# Patient Record
Sex: MALE | Race: WHITE | NOT HISPANIC OR LATINO | Employment: FULL TIME | URBAN - METROPOLITAN AREA
[De-identification: names, ages, dates, MRNs, and addresses within clinical notes are randomized per-mention and may not be internally consistent; named-entity substitution may affect disease eponyms.]

---

## 2022-03-11 ENCOUNTER — HOSPITAL ENCOUNTER (EMERGENCY)
Facility: CLINIC | Age: 37
Discharge: HOME | End: 2022-03-11
Attending: EMERGENCY MEDICINE | Admitting: EMERGENCY MEDICINE
Payer: COMMERCIAL

## 2022-03-11 VITALS
HEART RATE: 81 BPM | TEMPERATURE: 96.8 F | SYSTOLIC BLOOD PRESSURE: 141 MMHG | OXYGEN SATURATION: 98 % | BODY MASS INDEX: 29.39 KG/M2 | RESPIRATION RATE: 16 BRPM | WEIGHT: 217 LBS | HEIGHT: 72 IN | DIASTOLIC BLOOD PRESSURE: 99 MMHG

## 2022-03-11 DIAGNOSIS — F10.930 ALCOHOL WITHDRAWAL SYNDROME WITHOUT COMPLICATION (H): ICD-10-CM

## 2022-03-11 DIAGNOSIS — F10.10 ALCOHOL ABUSE: ICD-10-CM

## 2022-03-11 LAB
ALBUMIN SERPL-MCNC: 3.8 G/DL (ref 3.4–5)
ALP SERPL-CCNC: 113 U/L (ref 40–150)
ALT SERPL W P-5'-P-CCNC: 78 U/L (ref 0–70)
ANION GAP SERPL CALCULATED.3IONS-SCNC: 7 MMOL/L (ref 3–14)
AST SERPL W P-5'-P-CCNC: 115 U/L (ref 0–45)
BASOPHILS # BLD AUTO: 0 10E3/UL (ref 0–0.2)
BASOPHILS NFR BLD AUTO: 1 %
BILIRUB SERPL-MCNC: 1.6 MG/DL (ref 0.2–1.3)
BUN SERPL-MCNC: 6 MG/DL (ref 7–30)
CALCIUM SERPL-MCNC: 9.6 MG/DL (ref 8.5–10.1)
CHLORIDE BLD-SCNC: 101 MMOL/L (ref 94–109)
CO2 SERPL-SCNC: 26 MMOL/L (ref 20–32)
CREAT SERPL-MCNC: 0.56 MG/DL (ref 0.66–1.25)
EOSINOPHIL # BLD AUTO: 0.2 10E3/UL (ref 0–0.7)
EOSINOPHIL NFR BLD AUTO: 3 %
ERYTHROCYTE [DISTWIDTH] IN BLOOD BY AUTOMATED COUNT: 13.4 % (ref 10–15)
ETHANOL SERPL-MCNC: <0.01 G/DL
GFR SERPL CREATININE-BSD FRML MDRD: >90 ML/MIN/1.73M2
GLUCOSE BLD-MCNC: 122 MG/DL (ref 70–99)
HCT VFR BLD AUTO: 48.7 % (ref 40–53)
HGB BLD-MCNC: 16.7 G/DL (ref 13.3–17.7)
HOLD SPECIMEN: NORMAL
IMM GRANULOCYTES # BLD: 0 10E3/UL
IMM GRANULOCYTES NFR BLD: 0 %
LYMPHOCYTES # BLD AUTO: 0.5 10E3/UL (ref 0.8–5.3)
LYMPHOCYTES NFR BLD AUTO: 8 %
MAGNESIUM SERPL-MCNC: 1.4 MG/DL (ref 1.6–2.3)
MCH RBC QN AUTO: 34.7 PG (ref 26.5–33)
MCHC RBC AUTO-ENTMCNC: 34.3 G/DL (ref 31.5–36.5)
MCV RBC AUTO: 101 FL (ref 78–100)
MONOCYTES # BLD AUTO: 0.7 10E3/UL (ref 0–1.3)
MONOCYTES NFR BLD AUTO: 11 %
NEUTROPHILS # BLD AUTO: 4.8 10E3/UL (ref 1.6–8.3)
NEUTROPHILS NFR BLD AUTO: 77 %
NRBC # BLD AUTO: 0 10E3/UL
NRBC BLD AUTO-RTO: 0 /100
PHOSPHATE SERPL-MCNC: 2.6 MG/DL (ref 2.5–4.5)
PLATELET # BLD AUTO: 166 10E3/UL (ref 150–450)
POTASSIUM BLD-SCNC: 3.7 MMOL/L (ref 3.4–5.3)
POTASSIUM BLD-SCNC: 5.4 MMOL/L (ref 3.4–5.3)
PROT SERPL-MCNC: 8.5 G/DL (ref 6.8–8.8)
RBC # BLD AUTO: 4.81 10E6/UL (ref 4.4–5.9)
SODIUM SERPL-SCNC: 134 MMOL/L (ref 133–144)
WBC # BLD AUTO: 6.2 10E3/UL (ref 4–11)

## 2022-03-11 PROCEDURE — 96375 TX/PRO/DX INJ NEW DRUG ADDON: CPT

## 2022-03-11 PROCEDURE — 82040 ASSAY OF SERUM ALBUMIN: CPT | Performed by: EMERGENCY MEDICINE

## 2022-03-11 PROCEDURE — 36415 COLL VENOUS BLD VENIPUNCTURE: CPT | Performed by: EMERGENCY MEDICINE

## 2022-03-11 PROCEDURE — 82077 ASSAY SPEC XCP UR&BREATH IA: CPT | Performed by: EMERGENCY MEDICINE

## 2022-03-11 PROCEDURE — 84100 ASSAY OF PHOSPHORUS: CPT | Performed by: EMERGENCY MEDICINE

## 2022-03-11 PROCEDURE — 258N000003 HC RX IP 258 OP 636: Performed by: EMERGENCY MEDICINE

## 2022-03-11 PROCEDURE — 250N000013 HC RX MED GY IP 250 OP 250 PS 637: Performed by: EMERGENCY MEDICINE

## 2022-03-11 PROCEDURE — 96365 THER/PROPH/DIAG IV INF INIT: CPT

## 2022-03-11 PROCEDURE — 250N000011 HC RX IP 250 OP 636: Performed by: EMERGENCY MEDICINE

## 2022-03-11 PROCEDURE — 84132 ASSAY OF SERUM POTASSIUM: CPT | Performed by: EMERGENCY MEDICINE

## 2022-03-11 PROCEDURE — 99284 EMERGENCY DEPT VISIT MOD MDM: CPT | Mod: 25

## 2022-03-11 PROCEDURE — 80053 COMPREHEN METABOLIC PANEL: CPT | Performed by: EMERGENCY MEDICINE

## 2022-03-11 PROCEDURE — 96361 HYDRATE IV INFUSION ADD-ON: CPT

## 2022-03-11 PROCEDURE — 83735 ASSAY OF MAGNESIUM: CPT | Performed by: EMERGENCY MEDICINE

## 2022-03-11 PROCEDURE — 85025 COMPLETE CBC W/AUTO DIFF WBC: CPT | Performed by: EMERGENCY MEDICINE

## 2022-03-11 RX ORDER — FOLIC ACID 1 MG/1
1 TABLET ORAL ONCE
Status: COMPLETED | OUTPATIENT
Start: 2022-03-11 | End: 2022-03-11

## 2022-03-11 RX ORDER — MULTIPLE VITAMINS W/ MINERALS TAB 9MG-400MCG
1 TAB ORAL ONCE
Status: COMPLETED | OUTPATIENT
Start: 2022-03-11 | End: 2022-03-11

## 2022-03-11 RX ORDER — DIAZEPAM 5 MG
10 TABLET ORAL EVERY 30 MIN PRN
Status: DISCONTINUED | OUTPATIENT
Start: 2022-03-11 | End: 2022-03-11 | Stop reason: HOSPADM

## 2022-03-11 RX ORDER — MAGNESIUM SULFATE HEPTAHYDRATE 500 MG/ML
2 INJECTION, SOLUTION INTRAMUSCULAR; INTRAVENOUS ONCE
Status: DISCONTINUED | OUTPATIENT
Start: 2022-03-11 | End: 2022-03-11 | Stop reason: CLARIF

## 2022-03-11 RX ORDER — CLONIDINE HYDROCHLORIDE 0.1 MG/1
0.1 TABLET ORAL ONCE
Status: COMPLETED | OUTPATIENT
Start: 2022-03-11 | End: 2022-03-11

## 2022-03-11 RX ORDER — MAGNESIUM SULFATE HEPTAHYDRATE 40 MG/ML
2 INJECTION, SOLUTION INTRAVENOUS ONCE
Status: COMPLETED | OUTPATIENT
Start: 2022-03-11 | End: 2022-03-11

## 2022-03-11 RX ORDER — SODIUM CHLORIDE 9 MG/ML
INJECTION, SOLUTION INTRAVENOUS CONTINUOUS
Status: DISCONTINUED | OUTPATIENT
Start: 2022-03-11 | End: 2022-03-11 | Stop reason: HOSPADM

## 2022-03-11 RX ORDER — DIAZEPAM 10 MG/2ML
5-10 INJECTION, SOLUTION INTRAMUSCULAR; INTRAVENOUS EVERY 30 MIN PRN
Status: DISCONTINUED | OUTPATIENT
Start: 2022-03-11 | End: 2022-03-11 | Stop reason: HOSPADM

## 2022-03-11 RX ORDER — FLUMAZENIL 0.1 MG/ML
0.2 INJECTION, SOLUTION INTRAVENOUS
Status: DISCONTINUED | OUTPATIENT
Start: 2022-03-11 | End: 2022-03-11 | Stop reason: HOSPADM

## 2022-03-11 RX ORDER — ONDANSETRON 2 MG/ML
4 INJECTION INTRAMUSCULAR; INTRAVENOUS ONCE
Status: COMPLETED | OUTPATIENT
Start: 2022-03-11 | End: 2022-03-11

## 2022-03-11 RX ADMIN — MAGNESIUM SULFATE HEPTAHYDRATE 2 G: 40 INJECTION, SOLUTION INTRAVENOUS at 11:58

## 2022-03-11 RX ADMIN — SODIUM CHLORIDE: 9 INJECTION, SOLUTION INTRAVENOUS at 11:32

## 2022-03-11 RX ADMIN — FOLIC ACID 1 MG: 1 TABLET ORAL at 10:45

## 2022-03-11 RX ADMIN — SODIUM CHLORIDE 1000 ML: 9 INJECTION, SOLUTION INTRAVENOUS at 10:33

## 2022-03-11 RX ADMIN — THIAMINE HCL TAB 100 MG 100 MG: 100 TAB at 10:44

## 2022-03-11 RX ADMIN — MULTIPLE VITAMINS W/ MINERALS TAB 1 TABLET: TAB at 10:45

## 2022-03-11 RX ADMIN — ONDANSETRON 4 MG: 2 INJECTION INTRAMUSCULAR; INTRAVENOUS at 10:43

## 2022-03-11 RX ADMIN — CLONIDINE HYDROCHLORIDE 0.1 MG: 0.1 TABLET ORAL at 10:44

## 2022-03-11 ASSESSMENT — ENCOUNTER SYMPTOMS
PALPITATIONS: 1
LIGHT-HEADEDNESS: 1
VOMITING: 1
ABDOMINAL PAIN: 1
SEIZURES: 0
SLEEP DISTURBANCE: 1
NUMBNESS: 1

## 2022-03-11 NOTE — ED PROVIDER NOTES
History     Chief Complaint:  Withdrawal, Hypertension, and Numbness    The history is provided by the patient.      Crispin Ham is a 36 year old male with history of alcoholism, fatty liver, obesity, and hypertension who presents with withdrawal, hypertension, and numbness. The patient reports a history of alcoholism, and has been trying to quit, but will keep relapsing and going on a binge, during which time he will drink 1.75 L of vodka within 2-3 days. His last drink was two days ago. He says he stopped because he started vomiting, was unable to sleep, and has a racing heart. His at-home blood pressure this morning was 150/112; a nurse line told him to come into the ED. As he presents to the ED, the patient complains of vomiting, light-headedness, tingling and numbness in his fingers and toes, and difficulty sleeping. Also endorses some mild abdominal pain. No history of pancreatitis or withdrawal seizures. No history of treatment, and does not feel particularly excited to start treatment at this time.     Review of Systems   Cardiovascular: Positive for palpitations.   Gastrointestinal: Positive for abdominal pain and vomiting.   Neurological: Positive for light-headedness and numbness. Negative for seizures.   Psychiatric/Behavioral: Positive for sleep disturbance.     Allergies:  Aspirin  Lisinopril  Nsaids    Medications:    sertraline (ZOLOFT) 50 MG tablet      Past Medical History:    Fatty Livery  Osteoarthritis of left hip   Morbid obesity  Alcoholism  Hypertension  Ganglion cyst  Anxiety  Depression     Past Surgical History:    ORIF left wrist fracture x2    Family History:    Brother: asthma    Social History:  The patient presents to the ED alone.     Physical Exam     Patient Vitals for the past 24 hrs:   BP Temp Pulse Resp SpO2 Height Weight   03/11/22 1130 (!) 150/93 -- 89 18 95 % -- --   03/11/22 1030 (!) 141/116 -- -- 18 96 % -- --   03/11/22 0958 (!) 178/127 96.8  F (36  C) 106 20 96 %  1.829 m (6') 98.4 kg (217 lb)       Physical Exam  GENERAL: well developed, pleasant, tremulous  HEAD: atraumatic  EYES: pupils reactive, extraocular muscles intact, conjunctivae normal  ENT:  mucus membranes moist  NECK:  trachea midline, normal range of motion  RESPIRATORY: no tachypnea, breath sounds clear to auscultation   CVS: normal S1/S2, no murmurs, intact distal pulses  ABDOMEN: soft, nontender, nondistention  MUSCULOSKELETAL: no deformities  SKIN: warm and dry, no acute rashes or ulceration  NEURO: GCS 15, cranial nerves intact, alert and oriented x3  PSYCH:  Mood/affect normal        Emergency Department Course     Laboratory:  Labs Ordered and Resulted from Time of ED Arrival to Time of ED Departure   COMPREHENSIVE METABOLIC PANEL - Abnormal       Result Value    Sodium 134      Potassium 5.4 (*)     Chloride 101      Carbon Dioxide (CO2) 26      Anion Gap 7      Urea Nitrogen 6 (*)     Creatinine 0.56 (*)     Calcium 9.6      Glucose 122 (*)     Alkaline Phosphatase 113       (*)     ALT 78 (*)     Protein Total 8.5      Albumin 3.8      Bilirubin Total 1.6 (*)     GFR Estimate >90     MAGNESIUM - Abnormal    Magnesium 1.4 (*)    CBC WITH PLATELETS AND DIFFERENTIAL - Abnormal    WBC Count 6.2      RBC Count 4.81      Hemoglobin 16.7      Hematocrit 48.7       (*)     MCH 34.7 (*)     MCHC 34.3      RDW 13.4      Platelet Count 166      % Neutrophils 77      % Lymphocytes 8      % Monocytes 11      % Eosinophils 3      % Basophils 1      % Immature Granulocytes 0      NRBCs per 100 WBC 0      Absolute Neutrophils 4.8      Absolute Lymphocytes 0.5 (*)     Absolute Monocytes 0.7      Absolute Eosinophils 0.2      Absolute Basophils 0.0      Absolute Immature Granulocytes 0.0      Absolute NRBCs 0.0     PHOSPHORUS - Normal    Phosphorus 2.6     ETHYL ALCOHOL LEVEL - Normal    Alcohol ethyl <0.01     POTASSIUM - Normal    Potassium 3.7       Emergency Department Course:       Reviewed:  I  reviewed nursing notes, vitals, past history and care everywhere    Assessments:  1005 I obtained history and examined the patient as noted above.   1305 I rechecked the patient and explained findings. Prepared for discharge.         Interventions:  1033 NS 1 L IV  1043 Zofran 4 mg IV  1044 Catapres 0.1 mg PO  1044 Thiamine 100 g PO  1045 Flovite 1 mg PO   1158 Magnesium sulfate 2 g IV   1245 Thera-Vit-M 1 tablet PO     Disposition:  The patient was discharged to home.    Impression & Plan         Medical Decision Making:  Patient presents with alcohol abuse and periodic binge drinking.  He is not suicidal.  He was given IV fluids, catapres, oral multivitamins, Magnesium IV.  CIWA protocol was initiated and did not require valium or ativan.  Discussed treatment/detox which he wasn't interested in.  He is feeling improved.  I discussed options as an outpatient and resources were given.  He notes he is just now admitting his addiction to family and friends.  I welcomed him back if he changes his mind.  I also discussed possible complications of on going alcohol abuse and withdrawal.    Critical Care time:  none    Diagnosis:    ICD-10-CM    1. Alcohol withdrawal syndrome without complication (H)  F10.230    2. Alcohol abuse  F10.10        Discharge Medications:  None.     Scribe Disclosure:  YARITZA, Anirudh Munoz, am serving as a scribe at 10:04 AM on 3/11/2022 to document services personally performed by Leo Parham MD based on my observations and the provider's statements to me.      Leo Parham MD  03/11/22 [id number]

## 2022-03-11 NOTE — ED TRIAGE NOTES
Was binge drinking, last drink was on Wednesday, shaky, numbness to fingers and toes since yesterday. Blood pressure high.

## 2022-03-11 NOTE — ED NOTES
"Pt states he does not want to go to detox \"I can do it on my own\"  Pt at first stated has been only drinking for 3 days then stated \"I have been drinking heavily since college.\"  I have stopped a few times and go back to drinking.  States he has a therapist and is to embarrassed to go to treatment.  \"I almost didn't come but I was vomiting\"  "

## 2022-04-30 ENCOUNTER — HEALTH MAINTENANCE LETTER (OUTPATIENT)
Age: 37
End: 2022-04-30

## 2022-11-20 ENCOUNTER — HEALTH MAINTENANCE LETTER (OUTPATIENT)
Age: 37
End: 2022-11-20

## 2022-12-24 ENCOUNTER — HEALTH MAINTENANCE LETTER (OUTPATIENT)
Age: 37
End: 2022-12-24

## 2023-01-01 ENCOUNTER — APPOINTMENT (OUTPATIENT)
Dept: ULTRASOUND IMAGING | Facility: CLINIC | Age: 38
DRG: 432 | End: 2023-01-01
Attending: PHYSICIAN ASSISTANT
Payer: COMMERCIAL

## 2023-01-01 ENCOUNTER — APPOINTMENT (OUTPATIENT)
Dept: ULTRASOUND IMAGING | Facility: CLINIC | Age: 38
DRG: 432 | End: 2023-01-01
Attending: INTERNAL MEDICINE
Payer: COMMERCIAL

## 2023-01-01 ENCOUNTER — HOSPITAL ENCOUNTER (EMERGENCY)
Facility: CLINIC | Age: 38
Discharge: HOME | End: 2023-10-30
Attending: EMERGENCY MEDICINE | Admitting: EMERGENCY MEDICINE
Payer: COMMERCIAL

## 2023-01-01 ENCOUNTER — APPOINTMENT (OUTPATIENT)
Dept: CARDIOLOGY | Facility: CLINIC | Age: 38
DRG: 432 | End: 2023-01-01
Attending: INTERNAL MEDICINE
Payer: COMMERCIAL

## 2023-01-01 ENCOUNTER — HOSPITAL ENCOUNTER (INPATIENT)
Facility: CLINIC | Age: 38
LOS: 8 days | Discharge: HOME | DRG: 432 | End: 2023-11-24
Attending: EMERGENCY MEDICINE | Admitting: INTERNAL MEDICINE
Payer: COMMERCIAL

## 2023-01-01 ENCOUNTER — APPOINTMENT (OUTPATIENT)
Dept: GENERAL RADIOLOGY | Facility: CLINIC | Age: 38
DRG: 432 | End: 2023-01-01
Attending: INTERNAL MEDICINE
Payer: COMMERCIAL

## 2023-01-01 ENCOUNTER — APPOINTMENT (OUTPATIENT)
Dept: OCCUPATIONAL THERAPY | Facility: CLINIC | Age: 38
DRG: 432 | End: 2023-01-01
Payer: COMMERCIAL

## 2023-01-01 ENCOUNTER — TELEPHONE (OUTPATIENT)
Facility: CLINIC | Age: 38
End: 2023-01-01
Payer: COMMERCIAL

## 2023-01-01 ENCOUNTER — APPOINTMENT (OUTPATIENT)
Dept: OCCUPATIONAL THERAPY | Facility: CLINIC | Age: 38
DRG: 432 | End: 2023-01-01
Attending: PHYSICIAN ASSISTANT
Payer: COMMERCIAL

## 2023-01-01 ENCOUNTER — APPOINTMENT (OUTPATIENT)
Dept: ULTRASOUND IMAGING | Facility: CLINIC | Age: 38
End: 2023-01-01
Attending: PHYSICIAN ASSISTANT
Payer: COMMERCIAL

## 2023-01-01 ENCOUNTER — APPOINTMENT (OUTPATIENT)
Dept: OCCUPATIONAL THERAPY | Facility: CLINIC | Age: 38
DRG: 432 | End: 2023-01-01
Attending: INTERNAL MEDICINE
Payer: COMMERCIAL

## 2023-01-01 ENCOUNTER — TELEPHONE (OUTPATIENT)
Dept: MEDSURG UNIT | Facility: CLINIC | Age: 38
End: 2023-01-01
Payer: COMMERCIAL

## 2023-01-01 ENCOUNTER — HOSPITAL ENCOUNTER (OUTPATIENT)
Facility: CLINIC | Age: 38
Discharge: HOME | End: 2023-11-09
Admitting: RADIOLOGY
Payer: COMMERCIAL

## 2023-01-01 ENCOUNTER — APPOINTMENT (OUTPATIENT)
Dept: PHYSICAL THERAPY | Facility: CLINIC | Age: 38
DRG: 432 | End: 2023-01-01
Payer: COMMERCIAL

## 2023-01-01 ENCOUNTER — PATIENT OUTREACH (OUTPATIENT)
Dept: CARE COORDINATION | Facility: CLINIC | Age: 38
End: 2023-01-01
Payer: COMMERCIAL

## 2023-01-01 ENCOUNTER — APPOINTMENT (OUTPATIENT)
Dept: PHYSICAL THERAPY | Facility: CLINIC | Age: 38
DRG: 432 | End: 2023-01-01
Attending: PHYSICIAN ASSISTANT
Payer: COMMERCIAL

## 2023-01-01 VITALS
RESPIRATION RATE: 18 BRPM | DIASTOLIC BLOOD PRESSURE: 65 MMHG | TEMPERATURE: 97.6 F | HEIGHT: 72 IN | BODY MASS INDEX: 37.93 KG/M2 | OXYGEN SATURATION: 98 % | HEART RATE: 90 BPM | SYSTOLIC BLOOD PRESSURE: 105 MMHG | WEIGHT: 280 LBS

## 2023-01-01 VITALS
HEART RATE: 96 BPM | RESPIRATION RATE: 18 BRPM | HEIGHT: 72 IN | WEIGHT: 248.8 LBS | SYSTOLIC BLOOD PRESSURE: 106 MMHG | OXYGEN SATURATION: 98 % | DIASTOLIC BLOOD PRESSURE: 60 MMHG | BODY MASS INDEX: 33.7 KG/M2 | TEMPERATURE: 98 F

## 2023-01-01 DIAGNOSIS — K76.9 LIVER DISEASE: ICD-10-CM

## 2023-01-01 DIAGNOSIS — E87.8 ELECTROLYTE ABNORMALITY: ICD-10-CM

## 2023-01-01 DIAGNOSIS — F10.10 ALCOHOL ABUSE: ICD-10-CM

## 2023-01-01 DIAGNOSIS — G89.29 CHRONIC BACK PAIN, UNSPECIFIED BACK LOCATION, UNSPECIFIED BACK PAIN LATERALITY: ICD-10-CM

## 2023-01-01 DIAGNOSIS — K72.00 ACUTE LIVER FAILURE WITHOUT HEPATIC COMA: ICD-10-CM

## 2023-01-01 DIAGNOSIS — K70.11 ALCOHOLIC HEPATITIS WITH ASCITES (H): ICD-10-CM

## 2023-01-01 DIAGNOSIS — K26.9 DUODENAL ULCER DISEASE: ICD-10-CM

## 2023-01-01 DIAGNOSIS — K65.2 SBP (SPONTANEOUS BACTERIAL PERITONITIS) (H): ICD-10-CM

## 2023-01-01 DIAGNOSIS — D64.9 ANEMIA, UNSPECIFIED TYPE: Primary | ICD-10-CM

## 2023-01-01 DIAGNOSIS — M54.9 CHRONIC BACK PAIN, UNSPECIFIED BACK LOCATION, UNSPECIFIED BACK PAIN LATERALITY: ICD-10-CM

## 2023-01-01 DIAGNOSIS — I89.0 LYMPHEDEMA: Primary | ICD-10-CM

## 2023-01-01 LAB
% LINING CELLS, BODY FLUID: 10 %
% LINING CELLS, BODY FLUID: 51 %
% LINING CELLS, BODY FLUID: 82 %
ABO/RH(D): NORMAL
ABO/RH(D): NORMAL
ABSOLUTE NEUTROPHILS, BODY FLUID: 1.7 /UL
ABSOLUTE NEUTROPHILS, BODY FLUID: 72 /UL
ALBUMIN BODY FLUID SOURCE: NORMAL
ALBUMIN FLD-MCNC: 0.5 G/DL
ALBUMIN SERPL BCG-MCNC: 2.6 G/DL (ref 3.5–5.2)
ALBUMIN SERPL BCG-MCNC: 2.7 G/DL (ref 3.5–5.2)
ALBUMIN SERPL BCG-MCNC: 2.9 G/DL (ref 3.5–5.2)
ALBUMIN SERPL BCG-MCNC: 3 G/DL (ref 3.5–5.2)
ALBUMIN SERPL BCG-MCNC: 3.1 G/DL (ref 3.5–5.2)
ALBUMIN UR-MCNC: 100 MG/DL
ALLEN'S TEST: YES
ALP SERPL-CCNC: 185 U/L (ref 40–129)
ALP SERPL-CCNC: 245 U/L (ref 40–150)
ALP SERPL-CCNC: 247 U/L (ref 40–150)
ALP SERPL-CCNC: 263 U/L (ref 40–150)
ALP SERPL-CCNC: 269 U/L (ref 40–150)
ALP SERPL-CCNC: 308 U/L (ref 40–150)
ALP SERPL-CCNC: 312 U/L (ref 40–150)
ALP SERPL-CCNC: 330 U/L (ref 40–150)
ALT SERPL W P-5'-P-CCNC: 35 U/L (ref 0–70)
ALT SERPL W P-5'-P-CCNC: 36 U/L (ref 0–70)
ALT SERPL W P-5'-P-CCNC: 36 U/L (ref 0–70)
ALT SERPL W P-5'-P-CCNC: 38 U/L (ref 0–70)
ALT SERPL W P-5'-P-CCNC: 43 U/L (ref 0–70)
ALT SERPL W P-5'-P-CCNC: 46 U/L (ref 0–70)
ALT SERPL W P-5'-P-CCNC: 48 U/L (ref 0–70)
ALT SERPL W P-5'-P-CCNC: 49 U/L (ref 0–70)
AMMONIA PLAS-SCNC: 80 UMOL/L (ref 16–60)
AMMONIA PLAS-SCNC: 82 UMOL/L (ref 16–60)
AMMONIA PLAS-SCNC: 96 UMOL/L (ref 16–60)
ANION GAP SERPL CALCULATED.3IONS-SCNC: 11 MMOL/L (ref 7–15)
ANION GAP SERPL CALCULATED.3IONS-SCNC: 12 MMOL/L (ref 7–15)
ANION GAP SERPL CALCULATED.3IONS-SCNC: 12 MMOL/L (ref 7–15)
ANION GAP SERPL CALCULATED.3IONS-SCNC: 7 MMOL/L (ref 7–15)
ANION GAP SERPL CALCULATED.3IONS-SCNC: 8 MMOL/L (ref 7–15)
ANION GAP SERPL CALCULATED.3IONS-SCNC: 9 MMOL/L (ref 7–15)
ANTIBODY SCREEN: NEGATIVE
ANTIBODY SCREEN: NEGATIVE
APPEARANCE FLD: ABNORMAL
APPEARANCE FLD: ABNORMAL
APPEARANCE FLD: CLEAR
APPEARANCE UR: ABNORMAL
AST SERPL W P-5'-P-CCNC: 113 U/L (ref 0–45)
AST SERPL W P-5'-P-CCNC: 141 U/L (ref 0–45)
AST SERPL W P-5'-P-CCNC: 146 U/L (ref 0–45)
AST SERPL W P-5'-P-CCNC: 192 U/L (ref 0–45)
AST SERPL W P-5'-P-CCNC: 203 U/L (ref 0–45)
AST SERPL W P-5'-P-CCNC: 205 U/L (ref 0–45)
AST SERPL W P-5'-P-CCNC: 222 U/L (ref 0–45)
AST SERPL W P-5'-P-CCNC: ABNORMAL U/L
BACTERIA #/AREA URNS HPF: ABNORMAL /HPF
BACTERIA BLD CULT: NO GROWTH
BACTERIA BLD CULT: NO GROWTH
BACTERIA FLD CULT: NO GROWTH
BACTERIA FLD CULT: NORMAL
BACTERIA PLR CULT: NO GROWTH
BACTERIA PRT CULT: ABNORMAL
BACTERIA PRT CULT: NO GROWTH
BASE EXCESS BLDA CALC-SCNC: 1 MMOL/L (ref -9–1.8)
BASE EXCESS BLDV CALC-SCNC: 2.7 MMOL/L (ref -7.7–1.9)
BASOPHILS # BLD AUTO: 0 10E3/UL (ref 0–0.2)
BASOPHILS # BLD AUTO: 0 10E3/UL (ref 0–0.2)
BASOPHILS # BLD AUTO: 0.1 10E3/UL (ref 0–0.2)
BASOPHILS NFR BLD AUTO: 1 %
BASOPHILS NFR BLD AUTO: 2 %
BASOPHILS NFR FLD MANUAL: 1 %
BASOPHILS NFR FLD MANUAL: 1 %
BASOPHILS NFR FLD MANUAL: 3 %
BILIRUB DIRECT SERPL-MCNC: 3.19 MG/DL (ref 0–0.3)
BILIRUB DIRECT SERPL-MCNC: 3.34 MG/DL (ref 0–0.3)
BILIRUB DIRECT SERPL-MCNC: 3.89 MG/DL (ref 0–0.3)
BILIRUB SERPL-MCNC: 6.1 MG/DL
BILIRUB SERPL-MCNC: 7 MG/DL
BILIRUB SERPL-MCNC: 7.1 MG/DL
BILIRUB SERPL-MCNC: 7.1 MG/DL
BILIRUB SERPL-MCNC: 7.5 MG/DL
BILIRUB SERPL-MCNC: 7.8 MG/DL
BILIRUB SERPL-MCNC: 8.1 MG/DL
BILIRUB SERPL-MCNC: 8.4 MG/DL
BILIRUB UR QL STRIP: ABNORMAL
BLD PROD TYP BPU: NORMAL
BLOOD COMPONENT TYPE: NORMAL
BUN SERPL-MCNC: 18.1 MG/DL (ref 6–20)
BUN SERPL-MCNC: 3.1 MG/DL (ref 6–20)
BUN SERPL-MCNC: 3.3 MG/DL (ref 6–20)
BUN SERPL-MCNC: 3.4 MG/DL (ref 6–20)
BUN SERPL-MCNC: 4.5 MG/DL (ref 6–20)
BUN SERPL-MCNC: 5.3 MG/DL (ref 6–20)
BUN SERPL-MCNC: 5.4 MG/DL (ref 6–20)
BUN SERPL-MCNC: 6.1 MG/DL (ref 6–20)
BUN SERPL-MCNC: 6.7 MG/DL (ref 6–20)
CALCIUM SERPL-MCNC: 8.8 MG/DL (ref 8.6–10)
CALCIUM SERPL-MCNC: 8.9 MG/DL (ref 8.6–10)
CALCIUM SERPL-MCNC: 8.9 MG/DL (ref 8.6–10)
CALCIUM SERPL-MCNC: 9.1 MG/DL (ref 8.6–10)
CALCIUM SERPL-MCNC: 9.1 MG/DL (ref 8.6–10)
CALCIUM SERPL-MCNC: 9.2 MG/DL (ref 8.6–10)
CALCIUM SERPL-MCNC: 9.2 MG/DL (ref 8.6–10)
CALCIUM SERPL-MCNC: 9.3 MG/DL (ref 8.6–10)
CALCIUM SERPL-MCNC: 9.4 MG/DL (ref 8.6–10)
CAOX CRY #/AREA URNS HPF: ABNORMAL /HPF
CELL COUNT BODY FLUID SOURCE: ABNORMAL
CELL COUNT BODY FLUID SOURCE: ABNORMAL
CELL COUNT BODY FLUID SOURCE: NORMAL
CHLORIDE SERPL-SCNC: 102 MMOL/L (ref 98–107)
CHLORIDE SERPL-SCNC: 103 MMOL/L (ref 98–107)
CHLORIDE SERPL-SCNC: 104 MMOL/L (ref 98–107)
CHLORIDE SERPL-SCNC: 104 MMOL/L (ref 98–107)
CHLORIDE SERPL-SCNC: 105 MMOL/L (ref 98–107)
CHLORIDE SERPL-SCNC: 107 MMOL/L (ref 98–107)
CODING SYSTEM: NORMAL
COLOR FLD: ABNORMAL
COLOR FLD: YELLOW
COLOR FLD: YELLOW
COLOR UR AUTO: ABNORMAL
CREAT SERPL-MCNC: 0.4 MG/DL (ref 0.67–1.17)
CREAT SERPL-MCNC: 0.45 MG/DL (ref 0.67–1.17)
CREAT SERPL-MCNC: 0.47 MG/DL (ref 0.67–1.17)
CREAT SERPL-MCNC: 0.49 MG/DL (ref 0.67–1.17)
CREAT SERPL-MCNC: 0.5 MG/DL (ref 0.67–1.17)
CREAT SERPL-MCNC: 0.54 MG/DL (ref 0.67–1.17)
CREAT SERPL-MCNC: 0.56 MG/DL (ref 0.67–1.17)
CREAT SERPL-MCNC: 0.7 MG/DL (ref 0.67–1.17)
CREAT SERPL-MCNC: 0.94 MG/DL (ref 0.67–1.17)
CROSSMATCH: NORMAL
DEPRECATED HCO3 PLAS-SCNC: 20 MMOL/L (ref 22–29)
DEPRECATED HCO3 PLAS-SCNC: 20 MMOL/L (ref 22–29)
DEPRECATED HCO3 PLAS-SCNC: 22 MMOL/L (ref 22–29)
DEPRECATED HCO3 PLAS-SCNC: 24 MMOL/L (ref 22–29)
DEPRECATED HCO3 PLAS-SCNC: 26 MMOL/L (ref 22–29)
DEPRECATED HCO3 PLAS-SCNC: 26 MMOL/L (ref 22–29)
EGFRCR SERPLBLD CKD-EPI 2021: >90 ML/MIN/1.73M2
EOSINOPHIL # BLD AUTO: 0.2 10E3/UL (ref 0–0.7)
EOSINOPHIL # BLD AUTO: 0.2 10E3/UL (ref 0–0.7)
EOSINOPHIL # BLD AUTO: 0.3 10E3/UL (ref 0–0.7)
EOSINOPHIL # BLD AUTO: 0.6 10E3/UL (ref 0–0.7)
EOSINOPHIL NFR BLD AUTO: 3 %
EOSINOPHIL NFR BLD AUTO: 3 %
EOSINOPHIL NFR BLD AUTO: 4 %
EOSINOPHIL NFR BLD AUTO: 6 %
EOSINOPHIL NFR BLD AUTO: 6 %
EOSINOPHIL NFR BLD AUTO: 7 %
EOSINOPHIL NFR FLD MANUAL: 1 %
ERYTHROCYTE [DISTWIDTH] IN BLOOD BY AUTOMATED COUNT: 14.9 % (ref 10–15)
ERYTHROCYTE [DISTWIDTH] IN BLOOD BY AUTOMATED COUNT: 14.9 % (ref 10–15)
ERYTHROCYTE [DISTWIDTH] IN BLOOD BY AUTOMATED COUNT: 15.2 % (ref 10–15)
ERYTHROCYTE [DISTWIDTH] IN BLOOD BY AUTOMATED COUNT: 15.4 % (ref 10–15)
ERYTHROCYTE [DISTWIDTH] IN BLOOD BY AUTOMATED COUNT: 17 % (ref 10–15)
ERYTHROCYTE [DISTWIDTH] IN BLOOD BY AUTOMATED COUNT: 18.1 % (ref 10–15)
ERYTHROCYTE [DISTWIDTH] IN BLOOD BY AUTOMATED COUNT: 18.4 % (ref 10–15)
ETHANOL SERPL-MCNC: 0.22 G/DL
FERRITIN SERPL-MCNC: 3593 NG/ML (ref 31–409)
FOLATE SERPL-MCNC: 7.6 NG/ML (ref 4.6–34.8)
GLUCOSE BLDC GLUCOMTR-MCNC: 108 MG/DL (ref 70–99)
GLUCOSE BLDC GLUCOMTR-MCNC: 116 MG/DL (ref 70–99)
GLUCOSE BODY FLUID SOURCE: NORMAL
GLUCOSE FLD-MCNC: 123 MG/DL
GLUCOSE SERPL-MCNC: 100 MG/DL (ref 70–99)
GLUCOSE SERPL-MCNC: 101 MG/DL (ref 70–99)
GLUCOSE SERPL-MCNC: 102 MG/DL (ref 70–99)
GLUCOSE SERPL-MCNC: 102 MG/DL (ref 70–99)
GLUCOSE SERPL-MCNC: 115 MG/DL (ref 70–99)
GLUCOSE SERPL-MCNC: 118 MG/DL (ref 70–99)
GLUCOSE SERPL-MCNC: 127 MG/DL (ref 70–99)
GLUCOSE SERPL-MCNC: 135 MG/DL (ref 70–99)
GLUCOSE SERPL-MCNC: 98 MG/DL (ref 70–99)
GLUCOSE UR STRIP-MCNC: 50 MG/DL
GRAM STAIN RESULT: NORMAL
GRANULAR CAST: 20 /LPF
HCO3 BLD-SCNC: 25 MMOL/L (ref 21–28)
HCO3 BLDV-SCNC: 27 MMOL/L (ref 21–28)
HCT VFR BLD AUTO: 19.6 % (ref 40–53)
HCT VFR BLD AUTO: 20.3 % (ref 40–53)
HCT VFR BLD AUTO: 20.6 % (ref 40–53)
HCT VFR BLD AUTO: 20.6 % (ref 40–53)
HCT VFR BLD AUTO: 21 % (ref 40–53)
HCT VFR BLD AUTO: 21.6 % (ref 40–53)
HCT VFR BLD AUTO: 22.9 % (ref 40–53)
HGB BLD-MCNC: 6.7 G/DL (ref 13.3–17.7)
HGB BLD-MCNC: 6.9 G/DL (ref 13.3–17.7)
HGB BLD-MCNC: 7 G/DL (ref 13.3–17.7)
HGB BLD-MCNC: 7.1 G/DL (ref 13.3–17.7)
HGB BLD-MCNC: 7.2 G/DL (ref 13.3–17.7)
HGB BLD-MCNC: 7.4 G/DL (ref 13.3–17.7)
HGB BLD-MCNC: 7.4 G/DL (ref 13.3–17.7)
HGB BLD-MCNC: 7.5 G/DL (ref 13.3–17.7)
HGB BLD-MCNC: 7.6 G/DL (ref 13.3–17.7)
HGB BLD-MCNC: 7.7 G/DL (ref 13.3–17.7)
HGB BLD-MCNC: 7.8 G/DL (ref 13.3–17.7)
HGB BLD-MCNC: 8 G/DL (ref 13.3–17.7)
HGB BLD-MCNC: 8.1 G/DL (ref 13.3–17.7)
HGB BLD-MCNC: 8.2 G/DL (ref 13.3–17.7)
HGB BLD-MCNC: 8.4 G/DL (ref 13.3–17.7)
HGB BLD-MCNC: 8.5 G/DL (ref 13.3–17.7)
HGB BLD-MCNC: 8.8 G/DL (ref 13.3–17.7)
HGB UR QL STRIP: ABNORMAL
HOLD SPECIMEN: NORMAL
HYALINE CASTS: 20 /LPF
IMM GRANULOCYTES # BLD: 0 10E3/UL
IMM GRANULOCYTES # BLD: 0.1 10E3/UL
IMM GRANULOCYTES # BLD: 0.1 10E3/UL
IMM GRANULOCYTES NFR BLD: 0 %
IMM GRANULOCYTES NFR BLD: 1 %
INR PPP: 2.65 (ref 0.85–1.15)
INR PPP: 2.68 (ref 0.85–1.15)
INR PPP: 2.88 (ref 0.85–1.15)
INR PPP: 3.01 (ref 0.85–1.15)
IRON BINDING CAPACITY (ROCHE): NORMAL
IRON SATN MFR SERPL: NORMAL %
IRON SERPL-MCNC: 96 UG/DL (ref 61–157)
ISSUE DATE AND TIME: NORMAL
KETONES UR STRIP-MCNC: NEGATIVE MG/DL
LACTATE SERPL-SCNC: 2.4 MMOL/L (ref 0.7–2)
LACTATE SERPL-SCNC: 3 MMOL/L (ref 0.7–2)
LACTATE SERPL-SCNC: 3.8 MMOL/L (ref 0.7–2)
LACTATE SERPL-SCNC: 4.3 MMOL/L (ref 0.7–2)
LACTATE SERPL-SCNC: 4.6 MMOL/L (ref 0.7–2)
LACTATE SERPL-SCNC: 4.9 MMOL/L (ref 0.7–2)
LD BODY BODY FLUID SOURCE: NORMAL
LDH FLD L TO P-CCNC: 63 U/L
LDH SERPL L TO P-CCNC: 189 U/L (ref 0–250)
LEUKOCYTE ESTERASE UR QL STRIP: NEGATIVE
LVEF ECHO: NORMAL
LYMPHOCYTES # BLD AUTO: 0.7 10E3/UL (ref 0.8–5.3)
LYMPHOCYTES # BLD AUTO: 1.3 10E3/UL (ref 0.8–5.3)
LYMPHOCYTES # BLD AUTO: 1.3 10E3/UL (ref 0.8–5.3)
LYMPHOCYTES NFR BLD AUTO: 13 %
LYMPHOCYTES NFR BLD AUTO: 16 %
LYMPHOCYTES NFR BLD AUTO: 18 %
LYMPHOCYTES NFR BLD AUTO: 9 %
LYMPHOCYTES NFR FLD MANUAL: 15 %
LYMPHOCYTES NFR FLD MANUAL: 37 %
LYMPHOCYTES NFR FLD MANUAL: 5 %
MAGNESIUM SERPL-MCNC: 1.3 MG/DL (ref 1.7–2.3)
MAGNESIUM SERPL-MCNC: 1.5 MG/DL (ref 1.7–2.3)
MAGNESIUM SERPL-MCNC: 1.5 MG/DL (ref 1.7–2.3)
MAGNESIUM SERPL-MCNC: 1.6 MG/DL (ref 1.7–2.3)
MAGNESIUM SERPL-MCNC: 1.6 MG/DL (ref 1.7–2.3)
MAGNESIUM SERPL-MCNC: 1.7 MG/DL (ref 1.7–2.3)
MAGNESIUM SERPL-MCNC: 1.8 MG/DL (ref 1.7–2.3)
MAGNESIUM SERPL-MCNC: 1.8 MG/DL (ref 1.7–2.3)
MAGNESIUM SERPL-MCNC: 2 MG/DL (ref 1.7–2.3)
MAGNESIUM SERPL-MCNC: 2.4 MG/DL (ref 1.7–2.3)
MCH RBC QN AUTO: 34.4 PG (ref 26.5–33)
MCH RBC QN AUTO: 34.5 PG (ref 26.5–33)
MCH RBC QN AUTO: 34.8 PG (ref 26.5–33)
MCH RBC QN AUTO: 35.4 PG (ref 26.5–33)
MCH RBC QN AUTO: 36 PG (ref 26.5–33)
MCH RBC QN AUTO: 36.1 PG (ref 26.5–33)
MCH RBC QN AUTO: 36.1 PG (ref 26.5–33)
MCHC RBC AUTO-ENTMCNC: 33.8 G/DL (ref 31.5–36.5)
MCHC RBC AUTO-ENTMCNC: 34 G/DL (ref 31.5–36.5)
MCHC RBC AUTO-ENTMCNC: 34 G/DL (ref 31.5–36.5)
MCHC RBC AUTO-ENTMCNC: 34.2 G/DL (ref 31.5–36.5)
MCHC RBC AUTO-ENTMCNC: 34.3 G/DL (ref 31.5–36.5)
MCHC RBC AUTO-ENTMCNC: 34.5 G/DL (ref 31.5–36.5)
MCHC RBC AUTO-ENTMCNC: 35.4 G/DL (ref 31.5–36.5)
MCV RBC AUTO: 101 FL (ref 78–100)
MCV RBC AUTO: 101 FL (ref 78–100)
MCV RBC AUTO: 102 FL (ref 78–100)
MCV RBC AUTO: 103 FL (ref 78–100)
MCV RBC AUTO: 103 FL (ref 78–100)
MCV RBC AUTO: 106 FL (ref 78–100)
MCV RBC AUTO: 106 FL (ref 78–100)
MONOCYTES # BLD AUTO: 0.4 10E3/UL (ref 0–1.3)
MONOCYTES # BLD AUTO: 0.4 10E3/UL (ref 0–1.3)
MONOCYTES # BLD AUTO: 0.5 10E3/UL (ref 0–1.3)
MONOCYTES # BLD AUTO: 0.7 10E3/UL (ref 0–1.3)
MONOCYTES # BLD AUTO: 0.8 10E3/UL (ref 0–1.3)
MONOCYTES # BLD AUTO: 1 10E3/UL (ref 0–1.3)
MONOCYTES NFR BLD AUTO: 11 %
MONOCYTES NFR BLD AUTO: 8 %
MONOCYTES NFR BLD AUTO: 9 %
MONOCYTES NFR BLD AUTO: 9 %
MONOS+MACROS NFR FLD MANUAL: 12 %
MONOS+MACROS NFR FLD MANUAL: 61 %
MONOS+MACROS NFR FLD MANUAL: 9 %
MUCOUS THREADS #/AREA URNS LPF: PRESENT /LPF
NEUTROPHILS # BLD AUTO: 2.5 10E3/UL (ref 1.6–8.3)
NEUTROPHILS # BLD AUTO: 2.6 10E3/UL (ref 1.6–8.3)
NEUTROPHILS # BLD AUTO: 4.1 10E3/UL (ref 1.6–8.3)
NEUTROPHILS # BLD AUTO: 5.9 10E3/UL (ref 1.6–8.3)
NEUTROPHILS # BLD AUTO: 6.7 10E3/UL (ref 1.6–8.3)
NEUTROPHILS # BLD AUTO: 7.7 10E3/UL (ref 1.6–8.3)
NEUTROPHILS NFR BLD AUTO: 63 %
NEUTROPHILS NFR BLD AUTO: 66 %
NEUTROPHILS NFR BLD AUTO: 68 %
NEUTROPHILS NFR BLD AUTO: 72 %
NEUTROPHILS NFR BLD AUTO: 74 %
NEUTROPHILS NFR BLD AUTO: 77 %
NEUTS BAND NFR FLD MANUAL: 1 %
NEUTS BAND NFR FLD MANUAL: 11 %
NEUTS BAND NFR FLD MANUAL: NORMAL %
NITRATE UR QL: NEGATIVE
NRBC # BLD AUTO: 0 10E3/UL
NRBC BLD AUTO-RTO: 0 /100
NT-PROBNP SERPL-MCNC: 93 PG/ML (ref 0–450)
O2/TOTAL GAS SETTING VFR VENT: 1 %
O2/TOTAL GAS SETTING VFR VENT: 8 %
OXYHGB MFR BLD: 97 % (ref 92–100)
PATH REPORT.COMMENTS IMP SPEC: NORMAL
PATH REPORT.FINAL DX SPEC: NORMAL
PATH REPORT.GROSS SPEC: NORMAL
PATH REPORT.MICROSCOPIC SPEC OTHER STN: NORMAL
PATH REPORT.RELEVANT HX SPEC: NORMAL
PCO2 BLD: 34 MM HG (ref 35–45)
PCO2 BLDV: 39 MM HG (ref 40–50)
PH BLD: 7.47 [PH] (ref 7.35–7.45)
PH BLDV: 7.45 [PH] (ref 7.32–7.43)
PH UR STRIP: 6 [PH] (ref 5–7)
PHOSPHATE SERPL-MCNC: 2.4 MG/DL (ref 2.5–4.5)
PHOSPHATE SERPL-MCNC: 2.8 MG/DL (ref 2.5–4.5)
PHOSPHATE SERPL-MCNC: 2.8 MG/DL (ref 2.5–4.5)
PHOSPHATE SERPL-MCNC: 2.9 MG/DL (ref 2.5–4.5)
PHOSPHATE SERPL-MCNC: 2.9 MG/DL (ref 2.5–4.5)
PHOSPHATE SERPL-MCNC: 3.1 MG/DL (ref 2.5–4.5)
PHOSPHATE SERPL-MCNC: 3.5 MG/DL (ref 2.5–4.5)
PHOSPHATE SERPL-MCNC: 3.5 MG/DL (ref 2.5–4.5)
PLATELET # BLD AUTO: 101 10E3/UL (ref 150–450)
PLATELET # BLD AUTO: 138 10E3/UL (ref 150–450)
PLATELET # BLD AUTO: 221 10E3/UL (ref 150–450)
PLATELET # BLD AUTO: 221 10E3/UL (ref 150–450)
PLATELET # BLD AUTO: 62 10E3/UL (ref 150–450)
PLATELET # BLD AUTO: 63 10E3/UL (ref 150–450)
PLATELET # BLD AUTO: 74 10E3/UL (ref 150–450)
PLATELET # BLD AUTO: 83 10E3/UL (ref 150–450)
PO2 BLD: 105 MM HG (ref 80–105)
PO2 BLDV: 43 MM HG (ref 25–47)
POTASSIUM SERPL-SCNC: 2.9 MMOL/L (ref 3.4–5.3)
POTASSIUM SERPL-SCNC: 3.1 MMOL/L (ref 3.4–5.3)
POTASSIUM SERPL-SCNC: 3.2 MMOL/L (ref 3.4–5.3)
POTASSIUM SERPL-SCNC: 3.3 MMOL/L (ref 3.4–5.3)
POTASSIUM SERPL-SCNC: 3.3 MMOL/L (ref 3.4–5.3)
POTASSIUM SERPL-SCNC: 3.4 MMOL/L (ref 3.4–5.3)
POTASSIUM SERPL-SCNC: 3.5 MMOL/L (ref 3.4–5.3)
POTASSIUM SERPL-SCNC: 3.6 MMOL/L (ref 3.4–5.3)
POTASSIUM SERPL-SCNC: 3.6 MMOL/L (ref 3.4–5.3)
POTASSIUM SERPL-SCNC: 3.8 MMOL/L (ref 3.4–5.3)
PROT FLD-MCNC: 1.1 G/DL
PROT FLD-MCNC: 1.6 G/DL
PROT SERPL-MCNC: 5.9 G/DL (ref 6.4–8.3)
PROT SERPL-MCNC: 6.1 G/DL (ref 6.4–8.3)
PROT SERPL-MCNC: 6.4 G/DL (ref 6.4–8.3)
PROT SERPL-MCNC: 6.8 G/DL (ref 6.4–8.3)
PROT SERPL-MCNC: 6.8 G/DL (ref 6.4–8.3)
PROT SERPL-MCNC: 7 G/DL (ref 6.4–8.3)
PROT SERPL-MCNC: 7.5 G/DL (ref 6.4–8.3)
PROTEIN BODY FLUID SOURCE: NORMAL
PROTEIN BODY FLUID SOURCE: NORMAL
RBC # BLD AUTO: 1.94 10E6/UL (ref 4.4–5.9)
RBC # BLD AUTO: 1.98 10E6/UL (ref 4.4–5.9)
RBC # BLD AUTO: 2.04 10E6/UL (ref 4.4–5.9)
RBC # BLD AUTO: 2.15 10E6/UL (ref 4.4–5.9)
RBC # BLD AUTO: 2.25 10E6/UL (ref 4.4–5.9)
RBC URINE: 6 /HPF
SODIUM SERPL-SCNC: 135 MMOL/L (ref 135–145)
SODIUM SERPL-SCNC: 136 MMOL/L (ref 135–145)
SODIUM SERPL-SCNC: 137 MMOL/L (ref 135–145)
SODIUM SERPL-SCNC: 139 MMOL/L (ref 135–145)
SP GR UR STRIP: 1.03 (ref 1–1.03)
SPECIMEN EXPIRATION DATE: NORMAL
SPECIMEN EXPIRATION DATE: NORMAL
SQUAMOUS EPITHELIAL: <1 /HPF
UNIT ABO/RH: NORMAL
UNIT NUMBER: NORMAL
UNIT STATUS: NORMAL
UNIT TYPE ISBT: 600
UNIT TYPE ISBT: 6200
UROBILINOGEN UR STRIP-MCNC: 12 MG/DL
VIT B12 SERPL-MCNC: 1478 PG/ML (ref 232–1245)
WBC # BLD AUTO: 10.3 10E3/UL (ref 4–11)
WBC # BLD AUTO: 3.9 10E3/UL (ref 4–11)
WBC # BLD AUTO: 4 10E3/UL (ref 4–11)
WBC # BLD AUTO: 5.6 10E3/UL (ref 4–11)
WBC # BLD AUTO: 7.6 10E3/UL (ref 4–11)
WBC # BLD AUTO: 9.6 10E3/UL (ref 4–11)
WBC # BLD AUTO: 9.6 10E3/UL (ref 4–11)
WBC # FLD AUTO: 169 /UL
WBC # FLD AUTO: 648 /UL
WBC # FLD AUTO: 809 /UL
WBC URINE: 8 /HPF

## 2023-01-01 PROCEDURE — 36415 COLL VENOUS BLD VENIPUNCTURE: CPT

## 2023-01-01 PROCEDURE — 250N000011 HC RX IP 250 OP 636: Mod: JZ | Performed by: PHYSICIAN ASSISTANT

## 2023-01-01 PROCEDURE — 120N000013 HC R&B IMCU

## 2023-01-01 PROCEDURE — 250N000013 HC RX MED GY IP 250 OP 250 PS 637: Performed by: NURSE PRACTITIONER

## 2023-01-01 PROCEDURE — 96365 THER/PROPH/DIAG IV INF INIT: CPT

## 2023-01-01 PROCEDURE — 250N000013 HC RX MED GY IP 250 OP 250 PS 637: Performed by: INTERNAL MEDICINE

## 2023-01-01 PROCEDURE — 258N000003 HC RX IP 258 OP 636: Performed by: INTERNAL MEDICINE

## 2023-01-01 PROCEDURE — 250N000011 HC RX IP 250 OP 636: Mod: JZ | Performed by: HOSPITALIST

## 2023-01-01 PROCEDURE — 84100 ASSAY OF PHOSPHORUS: CPT | Performed by: HOSPITALIST

## 2023-01-01 PROCEDURE — 82140 ASSAY OF AMMONIA: CPT | Performed by: PHYSICIAN ASSISTANT

## 2023-01-01 PROCEDURE — 36415 COLL VENOUS BLD VENIPUNCTURE: CPT | Performed by: EMERGENCY MEDICINE

## 2023-01-01 PROCEDURE — 258N000003 HC RX IP 258 OP 636: Performed by: PHYSICIAN ASSISTANT

## 2023-01-01 PROCEDURE — 36600 WITHDRAWAL OF ARTERIAL BLOOD: CPT

## 2023-01-01 PROCEDURE — HZ2ZZZZ DETOXIFICATION SERVICES FOR SUBSTANCE ABUSE TREATMENT: ICD-10-PCS | Performed by: INTERNAL MEDICINE

## 2023-01-01 PROCEDURE — 97110 THERAPEUTIC EXERCISES: CPT | Mod: GP

## 2023-01-01 PROCEDURE — 85018 HEMOGLOBIN: CPT | Performed by: HOSPITALIST

## 2023-01-01 PROCEDURE — 97535 SELF CARE MNGMENT TRAINING: CPT | Mod: GO | Performed by: OCCUPATIONAL THERAPIST

## 2023-01-01 PROCEDURE — 84132 ASSAY OF SERUM POTASSIUM: CPT | Performed by: INTERNAL MEDICINE

## 2023-01-01 PROCEDURE — 250N000013 HC RX MED GY IP 250 OP 250 PS 637: Performed by: PHYSICIAN ASSISTANT

## 2023-01-01 PROCEDURE — 81001 URINALYSIS AUTO W/SCOPE: CPT | Performed by: EMERGENCY MEDICINE

## 2023-01-01 PROCEDURE — 94640 AIRWAY INHALATION TREATMENT: CPT | Mod: 76

## 2023-01-01 PROCEDURE — 85018 HEMOGLOBIN: CPT | Performed by: INTERNAL MEDICINE

## 2023-01-01 PROCEDURE — 83880 ASSAY OF NATRIURETIC PEPTIDE: CPT | Performed by: INTERNAL MEDICINE

## 2023-01-01 PROCEDURE — 85610 PROTHROMBIN TIME: CPT | Performed by: EMERGENCY MEDICINE

## 2023-01-01 PROCEDURE — 93306 TTE W/DOPPLER COMPLETE: CPT

## 2023-01-01 PROCEDURE — 87205 SMEAR GRAM STAIN: CPT | Performed by: PHYSICIAN ASSISTANT

## 2023-01-01 PROCEDURE — 99239 HOSP IP/OBS DSCHRG MGMT >30: CPT | Performed by: HOSPITALIST

## 2023-01-01 PROCEDURE — 36415 COLL VENOUS BLD VENIPUNCTURE: CPT | Performed by: INTERNAL MEDICINE

## 2023-01-01 PROCEDURE — 88305 TISSUE EXAM BY PATHOLOGIST: CPT | Mod: TC | Performed by: INTERNAL MEDICINE

## 2023-01-01 PROCEDURE — 84157 ASSAY OF PROTEIN OTHER: CPT | Performed by: INTERNAL MEDICINE

## 2023-01-01 PROCEDURE — 94640 AIRWAY INHALATION TREATMENT: CPT

## 2023-01-01 PROCEDURE — 84155 ASSAY OF PROTEIN SERUM: CPT | Performed by: HOSPITALIST

## 2023-01-01 PROCEDURE — 97116 GAIT TRAINING THERAPY: CPT | Mod: GP | Performed by: PHYSICAL THERAPIST

## 2023-01-01 PROCEDURE — 87070 CULTURE OTHR SPECIMN AEROBIC: CPT | Performed by: PHYSICIAN ASSISTANT

## 2023-01-01 PROCEDURE — 0W9B3ZZ DRAINAGE OF LEFT PLEURAL CAVITY, PERCUTANEOUS APPROACH: ICD-10-PCS | Performed by: RADIOLOGY

## 2023-01-01 PROCEDURE — 84157 ASSAY OF PROTEIN OTHER: CPT | Performed by: PHYSICIAN ASSISTANT

## 2023-01-01 PROCEDURE — 250N000013 HC RX MED GY IP 250 OP 250 PS 637: Performed by: HOSPITALIST

## 2023-01-01 PROCEDURE — 97116 GAIT TRAINING THERAPY: CPT | Mod: GP

## 2023-01-01 PROCEDURE — 76700 US EXAM ABDOM COMPLETE: CPT

## 2023-01-01 PROCEDURE — 250N000009 HC RX 250: Performed by: RADIOLOGY

## 2023-01-01 PROCEDURE — 84132 ASSAY OF SERUM POTASSIUM: CPT | Performed by: HOSPITALIST

## 2023-01-01 PROCEDURE — 120N000001 HC R&B MED SURG/OB

## 2023-01-01 PROCEDURE — 272N000706 US PARACENTESIS WITHOUT ALBUMIN

## 2023-01-01 PROCEDURE — P9047 ALBUMIN (HUMAN), 25%, 50ML: HCPCS | Performed by: INTERNAL MEDICINE

## 2023-01-01 PROCEDURE — 83605 ASSAY OF LACTIC ACID: CPT | Performed by: PHYSICIAN ASSISTANT

## 2023-01-01 PROCEDURE — 97140 MANUAL THERAPY 1/> REGIONS: CPT | Mod: GO

## 2023-01-01 PROCEDURE — 84100 ASSAY OF PHOSPHORUS: CPT | Performed by: INTERNAL MEDICINE

## 2023-01-01 PROCEDURE — 94660 CPAP INITIATION&MGMT: CPT

## 2023-01-01 PROCEDURE — 999N000127 HC STATISTIC PERIPHERAL IV START W US GUIDANCE

## 2023-01-01 PROCEDURE — 250N000009 HC RX 250: Performed by: INTERNAL MEDICINE

## 2023-01-01 PROCEDURE — 99233 SBSQ HOSP IP/OBS HIGH 50: CPT | Performed by: HOSPITALIST

## 2023-01-01 PROCEDURE — 83735 ASSAY OF MAGNESIUM: CPT | Performed by: INTERNAL MEDICINE

## 2023-01-01 PROCEDURE — 84460 ALANINE AMINO (ALT) (SGPT): CPT | Performed by: INTERNAL MEDICINE

## 2023-01-01 PROCEDURE — 93005 ELECTROCARDIOGRAM TRACING: CPT

## 2023-01-01 PROCEDURE — 89050 BODY FLUID CELL COUNT: CPT | Performed by: PHYSICIAN ASSISTANT

## 2023-01-01 PROCEDURE — 97166 OT EVAL MOD COMPLEX 45 MIN: CPT | Mod: GO

## 2023-01-01 PROCEDURE — 99232 SBSQ HOSP IP/OBS MODERATE 35: CPT | Performed by: HOSPITALIST

## 2023-01-01 PROCEDURE — 36430 TRANSFUSION BLD/BLD COMPNT: CPT

## 2023-01-01 PROCEDURE — 5A09357 ASSISTANCE WITH RESPIRATORY VENTILATION, LESS THAN 24 CONSECUTIVE HOURS, CONTINUOUS POSITIVE AIRWAY PRESSURE: ICD-10-PCS

## 2023-01-01 PROCEDURE — 80053 COMPREHEN METABOLIC PANEL: CPT | Performed by: EMERGENCY MEDICINE

## 2023-01-01 PROCEDURE — 83735 ASSAY OF MAGNESIUM: CPT | Performed by: HOSPITALIST

## 2023-01-01 PROCEDURE — 250N000011 HC RX IP 250 OP 636: Performed by: PHYSICIAN ASSISTANT

## 2023-01-01 PROCEDURE — 82945 GLUCOSE OTHER FLUID: CPT | Performed by: INTERNAL MEDICINE

## 2023-01-01 PROCEDURE — 999N000128 HC STATISTIC PERIPHERAL IV START W/O US GUIDANCE

## 2023-01-01 PROCEDURE — 83605 ASSAY OF LACTIC ACID: CPT | Performed by: EMERGENCY MEDICINE

## 2023-01-01 PROCEDURE — 83605 ASSAY OF LACTIC ACID: CPT | Performed by: INTERNAL MEDICINE

## 2023-01-01 PROCEDURE — 36415 COLL VENOUS BLD VENIPUNCTURE: CPT | Performed by: HOSPITALIST

## 2023-01-01 PROCEDURE — 85027 COMPLETE CBC AUTOMATED: CPT | Performed by: EMERGENCY MEDICINE

## 2023-01-01 PROCEDURE — 89051 BODY FLUID CELL COUNT: CPT | Performed by: INTERNAL MEDICINE

## 2023-01-01 PROCEDURE — 71045 X-RAY EXAM CHEST 1 VIEW: CPT

## 2023-01-01 PROCEDURE — 80069 RENAL FUNCTION PANEL: CPT | Performed by: HOSPITALIST

## 2023-01-01 PROCEDURE — 0W9G3ZZ DRAINAGE OF PERITONEAL CAVITY, PERCUTANEOUS APPROACH: ICD-10-PCS | Performed by: RADIOLOGY

## 2023-01-01 PROCEDURE — 82248 BILIRUBIN DIRECT: CPT | Performed by: INTERNAL MEDICINE

## 2023-01-01 PROCEDURE — 85025 COMPLETE CBC W/AUTO DIFF WBC: CPT | Performed by: INTERNAL MEDICINE

## 2023-01-01 PROCEDURE — 88305 TISSUE EXAM BY PATHOLOGIST: CPT | Mod: 26 | Performed by: PATHOLOGY

## 2023-01-01 PROCEDURE — 85610 PROTHROMBIN TIME: CPT | Performed by: INTERNAL MEDICINE

## 2023-01-01 PROCEDURE — 82728 ASSAY OF FERRITIN: CPT | Performed by: EMERGENCY MEDICINE

## 2023-01-01 PROCEDURE — 250N000011 HC RX IP 250 OP 636: Performed by: INTERNAL MEDICINE

## 2023-01-01 PROCEDURE — 97535 SELF CARE MNGMENT TRAINING: CPT | Mod: GO

## 2023-01-01 PROCEDURE — 999N000157 HC STATISTIC RCP TIME EA 10 MIN

## 2023-01-01 PROCEDURE — 87075 CULTR BACTERIA EXCEPT BLOOD: CPT | Performed by: PHYSICIAN ASSISTANT

## 2023-01-01 PROCEDURE — 84155 ASSAY OF PROTEIN SERUM: CPT | Performed by: INTERNAL MEDICINE

## 2023-01-01 PROCEDURE — 99285 EMERGENCY DEPT VISIT HI MDM: CPT | Mod: 25

## 2023-01-01 PROCEDURE — 82746 ASSAY OF FOLIC ACID SERUM: CPT | Performed by: EMERGENCY MEDICINE

## 2023-01-01 PROCEDURE — 97530 THERAPEUTIC ACTIVITIES: CPT | Mod: GP | Performed by: PHYSICAL THERAPIST

## 2023-01-01 PROCEDURE — 80053 COMPREHEN METABOLIC PANEL: CPT | Performed by: PHYSICIAN ASSISTANT

## 2023-01-01 PROCEDURE — 82805 BLOOD GASES W/O2 SATURATION: CPT

## 2023-01-01 PROCEDURE — 97140 MANUAL THERAPY 1/> REGIONS: CPT | Mod: GO | Performed by: OCCUPATIONAL THERAPIST

## 2023-01-01 PROCEDURE — 80048 BASIC METABOLIC PNL TOTAL CA: CPT | Performed by: HOSPITALIST

## 2023-01-01 PROCEDURE — 85004 AUTOMATED DIFF WBC COUNT: CPT | Performed by: EMERGENCY MEDICINE

## 2023-01-01 PROCEDURE — P9059 PLASMA, FRZ BETWEEN 8-24HOUR: HCPCS | Performed by: INTERNAL MEDICINE

## 2023-01-01 PROCEDURE — 85025 COMPLETE CBC W/AUTO DIFF WBC: CPT | Performed by: EMERGENCY MEDICINE

## 2023-01-01 PROCEDURE — 99233 SBSQ HOSP IP/OBS HIGH 50: CPT | Performed by: INTERNAL MEDICINE

## 2023-01-01 PROCEDURE — 250N000011 HC RX IP 250 OP 636: Mod: JZ | Performed by: INTERNAL MEDICINE

## 2023-01-01 PROCEDURE — 99291 CRITICAL CARE FIRST HOUR: CPT

## 2023-01-01 PROCEDURE — 86901 BLOOD TYPING SEROLOGIC RH(D): CPT | Performed by: EMERGENCY MEDICINE

## 2023-01-01 PROCEDURE — 97530 THERAPEUTIC ACTIVITIES: CPT | Mod: GP

## 2023-01-01 PROCEDURE — 82803 BLOOD GASES ANY COMBINATION: CPT

## 2023-01-01 PROCEDURE — 88112 CYTOPATH CELL ENHANCE TECH: CPT | Mod: 26 | Performed by: PATHOLOGY

## 2023-01-01 PROCEDURE — 999N000065 XR CHEST PORT 1 VIEW

## 2023-01-01 PROCEDURE — 83735 ASSAY OF MAGNESIUM: CPT | Performed by: PHYSICIAN ASSISTANT

## 2023-01-01 PROCEDURE — 97161 PT EVAL LOW COMPLEX 20 MIN: CPT | Mod: GP

## 2023-01-01 PROCEDURE — 258N000003 HC RX IP 258 OP 636: Performed by: EMERGENCY MEDICINE

## 2023-01-01 PROCEDURE — 89050 BODY FLUID CELL COUNT: CPT | Performed by: INTERNAL MEDICINE

## 2023-01-01 PROCEDURE — 86850 RBC ANTIBODY SCREEN: CPT | Performed by: EMERGENCY MEDICINE

## 2023-01-01 PROCEDURE — C9113 INJ PANTOPRAZOLE SODIUM, VIA: HCPCS | Mod: JZ | Performed by: INTERNAL MEDICINE

## 2023-01-01 PROCEDURE — 99223 1ST HOSP IP/OBS HIGH 75: CPT | Performed by: PHYSICIAN ASSISTANT

## 2023-01-01 PROCEDURE — 272N000706 US PARACENTESIS WITH ALBUMIN

## 2023-01-01 PROCEDURE — 86923 COMPATIBILITY TEST ELECTRIC: CPT | Performed by: EMERGENCY MEDICINE

## 2023-01-01 PROCEDURE — 83550 IRON BINDING TEST: CPT | Performed by: EMERGENCY MEDICINE

## 2023-01-01 PROCEDURE — 82607 VITAMIN B-12: CPT | Performed by: EMERGENCY MEDICINE

## 2023-01-01 PROCEDURE — 83615 LACTATE (LD) (LDH) ENZYME: CPT | Performed by: INTERNAL MEDICINE

## 2023-01-01 PROCEDURE — P9016 RBC LEUKOCYTES REDUCED: HCPCS | Performed by: EMERGENCY MEDICINE

## 2023-01-01 PROCEDURE — 82140 ASSAY OF AMMONIA: CPT

## 2023-01-01 PROCEDURE — P9047 ALBUMIN (HUMAN), 25%, 50ML: HCPCS | Performed by: HOSPITALIST

## 2023-01-01 PROCEDURE — 250N000011 HC RX IP 250 OP 636: Performed by: HOSPITALIST

## 2023-01-01 PROCEDURE — 97166 OT EVAL MOD COMPLEX 45 MIN: CPT | Mod: GO | Performed by: OCCUPATIONAL THERAPIST

## 2023-01-01 PROCEDURE — 99233 SBSQ HOSP IP/OBS HIGH 50: CPT | Mod: 25 | Performed by: INTERNAL MEDICINE

## 2023-01-01 PROCEDURE — 0W993ZZ DRAINAGE OF RIGHT PLEURAL CAVITY, PERCUTANEOUS APPROACH: ICD-10-PCS | Performed by: RADIOLOGY

## 2023-01-01 PROCEDURE — 85049 AUTOMATED PLATELET COUNT: CPT | Performed by: HOSPITALIST

## 2023-01-01 PROCEDURE — 96361 HYDRATE IV INFUSION ADD-ON: CPT

## 2023-01-01 PROCEDURE — 86923 COMPATIBILITY TEST ELECTRIC: CPT | Performed by: PHYSICIAN ASSISTANT

## 2023-01-01 PROCEDURE — 84100 ASSAY OF PHOSPHORUS: CPT | Performed by: PHYSICIAN ASSISTANT

## 2023-01-01 PROCEDURE — P9016 RBC LEUKOCYTES REDUCED: HCPCS | Performed by: PHYSICIAN ASSISTANT

## 2023-01-01 PROCEDURE — 80053 COMPREHEN METABOLIC PANEL: CPT | Performed by: HOSPITALIST

## 2023-01-01 PROCEDURE — 272N000706 US THORACENTESIS

## 2023-01-01 PROCEDURE — 36415 COLL VENOUS BLD VENIPUNCTURE: CPT | Performed by: PHYSICIAN ASSISTANT

## 2023-01-01 PROCEDURE — 87205 SMEAR GRAM STAIN: CPT | Performed by: INTERNAL MEDICINE

## 2023-01-01 PROCEDURE — 97530 THERAPEUTIC ACTIVITIES: CPT | Mod: GO

## 2023-01-01 PROCEDURE — 82042 OTHER SOURCE ALBUMIN QUAN EA: CPT | Performed by: PHYSICIAN ASSISTANT

## 2023-01-01 PROCEDURE — 82140 ASSAY OF AMMONIA: CPT | Performed by: INTERNAL MEDICINE

## 2023-01-01 PROCEDURE — 93306 TTE W/DOPPLER COMPLETE: CPT | Mod: 26 | Performed by: INTERNAL MEDICINE

## 2023-01-01 PROCEDURE — 82077 ASSAY SPEC XCP UR&BREATH IA: CPT | Performed by: EMERGENCY MEDICINE

## 2023-01-01 PROCEDURE — 87040 BLOOD CULTURE FOR BACTERIA: CPT | Performed by: EMERGENCY MEDICINE

## 2023-01-01 PROCEDURE — 250N000011 HC RX IP 250 OP 636: Mod: JZ | Performed by: EMERGENCY MEDICINE

## 2023-01-01 PROCEDURE — 85025 COMPLETE CBC W/AUTO DIFF WBC: CPT | Performed by: PHYSICIAN ASSISTANT

## 2023-01-01 RX ORDER — LIDOCAINE HYDROCHLORIDE 10 MG/ML
10 INJECTION, SOLUTION EPIDURAL; INFILTRATION; INTRACAUDAL; PERINEURAL ONCE
Status: COMPLETED | OUTPATIENT
Start: 2023-01-01 | End: 2023-01-01

## 2023-01-01 RX ORDER — ALBUMIN (HUMAN) 12.5 G/50ML
12.5 SOLUTION INTRAVENOUS EVERY 8 HOURS
Status: COMPLETED | OUTPATIENT
Start: 2023-01-01 | End: 2023-01-01

## 2023-01-01 RX ORDER — HALOPERIDOL 5 MG/ML
2.5-5 INJECTION INTRAMUSCULAR EVERY 6 HOURS PRN
Status: DISCONTINUED | OUTPATIENT
Start: 2023-01-01 | End: 2023-01-01 | Stop reason: HOSPADM

## 2023-01-01 RX ORDER — ONDANSETRON 4 MG/1
4 TABLET, ORALLY DISINTEGRATING ORAL EVERY 6 HOURS PRN
Status: DISCONTINUED | OUTPATIENT
Start: 2023-01-01 | End: 2023-01-01 | Stop reason: HOSPADM

## 2023-01-01 RX ORDER — IPRATROPIUM BROMIDE AND ALBUTEROL SULFATE 2.5; .5 MG/3ML; MG/3ML
3 SOLUTION RESPIRATORY (INHALATION) EVERY 4 HOURS PRN
Status: DISCONTINUED | OUTPATIENT
Start: 2023-01-01 | End: 2023-01-01 | Stop reason: HOSPADM

## 2023-01-01 RX ORDER — POTASSIUM CHLORIDE 1.5 G/1.58G
40 POWDER, FOR SOLUTION ORAL DAILY
Status: DISCONTINUED | OUTPATIENT
Start: 2023-01-01 | End: 2023-01-01 | Stop reason: HOSPADM

## 2023-01-01 RX ORDER — ALBUMIN (HUMAN) 12.5 G/50ML
12.5 SOLUTION INTRAVENOUS EVERY 8 HOURS
Status: DISCONTINUED | OUTPATIENT
Start: 2023-01-01 | End: 2023-01-01

## 2023-01-01 RX ORDER — PANTOPRAZOLE SODIUM 40 MG/1
40 TABLET, DELAYED RELEASE ORAL
Status: DISCONTINUED | OUTPATIENT
Start: 2023-01-01 | End: 2023-01-01 | Stop reason: HOSPADM

## 2023-01-01 RX ORDER — POTASSIUM CHLORIDE 1500 MG/1
40 TABLET, EXTENDED RELEASE ORAL ONCE
Status: COMPLETED | OUTPATIENT
Start: 2023-01-01 | End: 2023-01-01

## 2023-01-01 RX ORDER — LACTULOSE 10 G/15ML
20 SOLUTION ORAL 3 TIMES DAILY
Status: DISCONTINUED | OUTPATIENT
Start: 2023-01-01 | End: 2023-01-01

## 2023-01-01 RX ORDER — SPIRONOLACTONE 25 MG/1
100 TABLET ORAL DAILY
Status: DISCONTINUED | OUTPATIENT
Start: 2023-01-01 | End: 2023-01-01

## 2023-01-01 RX ORDER — FUROSEMIDE 40 MG
40 TABLET ORAL
Status: DISCONTINUED | OUTPATIENT
Start: 2023-01-01 | End: 2023-01-01

## 2023-01-01 RX ORDER — FLUMAZENIL 0.1 MG/ML
0.2 INJECTION, SOLUTION INTRAVENOUS
Status: DISCONTINUED | OUTPATIENT
Start: 2023-01-01 | End: 2023-01-01 | Stop reason: HOSPADM

## 2023-01-01 RX ORDER — FUROSEMIDE 10 MG/ML
40 INJECTION INTRAMUSCULAR; INTRAVENOUS
Status: DISCONTINUED | OUTPATIENT
Start: 2023-01-01 | End: 2023-01-01 | Stop reason: ALTCHOICE

## 2023-01-01 RX ORDER — FUROSEMIDE 10 MG/ML
40 INJECTION INTRAMUSCULAR; INTRAVENOUS ONCE
Status: COMPLETED | OUTPATIENT
Start: 2023-01-01 | End: 2023-01-01

## 2023-01-01 RX ORDER — METHOCARBAMOL 500 MG/1
500 TABLET, FILM COATED ORAL 2 TIMES DAILY PRN
Qty: 15 TABLET | Refills: 0 | Status: SHIPPED | OUTPATIENT
Start: 2023-01-01 | End: 2024-01-01

## 2023-01-01 RX ORDER — LACTULOSE 10 G/15ML
20 SOLUTION ORAL 2 TIMES DAILY
Status: DISCONTINUED | OUTPATIENT
Start: 2023-01-01 | End: 2023-01-01 | Stop reason: HOSPADM

## 2023-01-01 RX ORDER — POTASSIUM CHLORIDE 1.5 G/1.58G
40 POWDER, FOR SOLUTION ORAL ONCE
Status: COMPLETED | OUTPATIENT
Start: 2023-01-01 | End: 2023-01-01

## 2023-01-01 RX ORDER — LANOLIN ALCOHOL/MO/W.PET/CERES
100 CREAM (GRAM) TOPICAL DAILY
Qty: 30 TABLET | Refills: 0 | Status: SHIPPED | OUTPATIENT
Start: 2023-01-01

## 2023-01-01 RX ORDER — OLANZAPINE 5 MG/1
5-10 TABLET, ORALLY DISINTEGRATING ORAL EVERY 6 HOURS PRN
Status: DISCONTINUED | OUTPATIENT
Start: 2023-01-01 | End: 2023-01-01 | Stop reason: HOSPADM

## 2023-01-01 RX ORDER — FUROSEMIDE 10 MG/ML
20 INJECTION INTRAMUSCULAR; INTRAVENOUS ONCE
Status: COMPLETED | OUTPATIENT
Start: 2023-01-01 | End: 2023-01-01

## 2023-01-01 RX ORDER — LORAZEPAM 2 MG/ML
1-2 INJECTION INTRAMUSCULAR EVERY 30 MIN PRN
Status: DISCONTINUED | OUTPATIENT
Start: 2023-01-01 | End: 2023-01-01

## 2023-01-01 RX ORDER — SPIRONOLACTONE 100 MG/1
200 TABLET, FILM COATED ORAL DAILY
Status: DISCONTINUED | OUTPATIENT
Start: 2023-01-01 | End: 2023-01-01 | Stop reason: HOSPADM

## 2023-01-01 RX ORDER — MAGNESIUM OXIDE 400 MG/1
400 TABLET ORAL DAILY
Qty: 15 TABLET | Refills: 0 | Status: ON HOLD | OUTPATIENT
Start: 2023-01-01 | End: 2024-01-01

## 2023-01-01 RX ORDER — SPIRONOLACTONE 100 MG/1
200 TABLET, FILM COATED ORAL DAILY
Qty: 60 TABLET | Refills: 0 | Status: ON HOLD | OUTPATIENT
Start: 2023-01-01 | End: 2024-01-01

## 2023-01-01 RX ORDER — MULTIPLE VITAMINS W/ MINERALS TAB 9MG-400MCG
1 TAB ORAL DAILY
Status: DISCONTINUED | OUTPATIENT
Start: 2023-01-01 | End: 2023-01-01 | Stop reason: HOSPADM

## 2023-01-01 RX ORDER — CALCIUM CARBONATE 500 MG/1
1000 TABLET, CHEWABLE ORAL 4 TIMES DAILY PRN
Status: DISCONTINUED | OUTPATIENT
Start: 2023-01-01 | End: 2023-01-01 | Stop reason: HOSPADM

## 2023-01-01 RX ORDER — LACTULOSE 10 G/15ML
20 SOLUTION ORAL 2 TIMES DAILY
Qty: 473 ML | Refills: 0 | Status: ON HOLD | OUTPATIENT
Start: 2023-01-01 | End: 2024-01-01

## 2023-01-01 RX ORDER — IBUPROFEN 200 MG
400 TABLET ORAL EVERY 8 HOURS PRN
Status: ON HOLD | COMMUNITY
End: 2023-01-01

## 2023-01-01 RX ORDER — AMOXICILLIN 250 MG
2 CAPSULE ORAL 2 TIMES DAILY PRN
Status: DISCONTINUED | OUTPATIENT
Start: 2023-01-01 | End: 2023-01-01 | Stop reason: HOSPADM

## 2023-01-01 RX ORDER — ONDANSETRON 2 MG/ML
4 INJECTION INTRAMUSCULAR; INTRAVENOUS ONCE
Status: COMPLETED | OUTPATIENT
Start: 2023-01-01 | End: 2023-01-01

## 2023-01-01 RX ORDER — FOLIC ACID 1 MG/1
1 TABLET ORAL DAILY
Status: DISCONTINUED | OUTPATIENT
Start: 2023-01-01 | End: 2023-01-01 | Stop reason: HOSPADM

## 2023-01-01 RX ORDER — LORAZEPAM 1 MG/1
1-2 TABLET ORAL EVERY 30 MIN PRN
Status: DISCONTINUED | OUTPATIENT
Start: 2023-01-01 | End: 2023-01-01

## 2023-01-01 RX ORDER — SULFAMETHOXAZOLE/TRIMETHOPRIM 800-160 MG
1 TABLET ORAL DAILY
Status: DISCONTINUED | OUTPATIENT
Start: 2023-01-01 | End: 2023-01-01 | Stop reason: HOSPADM

## 2023-01-01 RX ORDER — MAGNESIUM SULFATE HEPTAHYDRATE 40 MG/ML
4 INJECTION, SOLUTION INTRAVENOUS ONCE
Status: COMPLETED | OUTPATIENT
Start: 2023-01-01 | End: 2023-01-01

## 2023-01-01 RX ORDER — FUROSEMIDE 10 MG/ML
60 INJECTION INTRAMUSCULAR; INTRAVENOUS ONCE
Status: COMPLETED | OUTPATIENT
Start: 2023-01-01 | End: 2023-01-01

## 2023-01-01 RX ORDER — CEFTRIAXONE 2 G/1
2 INJECTION, POWDER, FOR SOLUTION INTRAMUSCULAR; INTRAVENOUS EVERY 24 HOURS
Status: DISCONTINUED | OUTPATIENT
Start: 2023-01-01 | End: 2023-01-01

## 2023-01-01 RX ORDER — MAGNESIUM OXIDE 400 MG/1
400 TABLET ORAL 2 TIMES DAILY
Status: DISCONTINUED | OUTPATIENT
Start: 2023-01-01 | End: 2023-01-01

## 2023-01-01 RX ORDER — LIDOCAINE 40 MG/G
CREAM TOPICAL
Status: DISCONTINUED | OUTPATIENT
Start: 2023-01-01 | End: 2023-01-01 | Stop reason: HOSPADM

## 2023-01-01 RX ORDER — AMOXICILLIN 250 MG
1 CAPSULE ORAL 2 TIMES DAILY PRN
Status: DISCONTINUED | OUTPATIENT
Start: 2023-01-01 | End: 2023-01-01 | Stop reason: HOSPADM

## 2023-01-01 RX ORDER — SODIUM CHLORIDE 9 MG/ML
INJECTION, SOLUTION INTRAVENOUS CONTINUOUS
Status: DISCONTINUED | OUTPATIENT
Start: 2023-01-01 | End: 2023-01-01

## 2023-01-01 RX ORDER — METHOCARBAMOL 500 MG/1
500 TABLET, FILM COATED ORAL 3 TIMES DAILY PRN
Status: DISCONTINUED | OUTPATIENT
Start: 2023-01-01 | End: 2023-01-01 | Stop reason: HOSPADM

## 2023-01-01 RX ORDER — MAGNESIUM OXIDE 400 MG/1
400 TABLET ORAL 2 TIMES DAILY
Status: DISCONTINUED | OUTPATIENT
Start: 2023-01-01 | End: 2023-01-01 | Stop reason: HOSPADM

## 2023-01-01 RX ORDER — CARBOXYMETHYLCELLULOSE SODIUM 5 MG/ML
1 SOLUTION/ DROPS OPHTHALMIC
Status: DISCONTINUED | OUTPATIENT
Start: 2023-01-01 | End: 2023-01-01 | Stop reason: HOSPADM

## 2023-01-01 RX ORDER — FUROSEMIDE 40 MG
40 TABLET ORAL
Qty: 60 TABLET | Refills: 0 | Status: ON HOLD | OUTPATIENT
Start: 2023-01-01 | End: 2024-01-01

## 2023-01-01 RX ORDER — PANTOPRAZOLE SODIUM 40 MG/1
40 TABLET, DELAYED RELEASE ORAL 2 TIMES DAILY
Status: DISCONTINUED | OUTPATIENT
Start: 2023-01-01 | End: 2023-01-01

## 2023-01-01 RX ORDER — POTASSIUM CHLORIDE 1.5 G/1.58G
40 POWDER, FOR SOLUTION ORAL DAILY
Status: COMPLETED | OUTPATIENT
Start: 2023-01-01 | End: 2023-01-01

## 2023-01-01 RX ORDER — FOLIC ACID 1 MG/1
1 TABLET ORAL DAILY
Qty: 30 TABLET | Refills: 0 | Status: SHIPPED | OUTPATIENT
Start: 2023-01-01

## 2023-01-01 RX ORDER — FUROSEMIDE 10 MG/ML
40 INJECTION INTRAMUSCULAR; INTRAVENOUS 2 TIMES DAILY
Status: DISCONTINUED | OUTPATIENT
Start: 2023-01-01 | End: 2023-01-01

## 2023-01-01 RX ORDER — BISACODYL 10 MG
10 SUPPOSITORY, RECTAL RECTAL DAILY PRN
Status: DISCONTINUED | OUTPATIENT
Start: 2023-01-01 | End: 2023-01-01 | Stop reason: HOSPADM

## 2023-01-01 RX ORDER — ONDANSETRON 2 MG/ML
4 INJECTION INTRAMUSCULAR; INTRAVENOUS EVERY 6 HOURS PRN
Status: DISCONTINUED | OUTPATIENT
Start: 2023-01-01 | End: 2023-01-01 | Stop reason: HOSPADM

## 2023-01-01 RX ORDER — NITROGLYCERIN 0.4 MG/1
0.4 TABLET SUBLINGUAL EVERY 5 MIN PRN
Status: DISCONTINUED | OUTPATIENT
Start: 2023-01-01 | End: 2023-01-01 | Stop reason: HOSPADM

## 2023-01-01 RX ORDER — POTASSIUM CHLORIDE 1.5 G/1.58G
40 POWDER, FOR SOLUTION ORAL DAILY
Qty: 15 PACKET | Refills: 0 | Status: SHIPPED | OUTPATIENT
Start: 2023-01-01 | End: 2024-01-01

## 2023-01-01 RX ORDER — IPRATROPIUM BROMIDE AND ALBUTEROL SULFATE 2.5; .5 MG/3ML; MG/3ML
3 SOLUTION RESPIRATORY (INHALATION)
Status: DISCONTINUED | OUTPATIENT
Start: 2023-01-01 | End: 2023-01-01

## 2023-01-01 RX ORDER — CEFTRIAXONE 2 G/1
2 INJECTION, POWDER, FOR SOLUTION INTRAMUSCULAR; INTRAVENOUS ONCE
Status: COMPLETED | OUTPATIENT
Start: 2023-01-01 | End: 2023-01-01

## 2023-01-01 RX ORDER — LIDOCAINE 40 MG/G
CREAM TOPICAL
Status: DISCONTINUED | OUTPATIENT
Start: 2023-01-01 | End: 2023-01-01

## 2023-01-01 RX ORDER — PANTOPRAZOLE SODIUM 40 MG/1
40 TABLET, DELAYED RELEASE ORAL 2 TIMES DAILY
Qty: 60 TABLET | Refills: 0 | Status: ON HOLD | OUTPATIENT
Start: 2023-01-01 | End: 2024-01-01

## 2023-01-01 RX ORDER — PROCHLORPERAZINE 25 MG
25 SUPPOSITORY, RECTAL RECTAL EVERY 12 HOURS PRN
Status: DISCONTINUED | OUTPATIENT
Start: 2023-01-01 | End: 2023-01-01 | Stop reason: HOSPADM

## 2023-01-01 RX ORDER — POTASSIUM CHLORIDE 1.5 G/1.58G
40 POWDER, FOR SOLUTION ORAL DAILY
Status: DISCONTINUED | OUTPATIENT
Start: 2023-01-01 | End: 2023-01-01

## 2023-01-01 RX ORDER — POTASSIUM CHLORIDE 1500 MG/1
20 TABLET, EXTENDED RELEASE ORAL ONCE
Status: COMPLETED | OUTPATIENT
Start: 2023-01-01 | End: 2023-01-01

## 2023-01-01 RX ORDER — SULFAMETHOXAZOLE/TRIMETHOPRIM 800-160 MG
1 TABLET ORAL DAILY
Qty: 14 TABLET | Refills: 0 | Status: ON HOLD | OUTPATIENT
Start: 2023-01-01 | End: 2024-01-01

## 2023-01-01 RX ORDER — PROCHLORPERAZINE MALEATE 10 MG
10 TABLET ORAL EVERY 6 HOURS PRN
Status: DISCONTINUED | OUTPATIENT
Start: 2023-01-01 | End: 2023-01-01 | Stop reason: HOSPADM

## 2023-01-01 RX ORDER — FUROSEMIDE 40 MG
40 TABLET ORAL
Status: DISCONTINUED | OUTPATIENT
Start: 2023-01-01 | End: 2023-01-01 | Stop reason: HOSPADM

## 2023-01-01 RX ORDER — POLYETHYLENE GLYCOL 3350 17 G/17G
17 POWDER, FOR SOLUTION ORAL 2 TIMES DAILY PRN
Status: DISCONTINUED | OUTPATIENT
Start: 2023-01-01 | End: 2023-01-01 | Stop reason: HOSPADM

## 2023-01-01 RX ORDER — MAGNESIUM OXIDE 400 MG/1
400 TABLET ORAL DAILY
Status: DISCONTINUED | OUTPATIENT
Start: 2023-01-01 | End: 2023-01-01

## 2023-01-01 RX ADMIN — PHYTONADIONE 10 MG: 10 INJECTION, EMULSION INTRAMUSCULAR; INTRAVENOUS; SUBCUTANEOUS at 14:57

## 2023-01-01 RX ADMIN — IPRATROPIUM BROMIDE AND ALBUTEROL SULFATE 3 ML: .5; 3 SOLUTION RESPIRATORY (INHALATION) at 12:31

## 2023-01-01 RX ADMIN — FUROSEMIDE 40 MG: 10 INJECTION, SOLUTION INTRAMUSCULAR; INTRAVENOUS at 08:19

## 2023-01-01 RX ADMIN — LIDOCAINE HYDROCHLORIDE 10 ML: 10 INJECTION, SOLUTION EPIDURAL; INFILTRATION; INTRACAUDAL; PERINEURAL at 13:06

## 2023-01-01 RX ADMIN — PANTOPRAZOLE SODIUM 40 MG: 40 TABLET, DELAYED RELEASE ORAL at 08:17

## 2023-01-01 RX ADMIN — LIDOCAINE HYDROCHLORIDE 10 ML: 10 INJECTION, SOLUTION EPIDURAL; INFILTRATION; INTRACAUDAL; PERINEURAL at 13:05

## 2023-01-01 RX ADMIN — SULFAMETHOXAZOLE AND TRIMETHOPRIM 1 TABLET: 800; 160 TABLET ORAL at 08:56

## 2023-01-01 RX ADMIN — POTASSIUM CHLORIDE 40 MEQ: 1500 TABLET, EXTENDED RELEASE ORAL at 08:20

## 2023-01-01 RX ADMIN — RIFAXIMIN 550 MG: 550 TABLET ORAL at 09:55

## 2023-01-01 RX ADMIN — CEFTRIAXONE SODIUM 2 G: 2 INJECTION, POWDER, FOR SOLUTION INTRAMUSCULAR; INTRAVENOUS at 16:23

## 2023-01-01 RX ADMIN — LACTULOSE 20 G: 20 SOLUTION ORAL at 20:10

## 2023-01-01 RX ADMIN — LIDOCAINE HYDROCHLORIDE 10 ML: 10 INJECTION, SOLUTION EPIDURAL; INFILTRATION; INTRACAUDAL; PERINEURAL at 10:07

## 2023-01-01 RX ADMIN — LACTULOSE 20 G: 20 SOLUTION ORAL at 08:14

## 2023-01-01 RX ADMIN — Medication 400 MG: at 15:11

## 2023-01-01 RX ADMIN — PANTOPRAZOLE SODIUM 40 MG: 40 TABLET, DELAYED RELEASE ORAL at 06:31

## 2023-01-01 RX ADMIN — SODIUM CHLORIDE: 9 INJECTION, SOLUTION INTRAVENOUS at 22:45

## 2023-01-01 RX ADMIN — SPIRONOLACTONE 100 MG: 25 TABLET ORAL at 11:47

## 2023-01-01 RX ADMIN — METHOCARBAMOL 500 MG: 500 TABLET ORAL at 13:52

## 2023-01-01 RX ADMIN — PANTOPRAZOLE SODIUM 40 MG: 40 TABLET, DELAYED RELEASE ORAL at 08:14

## 2023-01-01 RX ADMIN — FOLIC ACID 1 MG: 1 TABLET ORAL at 08:17

## 2023-01-01 RX ADMIN — RIFAXIMIN 550 MG: 550 TABLET ORAL at 08:15

## 2023-01-01 RX ADMIN — FOLIC ACID 1 MG: 1 TABLET ORAL at 09:55

## 2023-01-01 RX ADMIN — POTASSIUM CHLORIDE 40 MEQ: 1500 TABLET, EXTENDED RELEASE ORAL at 07:46

## 2023-01-01 RX ADMIN — LACTULOSE 20 G: 20 SOLUTION ORAL at 08:19

## 2023-01-01 RX ADMIN — SPIRONOLACTONE 200 MG: 100 TABLET ORAL at 09:47

## 2023-01-01 RX ADMIN — LACTULOSE 20 G: 20 SOLUTION ORAL at 08:00

## 2023-01-01 RX ADMIN — PANTOPRAZOLE SODIUM 40 MG: 40 TABLET, DELAYED RELEASE ORAL at 16:30

## 2023-01-01 RX ADMIN — CEFTRIAXONE SODIUM 2 G: 2 INJECTION, POWDER, FOR SOLUTION INTRAMUSCULAR; INTRAVENOUS at 15:50

## 2023-01-01 RX ADMIN — SODIUM CHLORIDE: 9 INJECTION, SOLUTION INTRAVENOUS at 06:27

## 2023-01-01 RX ADMIN — POTASSIUM & SODIUM PHOSPHATES POWDER PACK 280-160-250 MG 1 PACKET: 280-160-250 PACK at 07:47

## 2023-01-01 RX ADMIN — LACTULOSE 20 G: 20 SOLUTION ORAL at 16:55

## 2023-01-01 RX ADMIN — RIFAXIMIN 550 MG: 550 TABLET ORAL at 09:47

## 2023-01-01 RX ADMIN — RIFAXIMIN 550 MG: 550 TABLET ORAL at 08:20

## 2023-01-01 RX ADMIN — RIFAXIMIN 550 MG: 550 TABLET ORAL at 20:18

## 2023-01-01 RX ADMIN — LACTULOSE 20 G: 20 SOLUTION ORAL at 15:37

## 2023-01-01 RX ADMIN — SULFAMETHOXAZOLE AND TRIMETHOPRIM 1 TABLET: 800; 160 TABLET ORAL at 16:43

## 2023-01-01 RX ADMIN — POTASSIUM CHLORIDE 40 MEQ: 1500 TABLET, EXTENDED RELEASE ORAL at 21:33

## 2023-01-01 RX ADMIN — ALBUMIN HUMAN 12.5 G: 0.25 SOLUTION INTRAVENOUS at 09:55

## 2023-01-01 RX ADMIN — CEFTRIAXONE SODIUM 2 G: 2 INJECTION, POWDER, FOR SOLUTION INTRAMUSCULAR; INTRAVENOUS at 16:57

## 2023-01-01 RX ADMIN — RIFAXIMIN 550 MG: 550 TABLET ORAL at 20:01

## 2023-01-01 RX ADMIN — LACTULOSE 20 G: 20 SOLUTION ORAL at 12:24

## 2023-01-01 RX ADMIN — MAGNESIUM SULFATE HEPTAHYDRATE 4 G: 40 INJECTION, SOLUTION INTRAVENOUS at 17:43

## 2023-01-01 RX ADMIN — IPRATROPIUM BROMIDE AND ALBUTEROL SULFATE 3 ML: .5; 3 SOLUTION RESPIRATORY (INHALATION) at 18:00

## 2023-01-01 RX ADMIN — LORAZEPAM 1 MG: 2 INJECTION INTRAMUSCULAR; INTRAVENOUS at 06:58

## 2023-01-01 RX ADMIN — SULFAMETHOXAZOLE AND TRIMETHOPRIM 1 TABLET: 800; 160 TABLET ORAL at 09:47

## 2023-01-01 RX ADMIN — MAGNESIUM SULFATE HEPTAHYDRATE 4 G: 40 INJECTION, SOLUTION INTRAVENOUS at 08:13

## 2023-01-01 RX ADMIN — PANTOPRAZOLE SODIUM 40 MG: 40 TABLET, DELAYED RELEASE ORAL at 16:43

## 2023-01-01 RX ADMIN — IPRATROPIUM BROMIDE AND ALBUTEROL SULFATE 3 ML: .5; 3 SOLUTION RESPIRATORY (INHALATION) at 19:21

## 2023-01-01 RX ADMIN — POTASSIUM CHLORIDE 40 MEQ: 1500 TABLET, EXTENDED RELEASE ORAL at 05:46

## 2023-01-01 RX ADMIN — MULTIPLE VITAMINS W/ MINERALS TAB 1 TABLET: TAB at 08:58

## 2023-01-01 RX ADMIN — RIFAXIMIN 550 MG: 550 TABLET ORAL at 20:44

## 2023-01-01 RX ADMIN — LIDOCAINE HYDROCHLORIDE 10 ML: 10 INJECTION, SOLUTION EPIDURAL; INFILTRATION; INTRACAUDAL; PERINEURAL at 10:42

## 2023-01-01 RX ADMIN — MELATONIN 5 MG TABLET 5 MG: at 00:14

## 2023-01-01 RX ADMIN — RIFAXIMIN 550 MG: 550 TABLET ORAL at 08:56

## 2023-01-01 RX ADMIN — THIAMINE HCL TAB 100 MG 100 MG: 100 TAB at 08:14

## 2023-01-01 RX ADMIN — PANTOPRAZOLE SODIUM 40 MG: 40 TABLET, DELAYED RELEASE ORAL at 16:55

## 2023-01-01 RX ADMIN — FOLIC ACID 1 MG: 1 TABLET ORAL at 08:14

## 2023-01-01 RX ADMIN — MAGNESIUM SULFATE HEPTAHYDRATE 4 G: 40 INJECTION, SOLUTION INTRAVENOUS at 09:15

## 2023-01-01 RX ADMIN — RIFAXIMIN 550 MG: 550 TABLET ORAL at 20:03

## 2023-01-01 RX ADMIN — MULTIPLE VITAMINS W/ MINERALS TAB 1 TABLET: TAB at 09:47

## 2023-01-01 RX ADMIN — SPIRONOLACTONE 100 MG: 25 TABLET ORAL at 09:55

## 2023-01-01 RX ADMIN — LACTULOSE 20 G: 20 SOLUTION ORAL at 16:58

## 2023-01-01 RX ADMIN — FUROSEMIDE 60 MG: 10 INJECTION, SOLUTION INTRAMUSCULAR; INTRAVENOUS at 13:28

## 2023-01-01 RX ADMIN — LACTULOSE 20 G: 20 SOLUTION ORAL at 16:42

## 2023-01-01 RX ADMIN — IPRATROPIUM BROMIDE AND ALBUTEROL SULFATE 3 ML: .5; 3 SOLUTION RESPIRATORY (INHALATION) at 07:15

## 2023-01-01 RX ADMIN — METHOCARBAMOL 500 MG: 500 TABLET ORAL at 20:49

## 2023-01-01 RX ADMIN — THIAMINE HCL TAB 100 MG 100 MG: 100 TAB at 08:58

## 2023-01-01 RX ADMIN — FOLIC ACID 1 MG: 1 TABLET ORAL at 08:58

## 2023-01-01 RX ADMIN — METHOCARBAMOL 500 MG: 500 TABLET ORAL at 20:02

## 2023-01-01 RX ADMIN — FUROSEMIDE 40 MG: 10 INJECTION, SOLUTION INTRAMUSCULAR; INTRAVENOUS at 14:56

## 2023-01-01 RX ADMIN — PANTOPRAZOLE SODIUM 40 MG: 40 TABLET, DELAYED RELEASE ORAL at 16:58

## 2023-01-01 RX ADMIN — MULTIPLE VITAMINS W/ MINERALS TAB 1 TABLET: TAB at 09:55

## 2023-01-01 RX ADMIN — POTASSIUM & SODIUM PHOSPHATES POWDER PACK 280-160-250 MG 1 PACKET: 280-160-250 PACK at 15:54

## 2023-01-01 RX ADMIN — Medication 400 MG: at 08:56

## 2023-01-01 RX ADMIN — FUROSEMIDE 20 MG: 10 INJECTION, SOLUTION INTRAMUSCULAR; INTRAVENOUS at 20:03

## 2023-01-01 RX ADMIN — THIAMINE HCL TAB 100 MG 100 MG: 100 TAB at 08:17

## 2023-01-01 RX ADMIN — SODIUM CHLORIDE 1000 ML: 9 INJECTION, SOLUTION INTRAVENOUS at 17:54

## 2023-01-01 RX ADMIN — RIFAXIMIN 550 MG: 550 TABLET ORAL at 08:17

## 2023-01-01 RX ADMIN — POTASSIUM CHLORIDE 40 MEQ: 1500 TABLET, EXTENDED RELEASE ORAL at 15:54

## 2023-01-01 RX ADMIN — FUROSEMIDE 40 MG: 40 TABLET ORAL at 16:55

## 2023-01-01 RX ADMIN — LACTULOSE 20 G: 20 SOLUTION ORAL at 16:30

## 2023-01-01 RX ADMIN — POTASSIUM CHLORIDE 40 MEQ: 1.5 POWDER, FOR SOLUTION ORAL at 13:56

## 2023-01-01 RX ADMIN — MELATONIN 5 MG TABLET 5 MG: at 00:33

## 2023-01-01 RX ADMIN — IPRATROPIUM BROMIDE AND ALBUTEROL SULFATE 3 ML: .5; 3 SOLUTION RESPIRATORY (INHALATION) at 08:07

## 2023-01-01 RX ADMIN — LACTULOSE 20 G: 20 SOLUTION ORAL at 15:55

## 2023-01-01 RX ADMIN — PANTOPRAZOLE SODIUM 40 MG: 40 TABLET, DELAYED RELEASE ORAL at 06:42

## 2023-01-01 RX ADMIN — ALBUMIN HUMAN 12.5 G: 0.25 SOLUTION INTRAVENOUS at 08:14

## 2023-01-01 RX ADMIN — CEFTRIAXONE SODIUM 2 G: 2 INJECTION, POWDER, FOR SOLUTION INTRAMUSCULAR; INTRAVENOUS at 16:19

## 2023-01-01 RX ADMIN — POTASSIUM CHLORIDE 40 MEQ: 1500 TABLET, EXTENDED RELEASE ORAL at 06:31

## 2023-01-01 RX ADMIN — PANTOPRAZOLE SODIUM 40 MG: 40 INJECTION, POWDER, FOR SOLUTION INTRAVENOUS at 08:51

## 2023-01-01 RX ADMIN — ALBUMIN HUMAN 12.5 G: 0.25 SOLUTION INTRAVENOUS at 00:30

## 2023-01-01 RX ADMIN — FUROSEMIDE 40 MG: 40 TABLET ORAL at 08:56

## 2023-01-01 RX ADMIN — THIAMINE HCL TAB 100 MG 100 MG: 100 TAB at 08:01

## 2023-01-01 RX ADMIN — MULTIPLE VITAMINS W/ MINERALS TAB 1 TABLET: TAB at 08:01

## 2023-01-01 RX ADMIN — ALBUMIN HUMAN 12.5 G: 0.25 SOLUTION INTRAVENOUS at 08:45

## 2023-01-01 RX ADMIN — ALBUMIN HUMAN 12.5 G: 0.25 SOLUTION INTRAVENOUS at 00:14

## 2023-01-01 RX ADMIN — PANTOPRAZOLE SODIUM 40 MG: 40 INJECTION, POWDER, FOR SOLUTION INTRAVENOUS at 09:55

## 2023-01-01 RX ADMIN — POTASSIUM & SODIUM PHOSPHATES POWDER PACK 280-160-250 MG 1 PACKET: 280-160-250 PACK at 11:47

## 2023-01-01 RX ADMIN — FUROSEMIDE 40 MG: 40 TABLET ORAL at 09:47

## 2023-01-01 RX ADMIN — SPIRONOLACTONE 100 MG: 25 TABLET ORAL at 08:19

## 2023-01-01 RX ADMIN — FUROSEMIDE 40 MG: 10 INJECTION, SOLUTION INTRAMUSCULAR; INTRAVENOUS at 15:54

## 2023-01-01 RX ADMIN — CEFTRIAXONE SODIUM 2 G: 2 INJECTION, POWDER, FOR SOLUTION INTRAMUSCULAR; INTRAVENOUS at 15:55

## 2023-01-01 RX ADMIN — FUROSEMIDE 40 MG: 40 TABLET ORAL at 08:14

## 2023-01-01 RX ADMIN — RIFAXIMIN 550 MG: 550 TABLET ORAL at 08:01

## 2023-01-01 RX ADMIN — ONDANSETRON 4 MG: 4 TABLET, ORALLY DISINTEGRATING ORAL at 07:47

## 2023-01-01 RX ADMIN — THIAMINE HCL TAB 100 MG 100 MG: 100 TAB at 09:55

## 2023-01-01 RX ADMIN — LACTULOSE 20 G: 20 SOLUTION ORAL at 20:17

## 2023-01-01 RX ADMIN — MULTIPLE VITAMINS W/ MINERALS TAB 1 TABLET: TAB at 08:56

## 2023-01-01 RX ADMIN — IPRATROPIUM BROMIDE AND ALBUTEROL SULFATE 3 ML: .5; 3 SOLUTION RESPIRATORY (INHALATION) at 13:43

## 2023-01-01 RX ADMIN — MULTIPLE VITAMINS W/ MINERALS TAB 1 TABLET: TAB at 08:20

## 2023-01-01 RX ADMIN — LORAZEPAM 1 MG: 1 TABLET ORAL at 14:13

## 2023-01-01 RX ADMIN — LORAZEPAM 1 MG: 1 TABLET ORAL at 11:53

## 2023-01-01 RX ADMIN — FOLIC ACID 1 MG: 1 TABLET ORAL at 09:47

## 2023-01-01 RX ADMIN — SODIUM CHLORIDE: 9 INJECTION, SOLUTION INTRAVENOUS at 15:31

## 2023-01-01 RX ADMIN — RIFAXIMIN 550 MG: 550 TABLET ORAL at 20:10

## 2023-01-01 RX ADMIN — LACTULOSE 20 G: 20 SOLUTION ORAL at 09:25

## 2023-01-01 RX ADMIN — PANTOPRAZOLE SODIUM 40 MG: 40 TABLET, DELAYED RELEASE ORAL at 08:58

## 2023-01-01 RX ADMIN — RIFAXIMIN 550 MG: 550 TABLET ORAL at 20:12

## 2023-01-01 RX ADMIN — LORAZEPAM 1 MG: 1 TABLET ORAL at 08:58

## 2023-01-01 RX ADMIN — MULTIPLE VITAMINS W/ MINERALS TAB 1 TABLET: TAB at 08:17

## 2023-01-01 RX ADMIN — PANTOPRAZOLE SODIUM 40 MG: 40 TABLET, DELAYED RELEASE ORAL at 20:10

## 2023-01-01 RX ADMIN — LACTULOSE 20 G: 20 SOLUTION ORAL at 15:54

## 2023-01-01 RX ADMIN — FOLIC ACID 1 MG: 1 TABLET ORAL at 08:00

## 2023-01-01 RX ADMIN — IPRATROPIUM BROMIDE AND ALBUTEROL SULFATE 3 ML: .5; 3 SOLUTION RESPIRATORY (INHALATION) at 19:55

## 2023-01-01 RX ADMIN — CEFTRIAXONE SODIUM 2 G: 2 INJECTION, POWDER, FOR SOLUTION INTRAMUSCULAR; INTRAVENOUS at 15:39

## 2023-01-01 RX ADMIN — FUROSEMIDE 40 MG: 40 TABLET ORAL at 16:30

## 2023-01-01 RX ADMIN — FUROSEMIDE 40 MG: 40 TABLET ORAL at 11:58

## 2023-01-01 RX ADMIN — FUROSEMIDE 40 MG: 10 INJECTION, SOLUTION INTRAMUSCULAR; INTRAVENOUS at 16:42

## 2023-01-01 RX ADMIN — IPRATROPIUM BROMIDE AND ALBUTEROL SULFATE 3 ML: .5; 3 SOLUTION RESPIRATORY (INHALATION) at 08:58

## 2023-01-01 RX ADMIN — IPRATROPIUM BROMIDE AND ALBUTEROL SULFATE 3 ML: .5; 3 SOLUTION RESPIRATORY (INHALATION) at 12:54

## 2023-01-01 RX ADMIN — ONDANSETRON 4 MG: 2 INJECTION INTRAMUSCULAR; INTRAVENOUS at 21:11

## 2023-01-01 RX ADMIN — PHYTONADIONE 10 MG: 10 INJECTION, EMULSION INTRAMUSCULAR; INTRAVENOUS; SUBCUTANEOUS at 13:33

## 2023-01-01 RX ADMIN — PHYTONADIONE 10 MG: 10 INJECTION, EMULSION INTRAMUSCULAR; INTRAVENOUS; SUBCUTANEOUS at 13:40

## 2023-01-01 RX ADMIN — FOLIC ACID 1 MG: 1 TABLET ORAL at 08:20

## 2023-01-01 RX ADMIN — FUROSEMIDE 40 MG: 40 TABLET ORAL at 16:58

## 2023-01-01 RX ADMIN — FOLIC ACID 1 MG: 1 TABLET ORAL at 08:56

## 2023-01-01 RX ADMIN — SPIRONOLACTONE 200 MG: 100 TABLET ORAL at 08:56

## 2023-01-01 RX ADMIN — LORAZEPAM 1 MG: 1 TABLET ORAL at 15:37

## 2023-01-01 RX ADMIN — SODIUM CHLORIDE 500 ML: 9 INJECTION, SOLUTION INTRAVENOUS at 22:44

## 2023-01-01 RX ADMIN — PANTOPRAZOLE SODIUM 40 MG: 40 TABLET, DELAYED RELEASE ORAL at 08:20

## 2023-01-01 RX ADMIN — ALBUMIN HUMAN 12.5 G: 0.25 SOLUTION INTRAVENOUS at 17:07

## 2023-01-01 RX ADMIN — LACTULOSE 20 G: 20 SOLUTION ORAL at 09:55

## 2023-01-01 RX ADMIN — MAGNESIUM SULFATE HEPTAHYDRATE 4 G: 40 INJECTION, SOLUTION INTRAVENOUS at 10:27

## 2023-01-01 RX ADMIN — PANTOPRAZOLE SODIUM 40 MG: 40 INJECTION, POWDER, FOR SOLUTION INTRAVENOUS at 20:18

## 2023-01-01 RX ADMIN — POTASSIUM CHLORIDE 20 MEQ: 1500 TABLET, EXTENDED RELEASE ORAL at 09:23

## 2023-01-01 RX ADMIN — Medication 400 MG: at 11:11

## 2023-01-01 RX ADMIN — LACTULOSE 20 G: 20 SOLUTION ORAL at 08:56

## 2023-01-01 RX ADMIN — FUROSEMIDE 40 MG: 10 INJECTION, SOLUTION INTRAMUSCULAR; INTRAVENOUS at 10:27

## 2023-01-01 RX ADMIN — ALBUMIN HUMAN 12.5 G: 0.25 SOLUTION INTRAVENOUS at 16:57

## 2023-01-01 RX ADMIN — IPRATROPIUM BROMIDE AND ALBUTEROL SULFATE 3 ML: .5; 3 SOLUTION RESPIRATORY (INHALATION) at 08:03

## 2023-01-01 RX ADMIN — POTASSIUM CHLORIDE 40 MEQ: 1.5 POWDER, FOR SOLUTION ORAL at 08:56

## 2023-01-01 RX ADMIN — MULTIPLE VITAMINS W/ MINERALS TAB 1 TABLET: TAB at 08:14

## 2023-01-01 ASSESSMENT — ACTIVITIES OF DAILY LIVING (ADL)
ADLS_ACUITY_SCORE: 47
ADLS_ACUITY_SCORE: 40
ADLS_ACUITY_SCORE: 42
ADLS_ACUITY_SCORE: 47
ADLS_ACUITY_SCORE: 37
ADLS_ACUITY_SCORE: 44
ADLS_ACUITY_SCORE: 41
ADLS_ACUITY_SCORE: 46
ADLS_ACUITY_SCORE: 35
ADLS_ACUITY_SCORE: 37
ADLS_ACUITY_SCORE: 46
ADLS_ACUITY_SCORE: 42
ADLS_ACUITY_SCORE: 42
ADLS_ACUITY_SCORE: 46
ADLS_ACUITY_SCORE: 35
ADLS_ACUITY_SCORE: 42
ADLS_ACUITY_SCORE: 41
ADLS_ACUITY_SCORE: 42
ADLS_ACUITY_SCORE: 37
ADLS_ACUITY_SCORE: 36
ADLS_ACUITY_SCORE: 46
ADLS_ACUITY_SCORE: 44
ADLS_ACUITY_SCORE: 42
ADLS_ACUITY_SCORE: 37
ADLS_ACUITY_SCORE: 41
ADLS_ACUITY_SCORE: 47
ADLS_ACUITY_SCORE: 47
ADLS_ACUITY_SCORE: 46
ADLS_ACUITY_SCORE: 41
ADLS_ACUITY_SCORE: 47
ADLS_ACUITY_SCORE: 45
ADLS_ACUITY_SCORE: 40
ADLS_ACUITY_SCORE: 47
ADLS_ACUITY_SCORE: 40
ADLS_ACUITY_SCORE: 47
ADLS_ACUITY_SCORE: 45
ADLS_ACUITY_SCORE: 46
ADLS_ACUITY_SCORE: 42
ADLS_ACUITY_SCORE: 35
ADLS_ACUITY_SCORE: 40
ADLS_ACUITY_SCORE: 42
ADLS_ACUITY_SCORE: 37
ADLS_ACUITY_SCORE: 45
ADLS_ACUITY_SCORE: 35
ADLS_ACUITY_SCORE: 47
ADLS_ACUITY_SCORE: 45
ADLS_ACUITY_SCORE: 32
ADLS_ACUITY_SCORE: 37
ADLS_ACUITY_SCORE: 47
ADLS_ACUITY_SCORE: 47
ADLS_ACUITY_SCORE: 45
ADLS_ACUITY_SCORE: 46
ADLS_ACUITY_SCORE: 42
ADLS_ACUITY_SCORE: 35
ADLS_ACUITY_SCORE: 46
ADLS_ACUITY_SCORE: 47
ADLS_ACUITY_SCORE: 46
ADLS_ACUITY_SCORE: 47
ADLS_ACUITY_SCORE: 45
ADLS_ACUITY_SCORE: 47
ADLS_ACUITY_SCORE: 45
ADLS_ACUITY_SCORE: 40
ADLS_ACUITY_SCORE: 35
ADLS_ACUITY_SCORE: 46
ADLS_ACUITY_SCORE: 45
ADLS_ACUITY_SCORE: 47
ADLS_ACUITY_SCORE: 32
ADLS_ACUITY_SCORE: 46
ADLS_ACUITY_SCORE: 45
ADLS_ACUITY_SCORE: 33
ADLS_ACUITY_SCORE: 36
ADLS_ACUITY_SCORE: 42
ADLS_ACUITY_SCORE: 45
ADLS_ACUITY_SCORE: 41
DEPENDENT_IADLS:: INDEPENDENT
ADLS_ACUITY_SCORE: 46
ADLS_ACUITY_SCORE: 45
ADLS_ACUITY_SCORE: 46
ADLS_ACUITY_SCORE: 45
ADLS_ACUITY_SCORE: 42
ADLS_ACUITY_SCORE: 46
ADLS_ACUITY_SCORE: 37
ADLS_ACUITY_SCORE: 37
ADLS_ACUITY_SCORE: 47
ADLS_ACUITY_SCORE: 35
ADLS_ACUITY_SCORE: 47
ADLS_ACUITY_SCORE: 47
ADLS_ACUITY_SCORE: 42
ADLS_ACUITY_SCORE: 37
ADLS_ACUITY_SCORE: 37
ADLS_ACUITY_SCORE: 46
ADLS_ACUITY_SCORE: 36
ADLS_ACUITY_SCORE: 35
ADLS_ACUITY_SCORE: 42
ADLS_ACUITY_SCORE: 47
ADLS_ACUITY_SCORE: 36
ADLS_ACUITY_SCORE: 47
ADLS_ACUITY_SCORE: 42

## 2023-01-01 ASSESSMENT — ENCOUNTER SYMPTOMS
WEAKNESS: 0
NUMBNESS: 0
FEVER: 0
FREQUENCY: 0
DIFFICULTY URINATING: 0
NAUSEA: 0
DIARRHEA: 0
LIGHT-HEADEDNESS: 0
FATIGUE: 1
VOMITING: 0
BACK PAIN: 1
ABDOMINAL PAIN: 0
NECK PAIN: 0
CHILLS: 1
SHORTNESS OF BREATH: 0
BLOOD IN STOOL: 0
RHINORRHEA: 0
DYSURIA: 0
COUGH: 0
SORE THROAT: 0
HEMATURIA: 0
DIZZINESS: 0

## 2023-09-08 ENCOUNTER — APPOINTMENT (OUTPATIENT)
Dept: CT IMAGING | Facility: CLINIC | Age: 38
DRG: 432 | End: 2023-09-08
Attending: EMERGENCY MEDICINE
Payer: COMMERCIAL

## 2023-09-08 ENCOUNTER — HOSPITAL ENCOUNTER (INPATIENT)
Facility: CLINIC | Age: 38
LOS: 3 days | Discharge: HOME | DRG: 432 | End: 2023-09-11
Attending: EMERGENCY MEDICINE | Admitting: INTERNAL MEDICINE
Payer: COMMERCIAL

## 2023-09-08 DIAGNOSIS — R65.21 SEPTIC SHOCK (H): ICD-10-CM

## 2023-09-08 DIAGNOSIS — F10.10 ALCOHOL ABUSE: ICD-10-CM

## 2023-09-08 DIAGNOSIS — K72.00 ACUTE LIVER FAILURE WITHOUT HEPATIC COMA: ICD-10-CM

## 2023-09-08 DIAGNOSIS — D64.9 ANEMIA, UNSPECIFIED TYPE: ICD-10-CM

## 2023-09-08 DIAGNOSIS — A41.9 SEPTIC SHOCK (H): ICD-10-CM

## 2023-09-08 DIAGNOSIS — K26.9 DUODENAL ULCER DISEASE: Primary | ICD-10-CM

## 2023-09-08 DIAGNOSIS — N17.9 ACUTE RENAL FAILURE, UNSPECIFIED ACUTE RENAL FAILURE TYPE (H): ICD-10-CM

## 2023-09-08 LAB
ABO/RH(D): NORMAL
ALBUMIN SERPL BCG-MCNC: 2.3 G/DL (ref 3.5–5.2)
ALBUMIN UR-MCNC: 50 MG/DL
ALP SERPL-CCNC: 200 U/L (ref 40–129)
ALT SERPL W P-5'-P-CCNC: 82 U/L (ref 0–70)
ANION GAP SERPL CALCULATED.3IONS-SCNC: 15 MMOL/L (ref 7–15)
ANION GAP SERPL CALCULATED.3IONS-SCNC: 17 MMOL/L (ref 7–15)
ANTIBODY SCREEN: NEGATIVE
APAP SERPL-MCNC: 10.9 UG/ML (ref 10–30)
APPEARANCE UR: ABNORMAL
AST SERPL W P-5'-P-CCNC: 331 U/L (ref 0–45)
ATRIAL RATE - MUSE: 89 BPM
BACTERIA #/AREA URNS HPF: ABNORMAL /HPF
BASOPHILS # BLD AUTO: 0 10E3/UL (ref 0–0.2)
BASOPHILS NFR BLD AUTO: 0 %
BILIRUB DIRECT SERPL-MCNC: 7.66 MG/DL (ref 0–0.3)
BILIRUB SERPL-MCNC: 10.4 MG/DL
BILIRUB UR QL STRIP: ABNORMAL
BUN SERPL-MCNC: 34 MG/DL (ref 6–20)
BUN SERPL-MCNC: 36.2 MG/DL (ref 6–20)
CA-I BLD-MCNC: 3.8 MG/DL (ref 4.4–5.2)
CALCIUM SERPL-MCNC: 7.6 MG/DL (ref 8.6–10)
CALCIUM SERPL-MCNC: 7.9 MG/DL (ref 8.6–10)
CHLORIDE SERPL-SCNC: 87 MMOL/L (ref 98–107)
CHLORIDE SERPL-SCNC: 90 MMOL/L (ref 98–107)
CK SERPL-CCNC: 47 U/L (ref 39–308)
COLOR UR AUTO: ABNORMAL
CREAT SERPL-MCNC: 2.23 MG/DL (ref 0.67–1.17)
CREAT SERPL-MCNC: 2.78 MG/DL (ref 0.67–1.17)
CREAT UR-MCNC: 163.3 MG/DL
DEPRECATED HCO3 PLAS-SCNC: 23 MMOL/L (ref 22–29)
DEPRECATED HCO3 PLAS-SCNC: 26 MMOL/L (ref 22–29)
DIASTOLIC BLOOD PRESSURE - MUSE: NORMAL MMHG
EGFRCR SERPLBLD CKD-EPI 2021: 29 ML/MIN/1.73M2
EGFRCR SERPLBLD CKD-EPI 2021: 38 ML/MIN/1.73M2
EOSINOPHIL # BLD AUTO: 0.1 10E3/UL (ref 0–0.7)
EOSINOPHIL NFR BLD AUTO: 1 %
ERYTHROCYTE [DISTWIDTH] IN BLOOD BY AUTOMATED COUNT: 16 % (ref 10–15)
ETHANOL SERPL-MCNC: 0.09 G/DL
FOLATE SERPL-MCNC: 5.3 NG/ML (ref 4.6–34.8)
FRACT EXCRET NA UR+SERPL-RTO: 0.2 %
GLUCOSE SERPL-MCNC: 103 MG/DL (ref 70–99)
GLUCOSE SERPL-MCNC: 118 MG/DL (ref 70–99)
GLUCOSE UR STRIP-MCNC: 70 MG/DL
HCO3 BLDV-SCNC: 29 MMOL/L (ref 21–28)
HCO3 BLDV-SCNC: 29 MMOL/L (ref 21–28)
HCT VFR BLD AUTO: 29.3 % (ref 40–53)
HEMOCCULT STL QL: NEGATIVE
HGB BLD-MCNC: 10.7 G/DL (ref 13.3–17.7)
HGB BLD-MCNC: 10.9 G/DL (ref 13.3–17.7)
HGB UR QL STRIP: ABNORMAL
HYALINE CASTS: 14 /LPF
IMM GRANULOCYTES # BLD: 0 10E3/UL
IMM GRANULOCYTES NFR BLD: 0 %
INR PPP: 1.41 (ref 0.85–1.15)
INTERPRETATION ECG - MUSE: NORMAL
IRON BINDING CAPACITY (ROCHE): 113 UG/DL (ref 240–430)
IRON SATN MFR SERPL: 85 % (ref 15–46)
IRON SERPL-MCNC: 96 UG/DL (ref 61–157)
KETONES UR STRIP-MCNC: NEGATIVE MG/DL
LACTATE BLD-SCNC: 1.7 MMOL/L
LACTATE BLD-SCNC: 2.7 MMOL/L
LACTATE SERPL-SCNC: 1.8 MMOL/L (ref 0.7–2)
LACTATE SERPL-SCNC: 2.4 MMOL/L (ref 0.7–2)
LEUKOCYTE ESTERASE UR QL STRIP: ABNORMAL
LIPASE SERPL-CCNC: 272 U/L (ref 13–60)
LYMPHOCYTES # BLD AUTO: 1 10E3/UL (ref 0.8–5.3)
LYMPHOCYTES NFR BLD AUTO: 11 %
MAGNESIUM SERPL-MCNC: 1.4 MG/DL (ref 1.7–2.3)
MAGNESIUM SERPL-MCNC: 1.7 MG/DL (ref 1.7–2.3)
MCH RBC QN AUTO: 37.8 PG (ref 26.5–33)
MCHC RBC AUTO-ENTMCNC: 36.5 G/DL (ref 31.5–36.5)
MCV RBC AUTO: 104 FL (ref 78–100)
MONOCYTES # BLD AUTO: 1.7 10E3/UL (ref 0–1.3)
MONOCYTES NFR BLD AUTO: 18 %
MUCOUS THREADS #/AREA URNS LPF: PRESENT /LPF
NEUTROPHILS # BLD AUTO: 6.7 10E3/UL (ref 1.6–8.3)
NEUTROPHILS NFR BLD AUTO: 70 %
NITRATE UR QL: NEGATIVE
NRBC # BLD AUTO: 0 10E3/UL
NRBC BLD AUTO-RTO: 0 /100
P AXIS - MUSE: 26 DEGREES
PCO2 BLDV: 38 MM HG (ref 40–50)
PCO2 BLDV: 42 MM HG (ref 40–50)
PH BLDV: 7.45 [PH] (ref 7.32–7.43)
PH BLDV: 7.49 [PH] (ref 7.32–7.43)
PH UR STRIP: 5 [PH] (ref 5–7)
PLATELET # BLD AUTO: 154 10E3/UL (ref 150–450)
PO2 BLDV: 16 MM HG (ref 25–47)
PO2 BLDV: 30 MM HG (ref 25–47)
POTASSIUM SERPL-SCNC: 2.7 MMOL/L (ref 3.4–5.3)
POTASSIUM SERPL-SCNC: 2.8 MMOL/L (ref 3.4–5.3)
POTASSIUM SERPL-SCNC: 2.8 MMOL/L (ref 3.4–5.3)
PR INTERVAL - MUSE: 156 MS
PROCALCITONIN SERPL IA-MCNC: 0.71 NG/ML
PROT SERPL-MCNC: 6.1 G/DL (ref 6.4–8.3)
QRS DURATION - MUSE: 92 MS
QT - MUSE: 398 MS
QTC - MUSE: 484 MS
R AXIS - MUSE: 4 DEGREES
RBC # BLD AUTO: 2.83 10E6/UL (ref 4.4–5.9)
RBC URINE: 1 /HPF
SAO2 % BLDV: 23 % (ref 94–100)
SAO2 % BLDV: 63 % (ref 94–100)
SODIUM SERPL-SCNC: 128 MMOL/L (ref 136–145)
SODIUM SERPL-SCNC: 130 MMOL/L (ref 136–145)
SODIUM UR-SCNC: 20 MMOL/L
SODIUM UR-SCNC: <20 MMOL/L
SP GR UR STRIP: 1.02 (ref 1–1.03)
SPECIMEN EXPIRATION DATE: NORMAL
SYSTOLIC BLOOD PRESSURE - MUSE: NORMAL MMHG
T AXIS - MUSE: 12 DEGREES
T4 FREE SERPL-MCNC: 1.39 NG/DL (ref 0.9–1.7)
TROPONIN T SERPL HS-MCNC: 15 NG/L
TSH SERPL DL<=0.005 MIU/L-ACNC: 8.43 UIU/ML (ref 0.3–4.2)
UROBILINOGEN UR STRIP-MCNC: 4 MG/DL
VENTRICULAR RATE- MUSE: 89 BPM
VIT B12 SERPL-MCNC: 1442 PG/ML (ref 232–1245)
WBC # BLD AUTO: 9.5 10E3/UL (ref 4–11)
WBC CLUMPS #/AREA URNS HPF: PRESENT /HPF
WBC URINE: 50 /HPF

## 2023-09-08 PROCEDURE — 87086 URINE CULTURE/COLONY COUNT: CPT | Performed by: EMERGENCY MEDICINE

## 2023-09-08 PROCEDURE — 85610 PROTHROMBIN TIME: CPT | Performed by: EMERGENCY MEDICINE

## 2023-09-08 PROCEDURE — 85025 COMPLETE CBC W/AUTO DIFF WBC: CPT | Performed by: EMERGENCY MEDICINE

## 2023-09-08 PROCEDURE — 83735 ASSAY OF MAGNESIUM: CPT | Performed by: INTERNAL MEDICINE

## 2023-09-08 PROCEDURE — 71250 CT THORAX DX C-: CPT

## 2023-09-08 PROCEDURE — 82248 BILIRUBIN DIRECT: CPT | Performed by: EMERGENCY MEDICINE

## 2023-09-08 PROCEDURE — 120N000013 HC R&B IMCU

## 2023-09-08 PROCEDURE — 250N000011 HC RX IP 250 OP 636: Mod: JZ | Performed by: INTERNAL MEDICINE

## 2023-09-08 PROCEDURE — C9113 INJ PANTOPRAZOLE SODIUM, VIA: HCPCS | Mod: JZ | Performed by: INTERNAL MEDICINE

## 2023-09-08 PROCEDURE — 84300 ASSAY OF URINE SODIUM: CPT | Performed by: INTERNAL MEDICINE

## 2023-09-08 PROCEDURE — 96360 HYDRATION IV INFUSION INIT: CPT

## 2023-09-08 PROCEDURE — 84132 ASSAY OF SERUM POTASSIUM: CPT | Performed by: INTERNAL MEDICINE

## 2023-09-08 PROCEDURE — 82077 ASSAY SPEC XCP UR&BREATH IA: CPT | Performed by: EMERGENCY MEDICINE

## 2023-09-08 PROCEDURE — 250N000011 HC RX IP 250 OP 636: Mod: JZ | Performed by: EMERGENCY MEDICINE

## 2023-09-08 PROCEDURE — 87641 MR-STAPH DNA AMP PROBE: CPT | Performed by: INTERNAL MEDICINE

## 2023-09-08 PROCEDURE — 258N000003 HC RX IP 258 OP 636: Performed by: EMERGENCY MEDICINE

## 2023-09-08 PROCEDURE — 93005 ELECTROCARDIOGRAM TRACING: CPT

## 2023-09-08 PROCEDURE — C9113 INJ PANTOPRAZOLE SODIUM, VIA: HCPCS | Mod: JZ | Performed by: EMERGENCY MEDICINE

## 2023-09-08 PROCEDURE — 84484 ASSAY OF TROPONIN QUANT: CPT | Performed by: EMERGENCY MEDICINE

## 2023-09-08 PROCEDURE — 84145 PROCALCITONIN (PCT): CPT | Performed by: EMERGENCY MEDICINE

## 2023-09-08 PROCEDURE — 258N000003 HC RX IP 258 OP 636: Performed by: INTERNAL MEDICINE

## 2023-09-08 PROCEDURE — 36415 COLL VENOUS BLD VENIPUNCTURE: CPT | Performed by: EMERGENCY MEDICINE

## 2023-09-08 PROCEDURE — 85018 HEMOGLOBIN: CPT | Performed by: INTERNAL MEDICINE

## 2023-09-08 PROCEDURE — 99291 CRITICAL CARE FIRST HOUR: CPT | Mod: 25

## 2023-09-08 PROCEDURE — 80053 COMPREHEN METABOLIC PANEL: CPT | Performed by: EMERGENCY MEDICINE

## 2023-09-08 PROCEDURE — 81001 URINALYSIS AUTO W/SCOPE: CPT | Performed by: EMERGENCY MEDICINE

## 2023-09-08 PROCEDURE — 82330 ASSAY OF CALCIUM: CPT | Performed by: INTERNAL MEDICINE

## 2023-09-08 PROCEDURE — 250N000009 HC RX 250: Performed by: INTERNAL MEDICINE

## 2023-09-08 PROCEDURE — 82803 BLOOD GASES ANY COMBINATION: CPT

## 2023-09-08 PROCEDURE — 86850 RBC ANTIBODY SCREEN: CPT | Performed by: EMERGENCY MEDICINE

## 2023-09-08 PROCEDURE — HZ2ZZZZ DETOXIFICATION SERVICES FOR SUBSTANCE ABUSE TREATMENT: ICD-10-PCS | Performed by: INTERNAL MEDICINE

## 2023-09-08 PROCEDURE — 84439 ASSAY OF FREE THYROXINE: CPT | Performed by: EMERGENCY MEDICINE

## 2023-09-08 PROCEDURE — 82607 VITAMIN B-12: CPT | Performed by: INTERNAL MEDICINE

## 2023-09-08 PROCEDURE — 86901 BLOOD TYPING SEROLOGIC RH(D): CPT | Performed by: EMERGENCY MEDICINE

## 2023-09-08 PROCEDURE — 120N000001 HC R&B MED SURG/OB

## 2023-09-08 PROCEDURE — 83550 IRON BINDING TEST: CPT | Performed by: INTERNAL MEDICINE

## 2023-09-08 PROCEDURE — 99223 1ST HOSP IP/OBS HIGH 75: CPT | Performed by: INTERNAL MEDICINE

## 2023-09-08 PROCEDURE — 83605 ASSAY OF LACTIC ACID: CPT

## 2023-09-08 PROCEDURE — 84443 ASSAY THYROID STIM HORMONE: CPT | Performed by: EMERGENCY MEDICINE

## 2023-09-08 PROCEDURE — 87040 BLOOD CULTURE FOR BACTERIA: CPT | Performed by: EMERGENCY MEDICINE

## 2023-09-08 PROCEDURE — 99292 CRITICAL CARE ADDL 30 MIN: CPT

## 2023-09-08 PROCEDURE — P9045 ALBUMIN (HUMAN), 5%, 250 ML: HCPCS | Mod: JZ | Performed by: EMERGENCY MEDICINE

## 2023-09-08 PROCEDURE — 83735 ASSAY OF MAGNESIUM: CPT | Performed by: EMERGENCY MEDICINE

## 2023-09-08 PROCEDURE — 36415 COLL VENOUS BLD VENIPUNCTURE: CPT | Performed by: INTERNAL MEDICINE

## 2023-09-08 PROCEDURE — 83605 ASSAY OF LACTIC ACID: CPT | Performed by: INTERNAL MEDICINE

## 2023-09-08 PROCEDURE — 83690 ASSAY OF LIPASE: CPT | Performed by: EMERGENCY MEDICINE

## 2023-09-08 PROCEDURE — 80143 DRUG ASSAY ACETAMINOPHEN: CPT | Performed by: EMERGENCY MEDICINE

## 2023-09-08 PROCEDURE — 82746 ASSAY OF FOLIC ACID SERUM: CPT | Performed by: INTERNAL MEDICINE

## 2023-09-08 PROCEDURE — 82550 ASSAY OF CK (CPK): CPT | Performed by: EMERGENCY MEDICINE

## 2023-09-08 PROCEDURE — 250N000011 HC RX IP 250 OP 636: Performed by: INTERNAL MEDICINE

## 2023-09-08 PROCEDURE — 82272 OCCULT BLD FECES 1-3 TESTS: CPT | Performed by: INTERNAL MEDICINE

## 2023-09-08 RX ORDER — POTASSIUM CHLORIDE 7.45 MG/ML
10 INJECTION INTRAVENOUS
Status: COMPLETED | OUTPATIENT
Start: 2023-09-08 | End: 2023-09-09

## 2023-09-08 RX ORDER — POTASSIUM CHLORIDE 7.45 MG/ML
10 INJECTION INTRAVENOUS ONCE
Status: COMPLETED | OUTPATIENT
Start: 2023-09-08 | End: 2023-09-08

## 2023-09-08 RX ORDER — ACETAMINOPHEN 500 MG
500-1000 TABLET ORAL EVERY 6 HOURS PRN
Status: ON HOLD | COMMUNITY
End: 2023-09-11

## 2023-09-08 RX ORDER — FLUMAZENIL 0.1 MG/ML
0.2 INJECTION, SOLUTION INTRAVENOUS
Status: DISCONTINUED | OUTPATIENT
Start: 2023-09-08 | End: 2023-09-11 | Stop reason: HOSPADM

## 2023-09-08 RX ORDER — PIPERACILLIN SODIUM, TAZOBACTAM SODIUM 4; .5 G/20ML; G/20ML
4.5 INJECTION, POWDER, LYOPHILIZED, FOR SOLUTION INTRAVENOUS ONCE
Status: COMPLETED | OUTPATIENT
Start: 2023-09-08 | End: 2023-09-08

## 2023-09-08 RX ORDER — ONDANSETRON 2 MG/ML
4 INJECTION INTRAMUSCULAR; INTRAVENOUS EVERY 6 HOURS PRN
Status: DISCONTINUED | OUTPATIENT
Start: 2023-09-08 | End: 2023-09-11 | Stop reason: HOSPADM

## 2023-09-08 RX ORDER — SODIUM CHLORIDE 9 MG/ML
INJECTION, SOLUTION INTRAVENOUS CONTINUOUS
Status: DISCONTINUED | OUTPATIENT
Start: 2023-09-08 | End: 2023-09-10

## 2023-09-08 RX ORDER — MULTIVITAMIN,THERAPEUTIC
1 TABLET ORAL DAILY
COMMUNITY

## 2023-09-08 RX ORDER — LIDOCAINE 40 MG/G
CREAM TOPICAL
Status: DISCONTINUED | OUTPATIENT
Start: 2023-09-08 | End: 2023-09-11 | Stop reason: HOSPADM

## 2023-09-08 RX ORDER — MULTIPLE VITAMINS W/ MINERALS TAB 9MG-400MCG
1 TAB ORAL DAILY
Status: DISCONTINUED | OUTPATIENT
Start: 2023-09-09 | End: 2023-09-11 | Stop reason: HOSPADM

## 2023-09-08 RX ORDER — DIAZEPAM 10 MG/2ML
5-10 INJECTION, SOLUTION INTRAMUSCULAR; INTRAVENOUS EVERY 30 MIN PRN
Status: DISCONTINUED | OUTPATIENT
Start: 2023-09-08 | End: 2023-09-11 | Stop reason: HOSPADM

## 2023-09-08 RX ORDER — PIPERACILLIN SODIUM, TAZOBACTAM SODIUM 4; .5 G/20ML; G/20ML
4.5 INJECTION, POWDER, LYOPHILIZED, FOR SOLUTION INTRAVENOUS EVERY 6 HOURS
Status: DISCONTINUED | OUTPATIENT
Start: 2023-09-08 | End: 2023-09-10

## 2023-09-08 RX ORDER — OLANZAPINE 5 MG/1
5-10 TABLET, ORALLY DISINTEGRATING ORAL EVERY 6 HOURS PRN
Status: DISCONTINUED | OUTPATIENT
Start: 2023-09-08 | End: 2023-09-11 | Stop reason: HOSPADM

## 2023-09-08 RX ORDER — LIDOCAINE 40 MG/G
CREAM TOPICAL
Status: DISCONTINUED | OUTPATIENT
Start: 2023-09-08 | End: 2023-09-09

## 2023-09-08 RX ORDER — NITROGLYCERIN 0.4 MG/1
0.4 TABLET SUBLINGUAL EVERY 5 MIN PRN
Status: DISCONTINUED | OUTPATIENT
Start: 2023-09-08 | End: 2023-09-11 | Stop reason: HOSPADM

## 2023-09-08 RX ORDER — FOLIC ACID 1 MG/1
1 TABLET ORAL DAILY
Status: DISCONTINUED | OUTPATIENT
Start: 2023-09-09 | End: 2023-09-11 | Stop reason: HOSPADM

## 2023-09-08 RX ORDER — CALCIUM GLUCONATE 20 MG/ML
1 INJECTION, SOLUTION INTRAVENOUS ONCE
Status: COMPLETED | OUTPATIENT
Start: 2023-09-08 | End: 2023-09-08

## 2023-09-08 RX ORDER — ONDANSETRON 4 MG/1
4 TABLET, ORALLY DISINTEGRATING ORAL EVERY 6 HOURS PRN
Status: DISCONTINUED | OUTPATIENT
Start: 2023-09-08 | End: 2023-09-11 | Stop reason: HOSPADM

## 2023-09-08 RX ORDER — MAGNESIUM SULFATE HEPTAHYDRATE 40 MG/ML
2 INJECTION, SOLUTION INTRAVENOUS ONCE
Status: COMPLETED | OUTPATIENT
Start: 2023-09-08 | End: 2023-09-08

## 2023-09-08 RX ORDER — LOSARTAN POTASSIUM 50 MG/1
50 TABLET ORAL DAILY
Status: ON HOLD | COMMUNITY
End: 2023-09-11

## 2023-09-08 RX ORDER — HALOPERIDOL 5 MG/ML
2.5-5 INJECTION INTRAMUSCULAR EVERY 6 HOURS PRN
Status: DISCONTINUED | OUTPATIENT
Start: 2023-09-08 | End: 2023-09-11 | Stop reason: HOSPADM

## 2023-09-08 RX ORDER — DIAZEPAM 5 MG
10 TABLET ORAL EVERY 30 MIN PRN
Status: DISCONTINUED | OUTPATIENT
Start: 2023-09-08 | End: 2023-09-11 | Stop reason: HOSPADM

## 2023-09-08 RX ADMIN — SODIUM CHLORIDE 500 ML: 9 INJECTION, SOLUTION INTRAVENOUS at 18:34

## 2023-09-08 RX ADMIN — FOLIC ACID: 5 INJECTION, SOLUTION INTRAMUSCULAR; INTRAVENOUS; SUBCUTANEOUS at 20:04

## 2023-09-08 RX ADMIN — PANTOPRAZOLE SODIUM 40 MG: 40 INJECTION, POWDER, FOR SOLUTION INTRAVENOUS at 20:21

## 2023-09-08 RX ADMIN — POTASSIUM CHLORIDE 10 MEQ: 7.46 INJECTION, SOLUTION INTRAVENOUS at 22:30

## 2023-09-08 RX ADMIN — PIPERACILLIN AND TAZOBACTAM 4.5 G: 4; .5 INJECTION, POWDER, FOR SOLUTION INTRAVENOUS at 13:32

## 2023-09-08 RX ADMIN — ALBUMIN HUMAN 500 ML: 0.05 INJECTION, SOLUTION INTRAVENOUS at 15:15

## 2023-09-08 RX ADMIN — SODIUM CHLORIDE: 9 INJECTION, SOLUTION INTRAVENOUS at 14:55

## 2023-09-08 RX ADMIN — SODIUM CHLORIDE 1000 ML: 9 INJECTION, SOLUTION INTRAVENOUS at 13:32

## 2023-09-08 RX ADMIN — POTASSIUM CHLORIDE 10 MEQ: 7.46 INJECTION, SOLUTION INTRAVENOUS at 14:08

## 2023-09-08 RX ADMIN — MAGNESIUM SULFATE HEPTAHYDRATE 2 G: 40 INJECTION, SOLUTION INTRAVENOUS at 14:08

## 2023-09-08 RX ADMIN — POTASSIUM CHLORIDE 10 MEQ: 7.46 INJECTION, SOLUTION INTRAVENOUS at 23:46

## 2023-09-08 RX ADMIN — SODIUM CHLORIDE 1000 ML: 9 INJECTION, SOLUTION INTRAVENOUS at 11:47

## 2023-09-08 RX ADMIN — PANTOPRAZOLE SODIUM 80 MG: 40 INJECTION, POWDER, FOR SOLUTION INTRAVENOUS at 13:32

## 2023-09-08 RX ADMIN — CALCIUM GLUCONATE 1 G: 20 INJECTION, SOLUTION INTRAVENOUS at 21:02

## 2023-09-08 RX ADMIN — PIPERACILLIN AND TAZOBACTAM 4.5 G: 4; .5 INJECTION, POWDER, FOR SOLUTION INTRAVENOUS at 20:21

## 2023-09-08 ASSESSMENT — ACTIVITIES OF DAILY LIVING (ADL)
ADLS_ACUITY_SCORE: 20
ADLS_ACUITY_SCORE: 35
ADLS_ACUITY_SCORE: 20
ADLS_ACUITY_SCORE: 35
ADLS_ACUITY_SCORE: 20
ADLS_ACUITY_SCORE: 35
ADLS_ACUITY_SCORE: 35

## 2023-09-08 NOTE — PHARMACY-ADMISSION MEDICATION HISTORY
Pharmacy Intern Admission Medication History    Admission medication history is complete. The information provided in this note is only as accurate as the sources available at the time of the update.    Medication reconciliation/reorder completed by provider prior to medication history? No    Information Source(s): Patient and CareEverywhere/SureScripts via in-person    Pertinent Information: Patient reports he has not been taking sertraline. He recently restarted losartan for about 10 days until Wednesday 9/6. Prior to this, he had not been taking losartan since March 2023.    Changes made to PTA medication list:  Added: Losartan, acetaminophen  Deleted: None  Changed: None    Medication Affordability:  Not including over the counter (OTC) medications, was there a time in the past 3 months when you did not take your medications as prescribed because of cost?: No    Allergies reviewed with patient and updates made in EHR: yes He is unsure if his reaction to NSAIDs was really an allergy - he reports having a reaction of puffy eyes and dry throat to NSAIDs a few years ago but since then he tried Advil a few times and did not have any reaction.    Medication History Completed By: Megan Munoz 9/8/2023 1:29 PM    Prior to Admission medications    Medication Sig Last Dose Taking? Auth Provider Long Term End Date   acetaminophen (TYLENOL) 500 MG tablet Take 500-1,000 mg by mouth every 6 hours as needed for mild pain 9/8/2023 at 1000 Yes Unknown, Entered By History     losartan (COZAAR) 50 MG tablet Take 50 mg by mouth daily 9/6/2023 at 2000 Yes Unknown, Entered By History Yes    sertraline (ZOLOFT) 50 MG tablet Take 50 mg by mouth daily  Patient not taking: Reported on 9/8/2023 Not Taking  Reported, Patient Yes

## 2023-09-08 NOTE — H&P
[facility]    History and Physical - Hospitalist Service       Date of Admission:  9/8/2023    Assessment & Plan      Crispin Ham is a 38 year old male with history of alcohol use disorder, generalized anxiety disorder, history of hypertension who initially presented to urgent care due to feeling lightheaded and dizzy, decreased energy, light sensitivity and was noted to have blood pressure in the 60s at urgent care and so brought to the ED.    Sepsis with hypotension, lactic acidosis  Possible UTI  Lactic acidosis  -Presented with generalized weakness, dizziness, light sensitivity.  However denies any fever, does not have any meningeal signs.  Symptoms started about a week ago.  Patient was noted to be hypotensive in the 60s at urgent care.  No leukocytosis but UA dirty with leukocyte esterase positive and 50 WBC.  Procalcitonin 0.71.  Initial lactate at presentation was 2.7.  Patient has been given 2 L of IV fluid and his blood pressure now is in the high 80s/90s.  -Recheck lactate  -CT chest abdomen pelvis -shows marked hepatic steatosis with liver enlarged and diffusely heterogenous, mild bowel wall thickening the first and second portion of duodenum with mild surrounding fat stranding.  Finding could be related to duodenitis or duodenal ulcer disease.  Few small air bubbles adjacent to the duodenum possibly within duodenal diverticula however contained perforation of duodenitis or duodenal ulcer cannot be excluded  -Started on Zosyn after sending cultures, will continue  -Follow cultures, antibiotic titration accordingly  -Monitor in IMC    Macrocytic anemia  Possible duodenitis/duodenal ulcer with possible duodenal perforation  -Suggested in noncontrasted CT abdomen pelvis as above  -Hemoglobin about a year and half ago was 16, at presentation hemoglobin was 10.7, anticipate some more drop with hydration  -Patient denies any history of black tarry stool and christiano bleeding, ED  provider had done rectal examination and had noted brown stool  -IV Protonix already started at ED, will continue IV Protonix twice daily  -GI/surgery consult  -Will put him n.p.o. at least until evaluated by GI/surgery  -Iron studies B12 and folate studies    Alcohol use history  History of depression/anxiety  -Reports drinking about half a liter of vodka/hard drink up until about 10 days ago when he decided to quit.  Initially reported had withdrawal with tremors, dizziness but tremors had improved.  Dizziness and photosensitivity persisted so he decided to drink alcohol again last evening.  -Alcohol level at presentation was 0.09  -Monitor with CIWA protocol, benzodiazepine as needed  -Multivitamins  -Declines CD counselor evaluation at this point.  -Patient has not been taking his PTA Zoloft recently, will not restart Zoloft currently due to sodium of 128, may benefit from psychiatry evaluation once improves from his acute illness inpatient versus outpatient    Acute liver injury  Hyperbilirubinemia  Elevated lipase  -Had elevated LFTs in previous labs.  Lipase also elevated at 272, patient however denies any abdominal pain.  Bilirubin elevated at 10.4 with direct bilirubin also 7.6.  INR 1.41 and albumin 2.3 suggesting synthetic dysfunction also  -CT abdomen shows marked hepatic steatosis with enlarged liver and diffusely heterogenous liver  -Child-Schwartz class B, MELD Na score of 32 currently  -Abstain from alcohol or any hepatotoxins  -GI consulted as above  -Monitor labs    Acute kidney injury  Hyponatremia  Hypokalemia  Hypomagnesemia  Hypocalcemia-check ionized calcium  -At baseline about a year and half ago had normal creatinine, presented with a creatinine of 2.78  -No evidence of hydronephrosis on CT chest abdomen pelvis could be prerenal with elevated BUN  -Patient getting hydration, monitor labs, avoid nephrotoxins  -Replace electrolytes  -Monitor in telemetry    History of hypertension  -Patient has  not been taking his PTA losartan until about a week ago when he quit drinking and decided to start taking losartan  -Blood pressure low on arrival and GATITO  -Hold PTA losartan.    Elevated TSH  -TSH elevated at 8.43, however free T4 normal 1.39, could be euthyroid sick syndrome due to his acute illness  -Recheck labs in few weeks after improvement throughout patient     Diet:  N.p.o. given CT finding of possible duodenal perforation  DVT Prophylaxis: Full code, discussed with patientPneumatic Compression Devices  Bosch Catheter: Not present  Lines: None     Cardiac Monitoring: None  Code Status:  Full code, discussed with patient    Clinically Significant Risk Factors Present on Admission        # Hypokalemia: Lowest K = 2.7 mmol/L in last 2 days, will replace as needed  # Hyponatremia: Lowest Na = 128 mmol/L in last 2 days, will monitor as appropriate    # Hypomagnesemia: Lowest Mg = 1.4 mg/dL in last 2 days, will replace as needed   # Hypoalbuminemia: Lowest albumin = 2.3 g/dL at 9/8/2023 11:47 AM, will monitor as appropriate  # Coagulation Defect: INR = 1.41 (Ref range: 0.85 - 1.15) and/or PTT = N/A, will monitor for bleeding                    Disposition Plan           Jane Elmore MD  Hospitalist Service  [facility]  Securely message with [employer] (more info)  Text page via [employer] Paging/Directory     ______________________________________________________________________    Chief Complaint   Generalized weakness, dizziness    History is obtained from the patient, electronic health record, and emergency department physician    History of Present Illness   Crispin Ham is a 38 year old male with history of alcohol use disorder, generalized anxiety disorder, history of hypertension who initially presented to urgent care due to feeling lightheaded and dizzy, decreased energy, light sensitivity and was noted to have blood pressure in the 60s at urgent care and so brought to the  ED.    The patient reports that he used to drink alcohol about half liter of vodka every day until about 10 days ago when he decided to quit.  He started having tremors, light sensitivity, generalized weakness.  Tremors stopped after about 4 or 5 days but generalized weakness, dizziness and light sensitivity persisted.  He denies any chest pain, cough, shortness of breath.  He does report to intermittent headache associated with bright light but would get better when he rests.  He denies any fever nausea vomiting.  He does have loose stool but denies diarrhea.  Denies abdominal pain.  He again decided to drink alcohol yesterday as he was not feeling very well.      Past Medical History    Past Medical History:   Diagnosis Date    Alcohol abuse     Hypertension     Substance abuse (H)        Past Surgical History   Past Surgical History:   Procedure Laterality Date    ORTHOPEDIC SURGERY         Prior to Admission Medications   Prior to Admission Medications   Prescriptions Last Dose Informant Patient Reported? Taking?   acetaminophen (TYLENOL) 500 MG tablet 9/8/2023 at 1000  Yes Yes   Sig: Take 500-1,000 mg by mouth every 6 hours as needed for mild pain   losartan (COZAAR) 50 MG tablet 9/6/2023 at 2000  Yes Yes   Sig: Take 50 mg by mouth daily   multivitamin, therapeutic (THERA-VIT) TABS tablet 9/7/2023  Yes Yes   Sig: Take 1 tablet by mouth daily   sertraline (ZOLOFT) 50 MG tablet Not Taking Self Yes No   Sig: Take 50 mg by mouth daily   Patient not taking: Reported on 9/8/2023      Facility-Administered Medications: None        Review of Systems    The 10 point Review of Systems is negative other than noted in the HPI or here.      Physical Exam   Vital Signs: Temp: 97.6  F (36.4  C) Temp src: Tympanic BP: (!) 82/54 Pulse: 90   Resp: 14 SpO2: 95 %      Weight: 260 lbs 0 oz    Exam:  Constitutional: Awake, alert and no distress. Appears comfortable  Head/ ENMT: Icteric sclera  Cardiovascular: RRR.  No murmurs, no  rubs or JVD  Respiratory: Normal WOB,b/l equal air entry, no wheezes or crackles   Gastrointestinal: Abdomen soft, non-tender. BS normal. No masses, organomegaly  : Deferred  Extremities : No edema , no clubbing or cyanosis    Neurologic: Cranial nerves II-XII grossly intact , power symmetrical, Reflexes normal and symmetric. Sensation grossly WNL.     Medical Decision Making       85 MINUTES SPENT BY ME on the date of service doing chart review, history, exam, documentation & further activities per the note.      Data     I have personally reviewed the following data over the past 24 hrs:    9.5  \   10.7 (L)   / 154     128 (L) 87 (L) 36.2 (H) /  118 (H)   2.7 (L) 26 2.78 (H) \     ALT: 82 (H) AST: 331 (H) AP: 200 (H) TBILI: 10.4 (H)   ALB: 2.3 (L) TOT PROTEIN: 6.1 (L) LIPASE: 272 (H)     Trop: 15 BNP: N/A     TSH: 8.43 (H) T4: 1.39 A1C: N/A     Procal: 0.71 (H) CRP: N/A Lactic Acid: 2.7 (H)       INR:  1.41 (H) PTT:  N/A   D-dimer:  N/A Fibrinogen:  N/A       Imaging results reviewed over the past 24 hrs:   Recent Results (from the past 24 hour(s))   CT Chest Abdomen Pelvis w/o Contrast    Narrative    CT CHEST ABDOMEN PELVIS WITHOUT CONTRAST 9/8/2023 1:36 PM    CLINICAL HISTORY: Septic shock. Liver failure.    TECHNIQUE: CT scan of the chest, abdomen, and pelvis was performed  without IV contrast. Multiplanar reformats were obtained. Dose  reduction techniques were used.   CONTRAST: None.  COMPARISON: None.    FINDINGS:   LUNGS AND PLEURA: Trace amount of pleural fluid at the right lung  base. Mild scattered atelectasis at both lung bases. No lung masses.  No pneumothorax.    MEDIASTINUM/AXILLAE: No enlarged lymph nodes in the chest. No  pericardial effusion.    CORONARY ARTERY CALCIFICATION: None.    HEPATOBILIARY: Marked diffuse fatty infiltration of the liver. The  liver also appears enlarged and heterogeneous. No focal hepatic  masses. The gallbladder is mildly distended. No calcified  gallstones.    PANCREAS: Normal.    SPLEEN: Mild splenomegaly. The spleen measures 15.5 cm in length.    ADRENAL GLANDS: Normal.    KIDNEYS/BLADDER: Unremarkable. No hydronephrosis.    BOWEL: Mild thickening of the first and second portions of the  duodenum, with mild surrounding fat stranding. There are a few small  air bubbles adjacent to the duodenum, possibly within duodenal  diverticula, however a contained perforation of a duodenitis/duodenal  ulcer cannot be excluded. No bowel obstruction. Few scattered colonic  diverticula. No convincing evidence for colitis or diverticulitis.  Unremarkable appendix.    PELVIC ORGANS: Unremarkable.    LYMPH NODES: Few mildly enlarged retroperitoneal and peripancreatic  lymph nodes are noted. For example, a mildly enlarged aortocaval lymph  node (series 3 image 178) measures 1.3 cm. Scattered mildly prominent  mesenteric and periportal lymph nodes are also noted.    VASCULATURE: Unremarkable.    ADDITIONAL FINDINGS: Small amount of nonspecific free fluid in the  pelvis.    MUSCULOSKELETAL: Degenerative changes left hip.      Impression    IMPRESSION:   1.  Marked hepatic steatosis. The liver is also enlarged and diffusely  heterogeneous.  2.  Mild bowel wall thickening in the first and second portions of the  duodenum, with mild surrounding fat stranding. Findings could be  related to a duodenitis or duodenal ulcer disease. A few small air  bubbles adjacent to the duodenum could be within small duodenal  diverticula, although a contained perforation is difficult to exclude.  No other areas suspicious for free intraperitoneal air.  3.  Small amount of nonspecific free fluid in the pelvis.  4.  The gallbladder is mildly distended.  5.  Mild splenomegaly.  6.  Mild retroperitoneal and peripancreatic adenopathy.  7.  Trace amount of pleural fluid at the right lung base.      Recent Labs   Lab 09/08/23  1147   WBC 9.5   HGB 10.7*   *      INR 1.41*   *    POTASSIUM 2.7*   CHLORIDE 87*   CO2 26   BUN 36.2*   CR 2.78*   ANIONGAP 15   MIGUELINA 7.9*   *   ALBUMIN 2.3*   PROTTOTAL 6.1*   BILITOTAL 10.4*   ALKPHOS 200*   ALT 82*   *   LIPASE 272*

## 2023-09-08 NOTE — ED PROVIDER NOTES
History     Chief Complaint:  Hypotension       HPI   Crispin Ham is a 38 year old male brought in by ambulance from urgent care for hypotension.  The patient states that he stopped drinking alcohol about a week and a half ago.  He then believes that he started going into some withdrawal with some visual complaints and possibly seeing things, which then went away.  After that stopped about a week ago, he then started developing some light sensitivity and feeling lightheaded and dizzy.  He has been also having some blurry vision and decreased energy as well as feeling off balance.  He did start his blood pressure medication again as well last week as he stopped drinking alcohol.  His blood pressure in urgent care was as low as 60 systolic.  He was provided IV fluids which has helped his blood pressure and his symptoms somewhat.  His urine has been darker.  He denies any chest pain, abdominal pain, blood in his stools, dark tarry stools, or fevers.  He has had some intermittent shortness of breath that he is unsure if it could be from anxiety.      Independent Historian:   None - Patient Only    Review of External Notes:   Urgent care note from today      Medications:    sertraline (ZOLOFT) 50 MG tablet        Past Medical History:    Past Medical History:   Diagnosis Date    Alcohol abuse     Hypertension     Substance abuse (H)        Past Surgical History:    Past Surgical History:   Procedure Laterality Date    ORTHOPEDIC SURGERY          Physical Exam   Patient Vitals for the past 24 hrs:   BP Temp Temp src Pulse Resp SpO2 Weight   09/08/23 1100 94/42 97.6  F (36.4  C) Tympanic 91 16 99 % 117.9 kg (260 lb)        Physical Exam  Nursing note and vitals reviewed.  Constitutional:  Oriented to person, place, and time. Cooperative.   HENT:   Nose:    Nose normal.   Mouth/Throat:   Mucous membranes are normal.   Eyes:    Scleral icterus present.  Conjunctivae normal and EOM are normal.      Pupils are equal,  round, and reactive to light.   Neck:    Trachea normal.   Cardiovascular:  Normal rate, regular rhythm, normal heart sounds and normal pulses. No murmur heard.  Pulmonary/Chest:  Effort normal and breath sounds normal.   Abdominal:   Soft. Normal appearance and bowel sounds are normal.      There is no tenderness.      There is no rebound and no CVA tenderness.   Rectal:   No stool in the vault.  Musculoskeletal:  Extremities atraumatic x 4.   Lymphadenopathy:  No cervical adenopathy.   Neurological:   Alert and oriented to person, place, and time. Normal strength.      No cranial nerve deficit or sensory deficit. GCS eye subscore is 4. GCS verbal subscore is 5. GCS motor subscore is 6.   Skin:    Jaundiced.   Psychiatric:   Normal mood and affect.      Emergency Department Course     ECG results from 09/08/23 - EKG read at 1159   EKG 12-lead, tracing only     Value    Systolic Blood Pressure     Diastolic Blood Pressure     Ventricular Rate 89    Atrial Rate 89    UT Interval 156    QRS Duration 92        QTc 484    P Axis 26    R AXIS 4    T Axis 12    Interpretation ECG      Sinus rhythm  Normal ECG  No previous ECGs available       Imaging:  CT Chest Abdomen Pelvis w/o Contrast   Final Result   IMPRESSION:    1.  Marked hepatic steatosis. The liver is also enlarged and diffusely   heterogeneous.   2.  Mild bowel wall thickening in the first and second portions of the   duodenum, with mild surrounding fat stranding. Findings could be   related to a duodenitis or duodenal ulcer disease. A few small air   bubbles adjacent to the duodenum could be within small duodenal   diverticula, although a contained perforation is difficult to exclude.   No other areas suspicious for free intraperitoneal air.   3.  Small amount of nonspecific free fluid in the pelvis.   4.  The gallbladder is mildly distended.   5.  Mild splenomegaly.   6.  Mild retroperitoneal and peripancreatic adenopathy.   7.  Trace amount of pleural  fluid at the right lung base.       KHLOE FENTON MD            SYSTEM ID:  [id number]         Report per radiology    Laboratory:  Labs Ordered and Resulted from Time of ED Arrival to Time of ED Departure   INR - Abnormal       Result Value    INR 1.41 (*)    BASIC METABOLIC PANEL - Abnormal    Sodium 128 (*)     Potassium 2.7 (*)     Chloride 87 (*)     Carbon Dioxide (CO2) 26      Anion Gap 15      Urea Nitrogen 36.2 (*)     Creatinine 2.78 (*)     Calcium 7.9 (*)     Glucose 118 (*)     GFR Estimate 29 (*)    HEPATIC FUNCTION PANEL - Abnormal    Protein Total 6.1 (*)     Albumin 2.3 (*)     Bilirubin Total 10.4 (*)     Alkaline Phosphatase 200 (*)      (*)     ALT 82 (*)     Bilirubin Direct 7.66 (*)    LIPASE - Abnormal    Lipase 272 (*)    PROCALCITONIN - Abnormal    Procalcitonin 0.71 (*)    MAGNESIUM - Abnormal    Magnesium 1.4 (*)    TSH WITH FREE T4 REFLEX - Abnormal    TSH 8.43 (*)    ETHYL ALCOHOL LEVEL - Abnormal    Alcohol ethyl 0.09 (*)    ROUTINE UA WITH MICROSCOPIC REFLEX TO CULTURE - Abnormal    Color Urine Dark Yellow (*)     Appearance Urine Slightly Cloudy (*)     Glucose Urine 70 (*)     Bilirubin Urine Large (*)     Ketones Urine Negative      Specific Gravity Urine 1.024      Blood Urine Trace (*)     pH Urine 5.0      Protein Albumin Urine 50 (*)     Urobilinogen Urine 4.0 (*)     Nitrite Urine Negative      Leukocyte Esterase Urine Trace (*)     Bacteria Urine Few (*)     WBC Clumps Urine Present (*)     Mucus Urine Present (*)     RBC Urine 1      WBC Urine 50 (*)     Hyaline Casts Urine 14 (*)    CBC WITH PLATELETS AND DIFFERENTIAL - Abnormal    WBC Count 9.5      RBC Count 2.83 (*)     Hemoglobin 10.7 (*)     Hematocrit 29.3 (*)      (*)     MCH 37.8 (*)     MCHC 36.5      RDW 16.0 (*)     Platelet Count 154      % Neutrophils 70      % Lymphocytes 11      % Monocytes 18      % Eosinophils 1      % Basophils 0      % Immature Granulocytes 0      NRBCs per 100 WBC 0       Absolute Neutrophils 6.7      Absolute Lymphocytes 1.0      Absolute Monocytes 1.7 (*)     Absolute Eosinophils 0.1      Absolute Basophils 0.0      Absolute Immature Granulocytes 0.0      Absolute NRBCs 0.0     ISTAT GASES LACTATE VENOUS POCT - Abnormal    Lactic Acid POCT 2.7 (*)     Bicarbonate Venous POCT 29 (*)     O2 Sat, Venous POCT 63 (*)     pCO2 Venous POCT 38 (*)     pH Venous POCT 7.49 (*)     pO2 Venous POCT 30     ISTAT GASES LACTATE VENOUS POCT - Abnormal    Lactic Acid POCT 1.7      Bicarbonate Venous POCT 29 (*)     O2 Sat, Venous POCT 23 (*)     pCO2 Venous POCT 42      pH Venous POCT 7.45 (*)     pO2 Venous POCT 16 (*)    TROPONIN T, HIGH SENSITIVITY - Normal    Troponin T, High Sensitivity 15     ACETAMINOPHEN LEVEL - Normal    Acetaminophen 10.9     T4 FREE - Normal    Free T4 1.39     CK TOTAL - Normal    CK 47     LACTIC ACID WHOLE BLOOD   TYPE AND SCREEN, ADULT    ABO/RH(D) A POS      Antibody Screen Negative      SPECIMEN EXPIRATION DATE [id number]     URINE CULTURE   BLOOD CULTURE   BLOOD CULTURE   ABO/RH TYPE AND SCREEN        Procedures       Emergency Department Course & Assessments:             Interventions:  Medications   sodium chloride 0.9% infusion ( Intravenous $New Bag 9/8/23 1455)   0.9% sodium chloride BOLUS (0 mLs Intravenous Stopped 9/8/23 1256)   piperacillin-tazobactam (ZOSYN) 4.5 g vial to attach to  mL bag (0 g Intravenous Stopped 9/8/23 1407)   pantoprazole (PROTONIX) IV push injection 80 mg (80 mg Intravenous $Given 9/8/23 1332)   0.9% sodium chloride BOLUS (0 mLs Intravenous Stopped 9/8/23 1455)   magnesium sulfate 2 g in 50 mL sterile water intermittent infusion (0 g Intravenous Stopped 9/8/23 1455)   potassium chloride 10 mEq in 100 mL sterile water infusion (0 mEq Intravenous Stopped 9/8/23 1537)   albumin human 5 % injection 500 mL (500 mLs Intravenous $New Bag 9/8/23 1515)          Independent Interpretation (X-rays, CTs, rhythm  "strip):  None    Consultations/Discussion of Management or Tests:  None        Social Determinants of Health affecting care:   Alcohol abuse    Disposition:  The patient was admitted to the hospital under the care of Dr. Elmore.     Impression & Plan    CMS Diagnoses: The patient has signs of Septic Shock  The patient has signs of septic shock as evidenced by:  1. Presence of Sepsis, AND  2. Persistent hypotension defined by the last 2 BP readings within the ONE HOUR following completion of the 30mL/kg bolus being low (SBP <90 or MAP <65)    Time septic shock diagnosis confirmed = 1245  09/08/23   as this was the time when Provider determined that septic shock was present    3 Hour Septic Shock Bundle Completion:  1. Initial Lactic Acid Result:   Recent Labs   Lab Test 09/08/23  1457 09/08/23  1154   LACT 1.7 2.7*     2. Blood Cultures before Antibiotics: Yes  3. Broad Spectrum Antibiotics Administered:  yes       Anti-infectives (From admission through now)      Start     Dose/Rate Route Frequency Ordered Stop    09/08/23 1300  piperacillin-tazobactam (ZOSYN) 4.5 g vial to attach to  mL bag        Note to Pharmacy: For [facility], [facility] and [facility]: For Zosyn-naive patients, use the \"Zosyn initial dose + extended infusion\" order panel.    4.5 g  over 30 Minutes Intravenous ONCE 09/08/23 1255 09/08/23 1407            4. IF 30 mL/kg bolus criteria met based on:  -Lactate > 4  OR  -Initial Hypotension:  Definition:  2 low BP readings (SBP <90, MAP <65, or decrease > 40 from baseline due to infection) within 3 hrs of each other during the time period of 6 hrs before and 3 hrs  after time zero  THEN: Fluid volume administered in ED:  Full 30 mL/kg bolus given (see amount below).    BMI Readings from Last 1 Encounters:   09/08/23 35.26 kg/m      30 mL/kg fluids based on weight: 3,540 mL  30 mL/kg fluids based on IBW (must be >= 60 inches tall): Patient ideal weight not available.    Septic Shock reassessment:  1. Repeat Lactic " Acid Level: 1.7  2. MAP>65 after initial IVF bolus, will continue to monitor fluid status and vital signs    I attest to having performed a repeat sepsis exam and assessment of perfusion at 1543 and the results demonstrate improved perfusion.      Medical Decision Making:  This is a 38-year-old male brought in by ambulance from urgent care due to hypotension.  He was initially quite hypotensive here as well, although his blood pressure did seem to respond appropriately to IV fluids.  He also was clearly jaundiced, and therefore I was concerned about acute liver failure.  I was additionally concerned about the possibility of an infection and septic shock.  He does have quite a few things going on with him including what appears to be liver failure, acute renal failure, significant drop in his hemoglobin, and ongoing alcohol abuse.  I also could have septic shock, as he does have an apparent UTI with persistent hypotension.  Therefore he was provided IV Zosyn in addition to IV fluids.  His blood pressure did respond appropriately to the 30 cc/kg bolus of IV fluids.  He also is mentating normally, and therefore I do not feel that he requires pressors.  He also does not require a transfusion at this point, however that might change if his hemoglobin continues to drop.  I performed a rectal exam, however there was no stool in the vault and therefore I could not do guaiac testing at this time.  I am providing him an IV dose of Protonix in case he might have a peptic ulcer.  I spoke with Dr. Elmore, who [place] [facility]  We then obtained a noncontrast CT scan of his chest, abdomen, and pelvis, which shows the above findings which are concerning for a peptic ulcer as well.  He does not have any abdominal pain to suggest the need for emergent surgical consultation or going to the OR emergently.    Critical care time (excluding procedures): 35 minutes.      Diagnosis:    ICD-10-CM    1. Septic shock (H)  A41.9      R65.21       2. Anemia, unspecified type  D64.9       3. Acute liver failure without hepatic coma  K72.00       4. Acute renal failure, unspecified acute renal failure type (H)  N17.9       5. Alcohol abuse  F10.10                9/8/2023   Rene Cuello MD Lashkowitz, Seth H, MD  09/08/23 8688

## 2023-09-08 NOTE — PLAN OF CARE
September 8, 2023  Shift:[id number]  Crispin Ham  38 year old  YOB: 1985    Reason for admission:Alcohol abuse [F10.10]  Septic shock (H) [A41.9, R65.21]  Acute renal failure, unspecified acute renal failure type (H) [N17.9]  Acute liver failure without hepatic coma [K72.00]  Anemia, unspecified type [D64.9]    Cognition/Mentation:A&Ox4  Neuros/CMS:WDL  VS:VSS on RA  Cardiac:Tele SR  GI:WDL  :WDL  Pulmonary:WDL  Pain:denies pain  Drains/Lines:PIV x 2, Left arm infusing and right SL  Skin:WDL ex Jaundice   Activity:SBA  Diet:NPO  Discharge:pending    Shift summary:Lactic came back 2.4, NS bolus 500 ml over an hour. CIWA at a 2. GI consult and Gen Surg consult ordered. Urine, stool, and MRSA nasal done and sent to lab 1830. Potasium and magnesium recheck at 1900.

## 2023-09-08 NOTE — ED TRIAGE NOTES
Patient was seen at the clinic for liver failure work up. Patient was hypotensive at the clinic with systolics in the 50s. Patient received fluids and last systolic was 94.  Patient complains of weakness.     Triage Assessment       Row Name 09/08/23 1058       Triage Assessment (Adult)    Airway WDL WDL       Respiratory WDL    Respiratory WDL WDL       Skin Circulation/Temperature WDL    Skin Circulation/Temperature WDL WDL       Cardiac WDL    Cardiac WDL WDL       Peripheral/Neurovascular WDL    Peripheral Neurovascular WDL WDL       Cognitive/Neuro/Behavioral WDL    Cognitive/Neuro/Behavioral WDL WDL

## 2023-09-08 NOTE — ED NOTES
Bed: ED08  Expected date: 9/8/23  Expected time: 10:45 AM  Means of arrival: Ambulance  Comments:  [place] 38m hypoBP  ETA 1050

## 2023-09-09 ENCOUNTER — APPOINTMENT (OUTPATIENT)
Dept: GENERAL RADIOLOGY | Facility: CLINIC | Age: 38
DRG: 432 | End: 2023-09-09
Attending: INTERNAL MEDICINE
Payer: COMMERCIAL

## 2023-09-09 LAB
ALBUMIN SERPL BCG-MCNC: 2.4 G/DL (ref 3.5–5.2)
ALP SERPL-CCNC: 178 U/L (ref 40–129)
ALT SERPL W P-5'-P-CCNC: 80 U/L (ref 0–70)
ANION GAP SERPL CALCULATED.3IONS-SCNC: 11 MMOL/L (ref 7–15)
AST SERPL W P-5'-P-CCNC: 327 U/L (ref 0–45)
BASOPHILS # BLD AUTO: 0.1 10E3/UL (ref 0–0.2)
BASOPHILS NFR BLD AUTO: 1 %
BILIRUB SERPL-MCNC: 12.1 MG/DL
BUN SERPL-MCNC: 28.8 MG/DL (ref 6–20)
CALCIUM SERPL-MCNC: 8 MG/DL (ref 8.6–10)
CHLORIDE SERPL-SCNC: 96 MMOL/L (ref 98–107)
CREAT SERPL-MCNC: 1.38 MG/DL (ref 0.67–1.17)
DEPRECATED HCO3 PLAS-SCNC: 26 MMOL/L (ref 22–29)
EGFRCR SERPLBLD CKD-EPI 2021: 68 ML/MIN/1.73M2
EOSINOPHIL # BLD AUTO: 0.1 10E3/UL (ref 0–0.7)
EOSINOPHIL NFR BLD AUTO: 1 %
ERYTHROCYTE [DISTWIDTH] IN BLOOD BY AUTOMATED COUNT: 16.5 % (ref 10–15)
GLUCOSE SERPL-MCNC: 106 MG/DL (ref 70–99)
HCT VFR BLD AUTO: 29.6 % (ref 40–53)
HGB BLD-MCNC: 10.6 G/DL (ref 13.3–17.7)
IMM GRANULOCYTES # BLD: 0 10E3/UL
IMM GRANULOCYTES NFR BLD: 0 %
LYMPHOCYTES # BLD AUTO: 0.8 10E3/UL (ref 0.8–5.3)
LYMPHOCYTES NFR BLD AUTO: 10 %
MAGNESIUM SERPL-MCNC: 1.7 MG/DL (ref 1.7–2.3)
MCH RBC QN AUTO: 38 PG (ref 26.5–33)
MCHC RBC AUTO-ENTMCNC: 35.8 G/DL (ref 31.5–36.5)
MCV RBC AUTO: 106 FL (ref 78–100)
MONOCYTES # BLD AUTO: 1.2 10E3/UL (ref 0–1.3)
MONOCYTES NFR BLD AUTO: 15 %
MRSA DNA SPEC QL NAA+PROBE: NEGATIVE
NEUTROPHILS # BLD AUTO: 5.8 10E3/UL (ref 1.6–8.3)
NEUTROPHILS NFR BLD AUTO: 73 %
NRBC # BLD AUTO: 0 10E3/UL
NRBC BLD AUTO-RTO: 0 /100
PLATELET # BLD AUTO: 157 10E3/UL (ref 150–450)
POTASSIUM SERPL-SCNC: 3.1 MMOL/L (ref 3.4–5.3)
POTASSIUM SERPL-SCNC: 3.9 MMOL/L (ref 3.4–5.3)
PROT SERPL-MCNC: 6.3 G/DL (ref 6.4–8.3)
RBC # BLD AUTO: 2.79 10E6/UL (ref 4.4–5.9)
SA TARGET DNA: POSITIVE
SODIUM SERPL-SCNC: 133 MMOL/L (ref 136–145)
WBC # BLD AUTO: 7.9 10E3/UL (ref 4–11)

## 2023-09-09 PROCEDURE — C9113 INJ PANTOPRAZOLE SODIUM, VIA: HCPCS | Mod: JZ | Performed by: INTERNAL MEDICINE

## 2023-09-09 PROCEDURE — 120N000013 HC R&B IMCU

## 2023-09-09 PROCEDURE — 250N000011 HC RX IP 250 OP 636: Mod: JZ | Performed by: INTERNAL MEDICINE

## 2023-09-09 PROCEDURE — 999N000065 XR ABDOMEN 1 VIEW

## 2023-09-09 PROCEDURE — 120N000001 HC R&B MED SURG/OB

## 2023-09-09 PROCEDURE — 74018 RADEX ABDOMEN 1 VIEW: CPT | Mod: 76

## 2023-09-09 PROCEDURE — 80053 COMPREHEN METABOLIC PANEL: CPT | Performed by: INTERNAL MEDICINE

## 2023-09-09 PROCEDURE — 99232 SBSQ HOSP IP/OBS MODERATE 35: CPT | Performed by: INTERNAL MEDICINE

## 2023-09-09 PROCEDURE — 83735 ASSAY OF MAGNESIUM: CPT | Performed by: INTERNAL MEDICINE

## 2023-09-09 PROCEDURE — 85025 COMPLETE CBC W/AUTO DIFF WBC: CPT | Performed by: INTERNAL MEDICINE

## 2023-09-09 PROCEDURE — 74018 RADEX ABDOMEN 1 VIEW: CPT

## 2023-09-09 PROCEDURE — 36415 COLL VENOUS BLD VENIPUNCTURE: CPT | Performed by: INTERNAL MEDICINE

## 2023-09-09 PROCEDURE — 250N000013 HC RX MED GY IP 250 OP 250 PS 637: Performed by: INTERNAL MEDICINE

## 2023-09-09 PROCEDURE — 258N000003 HC RX IP 258 OP 636: Performed by: INTERNAL MEDICINE

## 2023-09-09 PROCEDURE — 84132 ASSAY OF SERUM POTASSIUM: CPT | Performed by: INTERNAL MEDICINE

## 2023-09-09 RX ADMIN — PIPERACILLIN AND TAZOBACTAM 4.5 G: 4; .5 INJECTION, POWDER, FOR SOLUTION INTRAVENOUS at 08:25

## 2023-09-09 RX ADMIN — THIAMINE HCL TAB 100 MG 100 MG: 100 TAB at 08:50

## 2023-09-09 RX ADMIN — POTASSIUM CHLORIDE 10 MEQ: 7.46 INJECTION, SOLUTION INTRAVENOUS at 05:04

## 2023-09-09 RX ADMIN — PIPERACILLIN AND TAZOBACTAM 4.5 G: 4; .5 INJECTION, POWDER, FOR SOLUTION INTRAVENOUS at 02:05

## 2023-09-09 RX ADMIN — PIPERACILLIN AND TAZOBACTAM 4.5 G: 4; .5 INJECTION, POWDER, FOR SOLUTION INTRAVENOUS at 14:31

## 2023-09-09 RX ADMIN — SODIUM CHLORIDE: 9 INJECTION, SOLUTION INTRAVENOUS at 14:31

## 2023-09-09 RX ADMIN — PIPERACILLIN AND TAZOBACTAM 4.5 G: 4; .5 INJECTION, POWDER, FOR SOLUTION INTRAVENOUS at 20:31

## 2023-09-09 RX ADMIN — PANTOPRAZOLE SODIUM 40 MG: 40 INJECTION, POWDER, FOR SOLUTION INTRAVENOUS at 21:16

## 2023-09-09 RX ADMIN — FOLIC ACID 1 MG: 1 TABLET ORAL at 08:50

## 2023-09-09 RX ADMIN — POTASSIUM CHLORIDE 10 MEQ: 7.46 INJECTION, SOLUTION INTRAVENOUS at 02:48

## 2023-09-09 RX ADMIN — PANTOPRAZOLE SODIUM 40 MG: 40 INJECTION, POWDER, FOR SOLUTION INTRAVENOUS at 08:27

## 2023-09-09 RX ADMIN — SODIUM CHLORIDE: 9 INJECTION, SOLUTION INTRAVENOUS at 06:50

## 2023-09-09 RX ADMIN — POTASSIUM CHLORIDE 10 MEQ: 7.46 INJECTION, SOLUTION INTRAVENOUS at 03:58

## 2023-09-09 RX ADMIN — MULTIPLE VITAMINS W/ MINERALS TAB 1 TABLET: TAB at 08:50

## 2023-09-09 RX ADMIN — POTASSIUM CHLORIDE 10 MEQ: 7.46 INJECTION, SOLUTION INTRAVENOUS at 02:05

## 2023-09-09 ASSESSMENT — ACTIVITIES OF DAILY LIVING (ADL)
ADLS_ACUITY_SCORE: 20

## 2023-09-09 NOTE — PROGRESS NOTES
1930h-0730h: Pt AO x4. W/ generalized jaundice. Soft BP at 90's, O2Sat 93% on RA. Clear bilateral breath sound. NPO except ice chips. Continent B/B. Voiding adequately. Last BM 09/08. Abdomen soft to palpate, normoactive. No edema  on BLE, sensation intact w/ palpable pulses. Skin intact. Ambulates SBA in the room.    CIWA score: 1 to 2.  Started on Infuvite, thiamine, folic infusion @ 100 ml/hr then to start IV maintenance NaCL 125 ml/hr at 0800h after.    For GI & Gen surgery consult 09/10  On K, Mg protocol.  2002h:   K 2.8, replaced w/ IV KCl x 6 doses. Next re-check at 0900h.  Lactic 1.7    Tele: [place]

## 2023-09-09 NOTE — PROGRESS NOTES
[facility]    Medicine Progress Note - Hospitalist Service    Date of Admission:  9/8/2023    Assessment & Plan   Crispin Ham is a 38 y.o M hx of Alcohol use disorder, generalized anxiety disorder, hypertension. He presented to the hospital where urgent care due to generalized weakness, dizziness and light sensitivity noted to be hypotensive with work-up showing evidence of duodenitis.  Patient also found to be hyponatremic, hypokalemic, hypomagnesemic, elevated LFTs as well as lactic acid.  There was no evidence of metabolic acidosis.  Patient's urinalysis is also concerning for an infection.  Initiated on Zosyn for presumed sepsis in the setting of a urinary tract infection.  Though, patient's metabolic derangement could also related to his heavy alcohol use.      #Sepsis with hypotension, lactic acidosis  #Possible UTI  #Lactic acidosis  -- Presented with generalized weakness, dizziness, light sensitivity.   -- However denies any fever, does not have any meningeal signs.   -- Symptoms started about a week ago.    -- Patient was noted to be hypotensive in the 60s at urgent care.    -- No leukocytosis but UA dirty with leukocyte esterase positive and 50 WBC.  Procalcitonin 0.71.  Initial lactate at presentation was 2.7.    -- Patient has been given IVF with adequate response.    -- Lactic acid resolved   -CT chest abdomen pelvis -shows marked hepatic steatosis with liver enlarged and diffusely heterogenous, mild bowel wall thickening the first and second portion of duodenum with mild surrounding fat stranding.  Finding could be related to duodenitis or duodenal ulcer disease.  Few small air bubbles adjacent to the duodenum possibly within duodenal diverticula however contained perforation of duodenitis or duodenal ulcer cannot be excluded  Continue Zosyn after sending cultures, will continue  Appreciate GI and surgery recommendations   Follow cultures, antibiotic titration  accordingly  -Monitor in IMC     #Macrocytic anemia  #Possible duodenitis/duodenal ulcer with possible duodenal perforation  -Suggested in noncontrasted CT abdomen pelvis as above  -Hemoglobin about a year and half ago was 16, at presentation hemoglobin was 10.7, anticipate some more drop with hydration  -Patient denies any history of black tarry stool and christiano bleeding, ED provider had done rectal examination and had noted brown stool  -IV Protonix already started at ED, will continue IV Protonix twice daily  -GI/surgery consult  Keep n.p.o. pending surgery recs   -Iron studies B12 and folate studies     #Alcohol use history  #History of depression/anxiety  -Reports drinking about half a liter of vodka/hard drink up until about 10 days ago when he decided to quit.  Initially reported had withdrawal with tremors, dizziness but tremors had improved.  Dizziness and photosensitivity persisted so he decided to drink alcohol again last evening.  -Alcohol level at presentation was 0.09  -Monitor with CIWA protocol, benzodiazepine as needed  -Multivitamins  -Declines CD counselor evaluation at this point.  -Patient has not been taking his PTA Zoloft recently, will not restart Zoloft currently due to sodium of 128, may benefit from psychiatry evaluation once improves from his acute illness inpatient versus outpatient     #Acute liver injury  #Hyperbilirubinemia  #Elevated lipase  #Alcoholic hepatitis   -Had elevated LFTs in previous labs.  Lipase also elevated at 272, patient however denies any abdominal pain.  Bilirubin elevated at 10.4 with direct bilirubin also 7.6.  INR 1.41 and albumin 2.3 suggesting synthetic dysfunction also  -CT abdomen shows marked hepatic steatosis with enlarged liver and diffusely heterogenous liver  -Child-Schwartz class B,   MELD 3.0: 26 at 9/9/2023 11:19 AM  MELD-Na: 25 at 9/9/2023 11:19 AM  Calculated from:  Serum Creatinine: 1.38 mg/dL at 9/9/2023 11:19 AM  Serum Sodium: 133 mmol/L at 9/9/2023  11:19 AM  Total Bilirubin: 12.1 mg/dL at 9/9/2023 11:19 AM  Serum Albumin: 2.4 g/dL at 9/9/2023 11:19 AM  INR(ratio): 1.41 at 9/8/2023 11:47 AM  Age at listing (hypothetical): 38 years  Sex: Male at 9/9/2023 11:19 AM  Abstain from alcohol or any hepatotoxins  Appreciate GI recs     #Acute kidney injury - improving   #Hyponatremia - improving   #Hypokalemia - improving   #Hypomagnesemia - improving   #Hypocalcemia-check ionized calcium  -At baseline about a year and half ago had normal creatinine, presented with a creatinine of 2.78  -No evidence of hydronephrosis on CT chest abdomen pelvis could be prerenal with elevated BUN  -Patient getting hydration, monitor labs, avoid nephrotoxins  -Replace electrolytes  -Monitor in telemetry     #History of hypertension  -Patient has not been taking his PTA losartan until about a week ago when he quit drinking and decided to start taking losartan  -Blood pressure low on arrival and [place]  -Hold PTA losartan.     #Elevated TSH  -TSH elevated at 8.43, however free T4 normal 1.39, could be euthyroid sick syndrome due to his acute illness  -Recheck labs in few weeks after improvement throughout patient         Diet: NPO for Medical/Clinical Reasons Except for: Ice Chips, Meds    DVT Prophylaxis: Pneumatic Compression Devices  Bosch Catheter: Not present  Lines: None     Cardiac Monitoring: ACTIVE order. Indication: Electrolyte Imbalance (24 hours)- Magnesium <1.3 mg/ml; Potassium < =2.8 or > 5.5 mg/ml  Code Status: Full Code      Clinically Significant Risk Factors        # Hypokalemia: Lowest K = 2.7 mmol/L in last 2 days, will replace as needed  # Hyponatremia: Lowest Na = 128 mmol/L in last 2 days, will monitor as appropriate  # Hypocalcemia: Lowest iCa = 3.8 mg/dL in last 2 days, will monitor and replace as appropriate   # Hypomagnesemia: Lowest Mg = 1.4 mg/dL in last 2 days, will replace as needed   # Hypoalbuminemia: Lowest albumin = 2.3 g/dL at 9/8/2023 11:47 AM, will monitor  as appropriate  # Coagulation Defect: INR = 1.41 (Ref range: 0.85 - 1.15) and/or PTT = N/A, will monitor for bleeding           # Obesity: Estimated body mass index is 36.06 kg/m  as calculated from the following:    Height as of this encounter: 1.829 m (6').    Weight as of this encounter: 120.6 kg (265 lb 14 oz)., PRESENT ON ADMISSION            Disposition Plan      Expected Discharge Date: 09/11/2023        Discharge Comments: 9/9 requiring electrolye replacements, awaiting GI consults          JOHNNIE MARX MD  Hospitalist Service  [facility]  Securely message with [employer] (more info)  Text page via [employer] Paging/Directory   ______________________________________________________________________    Interval History   -admitted overnight  -denies any acute complaints  -afebrile and HDS    Physical Exam   Vital Signs: Temp: 98.9  F (37.2  C) Temp src: Oral BP: 106/54 Pulse: 92   Resp: 18 SpO2: 98 % O2 Device: None (Room air)    Weight: 265 lbs 14 oz    General Appearance: Lying comfortably in bed, on room air, in no acute distress of discomfort. Appears jaundiced  HEENT: PERRL: EOMI; moist mucous membrane w/o lesions  Neck: No JVD  Pulmonary: Clear to auscultation bilaterally, no wheezes or crackles  CVS: Regular rhythm, no murmurs, rubs or gallops  GI: BS (+), soft nontender, no rebound or guarding   Extremities: No LE edema.   Skin: No rashes or lesions  Neurologic: A&O x3      Medical Decision Making       55 MINUTES SPENT BY ME on the date of service doing chart review, history, exam, documentation & further activities per the note.      Data   ------------------------- PAST 24 HR DATA REVIEWED -----------------------------------------------    I have personally reviewed the following data over the past 24 hrs:    7.9  \   10.6 (L)   / 157     133 (L) 96 (L) 28.8 (H) /  106 (H)   3.1 (L) 26 1.38 (H) \     ALT: 80 (H) AST: 327 (H) AP: 178 (H) TBILI: 12.1 (H)   ALB: 2.4 (L) TOT PROTEIN:  6.3 (L) LIPASE: N/A     Procal: N/A CRP: N/A Lactic Acid: 1.8         Imaging results reviewed over the past 24 hrs:   No results found for this or any previous visit (from the past 24 hour(s)).

## 2023-09-09 NOTE — PLAN OF CARE
Patient is A&Ox 4. Vitals are stable on room air, Bps soft in 100's. Tele is NSR.  Patient is up with standby assist, ambulates to bathroom. Voiding adequately, no BM on shift. Skin Jaundiced. CIWA scores zero. Patient states he does not anticipate having any withdrawal as last drink was a while ago. Gastrogaffin imaging needs completed on 9/9.

## 2023-09-10 LAB
ALBUMIN SERPL BCG-MCNC: 2.5 G/DL (ref 3.5–5.2)
ALP SERPL-CCNC: 173 U/L (ref 40–129)
ALT SERPL W P-5'-P-CCNC: 87 U/L (ref 0–70)
ANION GAP SERPL CALCULATED.3IONS-SCNC: 12 MMOL/L (ref 7–15)
AST SERPL W P-5'-P-CCNC: 357 U/L (ref 0–45)
BACTERIA UR CULT: NORMAL
BASOPHILS # BLD AUTO: 0.1 10E3/UL (ref 0–0.2)
BASOPHILS NFR BLD AUTO: 1 %
BILIRUB SERPL-MCNC: 12.8 MG/DL
BUN SERPL-MCNC: 23.8 MG/DL (ref 6–20)
CALCIUM SERPL-MCNC: 8.4 MG/DL (ref 8.6–10)
CHLORIDE SERPL-SCNC: 99 MMOL/L (ref 98–107)
CREAT SERPL-MCNC: 0.97 MG/DL (ref 0.67–1.17)
DEPRECATED HCO3 PLAS-SCNC: 25 MMOL/L (ref 22–29)
EGFRCR SERPLBLD CKD-EPI 2021: >90 ML/MIN/1.73M2
EOSINOPHIL # BLD AUTO: 0.1 10E3/UL (ref 0–0.7)
EOSINOPHIL NFR BLD AUTO: 1 %
ERYTHROCYTE [DISTWIDTH] IN BLOOD BY AUTOMATED COUNT: 16.7 % (ref 10–15)
GLUCOSE SERPL-MCNC: 97 MG/DL (ref 70–99)
HCT VFR BLD AUTO: 29.1 % (ref 40–53)
HGB BLD-MCNC: 10.3 G/DL (ref 13.3–17.7)
IMM GRANULOCYTES # BLD: 0 10E3/UL
IMM GRANULOCYTES NFR BLD: 1 %
INR PPP: 1.52 (ref 0.85–1.15)
LYMPHOCYTES # BLD AUTO: 0.9 10E3/UL (ref 0.8–5.3)
LYMPHOCYTES NFR BLD AUTO: 11 %
MAGNESIUM SERPL-MCNC: 1.8 MG/DL (ref 1.7–2.3)
MCH RBC QN AUTO: 37.9 PG (ref 26.5–33)
MCHC RBC AUTO-ENTMCNC: 35.4 G/DL (ref 31.5–36.5)
MCV RBC AUTO: 107 FL (ref 78–100)
MONOCYTES # BLD AUTO: 1 10E3/UL (ref 0–1.3)
MONOCYTES NFR BLD AUTO: 13 %
NEUTROPHILS # BLD AUTO: 6.1 10E3/UL (ref 1.6–8.3)
NEUTROPHILS NFR BLD AUTO: 73 %
NRBC # BLD AUTO: 0 10E3/UL
NRBC BLD AUTO-RTO: 0 /100
PLATELET # BLD AUTO: 166 10E3/UL (ref 150–450)
POTASSIUM SERPL-SCNC: 3.3 MMOL/L (ref 3.4–5.3)
POTASSIUM SERPL-SCNC: 3.4 MMOL/L (ref 3.4–5.3)
POTASSIUM SERPL-SCNC: 3.6 MMOL/L (ref 3.4–5.3)
PROT SERPL-MCNC: 6.4 G/DL (ref 6.4–8.3)
RBC # BLD AUTO: 2.72 10E6/UL (ref 4.4–5.9)
SODIUM SERPL-SCNC: 136 MMOL/L (ref 136–145)
WBC # BLD AUTO: 8.2 10E3/UL (ref 4–11)

## 2023-09-10 PROCEDURE — 84132 ASSAY OF SERUM POTASSIUM: CPT | Performed by: INTERNAL MEDICINE

## 2023-09-10 PROCEDURE — 99232 SBSQ HOSP IP/OBS MODERATE 35: CPT | Performed by: INTERNAL MEDICINE

## 2023-09-10 PROCEDURE — 36415 COLL VENOUS BLD VENIPUNCTURE: CPT | Performed by: INTERNAL MEDICINE

## 2023-09-10 PROCEDURE — 36415 COLL VENOUS BLD VENIPUNCTURE: CPT | Performed by: HOSPITALIST

## 2023-09-10 PROCEDURE — 83735 ASSAY OF MAGNESIUM: CPT | Performed by: INTERNAL MEDICINE

## 2023-09-10 PROCEDURE — 250N000013 HC RX MED GY IP 250 OP 250 PS 637: Performed by: INTERNAL MEDICINE

## 2023-09-10 PROCEDURE — 85025 COMPLETE CBC W/AUTO DIFF WBC: CPT | Performed by: HOSPITALIST

## 2023-09-10 PROCEDURE — 250N000011 HC RX IP 250 OP 636: Mod: JZ | Performed by: INTERNAL MEDICINE

## 2023-09-10 PROCEDURE — C9113 INJ PANTOPRAZOLE SODIUM, VIA: HCPCS | Mod: JZ | Performed by: INTERNAL MEDICINE

## 2023-09-10 PROCEDURE — 258N000003 HC RX IP 258 OP 636: Performed by: INTERNAL MEDICINE

## 2023-09-10 PROCEDURE — 85610 PROTHROMBIN TIME: CPT | Performed by: HOSPITALIST

## 2023-09-10 PROCEDURE — 80053 COMPREHEN METABOLIC PANEL: CPT | Performed by: HOSPITALIST

## 2023-09-10 PROCEDURE — 120N000001 HC R&B MED SURG/OB

## 2023-09-10 PROCEDURE — 86705 HEP B CORE ANTIBODY IGM: CPT | Performed by: INTERNAL MEDICINE

## 2023-09-10 RX ORDER — MAGNESIUM OXIDE 400 MG/1
400 TABLET ORAL EVERY 4 HOURS
Status: COMPLETED | OUTPATIENT
Start: 2023-09-10 | End: 2023-09-10

## 2023-09-10 RX ORDER — POTASSIUM CHLORIDE 7.45 MG/ML
10 INJECTION INTRAVENOUS
Status: COMPLETED | OUTPATIENT
Start: 2023-09-10 | End: 2023-09-10

## 2023-09-10 RX ORDER — POTASSIUM CHLORIDE 1500 MG/1
40 TABLET, EXTENDED RELEASE ORAL ONCE
Status: COMPLETED | OUTPATIENT
Start: 2023-09-10 | End: 2023-09-10

## 2023-09-10 RX ADMIN — SODIUM CHLORIDE: 9 INJECTION, SOLUTION INTRAVENOUS at 01:39

## 2023-09-10 RX ADMIN — FOLIC ACID 1 MG: 1 TABLET ORAL at 09:27

## 2023-09-10 RX ADMIN — Medication 400 MG: at 13:10

## 2023-09-10 RX ADMIN — POTASSIUM CHLORIDE 10 MEQ: 7.46 INJECTION, SOLUTION INTRAVENOUS at 10:29

## 2023-09-10 RX ADMIN — PANTOPRAZOLE SODIUM 40 MG: 40 INJECTION, POWDER, FOR SOLUTION INTRAVENOUS at 09:26

## 2023-09-10 RX ADMIN — POTASSIUM CHLORIDE 10 MEQ: 7.46 INJECTION, SOLUTION INTRAVENOUS at 06:45

## 2023-09-10 RX ADMIN — POTASSIUM CHLORIDE 10 MEQ: 7.46 INJECTION, SOLUTION INTRAVENOUS at 09:13

## 2023-09-10 RX ADMIN — PIPERACILLIN AND TAZOBACTAM 4.5 G: 4; .5 INJECTION, POWDER, FOR SOLUTION INTRAVENOUS at 01:46

## 2023-09-10 RX ADMIN — POTASSIUM CHLORIDE 10 MEQ: 7.46 INJECTION, SOLUTION INTRAVENOUS at 11:31

## 2023-09-10 RX ADMIN — POTASSIUM CHLORIDE 40 MEQ: 1500 TABLET, EXTENDED RELEASE ORAL at 17:28

## 2023-09-10 RX ADMIN — PANTOPRAZOLE SODIUM 40 MG: 40 INJECTION, POWDER, FOR SOLUTION INTRAVENOUS at 20:16

## 2023-09-10 RX ADMIN — MULTIPLE VITAMINS W/ MINERALS TAB 1 TABLET: TAB at 09:27

## 2023-09-10 RX ADMIN — THIAMINE HCL TAB 100 MG 100 MG: 100 TAB at 09:27

## 2023-09-10 RX ADMIN — Medication 400 MG: at 09:22

## 2023-09-10 ASSESSMENT — ACTIVITIES OF DAILY LIVING (ADL)
ADLS_ACUITY_SCORE: 20

## 2023-09-10 NOTE — PROGRESS NOTES
[facility]    Medicine Progress Note - Hospitalist Service    Date of Admission:  9/8/2023    Assessment & Plan   HamCrispin garcia is a 38 y.o M hx of Alcohol use disorder, generalized anxiety disorder, hypertension. He presented to the hospital where urgent care due to generalized weakness, dizziness and light sensitivity noted to be hypotensive with work-up showing evidence of duodenitis.  Patient also found to be hyponatremic, hypokalemic, hypomagnesemic, elevated LFTs as well as lactic acid.  There was no evidence of metabolic acidosis.  Patient's urinalysis is also concerning for an infection.  Initiated on Zosyn for presumed sepsis in the setting of a urinary tract infection.  Though, patient's metabolic derangement could also related to his heavy alcohol use.      #Sepsis with hypotension, lactic acidosis  #Concern for UTI  #Lactic acidosis  -Presented with generalized weakness, dizziness, light sensitivity.   -However denies any fever, does not have any meningeal signs.   -Symptoms started about a week ago.    -Patient was noted to be hypotensive in the 60s at urgent care; but could be due to hypovolemia  -No leukocytosis but UA concerning for infection with leukocyte esterase positive and 50 WBC.  - Procalcitonin 0.71.  - Initial lactate at presentation was 2.7.    -Patient has been given IVF with adequate response.    -Lactic acid resolved   -CT chest abdomen pelvis -shows marked hepatic steatosis with liver enlarged and diffusely heterogenous, mild bowel wall thickening the first and second portion of duodenum with mild surrounding fat stranding.  Finding could be related to duodenitis or duodenal ulcer disease.  Few small air bubbles adjacent to the duodenum possibly within duodenal diverticula however contained perforation of duodenitis or duodenal ulcer cannot be excluded  - Patient has since undergone upper GI series without evidence of perforation.  - Patient denies any  abdominal pain, his abdominal exam is also relatively benign.  Has been on Zosyn from 9/8 - 9/10; given no culture growth, will opt to discontinue antibiotics now.  Appreciate GI and surgery recommendations   Follow cultures, antibiotic titration accordingly  Given normal hemodynamics, improving laboratory derangements, okay for patient to come off IMC.     #Macrocytic anemia-  #Possible duodenitis/duodenal ulcer with possible duodenal perforation  -Suggested in noncontrasted CT abdomen pelvis as above  -Hemoglobin about a year and half ago was 16, at presentation hemoglobin was 10.7, anticipate some more drop with hydration  -Patient denies any history of black tarry stool and christiano bleeding, ED provider had done rectal examination and had noted brown stool  - Patient was initiated on IV Protonix in the ED which was continued while inpatient.  - Iron studies without evidence of iron deficiency  - Low normal folate level, no evidence of B12 deficiency.  Continue to encourage alcohol cessation.       #Alcohol use history  #History of depression/anxiety  -Reports drinking about half a liter of vodka/hard drink up until about 10 days ago when he decided to quit.  Initially reported had withdrawal with tremors, dizziness but tremors had improved.  Dizziness and photosensitivity persisted so he decided to drink alcohol again last evening.  -Alcohol level at presentation was 0.09  -Monitor with CIWA protocol, benzodiazepine as needed  -Multivitamins  -Declines CD counselor evaluation at this point.  -Patient has not been taking his PTA Zoloft recently, will not restart Zoloft currently due to sodium of 128, may benefit from psychiatry evaluation once improves from his acute illness inpatient versus outpatient     #Transaminitis  #Hyperbilirubinemia  #Elevated lipase  #Alcoholic hepatitis   -Had elevated LFTs in previous labs.  Lipase also elevated at 272, patient however denies any abdominal pain.  Bilirubin markedly  elevated.  - INR elevated, low albumin.    -CT abdomen shows marked hepatic steatosis with enlarged liver and diffusely heterogenous liver  -Child-Schwartz class B,   MELD 3.0: 23 at 9/10/2023  5:25 AM  MELD-Na: 22 at 9/10/2023  5:25 AM  Calculated from:  Serum Creatinine: 0.97 mg/dL (Using min of 1 mg/dL) at 9/10/2023  5:25 AM  Serum Sodium: 136 mmol/L at 9/10/2023  5:25 AM  Total Bilirubin: 12.8 mg/dL at 9/10/2023  5:25 AM  Serum Albumin: 2.5 g/dL at 9/10/2023  5:25 AM  INR(ratio): 1.52 at 9/10/2023  5:25 AM  Age at listing (hypothetical): 38 years  Sex: Male at 9/10/2023  5:25 AM  Abstain from alcohol or any hepatotoxins  Continue to trend LFTs  Continue emphasis on alcohol cessation  Appreciate GI recs; no indication for steroids at this time.  Follow-up acute hepatitis panel.     #Acute kidney injury -resolved with IV fluids  #Hyponatremia -resolved with IV fluids  #Hypokalemia - improving with ongoing correction  #Hypomagnesemia - improving with ongoing correction  #Hypocalcemia -improving  -Various electrolyte abnormalities attributed to poor p.o. intake in the setting of ongoing alcohol use disorder all of which has improved significantly with alcohol cessation as well as IV fluids.  Continue to monitor  -Monitor in telemetry     #History of hypertension  -Patient has not been taking his PTA losartan until about a week ago when he quit drinking and decided to start taking losartan subsequently  -Blood pressure has remained normal  Continue to hold PTA losartan.     #Elevated TSH  -TSH elevated at 8.43, however free T4 normal 1.39, could be euthyroid sick syndrome due to his acute illness  -Recheck labs in few weeks after improvement throughout patient         Diet: Advance Diet as Tolerated: Clear Liquid Diet    DVT Prophylaxis: Pneumatic Compression Devices  Bosch Catheter: Not present  Lines: None     Cardiac Monitoring: ACTIVE order. Indication: Electrolyte Imbalance (24 hours)- Magnesium <1.3 mg/ml;  Potassium < =2.8 or > 5.5 mg/ml  Code Status: Full Code      Clinically Significant Risk Factors        # Hypokalemia: Lowest K = 2.7 mmol/L in last 2 days, will replace as needed  # Hyponatremia: Lowest Na = 128 mmol/L in last 2 days, will monitor as appropriate  # Hypocalcemia: Lowest iCa = 3.8 mg/dL in last 2 days, will monitor and replace as appropriate   # Hypomagnesemia: Lowest Mg = 1.4 mg/dL in last 2 days, will replace as needed   # Hypoalbuminemia: Lowest albumin = 2.3 g/dL at 9/8/2023 11:47 AM, will monitor as appropriate    # Coagulation Defect: INR = 1.52 (Ref range: 0.85 - 1.15) and/or PTT = N/A, will monitor for bleeding           # Obesity: Estimated body mass index is 35.97 kg/m  as calculated from the following:    Height as of this encounter: 1.829 m (6').    Weight as of this encounter: 120.3 kg (265 lb 3.4 oz).  , PRESENT ON ADMISSION            Disposition Plan  Okay to discharge on 9/11/23 if able to fully advance diet, electrolytes remains corrected. He'll need to follow GI outpatient.     Expected Discharge Date: 09/11/2023        Discharge Comments: 9/9 requiring electrolye replacements, awaiting GI consults          JOHNNIE MARX MD  Hospitalist Service  [facility]  Securely message with [employer] (more info)  Text page via [employer] Paging/Directory   ______________________________________________________________________    Interval History   -Afebrile and hemodynamically stable  -Denies any acute complaints  -He was able to tolerate clear liquid diet today      Physical Exam   Vital Signs: Temp: 98.9  F (37.2  C) Temp src: Oral BP: 108/72 Pulse: 92   Resp: 13 SpO2: 96 % O2 Device: None (Room air)    Weight: 265 lbs 3.41 oz    General Appearance: Lying comfortably in bed, on room air, in no acute distress of discomfort. Appears jaundiced  HEENT: PERRL: EOMI; moist mucous membrane w/o lesions, scleral icterus  Neck: No JVD  Pulmonary: Clear to auscultation bilaterally,  no wheezes or crackles  CVS: Regular rhythm, no murmurs, rubs or gallops  GI: BS (+), soft nontender, no rebound or guarding   Extremities: No LE edema.   Skin: No rashes or lesions  Neurologic: A&O x3      Medical Decision Making       55 MINUTES SPENT BY ME on the date of service doing chart review, history, exam, documentation & further activities per the note.      Data   ------------------------- PAST 24 HR DATA REVIEWED -----------------------------------------------    I have personally reviewed the following data over the past 24 hrs:    8.2  \   10.3 (L)   / 166     136 99 23.8 (H) /  97   3.3 (L) 25 0.97 \     ALT: 87 (H) AST: 357 (H) AP: 173 (H) TBILI: 12.8 (H)   ALB: 2.5 (L) TOT PROTEIN: 6.4 LIPASE: N/A     INR:  1.52 (H) PTT:  N/A   D-dimer:  N/A Fibrinogen:  N/A       Imaging results reviewed over the past 24 hrs:   Recent Results (from the past 24 hour(s))   XR Abdomen 1 View    Narrative    EXAM: XR ABDOMEN 1 VIEW  LOCATION: [facility]  DATE: 9/9/2023    INDICATION*: to ensure no perforation  COMPARISON: 09/09/2023 at 2233 hours      Impression    IMPRESSION: Oral contrast material is noted within the stomach, nondilated small bowel loops, and rectosigmoid. No plain film evidence of extra luminal contrast. Bibasilar atelectasis. No acute bony abnormalities.   XR Abdomen 1 View    Narrative    EXAM: ABDOMEN SINGLE VIEW  LOCATION: [facility]  DATE/TIME: 9/9/2023 10:39 PM CDT    INDICATION: Septic shock. Liver failure. Evaluate for bowel perforation. Question of extraluminal gas adjacent to the duodenum on CT scan.    COMPARISON:   09/09/2023 at 2154 hours - Abdomen single view.  09/08/2023 - CT chest, abdomen and pelvis.      Impression    IMPRESSION:   1. Enteric contrast material is present within the stomach and nondilated distal small bowel and colon. No evidence of bowel obstruction.  2. No extraluminal enteric contrast material is visualized to  suggest the presence of a bowel perforation, but a plain radiograph with enteric contrast is not sensitive in the detection of a bowel perforation.

## 2023-09-10 NOTE — PROVIDER NOTIFICATION
GI notes says daily LFT w/ INR & Hgb but forgot to order. XR gastrografin done tonight & result available. Notified Dr. Schwartz.

## 2023-09-10 NOTE — PLAN OF CARE
Patient is A&Ox 4. Vitals are stable on room air. IMC status discontinued on shift.  Patient is up independently in room. Potassium replaced IV in AM, and then orally in afternoon. Recheck tonight. Magnesium replaced orally, recheck in AM. Diet advanced to full liquid. Advance to regular diet in AM, if tolerated, patient likely to discharge. Denies pain

## 2023-09-10 NOTE — PROGRESS NOTES
1930h-0730h: Pt AO x4. Generalized jaundice. Soft BP at 90 to low 100's. O2Sat 94% on RA. Clear bilateral breath sound. NPO except ice chips. Continent B/B. Voiding adequately. Last BM 09/09. Abdomen soft to palpate, normoactive BS x4. No edema  on BLE, sensation intact w/ palpable pulses. Skin intact. Ambulates SBA in the room. Light headed at times.      CIWA score: 0  GI & Gen surgery consult done today.  XR Gastrografin done. All orders clarified w/ both GI & Radiologist doctors.    IV maintenance NaCL 125 ml/hr infusing thru L arm.  IV SL on R forearm.     On K, Mg protocol.     1911h: K 3.9, w/ slightly hemolyzed comment; result clarified, acceptable as per lab no need redraw.    0525h: serum K 3.3 to replace w/ IV Kcl x4 doses, re-check at 1331h.  Tele: NSR

## 2023-09-10 NOTE — PROGRESS NOTES
Surgeon Progress Note    no pain; slept well; ambulating; tolerating full liquid diet.    Pseudo UGI yesterday looked fine.    Lab Results   Component Value Date    WBC 8.2 09/10/2023     Lab Results   Component Value Date    RBC 2.72 09/10/2023     Lab Results   Component Value Date    HGB 10.3 09/10/2023     Lab Results   Component Value Date    HCT 29.1 09/10/2023     No components found for: MCT  Lab Results   Component Value Date     09/10/2023     Lab Results   Component Value Date    MCH 37.9 09/10/2023     Lab Results   Component Value Date    MCHC 35.4 09/10/2023     Lab Results   Component Value Date    RDW 16.7 09/10/2023     Lab Results   Component Value Date     09/10/2023         Vitals:  /68 (BP Location: Right arm, Patient Position: Semi-Gibson's, Cuff Size: Adult Regular)   Pulse 96   Temp 98.6  F (37  C) (Oral)   Resp (!) 48   Ht 1.829 m (6')   Wt 120.3 kg (265 lb 3.4 oz)   SpO2 98%   BMI 35.97 kg/m    NAD  breathing unlabored  abdomen s/nt/nd  LE no swelling    Ins and Outs:    Intake/Output Summary (Last 24 hours) at 9/10/2023 1531  Last data filed at 9/10/2023 0400  Gross per 24 hour   Intake --   Output 400 ml   Net -400 ml         A/P stable; not sick appearing.  1. Regular diet fine.  2. Discharge when able.  3. Continue ppi therapy; will need upper endoscopy as outpatient with 6-10 weeks.    Mukesh Rawls MD  Surgery  [phone] (hospital )  [phone] (clinic nurses)

## 2023-09-11 VITALS
RESPIRATION RATE: 18 BRPM | HEART RATE: 106 BPM | HEIGHT: 72 IN | DIASTOLIC BLOOD PRESSURE: 73 MMHG | TEMPERATURE: 98.2 F | WEIGHT: 268.74 LBS | BODY MASS INDEX: 36.4 KG/M2 | SYSTOLIC BLOOD PRESSURE: 126 MMHG | OXYGEN SATURATION: 92 %

## 2023-09-11 LAB
ALBUMIN SERPL BCG-MCNC: 2.5 G/DL (ref 3.5–5.2)
ALP SERPL-CCNC: 174 U/L (ref 40–129)
ALT SERPL W P-5'-P-CCNC: 103 U/L (ref 0–70)
ANION GAP SERPL CALCULATED.3IONS-SCNC: 10 MMOL/L (ref 7–15)
AST SERPL W P-5'-P-CCNC: 374 U/L (ref 0–45)
BASOPHILS # BLD AUTO: 0.1 10E3/UL (ref 0–0.2)
BASOPHILS NFR BLD AUTO: 1 %
BILIRUB SERPL-MCNC: 13.8 MG/DL
BUN SERPL-MCNC: 17.3 MG/DL (ref 6–20)
CALCIUM SERPL-MCNC: 8.6 MG/DL (ref 8.6–10)
CHLORIDE SERPL-SCNC: 101 MMOL/L (ref 98–107)
CREAT SERPL-MCNC: 0.57 MG/DL (ref 0.67–1.17)
DEPRECATED HCO3 PLAS-SCNC: 23 MMOL/L (ref 22–29)
EGFRCR SERPLBLD CKD-EPI 2021: >90 ML/MIN/1.73M2
EOSINOPHIL # BLD AUTO: 0.1 10E3/UL (ref 0–0.7)
EOSINOPHIL NFR BLD AUTO: 2 %
ERYTHROCYTE [DISTWIDTH] IN BLOOD BY AUTOMATED COUNT: 16.1 % (ref 10–15)
GLUCOSE SERPL-MCNC: 102 MG/DL (ref 70–99)
HAV IGM SERPL QL IA: NONREACTIVE
HBV CORE IGM SERPL QL IA: NONREACTIVE
HBV SURFACE AG SERPL QL IA: NONREACTIVE
HCT VFR BLD AUTO: 28.9 % (ref 40–53)
HCV AB SERPL QL IA: NONREACTIVE
HGB BLD-MCNC: 10.3 G/DL (ref 13.3–17.7)
IMM GRANULOCYTES # BLD: 0 10E3/UL
IMM GRANULOCYTES NFR BLD: 0 %
INR PPP: 1.52 (ref 0.85–1.15)
LYMPHOCYTES # BLD AUTO: 1 10E3/UL (ref 0.8–5.3)
LYMPHOCYTES NFR BLD AUTO: 11 %
MAGNESIUM SERPL-MCNC: 1.7 MG/DL (ref 1.7–2.3)
MCH RBC QN AUTO: 38.1 PG (ref 26.5–33)
MCHC RBC AUTO-ENTMCNC: 35.6 G/DL (ref 31.5–36.5)
MCV RBC AUTO: 107 FL (ref 78–100)
MONOCYTES # BLD AUTO: 1.1 10E3/UL (ref 0–1.3)
MONOCYTES NFR BLD AUTO: 12 %
NEUTROPHILS # BLD AUTO: 7.2 10E3/UL (ref 1.6–8.3)
NEUTROPHILS NFR BLD AUTO: 74 %
NRBC # BLD AUTO: 0 10E3/UL
NRBC BLD AUTO-RTO: 0 /100
PLATELET # BLD AUTO: 150 10E3/UL (ref 150–450)
POTASSIUM SERPL-SCNC: 3.6 MMOL/L (ref 3.4–5.3)
PROT SERPL-MCNC: 6.6 G/DL (ref 6.4–8.3)
RBC # BLD AUTO: 2.7 10E6/UL (ref 4.4–5.9)
SODIUM SERPL-SCNC: 134 MMOL/L (ref 136–145)
WBC # BLD AUTO: 9.6 10E3/UL (ref 4–11)

## 2023-09-11 PROCEDURE — 250N000011 HC RX IP 250 OP 636: Mod: JZ | Performed by: INTERNAL MEDICINE

## 2023-09-11 PROCEDURE — 83735 ASSAY OF MAGNESIUM: CPT | Performed by: INTERNAL MEDICINE

## 2023-09-11 PROCEDURE — 250N000013 HC RX MED GY IP 250 OP 250 PS 637: Performed by: HOSPITALIST

## 2023-09-11 PROCEDURE — 99239 HOSP IP/OBS DSCHRG MGMT >30: CPT | Performed by: HOSPITALIST

## 2023-09-11 PROCEDURE — 250N000013 HC RX MED GY IP 250 OP 250 PS 637: Performed by: INTERNAL MEDICINE

## 2023-09-11 PROCEDURE — 85025 COMPLETE CBC W/AUTO DIFF WBC: CPT | Performed by: INTERNAL MEDICINE

## 2023-09-11 PROCEDURE — 85610 PROTHROMBIN TIME: CPT | Performed by: INTERNAL MEDICINE

## 2023-09-11 PROCEDURE — 36415 COLL VENOUS BLD VENIPUNCTURE: CPT | Performed by: INTERNAL MEDICINE

## 2023-09-11 PROCEDURE — 80053 COMPREHEN METABOLIC PANEL: CPT | Performed by: INTERNAL MEDICINE

## 2023-09-11 PROCEDURE — C9113 INJ PANTOPRAZOLE SODIUM, VIA: HCPCS | Mod: JZ | Performed by: INTERNAL MEDICINE

## 2023-09-11 RX ORDER — POTASSIUM CHLORIDE 1500 MG/1
20 TABLET, EXTENDED RELEASE ORAL ONCE
Status: COMPLETED | OUTPATIENT
Start: 2023-09-11 | End: 2023-09-11

## 2023-09-11 RX ORDER — PANTOPRAZOLE SODIUM 40 MG/1
40 TABLET, DELAYED RELEASE ORAL 2 TIMES DAILY
Qty: 60 TABLET | Refills: 0 | Status: ON HOLD | OUTPATIENT
Start: 2023-09-11 | End: 2023-01-01

## 2023-09-11 RX ORDER — MAGNESIUM OXIDE 400 MG/1
400 TABLET ORAL EVERY 4 HOURS
Status: COMPLETED | OUTPATIENT
Start: 2023-09-11 | End: 2023-09-11

## 2023-09-11 RX ADMIN — PANTOPRAZOLE SODIUM 40 MG: 40 INJECTION, POWDER, FOR SOLUTION INTRAVENOUS at 08:29

## 2023-09-11 RX ADMIN — POTASSIUM CHLORIDE 20 MEQ: 1500 TABLET, EXTENDED RELEASE ORAL at 08:29

## 2023-09-11 RX ADMIN — Medication 400 MG: at 08:29

## 2023-09-11 RX ADMIN — FOLIC ACID 1 MG: 1 TABLET ORAL at 08:29

## 2023-09-11 RX ADMIN — Medication 400 MG: at 12:24

## 2023-09-11 RX ADMIN — MULTIPLE VITAMINS W/ MINERALS TAB 1 TABLET: TAB at 08:29

## 2023-09-11 RX ADMIN — THIAMINE HCL TAB 100 MG 100 MG: 100 TAB at 08:29

## 2023-09-11 ASSESSMENT — ACTIVITIES OF DAILY LIVING (ADL)
ADLS_ACUITY_SCORE: 20

## 2023-09-11 NOTE — PLAN OF CARE
Goal Outcome Evaluation:         Shift: Julian 9/10/2023 - 19:00-23:30    Patient is A&Ox 4. Vitals are stable on room air. IMC status discontinued on shift.  Patient is up independently in room. Potassium replaced IV in AM, and then orally in afternoon. Recheck tonight. Magnesium replaced orally, recheck in AM. Diet advanced to full liquid. Advance to regular diet in AM, if tolerated, patient likely to discharge. Denies pain. No acute changes from previous shift.

## 2023-09-11 NOTE — PLAN OF CARE
0700 - 1500  Patient is A&Ox 4. Vitals are stable on room air. Patient is up with independently in room. Potassium and Magnesium replaced orally in AM. Tolerating regular diet. Plan is for discharge this afternoon.

## 2023-09-11 NOTE — PROGRESS NOTES
[facility]  Gastroenterology Progress Note     Crispin Ham MRN# [id number]   YOB: 1985 Age: 38 year old          Assessment and Plan:     Crispin Ham is a 38 year old male who was admitted on 9/8/2023 with h/o alcohol use disorder, generalized anxiety disorder, history of hypertension with lightheadedness and dizzy, and hypotensive    Hypotension  Alcohol abuse  CT showing duodenitis/ulcer with contained perforation vs duodenal diverticulum.  Esophgram revealed no perforation  Hemoglobin stable and no signs of active bleeding  Child-Schwartz class B, MELD Na score of 23 currently but platelet >150, can still be al from Etoh hepatitis  LFts stable and in alcohol hepatitis pattern AST/AST >2:1  Has no h/o prior EGD    - oral PPI BID  -daily LFT with INR and Hgb  -only small amount of fluid collection in RUQ which could also be transient hence will not start diuretics yet  -outpatient follow up, no plan for EGD at this time unless Hgb continues to trend down  - ok to discharge patiemt from GI standpoint               Septic shock (H)  Anemia, unspecified type  Acute liver failure without hepatic coma  Acute renal failure, unspecified acute renal failure type (H)  Alcohol abuse      Interval History:     no new complaints and doing well; no cp, sob, n/v/d, mild abdominal pain.              Review of Systems:     C: NEGATIVE for fever, chills, change in weight  E/M: NEGATIVE for ear, mouth and throat problems  R: NEGATIVE for significant cough or SOB  CV: NEGATIVE for chest pain, palpitations or peripheral edema             Medications:   I have reviewed this patient's current medications   folic acid  1 mg Oral Daily    magnesium oxide  400 mg Oral Q4H    multivitamin w/minerals  1 tablet Oral Daily    pantoprazole  40 mg Intravenous BID    sodium chloride (PF)  3 mL Intracatheter Q8H    thiamine  100 mg Oral Daily                  Physical Exam:   Vitals were  reviewed  Vital Signs with Ranges  Temp:  [98.1  F (36.7  C)-98.8  F (37.1  C)] 98.5  F (36.9  C)  Pulse:  [] 103  Resp:  [18-20] 18  BP: (122-128)/(70-76) 123/75  SpO2:  [92 %-99 %] 92 %  No intake/output data recorded.  Constitutional: healthy, alert, and no distress   Cardiovascular: negative, PMI normal. No lifts, heaves, or thrills. RRR. No murmurs, clicks gallops or rub  Respiratory: negative, Percussion normal. Good diaphragmatic excursion. Lungs clear  Abdomen: Abdomen soft, non-tender. BS normal. No masses, organomegaly           Data:   I reviewed the patient's new clinical lab test results.   Recent Labs   Lab Test 09/11/23  0521 09/10/23  0525 09/09/23  1119 09/08/23  1709 09/08/23  1147   WBC 9.6 8.2 7.9  --  9.5   HGB 10.3* 10.3* 10.6*   < > 10.7*   * 107* 106*  --  104*    166 157  --  154   INR 1.52* 1.52*  --   --  1.41*    < > = values in this interval not displayed.     Recent Labs   Lab Test 09/11/23  0521 09/10/23  2236 09/10/23  1419 09/10/23  0525 09/09/23  1911 09/09/23  1119   POTASSIUM 3.6 3.6 3.4 3.3*   < > 3.1*   CHLORIDE 101  --   --  99  --  96*   CO2 23  --   --  25  --  26   BUN 17.3  --   --  23.8*  --  28.8*   ANIONGAP 10  --   --  12  --  11    < > = values in this interval not displayed.     Recent Labs   Lab Test 09/11/23  0521 09/10/23  0525 09/09/23  1119 09/08/23  1224 09/08/23  1147   ALBUMIN 2.5* 2.5* 2.4*  --  2.3*   BILITOTAL 13.8* 12.8* 12.1*  --  10.4*   * 87* 80*  --  82*   * 357* 327*  --  331*   PROTEIN  --   --   --  50*  --    LIPASE  --   --   --   --  272*       I reviewed the patient's new imaging results.    All laboratory data reviewed  All imaging studies reviewed by me.    Jeaneth Luu PA-C,  9/11/2023  [facility]  Office : [phone]  Cell: [phone] (Dr. Gibson)  Cell: [phone] (Jeaneth Luu PA-C)

## 2023-09-11 NOTE — PLAN OF CARE
Summary:Alcohol abuse, Septic shock, Acute renal failure, unspecified acute renal failure, Acute liver failure without hepatic coma, Anemia    Date & Time: 09/10/23 0725-6162  Orientation: A&O x4  Activity Level: Ind in room  Fall Risk: N  Behavior & Aggression: Green  Pain Management: denies  ABNL VS/O2: Vss on RA  Tele: N/A  ABNL Lab/BG: Mag, Potassium AM recheck   Diet: Full liquids, advance to regular in AM  Bowel/Bladder: Continent of B&B  Skin: N/A  Drains/Devices: R PIV SL, LPIV SL  Tests/Procedures: N/A  Anticipated DC Date: 9/11/23 if able to fully advance diet, electrolytes remains corrected  Other Important Info:

## 2023-09-11 NOTE — PLAN OF CARE
Goal Outcome Evaluation:      Plan of Care Reviewed With: patient    Overall Patient Progress: improvingOverall Patient Progress: improving         Summary  h/o alcohol use disorder, generalized anxiety disorder, history of hypertension with lightheadedness and dizzy, and hypotensive         Patient has been discharged September 11, 2023 3:59 PM. Discharge instructions and education reviewed, medication schedule and follow up appts discussed. Pt had no further questions. All belongings sent with pt.      VSS on RM air up independent, discharging home with family present.

## 2023-09-12 NOTE — DISCHARGE SUMMARY
[facility]  Hospitalist Discharge Summary      Date of Admission:  9/8/2023  Date of Discharge:  9/11/2023  5:24 PM  Discharging Provider: Rome Felipe MD  Discharge Service: Hospitalist Service    Discharge Diagnoses   Acute hypovolemic hypotension   Lactic acidosis due to #1  Duodenitis/ulcer with contained perforation vs duodenal diverticulum   Macrocytic anemia due to alcohol   Alcohol induced hepatitis   Mild alcohol induced pancreatitis   Alcohol use disorder, severe   Acute kidney injury   Hyponatremia   Hypokalemia   Hypomagnesemia    Hypocalcemia   Coagulopathy due to alcoholic liver disease     Clinically Significant Risk Factors     # Obesity: Estimated body mass index is 36.45 kg/m  as calculated from the following:    Height as of this encounter: 1.829 m (6').    Weight as of this encounter: 121.9 kg (268 lb 11.9 oz).       Follow-ups Needed After Discharge   Follow-up Appointments     Follow-up and recommended labs and tests       Follow up with primary care provider, [facility], within 7   days for hospital follow- up.  The following labs/tests are recommended:   complete metabolic profile, complete blood count and Mg.  Follow up with   Dr. Gibson as scheduled.            Unresulted Labs Ordered in the Past 30 Days of this Admission       Date and Time Order Name Status Description    9/8/2023 12:55 PM Blood Culture Peripheral Blood Preliminary     9/8/2023 12:55 PM Blood Culture Peripheral Blood Preliminary         These results will be followed up by primary care provider     Discharge Disposition   Discharged to home  Condition at discharge: Stable    Hospital Course   HPI:  Crispin Ham is a 38 year old male with history of alcohol use disorder, generalized anxiety disorder, history of hypertension who initially presented to urgent care due to feeling lightheaded and dizzy, decreased energy, light sensitivity and was noted to have blood  pressure in the 60s at urgent care and so brought to the ED.  The patient reports that he used to drink alcohol about half liter of vodka every day until about 10 days ago when he decided to quit.  He started having tremors, light sensitivity, generalized weakness.  Tremors stopped after about 4 or 5 days but generalized weakness, dizziness and light sensitivity persisted.  He denies any chest pain, cough, shortness of breath.  He does report to intermittent headache associated with bright light but would get better when he rests.  He denies any fever nausea vomiting.  He does have loose stool but denies diarrhea.  Denies abdominal pain. He again decided to drink alcohol yesterday as he was not feeling very well.     Acute hypovolemic hypotension   Lactic acidosis due to #1  History of hypertension   He responded to fluid. Started on antibiotics for possible sepsis but urine culture and blood cultures are negative.  Currently normotensive.    Duodenitis/ulcer with contained perforation vs duodenal diverticulum   Alcohol induced hepatitis   Mild alcohol induced pancreatitis   Seen by GI and currently stable. No pain. Tolerating diet.   Continue proton pump inhibitor   He needs complete abstinence from alcohol   Follow up with Dr. Gibson     Alcohol use disorder, severe   He did not want to speak with CD or psychiatrist.    I emphasized that he needs to be completely abstinent from alcohol or he risks liver failure and death.     Acute kidney injury   Hyponatremia   Hypokalemia   Hypomagnesemia    Hypocalcemia   Above have corrected.  Follow up with primary care provider     Macrocytic anemia due to alcohol   Coagulopathy due to alcoholic liver disease   Abstain from alcohol   Follow up with primary care provider     Consultations This Hospital Stay   GASTROENTEROLOGY IP CONSULT  SURGERY GENERAL IP CONSULT    Code Status   Prior    Time Spent on this Encounter   I, Rome Felipe MD, personally saw the patient  today and spent greater than 30 minutes discharging this patient.       Rome Felipe MD  [address] [place] [address]  Phone: [phone]  ______________________________________________________________________    Physical Exam   Vital Signs: Temp: 98.2  F (36.8  C) Temp src: Oral BP: 126/73 Pulse: 106   Resp: 18 SpO2: 92 % O2 Device: None (Room air)    Weight: 268 lbs 11.85 oz  Awake, alert and oriented   No tremor   Abdomen soft and non tender       Primary Care Physician   [facility]    Discharge Orders      Reason for your hospital stay    Perforated ulcer, hepatitis from alcohol     Follow-up and recommended labs and tests     Follow up with primary care provider, [facility], within 7 days for hospital follow- up.  The following labs/tests are recommended: complete metabolic profile, complete blood count and Mg.  Follow up with Dr. Gibson as scheduled.     Activity    Your activity upon discharge: activity as tolerated     Discharge Instructions    No alcohol usage at all. No Tylenol.     Diet    Follow this diet upon discharge: Orders Placed This Encounter      Regular Diet Adult       Significant Results and Procedures   Most Recent 3 CBC's:  Recent Labs   Lab Test 09/11/23  0521 09/10/23  0525 09/09/23  1119   WBC 9.6 8.2 7.9   HGB 10.3* 10.3* 10.6*   * 107* 106*    166 157     Most Recent 3 BMP's:  Recent Labs   Lab Test 09/11/23  0521 09/10/23  2236 09/10/23  1419 09/10/23  0525 09/09/23  1911 09/09/23  1119   *  --   --  136  --  133*   POTASSIUM 3.6 3.6 3.4 3.3*   < > 3.1*   CHLORIDE 101  --   --  99  --  96*   CO2 23  --   --  25  --  26   BUN 17.3  --   --  23.8*  --  28.8*   CR 0.57*  --   --  0.97  --  1.38*   ANIONGAP 10  --   --  12  --  11   MIGUELINA 8.6  --   --  8.4*  --  8.0*   *  --   --  97  --  106*    < > = values in this interval not displayed.     Most Recent 2  LFT's:  Recent Labs   Lab Test 09/11/23  0521 09/10/23  0525   * 357*   * 87*   ALKPHOS 174* 173*   BILITOTAL 13.8* 12.8*     Most Recent 3 INR's:  Recent Labs   Lab Test 09/11/23  0521 09/10/23  0525 09/08/23  1147   INR 1.52* 1.52* 1.41*     7-Day Micro Results       Collected Updated Procedure Result Status      09/08/2023 1832 09/09/2023 0107 MRSA MSSA PCR, Nasal Swab [[id number]]    Swab from Nare, Left    Final result Component Value   MRSA Target DNA Negative   SA Target DNA Positive            09/08/2023 1310 09/11/2023 1716 Blood Culture Peripheral Blood [[id number]]   Peripheral Blood    Preliminary result Component Value   Culture No growth after 3 days  [P]                09/08/2023 1302 09/11/2023 1716 Blood Culture Peripheral Blood [[id number]]   Peripheral Blood    Preliminary result Component Value   Culture No growth after 3 days  [P]                09/08/2023 1224 09/10/2023 0604 Urine Culture [[id number]]   Urine, Midstream    Final result Component Value   Culture <10,000 CFU/mL Mixture of urogenital jenny                   ,   Results for orders placed or performed during the hospital encounter of 09/08/23   CT Chest Abdomen Pelvis w/o Contrast    Narrative    CT CHEST ABDOMEN PELVIS WITHOUT CONTRAST 9/8/2023 1:36 PM    CLINICAL HISTORY: Septic shock. Liver failure.    TECHNIQUE: CT scan of the chest, abdomen, and pelvis was performed  without IV contrast. Multiplanar reformats were obtained. Dose  reduction techniques were used.   CONTRAST: None.  COMPARISON: None.    FINDINGS:   LUNGS AND PLEURA: Trace amount of pleural fluid at the right lung  base. Mild scattered atelectasis at both lung bases. No lung masses.  No pneumothorax.    MEDIASTINUM/AXILLAE: No enlarged lymph nodes in the chest. No  pericardial effusion.    CORONARY ARTERY CALCIFICATION: None.    HEPATOBILIARY: Marked diffuse fatty infiltration of the liver. The  liver also appears enlarged and heterogeneous.  No focal hepatic  masses. The gallbladder is mildly distended. No calcified gallstones.    PANCREAS: Normal.    SPLEEN: Mild splenomegaly. The spleen measures 15.5 cm in length.    ADRENAL GLANDS: Normal.    KIDNEYS/BLADDER: Unremarkable. No hydronephrosis.    BOWEL: Mild thickening of the first and second portions of the  duodenum, with mild surrounding fat stranding. There are a few small  air bubbles adjacent to the duodenum, possibly within duodenal  diverticula, however a contained perforation of a duodenitis/duodenal  ulcer cannot be excluded. No bowel obstruction. Few scattered colonic  diverticula. No convincing evidence for colitis or diverticulitis.  Unremarkable appendix.    PELVIC ORGANS: Unremarkable.    LYMPH NODES: Few mildly enlarged retroperitoneal and peripancreatic  lymph nodes are noted. For example, a mildly enlarged aortocaval lymph  node (series 3 image 178) measures 1.3 cm. Scattered mildly prominent  mesenteric and periportal lymph nodes are also noted.    VASCULATURE: Unremarkable.    ADDITIONAL FINDINGS: Small amount of nonspecific free fluid in the  pelvis.    MUSCULOSKELETAL: Degenerative changes left hip.      Impression    IMPRESSION:   1.  Marked hepatic steatosis. The liver is also enlarged and diffusely  heterogeneous.  2.  Mild bowel wall thickening in the first and second portions of the  duodenum, with mild surrounding fat stranding. Findings could be  related to a duodenitis or duodenal ulcer disease. A few small air  bubbles adjacent to the duodenum could be within small duodenal  diverticula, although a contained perforation is difficult to exclude.  No other areas suspicious for free intraperitoneal air.  3.  Small amount of nonspecific free fluid in the pelvis.  4.  The gallbladder is mildly distended.  5.  Mild splenomegaly.  6.  Mild retroperitoneal and peripancreatic adenopathy.  7.  Trace amount of pleural fluid at the right lung base.     KHLOE FENTON MD          SYSTEM ID:  [id number]   XR Abdomen 1 View    Narrative    EXAM: XR ABDOMEN 1 VIEW  LOCATION: [facility]  DATE: 9/9/2023    INDICATION*: to ensure no perforation  COMPARISON: 09/09/2023 at 2233 hours      Impression    IMPRESSION: Oral contrast material is noted within the stomach, nondilated small bowel loops, and rectosigmoid. No plain film evidence of extra luminal contrast. Bibasilar atelectasis. No acute bony abnormalities.   XR Abdomen 1 View    Narrative    EXAM: ABDOMEN SINGLE VIEW  LOCATION: [facility]  DATE/TIME: 9/9/2023 10:39 PM CDT    INDICATION: Septic shock. Liver failure. Evaluate for bowel perforation. Question of extraluminal gas adjacent to the duodenum on CT scan.    COMPARISON:   09/09/2023 at 2154 hours - Abdomen single view.  09/08/2023 - CT chest, abdomen and pelvis.      Impression    IMPRESSION:   1. Enteric contrast material is present within the stomach and nondilated distal small bowel and colon. No evidence of bowel obstruction.  2. No extraluminal enteric contrast material is visualized to suggest the presence of a bowel perforation, but a plain radiograph with enteric contrast is not sensitive in the detection of a bowel perforation.       Discharge Medications   Discharge Medication List as of 9/11/2023  3:39 PM        START taking these medications    Details   pantoprazole (PROTONIX) 40 MG EC tablet Take 1 tablet (40 mg) by mouth 2 times daily, Disp-60 tablet, R-0, E-Prescribe           CONTINUE these medications which have NOT CHANGED    Details   multivitamin, therapeutic (THERA-VIT) TABS tablet Take 1 tablet by mouth daily, Historical           STOP taking these medications       acetaminophen (TYLENOL) 500 MG tablet Comments:   Reason for Stopping:         losartan (COZAAR) 50 MG tablet Comments:   Reason for Stopping:         sertraline (ZOLOFT) 50 MG tablet Comments:   Reason for Stopping:             Allergies   Allergies    Allergen Reactions    Nsaids      Patient reports having a reaction of puffy eyes and dry throat a few years ago, but he has taken Advil in 2023 and did not react.

## 2023-09-13 ENCOUNTER — PATIENT OUTREACH (OUTPATIENT)
Dept: CARE COORDINATION | Facility: CLINIC | Age: 38
End: 2023-09-13
Payer: COMMERCIAL

## 2023-09-13 LAB
BACTERIA BLD CULT: NO GROWTH
BACTERIA BLD CULT: NO GROWTH

## 2023-09-13 NOTE — PROGRESS NOTES
[facility] Contact  [facility]/Voicemail     Clinical Data: Post-Discharge Outreach     Outreach attempted x 2. Unable to leave message on patient's voicemail, providing [facility]'s 24/7 scheduling and nurse triage phone number [phone] ([phone]) for questions/concerns and/or to schedule an appt with an [facility] provider, if they do not have a PCP.      Plan:  [facility] will do no further outreaches at this time.       Mariana Rock MA  [facility] [place], [facility]    *Connected Care Resource Team does NOT follow patient ongoing. Referrals are identified based on internal discharge reports and the outreach is to ensure patient has an understanding of their discharge instructions.

## 2023-09-25 ENCOUNTER — HOSPITAL ENCOUNTER (INPATIENT)
Facility: CLINIC | Age: 38
LOS: 9 days | Discharge: HOME | DRG: 432 | End: 2023-10-04
Attending: EMERGENCY MEDICINE | Admitting: HOSPITALIST
Payer: COMMERCIAL

## 2023-09-25 ENCOUNTER — APPOINTMENT (OUTPATIENT)
Dept: CT IMAGING | Facility: CLINIC | Age: 38
DRG: 432 | End: 2023-09-25
Attending: EMERGENCY MEDICINE
Payer: COMMERCIAL

## 2023-09-25 ENCOUNTER — APPOINTMENT (OUTPATIENT)
Dept: GENERAL RADIOLOGY | Facility: CLINIC | Age: 38
DRG: 432 | End: 2023-09-25
Attending: EMERGENCY MEDICINE
Payer: COMMERCIAL

## 2023-09-25 DIAGNOSIS — R60.1 ANASARCA: ICD-10-CM

## 2023-09-25 DIAGNOSIS — N17.9 AKI (ACUTE KIDNEY INJURY) (H): ICD-10-CM

## 2023-09-25 DIAGNOSIS — R74.01 TRANSAMINITIS: ICD-10-CM

## 2023-09-25 DIAGNOSIS — R60.9 EDEMA: Primary | ICD-10-CM

## 2023-09-25 DIAGNOSIS — D72.829 LEUKOCYTOSIS, UNSPECIFIED TYPE: ICD-10-CM

## 2023-09-25 DIAGNOSIS — K72.00 SUBACUTE LIVER FAILURE WITHOUT HEPATIC COMA: ICD-10-CM

## 2023-09-25 DIAGNOSIS — E80.6 HYPERBILIRUBINEMIA: ICD-10-CM

## 2023-09-25 DIAGNOSIS — K70.31 ALCOHOLIC CIRRHOSIS OF LIVER WITH ASCITES (H): ICD-10-CM

## 2023-09-25 DIAGNOSIS — E87.1 HYPONATREMIA: ICD-10-CM

## 2023-09-25 DIAGNOSIS — F10.10 ALCOHOL ABUSE: ICD-10-CM

## 2023-09-25 DIAGNOSIS — R79.1 ELEVATED INR: ICD-10-CM

## 2023-09-25 PROBLEM — K72.90 LIVER FAILURE (H): Status: ACTIVE | Noted: 2023-09-25

## 2023-09-25 LAB
ALBUMIN SERPL BCG-MCNC: 2.4 G/DL (ref 3.5–5.2)
ALBUMIN UR-MCNC: 30 MG/DL
ALP SERPL-CCNC: 182 U/L (ref 40–129)
ALT SERPL W P-5'-P-CCNC: 69 U/L (ref 0–70)
ANION GAP SERPL CALCULATED.3IONS-SCNC: 14 MMOL/L (ref 7–15)
APPEARANCE UR: ABNORMAL
AST SERPL W P-5'-P-CCNC: 192 U/L (ref 0–45)
AST SERPL W P-5'-P-CCNC: ABNORMAL U/L
BACTERIA #/AREA URNS HPF: ABNORMAL /HPF
BASOPHILS # BLD AUTO: 0.1 10E3/UL (ref 0–0.2)
BASOPHILS NFR BLD AUTO: 0 %
BILIRUB SERPL-MCNC: 21.7 MG/DL
BILIRUB UR QL STRIP: ABNORMAL
BUN SERPL-MCNC: 39.4 MG/DL (ref 6–20)
CALCIUM SERPL-MCNC: 9 MG/DL (ref 8.6–10)
CHLORIDE SERPL-SCNC: 93 MMOL/L (ref 98–107)
COLOR UR AUTO: ABNORMAL
CREAT SERPL-MCNC: 2.01 MG/DL (ref 0.67–1.17)
DEPRECATED HCO3 PLAS-SCNC: 18 MMOL/L (ref 22–29)
EGFRCR SERPLBLD CKD-EPI 2021: 43 ML/MIN/1.73M2
EOSINOPHIL # BLD AUTO: 0.3 10E3/UL (ref 0–0.7)
EOSINOPHIL NFR BLD AUTO: 2 %
ERYTHROCYTE [DISTWIDTH] IN BLOOD BY AUTOMATED COUNT: 14.3 % (ref 10–15)
ETHANOL SERPL-MCNC: <0.01 G/DL
FLUAV RNA SPEC QL NAA+PROBE: NEGATIVE
FLUBV RNA RESP QL NAA+PROBE: NEGATIVE
GLUCOSE SERPL-MCNC: 120 MG/DL (ref 70–99)
GLUCOSE UR STRIP-MCNC: 50 MG/DL
HCO3 BLDV-SCNC: 22 MMOL/L (ref 21–28)
HCT VFR BLD AUTO: 27.1 % (ref 40–53)
HGB BLD-MCNC: 9.6 G/DL (ref 13.3–17.7)
HGB UR QL STRIP: ABNORMAL
HOLD SPECIMEN: 0
HOLD SPECIMEN: NORMAL
HOLD SPECIMEN: NORMAL
HYALINE CASTS: 3 /LPF
IMM GRANULOCYTES # BLD: 0.1 10E3/UL
IMM GRANULOCYTES NFR BLD: 1 %
INR PPP: 1.88 (ref 0.85–1.15)
KETONES UR STRIP-MCNC: NEGATIVE MG/DL
LACTATE BLD-SCNC: 1.5 MMOL/L
LEUKOCYTE ESTERASE UR QL STRIP: ABNORMAL
LIPASE SERPL-CCNC: 237 U/L (ref 13–60)
LYMPHOCYTES # BLD AUTO: 1 10E3/UL (ref 0.8–5.3)
LYMPHOCYTES NFR BLD AUTO: 5 %
MAGNESIUM SERPL-MCNC: 1.8 MG/DL (ref 1.7–2.3)
MCH RBC QN AUTO: 37.6 PG (ref 26.5–33)
MCHC RBC AUTO-ENTMCNC: 35.4 G/DL (ref 31.5–36.5)
MCV RBC AUTO: 106 FL (ref 78–100)
MONOCYTES # BLD AUTO: 2.1 10E3/UL (ref 0–1.3)
MONOCYTES NFR BLD AUTO: 11 %
MUCOUS THREADS #/AREA URNS LPF: PRESENT /LPF
NEUTROPHILS # BLD AUTO: 15.1 10E3/UL (ref 1.6–8.3)
NEUTROPHILS NFR BLD AUTO: 81 %
NITRATE UR QL: NEGATIVE
NRBC # BLD AUTO: 0 10E3/UL
NRBC BLD AUTO-RTO: 0 /100
PCO2 BLDV: 34 MM HG (ref 40–50)
PH BLDV: 7.42 [PH] (ref 7.32–7.43)
PH UR STRIP: 5 [PH] (ref 5–7)
PLATELET # BLD AUTO: 270 10E3/UL (ref 150–450)
PO2 BLDV: 23 MM HG (ref 25–47)
POTASSIUM SERPL-SCNC: 4.9 MMOL/L (ref 3.4–5.3)
PROT SERPL-MCNC: ABNORMAL G/DL
RBC # BLD AUTO: 2.55 10E6/UL (ref 4.4–5.9)
RBC URINE: 1 /HPF
RSV RNA SPEC NAA+PROBE: NEGATIVE
SAO2 % BLDV: 42 % (ref 94–100)
SARS-COV-2 RNA RESP QL NAA+PROBE: NEGATIVE
SODIUM SERPL-SCNC: 125 MMOL/L (ref 136–145)
SP GR UR STRIP: 1.02 (ref 1–1.03)
UROBILINOGEN UR STRIP-MCNC: 4 MG/DL
WBC # BLD AUTO: 18.6 10E3/UL (ref 4–11)
WBC URINE: 11 /HPF

## 2023-09-25 PROCEDURE — 250N000013 HC RX MED GY IP 250 OP 250 PS 637: Performed by: HOSPITALIST

## 2023-09-25 PROCEDURE — 84460 ALANINE AMINO (ALT) (SGPT): CPT | Performed by: EMERGENCY MEDICINE

## 2023-09-25 PROCEDURE — P9047 ALBUMIN (HUMAN), 25%, 50ML: HCPCS | Performed by: HOSPITALIST

## 2023-09-25 PROCEDURE — 83735 ASSAY OF MAGNESIUM: CPT | Performed by: EMERGENCY MEDICINE

## 2023-09-25 PROCEDURE — 71250 CT THORAX DX C-: CPT

## 2023-09-25 PROCEDURE — 71046 X-RAY EXAM CHEST 2 VIEWS: CPT

## 2023-09-25 PROCEDURE — 87086 URINE CULTURE/COLONY COUNT: CPT | Performed by: EMERGENCY MEDICINE

## 2023-09-25 PROCEDURE — 36415 COLL VENOUS BLD VENIPUNCTURE: CPT | Performed by: EMERGENCY MEDICINE

## 2023-09-25 PROCEDURE — 87637 SARSCOV2&INF A&B&RSV AMP PRB: CPT | Performed by: EMERGENCY MEDICINE

## 2023-09-25 PROCEDURE — 83690 ASSAY OF LIPASE: CPT | Performed by: EMERGENCY MEDICINE

## 2023-09-25 PROCEDURE — 87507 IADNA-DNA/RNA PROBE TQ 12-25: CPT | Performed by: HOSPITALIST

## 2023-09-25 PROCEDURE — 99285 EMERGENCY DEPT VISIT HI MDM: CPT | Mod: 25

## 2023-09-25 PROCEDURE — 36415 COLL VENOUS BLD VENIPUNCTURE: CPT | Performed by: HOSPITALIST

## 2023-09-25 PROCEDURE — 250N000011 HC RX IP 250 OP 636: Performed by: HOSPITALIST

## 2023-09-25 PROCEDURE — 250N000011 HC RX IP 250 OP 636: Mod: JZ | Performed by: EMERGENCY MEDICINE

## 2023-09-25 PROCEDURE — 93005 ELECTROCARDIOGRAM TRACING: CPT

## 2023-09-25 PROCEDURE — 84450 TRANSFERASE (AST) (SGOT): CPT | Performed by: EMERGENCY MEDICINE

## 2023-09-25 PROCEDURE — 87040 BLOOD CULTURE FOR BACTERIA: CPT | Performed by: HOSPITALIST

## 2023-09-25 PROCEDURE — 83605 ASSAY OF LACTIC ACID: CPT

## 2023-09-25 PROCEDURE — 82077 ASSAY SPEC XCP UR&BREATH IA: CPT | Performed by: EMERGENCY MEDICINE

## 2023-09-25 PROCEDURE — 85025 COMPLETE CBC W/AUTO DIFF WBC: CPT | Performed by: EMERGENCY MEDICINE

## 2023-09-25 PROCEDURE — 81001 URINALYSIS AUTO W/SCOPE: CPT | Performed by: EMERGENCY MEDICINE

## 2023-09-25 PROCEDURE — 85610 PROTHROMBIN TIME: CPT | Performed by: EMERGENCY MEDICINE

## 2023-09-25 PROCEDURE — 120N000001 HC R&B MED SURG/OB

## 2023-09-25 PROCEDURE — 99223 1ST HOSP IP/OBS HIGH 75: CPT | Performed by: HOSPITALIST

## 2023-09-25 RX ORDER — CEFTRIAXONE 2 G/1
2 INJECTION, POWDER, FOR SOLUTION INTRAMUSCULAR; INTRAVENOUS EVERY 24 HOURS
Status: DISCONTINUED | OUTPATIENT
Start: 2023-09-26 | End: 2023-09-27

## 2023-09-25 RX ORDER — MULTIVITAMIN,THERAPEUTIC
1 TABLET ORAL DAILY
Status: DISCONTINUED | OUTPATIENT
Start: 2023-09-26 | End: 2023-10-04 | Stop reason: HOSPADM

## 2023-09-25 RX ORDER — ALBUMIN (HUMAN) 12.5 G/50ML
50 SOLUTION INTRAVENOUS ONCE
Status: COMPLETED | OUTPATIENT
Start: 2023-09-25 | End: 2023-09-25

## 2023-09-25 RX ORDER — PANTOPRAZOLE SODIUM 40 MG/1
40 TABLET, DELAYED RELEASE ORAL 2 TIMES DAILY
Status: DISCONTINUED | OUTPATIENT
Start: 2023-09-25 | End: 2023-10-04 | Stop reason: HOSPADM

## 2023-09-25 RX ORDER — ERGOCALCIFEROL (VITAMIN D2) 10 MCG
1 TABLET ORAL DAILY
COMMUNITY
End: 2024-01-01

## 2023-09-25 RX ORDER — ALBUMIN (HUMAN) 12.5 G/50ML
25-50 SOLUTION INTRAVENOUS
Status: DISCONTINUED | OUTPATIENT
Start: 2023-09-25 | End: 2023-10-04 | Stop reason: HOSPADM

## 2023-09-25 RX ORDER — ONDANSETRON 4 MG/1
4 TABLET, ORALLY DISINTEGRATING ORAL EVERY 6 HOURS PRN
Status: DISCONTINUED | OUTPATIENT
Start: 2023-09-25 | End: 2023-10-04 | Stop reason: HOSPADM

## 2023-09-25 RX ORDER — CEFTRIAXONE 2 G/1
2 INJECTION, POWDER, FOR SOLUTION INTRAMUSCULAR; INTRAVENOUS EVERY 24 HOURS
Status: DISCONTINUED | OUTPATIENT
Start: 2023-09-26 | End: 2023-09-25

## 2023-09-25 RX ORDER — HEPARIN SODIUM 5000 [USP'U]/.5ML
5000 INJECTION, SOLUTION INTRAVENOUS; SUBCUTANEOUS EVERY 12 HOURS
Status: DISCONTINUED | OUTPATIENT
Start: 2023-09-25 | End: 2023-09-28

## 2023-09-25 RX ORDER — ALBUMIN (HUMAN) 12.5 G/50ML
50 SOLUTION INTRAVENOUS ONCE
Status: DISCONTINUED | OUTPATIENT
Start: 2023-09-25 | End: 2023-09-25

## 2023-09-25 RX ORDER — ALBUMIN (HUMAN) 12.5 G/50ML
25 SOLUTION INTRAVENOUS EVERY 8 HOURS
Status: DISCONTINUED | OUTPATIENT
Start: 2023-09-26 | End: 2023-09-26

## 2023-09-25 RX ORDER — CEFTRIAXONE 2 G/1
2 INJECTION, POWDER, FOR SOLUTION INTRAMUSCULAR; INTRAVENOUS ONCE
Status: COMPLETED | OUTPATIENT
Start: 2023-09-25 | End: 2023-09-25

## 2023-09-25 RX ORDER — ONDANSETRON 2 MG/ML
4 INJECTION INTRAMUSCULAR; INTRAVENOUS EVERY 6 HOURS PRN
Status: DISCONTINUED | OUTPATIENT
Start: 2023-09-25 | End: 2023-10-04 | Stop reason: HOSPADM

## 2023-09-25 RX ORDER — ACETAMINOPHEN 325 MG/1
650 TABLET ORAL EVERY 8 HOURS PRN
Status: DISCONTINUED | OUTPATIENT
Start: 2023-09-25 | End: 2023-10-04 | Stop reason: HOSPADM

## 2023-09-25 RX ORDER — ACETAMINOPHEN 650 MG/1
650 SUPPOSITORY RECTAL EVERY 8 HOURS PRN
Status: DISCONTINUED | OUTPATIENT
Start: 2023-09-25 | End: 2023-10-04 | Stop reason: HOSPADM

## 2023-09-25 RX ORDER — ALBUMIN (HUMAN) 12.5 G/50ML
25 SOLUTION INTRAVENOUS EVERY 8 HOURS
Status: DISCONTINUED | OUTPATIENT
Start: 2023-09-25 | End: 2023-09-25

## 2023-09-25 RX ADMIN — ALBUMIN HUMAN 50 G: 0.25 SOLUTION INTRAVENOUS at 22:22

## 2023-09-25 RX ADMIN — PANTOPRAZOLE SODIUM 40 MG: 40 TABLET, DELAYED RELEASE ORAL at 20:49

## 2023-09-25 RX ADMIN — HEPARIN SODIUM 5000 UNITS: 5000 INJECTION, SOLUTION INTRAVENOUS; SUBCUTANEOUS at 20:49

## 2023-09-25 RX ADMIN — CEFTRIAXONE SODIUM 2 G: 2 INJECTION, POWDER, FOR SOLUTION INTRAMUSCULAR; INTRAVENOUS at 20:04

## 2023-09-25 ASSESSMENT — ACTIVITIES OF DAILY LIVING (ADL)
ADLS_ACUITY_SCORE: 35

## 2023-09-25 NOTE — PHARMACY-ADMISSION MEDICATION HISTORY
Pharmacist Admission Medication History    Admission medication history is complete. The information provided in this note is only as accurate as the sources available at the time of the update.    Medication reconciliation/reorder completed by provider prior to medication history? No    Information Source(s): Patient and Hospital records via in-person    Pertinent Information: None    Changes made to PTA medication list:  Added: vit D  Deleted: None  Changed: None    Allergies reviewed with patient and updates made in EHR: yes    Medication History Completed By: Marian Siu RPH 9/25/2023 6:42 PM    Prior to Admission medications    Medication Sig Last Dose Taking? Auth Provider Long Term End Date   multivitamin, therapeutic (THERA-VIT) TABS tablet Take 1 tablet by mouth daily 9/24/2023 Yes Unknown, Entered By History     pantoprazole (PROTONIX) 40 MG EC tablet Take 1 tablet (40 mg) by mouth 2 times daily 9/25/2023 at x1 Yes Rome Felipe MD     Vitamin D, Cholecalciferol, 10 MCG (400 UNIT) TABS Take 1 tablet by mouth daily 9/25/2023 Yes Unknown, Entered By History

## 2023-09-25 NOTE — ED PROVIDER NOTES
History     Chief Complaint:  Shortness of Breath and Fatigue       The history is provided by the patient and medical records.      Crispin Ham is a 38 year old male with a history of alcohol abuse who presents with shortness of breath and fatigue.  He was admitted to this hospital with discharge 9/11/2023 for hypovolemic hypotension with duodenal ulcer with contained perforation versus duodenal diverticulum as well as alcohol induced hepatitis and electrolyte derangements.  He was feeling okay for the first few days after discharge but about 10 days ago he started to again feel weak, noting it is even difficult to get out of bed.  If he was able to get out of bed he is feeling quite short of breath and he was uncomfortable when supine, unable to sleep.  He has had poor oral intake but denies fever, vomiting, cough, rhinorrhea, or sore throat.  He does have about 3 loose stools per day that are without hematochezia or melena.  He denies abdominal pain but does feel he is distended.  Crispin has not consumed any alcohol since his discharge 14 days ago.    Crispin saw his primary care provider 6 days ago and then again today and was directed to the emergency department.    Independent Historian:    None     Review of External Notes:  I reviewed his discharge summary from 9/8/2023 where he was admitted for hypertension and a contained perforation of a ulcer. He saw PCP on the 9/19 where he was seen for quoted feeling somewhat stronger since his discharge. He was seen again earlier today by primary care where he mentioned feeling worse again.     Medications:    Protonix     Past Medical History:    Alcohol abuse   HTN  Substance abuse   Septic shock   Anemia, unspecified type   Fatty liver   TIKA   Depressive disorder   Primary OA    Past Surgical History:    L wrist ORIF     Physical Exam   Patient Vitals for the past 24 hrs:   BP Temp Temp src Pulse Resp SpO2 Height Weight   09/25/23 1956 102/61 -- -- -- --  95 % -- --   09/25/23 1926 118/60 -- -- -- -- 95 % -- --   09/25/23 1332 114/69 97.6  F (36.4  C) Oral 98 20 96 % 1.829 m (6') 127 kg (280 lb)        Physical Exam  General: Well-developed and well-nourished. Fatigued appearing young  man. Cooperative.  Head:  Atraumatic.  Eyes:  Conjunctivae and lids are normal.  Scleral icterus.  ENT:    Normal nose. Moist mucous membranes.  Neck:  Supple. Normal range of motion.  CV:  Regular rate and rhythm. Normal heart sounds with no murmurs, rubs, or gallops detected.  Resp:  No respiratory distress. Clear to auscultation bilaterally without decreased breath sounds, wheezing, rales, or rhonchi.  GI:  Soft. Non-distended. Non-tender.    MS:  Normal ROM. Trace bilateral lower extremity edema.  Skin:  Warm. Non-diaphoretic. No pallor.  Profound jaundice.  Neuro:  Awake. A&Ox3. Normal strength.  Psych: Normal mood and affect. Normal speech.  Vitals reviewed.    Emergency Department Course   EKG  Indication: Shortness of Breath  Time: 1522  Rate 99 bpm. MD interval 168. QRS duration 92. QT/QTc 336/431.   Normal sinus rhythm   Possible Lateral infarct, age undetermined   Abnormal ECG   No acute ST changes.  No significant changes as compared to prior, dated 9/8/2023.    Imaging:  Chest XR,  PA & LAT  Preliminary Result  IMPRESSION: No acute cardiopulmonary disease. Hypoinflated lungs and  bibasilar atelectasis. There may be trace bibasilar pleural fluid.    Report per radiology    CT Chest Abdomen Pelvis w/o Contrast   Final Result   IMPRESSION:   1.  Morphologic features of chronic hepatocellular disease (and likely   cirrhosis) with sequelae of portal hypertension including splenomegaly   and worsened, now large volume ascites.   2.  Similar heterogeneous hepatic steatosis. No discrete mass within   the limitations of the study.   3.  Mild increased trace bilateral pleural effusions and body wall   edema.   4.  Similar nonspecific gallbladder distention without calcified    gallstone.      SHARON DUFFY MD      Laboratory:  Labs Ordered and Resulted from Time of ED Arrival to Time of ED Departure   COMPREHENSIVE METABOLIC PANEL - Abnormal       Result Value    Sodium 125 (*)     Potassium 4.9      Carbon Dioxide (CO2) 18 (*)     Anion Gap 14      Urea Nitrogen 39.4 (*)     Creatinine 2.01 (*)     GFR Estimate 43 (*)     Calcium 9.0      Chloride 93 (*)     Glucose 120 (*)     Alkaline Phosphatase 182 (*)     AST        ALT 69      Protein Total        Albumin 2.4 (*)     Bilirubin Total 21.7 (*)    LIPASE - Abnormal    Lipase 237 (*)    CBC WITH PLATELETS AND DIFFERENTIAL - Abnormal    WBC Count 18.6 (*)     RBC Count 2.55 (*)     Hemoglobin 9.6 (*)     Hematocrit 27.1 (*)      (*)     MCH 37.6 (*)     MCHC 35.4      RDW 14.3      Platelet Count 270      % Neutrophils 81      % Lymphocytes 5      % Monocytes 11      % Eosinophils 2      % Basophils 0      % Immature Granulocytes 1      NRBCs per 100 WBC 0      Absolute Neutrophils 15.1 (*)     Absolute Lymphocytes 1.0      Absolute Monocytes 2.1 (*)     Absolute Eosinophils 0.3      Absolute Basophils 0.1      Absolute Immature Granulocytes 0.1      Absolute NRBCs 0.0     INR - Abnormal    INR 1.88 (*)    ROUTINE UA WITH MICROSCOPIC REFLEX TO CULTURE - Abnormal    Color Urine Orange (*)     Appearance Urine Cloudy (*)     Glucose Urine 50 (*)     Bilirubin Urine Large (*)     Ketones Urine Negative      Specific Gravity Urine 1.021      Blood Urine Trace (*)     pH Urine 5.0      Protein Albumin Urine 30 (*)     Urobilinogen Urine 4.0 (*)     Nitrite Urine Negative      Leukocyte Esterase Urine Trace (*)     Bacteria Urine Few (*)     Mucus Urine Present (*)     RBC Urine 1      WBC Urine 11 (*)     Hyaline Casts Urine 3 (*)    ISTAT GASES LACTATE VENOUS POCT - Abnormal    Lactic Acid POCT 1.5      Bicarbonate Venous POCT 22      O2 Sat, Venous POCT 42 (*)     pCO2 Venous POCT 34 (*)     pH Venous POCT 7.42      pO2  Venous POCT 23 (*)    AST - Abnormal     (*)    ETHYL ALCOHOL LEVEL - Normal    Alcohol ethyl <0.01     MAGNESIUM - Normal    Magnesium 1.8     INFLUENZA A/B, RSV, & SARS-COV2 PCR - Normal    Influenza A PCR Negative      Influenza B PCR Negative      RSV PCR Negative      SARS CoV2 PCR Negative     URINE CULTURE   BLOOD CULTURE   BLOOD CULTURE      Emergency Department Course & Assessments:  Interventions:  cefTRIAXone (ROCEPHIN) 2 g vial to attach to  ml bag for ADULTS or NS 50 ml bag for PEDS (2 g Intravenous $New Bag 9/25/23 2004)      Assessments:  1450 I obtained history and examined the patient as noted above.  1805 I rechecked the patient and explained findings. I discussed plan for admission to the hospital.    Independent Interpretation (X-rays, CTs, rhythm strip):  I independently interpreted the CT and appreciate ascites.    Consultations/Discussion of Management or Tests:  1811 I spoke with Dr. Coffey, hospitalist, who accepts the patient for admission.    Social Determinants of Health affecting care:  Lives with parents  Employed  Established care provider  History of alcohol abuse     Disposition:  The patient was admitted to the hospital under the care of Dr. Coffey.     Impression & Plan    Medical Decision Making:  Crispin is 38 year old man with alcoholic cirrhosis who was discharged 9/11/2023 after hospitalization for hypovolemic hypotension with alcohol induced hepatitis and electrolyte derangements.  He felt well for a couple of days but about 10 days ago he had worsening weakness and dyspnea on exertion.  He has had poor oral intake and ultimately his primary care provider referred him to the emergency department today.  He has about 3 loose bowel movements per day without hematochezia or abdominal pain.  He has been sober in the last 2 weeks.  On exam he appears fatigued with scleral icterus and profound jaundice.    EKG is reassuring without acute ST changes or arrhythmias.  I  doubt a cardiac etiology for shortness of breath.  Troponin could not be run because his bilirubin was so high but, again, a cardiac cause is considered unlikely.  CT of the chest reveals bilateral pleural effusions but no pneumonia.  He also has now large volume ascites without acute pathology in the abdomen.    His laboratory studies are significant for hyponatremia to 125 from 134 at discharge.  He also has a significant GATITO with creatinine of 2.01 from baseline of 0.57.  His transaminases are very similar to discharge, as his lipase.  However, his bilirubin has significantly worsened to 21.7 from 13.8 at discharge.  I also note a new leukocytosis of 18.6.  There is no UTI.  There is no obvious source of infection on his work-up including the aforementioned imaging and negative influenza/RSV/COVID-19.  Lactate is normal.  Urine and blood cultures were obtained.  Other values are similar to discharge including anemia and elevated INR.    This man clearly requires hospitalization for attention to his worsening liver failure.  He would likely benefit from diuresis and paracentesis.  I discussed his findings and plan for admission and answered all his questions.  He verbalized understanding and is amenable.  I discussed the patient's case with Dr. Rand, hospitalist, who accepts the patient.  She recommends empiric Rocephin for possible SBP which was given though it should be noted that he has no abdominal pain or tenderness.    Diagnosis:    ICD-10-CM    1. Subacute liver failure without hepatic coma  K72.00       2. Hyperbilirubinemia  E80.6       3. Transaminitis  R74.01       4. Alcohol abuse  F10.10       5. Leukocytosis, unspecified type  D72.829       6. Alcoholic cirrhosis of liver with ascites (H)  K70.31       7. Anasarca  R60.1       8. Hyponatremia  E87.1       9. GATITO (acute kidney injury) (H)  N17.9       10. Elevated INR  R79.1              Scribe Disclosure:  I, Atul Mayorga, am serving as a scribe  at 2:58 PM on 9/25/2023 to document services personally performed by Johnna Rolle MD based on my observations and the provider's statements to me.  9/25/2023   Johnna Rolle MD Dixson, Kylie S, MD  09/26/23 1451

## 2023-09-25 NOTE — ED NOTES
[facility]  ED Nurse Handoff Report    ED Chief complaint: Shortness of Breath and Fatigue      ED Diagnosis:   Final diagnoses:   None       Code Status: Full Code    Allergies:   Allergies   Allergen Reactions    Nsaids      Patient reports having a reaction of puffy eyes and dry throat a few years ago, but he has taken Advil in 2023 and did not react.       Patient Story: liver failure, SOB  Focused Assessment:  pt come into ED today c/o SOB and fatigue for past few days. Pt was discharged on 9/12 fro [facility] for liver issues. Pt is jaundiced with bilirubin of 21.7.     Treatments and/or interventions provided: see MAR  Patient's response to treatments and/or interventions: good    To be done/followed up on inpatient unit:  monitor, check liver levels    Does this patient have any cognitive concerns?:  NA    Activity level - Baseline/Home:  Independent  Activity Level - Current:   Independent    Patient's Preferred language: English   Needed?: No    Isolation: None  Infection: Not Applicable  Patient tested for COVID 19 prior to admission: YES  Bariatric?: No    Vital Signs:   Vitals:    09/25/23 1332   BP: 114/69   Pulse: 98   Resp: 20   Temp: 97.6  F (36.4  C)   TempSrc: Oral   SpO2: 96%   Weight: 127 kg (280 lb)   Height: 1.829 m (6')       Cardiac Rhythm:     Was the PSS-3 completed:   Yes  What interventions are required if any?               Family Comments: none here  OBS brochure/video discussed/provided to patient/family: N/A              Name of person given brochure if not patient: NA              Relationship to patient: NA    For the majority of the shift this patient's behavior was Green.   Behavioral interventions performed were NA.    ED NURSE PHONE NUMBER: *[id number]

## 2023-09-25 NOTE — ED TRIAGE NOTES
Pt was discharged on 9/12 from [facility]. Pt reports increased SOB and fatigue. Jaundice

## 2023-09-26 ENCOUNTER — APPOINTMENT (OUTPATIENT)
Dept: ULTRASOUND IMAGING | Facility: CLINIC | Age: 38
DRG: 432 | End: 2023-09-26
Attending: HOSPITALIST
Payer: COMMERCIAL

## 2023-09-26 LAB
% LINING CELLS, BODY FLUID: 7 %
ABSOLUTE NEUTROPHILS, BODY FLUID: 1.8 /UL
ADV 40+41 DNA STL QL NAA+NON-PROBE: NEGATIVE
ALBUMIN BODY FLUID SOURCE: NORMAL
ALBUMIN FLD-MCNC: 0.4 G/DL
ALBUMIN SERPL BCG-MCNC: 2.6 G/DL (ref 3.5–5.2)
ALP SERPL-CCNC: 185 U/L (ref 40–129)
ALT SERPL W P-5'-P-CCNC: 57 U/L (ref 0–70)
ANION GAP SERPL CALCULATED.3IONS-SCNC: 12 MMOL/L (ref 7–15)
APPEARANCE FLD: CLEAR
AST SERPL W P-5'-P-CCNC: 169 U/L (ref 0–45)
ASTRO TYP 1-8 RNA STL QL NAA+NON-PROBE: NEGATIVE
ATRIAL RATE - MUSE: 99 BPM
BILIRUB SERPL-MCNC: 20.1 MG/DL
BLD PROD TYP BPU: NORMAL
BLOOD COMPONENT TYPE: NORMAL
BUN SERPL-MCNC: 45.2 MG/DL (ref 6–20)
C CAYETANENSIS DNA STL QL NAA+NON-PROBE: NEGATIVE
C DIFF GDH STL QL IA: POSITIVE
C DIFF TOX A+B STL QL IA: NEGATIVE
C DIFF TOX B STL QL: POSITIVE
CALCIUM SERPL-MCNC: 9.1 MG/DL (ref 8.6–10)
CAMPYLOBACTER DNA SPEC NAA+PROBE: NEGATIVE
CELL COUNT BODY FLUID SOURCE: NORMAL
CHLORIDE SERPL-SCNC: 93 MMOL/L (ref 98–107)
CODING SYSTEM: NORMAL
COLOR FLD: YELLOW
CREAT SERPL-MCNC: 2.21 MG/DL (ref 0.67–1.17)
CRYPTOSP DNA STL QL NAA+NON-PROBE: NEGATIVE
DEPRECATED HCO3 PLAS-SCNC: 20 MMOL/L (ref 22–29)
DIASTOLIC BLOOD PRESSURE - MUSE: NORMAL MMHG
E COLI O157 DNA STL QL NAA+NON-PROBE: NORMAL
E HISTOLYT DNA STL QL NAA+NON-PROBE: NEGATIVE
EAEC ASTA GENE ISLT QL NAA+PROBE: NEGATIVE
EC STX1+STX2 GENES STL QL NAA+NON-PROBE: NEGATIVE
EGFRCR SERPLBLD CKD-EPI 2021: 39 ML/MIN/1.73M2
EPEC EAE GENE STL QL NAA+NON-PROBE: NEGATIVE
ERYTHROCYTE [DISTWIDTH] IN BLOOD BY AUTOMATED COUNT: 14.3 % (ref 10–15)
ETEC LTA+ST1A+ST1B TOX ST NAA+NON-PROBE: NEGATIVE
G LAMBLIA DNA STL QL NAA+NON-PROBE: NEGATIVE
GLUCOSE SERPL-MCNC: 102 MG/DL (ref 70–99)
HCT VFR BLD AUTO: 22.7 % (ref 40–53)
HGB BLD-MCNC: 8.3 G/DL (ref 13.3–17.7)
INR PPP: 2.18 (ref 0.85–1.15)
INTERPRETATION ECG - MUSE: NORMAL
ISSUE DATE AND TIME: NORMAL
LYMPHOCYTES NFR FLD MANUAL: 15 %
MCH RBC QN AUTO: 38.4 PG (ref 26.5–33)
MCHC RBC AUTO-ENTMCNC: 36.6 G/DL (ref 31.5–36.5)
MCV RBC AUTO: 105 FL (ref 78–100)
MONOS+MACROS NFR FLD MANUAL: 75 %
NEUTS BAND NFR FLD MANUAL: 3 %
NOROVIRUS GI+II RNA STL QL NAA+NON-PROBE: NEGATIVE
P AXIS - MUSE: 41 DEGREES
P SHIGELLOIDES DNA STL QL NAA+NON-PROBE: NEGATIVE
PLATELET # BLD AUTO: 214 10E3/UL (ref 150–450)
POTASSIUM SERPL-SCNC: 4 MMOL/L (ref 3.4–5.3)
PR INTERVAL - MUSE: 168 MS
PROT FLD-MCNC: 0.9 G/DL
PROT SERPL-MCNC: ABNORMAL G/DL
PROTEIN BODY FLUID SOURCE: NORMAL
QRS DURATION - MUSE: 92 MS
QT - MUSE: 336 MS
QTC - MUSE: 431 MS
R AXIS - MUSE: -5 DEGREES
RBC # BLD AUTO: 2.16 10E6/UL (ref 4.4–5.9)
RVA RNA STL QL NAA+NON-PROBE: NEGATIVE
SALMONELLA SP RPOD STL QL NAA+PROBE: NEGATIVE
SAPO I+II+IV+V RNA STL QL NAA+NON-PROBE: NEGATIVE
SHIGELLA SP+EIEC IPAH ST NAA+NON-PROBE: NEGATIVE
SODIUM SERPL-SCNC: 125 MMOL/L (ref 136–145)
SYSTOLIC BLOOD PRESSURE - MUSE: NORMAL MMHG
T AXIS - MUSE: -8 DEGREES
UNIT ABO/RH: NORMAL
UNIT NUMBER: NORMAL
UNIT STATUS: NORMAL
UNIT TYPE ISBT: 6200
V CHOLERAE DNA SPEC QL NAA+PROBE: NEGATIVE
VENTRICULAR RATE- MUSE: 99 BPM
VIBRIO DNA SPEC NAA+PROBE: NEGATIVE
WBC # BLD AUTO: 13.6 10E3/UL (ref 4–11)
WBC # FLD AUTO: 59 /UL
Y ENTEROCOL DNA STL QL NAA+PROBE: NEGATIVE

## 2023-09-26 PROCEDURE — 85610 PROTHROMBIN TIME: CPT | Performed by: HOSPITALIST

## 2023-09-26 PROCEDURE — 87070 CULTURE OTHR SPECIMN AEROBIC: CPT | Performed by: HOSPITALIST

## 2023-09-26 PROCEDURE — 87324 CLOSTRIDIUM AG IA: CPT | Performed by: HOSPITALIST

## 2023-09-26 PROCEDURE — 99233 SBSQ HOSP IP/OBS HIGH 50: CPT | Performed by: STUDENT IN AN ORGANIZED HEALTH CARE EDUCATION/TRAINING PROGRAM

## 2023-09-26 PROCEDURE — 999N000128 HC STATISTIC PERIPHERAL IV START W/O US GUIDANCE

## 2023-09-26 PROCEDURE — 89050 BODY FLUID CELL COUNT: CPT | Performed by: HOSPITALIST

## 2023-09-26 PROCEDURE — 87493 C DIFF AMPLIFIED PROBE: CPT | Performed by: HOSPITALIST

## 2023-09-26 PROCEDURE — P9059 PLASMA, FRZ BETWEEN 8-24HOUR: HCPCS | Performed by: STUDENT IN AN ORGANIZED HEALTH CARE EDUCATION/TRAINING PROGRAM

## 2023-09-26 PROCEDURE — 82042 OTHER SOURCE ALBUMIN QUAN EA: CPT | Performed by: HOSPITALIST

## 2023-09-26 PROCEDURE — 80048 BASIC METABOLIC PNL TOTAL CA: CPT | Performed by: HOSPITALIST

## 2023-09-26 PROCEDURE — 49083 ABD PARACENTESIS W/IMAGING: CPT

## 2023-09-26 PROCEDURE — 250N000009 HC RX 250: Performed by: RADIOLOGY

## 2023-09-26 PROCEDURE — 84157 ASSAY OF PROTEIN OTHER: CPT | Performed by: HOSPITALIST

## 2023-09-26 PROCEDURE — 250N000011 HC RX IP 250 OP 636: Performed by: HOSPITALIST

## 2023-09-26 PROCEDURE — P9047 ALBUMIN (HUMAN), 25%, 50ML: HCPCS | Performed by: HOSPITALIST

## 2023-09-26 PROCEDURE — 36415 COLL VENOUS BLD VENIPUNCTURE: CPT | Performed by: HOSPITALIST

## 2023-09-26 PROCEDURE — 85027 COMPLETE CBC AUTOMATED: CPT | Performed by: HOSPITALIST

## 2023-09-26 PROCEDURE — 120N000001 HC R&B MED SURG/OB

## 2023-09-26 PROCEDURE — 84460 ALANINE AMINO (ALT) (SGPT): CPT | Performed by: HOSPITALIST

## 2023-09-26 PROCEDURE — 250N000013 HC RX MED GY IP 250 OP 250 PS 637: Performed by: HOSPITALIST

## 2023-09-26 PROCEDURE — 0W9G3ZZ DRAINAGE OF PERITONEAL CAVITY, PERCUTANEOUS APPROACH: ICD-10-PCS | Performed by: INTERNAL MEDICINE

## 2023-09-26 RX ORDER — LIDOCAINE HYDROCHLORIDE 10 MG/ML
10 INJECTION, SOLUTION EPIDURAL; INFILTRATION; INTRACAUDAL; PERINEURAL ONCE
Status: COMPLETED | OUTPATIENT
Start: 2023-09-26 | End: 2023-09-26

## 2023-09-26 RX ADMIN — PANTOPRAZOLE SODIUM 40 MG: 40 TABLET, DELAYED RELEASE ORAL at 08:24

## 2023-09-26 RX ADMIN — MULTIVITAMIN TABLET 1 TABLET: TABLET at 08:24

## 2023-09-26 RX ADMIN — CEFTRIAXONE SODIUM 2 G: 2 INJECTION, POWDER, FOR SOLUTION INTRAMUSCULAR; INTRAVENOUS at 17:10

## 2023-09-26 RX ADMIN — HEPARIN SODIUM 5000 UNITS: 5000 INJECTION, SOLUTION INTRAVENOUS; SUBCUTANEOUS at 21:00

## 2023-09-26 RX ADMIN — PANTOPRAZOLE SODIUM 40 MG: 40 TABLET, DELAYED RELEASE ORAL at 19:42

## 2023-09-26 RX ADMIN — ALBUMIN HUMAN 25 G: 0.25 SOLUTION INTRAVENOUS at 07:03

## 2023-09-26 RX ADMIN — LIDOCAINE HYDROCHLORIDE 10 ML: 10 INJECTION, SOLUTION EPIDURAL; INFILTRATION; INTRACAUDAL; PERINEURAL at 15:46

## 2023-09-26 RX ADMIN — HEPARIN SODIUM 5000 UNITS: 5000 INJECTION, SOLUTION INTRAVENOUS; SUBCUTANEOUS at 08:32

## 2023-09-26 ASSESSMENT — ACTIVITIES OF DAILY LIVING (ADL)
ADLS_ACUITY_SCORE: 35

## 2023-09-26 NOTE — PLAN OF CARE
RECEIVING UNIT ED HANDOFF REVIEW    ED Nurse Handoff Report was reviewed by: ASHTYN TEJADA RN on September 25, 2023 at 7:37 PM

## 2023-09-26 NOTE — CONSULTS
38 year old male with a history of severe alcohol use disorder, alcoholic liver disease with recent hospital admission between 9/8-9/11 for hypovolemic hypotension, alcoholic hepatitis and pancreatitis, concern for duodenitis with possible contained perforation versus diverticulum, GATITO.  Also has history of generalized anxiety disorder,  HTN who now presents with worsening fatigue, weakness and shortness of breath over the last week.   stable vitals, afebrile.  Labs significant for WBC 18.6, Hb 9.6, , BUN 39, creatinine 2.0 [was 0.5 on 9/11], albumin 2.4, T. bili 21.7 [was 13.8 on 9/11], lipase 237, lactic acid 1.5, , ALT 69, INR 1.8.    CT CAP shows cirrhotic appearing liver with sequelae of portal hypertension including splenomegaly and worsened now large volume ascites.  Mild increased trace bilateral pleural effusions and body wall edema.  Given 2 g IV ceftriaxone in the ED to cover possible SBP.  Agree with suportive care  Repeat labs in AM  Paracentesis to r/o SBO  2 gm Na diet    Brenden Gibson MD FACP  [facility]

## 2023-09-26 NOTE — PROGRESS NOTES
[facility]    Medicine Progress Note - Hospitalist Service    Date of Admission:  9/25/2023  Date of Service: 09/26/2023    Assessment & Plan     Crispin Ham is a 38 year old male with a history of severe alcohol use disorder, alcoholic liver disease with recent hospital admission between 9/8-9/11 for hypovolemic hypotension, alcoholic hepatitis and pancreatitis, concern for duodenitis with possible contained perforation versus diverticulum, GATITO.  Also has history of generalized anxiety disorder,  HTN who now presents with worsening fatigue, weakness and shortness of breath over the last week.    Decompensated alcoholic liver cirrhosis  Portal hypertension with splenomegaly and ascites  Lower extremity edema  Leukocytosis, concern for SBP  Coagulopathy secondary to liver disease  Hypoalbuminemia  Patient notes he was doing okay after he discharged from hospital, was even able to return to his job with  at [employer] for few days 2 weeks ago.  However over the last week to 10 days has been progressively more weak, tired and gets short of breath with minimal exertion.  Is having hard time sleeping, has discomfort in lower abdomen, generalized pains.  Also notes increased frequency of small-volume loose stools about every 3-4 hours over the last few days.  No blood in stools or vomit.  Has been nauseous, no significant vomiting.  Has had decreased oral intake.  Does feel abdomen is distended and feels legs are mildly more swollen.  Denies any fevers, sweats but did have some intermittent chills.  Also notes some difficulty urinating/emptying bladder because at times his penis feels retracted.  He adamantly denies any alcohol use since last hospital discharge.  Currently lives with his parents who are both present in the room.    In the ED noted to have stable vitals, afebrile.  Labs significant for WBC 18.6, Hb 9.6, , BUN 39, creatinine 2.0 [was 0.5 on 9/11],  albumin 2.4, T. bili 21.7 [was 13.8 on 9/11], lipase 237, lactic acid 1.5, , ALT 69, INR 1.8.    CT CAP shows cirrhotic appearing liver with sequelae of portal hypertension including splenomegaly and worsened now large volume ascites.  Mild increased trace bilateral pleural effusions and body wall edema.  Given 2 g IV ceftriaxone in the ED to cover possible SBP.  Plan:  -Unclear what triggered current worsening/decompensated liver failure.  Has been having diarrhea last few days so this may have been a trigger.  Also concern for SBP given new large volume ascites seen on CT.  No evidence of GI bleed.  Has not been on diuretics.  Hemodynamics okay in ED.  -S/p on albumin infusion 50 g IV x1   -Continue IV ceftriaxone to cover for SBP.  Blood cultures ordered.  -Ordered ultrasound paracentesis therapeutic and diagnostic for today.  Will need additional albumin if large volume para.  -Consult to [facility] who saw patient during last hospitalization -> EGD tomorrow  -Will likely need to be started on diuretics during this hospitalization  -2 g sodium diet  -1U FFP ordered for today given INR 2.18 and EGD tomorrow    MELD 3.0: 35 at 9/25/2023  2:25 PM  MELD-Na: 35 at 9/25/2023  2:25 PM  Calculated from:  Serum Creatinine: 2.01 mg/dL at 9/25/2023  2:24 PM  Serum Sodium: 125 mmol/L at 9/25/2023  2:24 PM  Total Bilirubin: 21.7 mg/dL at 9/25/2023  2:24 PM  Serum Albumin: 2.4 g/dL at 9/25/2023  2:24 PM  INR(ratio): 1.88 at 9/25/2023  2:25 PM  Age at listing (hypothetical): 38 years  Sex: Male at 9/25/2023  2:25 PM    GATITO, prerenal versus hepatorenal  Diarrhea  Hyponatremia  Nongap acidosis in setting of diarrhea and GATITO  -Albumin infusions ordered as noted above.  Can administer up to 100 g/day for 2 days and monitor response with creatinine.  If not responding may be more in line with hepatorenal syndrome.  Would consult nephrology if not improving.  -Check enteric panel and C. difficile to evaluate  diarrhea  -Creatinine had gone up to 2.9 during last hospitalization and sodium was 128.  Creatinine normalized and sodium came up to 134 with NS during last hospitalization, however he did not have significant ascites and was hypotensive and hypovolemic at that time.  -Closely monitor renal function      Macrocytic anemia  Hb 9.6.  Recent baseline appears to be around 10.  No evidence of overt GI bleeding.  -Monitor hemoglobin.    Elevated lipase, recent alcoholic pancreatitis  No significant abdominal pain at this time.  Lipase was 272 on 9/8, currently 237.    Severe alcohol use disorder  Strongly encourage complete alcohol cessation.  He reports being sober since last hospital discharge.  He refused to talk with CD or psychiatry during last hospitalization.  Would offer this again during the stay.    Recent duodenitis/ulcer with contained perforation versus duodenal diverticulum seen on CT  Noted on CT during last hospitalization.  -Continue PPI  -No significant abdominal pain at this time.  Consult to [facility] as noted above.  -Will need outpatient EGD to evaluate for cirrhosis    History of hypertension  -Recently had issues with hypotension.  Not on any medications.  Noted.  Currently normotensive.       Diet: 2 Gram Sodium Diet  NPO per Anesthesia Guidelines for Procedure/Surgery Except for: Meds    DVT Prophylaxis: Pneumatic Compression Devices  Bosch Catheter: Not present  Lines: None     Cardiac Monitoring: None  Code Status: Full Code      Clinically Significant Risk Factors Present on Admission         # Hyponatremia: Lowest Na = 125 mmol/L in last 2 days, will monitor as appropriate   # Hypercalcemia: corrected calcium is >10.1, will monitor as appropriate    # Hypoalbuminemia: Lowest albumin = 2.4 g/dL at 9/25/2023  2:24 PM, will monitor as appropriate  # Coagulation Defect: INR = 2.18 (Ref range: 0.85 - 1.15) and/or PTT = N/A, will monitor for bleeding   # Acute Kidney Injury, unspecified: based on  a >150% or 0.3 mg/dL increase in last creatinine compared to past 90 day average, will monitor renal function       # Obesity: Estimated body mass index is 37.38 kg/m  as calculated from the following:    Height as of this encounter: 1.829 m (6').    Weight as of this encounter: 125 kg (275 lb 9.6 oz).              Disposition Plan      Expected Discharge Date: 09/28/2023                  Elio Chaves MD  Hospitalist Service  [facility]  Securely message with [employer] (more info)  Text page via [employer] Paging/Directory   ______________________________________________________________________    Interval History     Doing well post paracentesis  No CP/SOB  No nausea / vomiting or significant abdominal pain  No significant diarrhea / bloody stools  No fevers  No new complaints    Physical Exam   Vital Signs: Temp: 98.8  F (37.1  C) Temp src: Oral BP: 110/71 Pulse: 95   Resp: 18 SpO2: 97 % O2 Device: None (Room air)    Weight: 275 lbs 9.6 oz    Constitutional: Awake, alert, cooperative, no apparent distress.  Significantly jaundiced, obese male, appears sick, laying in bed  Eyes: Significantly icteric  Respiratory: Clear to auscultation bilaterally, nonlabored breathing, diminished at bases  Cardiovascular: Regular rate and rhythm, normal S1 and S2, and no murmur noted.  GI: Soft, distended, positive fluid wave, not significantly tender  Skin: No rashes, no cyanosis, 1+ bilateral lower extremity edema noted  Musculoskeletal: No joint swelling, erythema or tenderness.  Neurologic: Alert, oriented and engages in appropriate conversation, no facial asymmetry, moving all extremities, fluent speech  Psychiatric: Calm, flat affect    ----------------------------------------------------------------------------------------    Medical Decision Making       ------------------ MEDICAL DECISION MAKING ------------------------------------------------------------------------------------------------------  High  Complexity Decision Making       Data   ------------------------- PAST 24 HR DATA REVIEWED -----------------------------------------------    I have personally reviewed the following data over the past 24 hrs:    13.6 (H)  \   8.3 (L)   / 214     125 (L) 93 (L) 45.2 (H) /  102 (H)   4.0 20 (L) 2.21 (H) \     ALT: 57 AST: 169 (H) AP: 185 (H) TBILI: 20.1 (HH)   ALB: 2.6 (L) TOT PROTEIN: N/A LIPASE: N/A     INR:  2.18 (H) PTT:  N/A   D-dimer:  N/A Fibrinogen:  N/A       Imaging results reviewed over the past 24 hrs:   No results found for this or any previous visit (from the past 24 hour(s)).  ------------------------- ENCOUNTER LABS ----------------------------------------------------------------  Recent Labs   Lab 09/26/23  0724 09/25/23  1537 09/25/23  1425 09/25/23  1424   WBC 13.6*  --   --  18.6*   HGB 8.3*  --   --  9.6*   *  --   --  106*     --   --  270   INR 2.18*  --  1.88*  --    *  --   --  125*   POTASSIUM 4.0  --   --  4.9   CHLORIDE 93*  --   --  93*   CO2 20*  --   --  18*   BUN 45.2*  --   --  39.4*   CR 2.21*  --   --  2.01*   ANIONGAP 12  --   --  14   MIGUELINA 9.1  --   --  9.0   *  --   --  120*   ALBUMIN 2.6*  --   --  2.4*   BILITOTAL 20.1*  --   --  21.7*   ALKPHOS 185*  --   --  182*   ALT 57  --   --  69   * 192*  --   --    LIPASE  --   --   --  237*       Most Recent 3 CBC's:  Recent Labs   Lab Test 09/26/23 0724 09/25/23  1424 09/11/23  0521   WBC 13.6* 18.6* 9.6   HGB 8.3* 9.6* 10.3*   * 106* 107*    270 150     Most Recent 3 BMP's:  Recent Labs   Lab Test 09/26/23 0724 09/25/23 1424 09/11/23  0521   * 125* 134*   POTASSIUM 4.0 4.9 3.6   CHLORIDE 93* 93* 101   CO2 20* 18* 23   BUN 45.2* 39.4* 17.3   CR 2.21* 2.01* 0.57*   ANIONGAP 12 14 10   MIGUELINA 9.1 9.0 8.6   * 120* 102*     Most Recent 2 LFT's:  Recent Labs   Lab Test 09/26/23 0724 09/25/23  1537 09/25/23  1424   * 192*  --    ALT 57  --  69   ALKPHOS 185*  --  182*    BILITOTAL 20.1*  --  21.7*     Most Recent 3 INR's:  Recent Labs   Lab Test 09/26/23  0724 09/25/23  1425 09/11/23  0521   INR 2.18* 1.88* 1.52*     Anticoagulation Dose History          Latest Ref Rng & Units 9/8/2023 9/10/2023 9/11/2023 9/25/2023 9/26/2023   Recent Dosing and Labs   INR 0.85 - 1.15 1.41  1.52  1.52  1.88  2.18      Most Recent 3 Creatinines:  Recent Labs   Lab Test 09/26/23  0724 09/25/23  1424 09/11/23  0521   CR 2.21* 2.01* 0.57*     Most Recent 3 Hemoglobins:  Recent Labs   Lab Test 09/26/23  0724 09/25/23  1424 09/11/23  0521   HGB 8.3* 9.6* 10.3*     Most Recent 3 Troponin's:No lab results found.  Most Recent 3 BNP's:No lab results found.  Most Recent D-dimer:No lab results found.  Most Recent Cholesterol Panel:No lab results found.  Most Recent 6 Bacteria Isolates From Any Culture (See EPIC Reports for Culture Details):No lab results found.

## 2023-09-26 NOTE — H&P
[facility]    History and Physical  Hospitalist       Date of Admission:  9/25/2023    Assessment & Plan   Crispin Ham is a 38 year old male with a history of severe alcohol use disorder, alcoholic liver disease with recent hospital admission between 9/8-9/11 for hypovolemic hypotension, alcoholic hepatitis and pancreatitis, concern for duodenitis with possible contained perforation versus diverticulum, GATITO.  Also has history of generalized anxiety disorder,  HTN who now presents with worsening fatigue, weakness and shortness of breath over the last week.    Decompensated alcoholic liver cirrhosis  Portal hypertension with splenomegaly and ascites  Lower extremity edema  Leukocytosis, concern for SBP  Coagulopathy secondary to liver disease  Hypoalbuminemia  Patient notes he was doing okay after he discharged from hospital, was even able to return to his job with  at [employer] for few days 2 weeks ago.  However over the last week to 10 days has been progressively more weak, tired and gets short of breath with minimal exertion.  Is having hard time sleeping, has discomfort in lower abdomen, generalized pains.  Also notes increased frequency of small-volume loose stools about every 3-4 hours over the last few days.  No blood in stools or vomit.  Has been nauseous, no significant vomiting.  Has had decreased oral intake.  Does feel abdomen is distended and feels legs are mildly more swollen.  Denies any fevers, sweats but did have some intermittent chills.  Also notes some difficulty urinating/emptying bladder because at times his penis feels retracted.  He adamantly denies any alcohol use since last hospital discharge.  Currently lives with his parents who are both present in the room.    In the ED noted to have stable vitals, afebrile.  Labs significant for WBC 18.6, Hb 9.6, , BUN 39, creatinine 2.0 [was 0.5 on 9/11], albumin 2.4, T. bili 21.7 [was 13.8 on  9/11], lipase 237, lactic acid 1.5, , ALT 69, INR 1.8.    CT CAP shows cirrhotic appearing liver with sequelae of portal hypertension including splenomegaly and worsened now large volume ascites.  Mild increased trace bilateral pleural effusions and body wall edema.  Given 2 g IV ceftriaxone in the ED to cover possible SBP.    -Admit as inpatient  -Unclear what triggered current worsening/decompensated liver failure.  Has been having diarrhea last few days so this may have been a trigger.  Also concern for SBP given new large volume ascites seen on CT.  No evidence of GI bleed.  Has not been on diuretics.  Hemodynamics okay in ED.  -Start on albumin infusion 50 g IV x1 now followed by 25 g IV every 8 hours.  -Continue IV ceftriaxone to cover for SBP.  Blood cultures ordered.  -Ordered ultrasound paracentesis therapeutic and diagnostic for tomorrow.  Will need additional albumin if large volume para.  -Consult to [facility] GONSALES who saw patient during last hospitalization.  -Will likely need to be started on diuretics during this hospitalization pending hemodynamics.  -2 g sodium diet    MELD 3.0: 35 at 9/25/2023  2:25 PM  MELD-Na: 35 at 9/25/2023  2:25 PM  Calculated from:  Serum Creatinine: 2.01 mg/dL at 9/25/2023  2:24 PM  Serum Sodium: 125 mmol/L at 9/25/2023  2:24 PM  Total Bilirubin: 21.7 mg/dL at 9/25/2023  2:24 PM  Serum Albumin: 2.4 g/dL at 9/25/2023  2:24 PM  INR(ratio): 1.88 at 9/25/2023  2:25 PM  Age at listing (hypothetical): 38 years  Sex: Male at 9/25/2023  2:25 PM        GATITO, prerenal versus hepatorenal  Diarrhea  Hyponatremia  Nongap acidosis in setting of diarrhea and GATITO  -Albumin infusions ordered as noted above.  Can administer up to 100 g/day for 2 days and monitor response with creatinine.  If not responding may be more in line with hepatorenal syndrome.  Would consult nephrology if not improving.  -Check enteric panel and C. difficile to evaluate diarrhea  -Creatinine had gone up to 2.9  during last hospitalization and sodium was 128.  Creatinine normalized and sodium came up to 134 with NS during last hospitalization, however he did not have significant ascites and was hypotensive and hypovolemic at that time.  -Closely monitor renal function      Macrocytic anemia  Hb 9.6.  Recent baseline appears to be around 10.  No evidence of overt GI bleeding.  -Monitor hemoglobin.    Elevated lipase, recent alcoholic pancreatitis  No significant abdominal pain at this time.  Lipase was 272 on 9/8, currently 237.    Severe alcohol use disorder  Strongly encourage complete alcohol cessation.  He reports being sober since last hospital discharge.  He refused to talk with CD or psychiatry during last hospitalization.  Would offer this again during the stay.    Recent duodenitis/ulcer with contained perforation versus duodenal diverticulum seen on CT  Noted on CT during last hospitalization.  -Continue PPI  -No significant abdominal pain at this time.  Consult to [facility] as noted above.  -Will need outpatient EGD to evaluate for cirrhosis    History of hypertension  -Recently had issues with hypotension.  Not on any medications.  Noted.  Currently normotensive.    DVT Prophylaxis: Heparin SQ  Code Status: Full Code     Expected Discharge Date: 09/27/2023               Claudia Coffey MD    Medical Decision Making       Please see A&P for additional details of medical decision making.         Clinically Significant Risk Factors Present on Admission         # Hyponatremia: Lowest Na = 125 mmol/L in last 2 days, will monitor as appropriate   # Hypercalcemia: corrected calcium is >10.1, will monitor as appropriate    # Hypoalbuminemia: Lowest albumin = 2.4 g/dL at 9/25/2023  2:24 PM, will monitor as appropriate  # Coagulation Defect: INR = 1.88 (Ref range: 0.85 - 1.15) and/or PTT = N/A, will monitor for bleeding   # Acute Kidney Injury, unspecified: based on a >150% or 0.3 mg/dL increase in last creatinine compared  to past 90 day average, will monitor renal function       # Obesity: Estimated body mass index is 37.97 kg/m  as calculated from the following:    Height as of this encounter: 1.829 m (6').    Weight as of this encounter: 127 kg (280 lb).                Primary Care Physician   [facility]    Chief Complaint   Weakness, shortness of breath    History is obtained from the patient, ED provider and chart review    History of Present Illness   Crispin Ham is a 38 year old male with a history of severe alcohol use disorder, alcoholic liver disease with recent hospital admission between 9/8-9/11 for hypovolemic hypotension, alcoholic hepatitis and pancreatitis, concern for duodenitis with possible contained perforation versus diverticulum, GATITO.  Also has history of generalized anxiety disorder,  HTN who now presents with worsening fatigue, weakness and shortness of breath over the last week.  Patient notes he was doing okay after he discharged from hospital, was even able to return to his job with  at [employer]) for few days 2 weeks ago.  However over the last week to 10 days has been progressively more weak, tired and gets short of breath with minimal exertion.  Is having hard time sleeping, has discomfort in lower abdomen, generalized pains.  Also notes increased frequency of small-volume loose stools about every 3-4 hours over the last few days.  No blood in stools or vomit.  Has been nauseous, no significant vomiting.  Has had decreased oral intake.  Does feel abdomen is distended and feels legs are mildly more swollen.  Denies any fevers, sweats but did have some intermittent chills.  Also notes some difficulty urinating/emptying bladder because at times his penis feels retracted.  He adamantly denies any alcohol use since last hospital discharge.  Currently lives with his parents who are both present in the room.    In the ED noted to have stable vitals, afebrile.  Labs  significant for WBC 18.6, Hb 9.6, , BUN 39, creatinine 2.0, albumin 2.4, T. bili 21.7, lipase 237, lactic acid 1.5, , ALT 69, INR 1.8.    CT CAP shows cirrhotic appearing liver with sequelae of portal hypertension including splenomegaly and worsened now large volume ascites.  Mild increased trace bilateral pleural effusions and body wall edema.  Given 2 g IV ceftriaxone in the ED to cover possible SBP.  He does not smoke.  Admitted for further management.    Past Medical History    I have reviewed this patient's medical history and updated it with pertinent information if needed.   Past Medical History:   Diagnosis Date    Alcohol abuse     Hypertension     Substance abuse (H)        Past Surgical History   I have reviewed this patient's surgical history and updated it with pertinent information if needed.  Past Surgical History:   Procedure Laterality Date    ORTHOPEDIC SURGERY         Prior to Admission Medications   Prior to Admission Medications   Prescriptions Last Dose Informant Patient Reported? Taking?   Vitamin D, Cholecalciferol, 10 MCG (400 UNIT) TABS 9/25/2023  Yes Yes   Sig: Take 1 tablet by mouth daily   multivitamin, therapeutic (THERA-VIT) TABS tablet 9/24/2023  Yes Yes   Sig: Take 1 tablet by mouth daily   pantoprazole (PROTONIX) 40 MG EC tablet 9/25/2023 at x1  No Yes   Sig: Take 1 tablet (40 mg) by mouth 2 times daily      Facility-Administered Medications: None     Allergies   Allergies   Allergen Reactions    Nsaids      Patient reports having a reaction of puffy eyes and dry throat a few years ago, but he has taken Advil in 2023 and did not react.       Social History   I have reviewed this patient's social history and updated it with pertinent information if needed. Crispin Ham  reports that he has never smoked. He does not have any smokeless tobacco history on file. He reports current alcohol use. He reports that he does not currently use drugs.    Family History   I have  reviewed this patient's family history and updated it with pertinent information if needed.   No family history on file.    Review of Systems   The 10 point Review of Systems is negative other than noted in the HPI or here.     Physical Exam   Temp: 97.6  F (36.4  C) Temp src: Oral BP: 114/69 Pulse: 98   Resp: 20 SpO2: 96 %      Vital Signs with Ranges  Temp:  [97.6  F (36.4  C)] 97.6  F (36.4  C)  Pulse:  [98] 98  Resp:  [20] 20  BP: (114)/(69) 114/69  SpO2:  [96 %] 96 %  280 lbs 0 oz    Constitutional: Awake, alert, cooperative, no apparent distress.  Significantly jaundiced, obese male, appears sick, laying in bed  Eyes: Significantly icteric  HEENT: Moist mucous membranes  Respiratory: Clear to auscultation bilaterally, nonlabored breathing, diminished at bases  Cardiovascular: Regular rate and rhythm, normal S1 and S2, and no murmur noted.  GI: Soft, distended, positive fluid wave, not significantly tender  Skin: No rashes, no cyanosis, 1+ bilateral lower extremity edema noted  Musculoskeletal: No joint swelling, erythema or tenderness.  Neurologic: Alert, oriented and engages in appropriate conversation, no facial asymmetry, moving all extremities, fluent speech  Psychiatric: Calm, flat affect    Data   Data reviewed today:  I personally reviewed CT scan with result as noted above  Recent Labs   Lab 09/25/23  1537 09/25/23  1425 09/25/23  1424   WBC  --   --  18.6*   HGB  --   --  9.6*   MCV  --   --  106*   PLT  --   --  270   INR  --  1.88*  --    NA  --   --  125*   POTASSIUM  --   --  4.9   CHLORIDE  --   --  93*   CO2  --   --  18*   BUN  --   --  39.4*   CR  --   --  2.01*   ANIONGAP  --   --  14   MIGUELINA  --   --  9.0   GLC  --   --  120*   ALBUMIN  --   --  2.4*   BILITOTAL  --   --  21.7*   ALKPHOS  --   --  182*   ALT  --   --  69   *  --   --    LIPASE  --   --  237*       Recent Results (from the past 24 hour(s))   Chest XR,  PA & LAT    Narrative    CHEST TWO VIEWS  9/25/2023 2:37 PM      HISTORY:  Shortness of breath.    COMPARISON: None.      Impression    IMPRESSION: No acute cardiopulmonary disease. Hypoinflated lungs and  bibasilar atelectasis. There may be trace bibasilar pleural fluid.    GRANT GLEASON MD         SYSTEM ID:  [id number]   CT Chest Abdomen Pelvis w/o Contrast    Narrative    CT CHEST/ABDOMEN/PELVIS WITHOUT CONTRAST 9/25/2023 4:03 PM    CLINICAL HISTORY: Worsening dyspnea, leukocytosis and liver function  tests.    TECHNIQUE: CT scan of the chest, abdomen, and pelvis was performed  without IV contrast. Multiplanar reformats were obtained. Dose  reduction techniques were used.   CONTRAST: None    COMPARISON: CT chest, abdomen and pelvis 9/8/2023.    FINDINGS:   Study limitation: Noncontrast technique.    LUNGS AND PLEURA: Slightly increased trace bilateral pleural effusions  with adjacent compressive atelectasis. Similar right middle lobe  opacity with air bronchograms. No pneumothorax. Similar tiny adjacent  left upper lobe 2 to 3 mm nodules (4/12).    MEDIASTINUM/AXILLAE: No lymphadenopathy. No thoracic aortic aneurysms.    CORONARY ARTERY CALCIFICATIONS: None.    HEPATOBILIARY: Similar heterogeneous fat deposition within the liver,  particularly of the lateral left hepatic lobe and posterior right  hepatic lobe. Subtle surface nodularity. Similar distended gallbladder  without evidence of gallstones.    PANCREAS: No significant mass, duct dilatation, or inflammatory  change.    SPLEEN: Measures 16.3 cm.    ADRENAL GLANDS: No significant nodules.    KIDNEYS/BLADDER: No significant mass, stones, or hydronephrosis.    BOWEL: Similar submucosal mild fat deposition and mild wall thickening  of the proximal colon, possibly sequelae of chronic inflammation  and/or portal colopathy, less likely infectious/inflammatory. No  evidence of obstruction. Similar gas bubbles near the  pancreaticoduodenal groove, probable periampullary duodenal  diverticulum.     PELVIC ORGANS: No pelvic  masses.    ADDITIONAL FINDINGS: Worsened, now large volume ascites. No organized  fluid collection. Nonaneurysmal abdominal aorta. Several scattered  prominent portal caval and retroperitoneal lymph nodes are likely  reactive.    MUSCULOSKELETAL: Severe degenerative changes of the left hip joint.  Similar heterogeneous expansion of the presumed left posterior  pectineus (3/31), possibly related to remote trauma. No destructive  osseous lesion. Worsened body wall edema.       Impression    IMPRESSION:  1.  Morphologic features of chronic hepatocellular disease (and likely  cirrhosis) with sequelae of portal hypertension including splenomegaly  and worsened, now large volume ascites.  2.  Similar heterogeneous hepatic steatosis. No discrete mass within  the limitations of the study.  3.  Mild increased trace bilateral pleural effusions and body wall  edema.  4.  Similar nonspecific gallbladder distention without calcified  gallstone.    SHARON DUFFY MD         SYSTEM ID:  [id number]

## 2023-09-26 NOTE — CONSULTS
[facility]  Gastroenterology Consultation         Crispin Ham  [address] [facility] [address]  38 year old male    Admission Date/Time: 9/25/2023  Primary Care Provider: [facility] Kat  Referring / Attending Physician:  Dr. Claudia Coffey    We were asked to see the patient in consultation by Dr. Claudia Coffey for evaluation of liver failure.      CC: weakness and fatigue    HPI:  Crispin Ham is a 38 year old male who has a past medical history of severe alcohol use disorder, alcoholic liver disease with recent hospital admission between 9/8-9/11 for hypovolemic hypotension, alcoholic hepatitis and pancreatitis, concern for duodenitis with possible contained perforation versus diverticulum, GATITO. Also has history of generalized anxiety disorder, HTN who now presents with worsening fatigue, weakness and shortness of breath over the last week.     Patient reports no alcohol in 12 days and after last admission returned to job but has become more weak over last week with increase in shortness of breath with minimal exertion. He has noted increase in frequency of stool and urination every 3-4 hours. Stool described as soft, non watery. Has noted no blood in stool or vomit.    ROS: A comprehensive ten point review of systems was negative aside from those in mentioned in the HPI.      PAST MED HX:  I have reviewed this patient's medical history and updated it with pertinent information if needed.   Past Medical History:   Diagnosis Date    Alcohol abuse     Hypertension     Substance abuse (H)        MEDICATIONS:   Prior to Admission Medications   Prescriptions Last Dose Informant Patient Reported? Taking?   Vitamin D, Cholecalciferol, 10 MCG (400 UNIT) TABS 9/25/2023  Yes Yes   Sig: Take 1 tablet by mouth daily   multivitamin, therapeutic (THERA-VIT) TABS tablet 9/24/2023  Yes Yes   Sig: Take 1 tablet by mouth daily   pantoprazole (PROTONIX) 40 MG EC tablet  9/25/2023 at x1  No Yes   Sig: Take 1 tablet (40 mg) by mouth 2 times daily      Facility-Administered Medications: None       ALLERGIES:   Allergies   Allergen Reactions    Nsaids      Patient reports having a reaction of puffy eyes and dry throat a few years ago, but he has taken Advil in 2023 and did not react.       SOCIAL HISTORY:  Social History     Tobacco Use    Smoking status: Never   Substance Use Topics    Alcohol use: Yes     Comment: 1.75L per day    Drug use: Not Currently       FAMILY HISTORY:  No family history on file.    PHYSICAL EXAM:   General  alert, oriented and jaundice  Vital Signs with Ranges  Temp: 98.8  F (37.1  C) Temp src: Oral BP: 110/71 Pulse: 95   Resp: 18 SpO2: 97 % O2 Device: None (Room air)    I/O last 3 completed shifts:  In: -   Out: 150 [Urine:150]    Constitutional: alert, mild distress, cooperative, and jaundice   Cardiovascular: negative, PMI normal. No lifts, heaves, or thrills. RRR. No murmurs, clicks gallops or rub  Respiratory: negative, Percussion normal. Good diaphragmatic excursion. Lungs clear  Abdomen: Abdomen soft, mildly tender suprapubic. BS normal. No masses, organomegaly, positive findings: distended          ADDITIONAL COMMENTS:   I reviewed the patient's new clinical lab test results.   Recent Labs   Lab Test 09/26/23  0724 09/25/23  1425 09/25/23  1424 09/11/23  0521   WBC 13.6*  --  18.6* 9.6   HGB 8.3*  --  9.6* 10.3*   *  --  106* 107*     --  270 150   INR 2.18* 1.88*  --  1.52*     Recent Labs   Lab Test 09/25/23  1424 09/11/23  0521 09/10/23  2236 09/10/23  1419 09/10/23  0525   POTASSIUM 4.9 3.6 3.6   < > 3.3*   CHLORIDE 93* 101  --   --  99   CO2 18* 23  --   --  25   BUN 39.4* 17.3  --   --  23.8*   ANIONGAP 14 10  --   --  12    < > = values in this interval not displayed.     Recent Labs   Lab Test 09/25/23  1646 09/25/23  1537 09/25/23  1424 09/11/23  0521 09/10/23  0525 09/09/23  1119 09/08/23  1224 09/08/23  1147   ALBUMIN  --   --   2.4* 2.5* 2.5*   < >  --  2.3*   BILITOTAL  --   --  21.7* 13.8* 12.8*   < >  --  10.4*   ALT  --   --  69 103* 87*   < >  --  82*   AST  --  192*  --  374* 357*   < >  --  331*   PROTEIN 30*  --   --   --   --   --  50*  --    LIPASE  --   --  237*  --   --   --   --  272*    < > = values in this interval not displayed.       I reviewed the patient's new imaging results.        CONSULTATION ASSESSMENT AND PLAN:    Crispin Ham is a 38 year old male with history of alcohol abuse, and liver cirrhosis among others; admitted on 9/25 whom presented with fatigue, weakness and shortness of breath.    Liver failure (H)  Alcoholic liver cirrhosis with ascites  CT C/A/P shows cirrhotic appearing liver with sequelae of portal hypertension including splenomegaly and worsened now large volume ascites.  Mild increased trace bilateral pleural effusions and body wall edema.   MELD 33 Na++ 125, Cr 2.0 (0.5 on 9/11), Tbili 21.7( 13.9 on 9/11), INR 2.18 (90 day mortality of 53%)  LFTs: AST//69 ( alcoholic pattern)     - 2 g sodium diet  - Daily labs CMP, CBC, INR  - as below paracentesis    Leukocytosis  WBC 18.6 and has significant ascites with GATITO and concern for SBP    - paracentesis ordered with cell count  - treat empirically with ceftriaxone  - would consider diuretics but has GATITO/hepatorenal syndrome and Cr significantly above baseline  - continue albumin to treat potential hepatorenal syndrome and ascites    Recent duodenitis/ulcer with contained perforation versus duodenal diverticulum seen on CT   Anemia  Hemoglobin 9.6->8.3 baseline 10.7 on 9/9/23  No visible blood loss  On heparin  INR 2.18  Had initially consider EGD today but after further discussion with Dr. Gibson- will plan for tomorrow    - EGD on 9/27  - NPO at midnight  - may need FFP for elevated INR        Jeaneth Luu, [facility].  Office: [phone]  Cell : [phone] (Dr. Gibson)  Cell: [phone] (Jeaneth  [facility])

## 2023-09-26 NOTE — PLAN OF CARE
MD Notification    Notified Person: MD    Notified Person Name: Alexx    Notification Date/Time:    Notification Interaction:    Purpose of Notification: 8808- Does pt need to be on tele up on the floor. Ok for paracentesis to not be done until tomorrow am?     Orders Received: No need for cardiac monitoring. Will place on continuous pulse oximetry. Ok to wait til am for paracentesis.     Comments:

## 2023-09-26 NOTE — PLAN OF CARE
Goal Outcome Evaluation:    Arrived on floor from ED around 2030. A&O x4. Calm and pleasant. Denies pain. SBA. VSS on RA. Strict I&O's, urine tea colored. Daily weights. 2 gram sodium diet. PIV SL. Scheduled albumin x2. Had one small soft BM, sent lab rejected it as c-diff, ID called bacteria and virus panel negative accept incidental finding of c-diff. Repeat stool sent to verify c-diff, in process.  Plan for paracentesis today @ 1296.

## 2023-09-26 NOTE — PLAN OF CARE
Goal Outcome Evaluation:  Orientation A&Ox4    Vitals/Tele VSS    IV Access/drains R PIV SL    Diet NPO for EGD 09/27    Mobility Independent     GI/ Continent. BM this am    Wound/Skin Jaundice all over, +1 edema in LE    Consults GI    Discharge Plan Pending      See Flow sheets for assessment

## 2023-09-27 LAB
ALBUMIN SERPL BCG-MCNC: 2.6 G/DL (ref 3.5–5.2)
ALP SERPL-CCNC: 150 U/L (ref 40–129)
ALT SERPL W P-5'-P-CCNC: 54 U/L (ref 0–70)
AMMONIA PLAS-SCNC: 50 UMOL/L (ref 16–60)
ANION GAP SERPL CALCULATED.3IONS-SCNC: 14 MMOL/L (ref 7–15)
AST SERPL W P-5'-P-CCNC: 181 U/L (ref 0–45)
BACTERIA UR CULT: NORMAL
BILIRUB SERPL-MCNC: 19.8 MG/DL
BUN SERPL-MCNC: 49.3 MG/DL (ref 6–20)
CALCIUM SERPL-MCNC: 9 MG/DL (ref 8.6–10)
CHLORIDE SERPL-SCNC: 93 MMOL/L (ref 98–107)
CREAT SERPL-MCNC: 2.4 MG/DL (ref 0.67–1.17)
DEPRECATED HCO3 PLAS-SCNC: 19 MMOL/L (ref 22–29)
EGFRCR SERPLBLD CKD-EPI 2021: 35 ML/MIN/1.73M2
ERYTHROCYTE [DISTWIDTH] IN BLOOD BY AUTOMATED COUNT: 14.3 % (ref 10–15)
GLUCOSE SERPL-MCNC: 104 MG/DL (ref 70–99)
HCT VFR BLD AUTO: 21.1 % (ref 40–53)
HGB BLD-MCNC: 7.4 G/DL (ref 13.3–17.7)
HGB BLD-MCNC: 7.6 G/DL (ref 13.3–17.7)
INR PPP: 2.13 (ref 0.85–1.15)
MCH RBC QN AUTO: 38.2 PG (ref 26.5–33)
MCHC RBC AUTO-ENTMCNC: 36 G/DL (ref 31.5–36.5)
MCV RBC AUTO: 106 FL (ref 78–100)
PLATELET # BLD AUTO: 180 10E3/UL (ref 150–450)
POTASSIUM SERPL-SCNC: 4 MMOL/L (ref 3.4–5.3)
PROT SERPL-MCNC: ABNORMAL G/DL
RBC # BLD AUTO: 1.99 10E6/UL (ref 4.4–5.9)
SODIUM SERPL-SCNC: 126 MMOL/L (ref 135–145)
UPPER GI ENDOSCOPY: NORMAL
WBC # BLD AUTO: 10.8 10E3/UL (ref 4–11)

## 2023-09-27 PROCEDURE — 84460 ALANINE AMINO (ALT) (SGPT): CPT | Performed by: PHYSICIAN ASSISTANT

## 2023-09-27 PROCEDURE — 120N000001 HC R&B MED SURG/OB

## 2023-09-27 PROCEDURE — 250N000011 HC RX IP 250 OP 636: Performed by: HOSPITALIST

## 2023-09-27 PROCEDURE — 43255 EGD CONTROL BLEEDING ANY: CPT | Performed by: HOSPITALIST

## 2023-09-27 PROCEDURE — 85610 PROTHROMBIN TIME: CPT | Performed by: PHYSICIAN ASSISTANT

## 2023-09-27 PROCEDURE — 272N000104 HC DEVICE CLIP RESOLUTION, EACH: Performed by: HOSPITALIST

## 2023-09-27 PROCEDURE — 82140 ASSAY OF AMMONIA: CPT | Performed by: PHYSICIAN ASSISTANT

## 2023-09-27 PROCEDURE — 250N000011 HC RX IP 250 OP 636: Performed by: PHYSICIAN ASSISTANT

## 2023-09-27 PROCEDURE — 85018 HEMOGLOBIN: CPT | Performed by: INTERNAL MEDICINE

## 2023-09-27 PROCEDURE — 258N000003 HC RX IP 258 OP 636: Performed by: HOSPITALIST

## 2023-09-27 PROCEDURE — 36415 COLL VENOUS BLD VENIPUNCTURE: CPT | Performed by: INTERNAL MEDICINE

## 2023-09-27 PROCEDURE — 88305 TISSUE EXAM BY PATHOLOGIST: CPT | Mod: TC | Performed by: HOSPITALIST

## 2023-09-27 PROCEDURE — 43251 EGD REMOVE LESION SNARE: CPT | Performed by: HOSPITALIST

## 2023-09-27 PROCEDURE — 85027 COMPLETE CBC AUTOMATED: CPT | Performed by: PHYSICIAN ASSISTANT

## 2023-09-27 PROCEDURE — 36415 COLL VENOUS BLD VENIPUNCTURE: CPT | Performed by: PHYSICIAN ASSISTANT

## 2023-09-27 PROCEDURE — 0DB58ZZ EXCISION OF ESOPHAGUS, VIA NATURAL OR ARTIFICIAL OPENING ENDOSCOPIC: ICD-10-PCS | Performed by: HOSPITALIST

## 2023-09-27 PROCEDURE — 99232 SBSQ HOSP IP/OBS MODERATE 35: CPT | Performed by: STUDENT IN AN ORGANIZED HEALTH CARE EDUCATION/TRAINING PROGRAM

## 2023-09-27 PROCEDURE — 250N000013 HC RX MED GY IP 250 OP 250 PS 637: Performed by: HOSPITALIST

## 2023-09-27 PROCEDURE — G0500 MOD SEDAT ENDO SERVICE >5YRS: HCPCS | Performed by: HOSPITALIST

## 2023-09-27 PROCEDURE — P9047 ALBUMIN (HUMAN), 25%, 50ML: HCPCS | Performed by: PHYSICIAN ASSISTANT

## 2023-09-27 PROCEDURE — 80048 BASIC METABOLIC PNL TOTAL CA: CPT | Performed by: PHYSICIAN ASSISTANT

## 2023-09-27 PROCEDURE — 0W3P8ZZ CONTROL BLEEDING IN GASTROINTESTINAL TRACT, VIA NATURAL OR ARTIFICIAL OPENING ENDOSCOPIC: ICD-10-PCS | Performed by: HOSPITALIST

## 2023-09-27 RX ORDER — FENTANYL CITRATE 50 UG/ML
INJECTION, SOLUTION INTRAMUSCULAR; INTRAVENOUS PRN
Status: DISCONTINUED | OUTPATIENT
Start: 2023-09-27 | End: 2023-09-27 | Stop reason: HOSPADM

## 2023-09-27 RX ORDER — LIDOCAINE 40 MG/G
CREAM TOPICAL
Status: DISCONTINUED | OUTPATIENT
Start: 2023-09-27 | End: 2023-09-27 | Stop reason: HOSPADM

## 2023-09-27 RX ORDER — SODIUM CHLORIDE 9 MG/ML
INJECTION, SOLUTION INTRAVENOUS CONTINUOUS PRN
Status: COMPLETED | OUTPATIENT
Start: 2023-09-27 | End: 2023-09-27

## 2023-09-27 RX ORDER — ALBUMIN (HUMAN) 12.5 G/50ML
12.5 SOLUTION INTRAVENOUS EVERY 8 HOURS
Status: COMPLETED | OUTPATIENT
Start: 2023-09-27 | End: 2023-09-30

## 2023-09-27 RX ADMIN — HEPARIN SODIUM 5000 UNITS: 5000 INJECTION, SOLUTION INTRAVENOUS; SUBCUTANEOUS at 21:31

## 2023-09-27 RX ADMIN — MULTIVITAMIN TABLET 1 TABLET: TABLET at 08:05

## 2023-09-27 RX ADMIN — PANTOPRAZOLE SODIUM 40 MG: 40 TABLET, DELAYED RELEASE ORAL at 20:20

## 2023-09-27 RX ADMIN — ALBUMIN HUMAN 12.5 G: 0.25 SOLUTION INTRAVENOUS at 20:20

## 2023-09-27 RX ADMIN — ALBUMIN HUMAN 12.5 G: 0.25 SOLUTION INTRAVENOUS at 13:31

## 2023-09-27 RX ADMIN — PANTOPRAZOLE SODIUM 40 MG: 40 TABLET, DELAYED RELEASE ORAL at 08:05

## 2023-09-27 ASSESSMENT — ACTIVITIES OF DAILY LIVING (ADL)
ADLS_ACUITY_SCORE: 33
ADLS_ACUITY_SCORE: 31
ADLS_ACUITY_SCORE: 35
ADLS_ACUITY_SCORE: 31
ADLS_ACUITY_SCORE: 31
ADLS_ACUITY_SCORE: 33
ADLS_ACUITY_SCORE: 31
ADLS_ACUITY_SCORE: 31
ADLS_ACUITY_SCORE: 35
ADLS_ACUITY_SCORE: 31

## 2023-09-27 NOTE — PROGRESS NOTES
[facility]    Medicine Progress Note - Hospitalist Service    Date of Admission:  9/25/2023  Date of Service: 09/27/2023    Assessment & Plan     Crispin Ham is a 38 year old male with a history of severe alcohol use disorder, alcoholic liver disease with recent hospital admission between 9/8-9/11 for hypovolemic hypotension, alcoholic hepatitis and pancreatitis, concern for duodenitis with possible contained perforation versus diverticulum, GATITO.  Also has history of generalized anxiety disorder,  HTN who now presents with worsening fatigue, weakness and shortness of breath over the last week.    Decompensated alcoholic liver cirrhosis  Portal hypertension with splenomegaly and ascites  Lower extremity edema  Leukocytosis, concern for SBP  Coagulopathy secondary to liver disease  Hypoalbuminemia  Patient notes he was doing okay after he discharged from hospital, was even able to return to his job with  at [employer] for few days 2 weeks ago.  However over the last week to 10 days has been progressively more weak, tired and gets short of breath with minimal exertion.  Is having hard time sleeping, has discomfort in lower abdomen, generalized pains.  Also notes increased frequency of small-volume loose stools about every 3-4 hours over the last few days.  No blood in stools or vomit.  Has been nauseous, no significant vomiting.  Has had decreased oral intake.  Does feel abdomen is distended and feels legs are mildly more swollen.  Denies any fevers, sweats but did have some intermittent chills.  Also notes some difficulty urinating/emptying bladder because at times his penis feels retracted.  He adamantly denies any alcohol use since last hospital discharge.  Currently lives with his parents who are both present in the room.    In the ED noted to have stable vitals, afebrile.  Labs significant for WBC 18.6, Hb 9.6, , BUN 39, creatinine 2.0 [was 0.5 on 9/11],  albumin 2.4, T. bili 21.7 [was 13.8 on 9/11], lipase 237, lactic acid 1.5, , ALT 69, INR 1.8.    CT CAP shows cirrhotic appearing liver with sequelae of portal hypertension including splenomegaly and worsened now large volume ascites.  Mild increased trace bilateral pleural effusions and body wall edema.  Given 2 g IV ceftriaxone in the ED to cover possible SBP.  Plan:  -Unclear what triggered current worsening/decompensated liver failure.  Has been having diarrhea last few days so this may have been a trigger.  Also concern for SBP given new large volume ascites seen on CT.  No evidence of GI bleed.  Has not been on diuretics.  Hemodynamics okay in ED.  -S/p on albumin infusion 50 g IV x1   -Continue IV ceftriaxone to cover for SBP -> Can stop as SBP ruled out  -BC -> NGTD  -Ordered ultrasound paracentesis therapeutic and diagnostic for 09/26 ->  3.825 L of  straw  colored fluid was drained, fluid studies not consistent with SBP  -Consult to [facility] who saw patient during last hospitalization -> EGD today  - Normal upper third of esophagus and middle third                             of esophagus.                             - Z-line regular, 38 cm from the incisors.                             - Esophageal polyp(s) were found. Resected and                             retrieved. Clip was placed.                             - Esophageal mucosal tear with bleeding                             (complication of clip placement). Hemostasis                             achieved with bipolar probe.   -Will likely need to be started on diuretics during this hospitalization  -2 g sodium diet  -1U FFP ordered for 09/26 given INR 2.18 and EGD today    GATITO, prerenal versus hepatorenal  Diarrhea  Hyponatremia  Nongap acidosis in setting of diarrhea and GATITO  -Albumin infusions ordered as noted above.  Can administer up to 100 g/day for 2 days and monitor response with creatinine.  If not responding may be more in line  with hepatorenal syndrome.  Would consult nephrology if not improving.  -Check enteric panel and C. difficile to evaluate diarrhea  -Creatinine had gone up to 2.9 during last hospitalization and sodium was 128.  Creatinine normalized and sodium came up to 134 with NS during last hospitalization, however he did not have significant ascites and was hypotensive and hypovolemic at that time.  -Closely monitor renal function      Macrocytic anemia  Hb 9.6.  Recent baseline appears to be around 10.  No evidence of overt GI bleeding. But hgb down to 7.3 today  Plan:  -Monitor hemoglobin.  -Transfuse Hgb < 7.0    Elevated lipase, recent alcoholic pancreatitis  No significant abdominal pain at this time.  Lipase was 272 on 9/8, currently 237.    Severe alcohol use disorder  Strongly encourage complete alcohol cessation.  He reports being sober since last hospital discharge.  He refused to talk with CD or psychiatry during last hospitalization.    Recent duodenitis/ulcer with contained perforation versus duodenal diverticulum seen on CT  Noted on CT during last hospitalization.  -Continue PPI  -No significant abdominal pain at this time.  Consult to [facility] as noted above.    History of hypertension  -Recently had issues with hypotension.  Not on any medications.  Noted.  Currently normotensive.       Diet: Regular Diet Adult    DVT Prophylaxis: Pneumatic Compression Devices  Bosch Catheter: Not present  Lines: None     Cardiac Monitoring: None  Code Status: Full Code      Clinically Significant Risk Factors         # Hyponatremia: Lowest Na = 125 mmol/L in last 2 days, will monitor as appropriate   # Hypercalcemia: corrected calcium is >10.1, will monitor as appropriate    # Hypoalbuminemia: Lowest albumin = 2.4 g/dL at 9/25/2023  2:24 PM, will monitor as appropriate    # Coagulation Defect: INR = 2.13 (Ref range: 0.85 - 1.15) and/or PTT = N/A, will monitor for bleeding     # Acute Kidney Injury, unspecified: based on a  >150% or 0.3 mg/dL increase in last creatinine compared to past 90 day average, will monitor renal function         # Obesity: Estimated body mass index is 36.36 kg/m  as calculated from the following:    Height as of this encounter: 1.829 m (6').    Weight as of this encounter: 121.6 kg (268 lb 1.6 oz)., PRESENT ON ADMISSION            Disposition Plan      Expected Discharge Date: 09/29/2023                  Elio Chaves MD  Hospitalist Service  [facility]  Securely message with QBE (more info)  Text page via [employer] Paging/Directory   ______________________________________________________________________    Interval History     Doing well post EGD  No CP/SOB  No nausea / vomiting or significant abdominal pain  No significant diarrhea / bloody stools  No fevers  No new complaints    Physical Exam   Vital Signs: Temp: 97.4  F (36.3  C) Temp src: Oral BP: 122/70 Pulse: 92   Resp: 18 SpO2: 98 % O2 Device: None (Room air)    Weight: 268 lbs 1.6 oz    Constitutional: Awake, alert, cooperative, no apparent distress.  Significantly jaundiced, obese male, appears sick, laying in bed  Eyes: Significantly icteric  Respiratory: Clear to auscultation bilaterally, nonlabored breathing, diminished at bases  Cardiovascular: Regular rate and rhythm, normal S1 and S2, and no murmur noted.  GI: Soft, distended, positive fluid wave, not significantly tender  Skin: No rashes, no cyanosis, 1+ bilateral lower extremity edema noted  Musculoskeletal: No joint swelling, erythema or tenderness.  Neurologic: Alert, oriented and engages in appropriate conversation, no facial asymmetry, moving all extremities, fluent speech  Psychiatric: Calm, flat affect    ----------------------------------------------------------------------------------------    Medical Decision Making       ------------------ MEDICAL DECISION MAKING  ------------------------------------------------------------------------------------------------------  High Complexity Decision Making       Data   ------------------------- PAST 24 HR DATA REVIEWED -----------------------------------------------    I have personally reviewed the following data over the past 24 hrs:    10.8  \   7.6 (L)   / 180     126 (L) 93 (L) 49.3 (H) /  104 (H)   4.0 19 (L) 2.40 (H) \     ALT: 54 AST: 181 (H) AP: 150 (H) TBILI: 19.8 (HH)   ALB: 2.6 (L) TOT PROTEIN: N/A LIPASE: N/A     INR:  2.13 (H) PTT:  N/A   D-dimer:  N/A Fibrinogen:  N/A     Imaging results reviewed over the past 24 hrs:   No results found for this or any previous visit (from the past 24 hour(s)).  ------------------------- ENCOUNTER LABS ----------------------------------------------------------------  Recent Labs   Lab 09/27/23  0722 09/26/23  0724 09/25/23  1537 09/25/23  1425 09/25/23  1424   WBC 10.8 13.6*  --   --  18.6*   HGB 7.6* 8.3*  --   --  9.6*   * 105*  --   --  106*    214  --   --  270   INR 2.13* 2.18*  --  1.88*  --    * 125*  --   --  125*   POTASSIUM 4.0 4.0  --   --  4.9   CHLORIDE 93* 93*  --   --  93*   CO2 19* 20*  --   --  18*   BUN 49.3* 45.2*  --   --  39.4*   CR 2.40* 2.21*  --   --  2.01*   ANIONGAP 14 12  --   --  14   MIGUELINA 9.0 9.1  --   --  9.0   * 102*  --   --  120*   ALBUMIN 2.6* 2.6*  --   --  2.4*   BILITOTAL 19.8* 20.1*  --   --  21.7*   ALKPHOS 150* 185*  --   --  182*   ALT 54 57  --   --  69   * 169*   < >  --   --    LIPASE  --   --   --   --  237*    < > = values in this interval not displayed.       Most Recent 3 CBC's:  Recent Labs   Lab Test 09/27/23  0722 09/26/23  0724 09/25/23  1424   WBC 10.8 13.6* 18.6*   HGB 7.6* 8.3* 9.6*   * 105* 106*    214 270     Most Recent 3 BMP's:  Recent Labs   Lab Test 09/27/23  0722 09/26/23  0724 09/25/23  1424   * 125* 125*   POTASSIUM 4.0 4.0 4.9   CHLORIDE 93* 93* 93*   CO2 19* 20* 18*    BUN 49.3* 45.2* 39.4*   CR 2.40* 2.21* 2.01*   ANIONGAP 14 12 14   MIGUELINA 9.0 9.1 9.0   * 102* 120*     Most Recent 2 LFT's:  Recent Labs   Lab Test 09/27/23  0722 09/26/23  0724   * 169*   ALT 54 57   ALKPHOS 150* 185*   BILITOTAL 19.8* 20.1*     Most Recent 3 INR's:  Recent Labs   Lab Test 09/27/23  0722 09/26/23  0724 09/25/23  1425   INR 2.13* 2.18* 1.88*     Anticoagulation Dose History          Latest Ref Rng & Units 9/8/2023 9/10/2023 9/11/2023 9/25/2023 9/26/2023   Recent Dosing and Labs   INR 0.85 - 1.15 1.41  1.52  1.52  1.88  2.18      Most Recent 3 Creatinines:  Recent Labs   Lab Test 09/27/23  0722 09/26/23  0724 09/25/23  1424   CR 2.40* 2.21* 2.01*     Most Recent 3 Hemoglobins:  Recent Labs   Lab Test 09/27/23  0722 09/26/23  0724 09/25/23  1424   HGB 7.6* 8.3* 9.6*     Most Recent 3 Troponin's:No lab results found.  Most Recent 3 BNP's:No lab results found.  Most Recent D-dimer:No lab results found.  Most Recent Cholesterol Panel:No lab results found.  Most Recent 6 Bacteria Isolates From Any Culture (See EPIC Reports for Culture Details):No lab results found.

## 2023-09-27 NOTE — PLAN OF CARE
Goal Outcome Evaluation: 1930-0700    A&O x4. Up ad tanesha. NPO since midnight for EGD this am. VSS on RA. Slightly tachy.  PIV SL since plasma completed @ 2104, no reaction. Paracentesis site RUQ CDI. Daily weights. Denies pain/nausea. Voiding spontaneously, urine tea colored.

## 2023-09-27 NOTE — PLAN OF CARE
Problem: Admitted 9/25 with fatigue, weakness, and SOB  Hx: Severe alcohol use disorder, alcoholic liver cirrhosis  GATITO. HTN, TIKA  Vitals: VSS on RA. Pt was slightly hypotensive & slightly tachy this AM prior to receiving Albumin  Orientation/Neuro: A/Ox4  Activity Level: Ind  Diet: Reg  GI/: Cont of B&B. Urine is dark/tea colored. No BM this shift.  Labs: Creatinine 2.40, GFR 35, Bilirubin total 19.8. Hgb down to 7.6  IV /Drains/Tubes: PIV SL  Incisions/Dressings/Skin: Skin & sclera are jaundiced. Paracentesis site to RUQ covered in bandage. CDI  Pain Management: C/O mild abd discomfort. Denies pharmacological pain intervention  Other: Had EGD today  Consults: Nephology, Gastroenterology  Plan: Continue plan of care.

## 2023-09-27 NOTE — UTILIZATION REVIEW
Admission Status; Secondary Review Determination       Under the authority of the Utilization Management Committee, the utilization review process indicated a secondary review on the above patient. The review outcome is based on review of the medical records, discussions with staff, and applying clinical experience noted on the date of the review.     (x) Inpatient Status Appropriate - This patient's medical care is consistent with medical management for inpatient care and reasonable inpatient medical practice.     RATIONALE FOR DETERMINATION   Reason for review: In house insurance denial of inpatient care needed    38-year-old gentleman with complex medical history including severe alcohol use disorder with alcoholic liver disease, recent hospitalization from 9/8 through 9/11/2023 for hypovolemic hypotension, alcoholic hepatitis and pancreatitis, concern for duodenitis with possible contained perforation versus diverticulum and acute kidney injury.  Additional past medical history includes hypertension, generalized anxiety.  After his most recent hospitalization he attempted to return to work.  However he was plagued by ongoing fatigue, weakness and shortness of breath.  Presented again to the emergency department on 9/25/2023 and so has been in the hospital 2 nights already.  Unfortunately, his creatinine has continued to worsen to 2.4 with GFR 35.  This is in the setting of ongoing hyponatremic hypochloremia, hypoalbuminemia and overall increased edema.  Have been unable to even start diuresis given his worsening renal function, he is being actively treated for SBP given his tense abdomen, ascites, white blood cell count of 18.6.  He has had a further decrease in his hemoglobin from 9.6 down to 7.6.  Gastroenterology consultation obtained and he is undergoing an upper endoscopy procedure today 9/27/2023 to look for any esophageal varices or source of bleeding.  He is quite ill and certainly unable to be  discharged without further specialty input.  Nephrology consultation is being considered for management with diuresis and consideration of hepatorenal syndrome official diagnosis.  Certainly he may need transfusion given his 2 g hemoglobin drop since admission without any obvious source.     At the time of admission with the information available to the attending physician more than 2 nights hospital complex care was anticipated, based on patient risk of adverse outcome if treated as outpatient and complex care required. Inpatient admission is appropriate based on the Medicare guidelines.     This document was produced using voice recognition software.    The information on this document is developed by the utilization review team in order for the business office to ensure compliance. This only denotes the appropriateness of proper admission status and does not reflect the quality of care rendered.   The definitions of Inpatient Status and Observation Status used in making the determination above are those provided in the CMS Coverage Manual, Chapter 1 and Chapter 6, section 70.4.     Sincerely,   Marva Gannon MD  Utilization Review  Physician Advisor  [facility].

## 2023-09-27 NOTE — PROGRESS NOTES
[facility]  Gastroenterology Progress Note     Crispin Ham MRN# [id number]   YOB: 1985 Age: 38 year old          Assessment and Plan:     Crispin Ham is a 38 year old male with history of alcohol abuse, and liver cirrhosis among others; admitted on 9/25 whom presented with fatigue, weakness and shortness of breath.     Liver failure (H)  Alcoholic liver cirrhosis with ascites  CT C/A/P shows cirrhotic appearing liver with sequelae of portal hypertension including splenomegaly and worsened now large volume ascites.  Mild increased trace bilateral pleural effusions and body wall edema.   MELD 35 Na++ 126, Cr 2.4 (0.5 on 9/11), Tbili 19.8( 13.9 on 9/11), INR 2.13 (90 day mortality of 53%)  LFTs: AST//69 ( alcoholic pattern)   INR remains elevated after FFP on 9/26  Unable to start prednisolone with Maddrey score over 40 given underlying sepsis  Not a candidate for liver transplant with recent Alcohol use in last 2 weeks     - ordered ammonia level  - 2 g sodium diet after EGD today  - Daily labs CMP, CBC, INR  - Cr continues to elevate- will need albumin until 3.5 and start midodrine     Leukocytosis  WBC 18.6->10.8 and has significant ascites with GATITO   Paracentesis on 9/26 removed 3.8 L of straw colored fluid with cell count of 59 and 3 % neutrophils- no evidence of SBP     - treat empirically with ceftriaxone  - would consider diuretics but has GATITO/hepatorenal syndrome and Cr significantly above baseline  - continue albumin to treat potential hepatorenal syndrome and ascites     Recent duodenitis/ulcer with contained perforation versus duodenal diverticulum seen on CT   Anemia  Hemoglobin 9.6->8.3->7.3 baseline 10.7 on 9/9/23  No visible blood loss  On heparin- held  INR 2.13  Likely due to bone marrow suppression with sepsis vs hemorrhagic gastritis vs unlikely variceal bleed     - hold heparin until post EGD  - pantoprazole 40 mg BID  - serial  hemoglobin q 6 hours and transfuse or 7 or less  - EGD today  - NPO   - hold morning heparin dose- RN aware and verbally told. Notified hospitalist to hold in MAR         Interval History:     denies chest pain, denies shortness of breath, denies abdominal pain, doing well; no cp, sob, n/v/d, or abd pain., and patient has delayed response and garbled words at times- concern for HE              Review of Systems:     C: NEGATIVE for fever, chills, change in weight  E/M: NEGATIVE for ear, mouth and throat problems  R: NEGATIVE for significant cough or SOB  CV: NEGATIVE for chest pain, palpitations or peripheral edema             Medications:   I have reviewed this patient's current medications   cefTRIAXone  2 g Intravenous Q24H    heparin ANTICOAGULANT  5,000 Units Subcutaneous Q12H    multivitamin, therapeutic  1 tablet Oral Daily    pantoprazole  40 mg Oral BID    sodium chloride (PF)  3 mL Intracatheter Q8H                  Physical Exam:   Vitals were reviewed  Vital Signs with Ranges  Temp:  [97.4  F (36.3  C)-98.1  F (36.7  C)] 97.4  F (36.3  C)  Pulse:  [] 98  Resp:  [18] 18  BP: ()/(44-62) 92/52  SpO2:  [94 %-100 %] 94 %  I/O last 3 completed shifts:  In: 692 [P.O.:340]  Out: 350 [Urine:350]  Constitutional: jaundice, alert, and no distress   Cardiovascular: negative, PMI normal. No lifts, heaves, or thrills. RRR. No murmurs, clicks gallops or rub  Respiratory: negative, Percussion normal. Good diaphragmatic excursion. Lungs clear  Abdomen: Abdomen soft, non-tender. BS normal. No masses, organomegaly           Data:   I reviewed the patient's new clinical lab test results.   Recent Labs   Lab Test 09/27/23  0722 09/26/23  0724 09/25/23  1425 09/25/23  1424   WBC 10.8 13.6*  --  18.6*   HGB 7.6* 8.3*  --  9.6*   * 105*  --  106*    214  --  270   INR 2.13* 2.18* 1.88*  --      Recent Labs   Lab Test 09/27/23  0722 09/26/23  0724 09/25/23  1424   POTASSIUM 4.0 4.0 4.9   CHLORIDE 93*  93* 93*   CO2 19* 20* 18*   BUN 49.3* 45.2* 39.4*   ANIONGAP 14 12 14     Recent Labs   Lab Test 09/27/23  0722 09/26/23  0724 09/25/23  1646 09/25/23  1537 09/25/23  1424 09/09/23  1119 09/08/23  1224 09/08/23  1147   ALBUMIN 2.6* 2.6*  --   --  2.4*   < >  --  2.3*   BILITOTAL 19.8* 20.1*  --   --  21.7*   < >  --  10.4*   ALT 54 57  --   --  69   < >  --  82*   * 169*  --  192*  --    < >  --  331*   PROTEIN  --   --  30*  --   --   --  50*  --    LIPASE  --   --   --   --  237*  --   --  272*    < > = values in this interval not displayed.       I reviewed the patient's new imaging results.    All laboratory data reviewed  All imaging studies reviewed by me.    Jeaneth Luu PA-C,  9/27/2023  [facility]  Office : [phone]  Cell: [phone] (Dr. Gibson)  Cell: [phone] (Jeaneth Luu PA-C)

## 2023-09-27 NOTE — PLAN OF CARE
[id number]  Primary Diagnosis: alcoholic liver cirrhosis  Orientation: A&Ox4  Aggression Stop Light: green   Mobility: ind   Pain Management: Denies  Diet: 2 gm Na diet. NPO at midnight  Bowel/Bladder: Cont. Diarrhea  Abnormal Lab/Assessments: VSS on RA, ex soft BP and tachy at times. INR 2.18  Drain/Device: PIV infusing plasma at 150 mL/hr.   Skin: Intact, jaundice. BLE edema. Paracentesis site RUQ-CDI   Consults: GI  D/C Day/Goals/Place: pending  Other: SNOWDEN; LS- diminished bilateral lower lobes. Paracentesis this afternoon, 3.8 L removed. Plan for EGD tomorrow. Plasma infusion for INR 2.18 and EGD tomorrow

## 2023-09-28 LAB
ALBUMIN SERPL BCG-MCNC: 2.7 G/DL (ref 3.5–5.2)
ALP SERPL-CCNC: 138 U/L (ref 40–129)
ALT SERPL W P-5'-P-CCNC: 50 U/L (ref 0–70)
ANION GAP SERPL CALCULATED.3IONS-SCNC: 12 MMOL/L (ref 7–15)
AST SERPL W P-5'-P-CCNC: 167 U/L (ref 0–45)
BILIRUB SERPL-MCNC: 19.4 MG/DL
BUN SERPL-MCNC: 53.3 MG/DL (ref 6–20)
CALCIUM SERPL-MCNC: 9 MG/DL (ref 8.6–10)
CHLORIDE SERPL-SCNC: 94 MMOL/L (ref 98–107)
CREAT SERPL-MCNC: 2.27 MG/DL (ref 0.67–1.17)
CREAT SERPL-MCNC: 2.32 MG/DL (ref 0.67–1.17)
CREAT UR-MCNC: 208.4 MG/DL
EGFRCR SERPLBLD CKD-EPI 2021: 36 ML/MIN/1.73M2
ERYTHROCYTE [DISTWIDTH] IN BLOOD BY AUTOMATED COUNT: 14.2 % (ref 10–15)
FRACT EXCRET NA UR+SERPL-RTO: 0.2 %
GLUCOSE SERPL-MCNC: 105 MG/DL (ref 70–99)
HCO3 SERPL-SCNC: 19 MMOL/L (ref 22–29)
HCT VFR BLD AUTO: 20.5 % (ref 40–53)
HGB BLD-MCNC: 7.3 G/DL (ref 13.3–17.7)
HGB BLD-MCNC: 7.5 G/DL (ref 13.3–17.7)
MCH RBC QN AUTO: 38.7 PG (ref 26.5–33)
MCHC RBC AUTO-ENTMCNC: 36.6 G/DL (ref 31.5–36.5)
MCV RBC AUTO: 106 FL (ref 78–100)
OSMOLALITY UR: 346 MMOL/KG (ref 100–1200)
PLATELET # BLD AUTO: 161 10E3/UL (ref 150–450)
POTASSIUM SERPL-SCNC: 3.8 MMOL/L (ref 3.4–5.3)
PROT SERPL-MCNC: ABNORMAL G/DL
RBC # BLD AUTO: 1.94 10E6/UL (ref 4.4–5.9)
SODIUM SERPL-SCNC: 124 MMOL/L (ref 135–145)
SODIUM SERPL-SCNC: 125 MMOL/L (ref 135–145)
SODIUM UR-SCNC: 20 MMOL/L
WBC # BLD AUTO: 11.5 10E3/UL (ref 4–11)

## 2023-09-28 PROCEDURE — 250N000013 HC RX MED GY IP 250 OP 250 PS 637: Performed by: STUDENT IN AN ORGANIZED HEALTH CARE EDUCATION/TRAINING PROGRAM

## 2023-09-28 PROCEDURE — 84300 ASSAY OF URINE SODIUM: CPT | Performed by: INTERNAL MEDICINE

## 2023-09-28 PROCEDURE — 99232 SBSQ HOSP IP/OBS MODERATE 35: CPT | Performed by: STUDENT IN AN ORGANIZED HEALTH CARE EDUCATION/TRAINING PROGRAM

## 2023-09-28 PROCEDURE — 85027 COMPLETE CBC AUTOMATED: CPT | Performed by: STUDENT IN AN ORGANIZED HEALTH CARE EDUCATION/TRAINING PROGRAM

## 2023-09-28 PROCEDURE — 84450 TRANSFERASE (AST) (SGOT): CPT | Performed by: STUDENT IN AN ORGANIZED HEALTH CARE EDUCATION/TRAINING PROGRAM

## 2023-09-28 PROCEDURE — 36415 COLL VENOUS BLD VENIPUNCTURE: CPT | Performed by: INTERNAL MEDICINE

## 2023-09-28 PROCEDURE — 36415 COLL VENOUS BLD VENIPUNCTURE: CPT | Performed by: STUDENT IN AN ORGANIZED HEALTH CARE EDUCATION/TRAINING PROGRAM

## 2023-09-28 PROCEDURE — 120N000001 HC R&B MED SURG/OB

## 2023-09-28 PROCEDURE — 82565 ASSAY OF CREATININE: CPT | Performed by: INTERNAL MEDICINE

## 2023-09-28 PROCEDURE — 83935 ASSAY OF URINE OSMOLALITY: CPT | Performed by: INTERNAL MEDICINE

## 2023-09-28 PROCEDURE — 258N000003 HC RX IP 258 OP 636: Performed by: INTERNAL MEDICINE

## 2023-09-28 PROCEDURE — 250N000013 HC RX MED GY IP 250 OP 250 PS 637: Performed by: HOSPITALIST

## 2023-09-28 PROCEDURE — 250N000011 HC RX IP 250 OP 636: Performed by: PHYSICIAN ASSISTANT

## 2023-09-28 PROCEDURE — 84295 ASSAY OF SERUM SODIUM: CPT | Performed by: INTERNAL MEDICINE

## 2023-09-28 PROCEDURE — 99222 1ST HOSP IP/OBS MODERATE 55: CPT | Performed by: INTERNAL MEDICINE

## 2023-09-28 PROCEDURE — 250N000013 HC RX MED GY IP 250 OP 250 PS 637: Performed by: INTERNAL MEDICINE

## 2023-09-28 PROCEDURE — P9047 ALBUMIN (HUMAN), 25%, 50ML: HCPCS | Performed by: PHYSICIAN ASSISTANT

## 2023-09-28 PROCEDURE — 250N000011 HC RX IP 250 OP 636: Mod: JZ | Performed by: HOSPITALIST

## 2023-09-28 PROCEDURE — 85018 HEMOGLOBIN: CPT | Performed by: PHYSICIAN ASSISTANT

## 2023-09-28 RX ORDER — POTASSIUM CHLORIDE 1.5 G/1.58G
20 POWDER, FOR SOLUTION ORAL 2 TIMES DAILY
Status: COMPLETED | OUTPATIENT
Start: 2023-09-28 | End: 2023-09-29

## 2023-09-28 RX ORDER — VANCOMYCIN HYDROCHLORIDE 125 MG/1
125 CAPSULE ORAL 4 TIMES DAILY
Status: DISCONTINUED | OUTPATIENT
Start: 2023-09-28 | End: 2023-10-04 | Stop reason: HOSPADM

## 2023-09-28 RX ORDER — SODIUM CHLORIDE 9 MG/ML
INJECTION, SOLUTION INTRAVENOUS CONTINUOUS
Status: DISCONTINUED | OUTPATIENT
Start: 2023-09-28 | End: 2023-09-30

## 2023-09-28 RX ADMIN — PANTOPRAZOLE SODIUM 40 MG: 40 TABLET, DELAYED RELEASE ORAL at 09:15

## 2023-09-28 RX ADMIN — POTASSIUM CHLORIDE 20 MEQ: 1.5 POWDER, FOR SOLUTION ORAL at 12:29

## 2023-09-28 RX ADMIN — SODIUM CHLORIDE: 9 INJECTION, SOLUTION INTRAVENOUS at 12:37

## 2023-09-28 RX ADMIN — VANCOMYCIN HYDROCHLORIDE 125 MG: 125 CAPSULE ORAL at 22:44

## 2023-09-28 RX ADMIN — ALBUMIN HUMAN 12.5 G: 0.25 SOLUTION INTRAVENOUS at 03:39

## 2023-09-28 RX ADMIN — HEPARIN SODIUM 5000 UNITS: 5000 INJECTION, SOLUTION INTRAVENOUS; SUBCUTANEOUS at 09:15

## 2023-09-28 RX ADMIN — POTASSIUM CHLORIDE 20 MEQ: 1.5 POWDER, FOR SOLUTION ORAL at 22:43

## 2023-09-28 RX ADMIN — ALBUMIN HUMAN 12.5 G: 0.25 SOLUTION INTRAVENOUS at 19:27

## 2023-09-28 RX ADMIN — MULTIVITAMIN TABLET 1 TABLET: TABLET at 09:14

## 2023-09-28 RX ADMIN — PANTOPRAZOLE SODIUM 40 MG: 40 TABLET, DELAYED RELEASE ORAL at 19:29

## 2023-09-28 RX ADMIN — VANCOMYCIN HYDROCHLORIDE 125 MG: 125 CAPSULE ORAL at 18:35

## 2023-09-28 RX ADMIN — ALBUMIN HUMAN 12.5 G: 0.25 SOLUTION INTRAVENOUS at 12:35

## 2023-09-28 ASSESSMENT — ACTIVITIES OF DAILY LIVING (ADL)
ADLS_ACUITY_SCORE: 33

## 2023-09-28 NOTE — PROGRESS NOTES
[facility]    Medicine Progress Note - Hospitalist Service    Date of Admission:  9/25/2023  Date of Service: 09/28/2023    Assessment & Plan     Crispin Ham is a 38 year old male with a history of severe alcohol use disorder, alcoholic liver disease with recent hospital admission between 9/8-9/11 for hypovolemic hypotension, alcoholic hepatitis and pancreatitis, concern for duodenitis with possible contained perforation versus diverticulum, GATITO.  Also has history of generalized anxiety disorder,  HTN who now presents with worsening fatigue, weakness and shortness of breath over the last week.    Decompensated alcoholic liver cirrhosis  Portal hypertension with splenomegaly and ascites  Lower extremity edema  Leukocytosis, concern for SBP  Coagulopathy secondary to liver disease  Hypoalbuminemia  Patient notes he was doing okay after he discharged from hospital, was even able to return to his job with  at [employer] for few days 2 weeks ago.  However over the last week to 10 days has been progressively more weak, tired and gets short of breath with minimal exertion.  Is having hard time sleeping, has discomfort in lower abdomen, generalized pains.  Also notes increased frequency of small-volume loose stools about every 3-4 hours over the last few days.  No blood in stools or vomit.  Has been nauseous, no significant vomiting.  Has had decreased oral intake.  Does feel abdomen is distended and feels legs are mildly more swollen.  Denies any fevers, sweats but did have some intermittent chills.  Also notes some difficulty urinating/emptying bladder because at times his penis feels retracted.  He adamantly denies any alcohol use since last hospital discharge.  Currently lives with his parents who are both present in the room.    In the ED noted to have stable vitals, afebrile.  Labs significant for WBC 18.6, Hb 9.6, , BUN 39, creatinine 2.0 [was 0.5 on 9/11],  albumin 2.4, T. bili 21.7 [was 13.8 on 9/11], lipase 237, lactic acid 1.5, , ALT 69, INR 1.8.    CT CAP shows cirrhotic appearing liver with sequelae of portal hypertension including splenomegaly and worsened now large volume ascites.  Mild increased trace bilateral pleural effusions and body wall edema.  Given 2 g IV ceftriaxone in the ED to cover possible SBP.  Plan:  -Unclear what triggered current worsening/decompensated liver failure.  Has been having diarrhea last few days so this may have been a trigger.  Also concern for SBP given new large volume ascites seen on CT.  No evidence of GI bleed.  Has not been on diuretics.  Hemodynamics okay in ED.  -S/p on albumin infusion 50 g IV x1   -Continue IV ceftriaxone to cover for SBP -> Can stop as SBP ruled out  -BC -> NGTD  -Ordered ultrasound paracentesis therapeutic and diagnostic for 09/26 ->  3.825 L of  straw  colored fluid was drained, fluid studies not consistent with SBP  -Consult to [facility] who saw patient during last hospitalization -> EGD today  - Normal upper third of esophagus and middle third                             of esophagus.                             - Z-line regular, 38 cm from the incisors.                             - Esophageal polyp(s) were found. Resected and                             retrieved. Clip was placed.                             - Esophageal mucosal tear with bleeding                             (complication of clip placement). Hemostasis                             achieved with bipolar probe.   -Will likely need to be started on diuretics during this hospitalization  -2 g sodium diet  -1U FFP ordered for 09/26 given INR 2.18     GATITO, prerenal versus hepatorenal  Hyponatremia  Nongap acidosis in setting of diarrhea and GATITO  -Albumin infusions ordered as noted above.  Can administer up to 100 g/day for 2 days and monitor response with creatinine.  If not responding may be more in line with hepatorenal syndrome.   Would consult nephrology if not improving.  -Creatinine had gone up to 2.9 during last hospitalization and sodium was 128.  Creatinine normalized and sodium came up to 134 with NS during last hospitalization, however he did not have significant ascites and was hypotensive and hypovolemic at that time.  -Closely monitor renal function  -Nephrology consulted -> on IV albumin currently    Diarrhea -> C Diff positive  Assessment/Plan: oral vancomycin 125 mg QID    Macrocytic anemia  Hb 9.6.  Recent baseline appears to be around 10.  No evidence of overt GI bleeding. But hgb down to 7.3 today  Plan:  -Monitor hemoglobin.  -Transfuse Hgb < 7.0    Elevated lipase, recent alcoholic pancreatitis  No significant abdominal pain at this time.  Lipase was 272 on 9/8, currently 237.    Severe alcohol use disorder  Strongly encourage complete alcohol cessation.  He reports being sober since last hospital discharge.  He refused to talk with CD or psychiatry during last hospitalization.    Recent duodenitis/ulcer with contained perforation versus duodenal diverticulum seen on CT  Noted on CT during last hospitalization.  -Continue PPI  -No significant abdominal pain at this time.  Consult to [facility] as noted above.    History of hypertension  -Recently had issues with hypotension.  Not on any medications.  Noted.  Currently normotensive.       Diet: 2 Gram Sodium Diet    DVT Prophylaxis: Pneumatic Compression Devices  Bosch Catheter: Not present  Lines: None     Cardiac Monitoring: None  Code Status: Full Code      Clinically Significant Risk Factors         # Hyponatremia: Lowest Na = 125 mmol/L in last 2 days, will monitor as appropriate   # Hypercalcemia: corrected calcium is >10.1, will monitor as appropriate    # Hypoalbuminemia: Lowest albumin = 2.4 g/dL at 9/25/2023  2:24 PM, will monitor as appropriate    # Coagulation Defect: INR = 2.13 (Ref range: 0.85 - 1.15) and/or PTT = N/A, will monitor for bleeding     # Acute  Kidney Injury, unspecified: based on a >150% or 0.3 mg/dL increase in last creatinine compared to past 90 day average, will monitor renal function         # Obesity: Estimated body mass index is 36.43 kg/m  as calculated from the following:    Height as of this encounter: 1.829 m (6').    Weight as of this encounter: 121.8 kg (268 lb 9.6 oz)., PRESENT ON ADMISSION            Disposition Plan      Expected Discharge Date: 09/29/2023                  Elio Chaves MD  Hospitalist Service  [facility]  Securely message with [employer] (more info)  Text page via [facility] Paging/Directory   ______________________________________________________________________    Interval History     No acute events overnight  No CP/SOB  No nausea / vomiting or significant abdominal pain  No significant diarrhea / bloody stools  No fevers  No new complaints    Physical Exam   Vital Signs: Temp: 98.2  F (36.8  C) Temp src: Oral BP: 115/69 Pulse: 102   Resp: 16 SpO2: 96 % O2 Device: None (Room air)    Weight: 268 lbs 9.6 oz    Constitutional: Awake, alert, cooperative, no apparent distress. Jaundiced, obese male, appears sick, laying in bed  Eyes: Significantly icteric  Respiratory: Clear to auscultation bilaterally, nonlabored breathing, diminished at bases  Cardiovascular: Regular rate and rhythm, normal S1 and S2, and no murmur noted.  GI: Soft, distended, positive fluid wave, not significantly tender  Skin: No rashes, no cyanosis, 1+ bilateral lower extremity edema noted  Musculoskeletal: No joint swelling, erythema or tenderness.  Neurologic: Alert, oriented and engages in appropriate conversation, no facial asymmetry, moving all extremities, fluent speech  Psychiatric: Calm, flat affect    ----------------------------------------------------------------------------------------    Medical Decision Making       ------------------ MEDICAL DECISION MAKING  ------------------------------------------------------------------------------------------------------  High Complexity Decision Making       Data   ------------------------- PAST 24 HR DATA REVIEWED -----------------------------------------------    I have personally reviewed the following data over the past 24 hrs:    11.5 (H)  \   7.5 (L)   / 161     125 (L) 94 (L) 53.3 (H) /  105 (H)   3.8 19 (L) 2.32 (H) \     ALT: 50 AST: 167 (H) AP: 138 (H) TBILI: 19.4 (HH)   ALB: 2.7 (L) TOT PROTEIN: N/A LIPASE: N/A     Imaging results reviewed over the past 24 hrs:   No results found for this or any previous visit (from the past 24 hour(s)).  ------------------------- ENCOUNTER LABS ----------------------------------------------------------------  Recent Labs   Lab 09/28/23  0547 09/27/23 2223 09/27/23  0722 09/26/23  0724 09/25/23  1537 09/25/23  1425 09/25/23  1424   WBC 11.5*  --  10.8 13.6*  --   --  18.6*   HGB 7.5* 7.4* 7.6* 8.3*  --   --  9.6*   *  --  106* 105*  --   --  106*     --  180 214  --   --  270   INR  --   --  2.13* 2.18*  --  1.88*  --    *  --  126* 125*  --   --  125*   POTASSIUM 3.8  --  4.0 4.0  --   --  4.9   CHLORIDE 94*  --  93* 93*  --   --  93*   CO2 19*  --  19* 20*  --   --  18*   BUN 53.3*  --  49.3* 45.2*  --   --  39.4*   CR 2.32*  --  2.40* 2.21*  --   --  2.01*   ANIONGAP 12  --  14 12  --   --  14   MIGUELINA 9.0  --  9.0 9.1  --   --  9.0   *  --  104* 102*  --   --  120*   ALBUMIN 2.7*  --  2.6* 2.6*  --   --  2.4*   BILITOTAL 19.4*  --  19.8* 20.1*  --   --  21.7*   ALKPHOS 138*  --  150* 185*  --   --  182*   ALT 50  --  54 57  --   --  69   *  --  181* 169*   < >  --   --    LIPASE  --   --   --   --   --   --  237*    < > = values in this interval not displayed.       Most Recent 3 CBC's:  Recent Labs   Lab Test 09/28/23  0547 09/27/23  2223 09/27/23  0722 09/26/23  0724   WBC 11.5*  --  10.8 13.6*   HGB 7.5* 7.4* 7.6* 8.3*   *  --  106* 105*      --  180 214     Most Recent 3 BMP's:  Recent Labs   Lab Test 09/28/23  0547 09/27/23  0722 09/26/23  0724   * 126* 125*   POTASSIUM 3.8 4.0 4.0   CHLORIDE 94* 93* 93*   CO2 19* 19* 20*   BUN 53.3* 49.3* 45.2*   CR 2.32* 2.40* 2.21*   ANIONGAP 12 14 12   MIGUELINA 9.0 9.0 9.1   * 104* 102*     Most Recent 2 LFT's:  Recent Labs   Lab Test 09/28/23  0547 09/27/23  0722   * 181*   ALT 50 54   ALKPHOS 138* 150*   BILITOTAL 19.4* 19.8*     Most Recent 3 INR's:  Recent Labs   Lab Test 09/27/23  0722 09/26/23  0724 09/25/23  1425   INR 2.13* 2.18* 1.88*     Anticoagulation Dose History          Latest Ref Rng & Units 9/8/2023 9/10/2023 9/11/2023 9/25/2023 9/26/2023   Recent Dosing and Labs   INR 0.85 - 1.15 1.41  1.52  1.52  1.88  2.18      Most Recent 3 Creatinines:  Recent Labs   Lab Test 09/28/23  0547 09/27/23  0722 09/26/23  0724   CR 2.32* 2.40* 2.21*     Most Recent 3 Hemoglobins:  Recent Labs   Lab Test 09/28/23  0547 09/27/23 2223 09/27/23  0722   HGB 7.5* 7.4* 7.6*     Most Recent 3 Troponin's:No lab results found.  Most Recent 3 BNP's:No lab results found.  Most Recent D-dimer:No lab results found.  Most Recent Cholesterol Panel:No lab results found.  Most Recent 6 Bacteria Isolates From Any Culture (See EPIC Reports for Culture Details):No lab results found.

## 2023-09-28 NOTE — PROVIDER NOTIFICATION
"Paged Jeaneth STEWART from GI to ask if subcutaneous heparin should be held. She stated \"no, keep it going\". Charlene GarciaD    "

## 2023-09-28 NOTE — CONSULTS
Nephrology Consultation      Crispin Ham MRN# [id number]   YOB: 1985 Age: 38 year old   Date of Admission: 9/25/2023     Reason for consult: I was asked by Dr. Chaves to evaulate this patient for hyponatremia and acute kidney injury..           Assessment and Plan:   1.  He has acute kidney injury with a bump in creatinine from 2.01-2.40.  This is subsequently improved to 2.32 with IV fluids and IV albumin.  This may be hepatorenal syndrome.  We will check a urine sodium and fractional excretion of sodium.  May also be a prerenal azotemia.  We will continue the albumin IV for 9 doses as ordered already.  We will replace his potassium I will put him on normal saline IV at 75 cc an hour.  We will recheck labs in the morning and we will get a urine sodium, fractional excretion of sodium and urine osmolality.    Hopefully we can turn his kidney function around.  Given his active drinking and his in eligibility for liver transplant, he would not be a dialysis candidate.    2.  Hyponatremia this is associated with cirrhosis and his edematous state.  It has been quite stable with a sodium of 125.  We will replace potassium and check magnesium and phosphorus levels and replace as needed.  We will put him on normal saline at the lowest dose of 75 mL/h.  We will recheck his sodium in the morning.    3.  Alcoholism: Apparently actively drinking.    4.  Cirrhosis with ascites.  He is status post a paracentesis.  He does not have SBP but is on Rocephin for prophylaxis.  He is also getting Protonix and the GI service is following him.             Chief Complaint:   Hyponatremia, acute kidney injury    History is obtained from the patient and the medical record.         History of Present Illness:   This patient is a 38 year old male who presents with complications of liver failure.  He has a history of alcoholism, cirrhosis and ascites.  He is apparently still actively drinking and therefore not a candidate  for liver transplant.  He underwent EGD on 2023 with some bleeding complications which have resolved.  He remains on Protonix.  He underwent a paracentesis for 3.  8 L and did not have evidence of SBP but is on Rocephin for prophylaxis.  Because of a rising creatinine he has been started on IV albumin, 12.5 g every 8 hours IV.    The patient notes abdominal distention and lower extremity edema but denies chest or abdominal pain.  He does not have shortness of breath.  He is taking p.o. well.                Past Medical History:     Past Medical History:   Diagnosis Date    Alcohol abuse     Hypertension     Substance abuse (H)              Past Surgical History:     Past Surgical History:   Procedure Laterality Date    ORTHOPEDIC SURGERY                 Social History:     Social History     Tobacco Use    Smoking status: Never    Smokeless tobacco: Not on file   Substance Use Topics    Alcohol use: Yes     Comment: 1.75L per day             Family History:   History reviewed. No pertinent family history.          Allergies:     Allergies   Allergen Reactions    Nsaids      Patient reports having a reaction of puffy eyes and dry throat a few years ago, but he has taken Advil in  and did not react.             Medications:      albumin human  12.5 g Intravenous Q8H    heparin ANTICOAGULANT  5,000 Units Subcutaneous Q12H    multivitamin, therapeutic  1 tablet Oral Daily    pantoprazole  40 mg Oral BID    potassium chloride  20 mEq Oral BID    sodium chloride (PF)  3 mL Intracatheter Q8H             Review of Systems:     Negative except as noted above            Physical Exam:   Vitals were reviewed  Temp: 98.2  F (36.8  C) Temp src: Oral BP: 115/69 Pulse: 102   Resp: 16 SpO2: 96 % O2 Device: None (Room air)    Blood pressure range: Systolic (24hrs), Av , Min:92 , Max:122     Blood pressure range: Diastolic (24hrs), Av, Min:43, Max:75      Intake/Output Summary (Last 24 hours) at 2023 1201  Last  data filed at 9/28/2023 0338  Gross per 24 hour   Intake 480 ml   Output 400 ml   Net 80 ml     He is alert, he is icteric  Head shows no trauma  Pupils are round and reactive to light  Neck is supple  Lungs show clear anterior breath sounds  Cardiac exam hyperdynamic normal S1-S2 no murmur  Abdomen is distended with a fluid wave normal bowel sounds.  There is a ballotable liver in the right upper quadrant which is nontender  Lower extremities 1+-2+ edema       Data:   All laboratory data reviewed  Lab Results   Component Value Date    WBC 11.5 (H) 09/28/2023    WBC 10.8 09/27/2023    WBC 13.6 (H) 09/26/2023    HGB 7.5 (L) 09/28/2023    HGB 7.4 (L) 09/27/2023    HGB 7.6 (L) 09/27/2023    HCT 20.5 (L) 09/28/2023    HCT 21.1 (L) 09/27/2023    HCT 22.7 (L) 09/26/2023     09/28/2023     09/27/2023     09/26/2023     (L) 09/28/2023     (L) 09/27/2023     (L) 09/26/2023    POTASSIUM 3.8 09/28/2023    POTASSIUM 4.0 09/27/2023    POTASSIUM 4.0 09/26/2023    CHLORIDE 94 (L) 09/28/2023    CHLORIDE 93 (L) 09/27/2023    CHLORIDE 93 (L) 09/26/2023    CO2 19 (L) 09/28/2023    CO2 19 (L) 09/27/2023    CO2 20 (L) 09/26/2023    BUN 53.3 (H) 09/28/2023    BUN 49.3 (H) 09/27/2023    BUN 45.2 (H) 09/26/2023    CR 2.32 (H) 09/28/2023    CR 2.40 (H) 09/27/2023    CR 2.21 (H) 09/26/2023     (H) 09/28/2023     (H) 09/27/2023     (H) 09/26/2023     (H) 09/28/2023     (H) 09/27/2023     (H) 09/26/2023    ALT 50 09/28/2023    ALT 54 09/27/2023    ALT 57 09/26/2023    ALKPHOS 138 (H) 09/28/2023    ALKPHOS 150 (H) 09/27/2023    ALKPHOS 185 (H) 09/26/2023    BILITOTAL 19.4 (HH) 09/28/2023    BILITOTAL 19.8 (HH) 09/27/2023    BILITOTAL 20.1 (HH) 09/26/2023    JAM 50 09/27/2023    INR 2.13 (H) 09/27/2023    INR 2.18 (H) 09/26/2023    INR 1.88 (H) 09/25/2023     CT scan of the abdomen and pelvis showed the kidneys to have no mass, stone or hydronephrosis.

## 2023-09-28 NOTE — PLAN OF CARE
Goal Outcome Evaluation:    Orientation/Neuro: A/Ox4  Activity Level: Independent in room, though pt lays in bed for the majority.   Diet: 2 gm Sodium   GI/: Cont of B&B. Urine is dark/tea colored. No BM this shift.  Labs: Creatinine 2.32, GFR 35, Bilirubin total 19.4. Hgb down to 7.5. Urine sample sent down.   IV /Drains/Tubes: Right PIV infusing NS at 75/hr, intermittent albumin   Incisions/Dressings/Skin: Skin & sclera are jaundiced. Paracentesis site to RUQ open to air.   Pain Management: Denies pain.  Other: Entire body jaundice + eyes  Consults: Nephology & Gastroenterology following.  Plan: Continue plan of care.

## 2023-09-28 NOTE — PROGRESS NOTES
19:00-07:30  Orientation: A&Ox4  Activity: independent in room  Diet/BS Checks: 2g Na  Tele: N/A  IV Access/Drains: R PIV SL'd  Pain Management: c/o abdominal pain, but denies pharmacological intervention  Abnormal VS/Results: tachycardic; Q6 Hgb checks (last was 7. at 0547)  Bowel/Bladder: cont of B&B, last BM 09/27, tea colored urine  Skin/Wounds: jaundiced skin & sclera; incision to R abdomen  D/C Disposition: pending  Other Info: Enteric precautions maintained, left foot edema increased to +3, right foot +2 edema

## 2023-09-28 NOTE — PROGRESS NOTES
[facility]  Gastroenterology Progress Note     Crispin Ham MRN# [id number]   YOB: 1985 Age: 38 year old          Assessment and Plan:     Crispin Ham is a 38 year old male with history of alcohol abuse, and liver cirrhosis among others; admitted on 9/25 whom presented with fatigue, weakness and shortness of breath.     Liver failure (H)  Alcoholic liver cirrhosis with ascites  CT C/A/P shows cirrhotic appearing liver with sequelae of portal hypertension including splenomegaly and worsened now large volume ascites.  Mild increased trace bilateral pleural effusions and body wall edema.   MELD 35 Na++ 125, Cr 2.32 (0.5 on 9/11), Tbili 19.4( 13.9 on 9/11), INR 2.13 (90 day mortality of 53%)  LFTs: AST//50 ( alcoholic pattern)   Unable to start prednisolone with Maddrey score over 40 given underlying sepsis  Not a candidate for liver transplant with recent Alcohol use in last 2 weeks  9/27 EGD noted esophageal polyp-resected and clips placed. Esophageal mucosal tear with bleeding- complication of clip placement- hemostasis achieved. Portal hypertensive gastropathy     - 2 g sodium diet after EGD today  - Daily labs CMP, CBC, INR  - Cr continues to elevate- will need albumin until 3.5     Leukocytosis  WBC 18.6->10.8->11.5 and has significant ascites with GATITO   Paracentesis on 9/26 removed 3.8 L of straw colored fluid with cell count of 59 and 3 % neutrophils- no evidence of SBP     - treat empirically with ceftriaxone  - would consider diuretics but has GATITO/hepatorenal syndrome and Cr significantly above baseline  - continue albumin to treat potential hepatorenal syndrome and ascites     Recent duodenitis/ulcer with contained perforation versus duodenal diverticulum seen on CT   Anemia  Hemoglobin 9.6->8.3->7.3 ->7.5 baseline 10.7 on 9/9/23  No visible blood loss  On heparin- held  INR 2.13  Likely due to bone marrow suppression with sepsis vs hemorrhagic  gastritis vs unlikely variceal bleed     - pantoprazole 40 mg BID  - hemoglobin q 12 hours           Interval History:     denies chest pain, denies shortness of breath, denies abdominal pain, doing well; no cp, sob, n/v/d, or abd pain.              Review of Systems:     C: NEGATIVE for fever, chills, change in weight  E/M: NEGATIVE for ear, mouth and throat problems  R: NEGATIVE for significant cough or SOB  CV: NEGATIVE for chest pain, palpitations or peripheral edema             Medications:   I have reviewed this patient's current medications   albumin human  12.5 g Intravenous Q8H    heparin ANTICOAGULANT  5,000 Units Subcutaneous Q12H    multivitamin, therapeutic  1 tablet Oral Daily    pantoprazole  40 mg Oral BID    sodium chloride (PF)  3 mL Intracatheter Q8H                  Physical Exam:   Vitals were reviewed  Vital Signs with Ranges  Temp:  [97.4  F (36.3  C)-98.2  F (36.8  C)] 98.2  F (36.8  C)  Pulse:  [] 102  Resp:  [0-40] 16  BP: ()/(43-75) 115/69  SpO2:  [91 %-98 %] 96 %  I/O last 3 completed shifts:  In: 480 [P.O.:480]  Out: 400 [Urine:400]  Constitutional: jaundice, alert, and no distress   Cardiovascular: negative, PMI normal. No lifts, heaves, or thrills. RRR. No murmurs, clicks gallops or rub  Respiratory: negative, Percussion normal. Good diaphragmatic excursion. Lungs clear  Abdomen: Abdomen soft, non-tender. BS normal. No masses, organomegaly           Data:   I reviewed the patient's new clinical lab test results.   Recent Labs   Lab Test 09/28/23  0547 09/27/23 2223 09/27/23  0722 09/26/23  0724 09/25/23  1425   WBC 11.5*  --  10.8 13.6*  --    HGB 7.5* 7.4* 7.6* 8.3*  --    *  --  106* 105*  --      --  180 214  --    INR  --   --  2.13* 2.18* 1.88*       Recent Labs   Lab Test 09/28/23  0547 09/27/23  0722 09/26/23  0724   POTASSIUM 3.8 4.0 4.0   CHLORIDE 94* 93* 93*   CO2 19* 19* 20*   BUN 53.3* 49.3* 45.2*   ANIONGAP 12 14 12       Recent Labs   Lab Test  09/28/23  0547 09/27/23  0722 09/26/23  0724 09/25/23  1646 09/25/23  1537 09/25/23  1424 09/09/23  1119 09/08/23  1224 09/08/23  1147   ALBUMIN 2.7* 2.6* 2.6*  --   --  2.4*   < >  --  2.3*   BILITOTAL 19.4* 19.8* 20.1*  --   --  21.7*   < >  --  10.4*   ALT 50 54 57  --   --  69   < >  --  82*   * 181* 169*  --    < >  --    < >  --  331*   PROTEIN  --   --   --  30*  --   --   --  50*  --    LIPASE  --   --   --   --   --  237*  --   --  272*    < > = values in this interval not displayed.         I reviewed the patient's new imaging results.    All laboratory data reviewed  All imaging studies reviewed by me.    Jeaneth Luu PA-C,  9/27/2023  [facility]  Office : [phone]  Cell: [phone] (Dr. Gibson)  Cell: [phone] (Jeaneth Luu PA-C)

## 2023-09-28 NOTE — PROVIDER NOTIFICATION
MD Notification    Notified Person: MD    Notified Person Name: Omar    Notification Date/Time: 09/27/23 7:44 PM    Notification Interaction: [facility] messaging     Purpose of Notification: Per GI note from today, Pt's diet should be 2g Na and should have Q6H Hgb checks. Can we have those orders put in?     Orders Received: Diet changed to 2g Na and Q6H Hgb checks scheduled    Comments:

## 2023-09-29 LAB
ABO/RH(D): NORMAL
ALBUMIN SERPL BCG-MCNC: 2.7 G/DL (ref 3.5–5.2)
ALP SERPL-CCNC: 132 U/L (ref 40–129)
ALT SERPL W P-5'-P-CCNC: 47 U/L (ref 0–70)
ANION GAP SERPL CALCULATED.3IONS-SCNC: 14 MMOL/L (ref 7–15)
ANTIBODY SCREEN: NEGATIVE
AST SERPL W P-5'-P-CCNC: 151 U/L (ref 0–45)
BILIRUB SERPL-MCNC: 18.7 MG/DL
BLD PROD TYP BPU: NORMAL
BLOOD COMPONENT TYPE: NORMAL
BUN SERPL-MCNC: 54.4 MG/DL (ref 6–20)
CALCIUM SERPL-MCNC: 8.8 MG/DL (ref 8.6–10)
CHLORIDE SERPL-SCNC: 96 MMOL/L (ref 98–107)
CODING SYSTEM: NORMAL
CREAT SERPL-MCNC: 1.86 MG/DL (ref 0.67–1.17)
CREAT SERPL-MCNC: 2.22 MG/DL (ref 0.67–1.17)
CREAT UR-MCNC: 171 MG/DL
CROSSMATCH: NORMAL
DEPRECATED HCO3 PLAS-SCNC: 17 MMOL/L (ref 22–29)
EGFRCR SERPLBLD CKD-EPI 2021: 38 ML/MIN/1.73M2
EGFRCR SERPLBLD CKD-EPI 2021: 48 ML/MIN/1.73M2
ERYTHROCYTE [DISTWIDTH] IN BLOOD BY AUTOMATED COUNT: 14.1 % (ref 10–15)
FRACT EXCRET NA UR+SERPL-RTO: 0.2 %
GLUCOSE SERPL-MCNC: 115 MG/DL (ref 70–99)
HCT VFR BLD AUTO: 19.4 % (ref 40–53)
HGB BLD-MCNC: 6.9 G/DL (ref 13.3–17.7)
HGB BLD-MCNC: 7.6 G/DL (ref 13.3–17.7)
HGB BLD-MCNC: 7.9 G/DL (ref 13.3–17.7)
INR PPP: 2.5 (ref 0.85–1.15)
ISSUE DATE AND TIME: NORMAL
MAGNESIUM SERPL-MCNC: 1.7 MG/DL (ref 1.7–2.3)
MCH RBC QN AUTO: 37.7 PG (ref 26.5–33)
MCHC RBC AUTO-ENTMCNC: 35.6 G/DL (ref 31.5–36.5)
MCV RBC AUTO: 106 FL (ref 78–100)
PHOSPHATE SERPL-MCNC: 3.7 MG/DL (ref 2.5–4.5)
PLATELET # BLD AUTO: 140 10E3/UL (ref 150–450)
POTASSIUM SERPL-SCNC: 3.8 MMOL/L (ref 3.4–5.3)
PROT SERPL-MCNC: ABNORMAL G/DL
RBC # BLD AUTO: 1.83 10E6/UL (ref 4.4–5.9)
SODIUM SERPL-SCNC: 126 MMOL/L (ref 135–145)
SODIUM SERPL-SCNC: 127 MMOL/L (ref 135–145)
SODIUM UR-SCNC: 20 MMOL/L
SPECIMEN EXPIRATION DATE: NORMAL
UNIT ABO/RH: NORMAL
UNIT NUMBER: NORMAL
UNIT STATUS: NORMAL
UNIT TYPE ISBT: 6200
WBC # BLD AUTO: 11.1 10E3/UL (ref 4–11)

## 2023-09-29 PROCEDURE — 84295 ASSAY OF SERUM SODIUM: CPT | Performed by: INTERNAL MEDICINE

## 2023-09-29 PROCEDURE — 250N000013 HC RX MED GY IP 250 OP 250 PS 637: Performed by: INTERNAL MEDICINE

## 2023-09-29 PROCEDURE — 120N000001 HC R&B MED SURG/OB

## 2023-09-29 PROCEDURE — 84100 ASSAY OF PHOSPHORUS: CPT | Performed by: INTERNAL MEDICINE

## 2023-09-29 PROCEDURE — 250N000013 HC RX MED GY IP 250 OP 250 PS 637: Performed by: HOSPITALIST

## 2023-09-29 PROCEDURE — 250N000011 HC RX IP 250 OP 636: Performed by: PHYSICIAN ASSISTANT

## 2023-09-29 PROCEDURE — 82565 ASSAY OF CREATININE: CPT | Performed by: INTERNAL MEDICINE

## 2023-09-29 PROCEDURE — 36415 COLL VENOUS BLD VENIPUNCTURE: CPT | Performed by: STUDENT IN AN ORGANIZED HEALTH CARE EDUCATION/TRAINING PROGRAM

## 2023-09-29 PROCEDURE — 99232 SBSQ HOSP IP/OBS MODERATE 35: CPT | Performed by: INTERNAL MEDICINE

## 2023-09-29 PROCEDURE — 86923 COMPATIBILITY TEST ELECTRIC: CPT | Performed by: STUDENT IN AN ORGANIZED HEALTH CARE EDUCATION/TRAINING PROGRAM

## 2023-09-29 PROCEDURE — P9016 RBC LEUKOCYTES REDUCED: HCPCS | Performed by: STUDENT IN AN ORGANIZED HEALTH CARE EDUCATION/TRAINING PROGRAM

## 2023-09-29 PROCEDURE — 84450 TRANSFERASE (AST) (SGOT): CPT | Performed by: STUDENT IN AN ORGANIZED HEALTH CARE EDUCATION/TRAINING PROGRAM

## 2023-09-29 PROCEDURE — 86850 RBC ANTIBODY SCREEN: CPT | Performed by: STUDENT IN AN ORGANIZED HEALTH CARE EDUCATION/TRAINING PROGRAM

## 2023-09-29 PROCEDURE — 85027 COMPLETE CBC AUTOMATED: CPT | Performed by: STUDENT IN AN ORGANIZED HEALTH CARE EDUCATION/TRAINING PROGRAM

## 2023-09-29 PROCEDURE — 84300 ASSAY OF URINE SODIUM: CPT | Performed by: STUDENT IN AN ORGANIZED HEALTH CARE EDUCATION/TRAINING PROGRAM

## 2023-09-29 PROCEDURE — 83735 ASSAY OF MAGNESIUM: CPT | Performed by: INTERNAL MEDICINE

## 2023-09-29 PROCEDURE — 36415 COLL VENOUS BLD VENIPUNCTURE: CPT | Performed by: INTERNAL MEDICINE

## 2023-09-29 PROCEDURE — 85018 HEMOGLOBIN: CPT | Performed by: INTERNAL MEDICINE

## 2023-09-29 PROCEDURE — 250N000013 HC RX MED GY IP 250 OP 250 PS 637: Performed by: STUDENT IN AN ORGANIZED HEALTH CARE EDUCATION/TRAINING PROGRAM

## 2023-09-29 PROCEDURE — 99232 SBSQ HOSP IP/OBS MODERATE 35: CPT | Performed by: STUDENT IN AN ORGANIZED HEALTH CARE EDUCATION/TRAINING PROGRAM

## 2023-09-29 PROCEDURE — P9047 ALBUMIN (HUMAN), 25%, 50ML: HCPCS | Performed by: PHYSICIAN ASSISTANT

## 2023-09-29 PROCEDURE — 86901 BLOOD TYPING SEROLOGIC RH(D): CPT | Performed by: STUDENT IN AN ORGANIZED HEALTH CARE EDUCATION/TRAINING PROGRAM

## 2023-09-29 PROCEDURE — 258N000003 HC RX IP 258 OP 636: Performed by: INTERNAL MEDICINE

## 2023-09-29 PROCEDURE — 85610 PROTHROMBIN TIME: CPT | Performed by: STUDENT IN AN ORGANIZED HEALTH CARE EDUCATION/TRAINING PROGRAM

## 2023-09-29 RX ADMIN — VANCOMYCIN HYDROCHLORIDE 125 MG: 125 CAPSULE ORAL at 08:13

## 2023-09-29 RX ADMIN — MULTIVITAMIN TABLET 1 TABLET: TABLET at 08:13

## 2023-09-29 RX ADMIN — SODIUM CHLORIDE: 9 INJECTION, SOLUTION INTRAVENOUS at 06:12

## 2023-09-29 RX ADMIN — VANCOMYCIN HYDROCHLORIDE 125 MG: 125 CAPSULE ORAL at 21:59

## 2023-09-29 RX ADMIN — ALBUMIN HUMAN 12.5 G: 0.25 SOLUTION INTRAVENOUS at 13:39

## 2023-09-29 RX ADMIN — POTASSIUM CHLORIDE 20 MEQ: 1.5 POWDER, FOR SOLUTION ORAL at 08:14

## 2023-09-29 RX ADMIN — VANCOMYCIN HYDROCHLORIDE 125 MG: 125 CAPSULE ORAL at 13:15

## 2023-09-29 RX ADMIN — VANCOMYCIN HYDROCHLORIDE 125 MG: 125 CAPSULE ORAL at 18:09

## 2023-09-29 RX ADMIN — ALBUMIN HUMAN 12.5 G: 0.25 SOLUTION INTRAVENOUS at 04:26

## 2023-09-29 RX ADMIN — ALBUMIN HUMAN 12.5 G: 0.25 SOLUTION INTRAVENOUS at 21:02

## 2023-09-29 RX ADMIN — POTASSIUM CHLORIDE 20 MEQ: 1.5 POWDER, FOR SOLUTION ORAL at 20:59

## 2023-09-29 RX ADMIN — PANTOPRAZOLE SODIUM 40 MG: 40 TABLET, DELAYED RELEASE ORAL at 20:59

## 2023-09-29 RX ADMIN — PANTOPRAZOLE SODIUM 40 MG: 40 TABLET, DELAYED RELEASE ORAL at 08:13

## 2023-09-29 ASSESSMENT — ACTIVITIES OF DAILY LIVING (ADL)
ADLS_ACUITY_SCORE: 33
ADLS_ACUITY_SCORE: 22
ADLS_ACUITY_SCORE: 33
ADLS_ACUITY_SCORE: 22
ADLS_ACUITY_SCORE: 22
ADLS_ACUITY_SCORE: 33

## 2023-09-29 NOTE — PLAN OF CARE
Goal Outcome Evaluation:       0700-1930    Orientation: A&Ox4  Activity: SBA/Independent   Diet/BS Checks: 2 gm Na diet  Tele:  -  IV Access/Drains: PIV inf NS @ 75 ml/hr  Pain Management: Denies pain and nausea  Abnormal VS/Results: VSS on RA, SBP high 90's, 2hour post infusion HGB 7.6, 12hr HGB pending lab draw  Bowel/Bladder: Incontinent bladder x1, dark florence urine, Cont bowel, no Bm this shift  Skin/Wounds: Jaundiced skin and sclera. BLE edema +2-3  Consults: Nephrology, GI  D/C Disposition: Pending medical improvement  Other Info: 1 Unit PRB given for Hgb 6.9, tolerated w/o reaction. X1 incontinent of bladder.  Hbg checks q12. No NSAIDs except daily aspirin. On isolation precautions for C.Diff. Urine sample collected & sent to lab for fractionated excretion of Na, results pending.

## 2023-09-29 NOTE — PROGRESS NOTES
[facility]    Medicine Progress Note - Hospitalist Service    Date of Admission:  9/25/2023  Date of Service: 09/29/2023    Assessment & Plan     Crispin Ham is a 38 year old male with a history of severe alcohol use disorder, alcoholic liver disease with recent hospital admission between 9/8-9/11 for hypovolemic hypotension, alcoholic hepatitis and pancreatitis, concern for duodenitis with possible contained perforation versus diverticulum, GATITO.  Also has history of generalized anxiety disorder,  HTN who now presents with worsening fatigue, weakness and shortness of breath over the last week.    Decompensated alcoholic liver cirrhosis  Portal hypertension with splenomegaly and ascites  Lower extremity edema  Leukocytosis, concern for SBP  Coagulopathy secondary to liver disease  Hypoalbuminemia  Patient notes he was doing okay after he discharged from hospital, was even able to return to his job with  at [employer] for few days 2 weeks ago.  However over the last week to 10 days has been progressively more weak, tired and gets short of breath with minimal exertion.  Is having hard time sleeping, has discomfort in lower abdomen, generalized pains.  Also notes increased frequency of small-volume loose stools about every 3-4 hours over the last few days.  No blood in stools or vomit.  Has been nauseous, no significant vomiting.  Has had decreased oral intake.  Does feel abdomen is distended and feels legs are mildly more swollen.  Denies any fevers, sweats but did have some intermittent chills.  Also notes some difficulty urinating/emptying bladder because at times his penis feels retracted.  He adamantly denies any alcohol use since last hospital discharge.  Currently lives with his parents who are both present in the room.    In the ED noted to have stable vitals, afebrile.  Labs significant for WBC 18.6, Hb 9.6, , BUN 39, creatinine 2.0 [was 0.5 on 9/11],  albumin 2.4, T. bili 21.7 [was 13.8 on 9/11], lipase 237, lactic acid 1.5, , ALT 69, INR 1.8.    CT CAP shows cirrhotic appearing liver with sequelae of portal hypertension including splenomegaly and worsened now large volume ascites.  Mild increased trace bilateral pleural effusions and body wall edema.  Given 2 g IV ceftriaxone in the ED to cover possible SBP.  Plan:  -Unclear what triggered current worsening/decompensated liver failure.  Has been having diarrhea last few days so this may have been a trigger.  Also concern for SBP given new large volume ascites seen on CT.  No evidence of GI bleed.  Has not been on diuretics.  Hemodynamics okay in ED.  -S/p on albumin infusion 50 g IV x1   -Continue IV ceftriaxone to cover for SBP -> Can stop as SBP ruled out  -BC -> NGTD  -Ordered ultrasound paracentesis therapeutic and diagnostic for 09/26 ->  3.825 L of  straw  colored fluid was drained, fluid studies not consistent with SBP  -Consult to [facility] who saw patient during last hospitalization -> EGD today  - Normal upper third of esophagus and middle third                             of esophagus.                             - Z-line regular, 38 cm from the incisors.                             - Esophageal polyp(s) were found. Resected and                             retrieved. Clip was placed.                             - Esophageal mucosal tear with bleeding                             (complication of clip placement). Hemostasis                             achieved with bipolar probe.   -Will likely need to be started on diuretics during this hospitalization  -2 g sodium diet  -1U FFP ordered for 09/26 given INR 2.18     GATITO, prerenal versus hepatorenal  Hyponatremia  Nongap acidosis in setting of diarrhea and GATITO  -Albumin infusions ordered as noted above.  Can administer up to 100 g/day for 2 days and monitor response with creatinine.  If not responding may be more in line with hepatorenal syndrome.   Would consult nephrology if not improving.  -Creatinine had gone up to 2.9 during last hospitalization and sodium was 128.  Creatinine normalized and sodium came up to 134 with NS during last hospitalization, however he did not have significant ascites and was hypotensive and hypovolemic at that time.  -Closely monitor renal function  -Nephrology consulted -> on IV albumin currently    Diarrhea -> C Diff positive  Assessment/Plan: Likely colonizer- however given diarrhea, decompensated liver cirrhosis continue 14 day treatment of vancomycin 125 mg QID     Macrocytic anemia  Hb 9.6.  Recent baseline appears to be around 10.  No evidence of overt GI bleeding. But hgb down to 7.3 today  Plan:  -Monitor hemoglobin.  -Transfuse Hgb < 7.0  -Transfuse 1U PRBC for Hgb 6.9 today    Elevated lipase, recent alcoholic pancreatitis  No significant abdominal pain at this time.  Lipase was 272 on 9/8, currently 237.    Severe alcohol use disorder  Strongly encourage complete alcohol cessation.  He reports being sober since last hospital discharge.  He refused to talk with CD or psychiatry during last hospitalization.    Recent duodenitis/ulcer with contained perforation versus duodenal diverticulum seen on CT  Noted on CT during last hospitalization.  -Continue PPI  -No significant abdominal pain at this time.  Consult to [facility] as noted above.    History of hypertension  -Recently had issues with hypotension.  Not on any medications.  Noted.  Currently normotensive.       Diet: 2 Gram Sodium Diet    DVT Prophylaxis: Pneumatic Compression Devices  Bosch Catheter: Not present  Lines: None     Cardiac Monitoring: None  Code Status: Full Code      Clinically Significant Risk Factors         # Hyponatremia: Lowest Na = 124 mmol/L in last 2 days, will monitor as appropriate      # Hypoalbuminemia: Lowest albumin = 2.4 g/dL at 9/25/2023  2:24 PM, will monitor as appropriate    # Coagulation Defect: INR = 2.50 (Ref range: 0.85 -  1.15) and/or PTT = N/A, will monitor for bleeding             # Obesity: Estimated body mass index is 37.05 kg/m  as calculated from the following:    Height as of this encounter: 1.829 m (6').    Weight as of this encounter: 123.9 kg (273 lb 3.2 oz)., PRESENT ON ADMISSION            Disposition Plan      Expected Discharge Date: 09/30/2023                  Elio Chaves MD  Hospitalist Service  [facility]  Securely message with DATY (more info)  Text page via [employer] Paging/Directory   ______________________________________________________________________    Interval History     No acute events overnight  No CP/SOB  No nausea / vomiting or significant abdominal pain  Hgb declining   No significant diarrhea today / no bloody stools   No fevers  No new complaints    Physical Exam   Vital Signs: Temp: 97.7  F (36.5  C) Temp src: Oral BP: 108/54 Pulse: 101   Resp: 20 SpO2: 93 % O2 Device: None (Room air)    Weight: 273 lbs 3.2 oz    Constitutional: Awake, alert, cooperative, no apparent distress. Jaundiced, obese male, appears sick, laying in bed  Eyes: Significantly icteric  Respiratory: Clear to auscultation bilaterally, nonlabored breathing, diminished at bases  Cardiovascular: Regular rate and rhythm, normal S1 and S2, and no murmur noted.  GI: Soft, distended, positive fluid wave, not significantly tender  Skin: No rashes, no cyanosis, 1+ bilateral lower extremity edema noted  Musculoskeletal: No joint swelling, erythema or tenderness.  Neurologic: Alert, oriented and engages in appropriate conversation, no facial asymmetry, moving all extremities, fluent speech  Psychiatric: Calm, flat affect    ----------------------------------------------------------------------------------------    Medical Decision Making       ------------------ MEDICAL DECISION MAKING ------------------------------------------------------------------------------------------------------  High Complexity Decision  Making       Data   ------------------------- PAST 24 HR DATA REVIEWED -----------------------------------------------    I have personally reviewed the following data over the past 24 hrs:    11.1 (H)  \   6.9 (LL)   / 140 (L)     127 (L) 96 (L) 54.4 (H) /  115 (H)   3.8 17 (L) 2.22 (H) \     ALT: 47 AST: 151 (H) AP: 132 (H) TBILI: 18.7 (HH)   ALB: 2.7 (L) TOT PROTEIN: N/A LIPASE: N/A     INR:  2.50 (H) PTT:  N/A   D-dimer:  N/A Fibrinogen:  N/A     Imaging results reviewed over the past 24 hrs:   No results found for this or any previous visit (from the past 24 hour(s)).  ------------------------- ENCOUNTER LABS ----------------------------------------------------------------  Recent Labs   Lab 09/29/23  0647 09/28/23  1839 09/28/23  0547 09/27/23  2223 09/27/23  0722 09/26/23  0724 09/25/23  1425 09/25/23  1424   WBC 11.1*  --  11.5*  --  10.8 13.6*  --  18.6*   HGB 6.9* 7.3* 7.5*   < > 7.6* 8.3*  --  9.6*   *  --  106*  --  106* 105*  --  106*   *  --  161  --  180 214  --  270   INR 2.50*  --   --   --  2.13* 2.18*   < >  --    * 124* 125*  --  126* 125*  --  125*   POTASSIUM 3.8  --  3.8  --  4.0 4.0  --  4.9   CHLORIDE 96*  --  94*  --  93* 93*  --  93*   CO2 17*  --  19*  --  19* 20*  --  18*   BUN 54.4*  --  53.3*  --  49.3* 45.2*  --  39.4*   CR 2.22* 2.27* 2.32*  --  2.40* 2.21*  --  2.01*   ANIONGAP 14  --  12  --  14 12  --  14   MIGUELINA 8.8  --  9.0  --  9.0 9.1  --  9.0   *  --  105*  --  104* 102*  --  120*   ALBUMIN 2.7*  --  2.7*  --  2.6* 2.6*  --  2.4*   BILITOTAL 18.7*  --  19.4*  --  19.8* 20.1*  --  21.7*   ALKPHOS 132*  --  138*  --  150* 185*  --  182*   ALT 47  --  50  --  54 57  --  69   *  --  167*  --  181* 169*   < >  --    LIPASE  --   --   --   --   --   --   --  237*    < > = values in this interval not displayed.       Most Recent 3 CBC's:  Recent Labs   Lab Test 09/29/23  0647 09/28/23  1839 09/28/23  0547 09/27/23  2223 09/27/23  0722   WBC 11.1*  --   11.5*  --  10.8   HGB 6.9* 7.3* 7.5*   < > 7.6*   *  --  106*  --  106*   *  --  161  --  180    < > = values in this interval not displayed.     Most Recent 3 BMP's:  Recent Labs   Lab Test 09/29/23  0647 09/28/23  1839 09/28/23  0547 09/27/23  0722   * 124* 125* 126*   POTASSIUM 3.8  --  3.8 4.0   CHLORIDE 96*  --  94* 93*   CO2 17*  --  19* 19*   BUN 54.4*  --  53.3* 49.3*   CR 2.22* 2.27* 2.32* 2.40*   ANIONGAP 14  --  12 14   MIGUELINA 8.8  --  9.0 9.0   *  --  105* 104*     Most Recent 2 LFT's:  Recent Labs   Lab Test 09/29/23  0647 09/28/23  0547   * 167*   ALT 47 50   ALKPHOS 132* 138*   BILITOTAL 18.7* 19.4*     Most Recent 3 INR's:  Recent Labs   Lab Test 09/29/23  0647 09/27/23  0722 09/26/23  0724   INR 2.50* 2.13* 2.18*     Anticoagulation Dose History          Latest Ref Rng & Units 9/8/2023 9/10/2023 9/11/2023 9/25/2023 9/26/2023   Recent Dosing and Labs   INR 0.85 - 1.15 1.41  1.52  1.52  1.88  2.18      Most Recent 3 Creatinines:  Recent Labs   Lab Test 09/29/23  0647 09/28/23  1839 09/28/23  0547   CR 2.22* 2.27* 2.32*     Most Recent 3 Hemoglobins:  Recent Labs   Lab Test 09/29/23  0647 09/28/23  1839 09/28/23  0547   HGB 6.9* 7.3* 7.5*     Most Recent 3 Troponin's:No lab results found.  Most Recent 3 BNP's:No lab results found.  Most Recent D-dimer:No lab results found.  Most Recent Cholesterol Panel:No lab results found.  Most Recent 6 Bacteria Isolates From Any Culture (See EPIC Reports for Culture Details):No lab results found.

## 2023-09-29 NOTE — PROGRESS NOTES
[facility]     Renal Progress Note       SHORTHAND KEY FOR MY NOTES:  c = with, s = without, p = after, a = before, x = except, asx = asymptomatic, tx = transplant or treatment, sx = symptoms or symptomatic, cx = canceled or culture, rxn = reaction, yday = yesterday, nl = normal, abx = antibiotics, fxn = function, dx = diagnosis, dz = disease, m/h = melena/hematochezia, c/d/l/ha = cramping/dizziness/lightheadedness/headache, d/c = discharge or diarrhea/constipation, f/c/n/v = fevers/chills/nausea/vomiting, cp/sob = chest pain/shortness of breath, tbv = total body volume, rxn = reaction, tdc = tunneled dialysis catheter, pta = prior to admission, hd = hemodialysis, pd = peritoneal dialysis, hhd = home hemodialysis, edw = estimated dry wt         Assessment/Plan:     1.  GATITO.  Pt's cr is stable in the low 2s.  Over the past month, he has been in the 0.6-2.8 range.  He is urinating a bit, but not a lot.  Shan is 20 today, but not completely suppressed.  Even so, this is largely due to HRS II.  Electrolytes are ok today.  Pt is not a liver tx candidate due to current alcohol use, therefore not a good dialysis candidate.  A.  Check labs, uo, sx daily.  B.  Avoid nephrotoxics.  C.  Continue IV alb.  D.  Check Shan in AM.  E.  If he gets worse clinically, consider adding octreo/mido to the IV alb.    2.  EtOHic cirrhosis.  Pt is quite jaundiced.  Bili remains high.  INR is elevated due to a fixed coagulopathy.  A.  Per GI.    3.  Hyponatremia.  Pt's Na is stable.  He is TBV up and has liver dz.    A.  Follow Na.          Interval History:     Pt denies any issues today.  No f/c/n/v.  Urinating a bit, but not a lot.  No cp/sob.          Medications and Allergies:      albumin human  12.5 g Intravenous Q8H    multivitamin, therapeutic  1 tablet Oral Daily    pantoprazole  40 mg Oral BID    potassium chloride  20 mEq Oral BID    sodium chloride (PF)  3 mL Intracatheter Q8H    vancomycin  125 mg Oral 4x  Daily     Allergies   Allergen Reactions    Nsaids      Patient reports having a reaction of puffy eyes and dry throat a few years ago, but he has taken Advil in 2023 and did not react.          Physical Exam:     Vitals were reviewed     , Blood pressure 108/54, pulse 101, temperature 97.7  F (36.5  C), temperature source Oral, resp. rate 20, height 1.829 m (6'), weight 123.9 kg (273 lb 3.2 oz), SpO2 93 %.  Wt Readings from Last 3 Encounters:   09/29/23 123.9 kg (273 lb 3.2 oz)   09/11/23 121.9 kg (268 lb 11.9 oz)   03/11/22 98.4 kg (217 lb)     Intake/Output Summary (Last 24 hours) at 9/29/2023 1656  Last data filed at 9/29/2023 1345  Gross per 24 hour   Intake 830 ml   Output 570 ml   Net 260 ml     GENERAL APPEARANCE: pleasant, NAD, alert  HEENT:  + scleral icterus  RESP: CTA B c good efforts  CV: reg, tachy, nl S1/S2   ABDOMEN: o/s/nt, distended  EXTREMITIES/SKIN: + jaundice, 2-3+ ble edema         Data:     CBC RESULTS:     Recent Labs   Lab 09/29/23  1610 09/29/23  0647 09/28/23  1839 09/28/23  0547 09/27/23  2223 09/27/23  0722 09/26/23  0724 09/25/23  1424   WBC  --  11.1*  --  11.5*  --  10.8 13.6* 18.6*   RBC  --  1.83*  --  1.94*  --  1.99* 2.16* 2.55*   HGB 7.6* 6.9* 7.3* 7.5* 7.4* 7.6* 8.3* 9.6*   HCT  --  19.4*  --  20.5*  --  21.1* 22.7* 27.1*   PLT  --  140*  --  161  --  180 214 270     Basic Metabolic Panel:  Recent Labs   Lab 09/29/23  0647 09/28/23  1839 09/28/23  0547 09/27/23  0722 09/26/23  0724 09/25/23  1424   * 124* 125* 126* 125* 125*   POTASSIUM 3.8  --  3.8 4.0 4.0 4.9   CHLORIDE 96*  --  94* 93* 93* 93*   CO2 17*  --  19* 19* 20* 18*   BUN 54.4*  --  53.3* 49.3* 45.2* 39.4*   CR 2.22* 2.27* 2.32* 2.40* 2.21* 2.01*   *  --  105* 104* 102* 120*   MIGUELINA 8.8  --  9.0 9.0 9.1 9.0     INR  Recent Labs   Lab 09/29/23  0647 09/27/23  0722 09/26/23  0724 09/25/23  1425   INR 2.50* 2.13* 2.18* 1.88*      Attestation:   I have reviewed today's relevant vital signs, notes, medications,  labs and imaging.    Anshu Ro MD  [facility] - Nephrology  [phone]

## 2023-09-29 NOTE — PROGRESS NOTES
[facility]  Gastroenterology Progress Note     Crispin Ham MRN# [id number]   YOB: 1985 Age: 38 year old          Assessment and Plan:     Crispin Ham is a 38 year old male with history of alcohol abuse, and liver cirrhosis among others; admitted on 9/25 whom presented with fatigue, weakness and shortness of breath.     Liver failure (H)  Alcoholic liver cirrhosis with ascites  CT C/A/P shows cirrhotic appearing liver with sequelae of portal hypertension including splenomegaly and worsened now large volume ascites.  Mild increased trace bilateral pleural effusions and body wall edema.   MELD 35 Na++ 127, Cr 2.22 (0.5 on 9/11), Tbili 18.7( 13.9 on 9/11), INR 2.50 (90 day mortality of 53%)  LFTs: AST//50 ( alcoholic pattern)   Unable to start prednisolone with Maddrey score over 40 given underlying sepsis  Not a candidate for liver transplant with recent Alcohol use in last 2 weeks  9/27 EGD noted esophageal polyp-resected and clips placed. Esophageal mucosal tear with bleeding- complication of clip placement- hemostasis achieved. Portal hypertensive gastropathy     - 2 g sodium diet after EGD today  - Daily labs CMP, CBC, INR  - Cr continues to elevate- will need albumin until 3.5     Leukocytosis  WBC 18.6->10.8->11.5->11.1  Paracentesis on 9/26 removed 3.8 L of straw colored fluid with cell count of 59 and 3 % neutrophils- no evidence of SBP     - treat empirically with ceftriaxone  - would consider diuretics but has GATITO/hepatorenal syndrome and Cr significantly above baseline  - continue albumin to treat potential hepatorenal syndrome and ascites     Recent duodenitis/ulcer with contained perforation versus duodenal diverticulum seen on CT   Anemia  Hemoglobin 9.6->8.3->7.3 ->7.5->6.9 baseline 10.7 on 9/9/23  No visible blood loss  On heparin  INR 2.13  Likely due to bone marrow suppression with sepsis vs hemorrhagic gastritis vs unlikely variceal  bleed     - pantoprazole 40 mg BID  - hemoglobin q 12 hours    C difficile   C difficile toxin by PCR positive, Ag positive and toxin by enzyme immunoassay negative  Likely colonizer- however given diarrhea, decompensated liver cirrhosis continued ongoing 14 day treatment of vancomycin 125 mg QID is recommended         Interval History:     denies chest pain, denies shortness of breath, denies abdominal pain, doing well; no cp, sob, n/v/d, or abd pain.              Review of Systems:     C: NEGATIVE for fever, chills, change in weight  E/M: NEGATIVE for ear, mouth and throat problems  R: NEGATIVE for significant cough or SOB  CV: NEGATIVE for chest pain, palpitations or peripheral edema             Medications:   I have reviewed this patient's current medications   albumin human  12.5 g Intravenous Q8H    multivitamin, therapeutic  1 tablet Oral Daily    pantoprazole  40 mg Oral BID    potassium chloride  20 mEq Oral BID    sodium chloride (PF)  3 mL Intracatheter Q8H    vancomycin  125 mg Oral 4x Daily                  Physical Exam:   Vitals were reviewed  Vital Signs with Ranges  Temp:  [97.6  F (36.4  C)-98  F (36.7  C)] 98  F (36.7  C)  Pulse:  [] 100  Resp:  [17-22] 22  BP: ()/(46-55) 96/50  SpO2:  [93 %-95 %] 95 %  I/O last 3 completed shifts:  In: 800 [P.O.:800]  Out: 175 [Urine:175]  Constitutional: jaundice, alert, and no distress   Cardiovascular: negative, PMI normal. No lifts, heaves, or thrills. RRR. No murmurs, clicks gallops or rub  Respiratory: negative, Percussion normal. Good diaphragmatic excursion. Lungs clear  Abdomen: Abdomen soft, non-tender. BS normal. No masses, organomegaly           Data:   I reviewed the patient's new clinical lab test results.   Recent Labs   Lab Test 09/29/23  0647 09/28/23  1839 09/28/23  0547 09/27/23  2223 09/27/23  0722 09/26/23  0724   WBC 11.1*  --  11.5*  --  10.8 13.6*   HGB 6.9* 7.3* 7.5*   < > 7.6* 8.3*   *  --  106*  --  106* 105*   PLT  140*  --  161  --  180 214   INR 2.50*  --   --   --  2.13* 2.18*    < > = values in this interval not displayed.       Recent Labs   Lab Test 09/29/23  0647 09/28/23  0547 09/27/23  0722   POTASSIUM 3.8 3.8 4.0   CHLORIDE 96* 94* 93*   CO2 17* 19* 19*   BUN 54.4* 53.3* 49.3*   ANIONGAP 14 12 14       Recent Labs   Lab Test 09/29/23  0647 09/28/23  0547 09/27/23  0722 09/26/23  0724 09/25/23  1646 09/25/23  1537 09/25/23  1424 09/09/23  1119 09/08/23  1224 09/08/23  1147   ALBUMIN 2.7* 2.7* 2.6*   < >  --   --  2.4*   < >  --  2.3*   BILITOTAL 18.7* 19.4* 19.8*   < >  --   --  21.7*   < >  --  10.4*   ALT 47 50 54   < >  --   --  69   < >  --  82*   * 167* 181*   < >  --    < >  --    < >  --  331*   PROTEIN  --   --   --   --  30*  --   --   --  50*  --    LIPASE  --   --   --   --   --   --  237*  --   --  272*    < > = values in this interval not displayed.         I reviewed the patient's new imaging results.    All laboratory data reviewed  All imaging studies reviewed by me.    Jeaneth Luu PA-C,  9/27/2023  [facility]  Office : [phone]  Cell: [phone] (Dr. Gibson)  Cell: [phone] (Jeaneth Luu PA-C)

## 2023-09-29 NOTE — PROVIDER NOTIFICATION
MD Notification    Notified Person: MD    Notified Person Name: Elio Floyd    Notification Date/Time: 9/29/23 0730    Notification Interaction: EventWith webpage    Purpose of Notification: critical lab Hgb 6.9    Orders Received:    Comments:

## 2023-09-29 NOTE — PLAN OF CARE
Orientation: A/Ox4   Activity: Independent  Diet/BS Checks: 2g sodium diet  Tele:  N/A  IV Access/Drains: R. PIV infusing NS @ 75mL/hr  Pain Management: denies  Abnormal VS/Results: VSS on RA-tachy  Bowel/Bladder: Continent B/B  Skin/Wounds: Yellow sceleras/skin jaundice all over. BLE edema L-3+, R-2+  Consults: Nephrology and GI  D/C Disposition: Pending improvement  Other Info: Urine sample sent down this shift.

## 2023-09-29 NOTE — PLAN OF CARE
Orientation: A/Ox4  Activity: SBA - refuses bed alarm and mobility aid, moving steadily, education provided  Diet/BS Checks: 2 gm Na diet  Tele:  -  IV Access/Drains: PIV inf NS @ 75 ml/hr  Pain Management: Denies pain and nausea  Abnormal VS/Results: VSS on RA, hbg 7.3  Bowel/Bladder: Cont B/B, no Bm this shift  Skin/Wounds: Jaundiced skin and sclera. BLE edema +2-3  Consults: Nephrology, GI  D/C Disposition: Pending medical improvement  Other Info: Hbg checks q12. No NSAIDs except daily aspirin. On isolation precautions for C.Diff

## 2023-09-30 LAB
ALBUMIN SERPL BCG-MCNC: 2.7 G/DL (ref 3.5–5.2)
ALP SERPL-CCNC: 135 U/L (ref 40–129)
ALT SERPL W P-5'-P-CCNC: 42 U/L (ref 0–70)
ANION GAP SERPL CALCULATED.3IONS-SCNC: 12 MMOL/L (ref 7–15)
AST SERPL W P-5'-P-CCNC: 146 U/L (ref 0–45)
BILIRUB SERPL-MCNC: 18 MG/DL
BUN SERPL-MCNC: 54 MG/DL (ref 6–20)
CALCIUM SERPL-MCNC: 8.8 MG/DL (ref 8.6–10)
CHLORIDE SERPL-SCNC: 95 MMOL/L (ref 98–107)
CREAT SERPL-MCNC: 1.72 MG/DL (ref 0.67–1.17)
DEPRECATED HCO3 PLAS-SCNC: 17 MMOL/L (ref 22–29)
EGFRCR SERPLBLD CKD-EPI 2021: 52 ML/MIN/1.73M2
ERYTHROCYTE [DISTWIDTH] IN BLOOD BY AUTOMATED COUNT: 14.7 % (ref 10–15)
GLUCOSE SERPL-MCNC: 101 MG/DL (ref 70–99)
HCT VFR BLD AUTO: 21.6 % (ref 40–53)
HGB BLD-MCNC: 7.7 G/DL (ref 13.3–17.7)
HGB BLD-MCNC: 7.7 G/DL (ref 13.3–17.7)
HGB BLD-MCNC: 7.9 G/DL (ref 13.3–17.7)
MAGNESIUM SERPL-MCNC: 1.7 MG/DL (ref 1.7–2.3)
MCH RBC QN AUTO: 36.8 PG (ref 26.5–33)
MCHC RBC AUTO-ENTMCNC: 35.6 G/DL (ref 31.5–36.5)
MCV RBC AUTO: 103 FL (ref 78–100)
PLATELET # BLD AUTO: 138 10E3/UL (ref 150–450)
POTASSIUM SERPL-SCNC: 3.9 MMOL/L (ref 3.4–5.3)
PROT SERPL-MCNC: 5.7 G/DL (ref 6.4–8.3)
RBC # BLD AUTO: 2.09 10E6/UL (ref 4.4–5.9)
SODIUM SERPL-SCNC: 124 MMOL/L (ref 135–145)
SODIUM UR-SCNC: <20 MMOL/L
WBC # BLD AUTO: 12 10E3/UL (ref 4–11)

## 2023-09-30 PROCEDURE — 80053 COMPREHEN METABOLIC PANEL: CPT | Performed by: INTERNAL MEDICINE

## 2023-09-30 PROCEDURE — 84300 ASSAY OF URINE SODIUM: CPT | Performed by: INTERNAL MEDICINE

## 2023-09-30 PROCEDURE — 250N000011 HC RX IP 250 OP 636: Performed by: INTERNAL MEDICINE

## 2023-09-30 PROCEDURE — 258N000003 HC RX IP 258 OP 636: Performed by: INTERNAL MEDICINE

## 2023-09-30 PROCEDURE — 85027 COMPLETE CBC AUTOMATED: CPT | Performed by: INTERNAL MEDICINE

## 2023-09-30 PROCEDURE — 250N000011 HC RX IP 250 OP 636: Performed by: PHYSICIAN ASSISTANT

## 2023-09-30 PROCEDURE — 250N000013 HC RX MED GY IP 250 OP 250 PS 637: Performed by: STUDENT IN AN ORGANIZED HEALTH CARE EDUCATION/TRAINING PROGRAM

## 2023-09-30 PROCEDURE — 250N000013 HC RX MED GY IP 250 OP 250 PS 637: Performed by: HOSPITALIST

## 2023-09-30 PROCEDURE — 85018 HEMOGLOBIN: CPT | Performed by: INTERNAL MEDICINE

## 2023-09-30 PROCEDURE — 99233 SBSQ HOSP IP/OBS HIGH 50: CPT | Performed by: INTERNAL MEDICINE

## 2023-09-30 PROCEDURE — 99232 SBSQ HOSP IP/OBS MODERATE 35: CPT | Performed by: INTERNAL MEDICINE

## 2023-09-30 PROCEDURE — 83735 ASSAY OF MAGNESIUM: CPT | Performed by: INTERNAL MEDICINE

## 2023-09-30 PROCEDURE — 250N000013 HC RX MED GY IP 250 OP 250 PS 637: Performed by: INTERNAL MEDICINE

## 2023-09-30 PROCEDURE — 120N000001 HC R&B MED SURG/OB

## 2023-09-30 PROCEDURE — 36415 COLL VENOUS BLD VENIPUNCTURE: CPT | Performed by: INTERNAL MEDICINE

## 2023-09-30 PROCEDURE — P9047 ALBUMIN (HUMAN), 25%, 50ML: HCPCS | Performed by: PHYSICIAN ASSISTANT

## 2023-09-30 PROCEDURE — P9047 ALBUMIN (HUMAN), 25%, 50ML: HCPCS | Performed by: INTERNAL MEDICINE

## 2023-09-30 RX ORDER — ALBUMIN (HUMAN) 12.5 G/50ML
25 SOLUTION INTRAVENOUS 3 TIMES DAILY
Status: DISCONTINUED | OUTPATIENT
Start: 2023-09-30 | End: 2023-10-03

## 2023-09-30 RX ORDER — SODIUM BICARBONATE 650 MG/1
650 TABLET ORAL 2 TIMES DAILY
Status: DISCONTINUED | OUTPATIENT
Start: 2023-09-30 | End: 2023-10-04 | Stop reason: HOSPADM

## 2023-09-30 RX ADMIN — PANTOPRAZOLE SODIUM 40 MG: 40 TABLET, DELAYED RELEASE ORAL at 09:01

## 2023-09-30 RX ADMIN — VANCOMYCIN HYDROCHLORIDE 125 MG: 125 CAPSULE ORAL at 13:15

## 2023-09-30 RX ADMIN — SODIUM CHLORIDE: 9 INJECTION, SOLUTION INTRAVENOUS at 02:33

## 2023-09-30 RX ADMIN — ALBUMIN HUMAN 25 G: 0.25 SOLUTION INTRAVENOUS at 22:17

## 2023-09-30 RX ADMIN — SODIUM BICARBONATE 650 MG TABLET 650 MG: at 11:46

## 2023-09-30 RX ADMIN — VANCOMYCIN HYDROCHLORIDE 125 MG: 125 CAPSULE ORAL at 09:01

## 2023-09-30 RX ADMIN — MULTIVITAMIN TABLET 1 TABLET: TABLET at 09:01

## 2023-09-30 RX ADMIN — ALBUMIN HUMAN 12.5 G: 0.25 SOLUTION INTRAVENOUS at 05:31

## 2023-09-30 RX ADMIN — VANCOMYCIN HYDROCHLORIDE 125 MG: 125 CAPSULE ORAL at 22:16

## 2023-09-30 RX ADMIN — VANCOMYCIN HYDROCHLORIDE 125 MG: 125 CAPSULE ORAL at 17:54

## 2023-09-30 RX ADMIN — PANTOPRAZOLE SODIUM 40 MG: 40 TABLET, DELAYED RELEASE ORAL at 20:27

## 2023-09-30 RX ADMIN — SODIUM BICARBONATE 650 MG TABLET 650 MG: at 20:27

## 2023-09-30 ASSESSMENT — ACTIVITIES OF DAILY LIVING (ADL)
ADLS_ACUITY_SCORE: 22

## 2023-09-30 NOTE — PLAN OF CARE
Orientation: AO x4.   Activity: SBA  Diet/BS Checks: 2g Na diet.   Tele:  N/a.  IV Access/Drains: PIV infusing NS @ 75 ml/hr.   Pain Management: Denies.   Abnormal VS/Results: VS- tachy and soft Bps. Hgb recheck this AM.   Bowel/Bladder: Incontinent of bladder at times. No BM this shift.   Skin/Wounds: Jaundiced skin and sclera.  +2-3 BLE edema.   Consults: Nephrology, GI  D/C Disposition: Discharge pending improvement.   Other Info: Maintain enteric precautions for C.Diff.  Random sodium urine sample collected this AM.

## 2023-09-30 NOTE — PROGRESS NOTES
Renal Medicine Progress Note                                Crispin Ham MRN# [id number]   Age: 38 year old YOB: 1985   Date of Admission: 9/25/2023 Hospital LOS: 5                  Assessment/Plan:     1.  GATITO.      Pt's cr is stable in the low 2s.  Over the past month, he has been in the 0.6-2.8 range.  He is urinating a bit, but not a lot.  Shan is 20 today, but not completely suppressed.  Even so, this is largely due to HRS II.  Electrolytes are ok today.  Pt is not a liver tx candidate due to current alcohol use, therefore not a good dialysis candidate.     2.  EtOHic cirrhosis.  Pt is quite jaundiced.  Bili remains high.  INR is elevated due to a fixed coagulopathy.     3.  Hyponatremia.  Pt's Na is stable.  He is TBV up and has liver dz.        Creatinine stable to improved  Persistent NAGMA  Hyponatremia    Shan < 20 (actual value would be helpful)    Stop IVF  Oral HCO3-  FR 1200    Not a dialysis candidate  Would not use diuretic at this time  Guarded prognosis         Interval History:     Comfortable in bed  Appears ill  Jaundice  Edema    IO reviewed  Labs reviewed with patient     ROS:     GENERAL: NAD, No fever,chills  R: NEGATIVE for significant cough or SOB  CV: NEGATIVE for chest pain, palpitations  EXT: no change edema  ROS otherwise negative    Medications and Allergies:     Reviewed    Physical Exam:     Vitals were reviewed  Patient Vitals for the past 8 hrs:   BP Temp Temp src Pulse Resp SpO2   09/30/23 0839 94/48 97.6  F (36.4  C) Oral 105 18 94 %   09/30/23 0539 98/53 -- -- -- -- --   09/30/23 0535 (!) 83/49 -- -- -- -- --   09/30/23 0500 (!) 88/48 97.8  F (36.6  C) -- 100 21 --     Wt Readings from Last 4 Encounters:   09/29/23 123.9 kg (273 lb 3.2 oz)   09/11/23 121.9 kg (268 lb 11.9 oz)   03/11/22 98.4 kg (217 lb)     I/O last 3 completed shifts:  In: 830 [P.O.:580]  Out: 570 [Urine:570]    Vitals:    09/25/23 2253 09/26/23 0202 09/27/23 0046 09/28/23 0607   Weight: 124.7  kg (274 lb 14.4 oz) 125 kg (275 lb 9.6 oz) 121.6 kg (268 lb 1.6 oz) 121.8 kg (268 lb 9.6 oz)    09/29/23 0610   Weight: 123.9 kg (273 lb 3.2 oz)       GENERAL: alert/jaundice   HEENT: NC/AT, PERRLA, EOMI, non icteric, pharynx moist without lesion  RESP:  clear anteriorly  CV: RRR, normal S1 S2  ABDOMEN: distended  MS: no clubbing, cyanosis   EXT: 3 + edema     Data:     Recent Labs   Lab 09/30/23 0747 09/29/23 1957 09/29/23 0647 09/28/23 1839 09/28/23  0547 09/27/23  0722   * 126* 127* 124* 125* 126*   POTASSIUM 3.9  --  3.8  --  3.8 4.0   CHLORIDE 95*  --  96*  --  94* 93*   CO2 17*  --  17*  --  19* 19*   ANIONGAP 12  --  14  --  12 14   *  --  115*  --  105* 104*   BUN 54.0*  --  54.4*  --  53.3* 49.3*   CR 1.72* 1.86* 2.22* 2.27* 2.32* 2.40*   GFRESTIMATED 52* 48* 38*  --  36* 35*   MIGUELINA 8.8  --  8.8  --  9.0 9.0         Recent Labs   Lab 09/30/23  0747   *   POTASSIUM 3.9   CHLORIDE 95*   CO2 17*   ANIONGAP 12   *   BUN 54.0*   CR 1.72*   GFRESTIMATED 52*   MIGUELINA 8.8     Recent Labs   Lab 09/30/23 0747 09/29/23 1957 09/29/23 0647 09/28/23 1839 09/28/23  0547 09/27/23  0722 09/26/23  0724 09/25/23  1424   CR 1.72* 1.86* 2.22* 2.27* 2.32* 2.40* 2.21* 2.01*     Recent Labs   Lab 09/30/23 0747 09/29/23 0647 09/28/23  0547 09/27/23  0722   ALBUMIN 2.7* 2.7* 2.7* 2.6*     Recent Labs   Lab 09/30/23  0747 09/29/23 1957 09/29/23  1610 09/29/23  0647   PHOS  --   --   --  3.7   HGB 7.7*  7.7* 7.9* 7.6* 6.9*     Recent Labs   Lab 09/30/23  0622 09/29/23 1627 09/28/23 2122   UNAR <20 20 20         G Phoenix Rogel MD    [facility] - Nephrology  [phone]

## 2023-09-30 NOTE — PLAN OF CARE
Goal Outcome Evaluation:      Plan of Care Reviewed With: patient    Overall Patient Progress: improvingOverall Patient Progress: improving     [id number]  Ad 9/25 with worsening fatigue, weakness and shortness of breath  Orientation: A&Ox4  Activity: SBA/Independent   Diet/BS Checks: 2 gm Na diet/Fluid restrictions 1200mL  Tele:  NA  IV Access/Drains:PIV SL  Pain Management: Denies pain and nausea  Abnormal VS/Results: VSS on RA, SBP high 90's, Hgb 7.9, q12  Bowel/Bladder: Continent B/B, Urine dark florence, soft stools per pt  Skin/Wounds: Jaundiced skin and sclera. BLE edema +2-3  Consults: Nephrology, GI  D/C Disposition: Pending medical improvement  Other Info:  Hbg checks q12. No NSAIDs except daily aspirin. On isolation precautions for C.Diff. PCD's applied.

## 2023-09-30 NOTE — PLAN OF CARE
Goal Outcome Evaluation:  Summary: Severe alcohol use disorder, alcoholic liver disease  Primary Diagnosis: alcoholic liver cirrhosis, portal HTN, splenomegaly, hypoalbuminemia, GATITO    [id number] 9/29  Ad 9/25 with worsening fatigue, weakness and shortness of breath  Orientation: A&Ox4  Activity: SBA/Independent   Diet/BS Checks: 2 gm Na diet  Tele:  NA  IV Access/Drains: PIV inf NS @ 75 ml/hr  Pain Management: Denies pain and nausea  Abnormal VS/Results: VSS on RA, SBP high 90's, hbg rechecked 7.9. q12h  Bowel/Bladder: Incontinent bladder at times, dark florence urine, Cont bowel, no Bm this shift  Skin/Wounds: Jaundiced skin and sclera. BLE edema +2-3  Consults: Nephrology, GI  D/C Disposition: Pending medical improvement  Other Info:  Hbg checks q12. No NSAIDs except daily aspirin. On isolation precautions for C.Diff.                       Patent

## 2023-09-30 NOTE — PROVIDER NOTIFICATION
MD Notification    Notified Person: MD    Notified Person Name: Atul Cardoza    Notification Date/Time: 1108 9/30/23    Notification Interaction: [employer] webpage    Purpose of Notification: Pt family in room would like to speak with you about pt updates    Orders Received:    Comments:

## 2023-09-30 NOTE — PROGRESS NOTES
[facility] [place]    Medicine Progress Note - Hospitalist Service    Date of Admission:  9/25/2023    Assessment & Plan   Crispin Ham is a 38 year old male with a history of severe alcohol use disorder, alcoholic liver disease with recent hospital admission between 9/8-9/11 for hypovolemic hypotension, alcoholic hepatitis and pancreatitis, concern for duodenitis with possible contained perforation versus diverticulum, GATITO.  Also has history of generalized anxiety disorder,  HTN who now presents with worsening fatigue, weakness and shortness of breath over the last week.     Decompensated alcoholic liver cirrhosis  Portal hypertension with splenomegaly and ascites  Lower extremity edema  Ruled out SBP   Coagulopathy secondary to liver disease  Hypoalbuminemia  On admission patient noted increased weakness, fatigue, dyspnea.  Also having hard time sleeping, discomfort in lower abdomen, generalized pains and increased frequency of small-volume loose stools about every 3-4 hours over the last few days.   CT CAP shows cirrhotic appearing liver with sequelae of portal hypertension including splenomegaly and worsened now large volume ascites.  Mild increased trace bilateral pleural effusions and body wall edema.  Lab work concerning for decompensated liver disease.    In the ED patient was iven 2 g IV ceftriaxone in the ED to cover possible SBP.  * BC -> NGTD  * Ultrasound paracentesis therapeutic and diagnostic for 09/26 ->  3.825 L of  straw colored fluid was drained, fluid studies not consistent with SBP  * EGD:  Normal upper third of esophagus and middle third of esophagus.  Z-line regular, 38 cm from the incisors. Esophageal polyp(s) were found. Resected and retrieved. Clip was placed. Esophageal mucosal tear with bleeding (complication of clip placement). Hemostasis achieved with bipolar probe.   MELD 3.0: 36 at 9/30/2023  7:47 AM  MELD-Na: 36 at 9/30/2023  7:47 AM  Calculated from:  Serum  Creatinine: 1.72 mg/dL at 9/30/2023  7:47 AM  Serum Sodium: 124 mmol/L (Using min of 125 mmol/L) at 9/30/2023  7:47 AM  Total Bilirubin: 18.0 mg/dL at 9/30/2023  7:47 AM  Serum Albumin: 2.7 g/dL at 9/30/2023  7:47 AM  INR(ratio): 2.50 at 9/29/2023  6:47 AM  Age at listing (hypothetical): 38 years  Sex: Male at 9/30/2023  7:47 AM  - S/p on albumin infusion 50 g IV x1   - Initially treated IV ceftriaxone to cover for SBP but stopped as SBP ruled out  - Will likely need to be started on diuretics during this hospitalization but needs to have improvement in renal function and electrolytes  - 2 g sodium diet  - 1U FFP ordered for 09/26 given INR 2.18  - GI following and appreciate their recommendations      GATITO, likely hepatorenal  Hyponatremia  Nongap acidosis in setting of diarrhea and GATITO  Creatinine had gone up to 2.9 during last hospitalization and sodium was 128.  Creatinine normalized and sodium came up to 134 with NS during last hospitalization.    During this hospitalization his renal function continues to worsening with worsening hyponatremia   - Continue to monitor closely  - Avoid nephrotoxin   - Albumin infusions ordered as noted above  - Nephrology following and appreciate their assistance.  Continue IV albumin, check Shan in AM and started sodium bicarb     Diarrhea -> C Diff positive  Likely colonizer- however given diarrhea, decompensated liver cirrhosis continue 14 day treatment of vancomycin 125 mg QID   - Continue PO Vanc      Macrocytic anemia  Hb 9.6.  Recent baseline appears to be around 10.  No evidence of overt GI bleeding. But hgb down to 7.3 today  - Monitor hemoglobins  -Transfuse Hgb < 7.0.  Required 1 unite on 9/29      Elevated lipase, recent alcoholic pancreatitis  No significant abdominal pain at this time.  Lipase was 272 on 9/8, currently 237.     Severe alcohol use disorder  Strongly encourage complete alcohol cessation.  He reports being sober since last hospital discharge.    -  Refused to talk with CD or psychiatry during last hospitalization     Recent duodenitis/ulcer with contained perforation versus duodenal diverticulum seen on CT  Noted on CT during last hospitalization.  - Continue PPI  - No significant abdominal pain at this time.  Consult to [facility] as noted above.     History of hypertension  Recently had issues with hypotension.  Not on any medications.  Noted.  Currently normotensive.       Diet: 2 Gram Sodium Diet  Fluid restriction 1200 ML FLUID    DVT Prophylaxis: None as elevated INR from liver disease.  If continues to remain bed bound will likely need at least PCDs  Bosch Catheter: Not present  Lines: None     Cardiac Monitoring: None  Code Status: Full Code      Clinically Significant Risk Factors         # Hyponatremia: Lowest Na = 124 mmol/L in last 2 days, will monitor as appropriate      # Hypoalbuminemia: Lowest albumin = 2.4 g/dL at 9/25/2023  2:24 PM, will monitor as appropriate  # Coagulation Defect: INR = 2.50 (Ref range: 0.85 - 1.15) and/or PTT = N/A, will monitor for bleeding           # Obesity: Estimated body mass index is 37.05 kg/m  as calculated from the following:    Height as of this encounter: 1.829 m (6').    Weight as of this encounter: 123.9 kg (273 lb 3.2 oz).             Disposition Plan      Expected Discharge Date: 10/02/2023                  Atul Watkins DO  Hospitalist Service  [facility]  Securely message with [employer] (more info)  Text page via [facility] Paging/Directory   ______________________________________________________________________    Interval History   Patient seen and examined.  No acute events over night.  No fevers or hypoxia noted.  Patient denies any further diarrhea.  No nausea or vomiting.  No chest pain or SOB.      Physical Exam   Vital Signs: Temp: 97.6  F (36.4  C) Temp src: Oral BP: 94/48 Pulse: 105   Resp: 18 SpO2: 94 % O2 Device: None (Room air)    Weight: 273 lbs 3.2 oz    General  Appearance: Resting comfortably. NAD  Respiratory: No respiratory distress on RA.  No obvious wheezing  Cardiovascular: Mildly tachycardiac.  No obvious murmurs  GI: Distended.  No obvious tenderness  Skin: Jaundiced. No obvious rashes or cyanosis   Other: Alert. Penny noted    Medical Decision Making       65 MINUTES SPENT BY ME on the date of service doing chart review, history, exam, documentation & further activities per the note.      Data   ------------------------- PAST 24 HR DATA REVIEWED -----------------------------------------------    I have personally reviewed the following data over the past 24 hrs:    12.0 (H)  \   7.7 (L); 7.7 (L)   / 138 (L)     124 (L) 95 (L) 54.0 (H) /  101 (H)   3.9 17 (L) 1.72 (H) \     ALT: 42 AST: 146 (H) AP: 135 (H) TBILI: 18.0 (HH)   ALB: 2.7 (L) TOT PROTEIN: 5.7 (L) LIPASE: N/A       Imaging results reviewed over the past 24 hrs:   No results found for this or any previous visit (from the past 24 hour(s)).

## 2023-09-30 NOTE — PROVIDER NOTIFICATION
MD Notification    Notified Person: MD    Notified Person Name: Phoenix Fischer    Notification Date/Time: 1141 9/30/23    Notification Interaction: Paging service [phone]      Purpose of Notification: Pt have questions regarding changes to medication (I.e. fluid stopping & Na tablet)    Orders Received: MD will swing by when able    Comments:

## 2023-10-01 LAB
ALBUMIN SERPL BCG-MCNC: 2.7 G/DL (ref 3.5–5.2)
ALP SERPL-CCNC: 135 U/L (ref 40–129)
ALT SERPL W P-5'-P-CCNC: 41 U/L (ref 0–70)
AMMONIA PLAS-SCNC: 64 UMOL/L (ref 16–60)
ANION GAP SERPL CALCULATED.3IONS-SCNC: 13 MMOL/L (ref 7–15)
AST SERPL W P-5'-P-CCNC: 152 U/L (ref 0–45)
BACTERIA BLD CULT: NO GROWTH
BACTERIA BLD CULT: NO GROWTH
BACTERIA FLD CULT: NO GROWTH
BILIRUB SERPL-MCNC: 17.9 MG/DL
BUN SERPL-MCNC: 52.8 MG/DL (ref 6–20)
CALCIUM SERPL-MCNC: 8.9 MG/DL (ref 8.6–10)
CHLORIDE SERPL-SCNC: 98 MMOL/L (ref 98–107)
CREAT SERPL-MCNC: 1.46 MG/DL (ref 0.67–1.17)
DEPRECATED HCO3 PLAS-SCNC: 17 MMOL/L (ref 22–29)
EGFRCR SERPLBLD CKD-EPI 2021: 64 ML/MIN/1.73M2
ERYTHROCYTE [DISTWIDTH] IN BLOOD BY AUTOMATED COUNT: 14.8 % (ref 10–15)
GLUCOSE SERPL-MCNC: 102 MG/DL (ref 70–99)
HCT VFR BLD AUTO: 20.7 % (ref 40–53)
HGB BLD-MCNC: 7.6 G/DL (ref 13.3–17.7)
HGB BLD-MCNC: 7.6 G/DL (ref 13.3–17.7)
MCH RBC QN AUTO: 38.4 PG (ref 26.5–33)
MCHC RBC AUTO-ENTMCNC: 36.7 G/DL (ref 31.5–36.5)
MCV RBC AUTO: 105 FL (ref 78–100)
PLATELET # BLD AUTO: 151 10E3/UL (ref 150–450)
POTASSIUM SERPL-SCNC: 4 MMOL/L (ref 3.4–5.3)
PROT SERPL-MCNC: ABNORMAL G/DL
RBC # BLD AUTO: 1.98 10E6/UL (ref 4.4–5.9)
SODIUM SERPL-SCNC: 128 MMOL/L (ref 135–145)
WBC # BLD AUTO: 12.2 10E3/UL (ref 4–11)

## 2023-10-01 PROCEDURE — 85018 HEMOGLOBIN: CPT | Performed by: INTERNAL MEDICINE

## 2023-10-01 PROCEDURE — 99232 SBSQ HOSP IP/OBS MODERATE 35: CPT | Performed by: INTERNAL MEDICINE

## 2023-10-01 PROCEDURE — 250N000011 HC RX IP 250 OP 636: Performed by: INTERNAL MEDICINE

## 2023-10-01 PROCEDURE — 85027 COMPLETE CBC AUTOMATED: CPT | Performed by: INTERNAL MEDICINE

## 2023-10-01 PROCEDURE — 36415 COLL VENOUS BLD VENIPUNCTURE: CPT | Performed by: INTERNAL MEDICINE

## 2023-10-01 PROCEDURE — 250N000013 HC RX MED GY IP 250 OP 250 PS 637: Performed by: STUDENT IN AN ORGANIZED HEALTH CARE EDUCATION/TRAINING PROGRAM

## 2023-10-01 PROCEDURE — 250N000013 HC RX MED GY IP 250 OP 250 PS 637: Performed by: HOSPITALIST

## 2023-10-01 PROCEDURE — P9047 ALBUMIN (HUMAN), 25%, 50ML: HCPCS | Performed by: INTERNAL MEDICINE

## 2023-10-01 PROCEDURE — 82140 ASSAY OF AMMONIA: CPT | Performed by: INTERNAL MEDICINE

## 2023-10-01 PROCEDURE — 120N000001 HC R&B MED SURG/OB

## 2023-10-01 PROCEDURE — 250N000013 HC RX MED GY IP 250 OP 250 PS 637: Performed by: INTERNAL MEDICINE

## 2023-10-01 PROCEDURE — 84460 ALANINE AMINO (ALT) (SGPT): CPT | Performed by: INTERNAL MEDICINE

## 2023-10-01 RX ADMIN — PANTOPRAZOLE SODIUM 40 MG: 40 TABLET, DELAYED RELEASE ORAL at 20:16

## 2023-10-01 RX ADMIN — SODIUM BICARBONATE 650 MG TABLET 650 MG: at 20:16

## 2023-10-01 RX ADMIN — ALBUMIN HUMAN 25 G: 0.25 SOLUTION INTRAVENOUS at 22:01

## 2023-10-01 RX ADMIN — VANCOMYCIN HYDROCHLORIDE 125 MG: 125 CAPSULE ORAL at 13:06

## 2023-10-01 RX ADMIN — VANCOMYCIN HYDROCHLORIDE 125 MG: 125 CAPSULE ORAL at 17:50

## 2023-10-01 RX ADMIN — PANTOPRAZOLE SODIUM 40 MG: 40 TABLET, DELAYED RELEASE ORAL at 08:28

## 2023-10-01 RX ADMIN — SODIUM BICARBONATE 650 MG TABLET 650 MG: at 08:28

## 2023-10-01 RX ADMIN — ALBUMIN HUMAN 25 G: 0.25 SOLUTION INTRAVENOUS at 09:39

## 2023-10-01 RX ADMIN — VANCOMYCIN HYDROCHLORIDE 125 MG: 125 CAPSULE ORAL at 22:02

## 2023-10-01 RX ADMIN — Medication 1 MG: at 00:26

## 2023-10-01 RX ADMIN — ALBUMIN HUMAN 25 G: 0.25 SOLUTION INTRAVENOUS at 16:14

## 2023-10-01 RX ADMIN — MULTIVITAMIN TABLET 1 TABLET: TABLET at 08:28

## 2023-10-01 RX ADMIN — VANCOMYCIN HYDROCHLORIDE 125 MG: 125 CAPSULE ORAL at 08:28

## 2023-10-01 ASSESSMENT — ACTIVITIES OF DAILY LIVING (ADL)
ADLS_ACUITY_SCORE: 22

## 2023-10-01 NOTE — PROGRESS NOTES
[facility]  Gastroenterology Progress Note     Crispin Ham MRN# [id number]   YOB: 1985 Age: 38 year old          Assessment and Plan:       Liver failure (H)  Patient appears to be more awake responding to verbal command patient appears somewhat confused shaky patient still appears to be in possible hepatic encephalopathy versus withdrawal.  Patient's labs are otherwise fairly unchanged patient has hepatorenal syndrome I will recommend to consider patient to continue on albumin.  Patient's albumin is 3.5.  Continue on antibiotics for possible C. difficile.  Continue to monitor hemoglobin and ammonia level            Subacute liver failure without hepatic coma  Hyperbilirubinemia  Transaminitis  Alcohol abuse  Leukocytosis, unspecified type  Alcoholic cirrhosis of liver with ascites (H)  Anasarca  Hyponatremia  GATITO (acute kidney injury) (H24)  Elevated INR      Interval History:     doing well; no cp, sob, n/v/d, or abd pain.              Review of Systems:     C: NEGATIVE for fever, chills, change in weight  E/M: NEGATIVE for ear, mouth and throat problems  R: NEGATIVE for significant cough or SOB  CV: NEGATIVE for chest pain, palpitations or peripheral edema             Medications:   I have reviewed this patient's current medications   multivitamin, therapeutic  1 tablet Oral Daily    pantoprazole  40 mg Oral BID    sodium bicarbonate  650 mg Oral BID    sodium chloride (PF)  3 mL Intracatheter Q8H    vancomycin  125 mg Oral 4x Daily                  Physical Exam:   Vitals were reviewed  Vital Signs with Ranges  Temp:  [97.6  F (36.4  C)-97.9  F (36.6  C)] 97.7  F (36.5  C)  Pulse:  [100-105] 102  Resp:  [18-21] 18  BP: ()/(48-66) 110/54  SpO2:  [92 %-95 %] 95 %  No intake/output data recorded.  Cardiovascular: negative, PMI normal. No lifts, heaves, or thrills. RRR. No murmurs, clicks gallops or rub  Respiratory: negative, Percussion normal. Good diaphragmatic  excursion. Lungs clear  Head: Normocephalic. No masses, lesions, tenderness or abnormalities  Abdomen: Abdomen soft, non-tender. BS normal. No masses, organomegaly  SKIN: jaundice           Data:   I reviewed the patient's new clinical lab test results.   Recent Labs   Lab Test 09/30/23  1738 09/30/23  0747 09/29/23  1957 09/29/23  1610 09/29/23  0647 09/28/23  1839 09/28/23  0547 09/27/23  2223 09/27/23  0722 09/26/23  0724   WBC  --  12.0*  --   --  11.1*  --  11.5*  --  10.8 13.6*   HGB 7.9* 7.7*  7.7* 7.9*   < > 6.9*   < > 7.5*   < > 7.6* 8.3*   MCV  --  103*  --   --  106*  --  106*  --  106* 105*   PLT  --  138*  --   --  140*  --  161  --  180 214   INR  --   --   --   --  2.50*  --   --   --  2.13* 2.18*    < > = values in this interval not displayed.     Recent Labs   Lab Test 09/30/23  0747 09/29/23  0647 09/28/23  0547   POTASSIUM 3.9 3.8 3.8   CHLORIDE 95* 96* 94*   CO2 17* 17* 19*   BUN 54.0* 54.4* 53.3*   ANIONGAP 12 14 12     Recent Labs   Lab Test 09/30/23  0747 09/29/23  0647 09/28/23  0547 09/26/23  0724 09/25/23  1646 09/25/23  1537 09/25/23  1424 09/09/23  1119 09/08/23  1224 09/08/23  1147   ALBUMIN 2.7* 2.7* 2.7*   < >  --   --  2.4*   < >  --  2.3*   BILITOTAL 18.0* 18.7* 19.4*   < >  --   --  21.7*   < >  --  10.4*   ALT 42 47 50   < >  --   --  69   < >  --  82*   * 151* 167*   < >  --    < >  --    < >  --  331*   PROTEIN  --   --   --   --  30*  --   --   --  50*  --    LIPASE  --   --   --   --   --   --  237*  --   --  272*    < > = values in this interval not displayed.       I reviewed the patient's new imaging results.    All laboratory data reviewed  All imaging studies reviewed by me.    Brenden Gibson MD,  9/30/2023  [facility]  Office : [phone]  Cell: [phone]      [facility]  Ph: [phone] Fax: [phone]

## 2023-10-01 NOTE — PLAN OF CARE
Summary: Severe alcohol use disorder, alcoholic liver disease  Primary Diagnosis: alcoholic liver cirrhosis, portal HTN, splenomegaly, hypoalbuminemia, GATITO    [id number]  Ad 9/25 with worsening fatigue, weakness and shortness of breath  Orientation: A&Ox4  Activity: Independent   Diet/BS Checks: 2 gm Na diet/Fluid restriction 1200mL  Tele: NA  IV Access/Drains:PIV SL  Pain Management: Denies pain and nausea; requested melatonin  Abnormal VS/Results: VSS on RA, SHgb q12  Bowel/Bladder: Continent B/B, Urine dark florence output of 600mL this shift, soft stools per pt  Skin/Wounds: Jaundiced skin and sclera. BLE edema +2-3  Consults: Nephrology, GI  D/C Disposition: Pending medical improvement  Other Info:  Hbg checks q12. No NSAIDs except daily aspirin. On isolation precautions for C.Diff.  Negative CIWA

## 2023-10-01 NOTE — PROGRESS NOTES
[facility]    Medicine Progress Note - Hospitalist Service    Date of Admission:  9/25/2023    Assessment & Plan   Crispin Ham is a 38 year old male with a history of severe alcohol use disorder, alcoholic liver disease with recent hospital admission between 9/8-9/11 for hypovolemic hypotension, alcoholic hepatitis and pancreatitis, concern for duodenitis with possible contained perforation versus diverticulum, GATITO.  Also has history of generalized anxiety disorder,  HTN who now presents with worsening fatigue, weakness and shortness of breath over the last week.     Decompensated alcoholic liver cirrhosis  Portal hypertension with splenomegaly and ascites  Lower extremity edema  Ruled out SBP   Coagulopathy secondary to liver disease  Hypoalbuminemia  On admission patient noted increased weakness, fatigue, dyspnea.  Also having hard time sleeping, discomfort in lower abdomen, generalized pains and increased frequency of small-volume loose stools about every 3-4 hours over the last few days.   CT CAP shows cirrhotic appearing liver with sequelae of portal hypertension including splenomegaly and worsened now large volume ascites.  Mild increased trace bilateral pleural effusions and body wall edema.  Lab work concerning for decompensated liver disease.    In the ED patient was iven 2 g IV ceftriaxone in the ED to cover possible SBP.  * BC -> NGTD  * Ultrasound paracentesis therapeutic and diagnostic for 09/26 ->  3.825 L of  straw colored fluid was drained, fluid studies not consistent with SBP  * EGD:  Normal upper third of esophagus and middle third of esophagus.  Z-line regular, 38 cm from the incisors. Esophageal polyp(s) were found. Resected and retrieved. Clip was placed. Esophageal mucosal tear with bleeding (complication of clip placement). Hemostasis achieved with bipolar probe.   MELD 3.0: 34 at 10/1/2023  5:39 AM  MELD-Na: 34 at 10/1/2023  5:39 AM  Calculated from:  Serum  Creatinine: 1.46 mg/dL at 10/1/2023  5:39 AM  Serum Sodium: 128 mmol/L at 10/1/2023  5:39 AM  Total Bilirubin: 17.9 mg/dL at 10/1/2023  5:39 AM  Serum Albumin: 2.7 g/dL at 10/1/2023  5:39 AM  INR(ratio): 2.50 at 9/29/2023  6:47 AM  Age at listing (hypothetical): 38 years  Sex: Male at 10/1/2023  5:39 AM    - Continue Albumin for 9 doses   - Initially treated IV ceftriaxone to cover for SBP but stopped as SBP ruled out  - 2 g sodium diet  - 1U FFP ordered for 09/26 given INR 2.18  - GI following and appreciate their recommendations   - PT evaluation      GATITO, likely hepatorenal. Improving   Hyponatremia.  Improving  Nongap acidosis in setting of diarrhea and GATITO  Creatinine had gone up to 2.9 during last hospitalization and sodium was 128.  Creatinine normalized and sodium came up to 134 with NS during last hospitalization.    During this hospitalization his renal function continues to worsening with worsening hyponatremia   - Continue to monitor closely  - Avoid nephrotoxin   - Albumin infusions ordered as noted above  - Nephrology following and appreciate their assistance. Complete albumin course and continue sodium bicarb.  Signed off      Diarrhea -> C Diff positive  Likely colonizer- however given diarrhea, decompensated liver cirrhosis continue 14 day treatment of vancomycin 125 mg QID   - Continue PO Vanc      Macrocytic anemia  Hb 9.6.  Recent baseline appears to be around 10.  No evidence of overt GI bleeding. But hgb down to 7.3 today  - Monitor hemoglobins  -Transfuse Hgb < 7.0.  Required 1 unite on 9/29      Elevated lipase, recent alcoholic pancreatitis  No significant abdominal pain at this time.  Lipase was 272 on 9/8, currently 237.     Severe alcohol use disorder  Strongly encourage complete alcohol cessation.  He reports being sober since last hospital discharge.    - Refused to talk with CD or psychiatry during last hospitalization     Recent duodenitis/ulcer with contained perforation versus  duodenal diverticulum seen on CT  Noted on CT during last hospitalization.  - Continue PPI  - No significant abdominal pain at this time.  Consult to [facility] as noted above.     History of hypertension  Recently had issues with hypotension.  Not on any medications.  Noted.  Currently normotensive.       Diet: 2 Gram Sodium Diet  Fluid restriction 1200 ML FLUID    DVT Prophylaxis: Pneumatic Compression Devices  Bosch Catheter: Not present  Lines: None     Cardiac Monitoring: None  Code Status: Full Code      Clinically Significant Risk Factors         # Hyponatremia: Lowest Na = 124 mmol/L in last 2 days, will monitor as appropriate      # Hypoalbuminemia: Lowest albumin = 2.4 g/dL at 9/25/2023  2:24 PM, will monitor as appropriate  # Coagulation Defect: INR = 2.50 (Ref range: 0.85 - 1.15) and/or PTT = N/A, will monitor for bleeding           # Obesity: Estimated body mass index is 37.49 kg/m  as calculated from the following:    Height as of this encounter: 1.829 m (6').    Weight as of this encounter: 125.4 kg (276 lb 6.4 oz).             Disposition Plan      Expected Discharge Date: 10/02/2023        Discharge Comments: Once GI clears and therapy evaluates          Atul Watkins DO  Hospitalist Service  [facility]  Securely message with [employer] (more info)  Text page via [employer] Paging/Directory   ______________________________________________________________________    Interval History   Patient seen and examined.  No acute events over night.  Family requesting PT evaluation as patient weaker than normal. No fevers or hypoxia.  No chest pain or SOB.  No nausea or vomiting.  Overall improved     Physical Exam   Vital Signs: Temp: 97.3  F (36.3  C) Temp src: Oral BP: 122/70 Pulse: 101   Resp: 20 SpO2: 96 % O2 Device: None (Room air)    Weight: 276 lbs 6.4 oz    General Appearance: Resting comfortably sitting up in bed.  NAD  Respiratory: Clear to auscultation.  No respiratory  distress  Cardiovascular: RRR.  No obvious murmurs  GI: Minimally distended.  Non-tender   Skin: Jaundiced.  No obvious rashes  Other: Alert.  Diffuse edema noted      Medical Decision Making       45 MINUTES SPENT BY ME on the date of service doing chart review, history, exam, documentation & further activities per the note.      Data   ------------------------- PAST 24 HR DATA REVIEWED -----------------------------------------------    I have personally reviewed the following data over the past 24 hrs:    12.2 (H)  \   7.6 (L)   / 151     128 (L) 98 52.8 (H) /  102 (H)   4.0 17 (L) 1.46 (H) \     ALT: 41 AST: 152 (H) AP: 135 (H) TBILI: 17.9 (HH)   ALB: 2.7 (L) TOT PROTEIN: N/A LIPASE: N/A       Imaging results reviewed over the past 24 hrs:   No results found for this or any previous visit (from the past 24 hour(s)).

## 2023-10-01 NOTE — PROGRESS NOTES
[facility]  Gastroenterology Progress Note     Crispin Ham MRN# [id number]   YOB: 1985 Age: 38 year old          Assessment and Plan:       Liver failure (H)  Overall patient is stable no new GI complaint patient is hemodynamically stable patient labs are slowly improving patient's creatinine is improving.  Continue on current treatment plan supportive care IV albumin patient was encouraged to ambulate.            Subacute liver failure without hepatic coma  Hyperbilirubinemia  Transaminitis  Alcohol abuse  Leukocytosis, unspecified type  Alcoholic cirrhosis of liver with ascites (H)  Anasarca  Hyponatremia  GATITO (acute kidney injury) (H24)  Elevated INR      Interval History:     doing well; no cp, sob, n/v/d, or abd pain.              Review of Systems:     C: NEGATIVE for fever, chills, change in weight  E/M: NEGATIVE for ear, mouth and throat problems  R: NEGATIVE for significant cough or SOB  CV: NEGATIVE for chest pain, palpitations or peripheral edema             Medications:   I have reviewed this patient's current medications   albumin human  25 g Intravenous TID    multivitamin, therapeutic  1 tablet Oral Daily    pantoprazole  40 mg Oral BID    sodium bicarbonate  650 mg Oral BID    sodium chloride (PF)  3 mL Intracatheter Q8H    vancomycin  125 mg Oral 4x Daily                  Physical Exam:   Vitals were reviewed  Vital Signs with Ranges  Temp:  [97.3  F (36.3  C)-98.1  F (36.7  C)] 98.1  F (36.7  C)  Pulse:  [100-104] 100  Resp:  [18-20] 20  BP: (108-122)/(58-70) 108/65  SpO2:  [95 %-96 %] 95 %  I/O last 3 completed shifts:  In: 750 [P.O.:750]  Out: 1150 [Urine:1150]  Cardiovascular: negative, PMI normal. No lifts, heaves, or thrills. RRR. No murmurs, clicks gallops or rub  Respiratory: negative, Percussion normal. Good diaphragmatic excursion. Lungs clear  Neck: Neck supple. No adenopathy. Thyroid symmetric, normal size,, Carotids without bruits.  Abdomen:  Abdomen soft, non-tender. BS normal. No masses, organomegaly  SKIN: no suspicious lesions or rashes           Data:   I reviewed the patient's new clinical lab test results.   Recent Labs   Lab Test 10/01/23  0539 09/30/23  1738 09/30/23  0747 09/29/23  1610 09/29/23  0647 09/27/23  2223 09/27/23  0722 09/26/23  0724   WBC 12.2*  --  12.0*  --  11.1*   < > 10.8 13.6*   HGB 7.6* 7.9* 7.7*  7.7*   < > 6.9*   < > 7.6* 8.3*   *  --  103*  --  106*   < > 106* 105*     --  138*  --  140*   < > 180 214   INR  --   --   --   --  2.50*  --  2.13* 2.18*    < > = values in this interval not displayed.     Recent Labs   Lab Test 10/01/23  0539 09/30/23  0747 09/29/23  0647   POTASSIUM 4.0 3.9 3.8   CHLORIDE 98 95* 96*   CO2 17* 17* 17*   BUN 52.8* 54.0* 54.4*   ANIONGAP 13 12 14     Recent Labs   Lab Test 10/01/23  0539 09/30/23  0747 09/29/23  0647 09/26/23  0724 09/25/23  1646 09/25/23  1537 09/25/23  1424 09/09/23  1119 09/08/23  1224 09/08/23  1147   ALBUMIN 2.7* 2.7* 2.7*   < >  --   --  2.4*   < >  --  2.3*   BILITOTAL 17.9* 18.0* 18.7*   < >  --   --  21.7*   < >  --  10.4*   ALT 41 42 47   < >  --   --  69   < >  --  82*   * 146* 151*   < >  --    < >  --    < >  --  331*   PROTEIN  --   --   --   --  30*  --   --   --  50*  --    LIPASE  --   --   --   --   --   --  237*  --   --  272*    < > = values in this interval not displayed.       I reviewed the patient's new imaging results.    All laboratory data reviewed  All imaging studies reviewed by me.    Brenden Gibson MD,  10/1/2023  [facility]  Office : [phone]  Cell: [phone]      [facility]  Ph: [phone] Fax: [phone]

## 2023-10-01 NOTE — PLAN OF CARE
Goal Outcome Evaluation:      Plan of Care Reviewed With: patient    Overall Patient Progress: improvingOverall Patient Progress: improving     [id number]  Ad 9/25 with worsening fatigue, weakness and shortness of breath  Orientation: A&Ox4  Activity: Independent   Diet/BS Checks: 2 gm Na diet/Fluid restriction 1200mL  Tele: NA  IV Access/Drains:New PIV SL  Pain Management: Denies pain and nausea  Abnormal VS/Results: VSS on RA, SHgb q12  Bowel/Bladder: Continent B/B, Urine dark florence, soft stools per pt  Skin/Wounds: Jaundiced skin and sclera. BLE edema +2-3  Consults: Nephrology, GI  D/C Disposition: Pending medical improvement  Other Info:  Hbg checks q12. No NSAIDs except daily aspirin. On isolation precautions for C.Diff. PT consult pending. Offered pt to go on walk this afternoon, declined. Na+ replaced today.

## 2023-10-01 NOTE — PROGRESS NOTES
Renal Medicine Progress Note                                Crispin Ham MRN# [id number]   Age: 38 year old YOB: 1985   Date of Admission: 9/25/2023 Hospital LOS: 6                  Assessment/Plan:     1.  GATITO.      Pt's cr is stable in the low 2s.  Over the past month, he has been in the 0.6-2.8 range.  He is urinating a bit, but not a lot.  Shan is 20 today, but not completely suppressed.  Even so, this is largely due to HRS II.  Electrolytes are ok today.  Pt is not a liver tx candidate due to current alcohol use, therefore not a good dialysis candidate.     2.  EtOHic cirrhosis.  Pt is quite jaundiced.  Bili remains high.  INR is elevated due to a fixed coagulopathy.     3.  Hyponatremia.  Pt's Na is stable.  He is TBV up and has liver dz.        Creatinine stable to improved  Persistent NAGMA  Hyponatremia    Shan < 20 (actual value would be helpful)    Continues improvement in creatinine and Na    Complete albumin course  Continue HCO3-    Not a dialysis candidate  Would not use diuretic at this time  Guarded prognosis     No further recommendations  Call with questions  Signing off         Interval History:     Comfortable in bed  Sleepy   Appears ill  Jaundice  Edema    IO reviewed  Labs reviewed with patient     ROS:     GENERAL: NAD, No fever,chills  R: NEGATIVE for significant cough or SOB  CV: NEGATIVE for chest pain, palpitations  EXT: no change edema  ROS otherwise negative    Medications and Allergies:     Reviewed    Physical Exam:     Vitals were reviewed  Patient Vitals for the past 8 hrs:   BP Temp Temp src Pulse Resp SpO2 Weight   10/01/23 0843 -- 97.3  F (36.3  C) Oral -- -- -- --   10/01/23 0834 122/70 -- -- 101 20 96 % --   10/01/23 0750 112/66 97.9  F (36.6  C) Oral 104 18 96 % --   10/01/23 0610 -- -- -- -- -- -- 125.4 kg (276 lb 6.4 oz)     Wt Readings from Last 4 Encounters:   10/01/23 125.4 kg (276 lb 6.4 oz)   09/11/23 121.9 kg (268 lb 11.9 oz)   03/11/22 98.4 kg (217  lb)     I/O last 3 completed shifts:  In: 750 [P.O.:750]  Out: 1150 [Urine:1150]    Vitals:    09/26/23 0202 09/27/23 0046 09/28/23 0607 09/29/23 0610   Weight: 125 kg (275 lb 9.6 oz) 121.6 kg (268 lb 1.6 oz) 121.8 kg (268 lb 9.6 oz) 123.9 kg (273 lb 3.2 oz)    10/01/23 0610   Weight: 125.4 kg (276 lb 6.4 oz)       GENERAL: alert/jaundice   HEENT: NC/AT, PERRLA, EOMI, non icteric, pharynx moist without lesion  RESP:  clear anteriorly  CV: RRR, normal S1 S2  ABDOMEN: distended  MS: no clubbing, cyanosis   EXT: 3 + edema     Data:     Recent Labs   Lab 10/01/23  0539 09/30/23  0747 09/29/23 1957 09/29/23 0647 09/28/23 1839 09/28/23  0547   * 124* 126* 127*   < > 125*   POTASSIUM 4.0 3.9  --  3.8  --  3.8   CHLORIDE 98 95*  --  96*  --  94*   CO2 17* 17*  --  17*  --  19*   ANIONGAP 13 12  --  14  --  12   * 101*  --  115*  --  105*   BUN 52.8* 54.0*  --  54.4*  --  53.3*   CR 1.46* 1.72* 1.86* 2.22*   < > 2.32*   GFRESTIMATED 64 52* 48* 38*  --  36*   MIGUELINA 8.9 8.8  --  8.8  --  9.0    < > = values in this interval not displayed.         Recent Labs   Lab 10/01/23  0539   *   POTASSIUM 4.0   CHLORIDE 98   CO2 17*   ANIONGAP 13   *   BUN 52.8*   CR 1.46*   GFRESTIMATED 64   MIGUELINA 8.9     Recent Labs   Lab 10/01/23  0539 09/30/23  0747 09/29/23 1957 09/29/23  0647 09/28/23 1839 09/28/23  0547 09/27/23  0722 09/26/23  0724 09/25/23  1424   CR 1.46* 1.72* 1.86* 2.22* 2.27* 2.32* 2.40* 2.21* 2.01*     Recent Labs   Lab 10/01/23  0539 09/30/23  0747 09/29/23  0647 09/28/23  0547   ALBUMIN 2.7* 2.7* 2.7* 2.7*     Recent Labs   Lab 10/01/23  0539 09/30/23  1738 09/30/23  0747 09/29/23  1957 09/29/23  1610 09/29/23  0647   PHOS  --   --   --   --   --  3.7   HGB 7.6* 7.9* 7.7*  7.7* 7.9*   < > 6.9*    < > = values in this interval not displayed.     Recent Labs   Lab 09/30/23  0622 09/29/23  1627 09/28/23 2122   UNAR <20 20 20         G Phoenix Rogel MD    [facility] -  Nephrology  [phone]

## 2023-10-01 NOTE — PROVIDER NOTIFICATION
MD Notification    Notified Person: MD    Notified Person Name: Atul Cardoza    Notification Date/Time: 10/1/23 1120    Notification Interaction: [facility] webpage    Purpose of Notification: Family requesting to see if pt can work with PT/OT to build up strength?      Orders Received:    Comments:

## 2023-10-02 ENCOUNTER — APPOINTMENT (OUTPATIENT)
Dept: PHYSICAL THERAPY | Facility: CLINIC | Age: 38
DRG: 432 | End: 2023-10-02
Attending: INTERNAL MEDICINE
Payer: COMMERCIAL

## 2023-10-02 LAB
ALBUMIN SERPL BCG-MCNC: 3.2 G/DL (ref 3.5–5.2)
ALP SERPL-CCNC: 131 U/L (ref 40–129)
ALT SERPL W P-5'-P-CCNC: 39 U/L (ref 0–70)
ANION GAP SERPL CALCULATED.3IONS-SCNC: 13 MMOL/L (ref 7–15)
AST SERPL W P-5'-P-CCNC: 142 U/L (ref 0–45)
BILIRUB SERPL-MCNC: 18.6 MG/DL
BUN SERPL-MCNC: 52.6 MG/DL (ref 6–20)
CALCIUM SERPL-MCNC: 9.2 MG/DL (ref 8.6–10)
CHLORIDE SERPL-SCNC: 97 MMOL/L (ref 98–107)
CREAT SERPL-MCNC: 1.35 MG/DL (ref 0.67–1.17)
DEPRECATED HCO3 PLAS-SCNC: 16 MMOL/L (ref 22–29)
EGFRCR SERPLBLD CKD-EPI 2021: 70 ML/MIN/1.73M2
ERYTHROCYTE [DISTWIDTH] IN BLOOD BY AUTOMATED COUNT: 14.8 % (ref 10–15)
GLUCOSE SERPL-MCNC: 109 MG/DL (ref 70–99)
HCT VFR BLD AUTO: 20.5 % (ref 40–53)
HGB BLD-MCNC: 7.3 G/DL (ref 13.3–17.7)
MCH RBC QN AUTO: 37.2 PG (ref 26.5–33)
MCHC RBC AUTO-ENTMCNC: 35.6 G/DL (ref 31.5–36.5)
MCV RBC AUTO: 105 FL (ref 78–100)
PLATELET # BLD AUTO: 130 10E3/UL (ref 150–450)
POTASSIUM SERPL-SCNC: 3.9 MMOL/L (ref 3.4–5.3)
PROT SERPL-MCNC: ABNORMAL G/DL
RBC # BLD AUTO: 1.96 10E6/UL (ref 4.4–5.9)
SODIUM SERPL-SCNC: 126 MMOL/L (ref 135–145)
WBC # BLD AUTO: 13.8 10E3/UL (ref 4–11)

## 2023-10-02 PROCEDURE — 36415 COLL VENOUS BLD VENIPUNCTURE: CPT | Performed by: INTERNAL MEDICINE

## 2023-10-02 PROCEDURE — 99232 SBSQ HOSP IP/OBS MODERATE 35: CPT | Performed by: STUDENT IN AN ORGANIZED HEALTH CARE EDUCATION/TRAINING PROGRAM

## 2023-10-02 PROCEDURE — 120N000001 HC R&B MED SURG/OB

## 2023-10-02 PROCEDURE — 97116 GAIT TRAINING THERAPY: CPT | Mod: GP

## 2023-10-02 PROCEDURE — 250N000013 HC RX MED GY IP 250 OP 250 PS 637: Performed by: HOSPITALIST

## 2023-10-02 PROCEDURE — 97161 PT EVAL LOW COMPLEX 20 MIN: CPT | Mod: GP

## 2023-10-02 PROCEDURE — 97110 THERAPEUTIC EXERCISES: CPT | Mod: GP

## 2023-10-02 PROCEDURE — P9047 ALBUMIN (HUMAN), 25%, 50ML: HCPCS | Performed by: INTERNAL MEDICINE

## 2023-10-02 PROCEDURE — 85027 COMPLETE CBC AUTOMATED: CPT | Performed by: INTERNAL MEDICINE

## 2023-10-02 PROCEDURE — 250N000013 HC RX MED GY IP 250 OP 250 PS 637: Performed by: INTERNAL MEDICINE

## 2023-10-02 PROCEDURE — 85018 HEMOGLOBIN: CPT | Performed by: INTERNAL MEDICINE

## 2023-10-02 PROCEDURE — 250N000013 HC RX MED GY IP 250 OP 250 PS 637: Performed by: STUDENT IN AN ORGANIZED HEALTH CARE EDUCATION/TRAINING PROGRAM

## 2023-10-02 PROCEDURE — 82040 ASSAY OF SERUM ALBUMIN: CPT | Performed by: INTERNAL MEDICINE

## 2023-10-02 PROCEDURE — 250N000011 HC RX IP 250 OP 636: Performed by: INTERNAL MEDICINE

## 2023-10-02 RX ADMIN — VANCOMYCIN HYDROCHLORIDE 125 MG: 125 CAPSULE ORAL at 08:27

## 2023-10-02 RX ADMIN — PANTOPRAZOLE SODIUM 40 MG: 40 TABLET, DELAYED RELEASE ORAL at 08:27

## 2023-10-02 RX ADMIN — VANCOMYCIN HYDROCHLORIDE 125 MG: 125 CAPSULE ORAL at 13:07

## 2023-10-02 RX ADMIN — SODIUM BICARBONATE 650 MG TABLET 650 MG: at 20:07

## 2023-10-02 RX ADMIN — PANTOPRAZOLE SODIUM 40 MG: 40 TABLET, DELAYED RELEASE ORAL at 20:07

## 2023-10-02 RX ADMIN — ALBUMIN HUMAN 25 G: 0.25 SOLUTION INTRAVENOUS at 08:28

## 2023-10-02 RX ADMIN — MULTIVITAMIN TABLET 1 TABLET: TABLET at 08:27

## 2023-10-02 RX ADMIN — ALBUMIN HUMAN 25 G: 0.25 SOLUTION INTRAVENOUS at 22:09

## 2023-10-02 RX ADMIN — VANCOMYCIN HYDROCHLORIDE 125 MG: 125 CAPSULE ORAL at 22:09

## 2023-10-02 RX ADMIN — ALBUMIN HUMAN 25 G: 0.25 SOLUTION INTRAVENOUS at 16:15

## 2023-10-02 RX ADMIN — VANCOMYCIN HYDROCHLORIDE 125 MG: 125 CAPSULE ORAL at 17:23

## 2023-10-02 RX ADMIN — SODIUM BICARBONATE 650 MG TABLET 650 MG: at 08:27

## 2023-10-02 ASSESSMENT — ACTIVITIES OF DAILY LIVING (ADL)
ADLS_ACUITY_SCORE: 22

## 2023-10-02 NOTE — PLAN OF CARE
Primary Diagnosis: alcoholic liver cirrhosis, portal HTN, splenomegaly, hypoalbuminemia, GATITO.  A+Ox4. Flat affect.   Independent w/ walker.  2 gram sodium diet. 1,200 mL fluid restriction. Albumin scheduled. L PIV SL.  Denies pain.  AVSS on room air, except tachy.   Ongoing loose stools. C. Diff precautions maintained.  BLE +3/4 edema. Scattered bruising. Skin & sclera jaundiced. CTM.

## 2023-10-02 NOTE — PROGRESS NOTES
[facility]    Medicine Progress Note - Hospitalist Service    Date of Admission:  9/25/2023    Assessment & Plan   Crispin Ham is a 38 year old male with a history of severe alcohol use disorder, alcoholic liver disease with recent hospital admission between 9/8-9/11 for hypovolemic hypotension, alcoholic hepatitis and pancreatitis, concern for duodenitis with possible contained perforation versus diverticulum, GATITO.  Also has history of generalized anxiety disorder,  HTN who now presents with worsening fatigue, weakness and shortness of breath over the last week.     Decompensated alcoholic liver cirrhosis  Portal hypertension with splenomegaly and ascites  Lower extremity edema  Ruled out SBP   Coagulopathy secondary to liver disease  Hypoalbuminemia  On admission patient noted increased weakness, fatigue, dyspnea.  Also having hard time sleeping, discomfort in lower abdomen, generalized pains and increased frequency of small-volume loose stools about every 3-4 hours over the last few days.   CT CAP shows cirrhotic appearing liver with sequelae of portal hypertension including splenomegaly and worsened now large volume ascites.  Mild increased trace bilateral pleural effusions and body wall edema.  Lab work concerning for decompensated liver disease.    In the ED patient was iven 2 g IV ceftriaxone in the ED to cover possible SBP.  * BC -> NGTD  * Ultrasound paracentesis therapeutic and diagnostic for 09/26 ->  3.825 L of  straw colored fluid was drained, fluid studies not consistent with SBP  * EGD:  Normal upper third of esophagus and middle third of esophagus.  Z-line regular, 38 cm from the incisors. Esophageal polyp(s) were found. Resected and retrieved. Clip was placed. Esophageal mucosal tear with bleeding (complication of clip placement). Hemostasis achieved with bipolar probe.   MELD 3.0: 34 at 10/1/2023  5:39 AM  MELD-Na: 34 at 10/1/2023  5:39 AM  Calculated from:  Serum  Creatinine: 1.46 mg/dL at 10/1/2023  5:39 AM  Serum Sodium: 128 mmol/L at 10/1/2023  5:39 AM  Total Bilirubin: 17.9 mg/dL at 10/1/2023  5:39 AM  Serum Albumin: 2.7 g/dL at 10/1/2023  5:39 AM  INR(ratio): 2.50 at 9/29/2023  6:47 AM  Age at listing (hypothetical): 38 years  Sex: Male at 10/1/2023  5:39 AM    - Continue Albumin for 9 doses   - Initially treated IV ceftriaxone to cover for SBP but stopped as SBP ruled out  - 2 g sodium diet  - 1U FFP ordered for 09/26 given INR 2.18  - GI following and appreciate their recommendations   - PT evaluation -> Home with OP      GATITO, likely hepatorenal. Improving   Hyponatremia.  Improving  Nongap acidosis in setting of diarrhea and GATITO  Creatinine had gone up to 2.9 during last hospitalization and sodium was 128.  Creatinine normalized and sodium came up to 134 with NS during last hospitalization.    During this hospitalization his renal function continues to worsening with worsening hyponatremia   - Continue to monitor closely  - Avoid nephrotoxin   - Albumin infusions ordered as noted above  - Nephrology following and appreciate their assistance. Complete albumin course and continue sodium bicarb.  Signed off      Diarrhea -> C Diff positive  Likely colonizer- however given diarrhea, decompensated liver cirrhosis continue 14 day treatment of vancomycin 125 mg QID   - Continue PO Vanc      Macrocytic anemia  Hb 9.6.  Recent baseline appears to be around 10.  No evidence of overt GI bleeding. But hgb down to 7.3 today  - Monitor hemoglobins  -Transfuse Hgb < 7.0.  Required 1 unite on 9/29      Elevated lipase, recent alcoholic pancreatitis  No significant abdominal pain at this time.  Lipase was 272 on 9/8, currently 237.     Severe alcohol use disorder  Strongly encourage complete alcohol cessation.  He reports being sober since last hospital discharge.    - Refused to talk with CD or psychiatry during last hospitalization     Recent duodenitis/ulcer with contained  perforation versus duodenal diverticulum seen on CT  Noted on CT during last hospitalization.  - Continue PPI  - No significant abdominal pain at this time.  Consult to [facility] as noted above.     History of hypertension  Recently had issues with hypotension.  Not on any medications.  Noted.  Currently normotensive.       Diet: 2 Gram Sodium Diet  Fluid restriction 1200 ML FLUID  Snacks/Supplements Adult: Other; D: Ensure MAX; With Meals    DVT Prophylaxis: Pneumatic Compression Devices  Bosch Catheter: Not present  Lines: None     Cardiac Monitoring: None  Code Status: Full Code      Clinically Significant Risk Factors         # Hyponatremia: Lowest Na = 126 mmol/L in last 2 days, will monitor as appropriate   # Hypercalcemia: corrected calcium is >10.1, will monitor as appropriate    # Hypoalbuminemia: Lowest albumin = 2.4 g/dL at 9/25/2023  2:24 PM, will monitor as appropriate              # Obesity: Estimated body mass index is 37.54 kg/m  as calculated from the following:    Height as of this encounter: 1.829 m (6').    Weight as of this encounter: 125.6 kg (276 lb 12.8 oz).   # Moderate Malnutrition: based on nutrition assessment           Disposition Plan      Expected Discharge Date: 10/03/2023        Discharge Comments: Once GI clears and therapy evaluates          Elio Chaves MD  Hospitalist Service  [facility]  Securely message with [employer] (more info)  Text page via [employer] Paging/Directory   ______________________________________________________________________    Interval History     Patient seen and examined.  No acute events over night.  No fevers or hypoxia.  No chest pain or SOB.  No nausea or vomiting.  Overall improving per patient  No new abdominal pain  No new complaints     Physical Exam   Vital Signs: Temp: 98.1  F (36.7  C) Temp src: Oral BP: (!) 78/49 Pulse: 100   Resp: 18 SpO2: 92 % O2 Device: None (Room air)    Weight: 276 lbs 12.8 oz    General  Appearance: Resting comfortably sitting up in bed.  NAD  Respiratory: Clear to auscultation.  No respiratory distress  Cardiovascular: RRR.  No obvious murmurs  GI: Minimally distended.  Non-tender   Skin: Jaundiced.  No obvious rashes  Other: Alert.  Diffuse edema noted      Medical Decision Making       45 MINUTES SPENT BY ME on the date of service doing chart review, history, exam, documentation & further activities per the note.      Data   ------------------------- PAST 24 HR DATA REVIEWED -----------------------------------------------    I have personally reviewed the following data over the past 24 hrs:    13.8 (H)  \   7.3 (L); 7.3 (L)   / 130 (L)     126 (L) 97 (L) 52.6 (H) /  109 (H)   3.9 16 (L) 1.35 (H) \     ALT: 39 AST: 142 (H) AP: 131 (H) TBILI: 18.6 (HH)   ALB: 3.2 (L) TOT PROTEIN: N/A LIPASE: N/A     Imaging results reviewed over the past 24 hrs:   No results found for this or any previous visit (from the past 24 hour(s)).

## 2023-10-02 NOTE — PLAN OF CARE
Summary: Severe alcohol use disorder, alcoholic liver disease  Primary Diagnosis: alcoholic liver cirrhosis, portal HTN, splenomegaly, hypoalbuminemia, GATITO    Orientation: A+Ox4. Flat affect.   Activity: Independent w/ walker  Diet/BS Checks: 2 gram sodium diet. 1,200 mL fluid restriction.   Tele:  n/a  IV Access/Drains: L PIV SL   Pain Management: Denies pain. Has PRN tylenol available.   Abnormal VS/Results: Pt tachycardic, otherwise AVSS on room air.   Bowel/Bladder: Continent of B/B. LBM noted this afernoon. Reports ongoing loose stools. C. Diff precautions maintained  Skin/Wounds: BLE +3/4 edema. Scattered bruising. Skin jaundiced.   Consults: GI, PT, lymphedema   D/C Disposition: Home pending improvement  Other Info: High bilirubin of 18.6 (MD aware).

## 2023-10-02 NOTE — PROGRESS NOTES
[facility]  Gastroenterology Progress Note     Crispin Ham MRN# [id number]   YOB: 1985 Age: 38 year old          Assessment and Plan:     Crispin Ham is a 38 year old male with history of alcohol abuse, and liver cirrhosis among others; admitted on 9/25 whom presented with fatigue, weakness and shortness of breath.     Liver failure  Alcoholic liver cirrhosis with ascites  CT C/A/P shows cirrhotic appearing liver with sequelae of portal hypertension including splenomegaly and worsened now large volume ascites.  Mild increased trace bilateral pleural effusions and body wall edema.   MELD 35 Na++ 125, Cr 2.32 (0.5 on 9/11), Tbili 19.4( 13.9 on 9/11), INR 2.13 (90 day mortality of 53%)  LFTs: AST//50 ( alcoholic pattern)   Unable to start prednisolone with Maddrey score over 40 given underlying sepsis  Not a candidate for liver transplant with recent Alcohol use in last 2 weeks  9/27 EGD noted esophageal polyp-resected and clips placed. Esophageal mucosal tear with bleeding- complication of clip placement- hemostasis achieved. Portal hypertensive gastropathy    - 2 g sodium diet   - Daily labs CMP, CBC, INR  - Cr is improved and stable in the low 2s- will need albumin until 3.5 (10/2/23- Albumin 3.2)     Leukocytosis  WBC 18.6->10.8->11.5 and has significant ascites with GATITO   Paracentesis on 9/26 removed 3.8 L of straw colored fluid with cell count of 59 and 3 % neutrophils- no evidence of SBP     - Continue to hold off on diuretics at this time  - Continue albumin to treat potential hepatorenal syndrome and ascites     Recent duodenitis/ulcer with contained perforation versus duodenal diverticulum seen on CT   Anemia  Hemoglobin has remained in the 7 range (baseline 10.7 on 9/9/23)  No evidence of overt GI bleeding  On heparin- held  Most recent INR 2.5      - Pantoprazole 40 mg BID  - GI will continue to follow                Interval History:     No  active complaints. Denies abdominal, nausea and vomiting. Patient working with PT to regain strength.              Review of Systems:     C: NEGATIVE for fever, chills, change in weight  E/M: NEGATIVE for ear, mouth and throat problems  R: NEGATIVE for significant cough or SOB  CV: NEGATIVE for chest pain, palpitations or peripheral edema             Medications:   I have reviewed this patient's current medications   albumin human  25 g Intravenous TID    multivitamin, therapeutic  1 tablet Oral Daily    pantoprazole  40 mg Oral BID    sodium bicarbonate  650 mg Oral BID    sodium chloride (PF)  3 mL Intracatheter Q8H    vancomycin  125 mg Oral 4x Daily                  Physical Exam:   Vitals were reviewed  Vital Signs with Ranges  Temp:  [97.7  F (36.5  C)-98.1  F (36.7  C)] 97.9  F (36.6  C)  Pulse:  [100-104] 104  Resp:  [20] 20  BP: (108-123)/(44-75) 108/44  SpO2:  [95 %-96 %] 96 %  I/O last 3 completed shifts:  In: 925 [P.O.:925]  Out: 450 [Urine:450]  Cardiovascular: negative, PMI normal. No lifts, heaves, or thrills. RRR. No murmurs, clicks gallops or rub  Respiratory: negative, Percussion normal. Good diaphragmatic excursion. Lungs clear  Neck: Neck supple. No adenopathy. Thyroid symmetric, normal size,, Carotids without bruits.  Abdomen: Abdomen soft, non-tender. BS normal. No masses, organomegaly  SKIN: no suspicious lesions or rashes           Data:   I reviewed the patient's new clinical lab test results.   Recent Labs   Lab Test 10/02/23  0640 10/01/23  1926 10/01/23  0539 09/30/23  1738 09/30/23  0747 09/29/23  1610 09/29/23  0647 09/27/23  2223 09/27/23  0722 09/26/23  0724   WBC 13.8*  --  12.2*  --  12.0*  --  11.1*   < > 10.8 13.6*   HGB 7.3*  7.3* 7.6* 7.6*   < > 7.7*  7.7*   < > 6.9*   < > 7.6* 8.3*   *  --  105*  --  103*  --  106*   < > 106* 105*   *  --  151  --  138*  --  140*   < > 180 214   INR  --   --   --   --   --   --  2.50*  --  2.13* 2.18*    < > = values in this  interval not displayed.       Recent Labs   Lab Test 10/02/23  0640 10/01/23  0539 09/30/23  0747   POTASSIUM 3.9 4.0 3.9   CHLORIDE 97* 98 95*   CO2 16* 17* 17*   BUN 52.6* 52.8* 54.0*   ANIONGAP 13 13 12       Recent Labs   Lab Test 10/02/23  0640 10/01/23  0539 09/30/23  0747 09/26/23  0724 09/25/23  1646 09/25/23  1537 09/25/23  1424 09/09/23  1119 09/08/23  1224 09/08/23  1147   ALBUMIN 3.2* 2.7* 2.7*   < >  --   --  2.4*   < >  --  2.3*   BILITOTAL 18.6* 17.9* 18.0*   < >  --   --  21.7*   < >  --  10.4*   ALT 39 41 42   < >  --   --  69   < >  --  82*   * 152* 146*   < >  --    < >  --    < >  --  331*   PROTEIN  --   --   --   --  30*  --   --   --  50*  --    LIPASE  --   --   --   --   --   --  237*  --   --  272*    < > = values in this interval not displayed.         I reviewed the patient's new imaging results.    All laboratory data reviewed  All imaging studies reviewed by me.    [facility]  Office: [phone]  Cell: [phone] (Dr. Gibson)  Cell: [phone] (Nara Slade PA-C)

## 2023-10-02 NOTE — PROGRESS NOTES
10/02/23 1100   Signing Clinician's Name / Credentials   Signing clinician's name / credentials Megan Luis DPT   Dynamic Gait Index (Kamran and Buckner [place], 1995)   Gait Level Surface 2   Change in Gait Speed 2   Gait and Horizontal Head Turns 3   Gait with Vertical Head Turns 3   Gait and Pivot Turns 2   Step Over Obstacle 2   Step Around Obstacles 2   Steps 1   Total Dynamic Gait Index Score  (A score of 19 or less has been correlated to an increased risk of falls in community dwelling older adults, patients with vestibular disorders, and patients with MS.)   Total Score (out of 24) 17     Dynamic Gait Index (DGI):The DGI is a measure of balance during gait that is reliable and valid for the elderly and individuals with Parkinson's disease, MS, vestibular disorders, or s/p stroke. Gait assistive device used: none     Patient score: 17/24  Scores ?19/24 indicate an increased risk for falls according to Vivienne et al 2000  Minimal Detectable Change = 2.9 in community dwelling elderly according to Addison et al 2011    Assessment (rationale for performing, application to patient s function & care plan): Pt is at risk for falls w/o assistive device. Gait improved w/ assistive device and appears functional so pt can be ind in room w/ FWW and amb in halls with family. Anticipate balance deficit is d/t deconditioning and with HEP provided pt will improve.   Minutes billed as physical performance test: 0

## 2023-10-02 NOTE — PLAN OF CARE
Summary: Severe alcohol use disorder, alcoholic liver disease  Primary Diagnosis: alcoholic liver cirrhosis, portal HTN, splenomegaly, hypoalbuminemia, GATITO    10/1/23 @ 1900- 10/2/23 @ 0730  Ad 9/25 with worsening fatigue, weakness and shortness of breath  Orientation: AOx4  Activity: Independent   Diet/BS Checks: 2 gm Na diet/Fluid restriction 1200mL  Tele: NA  IV Access/Drains: PIV SL  Pain Management: Denies pain and nausea; trouble sleeping  Abnormal VS/Results: VSS on RA, SHgb q12  Bowel/Bladder: Continent B/B, Urine dark florence, soft stools per pt  Skin/Wounds: Jaundiced skin and sclera. BLE edema +2-3  Consults: Nephrology, GI  D/C Disposition: Pending medical improvement  Other Info:  Hbg checks q12. No NSAIDs except daily aspirin. On isolation precautions for C.Diff. PT consult pending

## 2023-10-02 NOTE — PROGRESS NOTES
10/02/23 1000   Appointment Info   Signing Clinician's Name / Credentials (PT) Megan Luis DPT   Rehab Comments (PT) Would benefit from lymph eval, MD paged for orders. May need walker for DC pending progress.   Living Environment   People in Home parent(s)   Current Living Arrangements house   Home Accessibility stairs to enter home   Number of Stairs, Main Entrance 3   Stair Railings, Main Entrance none   Transportation Anticipated public transportation   Living Environment Comments Lives in single level house 3 [address] no railings w/ parents, does yardwork for them. Does not work.   Self-Care   Usual Activity Tolerance good   Current Activity Tolerance good   Regular Exercise No   Equipment Currently Used at Home cane, straight   Fall history within last six months yes   Number of times patient has fallen within last six months 2   Activity/Exercise/Self-Care Comment IND w/ ADLS, no AD   General Information   Onset of Illness/Injury or Date of Surgery 09/25/23   Referring Physician Atul Watkins, DO   Patient/Family Therapy Goals Statement (PT) get stronger   Pertinent History of Current Problem (include personal factors and/or comorbidities that impact the POC) Crispin Ham is a 38 year old male with a history of severe alcohol use disorder, alcoholic liver disease with recent hospital admission between 9/8-9/11 for hypovolemic hypotension, alcoholic hepatitis and pancreatitis, concern for duodenitis with possible contained perforation versus diverticulum, GATITO.  Also has history of generalized anxiety disorder,  HTN who now presents with worsening fatigue, weakness and shortness of breath over the last week.   Existing Precautions/Restrictions fall   Cognition   Affect/Mental Status (Cognition) flat/blunted affect   Orientation Status (Cognition) oriented x 4   Follows Commands (Cognition) WFL   Pain Assessment   Patient Currently in Pain Yes, see Vital Sign flowsheet   Integumentary/Edema    Integumentary/Edema Comments 2+ edema BLE, would benefit from lymph eval. Pt unable to get feet in shoes, does not have full ROM of DF   Posture    Posture Forward head position   Range of Motion (ROM)   ROM Comment DF/PF limited d/t edema. all others WFL. Pt has hip arthritis at baseline though did not appear limited in ROM.   Strength (Manual Muscle Testing)   Strength Comments 4/5 globally though has deconditioned significantly.   Bed Mobility   Comment, (Bed Mobility) IND w/ extra time   Transfers   Comment, (Transfers) IND w/ multiple attempts and extra time. Increased difficulty from low surface.   Gait/Stairs (Locomotion)   Distance in Feet (Gait) 400   Comment, (Gait/Stairs) IND w/ significant ER/ABD of feet, R uncompensated Trendelenburg w/ WBOS. No overt LOB.   Balance   Balance Comments 17/24 DGI   Sensory Examination   Sensory Perception WFL   Coordination   Coordination Comments Mild ataxia with fine motor tasks such as velcroing shoes   Clinical Impression   Criteria for Skilled Therapeutic Intervention Yes, treatment indicated   PT Diagnosis (PT) Impaired gait   Influenced by the following impairments Decreased strength, decreased endurance, previous hip pathology, edema   Functional limitations due to impairments Impaired mobility   Clinical Presentation (PT Evaluation Complexity) Stable/Uncomplicated   Clinical Presentation Rationale clinical jdugement   Clinical Decision Making (Complexity) low complexity   Planned Therapy Interventions (PT) balance training;gait training;strengthening;stretching;risk factor education;progressive activity/exercise   Anticipated Equipment Needs at Discharge (PT) walker, rolling  (Bariatric FWW)   Risk & Benefits of therapy have been explained evaluation/treatment results reviewed;care plan/treatment goals reviewed;risks/benefits reviewed;current/potential barriers reviewed;participants voiced agreement with care plan;participants included;patient;father   PT Total  Evaluation Time   PT Eval, Low Complexity Minutes (78865) 24   Physical Therapy Goals   PT Frequency 2x/week   PT Predicted Duration/Target Date for Goal Attainment 10/09/23   PT Goals Gait;Stairs   PT: Gait Independent;Assistive device;Greater than 200 feet   PT: Stairs Independent;3 stairs;Rail on both sides   Interventions   Interventions Quick Adds Therapeutic Procedure;Gait Training   Therapeutic Procedure/Exercise   Ther. Procedure: strength, endurance, ROM, flexibillity Minutes (62643) 15   Treatment Detail/Skilled Intervention Provided handouts with HEP: standing, sitting, laying exercises. Trialed ankle pumps, SAQ, chair pushups and Sit<>stand x10 with cues for pacing and technique. Education on importance of mobility throughout the day to improve strength and edema. Discussed POC with pt and father in room, agreeable for OP therapy when DC to home.   Gait Training   Gait Training Minutes (13645) 15   Symptoms Noted During/After Treatment (Gait Training) fatigue   Treatment Detail/Skilled Intervention Amb 400ft w/ supevision no AD, modified to IND w. FWW. Pt reluctant to use though agreeable if he can be IND. Pt has WBOS with R Trendelenburg and signicant ER/ABD of foot position. Cues for pathfinding. Trial of walker with improved gait mechanics. PT adjusted and fit height, edu on importance of walker management to prevent falls. Edu on fall risks at home. Pt SOB and fatigued after 400ft.   PT Discharge Planning   PT Plan Lymph consult requested, dynamic balance, followup on HEP   PT Discharge Recommendation (DC Rec) home with outpatient physical therapy   PT Rationale for DC Rec Home w/ OP. Pt  below functional baseline with activity tolerance but anticipate pt will regain strength quickly w/ HEP and amb w/ FWW. Pt is IND w/ bed mobility, toileting and in room amb. Pt at risk of falls with fatigue in longer distnaces but lives with retired parents who are able to supervise intermittently. Anticipate w/  continued therapy pt can return home with OP to improve activity tolerance.   PT Brief overview of current status IND in room, amb halls w/ family and FWW for balance   Total Session Time   Timed Code Treatment Minutes 30   Total Session Time (sum of timed and untimed services) 54

## 2023-10-02 NOTE — PROGRESS NOTES
"CLINICAL NUTRITION SERVICES  -  ASSESSMENT NOTE      Future/Additional Recommendations:     Ensure MAX once daily (30 gm pro)    Reviewed current diet order  Encouraged intake of protein at each meal  Discussed importance of adequate po intake for recovery and energy     Malnutrition:   % Weight Loss:  difficult to assess d/t fluid-status - suspect some true wt loss 2' to decreased po intake  % Intake:  <75% for > 7 days (moderate malnutrition)  Subcutaneous Fat Loss:  None observed  Muscle Loss:  Temporal region - mild depletion, Clavicle bone region - mild depletion, and Dorsal hand region - mild depletion  Fluid Retention:  BLE edema +2-3     Malnutrition Diagnosis: Moderate malnutrition in the context of Acute illness or injury       REASON FOR ASSESSMENT  Crispin Ham is a 38 year old male seen by Registered Dietitian for LOS      NUTRITION HISTORY  PMH of severe alcohol use disorder, alcoholic liver cirrhosis w/ ascites, HTN, TIKA, recent hx of alcoholic pancreatitis, recent hx of duodenitis/ulcer with contained perforation vs duodenal diverticulum    H&P-- has become increasingly weak over 7-10 days prior to admission. C/o nausea, no significant vomiting. Decreased PO intake. Abdominal distention, legs \"mildly more swollen.\" Denies alcohol use since recent discharge (9/11).     Visited with pt this afternoon  Notes that he has early satiety - \"will feel hungry but then get full after a few bites\"  States that PTA he was drinking low Na V-8 juice - \"liquids seemed to go down better\"  Admits that his eating has been poor - \"was drinking and not eating well, and then recently haven't felt well and have been mostly in bed\"      CURRENT NUTRITION ORDERS  Diet: 2 Gram Sodium Diet  Fluid restriction 1200 ML FLUID      Pt has been receiving 2-3 small meals/day per health touch.   % intakes documented throughout this admit per nursing flow sheet.    Lunch meal just arrived - soup, fruit, rice krispie " "bar  States he was getting tired of eggs and sandwiches  \"I also need to start getting out of bed and moving around\"  He is receptive to an Ensure MAX for added protein      PER CHART REVIEW  Admitted for:   Decompensated alcoholic liver cirrhosis  Portal hypertension with splenomegaly and ascites  Lower extremity edema  Leukocytosis, concern for SBP  Coagulopathy secondary to liver disease  Hypoalbuminemia  GATITO, prerenal versus hepatorenal  Diarrhea  Hyponatremia  Nongap acidosis in setting of diarrhea and GATITO    9/25: enteric virus/bacteria PCR panel = all negative   9/25: C.diff r/o =    Latest Reference Range & Units 09/26/23 04:25   C Difficile Toxin B by PCR Negative  Positive !   C. difficile GDH Antigen Negative  Positive !   C. Difficile Toxin Negative  Negative                 NUTRITION FOCUSED PHYSICAL ASSESSMENT FOR DIAGNOSING MALNUTRITION)  Yes           Observed:    Muscle wasting (refer to documentation in Malnutrition section) and LE edema    Obtained from Chart/Interdisciplinary Team:  Jaundiced   9/26: paracentesis = 3.8L removed     ANTHROPOMETRICS  Height: 6' 0\"  Weight:(10/2) 125.6 kg / 276 lbs 12.8 oz  Body mass index is 37.54 kg/m .  Weight Status:  Obesity Grade II BMI 35-39.9  IBW: 80.9 kg  % IBW: 150%   Weight History:   Wt has been trending up, though suspect fluid-related w/ ascites, edema   Pt is not sure of his usual wt - but does feel his current wt is up with the fluid  Thinks at from earlier in the year is probably more accurate     10/02/23 0650 125.6 kg (276 lb 12.8 oz) Standing scale   10/01/23 0610 125.4 kg (276 lb 6.4 oz) Standing scale   09/29/23 0610 123.9 kg (273 lb 3.2 oz) Standing scale   09/28/23 0607 121.8 kg (268 lb 9.6 oz) Standing scale   09/27/23 0046 121.6 kg (268 lb 1.6 oz) Standing scale   09/26/23 0202 125 kg (275 lb 9.6 oz) Standing scale   09/25/23 2253 124.7 kg (274 lb 14.4 oz) Standing scale   09/25/23 2035 123.7 kg (272 lb 11.3 oz) Bed scale       10/02/23 " 125.6 kg (276 lb 12.8 oz)   09/11/23 121.9 kg (268 lb 11.9 oz)   03/11/22 98.4 kg (217 lb)     Care everywhere--  09/25/23 : 125.8 kg (277 lb 6.4 oz)  09/19/23 : 125.4 kg (276 lb 8 oz)  02/21/23 : 115.3 kg (254 lb 4.8 oz)      LABS    (L) 10/02/2023    BUN 52.6 (H) 10/02/2023    CR 1.35 (H) 10/02/2023    ALKPHOS 131 (H) 10/02/2023     (H) 10/02/2023     Lab Test 10/02/23  0640 10/01/23  0539 09/30/23  0747 09/29/23  0647 09/28/23  0547   BILITOTAL 18.6* 17.9* 18.0* 18.7* 19.4*     MELD 3.0: 34 at 10/1/2023  5:39 AM  MELD-Na: 34 at 10/1/2023  5:39 AM    MEDICATIONS   albumin human  25 g Intravenous TID    multivitamin, therapeutic  1 tablet Oral Daily    pantoprazole  40 mg Oral BID    sodium bicarbonate  650 mg Oral BID       ASSESSED NUTRITION NEEDS PER APPROVED PRACTICE GUIDELINES:  Dosing Weight: 91 kg (adjusted, based on 121.6 kg-- 9/27)  Estimated Energy Needs: 6433-1561 kcal (25-30 Kcal/Kg)  Justification: maintenance  Estimated Protein Needs: 109-137 grams protein (1.2-1.5 g pro/Kg)  Justification: preservation of lean body mass  Estimated Fluid Needs: per provider pending fluid status      MALNUTRITION:  % Weight Loss:  difficult to assess d/t fluid-status - suspect some true wt loss 2' to decreased po intake  % Intake:  <75% for > 7 days (moderate malnutrition)  Subcutaneous Fat Loss:  None observed  Muscle Loss:  Temporal region - mild depletion, Clavicle bone region - mild depletion, and Dorsal hand region - mild depletion  Fluid Retention:  BLE edema +2-3     Malnutrition Diagnosis: Moderate malnutrition in the context of Acute illness or injury        NUTRITION DIAGNOSIS:  Inadequate oral intake related to early satiety as evidenced by pt eating 3 small meals/day        NUTRITION INTERVENTIONS  Recommendations / Nutrition Prescription  2gm Na, 1200 mL fluid restriction    Ensure MAX once daily (30 gm pro)    Nutrition education   Reviewed current diet order  Encouraged intake of protein at  each meal  Discussed importance of adequate po intake for recovery and energy    Nutrition Goals  Pt to consume 100% meals TID + 1 nutrition supplement        MONITORING AND EVALUATION:  Progress towards goals will be monitored and evaluated per protocol and Practice Guidelines

## 2023-10-03 ENCOUNTER — APPOINTMENT (OUTPATIENT)
Dept: OCCUPATIONAL THERAPY | Facility: CLINIC | Age: 38
DRG: 432 | End: 2023-10-03
Attending: STUDENT IN AN ORGANIZED HEALTH CARE EDUCATION/TRAINING PROGRAM
Payer: COMMERCIAL

## 2023-10-03 LAB
ALBUMIN SERPL BCG-MCNC: 3.5 G/DL (ref 3.5–5.2)
ALP SERPL-CCNC: 120 U/L (ref 40–129)
ALT SERPL W P-5'-P-CCNC: 36 U/L (ref 0–70)
ANION GAP SERPL CALCULATED.3IONS-SCNC: 14 MMOL/L (ref 7–15)
AST SERPL W P-5'-P-CCNC: 135 U/L (ref 0–45)
BILIRUB SERPL-MCNC: 17.5 MG/DL
BUN SERPL-MCNC: 54.7 MG/DL (ref 6–20)
CALCIUM SERPL-MCNC: 9.3 MG/DL (ref 8.6–10)
CHLORIDE SERPL-SCNC: 97 MMOL/L (ref 98–107)
CREAT SERPL-MCNC: 1.3 MG/DL (ref 0.67–1.17)
DEPRECATED HCO3 PLAS-SCNC: 18 MMOL/L (ref 22–29)
EGFRCR SERPLBLD CKD-EPI 2021: 73 ML/MIN/1.73M2
ERYTHROCYTE [DISTWIDTH] IN BLOOD BY AUTOMATED COUNT: 15 % (ref 10–15)
GLUCOSE SERPL-MCNC: 109 MG/DL (ref 70–99)
HCT VFR BLD AUTO: 20.1 % (ref 40–53)
HGB BLD-MCNC: 7.2 G/DL (ref 13.3–17.7)
HGB BLD-MCNC: 7.2 G/DL (ref 13.3–17.7)
HGB BLD-MCNC: 7.4 G/DL (ref 13.3–17.7)
INR PPP: 2.66 (ref 0.85–1.15)
MCH RBC QN AUTO: 37.5 PG (ref 26.5–33)
MCHC RBC AUTO-ENTMCNC: 35.8 G/DL (ref 31.5–36.5)
MCV RBC AUTO: 105 FL (ref 78–100)
PLATELET # BLD AUTO: 131 10E3/UL (ref 150–450)
POTASSIUM SERPL-SCNC: 3.9 MMOL/L (ref 3.4–5.3)
PROT SERPL-MCNC: ABNORMAL G/DL
RBC # BLD AUTO: 1.92 10E6/UL (ref 4.4–5.9)
SODIUM SERPL-SCNC: 129 MMOL/L (ref 135–145)
WBC # BLD AUTO: 14.1 10E3/UL (ref 4–11)

## 2023-10-03 PROCEDURE — 250N000013 HC RX MED GY IP 250 OP 250 PS 637: Performed by: INTERNAL MEDICINE

## 2023-10-03 PROCEDURE — P9047 ALBUMIN (HUMAN), 25%, 50ML: HCPCS | Performed by: INTERNAL MEDICINE

## 2023-10-03 PROCEDURE — 85018 HEMOGLOBIN: CPT | Performed by: INTERNAL MEDICINE

## 2023-10-03 PROCEDURE — 120N000001 HC R&B MED SURG/OB

## 2023-10-03 PROCEDURE — 85027 COMPLETE CBC AUTOMATED: CPT | Performed by: STUDENT IN AN ORGANIZED HEALTH CARE EDUCATION/TRAINING PROGRAM

## 2023-10-03 PROCEDURE — 36415 COLL VENOUS BLD VENIPUNCTURE: CPT | Performed by: INTERNAL MEDICINE

## 2023-10-03 PROCEDURE — 85610 PROTHROMBIN TIME: CPT | Performed by: STUDENT IN AN ORGANIZED HEALTH CARE EDUCATION/TRAINING PROGRAM

## 2023-10-03 PROCEDURE — 250N000013 HC RX MED GY IP 250 OP 250 PS 637: Performed by: HOSPITALIST

## 2023-10-03 PROCEDURE — 250N000013 HC RX MED GY IP 250 OP 250 PS 637: Performed by: STUDENT IN AN ORGANIZED HEALTH CARE EDUCATION/TRAINING PROGRAM

## 2023-10-03 PROCEDURE — 82040 ASSAY OF SERUM ALBUMIN: CPT | Performed by: STUDENT IN AN ORGANIZED HEALTH CARE EDUCATION/TRAINING PROGRAM

## 2023-10-03 PROCEDURE — 250N000011 HC RX IP 250 OP 636: Performed by: INTERNAL MEDICINE

## 2023-10-03 PROCEDURE — 99232 SBSQ HOSP IP/OBS MODERATE 35: CPT | Performed by: STUDENT IN AN ORGANIZED HEALTH CARE EDUCATION/TRAINING PROGRAM

## 2023-10-03 PROCEDURE — 97165 OT EVAL LOW COMPLEX 30 MIN: CPT | Mod: GO

## 2023-10-03 PROCEDURE — 36415 COLL VENOUS BLD VENIPUNCTURE: CPT | Performed by: STUDENT IN AN ORGANIZED HEALTH CARE EDUCATION/TRAINING PROGRAM

## 2023-10-03 PROCEDURE — 97140 MANUAL THERAPY 1/> REGIONS: CPT | Mod: GO

## 2023-10-03 RX ADMIN — VANCOMYCIN HYDROCHLORIDE 125 MG: 125 CAPSULE ORAL at 21:34

## 2023-10-03 RX ADMIN — PANTOPRAZOLE SODIUM 40 MG: 40 TABLET, DELAYED RELEASE ORAL at 07:34

## 2023-10-03 RX ADMIN — MULTIVITAMIN TABLET 1 TABLET: TABLET at 07:35

## 2023-10-03 RX ADMIN — VANCOMYCIN HYDROCHLORIDE 125 MG: 125 CAPSULE ORAL at 13:52

## 2023-10-03 RX ADMIN — ALBUMIN HUMAN 25 G: 0.25 SOLUTION INTRAVENOUS at 08:37

## 2023-10-03 RX ADMIN — PANTOPRAZOLE SODIUM 40 MG: 40 TABLET, DELAYED RELEASE ORAL at 19:57

## 2023-10-03 RX ADMIN — SODIUM BICARBONATE 650 MG TABLET 650 MG: at 08:37

## 2023-10-03 RX ADMIN — SODIUM BICARBONATE 650 MG TABLET 650 MG: at 21:34

## 2023-10-03 RX ADMIN — VANCOMYCIN HYDROCHLORIDE 125 MG: 125 CAPSULE ORAL at 08:37

## 2023-10-03 RX ADMIN — VANCOMYCIN HYDROCHLORIDE 125 MG: 125 CAPSULE ORAL at 18:10

## 2023-10-03 ASSESSMENT — ACTIVITIES OF DAILY LIVING (ADL)
ADLS_ACUITY_SCORE: 22
IADL_COMMENTS: PT REPORTS BEING IND W/ ALL IADL'S AT BASELINE PRIOR TO ADMISSION.
ADLS_ACUITY_SCORE: 22
PREVIOUS_RESPONSIBILITIES: MEAL PREP;HOUSEKEEPING;LAUNDRY;YARDWORK;MEDICATION MANAGEMENT;FINANCES

## 2023-10-03 NOTE — PLAN OF CARE
[id number]  Orientation: A&O x4  Activity: independent with walker  Diet/BS Checks: 2 gram sodium diet. 1200 mL fluid restriction  Tele:  n/a  IV Access/Drains: PIV left forearm SL  Pain Management: denies pain  Abnormal VS/Results: VSS on room air except tachy and soft BP  Bowel/Bladder: cont. B&B. Loose stools  Skin/Wounds: jaundice skin and sclera. Bruising on arms. Edema +3/4 both legs  Consults: GI, lymphedema therapy, PT   D/C Disposition: pending on GI and therapies eval  Other Info: c.diff precautions. PT recommends outpatient PT at home. Hung 1 bag of albumin. Hgb checked this am

## 2023-10-03 NOTE — PROGRESS NOTES
10/03/23 1050   Appointment Info   Signing Clinician's Name / Credentials (OT) Matty Esquivel OTR/L   Quick Adds   Quick Adds Edema   Living Environment   People in Home parent(s)   Current Living Arrangements house   Home Accessibility stairs to enter home   Number of Stairs, Main Entrance 3   Stair Railings, Main Entrance none   Transportation Anticipated public transportation   Living Environment Comments Pt lives in single level home w/ parents. Tub shower w/ grab bars.   Self-Care   Usual Activity Tolerance good   Current Activity Tolerance good   Fall history within last six months yes   Number of times patient has fallen within last six months 2   Activity/Exercise/Self-Care Comment Pt reports being IND w/ all ADL's at baseline prior to admission.   Instrumental Activities of Daily Living (IADL)   Previous Responsibilities meal prep;housekeeping;laundry;yardwork;medication management;finances   IADL Comments Pt reports being IND w/ all IADL's at baseline prior to admission.   General Information   Onset of Illness/Injury or Date of Surgery 09/25/23   Referring Physician Elio Chaves MD   Patient/Family Therapy Goal Statement (OT) Return to home.   Additional Occupational Profile Info/Pertinent History of Current Problem Crispin Ham is a 38 year old male with a history of severe alcohol use disorder, alcoholic liver disease with recent hospital admission between 9/8-9/11 for hypovolemic hypotension, alcoholic hepatitis and pancreatitis, concern for duodenitis with possible contained perforation versus diverticulum, GATITO.  Also has history of generalized anxiety disorder,  HTN who now presents with worsening fatigue, weakness and shortness of breath over the last week.   Existing Precautions/Restrictions fall;other (see comments)  (Edema)   Cognitive Status Examination   Orientation Status orientation to person, place and time   Visual Perception   Visual Impairment/Limitations corrective lenses full-time    Sensory   Sensory Quick Adds sensation intact   Pain Assessment   Patient Currently in Pain No   Edema General Information   Onset of Edema 09/25/23   Affected Body Part(s) Left LE;Right LE   Edema Etiology Chronic Venous Insfficiency   General Comments/Previous Edema Treatment/Edema Equipment BLE swelling observed that is impacting activity tolerance and independence with ADL's.   Edema Examination/Assessment   Skin Condition Pitting  (3+)   Scar No   Ulcerations No   Stemmer Sign Negative   Pitting Assessment 3+   Activities of Daily Living   BADL Assessment/Intervention lower body dressing   Lower Body Dressing Assessment/Training   [place] Level (Lower Body Dressing) minimum assist (75% patient effort)   Clinical Impression   Criteria for Skilled Therapeutic Interventions Met (OT) Yes, treatment indicated   OT Diagnosis Decreased ADL independence, BLE edema   Edema: Patient Presentation Stage 2 Lymphedema   OT Problem List-Impairments impacting ADL problems related to;activity tolerance impaired;range of motion (ROM)  (edema)   Planned Therapy Interventions (OT) manual therapy;home program guidelines;progressive activity/exercise;risk factor education   Edema: Planned Interventions Edema exercises;Education;Manual therapy;ADL training   Clinical Decision Making Complexity (OT) low complexity   Risk & Benefits of therapy have been explained evaluation/treatment results reviewed;care plan/treatment goals reviewed;risks/benefits reviewed;current/potential barriers reviewed;patient   OT Total Evaluation Time   OT Eval, Low Complexity Minutes (07521) 12   OT Goals   Therapy Frequency (OT) Daily   OT Predicted Duration/Target Date for Goal Attainment 10/09/23   OT Goals Edema   OT: Edema education to increase ability to manage edema after discharge from the hospital Patient;Verbalize;Skin care routine;signs/symptoms of intolerance;wear schedule;limb positioning;garrnet/bandage care;discharge recommendations    OT: Management of edema bandages Patient;Verbalize;Demonstrate;quick wrap;garment(s)   OT: Functional edema exercise program to reduce limb volume, increase activity tolerance and improve independence with ADL Patient;Verbalize;Demonstrates;HEP   Interventions   Interventions Quick Adds Manual Therapy   Manual Therapy   Manual Therapy: Mobilization, MFR, MLD, friction massage minutes (94646) 30   Symptoms noted during/after treatment fatigue   Treatment Detail/Skilled Intervention OT: Lymphedema evaluation complete and treatment initiated.  Pt demonstrates stage 2 lymphedema in B LE below knee.  Pt is appropriate for LE quick wrap, using short stretch (SS) bandages, not ACE wraps. Pt educated in rationale for compression with wound healing and importance of using SS bandages which have low resting pressure and high working pressure, vs ACE wraps which have high resting pressure and low working pressure. LEs washed, lotion applied, and quick wraps completed to B LE with 10 cm short stretch bandage toes to ankle and 12 cm short stretch bandage ankle to knee with appropriate stretch and spacing to allow gradient compression.  Wraps applied bilaterally. Coordinated with nursing regarding wearing schedule, and completing LE cares for the following day prior to therapy arrival. Updated white board with schedule and quick wrap instruction sheet if wraps become loose or are otherwise not tolerated. If pt does not tolerate wraps well, please remove wraps but keep LEs elevated.   OT Discharge Planning   OT Plan Assess response to B LE SS wraps   OT Discharge Recommendation (DC Rec) home with assist   OT Rationale for DC Rec Pt is IND within room but remains limited d/t increased edema in BLE from knee down. Pt lives in house w/ retired parents. Anticipate once medically ready, pt will be safe to d/c home w/ assist from parents as needed for edema management in BLe's.   OT Brief overview of current status SS bandages applied  to B LE from knee down, education provided   Total Session Time   Timed Code Treatment Minutes 30   Total Session Time (sum of timed and untimed services) 42

## 2023-10-03 NOTE — PROGRESS NOTES
[facility]    Medicine Progress Note - Hospitalist Service    Date of Admission:  9/25/2023  Date of Service: 10/03/2023    Assessment & Plan     Crispin Ham is a 38 year old male with a history of severe alcohol use disorder, alcoholic liver disease with recent hospital admission between 9/8-9/11 for hypovolemic hypotension, alcoholic hepatitis and pancreatitis, concern for duodenitis with possible contained perforation versus diverticulum, GATITO.  Also has history of generalized anxiety disorder,  HTN who now presents with worsening fatigue, weakness and shortness of breath over the last week.     Decompensated alcoholic liver cirrhosis  Portal hypertension with splenomegaly and ascites  Lower extremity edema  Ruled out SBP   Coagulopathy secondary to liver disease  Hypoalbuminemia  On admission patient noted increased weakness, fatigue, dyspnea.  Also having hard time sleeping, discomfort in lower abdomen, generalized pains and increased frequency of small-volume loose stools about every 3-4 hours over the last few days.   CT CAP shows cirrhotic appearing liver with sequelae of portal hypertension including splenomegaly and worsened now large volume ascites.  Mild increased trace bilateral pleural effusions and body wall edema.  Lab work concerning for decompensated liver disease.    In the ED patient was iven 2 g IV ceftriaxone in the ED to cover possible SBP.  * BC -> NGTD  * Ultrasound paracentesis therapeutic and diagnostic for 09/26 ->  3.825 L of  straw colored fluid was drained, fluid studies not consistent with SBP  * EGD:  Normal upper third of esophagus and middle third of esophagus.  Z-line regular, 38 cm from the incisors. Esophageal polyp(s) were found. Resected and retrieved. Clip was placed. Esophageal mucosal tear with bleeding (complication of clip placement). Hemostasis achieved with bipolar probe.   MELD 3.0: 32 at 10/3/2023  7:10 AM  MELD-Na: 33 at 10/3/2023   7:10 AM  Calculated from:  Serum Creatinine: 1.30 mg/dL at 10/3/2023  7:10 AM  Serum Sodium: 129 mmol/L at 10/3/2023  7:10 AM  Total Bilirubin: 17.5 mg/dL at 10/3/2023  7:10 AM  Serum Albumin: 3.5 g/dL at 10/3/2023  7:10 AM  INR(ratio): 2.66 at 10/3/2023  7:10 AM  Age at listing (hypothetical): 38 years  Sex: Male at 10/3/2023  7:10 AM    - Continue Albumin for 9 doses -> stopped as now albumin 3.5  - Initially treated IV ceftriaxone to cover for SBP but stopped as SBP ruled out  - 2 g sodium diet  - 1U FFP ordered for 09/26 given INR 2.18  - GI following and appreciate their recommendations   - PT evaluation -> Home with OP   - CBC / CMP / INR daily  - Home next 24-48 hours     GATITO, likely hepatorenal. Improving   Hyponatremia.  Improving  Nongap acidosis in setting of diarrhea and GATITO  Creatinine had gone up to 2.9 during last hospitalization and sodium was 128.  Creatinine normalized and sodium came up to 134 with NS during last hospitalization.    During this hospitalization his renal function continues to worsening with worsening hyponatremia   - Continue to monitor closely  - Avoid nephrotoxin   - Albumin infusions ordered as noted above  - Nephrology following and appreciate their assistance. Complete albumin course and continue sodium bicarb.  Signed off      Diarrhea -> C Diff positive  Likely colonizer- however given diarrhea, decompensated liver cirrhosis continue 14 day treatment of vancomycin 125 mg QID   - Continue PO Vanc      Macrocytic anemia  Hb 9.6.  Recent baseline appears to be around 10.  No evidence of overt GI bleeding. But hgb down to 7.3 today  - Monitor hemoglobins  -Transfuse Hgb < 7.0.  Required 1 unite on 9/29      Elevated lipase, recent alcoholic pancreatitis  No significant abdominal pain at this time.  Lipase was 272 on 9/8, currently 237.     Severe alcohol use disorder  Strongly encourage complete alcohol cessation.  He reports being sober since last hospital discharge.    -  Refused to talk with CD or psychiatry during last hospitalization     Recent duodenitis/ulcer with contained perforation versus duodenal diverticulum seen on CT  Noted on CT during last hospitalization.  - Continue PPI  - No significant abdominal pain at this time.  Consult to [facility] as noted above.     History of hypertension  Recently had issues with hypotension.  Not on any medications.  Noted.  Currently normotensive.       Diet: 2 Gram Sodium Diet  Fluid restriction 1200 ML FLUID  Snacks/Supplements Adult: Other; D: Ensure MAX; With Meals    DVT Prophylaxis: Pneumatic Compression Devices  Bosch Catheter: Not present  Lines: None     Cardiac Monitoring: None  Code Status: Full Code      Clinically Significant Risk Factors         # Hyponatremia: Lowest Na = 126 mmol/L in last 2 days, will monitor as appropriate      # Hypoalbuminemia: Lowest albumin = 2.4 g/dL at 9/25/2023  2:24 PM, will monitor as appropriate    # Coagulation Defect: INR = 2.66 (Ref range: 0.85 - 1.15) and/or PTT = N/A, will monitor for bleeding           # Obesity: Estimated body mass index is 37.66 kg/m  as calculated from the following:    Height as of this encounter: 1.829 m (6').    Weight as of this encounter: 126 kg (277 lb 11.2 oz).   # Moderate Malnutrition: based on nutrition assessment           Disposition Plan      Expected Discharge Date: 10/05/2023        Discharge Comments: Once GI clears and therapy evaluates          Elio Chaves MD  Hospitalist Service  [facility]  Securely message with [employer] (more info)  Text page via [employer] Paging/Directory   ______________________________________________________________________    Interval History     Patient seen and examined.  No acute events over night.  No fevers or hypoxia.  No chest pain or SOB.    No nausea or vomiting.  Continues to improve per patient  No new abdominal pain  No new complaints     Physical Exam   Vital Signs: Temp: 98.3  F (36.8  C) Temp  src: Oral BP: 104/48 Pulse: 107   Resp: 18 SpO2: 95 % O2 Device: None (Room air)    Weight: 277 lbs 11.2 oz    General Appearance: Resting comfortably sitting up in bed.  NAD  Respiratory: Clear to auscultation.  No respiratory distress  Cardiovascular: RRR.  No obvious murmurs  GI: Minimally distended.  Non-tender   Skin: Jaundiced.  No obvious rashes  Other: Alert.  Diffuse edema noted      Medical Decision Making       45 MINUTES SPENT BY ME on the date of service doing chart review, history, exam, documentation & further activities per the note.      Data   ------------------------- PAST 24 HR DATA REVIEWED -----------------------------------------------    I have personally reviewed the following data over the past 24 hrs:    14.1 (H)  \   7.2 (L); 7.2 (L)   / 131 (L)     129 (L) 97 (L) 54.7 (H) /  109 (H)   3.9 18 (L) 1.30 (H) \     ALT: 36 AST: 135 (H) AP: 120 TBILI: 17.5 (HH)   ALB: 3.5 TOT PROTEIN: N/A LIPASE: N/A     INR:  2.66 (H) PTT:  N/A   D-dimer:  N/A Fibrinogen:  N/A     Imaging results reviewed over the past 24 hrs:   No results found for this or any previous visit (from the past 24 hour(s)).

## 2023-10-03 NOTE — PROGRESS NOTES
[facility]  Gastroenterology Progress Note     Crispin Ham MRN# [id number]   YOB: 1985 Age: 38 year old          Assessment and Plan:     Crispin Ham is a 38 year old male with history of alcohol abuse, and liver cirrhosis among others; admitted on 9/25 whom presented with fatigue, weakness and shortness of breath.     Liver failure  Alcoholic liver cirrhosis with ascites  Coagulopathy secondary to liver disease  Hypoalbuminemia  CT C/A/P shows cirrhotic appearing liver with sequelae of portal hypertension including splenomegaly and worsened now large volume ascites.  Mild increased trace bilateral pleural effusions and body wall edema.   MELD 35 Na++ 125, Cr 2.32 (0.5 on 9/11), Tbili 19.4( 13.9 on 9/11), INR 2.13 (90 day mortality of 53%)  LFTs: AST//50 ( alcoholic pattern)   Unable to start prednisolone with Maddrey score over 40 given underlying sepsis  Not a candidate for liver transplant with recent Alcohol use in last 2 weeks  9/27 EGD noted esophageal polyp-resected and clips placed. Esophageal mucosal tear with bleeding- complication of clip placement- hemostasis achieved. Portal hypertensive gastropathy  10/3 Cr stable. Albumin improved to 3.5.     - Stop albumin infusion  - 2 g sodium diet   - Daily labs CMP, CBC, INR     Leukocytosis  WBC 18.6->10.8->11.5 and has significant ascites with GATITO   Paracentesis on 9/26 removed 3.8 L of straw colored fluid with cell count of 59 and 3 % neutrophils- no evidence of SBP     - Continue to hold off on diuretics at this time     Recent duodenitis/ulcer with contained perforation versus duodenal diverticulum seen on CT   Anemia  Hemoglobin has remained in the 7 range (baseline 10.7 on 9/9/23)  No evidence of overt GI bleeding  On heparin- held  10/3 INR continues to progressively increase 2.13->2.50->2.66    - Pantoprazole 40 mg BID                Interval History:     Doing well; no cp, sob, n/v/d, or  abd pain.               Review of Systems:     C: NEGATIVE for fever, chills, change in weight  E/M: NEGATIVE for ear, mouth and throat problems  R: NEGATIVE for significant cough or SOB  CV: NEGATIVE for chest pain, palpitations or peripheral edema             Medications:   I have reviewed this patient's current medications   albumin human  25 g Intravenous TID    multivitamin, therapeutic  1 tablet Oral Daily    pantoprazole  40 mg Oral BID    sodium bicarbonate  650 mg Oral BID    sodium chloride (PF)  3 mL Intracatheter Q8H    vancomycin  125 mg Oral 4x Daily                  Physical Exam:   Vitals were reviewed  Vital Signs with Ranges  Temp:  [98  F (36.7  C)-98.1  F (36.7  C)] 98.1  F (36.7  C)  Pulse:  [100-107] 104  Resp:  [18] 18  BP: ()/(44-66) 119/52  SpO2:  [92 %-95 %] 94 %  I/O last 3 completed shifts:  In: 620 [P.O.:520]  Out: 900 [Urine:900]  Consitutional: Somnolent, no acute distress  Cardiovascular: No lifts, heaves, or thrills. RRR. No murmurs, clicks, gallops or rubs  Respiratory: Percussion normal. Good diaphragmatic excursion. Lungs clear  Abdomen: Abdomen soft, non-tender. BS normal. No masses, organomegaly           Data:   I reviewed the patient's new clinical lab test results.   Recent Labs   Lab Test 10/03/23  0710 10/02/23  1851 10/02/23  0640 10/01/23  1926 10/01/23  0539 09/29/23  1610 09/29/23  0647 09/27/23  2223 09/27/23  0722   WBC 14.1*  --  13.8*  --  12.2*   < > 11.1*   < > 10.8   HGB 7.2*  7.2* 7.3* 7.3*  7.3*   < > 7.6*   < > 6.9*   < > 7.6*   *  --  105*  --  105*   < > 106*   < > 106*   *  --  130*  --  151   < > 140*   < > 180   INR 2.66*  --   --   --   --   --  2.50*  --  2.13*    < > = values in this interval not displayed.     Recent Labs   Lab Test 10/03/23  0710 10/02/23  0640 10/01/23  0539   POTASSIUM 3.9 3.9 4.0   CHLORIDE 97* 97* 98   CO2 18* 16* 17*   BUN 54.7* 52.6* 52.8*   ANIONGAP 14 13 13     Recent Labs   Lab Test 10/03/23  0710  10/02/23  0640 10/01/23  0539 09/26/23  0724 09/25/23  1646 09/25/23  1537 09/25/23  1424 09/09/23  1119 09/08/23  1224 09/08/23  1147   ALBUMIN 3.5 3.2* 2.7*   < >  --   --  2.4*   < >  --  2.3*   BILITOTAL 17.5* 18.6* 17.9*   < >  --   --  21.7*   < >  --  10.4*   ALT 36 39 41   < >  --   --  69   < >  --  82*   * 142* 152*   < >  --    < >  --    < >  --  331*   PROTEIN  --   --   --   --  30*  --   --   --  50*  --    LIPASE  --   --   --   --   --   --  237*  --   --  272*    < > = values in this interval not displayed.       I reviewed the patient's new imaging results.    All laboratory data reviewed  All imaging studies reviewed by me.    [facility]  Office: [phone]  Cell: [phone] (Dr. Gibson)  Cell: [phone] (Nara Slade PA-C)

## 2023-10-04 ENCOUNTER — APPOINTMENT (OUTPATIENT)
Dept: OCCUPATIONAL THERAPY | Facility: CLINIC | Age: 38
DRG: 432 | End: 2023-10-04
Payer: COMMERCIAL

## 2023-10-04 VITALS
RESPIRATION RATE: 20 BRPM | SYSTOLIC BLOOD PRESSURE: 120 MMHG | HEIGHT: 72 IN | TEMPERATURE: 98.8 F | WEIGHT: 277.8 LBS | DIASTOLIC BLOOD PRESSURE: 73 MMHG | BODY MASS INDEX: 37.63 KG/M2 | OXYGEN SATURATION: 93 % | HEART RATE: 100 BPM

## 2023-10-04 LAB
ABO/RH(D): NORMAL
ALBUMIN SERPL BCG-MCNC: 3.4 G/DL (ref 3.5–5.2)
ALP SERPL-CCNC: 125 U/L (ref 40–129)
ALT SERPL W P-5'-P-CCNC: 34 U/L (ref 0–70)
ANION GAP SERPL CALCULATED.3IONS-SCNC: 14 MMOL/L (ref 7–15)
ANTIBODY SCREEN: NEGATIVE
AST SERPL W P-5'-P-CCNC: 134 U/L (ref 0–45)
BILIRUB SERPL-MCNC: 16.9 MG/DL
BLD PROD TYP BPU: NORMAL
BLOOD COMPONENT TYPE: NORMAL
BUN SERPL-MCNC: 57.5 MG/DL (ref 6–20)
CALCIUM SERPL-MCNC: 9.3 MG/DL (ref 8.6–10)
CHLORIDE SERPL-SCNC: 97 MMOL/L (ref 98–107)
CODING SYSTEM: NORMAL
CREAT SERPL-MCNC: 1.38 MG/DL (ref 0.67–1.17)
CROSSMATCH: NORMAL
DEPRECATED HCO3 PLAS-SCNC: 18 MMOL/L (ref 22–29)
EGFRCR SERPLBLD CKD-EPI 2021: 68 ML/MIN/1.73M2
ERYTHROCYTE [DISTWIDTH] IN BLOOD BY AUTOMATED COUNT: 14.8 % (ref 10–15)
GLUCOSE SERPL-MCNC: 107 MG/DL (ref 70–99)
HCT VFR BLD AUTO: 19.6 % (ref 40–53)
HGB BLD-MCNC: 7 G/DL (ref 13.3–17.7)
HGB BLD-MCNC: 7 G/DL (ref 13.3–17.7)
INR PPP: 2.56 (ref 0.85–1.15)
ISSUE DATE AND TIME: NORMAL
MCH RBC QN AUTO: 37.6 PG (ref 26.5–33)
MCHC RBC AUTO-ENTMCNC: 35.7 G/DL (ref 31.5–36.5)
MCV RBC AUTO: 105 FL (ref 78–100)
PATH REPORT.COMMENTS IMP SPEC: NORMAL
PATH REPORT.FINAL DX SPEC: NORMAL
PATH REPORT.GROSS SPEC: NORMAL
PATH REPORT.MICROSCOPIC SPEC OTHER STN: NORMAL
PATH REPORT.MICROSCOPIC SPEC OTHER STN: NORMAL
PATH REPORT.RELEVANT HX SPEC: NORMAL
PHOTO IMAGE: NORMAL
PLATELET # BLD AUTO: 154 10E3/UL (ref 150–450)
POTASSIUM SERPL-SCNC: 3.9 MMOL/L (ref 3.4–5.3)
PROT SERPL-MCNC: ABNORMAL G/DL
RBC # BLD AUTO: 1.86 10E6/UL (ref 4.4–5.9)
SODIUM SERPL-SCNC: 129 MMOL/L (ref 135–145)
SPECIMEN EXPIRATION DATE: NORMAL
UNIT ABO/RH: NORMAL
UNIT NUMBER: NORMAL
UNIT STATUS: NORMAL
UNIT TYPE ISBT: 6200
WBC # BLD AUTO: 15.5 10E3/UL (ref 4–11)

## 2023-10-04 PROCEDURE — 97140 MANUAL THERAPY 1/> REGIONS: CPT | Mod: GO

## 2023-10-04 PROCEDURE — 36415 COLL VENOUS BLD VENIPUNCTURE: CPT | Performed by: STUDENT IN AN ORGANIZED HEALTH CARE EDUCATION/TRAINING PROGRAM

## 2023-10-04 PROCEDURE — 250N000013 HC RX MED GY IP 250 OP 250 PS 637: Performed by: INTERNAL MEDICINE

## 2023-10-04 PROCEDURE — P9016 RBC LEUKOCYTES REDUCED: HCPCS | Performed by: STUDENT IN AN ORGANIZED HEALTH CARE EDUCATION/TRAINING PROGRAM

## 2023-10-04 PROCEDURE — 84460 ALANINE AMINO (ALT) (SGPT): CPT | Performed by: STUDENT IN AN ORGANIZED HEALTH CARE EDUCATION/TRAINING PROGRAM

## 2023-10-04 PROCEDURE — 85610 PROTHROMBIN TIME: CPT | Performed by: STUDENT IN AN ORGANIZED HEALTH CARE EDUCATION/TRAINING PROGRAM

## 2023-10-04 PROCEDURE — 97530 THERAPEUTIC ACTIVITIES: CPT | Mod: GO

## 2023-10-04 PROCEDURE — 250N000013 HC RX MED GY IP 250 OP 250 PS 637: Performed by: STUDENT IN AN ORGANIZED HEALTH CARE EDUCATION/TRAINING PROGRAM

## 2023-10-04 PROCEDURE — 85027 COMPLETE CBC AUTOMATED: CPT | Performed by: STUDENT IN AN ORGANIZED HEALTH CARE EDUCATION/TRAINING PROGRAM

## 2023-10-04 PROCEDURE — 86901 BLOOD TYPING SEROLOGIC RH(D): CPT | Performed by: STUDENT IN AN ORGANIZED HEALTH CARE EDUCATION/TRAINING PROGRAM

## 2023-10-04 PROCEDURE — 86923 COMPATIBILITY TEST ELECTRIC: CPT | Performed by: STUDENT IN AN ORGANIZED HEALTH CARE EDUCATION/TRAINING PROGRAM

## 2023-10-04 PROCEDURE — 99239 HOSP IP/OBS DSCHRG MGMT >30: CPT | Performed by: STUDENT IN AN ORGANIZED HEALTH CARE EDUCATION/TRAINING PROGRAM

## 2023-10-04 PROCEDURE — 88305 TISSUE EXAM BY PATHOLOGIST: CPT | Mod: 26 | Performed by: PATHOLOGY

## 2023-10-04 PROCEDURE — 250N000013 HC RX MED GY IP 250 OP 250 PS 637: Performed by: HOSPITALIST

## 2023-10-04 PROCEDURE — 88342 IMHCHEM/IMCYTCHM 1ST ANTB: CPT | Mod: 26 | Performed by: PATHOLOGY

## 2023-10-04 RX ORDER — VANCOMYCIN HYDROCHLORIDE 125 MG/1
125 CAPSULE ORAL 4 TIMES DAILY
Qty: 32 CAPSULE | Refills: 0 | Status: SHIPPED | OUTPATIENT
Start: 2023-10-04 | End: 2023-01-01

## 2023-10-04 RX ORDER — SODIUM BICARBONATE 650 MG/1
650 TABLET ORAL 2 TIMES DAILY
Qty: 20 TABLET | Refills: 0 | Status: SHIPPED | OUTPATIENT
Start: 2023-10-04 | End: 2023-01-01

## 2023-10-04 RX ADMIN — PANTOPRAZOLE SODIUM 40 MG: 40 TABLET, DELAYED RELEASE ORAL at 08:51

## 2023-10-04 RX ADMIN — VANCOMYCIN HYDROCHLORIDE 125 MG: 125 CAPSULE ORAL at 08:48

## 2023-10-04 RX ADMIN — VANCOMYCIN HYDROCHLORIDE 125 MG: 125 CAPSULE ORAL at 12:56

## 2023-10-04 RX ADMIN — MULTIVITAMIN TABLET 1 TABLET: TABLET at 08:48

## 2023-10-04 RX ADMIN — SODIUM BICARBONATE 650 MG TABLET 650 MG: at 08:48

## 2023-10-04 ASSESSMENT — ACTIVITIES OF DAILY LIVING (ADL)
ADLS_ACUITY_SCORE: 22

## 2023-10-04 NOTE — PROVIDER NOTIFICATION
MD Notification    Notified Person: MD    Notified Person Name: Dr. Chaves Elio    Notification Date/Time: 1121 10/04/23    Notification Interaction: [employer] webpage    Purpose of Notification: GI signed off on Pt, how would you like to proceed? I am about to give blood. Thx    Orders Received: Give blood, will discharge later today.    Comments:

## 2023-10-04 NOTE — PLAN OF CARE
Goal Outcome Evaluation:      Plan of Care Reviewed With: patient    Overall Patient Progress: improvingOverall Patient Progress: improving     [id number]  Orientation: A&O x4  Activity: independent with walker  Diet/BS Checks: 2 gram sodium diet. 1200 mL fluid restriction  Tele:  n/a  IV Access/Drains: PIV left forearm SL  Pain Management: denies pain  Abnormal VS/Results: VSS on room air except tachy and soft BP  Bowel/Bladder: cont. B&B. Loose stools  Skin/Wounds: jaundice skin and sclera. Bruising on arms. Edema +3/4 both legs  Consults: GI, lymphedema therapy, PT   D/C Disposition: pending on GI and therapies eval  Other Info: c.diff precautions. PT recommends outpatient PT at home. Hgb check this evening. Pt went for a walk around unit with parents this afternoon, tolerated well and had improved appetite, up in chair much of day. Lymphedema stockings applied today.

## 2023-10-04 NOTE — PROGRESS NOTES
Discharge Note    Patient discharged to home via private vehicle  accompanied by other mother and father .  IV: Discontinued  Prescriptions filled and given to patient/family.   Belongings reviewed and sent with patient.   Home medications returned to patient: NA  Equipment sent with: patient, N/A.   patient verbalizes understanding of discharge instructions. AVS given to patient.

## 2023-10-04 NOTE — PROGRESS NOTES
"SPIRITUAL HEALTH SERVICES - Progress Note    Oncology  Saw pt Crispin KUMAR Ham per length of stay..     Distress and Loss -   Pranay stated, \"I never sleep well in hospitals\" and agreed that he's \"exhausted.\"  Pranay also said that he \"wants to go home\" but plans \"keep changing\" according to his medical needs.    Upon offering to sit down for more conversation, Pranay replied \"I'm ok\" and that he would rather be alone. He noted that his coping strategy is to \"take one day at a time.\" I reminded him that, if he'd like to receive more support, we are available by asking the bedside nurse.    Plan of Care - Spiritual Health remains available.       Valorie Dykes  Chaplain Resident    [facility] routine referrals *[phone]    [facility] available 24/7 for emergent requests/referrals, either by paging the on-call  or by entering an ASAP/STAT consult in Epic (this will also page the on-call ).   "

## 2023-10-04 NOTE — PLAN OF CARE
Goal Outcome Evaluation:      Plan of Care Reviewed With: patient    Overall Patient Progress: improvingOverall Patient Progress: improving     Discharge Note    Patient discharged to home via private vehicle  accompanied by parents.  IV: Discontinued  Prescriptions printed and given to patient/family.   Belongings reviewed and sent with patient.   Home medications returned to patient: NA  Equipment sent with: patient, N/A.   patient verbalizes understanding of discharge instructions. AVS given to patient.

## 2023-10-04 NOTE — PLAN OF CARE
[id number]  Orientation: A&O x4  Activity: independent with walker  Diet/BS Checks: 2 gram sodium diet. 1200 mL fluid restriction  Tele:  n/a  IV Access/Drains: PIV left forearm SL  Pain Management: denies pain  Abnormal VS/Results: VSS on RA. Except tachycardic and soft BP  Bowel/Bladder: cont. B/B. Loose stools  Skin/Wounds: jaundice skin and sclera. Bruising on arms. Edema +3/4 on legs  Consults: GI, lymphedema therapy, PT  D/C Disposition: pending on GI and therapies eval. Home next  24-48hrs per hospitalist note  Other Info: enteric precautions for c-diff. PT recommends outpatient PT at home. CBC/CMP/INR daily. Short stretch bandages applied yesterday

## 2023-10-04 NOTE — PROGRESS NOTES
[facility]  Gastroenterology Progress Note     Crispin Ham MRN# [id number]   YOB: 1985 Age: 38 year old          Assessment and Plan:     Crispin Ham is a 38 year old male with history of alcohol abuse, and liver cirrhosis among others; admitted on 9/25 whom presented with fatigue, weakness and shortness of breath.     Liver failure  Alcoholic liver cirrhosis with ascites  Coagulopathy secondary to liver disease  Hypoalbuminemia  CT C/A/P shows cirrhotic appearing liver with sequelae of portal hypertension including splenomegaly and worsened now large volume ascites.  Mild increased trace bilateral pleural effusions and body wall edema.   LFTs: AST//50 ( alcoholic pattern)   Unable to start prednisolone with Maddrey score over 40 given underlying sepsis  Not a candidate for liver transplant with recent Alcohol use in last 2 weeks  9/27 EGD noted esophageal polyp-resected and clips placed. Esophageal mucosal tear with bleeding- complication of clip placement- hemostasis achieved. Portal hypertensive gastropathy  10/4 MELD score: 33 (90 day mortality of 52.6%) (Na++ 129, Cr 1.38, Tbili 16.9, INR 2.56)    - 2 g sodium diet   - Alcohol abstinence   - OK with GI to discharge  - Recommend close outpatient follow up with GI in 1-2 weeks     Leukocytosis  WBC 18.6->10.8->11.5 and has significant ascites with GATITO   Paracentesis on 9/26 removed 3.8 L of straw colored fluid with cell count of 59 and 3 % neutrophils- no evidence of SBP  Patient on vancomycin for C. Diff colonization. Leukocytosis likely secondary to cirrhosis / acute hepatitis.     - Continue to hold off on diuretics at this time  - Continue vancomycin for a total of 14 days of antibacterial therapy     Recent duodenitis/ulcer with contained perforation versus duodenal diverticulum seen on CT   Anemia  Hemoglobin has remained in the 7 range (baseline 10.7 on 9/9/23)  No evidence of overt GI  bleeding  On heparin- held    - Transfuse for hb of 7>  - Pantoprazole 40 mg BID                Interval History:     Doing well; no cp, sob, n/v/d, or abd pain.               Review of Systems:     C: NEGATIVE for fever, chills, change in weight  E/M: NEGATIVE for ear, mouth and throat problems  R: NEGATIVE for significant cough or SOB  CV: NEGATIVE for chest pain, palpitations or peripheral edema             Medications:   I have reviewed this patient's current medications   multivitamin, therapeutic  1 tablet Oral Daily    pantoprazole  40 mg Oral BID    sodium bicarbonate  650 mg Oral BID    sodium chloride (PF)  3 mL Intracatheter Q8H    vancomycin  125 mg Oral 4x Daily                  Physical Exam:   Vitals were reviewed  Vital Signs with Ranges  Temp:  [98.2  F (36.8  C)-98.3  F (36.8  C)] 98.3  F (36.8  C)  Pulse:  [101-107] 103  Resp:  [18-20] 18  BP: ()/(48-81) 112/68  SpO2:  [92 %-95 %] 92 %  I/O last 3 completed shifts:  In: 630 [P.O.:630]  Out: 570 [Urine:570]  Consitutional: Somnolent, no acute distress  Cardiovascular: No lifts, heaves, or thrills. RRR. No murmurs, clicks, gallops or rubs  Respiratory: Percussion normal. Good diaphragmatic excursion. Lungs clear  Abdomen: Abdomen soft, non-tender. BS normal. No masses, organomegaly           Data:   I reviewed the patient's new clinical lab test results.   Recent Labs   Lab Test 10/04/23  0657 10/03/23  1809 10/03/23  0710 10/02/23  1851 10/02/23  0640 09/29/23  1610 09/29/23  0647   WBC 15.5*  --  14.1*  --  13.8*   < > 11.1*   HGB 7.0*  7.0* 7.4* 7.2*  7.2*   < > 7.3*  7.3*   < > 6.9*   *  --  105*  --  105*   < > 106*     --  131*  --  130*   < > 140*   INR 2.56*  --  2.66*  --   --   --  2.50*    < > = values in this interval not displayed.     Recent Labs   Lab Test 10/04/23  0657 10/03/23  0710 10/02/23  0640   POTASSIUM 3.9 3.9 3.9   CHLORIDE 97* 97* 97*   CO2 18* 18* 16*   BUN 57.5* 54.7* 52.6*   ANIONGAP 14 14 13      Recent Labs   Lab Test 10/04/23  0657 10/03/23  0710 10/02/23  0640 09/26/23  0724 09/25/23  1646 09/25/23  1537 09/25/23  1424 09/09/23  1119 09/08/23  1224 09/08/23  1147   ALBUMIN 3.4* 3.5 3.2*   < >  --   --  2.4*   < >  --  2.3*   BILITOTAL 16.9* 17.5* 18.6*   < >  --   --  21.7*   < >  --  10.4*   ALT 34 36 39   < >  --   --  69   < >  --  82*   * 135* 142*   < >  --    < >  --    < >  --  331*   PROTEIN  --   --   --   --  30*  --   --   --  50*  --    LIPASE  --   --   --   --   --   --  237*  --   --  272*    < > = values in this interval not displayed.       I reviewed the patient's new imaging results.    All laboratory data reviewed  All imaging studies reviewed by me.    [facility]  Office: [phone]  Cell: [phone] (Dr. Gibson)  Cell: [phone] (Nara Slade PA-C)

## 2023-10-04 NOTE — DISCHARGE SUMMARY
[facility]  Hospitalist Discharge Summary      Date of Admission:  9/25/2023  Date of Discharge:  10/4/2023  Discharging Provider: Elio Chaves MD  Discharge Service: Hospitalist Service    Discharge Diagnoses     Decompensated alcoholic liver cirrhosis  Portal hypertension with splenomegaly and ascites  Lower extremity edema  Ruled out SBP   Coagulopathy secondary to liver disease  Hypoalbuminemia    Clinically Significant Risk Factors     # Obesity: Estimated body mass index is 37.68 kg/m  as calculated from the following:    Height as of this encounter: 1.829 m (6').    Weight as of this encounter: 126 kg (277 lb 12.8 oz).  # Moderate Malnutrition: based on nutrition assessment      Follow-ups Needed After Discharge   Follow-up Appointments     Follow-up and recommended labs and tests       Follow up with primary care provider, [facility], within 7   days for hospital follow- up.  The following labs/tests are recommended:   CBC / BMP / LFTs/ INR.            Unresulted Labs Ordered in the Past 30 Days of this Admission       No orders found from 8/26/2023 to 9/26/2023.          Discharge Disposition   Discharged to home  Condition at discharge: Stable    Hospital Course   Crispin Ham is a 38 year old male with a history of severe alcohol use disorder, alcoholic liver disease with recent hospital admission between 9/8-9/11 for hypovolemic hypotension, alcoholic hepatitis and pancreatitis, concern for duodenitis with possible contained perforation versus diverticulum, GATITO.  Also has history of generalized anxiety disorder,  HTN who now presents with worsening fatigue, weakness and shortness of breath over the last week.     Decompensated alcoholic liver cirrhosis  Portal hypertension with splenomegaly and ascites  Lower extremity edema  Ruled out SBP   Coagulopathy secondary to liver disease  Hypoalbuminemia  On admission patient noted increased weakness, fatigue,  dyspnea.  Also having hard time sleeping, discomfort in lower abdomen, generalized pains and increased frequency of small-volume loose stools about every 3-4 hours over the last few days.   CT CAP shows cirrhotic appearing liver with sequelae of portal hypertension including splenomegaly and worsened now large volume ascites.  Mild increased trace bilateral pleural effusions and body wall edema.  Lab work concerning for decompensated liver disease.    In the ED patient was iven 2 g IV ceftriaxone in the ED to cover possible SBP.  * BC -> NGTD  * Ultrasound paracentesis therapeutic and diagnostic for 09/26 ->  3.825 L of  straw colored fluid was drained, fluid studies not consistent with SBP  * EGD:  Normal upper third of esophagus and middle third of esophagus.  Z-line regular, 38 cm from the incisors. Esophageal polyp(s) were found. Resected and retrieved. Clip was placed. Esophageal mucosal tear with bleeding (complication of clip placement). Hemostasis achieved with bipolar probe.   MELD 3.0: 32 at 10/3/2023  7:10 AM  MELD-Na: 33 at 10/3/2023  7:10 AM  Calculated from:  Serum Creatinine: 1.30 mg/dL at 10/3/2023  7:10 AM  Serum Sodium: 129 mmol/L at 10/3/2023  7:10 AM  Total Bilirubin: 17.5 mg/dL at 10/3/2023  7:10 AM  Serum Albumin: 3.5 g/dL at 10/3/2023  7:10 AM  INR(ratio): 2.66 at 10/3/2023  7:10 AM  Age at listing (hypothetical): 38 years  Sex: Male at 10/3/2023  7:10 AM    - Continue Albumin for 9 doses -> stopped as now albumin 3.5  - Initially treated IV ceftriaxone to cover for SBP but stopped as SBP ruled out  - 2 g sodium diet  - 1U FFP ordered for 09/26 given INR 2.18  - GI following and appreciate their recommendations   - PT evaluation -> Home with OP   -  2 g sodium diet    - Close outpatient follow up with GI in 1-2 weeks      GATITO, likely hepatorenal. Improving   Hyponatremia.  Improving  Nongap acidosis in setting of diarrhea and GATITO  Creatinine had gone up to 2.9 during last hospitalization and  sodium was 128.  Creatinine normalized and sodium came up to 134 with NS during last hospitalization.    During this hospitalization his renal function continues to worsening with worsening hyponatremia   - Continue to monitor closely as outpatient   - Albumin infusions ordered as noted above  - Nephrology following and appreciate their assistance. Complete albumin course and continue sodium bicarb.  Signed off      Diarrhea -> C Diff positive  Likely colonizer- however given diarrhea, decompensated liver cirrhosis continue 14 day treatment of vancomycin 125 mg QID   - Continue PO Vanc total 14 days     Macrocytic anemia  Hb 9.6.  Recent baseline appears to be around 10.  No evidence of overt GI bleeding. But hgb down to 7.0 at discharge (received 1U at discharge)  Plan:  -Required 1 unit on 9/29 and 10/04     Elevated lipase, recent alcoholic pancreatitis  No significant abdominal pain at this time.  Lipase was 272 on 9/8, currently 237.     Severe alcohol use disorder  Strongly encourage complete alcohol cessation.  He reports being sober since last hospital discharge.    - Refused to talk with CD or psychiatry during last hospitalization     Recent duodenitis/ulcer with contained perforation versus duodenal diverticulum seen on CT  Noted on CT during last hospitalization.  - Continue PPI  - No significant abdominal pain at this time.  Consult to [facility] GI as noted above.     History of hypertension  Recently had issues with hypotension.  Not on any medications.  Noted.  Currently normotensive at discharge    Consultations This Hospital Stay   GASTROENTEROLOGY IP CONSULT  VASCULAR ACCESS ADULT IP CONSULT  NEPHROLOGY IP CONSULT  PHYSICAL THERAPY ADULT IP CONSULT  LYMPHEDEMA THERAPY IP CONSULT    Code Status   Full Code    Time Spent on this Encounter   I, Elio Chaves MD, personally saw the patient today and spent greater than 30 minutes discharging this patient.       Elio Chaves MD  [place] [id number]  ONCOLOGY  [address], [address]  [place] [address]  Phone: [phone]  ______________________________________________________________________    Physical Exam   Vital Signs: Temp: 98.8  F (37.1  C) Temp src: Oral BP: 120/73 Pulse: 100   Resp: 20 SpO2: 93 % O2 Device: None (Room air)    Weight: 277 lbs 12.8 oz  ----------------------------------------------------------------------------------------       Primary Care Physician   [facility]    Discharge Orders      Lymphedema Therapy Referral      Physical Therapy Referral      Reason for your hospital stay    You had liver damage and low blood (hemoglobin) levels from bleeding from  your digestive system .     Follow-up and recommended labs and tests     Follow up with primary care provider, [facility], within 7 days for hospital follow- up.  The following labs/tests are recommended: CBC / BMP / LFTs/ INR.     Activity    Your activity upon discharge: activity as tolerated     Diet    Follow this diet upon discharge: Orders Placed This Encounter      Fluid restriction 1500 ML FLUID      Snacks/Supplements Adult: Other; D: Ensure MAX; With Meals      2 Gram Sodium Diet       Significant Results and Procedures   Most Recent 3 CBC's:  Recent Labs   Lab Test 10/04/23  0657 10/03/23  1809 10/03/23  0710 10/02/23  1851 10/02/23  0640   WBC 15.5*  --  14.1*  --  13.8*   HGB 7.0*  7.0* 7.4* 7.2*  7.2*   < > 7.3*  7.3*   *  --  105*  --  105*     --  131*  --  130*    < > = values in this interval not displayed.     Most Recent 3 BMP's:  Recent Labs   Lab Test 10/04/23  0657 10/03/23  0710 10/02/23  0640   * 129* 126*   POTASSIUM 3.9 3.9 3.9   CHLORIDE 97* 97* 97*   CO2 18* 18* 16*   BUN 57.5* 54.7* 52.6*   CR 1.38* 1.30* 1.35*   ANIONGAP 14 14 13   MIGUELINA 9.3 9.3 9.2   * 109* 109*     Most Recent 2 LFT's:  Recent Labs   Lab Test 10/04/23  0657 10/03/23  0710   * 135*   ALT 34 36   ALKPHOS 125 120    BILITOTAL 16.9* 17.5*     Most Recent 3 INR's:  Recent Labs   Lab Test 10/04/23  0657 10/03/23  0710 09/29/23  0647   INR 2.56* 2.66* 2.50*     Most Recent 3 Troponin's:No lab results found.  Most Recent 3 BNP's:No lab results found.  Most Recent D-dimer:No lab results found.  Most Recent Cholesterol Panel:No lab results found.,   Results for orders placed or performed during the hospital encounter of 09/25/23   Chest XR,  PA & LAT    Narrative    CHEST TWO VIEWS  9/25/2023 2:37 PM     HISTORY:  Shortness of breath.    COMPARISON: None.      Impression    IMPRESSION: No acute cardiopulmonary disease. Hypoinflated lungs and  bibasilar atelectasis. There may be trace bibasilar pleural fluid.    GRNAT GLEASON MD         SYSTEM ID:  [id number]   CT Chest Abdomen Pelvis w/o Contrast    Narrative    CT CHEST/ABDOMEN/PELVIS WITHOUT CONTRAST 9/25/2023 4:03 PM    CLINICAL HISTORY: Worsening dyspnea, leukocytosis and liver function  tests.    TECHNIQUE: CT scan of the chest, abdomen, and pelvis was performed  without IV contrast. Multiplanar reformats were obtained. Dose  reduction techniques were used.   CONTRAST: None    COMPARISON: CT chest, abdomen and pelvis 9/8/2023.    FINDINGS:   Study limitation: Noncontrast technique.    LUNGS AND PLEURA: Slightly increased trace bilateral pleural effusions  with adjacent compressive atelectasis. Similar right middle lobe  opacity with air bronchograms. No pneumothorax. Similar tiny adjacent  left upper lobe 2 to 3 mm nodules (4/12).    MEDIASTINUM/AXILLAE: No lymphadenopathy. No thoracic aortic aneurysms.    CORONARY ARTERY CALCIFICATIONS: None.    HEPATOBILIARY: Similar heterogeneous fat deposition within the liver,  particularly of the lateral left hepatic lobe and posterior right  hepatic lobe. Subtle surface nodularity. Similar distended gallbladder  without evidence of gallstones.    PANCREAS: No significant mass, duct dilatation, or inflammatory  change.    SPLEEN: Measures  16.3 cm.    ADRENAL GLANDS: No significant nodules.    KIDNEYS/BLADDER: No significant mass, stones, or hydronephrosis.    BOWEL: Similar submucosal mild fat deposition and mild wall thickening  of the proximal colon, possibly sequelae of chronic inflammation  and/or portal colopathy, less likely infectious/inflammatory. No  evidence of obstruction. Similar gas bubbles near the  pancreaticoduodenal groove, probable periampullary duodenal  diverticulum.     PELVIC ORGANS: No pelvic masses.    ADDITIONAL FINDINGS: Worsened, now large volume ascites. No organized  fluid collection. Nonaneurysmal abdominal aorta. Several scattered  prominent portal caval and retroperitoneal lymph nodes are likely  reactive.    MUSCULOSKELETAL: Severe degenerative changes of the left hip joint.  Similar heterogeneous expansion of the presumed left posterior  pectineus (3/31), possibly related to remote trauma. No destructive  osseous lesion. Worsened body wall edema.       Impression    IMPRESSION:  1.  Morphologic features of chronic hepatocellular disease (and likely  cirrhosis) with sequelae of portal hypertension including splenomegaly  and worsened, now large volume ascites.  2.  Similar heterogeneous hepatic steatosis. No discrete mass within  the limitations of the study.  3.  Mild increased trace bilateral pleural effusions and body wall  edema.  4.  Similar nonspecific gallbladder distention without calcified  gallstone.    SHARON DUFFY MD         SYSTEM ID:  [id number]   US Paracentesis with Albumin    Narrative    US PARACENTESIS WITH ALBUMIN 9/26/2023 4:09 PM     HISTORY: Ascites, cirrhosis, evaluate for SBP.    PROCEDURE: Ultrasound was used to evaluate for the presence and best  approach for paracentesis. Written and oral informed consent was  obtained. A pause for the cause procedure to verify the correct  patient and correct procedure. The skin overlying the right lower  quadrant was prepped and draped in the  usual sterile fashion. The  subcutaneous tissues were anesthetized with 10mL 1% Lidocaine. A  catheter was advanced into the peritoneal space and 3.825 L of  straw  colored fluid was drained. There were no immediate complications.  Ultrasound images were permanently stored.  Patient left the  ultrasound suite in satisfactory condition.      Impression    IMPRESSION: Technically successful paracentesis without immediate  complications.      ZAYRA RODRIGUES MD         SYSTEM ID:  [id number]       Discharge Medications   Current Discharge Medication List        START taking these medications    Details   sodium bicarbonate 650 MG tablet Take 1 tablet (650 mg) by mouth 2 times daily for 10 days  Qty: 20 tablet, Refills: 0    Associated Diagnoses: Anasarca      vancomycin (VANCOCIN) 125 MG capsule Take 1 capsule (125 mg) by mouth 4 times daily for 8 days  Qty: 32 capsule, Refills: 0    Associated Diagnoses: Anasarca           CONTINUE these medications which have NOT CHANGED    Details   multivitamin, therapeutic (THERA-VIT) TABS tablet Take 1 tablet by mouth daily      pantoprazole (PROTONIX) 40 MG EC tablet Take 1 tablet (40 mg) by mouth 2 times daily  Qty: 60 tablet, Refills: 0    Associated Diagnoses: Duodenal ulcer disease      Vitamin D, Cholecalciferol, 10 MCG (400 UNIT) TABS Take 1 tablet by mouth daily           Allergies   Allergies   Allergen Reactions    Nsaids      Patient reports having a reaction of puffy eyes and dry throat a few years ago, but he has taken Advil in 2023 and did not react.

## 2023-10-04 NOTE — PROVIDER NOTIFICATION
"MD Notification    Notified Person: MD    Notified Person Name: Elio Floyd    Notification Date/Time:1451 10/04/23    Notification Interaction: [employer] webpage    Purpose of Notification: Pt is requesting paperwork for \"certification of healthcare provider, short term disability application\" I will place the forms he needs filled out in his chart for you. transfusion completed vital signs stable    Orders Received:    Comments:   "

## 2023-10-05 NOTE — PLAN OF CARE
Physical Therapy Discharge Summary     Reason for therapy discharge:    Discharged to home with OP therapy.     Progress towards therapy goal(s). See goals on Care Plan in [facility] electronic health record for goal details.  Goals partially met.  Barriers to achieving goals:   discharge from facility.     Therapy recommendation(s):    Continued therapy is recommended.  Rationale/Recommendations:  Patient would benefit from OP PT in order to increase strength, activity tolerance and independence with mobility.     **Pt not seen by discharging therapist on this date, note written based on previous treating therapist's notes and recommendations

## 2023-10-05 NOTE — PLAN OF CARE
Lymph/Occupational Therapy Discharge Summary    Reason for therapy discharge:    Discharged to home with outpatient therapy.    Progress towards therapy goal(s). See goals on Care Plan in [facility] electronic health record for goal details.  Goals partially met.  Barriers to achieving goals:   discharge from facility.    Therapy recommendation(s):      Continued therapy is recommended.  Rationale/Recommendations:   .OT Rationale for DC Rec: Pt demonstrating independence in self-cares and mobility. Continues to have lymphedema in BLEs proximal to knee. Pt fit for Edema Wear; declining wraps for home. Recommend outpatient lymph services if edema not resolved by 10/11/23    Writing therapist did not treat pt, note written based on previous treating therapist notes and recommendations. Please see chart and flow sheet for additional details.

## 2023-10-30 NOTE — ED TRIAGE NOTES
Patient had lab drawn last Friday at [facility] during endoscopy showed low hemoglobin.      Triage Assessment (Adult)       Row Name 10/30/23 4199          Triage Assessment    Airway WDL WDL        Respiratory WDL    Respiratory WDL WDL        Skin Circulation/Temperature WDL    Skin Circulation/Temperature WDL WDL        Cardiac WDL    Cardiac WDL WDL        Peripheral/Neurovascular WDL    Peripheral Neurovascular WDL WDL        Cognitive/Neuro/Behavioral WDL    Cognitive/Neuro/Behavioral WDL WDL

## 2023-10-30 NOTE — DISCHARGE INSTRUCTIONS
Please follow-up with your primary care provider and/or specialist regarding your visit to the ER today.    Please return to the emergency department should you experience any of the symptoms we specifically discussed, including but not limited to recurrence or worsening of your symptoms, or development of any new and concerning symptoms such as abdominal pain, fever, confusion, chest pain, shortness of breath, or lightheaded dizziness.

## 2023-10-30 NOTE — ED PROVIDER NOTES
History     Chief Complaint:  Abnormal Labs     HPI   Crispin Ham is a 38 year old male with history of substance abuse, HTN, hyperlipidemia, and liver failure who presents to the ED with abnormal labs. Patient had an appointment to establish care with [facility] three days ago and had labs drawn. He was found to have a low hemoglobin at this time and was recommended to be seen in the ED for a blood transfusion. Crispin reports that he has been fatigued recently, though has not felt lightheaded, dizzy, or had any syncopal episodes. He has had some intermittent nosebleeds which he attributes to living in a dry environment though denies any heavy nosebleeds, bloody stools, or hematemesis. Patient adds that he has back pain from laying around a lot lately. Denies other pains. Of note, patient was admitted to the hospital for liver failure from 9/25/23-10/4/23 and had two transfusions while in the hospital. He has not received a transfusion since being discharged from the hospital. He adds that his PCP has been helping him establish care with a liver and kidney specialist since his admission. He has no upcoming procedures scheduled. He has not been drinking alcohol recently.     Review of Systems   Constitutional:  Positive for chills and fatigue. Negative for fever.   HENT:  Positive for nosebleeds. Negative for congestion, rhinorrhea and sore throat.         (+) dry mouth   Respiratory:  Negative for cough and shortness of breath.    Cardiovascular:  Negative for chest pain.   Gastrointestinal:  Negative for abdominal pain, blood in stool, diarrhea, nausea and vomiting.   Genitourinary:  Negative for decreased urine volume, difficulty urinating, dysuria, frequency, hematuria and urgency.   Musculoskeletal:  Positive for back pain. Negative for neck pain.   Neurological:  Negative for dizziness, syncope, weakness, light-headedness and numbness.   All other systems reviewed and are negative.    Independent Historian:    None - Patient Only    Review of External Notes:   I reviewed the patient's 10/4/23 discharge summary. He received 1 unit of blood at discharge.    I also reviewed the patient's 10/18/23 hospital follow up visit.     Medications:    Protonix     Past Medical History:    Alcohol abuse  HTN  Substance abuse  Septic shock   Liver failure   Depressive disorder  TIKA  Hyperlipidemia   Fatty liver   Primary OA of left hip  Morbid obesity     Past Surgical History:    ORIF for wrist fracture, left x2    Physical Exam   Patient Vitals for the past 24 hrs:   BP Temp Temp src Pulse Resp SpO2 Height Weight   10/30/23 2009 105/65 -- -- 90 -- 98 % -- --   10/30/23 1948 -- -- -- -- -- 96 % -- --   10/30/23 1838 106/70 -- -- 89 18 99 % -- --   10/30/23 1836 103/69 97.6  F (36.4  C) Oral 91 24 97 % -- --   10/30/23 1800 98/62 -- -- 89 19 92 % -- --   10/30/23 1730 107/69 -- -- 90 13 94 % -- --   10/30/23 1700 101/64 -- -- 92 21 95 % -- --   10/30/23 1630 105/65 -- -- 90 18 96 % -- --   10/30/23 1623 107/64 97.4  F (36.3  C) Oral 90 20 96 % -- --   10/30/23 1608 103/68 -- -- 92 20 98 % -- --   10/30/23 1600 109/65 -- -- 91 20 96 % -- --   10/30/23 1545 113/67 -- -- 93 -- 97 % -- --   10/30/23 1544 -- 97.5  F (36.4  C) Oral -- -- -- -- --   10/30/23 1330 118/66 97  F (36.1  C) Temporal 103 18 97 % 1.829 m (6') 127 kg (280 lb)        Constitutional:       Appearance: Normal appearance.   HENT:      Head: Normocephalic and atraumatic.   Eyes:      Extraocular Movements: Extraocular movements intact.      Conjunctiva/sclera: Conjunctivae normal.   Cardiovascular:      Rate and Rhythm: Normal rate and regular rhythm.   Pulmonary:      Effort: Pulmonary effort is normal. No respiratory distress.      Breath sounds: Clear to auscultation bilaterally.  Abdominal:      General: Abdomen is flat. There is no distension.      Palpations: Abdomen is soft.      Tenderness: There is no abdominal tenderness.   Musculoskeletal:      Cervical back:  Normal range of motion. No rigidity.       Right lower le+ edema.      Left lower le+ edema.   Skin:     General: Skin is warm and dry.  Mildly jaundiced.  Neurological:      General: No focal deficit present.      Mental Status: Alert and oriented to person, place, and time.   Psychiatric:         Mood and Affect: Mood normal.         Behavior: Behavior normal    Emergency Department Course     Laboratory:  Labs Ordered and Resulted from Time of ED Arrival to Time of ED Departure   CBC WITH PLATELETS - Abnormal       Result Value    WBC Count 9.6      RBC Count 1.94 (*)     Hemoglobin 7.0 (*)     Hematocrit 20.6 (*)      (*)     MCH 36.1 (*)     MCHC 34.0      RDW 14.9      Platelet Count 221     COMPREHENSIVE METABOLIC PANEL - Abnormal    Sodium 135      Potassium 3.4      Carbon Dioxide (CO2) 22      Anion Gap 11      Urea Nitrogen 18.1      Creatinine 0.94      GFR Estimate >90      Calcium 9.1      Chloride 102      Glucose 127 (*)     Alkaline Phosphatase 185 (*)      (*)     ALT 35      Protein Total 7.0      Albumin 3.0 (*)     Bilirubin Total 7.8 (*)    CBC WITH PLATELETS AND DIFFERENTIAL - Abnormal    WBC Count 9.6      RBC Count 1.94 (*)     Hemoglobin 7.0 (*)     Hematocrit 20.6 (*)      (*)     MCH 36.1 (*)     MCHC 34.0      RDW 14.9      Platelet Count 221      % Neutrophils 68      % Lymphocytes 13      % Monocytes 11      % Eosinophils 6      % Basophils 1      % Immature Granulocytes 1      NRBCs per 100 WBC 0      Absolute Neutrophils 6.7      Absolute Lymphocytes 1.3      Absolute Monocytes 1.0      Absolute Eosinophils 0.6      Absolute Basophils 0.1      Absolute Immature Granulocytes 0.1      Absolute NRBCs 0.0     IRON AND IRON BINDING CAPACITY    Iron 96      Iron Binding Capacity        Iron Sat Index       VITAMIN B12   FERRITIN   TYPE AND SCREEN, ADULT    ABO/RH(D) A POS      Antibody Screen Negative      SPECIMEN EXPIRATION DATE [id number]     PREPARE RED  BLOOD CELLS (UNIT)    Blood Component Type Red Blood Cells      Product Code K1126X06      Unit Status Transfused      Unit Number [id number]      CROSSMATCH Compatible      CODING SYSTEM [id number]      ISSUE DATE AND TIME [id number]      UNIT ABO/RH A+      UNIT TYPE ISBT 6200     TRANSFUSE RED BLOOD CELLS (UNIT)   ABO/RH TYPE AND SCREEN      Emergency Department Course & Assessments:    Interventions:   1 unit RBC     Independent Interpretation (X-rays, CTs, rhythm strip):  None    Assessments/Consultations/Discussion of Management or Tests:  ED Course as of 10/30/23 2331   Mon Oct 30, 2023   1419 Hemoglobin(!): 7.0   1442 I obtained history and examined the patient as noted above.   1516 I obtained blood transfusion consent.    1848 Patient care coordinator with [facility] called ER nursing staff to inquire whether or not it is possible for patient to receive liver US and undergo diagnostic and therapeutic paracentesis in the ER since patient's is already here and was scheduled for outpatient procedure anyways. I informed the care coordinator that patient presented today primarily for transfusion due to anemia found on outpatient labwork and otherwise has no complaints of abdominal pain/discomfort and there is no physical examination or fever/leukocytosis to suggest SBP. Without symptoms or lab findings/vital sign changes suggestive of infection, there is no emergent indication for a therapeutic/diagnostic paracentesis at this time, especially if it's a matter of convenience. Interventional radiology would only be available on-call for emergent procedures and given the high acuity and congested waiting room, unfortunately we do not have the capacity to perform an outpatient procedure nor is it good utilization of ED resources at this time. Care coordinator will contact patient and schedule for outpatient procedure.   1941 I rechecked the patient and explained findings. Resting comfortably in bed. Tolerated  transfusion without issue. Feeling improvements in fatigue. Patient has outpatient paracentesis scheduled for next Thursday with GI care coodinator. Serial abdominal exam benign. Without evidence of leukocytosis, fever, or abdominal pain, low suspicion for spontaneous bacterial peritonitis and thus no indication for diagnostic paracentesis in the ER today. Patient also endorses that he does not feel that this abdomen is bothering him much and only noticed mild distension when pointed out by others. On my exam, abdomen soft, non-tender and non-distended. Patient also has no shortness of breath or chest pain at this time. No other indication for urgent/emergent therapeutic paracentesis at this time. Patient not interested in admission and would prefer to be discharged home and have paracentesis done on an outpatient setting. Patient understands to follow-up with PCP for repeat CBC. Discussed strict return precautions. Answered all questions.     Social Determinants of Health affecting care:   None    Disposition:  The patient was discharged to home.     Outpatient US Abdomen and US Paracentesis ordered and scheduled by patient care coordinator with [facility].    Impression & Plan    CMS Diagnoses: None    Medical Decision Makin-year-old male as described above presents to the emergency department for anemia.  Patient hemodynamically stable at time evaluation.  Hemoglobin 7.0 on today's blood draw.  This appears fairly consistent with patient's new baseline hemoglobin level since discharge in early October.  Nonetheless, patient did receive 1 unit of PRBC at discharge for hemoglobin of 7.0.  Other than generalized fatigue, patient has not had any significant complaints of symptomatic anemia.  Patient was incidentally found to have hemoglobin of 7 again at outpatient GI visit and referred to come into the ER for transfusion.  Patient states that he occasionally has some nosebleeding, but this has been ongoing  reportedly since prior to his latest admission.  Patient states that these bleeding stopped without issue.  Additionally, patient denies any upper GI bleeding or lower GI bleeding or other bleeding sources.  Reported to have macrocytic anemia on prior admission which is reflected again today.  Will transfuse 1 unit since patient otherwise asymptomatic and have patient follow-up outpatient as scheduled with nephrology, GI, and primary.  Slower transfusion rate given patient is at risk for volume overload.  Discussed care plan with patient who voiced understanding and agreement with plan.  Answered all questions.  Additional work-up and orders as listed in chart.     Please refer to ED course above for details on the patient's treatment course and any changes or updates in care plan beyond my initial evaluation and MDM.      Diagnosis:    ICD-10-CM    1. Anemia, unspecified type  D64.9       2. Alcoholic hepatitis with ascites (H28)  K70.11 US Abdomen Complete     US Abdomen Complete     US Paracentesis with Albumin     US Paracentesis with Albumin       Scribe Disclosure:  I, Janiya Winn, am serving as a scribe at 2:43 PM on 10/30/2023 to document services personally performed by Kuldeep White DO based on my observations and the provider's statements to me.   10/30/2023   Kuldeep White DO Yeh, Ferris, DO  10/30/23 7977

## 2023-11-06 NOTE — TELEPHONE ENCOUNTER
Has standing orders for Paracentesis with Albumin from [facility] scanned on 10/31/23 in media tab.

## 2023-11-16 PROBLEM — K76.9 LIVER DISEASE: Status: ACTIVE | Noted: 2023-01-01

## 2023-11-16 PROBLEM — K65.2 SBP (SPONTANEOUS BACTERIAL PERITONITIS) (H): Status: ACTIVE | Noted: 2023-01-01

## 2023-11-16 NOTE — ED NOTES
PIT/Triage Evaluation    Patient presented with normal lab testing.  Patient reports that he was called into the ER for positive fluid cultures.  He has been feeling somewhat chilled and fatigued at home.  Has not taken his temperature but feels sometimes feverish.  Has felt generally sluggish compared to his discharge.  Denies any black or tarry stools.  Denies any cough, runny nose, abdominal pain.    Exam is notable for:  General:  Alert, interactive  Cardiovascular:  Well perfused  Lungs:  No respiratory distress, no accessory muscle use  Neuro:  Moving all 4 extremities  Skin:  Warm, dry  Psych:  Normal affect  Abd: non tender abdomen     Appropriate interventions for symptom management were initiated if applicable.  Appropriate diagnostic tests were initiated if indicated.    Important information for subsequent clinician:  Patient sent in for peritoneal fluid being positive on culture.  Sepsis work-up initiated.  Plan to consult with Pharm.D. regarding appropriate antibiotics but appears to be susceptible to cephalosporins.      Isolated in broth only Cutibacterium (Propionibacterium) acnes Abnormal    On day 5 of incubation  Susceptibility testing of Cutibacterium (Propionibacterium) species is not done from this source. This organism is susceptible to penicillin and cefotaxime and most are susceptible to clindamycin.       I briefly evaluated the patient and developed an initial plan of care. I discussed this plan and explained that this brief interaction does not constitute a full evaluation. Patient/family understands that they should wait to be fully evaluated and discuss any test results with another clinician prior to leaving the hospital.       Hansa Morales MD  11/16/23 3717

## 2023-11-16 NOTE — LETTER
Transition Communication Hand-off for Care Transitions to Next Level of Care Provider    Name: Crispin Ham  : 1985  MRN #: [id number]  Primary Care Provider: Eduar Orange Shey     Primary Clinic: [address] [facility] [address]     Reason for Hospitalization:  Liver disease [K76.9]  SBP (spontaneous bacterial peritonitis) (H) [K65.2]  Admit Date/Time: 2023  2:58 PM  Discharge Date: 2023  Payor Source: Payor: PREFERREDONE / Plan: [employer] PREFERREDONE / Product Type: PPO /            Reason for Communication Hand-off Referral: Other therapy recommending TCU vs home care and pt declined.      Discharge Plan:Discharging to home with parents.  Therapy recommending TCU and pt declined.  Offered home care and pt declined.  Pt stated he will follow up with primary clinic if feels home care needed-has follow up scheduled  at 10:45am with Blake Canchola.                     Keya Ayala RN    AVS/Discharge Summary is the source of truth; this is a helpful guide for improved communication of patient story

## 2023-11-16 NOTE — LETTER
[facility] CARE  [address] [place] [address]  Phone: [phone]    November 24, 2023        Crispin Ham  [address] [place] [address]    To whom it may concern:    RE: Crispin T Jitendra    Crispin Ham was admitted to [facility] from 11/16/2023 to 11/24/2023. Recommend to follow-up with medical provider in 5 to 7 days.  Recommend time off from work until medical provider follow-up in clinic and reassessment.    Please contact me for questions or concerns.    Sincerely,  Roque Hector MD.

## 2023-11-16 NOTE — PHARMACY-ADMISSION MEDICATION HISTORY
Pharmacist Admission Medication History    Admission medication history is complete. The information provided in this note is only as accurate as the sources available at the time of the update.    Information Source(s): Patient and CareEverywhere/SureScripts via in-person    Pertinent Information: none    Changes made to PTA medication list:  Added: ibuprofen  Deleted: protonix.  9/11 x 30 days- completed  Changed: None    Medication Affordability:       Allergies reviewed with patient and updates made in EHR: yes, added excedrin and removed nsaid    Medication History Completed By: Zaki Peters RPH 11/16/2023 4:14 PM    PTA Med List   Medication Sig Last Dose    ibuprofen (ADVIL/MOTRIN) 200 MG tablet Take 400 mg by mouth every 8 hours as needed for pain Past Month    multivitamin, therapeutic (THERA-VIT) TABS tablet Take 1 tablet by mouth daily a week ago    Vitamin D, Cholecalciferol, 10 MCG (400 UNIT) TABS Take 1 tablet by mouth daily a week ago

## 2023-11-17 NOTE — PROVIDER NOTIFICATION
MD Notification    Notified Person: MD    Notified Person Name: Khoi Barraza    Notification Date/Time: 11/17/2023 0905    Notification Interaction: voclindsey    Purpose of Notification: pt has blood on his tongue. He has tissues covered in blood on his table but I thought they were from a nose bleed but since blood is on his tongue should we be concerned for esophageal varices?    Orders Received:    Comments:

## 2023-11-17 NOTE — CONSULTS
[facility]  Gastroenterology Consultation    Crispin Ham  [address]  [place] [address]  38 year old male    Admission Date/Time: 11/16/2023  Primary Care Provider: Shey, Eduar Abbott    We were asked to see the patient in consultation by Dr. Quiroz for evaluation of decompensated cirrhosis, abnormal paracentesis.        HPI:  Crispin Ham is a 38 year old male who has a past history of alcohol abuse, hypertension who presents for abnormal culture results following paracentesis.    The patient established care with Christiana Bright at [facility] on 10/27/23 for chronic liver disease.  He was felt to have alcoholic hepatitis, possibly cirrhosis with a MELD-Na on that visit of 33.  Patient also with sequelae of portal hypertension, splenomegaly, ascites.  Patient has been sober since 9/2023.  Paracentesis was ordered for ascites.  He was found to have an abnormal culture result.  While cell count is not consistent with SBP, anaerobic bacterial culture showed Cutibacterium acnes, question contaminant.  He notes subjective fever and chills and presented to the ED.  Denies abdominal pain or distention.  He also reports a relapse on alcohol this week.       Albumin 2.9, , , total bilirubin 7.5.  WBC 7.6.  Hemoglobin 7.0.  Platelets 101.  .  INR 2.88.    MDF 98.9.  MELD 18.3.  MELD-Na 20.4.    Last US 11/9/23 with hepatic steatosis, mild splenomegaly, some gallbladder wall thickening noted.  No ascites (s/p paracentesis).    EGD 9/27/23 with esophageal polyp (gastric hyperplastic polyp on path), s/p resection, esophageal mucosal tear with bleeding, cauterized, portal hypertensive gastropathy.    Patient is currently being transfused one unit for hemoglobin of 7.0 (8.1 on admission, although of note hemoglobin was 7.0 on 10/30)  He reports frequent nosebleeds.  Has been seeing black stools.  Denies christiano hematochezia.  Also, noted to have hematuria.    ROS: A  comprehensive ten point review of systems was negative aside from those in mentioned in the HPI.      MEDICATIONS: No current facility-administered medications on file prior to encounter.  ibuprofen (ADVIL/MOTRIN) 200 MG tablet, Take 400 mg by mouth every 8 hours as needed for pain  multivitamin, therapeutic (THERA-VIT) TABS tablet, Take 1 tablet by mouth daily  Vitamin D, Cholecalciferol, 10 MCG (400 UNIT) TABS, Take 1 tablet by mouth daily  pantoprazole (PROTONIX) 40 MG EC tablet, Take 1 tablet (40 mg) by mouth 2 times daily        ALLERGIES:   Allergies   Allergen Reactions    Excedrin Extra Strength [Aspirin-Acetaminophen-Caffeine]      puffy eyes and dry throat a few years ago. Tolerates ibuprofen and acetaminophen, but avoids aspirin       Past Medical History:   Diagnosis Date    Alcohol abuse     Hypertension     Substance abuse (H)        Past Surgical History:   Procedure Laterality Date    ORTHOPEDIC SURGERY           SOCIAL HISTORY:  Social History     Tobacco Use    Smoking status: Never   Substance Use Topics    Alcohol use: Yes     Comment: 1.75L per day    Drug use: Not Currently       FAMILY HISTORY:  No family history on file.    PHYSICAL EXAM:   BP (!) 137/90   Pulse 105   Temp 97.8  F (36.6  C) (Oral)   Resp 23   Ht 1.829 m (6')   Wt 127 kg (280 lb)   SpO2 92%   BMI 37.97 kg/m      Constitutional: NAD, comfortable  Cardiovascular: RRR, normal S1 and S2, no r/c/g/m  Respiratory: CTAB  Psychiatric: mentation appears normal and affect normal  Head: Normocephalic. Atraumatic.    Neck: Neck supple. No adenopathy. Thyroid symmetric, normal size, trachea midline  Eyes:  PERRL, + icterus  ENT: Hearing adequate, pharynx normal without erythema or exudate  Abdomen:   Auscultation: + BS  Appearance: mildly distended  Palpation: non-tender  NEURO: grossly negative  SKIN: no suspicious lesions or rashes  LYMPH:   anterior cervical: no adenopathy  posterior cervical: no adenopathy  supraclavicular: no  adenopathy          ADDITIONAL COMMENTS:   I reviewed the patient's new clinical lab test results.   Recent Labs   Lab Test 11/17/23  0536 11/16/23  1514 10/30/23  1346 10/04/23  0657 10/03/23  1809 10/03/23  0710   WBC 7.6 10.3 9.6  9.6 15.5*  --  14.1*   HGB 7.0* 8.1* 7.0*  7.0* 7.0*  7.0*   < > 7.2*  7.2*   * 102* 106*  106* 105*  --  105*   * 138* 221  221 154  --  131*   INR  --  2.88*  --  2.56*  --  2.66*    < > = values in this interval not displayed.     Recent Labs   Lab Test 11/17/23 0536 11/17/23  0114 11/16/23  1514 10/30/23  1346     --  135 135   POTASSIUM 3.5  --  3.4 3.4   CHLORIDE 104  --  103 102   CO2 20*  --  20* 22   BUN 5.3*  --  4.5* 18.1   CR 0.40*  --  0.45* 0.94   ANIONGAP 12  --  12 11   MIGUELINA 8.8  --  9.2 9.1   * 108* 135* 127*     Recent Labs   Lab Test 11/17/23 0536 11/16/23  1514 10/30/23  1346 09/26/23  0724 09/25/23  1646 09/25/23  1537 09/25/23  1424 09/09/23  1119 09/08/23  1224 09/08/23  1147   ALBUMIN 2.9* 3.1* 3.0*   < >  --   --  2.4*   < >  --  2.3*   BILITOTAL 7.5* 8.4* 7.8*   < >  --   --  21.7*   < >  --  10.4*   DBIL  --   --   --   --   --   --   --   --   --  7.66*   ALT 46 49 35   < >  --   --  69   < >  --  82*   * 222* 113*   < >  --    < >  --    < >  --  331*   ALKPHOS 308* 330* 185*   < >  --   --  182*   < >  --  200*   PROTEIN  --  100*  --   --  30*  --   --   --  50*  --    LIPASE  --   --   --   --   --   --  237*  --   --  272*    < > = values in this interval not displayed.             .    CONSULTATION ASSESSMENT AND PLAN:      39 yo male with history of alcohol abuse and liver disease complicated by alcoholic hepatitis and ascites.  Patient underwent paracentesis and while cell count was not consistent with SBP, cultures + for Propionibacterium acnes.  No signs of infection, such as, elevated WBC count, abdominal pain, or fever.  Patient does report some subjective fever and chills.  Question contaminant vs true  SBP.    Patient relapsed last week and had been drinking heavily over the last several days.  MDF is consistent with severe alcoholic hepatitis.    Being transfused one unit PRBC for hemoglobin of 7.0.  Reports frequent nosebleeds and black stools.  No christiano hematochezia.  Of note, hemoglobin previously 7.0 on last discharge 10/30.  Recent EGD 9/27/23 without evidence of varices, noted to have portal hypertensive gastropathy.  No history of hepatic encephalopathy.    -  Ultrasound to assess for present of ascites, if present recommend diagnostic paracentesis with repeat fluid studies although will be influenced by antibiotics patient is currently on.  -  Patient with severe alcoholic hepatitis.  Would not start steroid therapy in light of possible infection and possible GI bleed.  -  Monitor H/H; transfuse prn.  Watch for signs of GI bleeding.  -  Consider EGD for persistent signs of bleeding or hemoglobin drop.  Will discuss with Dr. Pratt.  -  Monitor MELD labs.  -  Complete alcohol cessation.  -  Close follow up in [facility] liver clinic after discharge.    Total Time Spent:  40 minutes        I discussed the patient's findings and plan with Dr. Pratt.          Kristi Samano, PAC  [facility]  Cell:  [phone], not available after 11:30AM at this number  Dr. Pratt is covering the afternoon.  Dr. Clayton is covering the weekend.

## 2023-11-17 NOTE — PROVIDER NOTIFICATION
MD Notification    Notified Person: MD    Notified Person Name: Khoi hall    Notification Date/Time: 11/17/2023 1450    Notification Interaction: vocera    Purpose of Notification: hey just FYI had to bump pt up to 5L NC. also his HR has been hanging out around 118    Orders Received:    Comments:

## 2023-11-17 NOTE — PLAN OF CARE
ED admit, pt arrived to unit approx 0100.     A&O x 4. Tachycardic 100s-110, otherwise VSS on 2L NC w/humidification. Tele: ST. Stand-by assist w/cane. 2 g sodium diet, tolerating well. PIV infusing NS @ 100mL/hr. Slight jaundice, mild tremor. CIWA 3, 7, 9. PRN ativan given x1. Scoring high for nausea, pt declined anti-nausea meds. Chronic left hip pain, declined intervention. Voids spontaneously, dark florence urine. +1 loose dark brown/black stool, on-call hospitalist notified. Continue plan of care.     Paged Dr. Underwood for FYI Hgb 7.0 this AM and dark stool. Conditional orders released to prepare 1 unit PRBCs. Pt denies dizziness or stomach pain.     Goal Outcome Evaluation:      Plan of Care Reviewed With: patient    Overall Patient Progress: no change

## 2023-11-17 NOTE — PROGRESS NOTES
[facility]    Hospitalist Progress Note    Brief Summary:  This is a 38-year-old male with history of alcohol abuse, on chronic liver cirrhosis, hypertension, substance abuse, was asked to come to the ED because of positive culture from his acetic fluid, by the GI, he relapsed recently and been drinking heavily before admission, noted to have significantly elevated lactic acid, elevated bilirubin, alkaline phosphatase and AST subsequently get admitted.      Assessment & Plan     Propionibacterium acnes ascites fluid culture: Likely a contaminant  Rule out SBP, lower suspicion:   Resulted on day 5 of incubation following paracentesis 11/9/2023 outpatient with [facility].  Patient contacted a few days prior to admission but did not report to the ED there after.  Presents on day of admission complaining of subjective chills, fatigue.  No worsening abdominal distention or abdominal pain.    Total cell count was 169 at the time, culture positive for Propionibacterium acne, likely a contaminant.  Ultrasound-guided diagnostic aspiration done today, was sent for cell count and cultures agree with that.  He was started on IV ceftriaxone admission we will continue with that, if the cell count and repeat culture negative then discontinue antibiotic otherwise treat for at least 5 days.  [facility] consulted and following.    Acute alcoholic hepatitis  Alcoholic liver cirrhosis decompensated  Possible acute hepatic encephalopathy  Decompensated alcohol associated cirrhosis,  Established with [facility].  Recent relapse on alcohol this week, and was drinking heavily.  *Admission labs with , total bili 8.4, hemoglobin 8.1 with , platelets 138, INR 2.88.       MELD 3.0: 27 at 11/16/2023  3:14 PM  MELD-Na: 27 at 11/16/2023  3:14 PM  Carmen discriminant function: Of almost 100    Patient is about 20% 3-month mortality based on his MELD score, he will benefit from steroids but giving possibility of  underlying cirrhosis and GI bleeding, steroid has been on help.  If the cell count from the next ascites fluid come back normal, then can consider steroids as the patient is high risk for worsening at this time.  We will check ammonia level, and put him on lactulose 20 g 3 times daily and rifaximin 550 twice daily      Lactic acidosis: Possible type B  Patient has significantly elevated lactic acid on admission 4.9.  His blood pressure remained normal or elevated, with good perfusion.  This is likely secondary to significant alcohol intake/intoxication on admission  He was given IV fluids boluses in the ED and continue IV fluids on admission.  Lactic acid now improved to 2.5.  Patient is not getting fluid overloaded with shortness of breath orthopnea and bilateral crackles on examination today.  Lower extremity edema is worsening.  I will stop the IV fluids at this time and continue monitor       Alcohol dependence, high risk for withdrawal: Recent relapse this week drinking 1 L of vodka over the past 3 days.  Last drink was the evening prior to admission.  Slightly tremulous on admission.  History of alcohol withdrawal, no withdrawal seizures, reports prior hallucinations while going through withdrawal; nothing active at this time.  - CIWA protocol, as needed Ativan  - Multivitamin, thiamine, folate  - As needed Zyprexa for hallucinations  - Patient not interested in CD treatment at this time  - Cessation strongly recommended     Anemia acute on chronic  Coagulopathy of liver disease  Frequent nosebleeds:   Hemoglobin 8.1 with , platelets 138, INR 2.88.  Was in the ER 10/30/2023 for anemia and received 2 units packed red blood cells at that time.  Patient hemoglobin dropped to 7.0 this morning part of the reason is dilutional as well as got a lot of IV fluids overnight.  He does have nosebleed and some blood in his mouth.  No obvious hematemesis.  Keep him on IV Protonix 40 twice daily, recent EGD was  negative for varices but does have portal gastropathy.  - Conditional orders to transfuse for hemoglobin less than 7, consent signed on admission  - Type and screen ordered    Agree with blood transfusion this morning as hemoglobin did drop to 7.0, with nosebleed cannot rule out GI bleeding altogether.  Keep him on IV Protonix 40 twice daily  Given his significantly elevated INR, I will start him on vitamin K 10 mg IV daily for x3 doses.     Chronic hip and back pain: Reports chronic left greater than right hip pain exacerbated by sitting in the ED bed.  Not on pain medications.  Uses a cane intermittently for ambulatory assistance for longer distances.  - Ice, heat        Hx of duodenitis/ulcer with contained perforation versus duodenal diverticulum seen on CT:   Noted on CT during hospitalization in 9/2023.  - Continue PPI    Acute hypoxic respiratory failure  Acute on chronic diastolic congestive heart failure fluid overload  Patient shortness of breath today, is on supplemental oxygen overnight 1 to 2 L.  On examination has bilateral wheezing and crackles at the bases, orthopnea positive  Lower extremity edema positive I will stop the IV fluids at this time, check BNP  Give him a dose of IV Lasix 60 mg times once, if chest x-ray shows pulmonary edema and BNP elevated we will keep him on IV Lasix 40 mg twice daily.  We will do echocardiogram          DVT Prophylaxis: Pneumatic Compression Devices  Code Status: Full Code    Disposition: Expected discharge in 2 to 3 days once more stable.    Khoi Barraza MD, MD  Text Page  (7am - 6pm)    Interval History   Patient seen and evaluated in his room today, quite fatigued lethargic and difficult to understand him at this time.  Slightly confused as well.  Complaining of some shortness of breath no nausea vomiting, he did have nosebleed earlier today    Overnight events reviewed with the nursing staff taking care of the patient.    -Data reviewed today: I reviewed all  new labs and imaging results over the last 24 hours. I personally reviewed no images or EKG's today.    Physical Exam   Temp: 97.7  F (36.5  C) Temp src: Oral BP: (!) 115/94 Pulse: 104   Resp: 20 SpO2: 95 % O2 Device: Nasal cannula with humidification Oxygen Delivery: 1 LPM  Vitals:    11/16/23 1503   Weight: 127 kg (280 lb)     Vital Signs with Ranges  Temp:  [97.4  F (36.3  C)-97.8  F (36.6  C)] 97.7  F (36.5  C)  Pulse:  [100-111] 104  Resp:  [18-36] 20  BP: (115-145)/(69-94) 115/94  SpO2:  [92 %-97 %] 95 %  I/O last 3 completed shifts:  In: -   Out: 150 [Urine:150]    Constitutional: Fatigued, tired, somewhat confused  Eyes: Sclera icteric  Respiratory: Diminished  air entry bilaterally, diffuse wheezing throughout his lung, and basal crackles positive Cardiovascular: Normal apical impulse, regular rate and rhythm, normal S1 and S2, no S3 or S4, and no murmur noted  GI: No scars, normal bowel sounds, soft, non-distended, non-tender, no masses palpated, no hepatosplenomegally  Musculoskeletal: 1-2+ lower extremity pitting edema present  Neurologic: No focal deficit, but fatigued and tired and somewhat confused, but moving all 4 limbs    Medications      cefTRIAXone  2 g Intravenous Q24H    folic acid  1 mg Oral Daily    furosemide  60 mg Intravenous Once    ipratropium - albuterol 0.5 mg/2.5 mg/3 mL  3 mL Nebulization 3 times daily    multivitamin w/minerals  1 tablet Oral Daily    pantoprazole  40 mg Oral BID    phytonadione  10 mg Intravenous Q24H    sodium chloride (PF)  3 mL Intracatheter Q8H    thiamine  100 mg Oral Daily       Data   Recent Labs   Lab 11/17/23  1231 11/17/23  0536 11/17/23  0114 11/16/23  1514   WBC  --  7.6  --  10.3   HGB 7.8* 7.0*  --  8.1*   MCV  --  103*  --  102*   PLT  --  101*  --  138*   INR  --   --   --  2.88*   NA  --  136  --  135   POTASSIUM  --  3.5  --  3.4   CHLORIDE  --  104  --  103   CO2  --  20*  --  20*   BUN  --  5.3*  --  4.5*   CR  --  0.40*  --  0.45*   ANIONGAP   --  12  --  12   MIGUELINA  --  8.8  --  9.2   GLC  --  118* 108* 135*   ALBUMIN  --  2.9*  --  3.1*   PROTTOTAL  --  6.8  --  7.5   BILITOTAL  --  7.5*  --  8.4*   ALKPHOS  --  308*  --  330*   ALT  --  46  --  49   AST  --  203*  --  222*       Recent Results (from the past 24 hour(s))   US Paracentesis without Albumin    Narrative    ULTRASOUND PARACENTESIS WITHOUT ALBUMIN November 17, 2023 10:59 AM     HISTORY: Previous culture from paracentesis +.    FINDINGS: Ultrasound was used to evaluate for the presence and best  approach for paracentesis. Written and oral informed consent was  obtained. A pause for the cause procedure to verify the correct  patient and correct procedure. The skin overlying the right lower  quadrant was prepped and draped in the usual sterile fashion. The  subcutaneous tissues were anesthetized with 10 mL 1% lidocaine. A  catheter was advanced into the peritoneal space and 34 mL of  straw  colored fluid was drained. There were no immediate complications.  Ultrasound images were permanently stored. Patient left the ultrasound  suite in satisfactory condition.      Impression    IMPRESSION: Technically successful paracentesis without immediate  complications.    ZAYRA RODRIGUES MD         SYSTEM ID:  [id number]

## 2023-11-17 NOTE — H&P
[facility]    History and Physical - Hospitalist Service       Date of Admission:  11/16/2023    Assessment & Plan   Crispin Ham is a 38 year old male with below past medical history, admitted on 11/16/2023. He presented a few days after being contacted by his outpatient GI team for abnormal culture results following paracentesis.  Noted to have a significantly elevated lactic acidosis, lab abnormalities consistent with chronic alcohol consumption and an acute alcohol withdrawal having recently relapsed.  [place] [facility] [place]    Abnormal paracentesis culture results, growing Propionibacterium acnes, question contaminant  Rule out SBP, lower suspicion: Resulted on day 5 of incubation following paracentesis 11/9/2023 outpatient with [facility].  Patient contacted a few days prior to admission but did not report to the ED there after.  Presents on day of admission complaining of subjective chills, fatigue.  No worsening abdominal distention or abdominal pain.  *WBC 10.3, lactic acid as below   - [place] [facility]  - Continue IV ceftriaxone initiated in the emergency department  - [facility] consulted, appreciate input  - Blood cultures obtained in the ED, follow  - Monitor fever curve    Lactic acidosis: 4.9 on admission.  Received 1 L IV fluid bolus with repeat lactic acid to 4.3.  UA with moderate bilirubin, 100 protein, hyaline casts. Blood pressure stable, patient slightly tachycardic.  Appears dry.  Has not been eating or drinking, relapsed on alcohol consuming 1 L of vodka over the past 3 days.  Ethanol on admission 0.22.   - Additional 500 cc bolus ordered, MIVF at 100 ccs/hr thereafter. Monitor closely for worsening ascites.    - Repeat lactic acid at 2200    Decompensated alcohol associated cirrhosis: Established with [facility].  Recent relapse on alcohol this week.  Not currently on any medications for cirrhosis.  Slight facial jaundice  noted.  *Admission labs with , total bili 8.4, hemoglobin 8.1 with , platelets 138, INR 2.88.  - [facility] consulted, appreciate input    MELD 3.0: 27 at 11/16/2023  3:14 PM  MELD-Na: 27 at 11/16/2023  3:14 PM  Calculated from:  Serum Creatinine: 0.45 mg/dL (Using min of 1 mg/dL) at 11/16/2023  3:14 PM  Serum Sodium: 135 mmol/L at 11/16/2023  3:14 PM  Total Bilirubin: 8.4 mg/dL at 11/16/2023  3:14 PM  Serum Albumin: 3.1 g/dL at 11/16/2023  3:14 PM  INR(ratio): 2.88 at 11/16/2023  3:14 PM  Age at listing (hypothetical): 38 years  Sex: Male at 11/16/2023  3:14 PM    Alcohol dependence, high risk for withdrawal: Recent relapse this week drinking 1 L of vodka over the past 3 days.  Last drink was the evening prior to admission.  Slightly tremulous on admission.  History of alcohol withdrawal, no withdrawal seizures, reports prior hallucinations while going through withdrawal; nothing active at this time.  - CIWA protocol, as needed Ativan  - Multivitamin, thiamine, folate  - As needed Zyprexa for hallucinations  - Patient not interested in CD treatment at this time  - Cessation strongly recommended    Coagulopathy of liver disease  Frequent nosebleeds: Hemoglobin 8.1 with , platelets 138, INR 2.88.  Was in the ER 10/30/2023 for anemia and received 2 units packed red blood cells at that time.  - Conditional orders to transfuse for hemoglobin less than 7, consent signed on admission  - Type and screen ordered    Chronic hip and back pain: Reports chronic left greater than right hip pain exacerbated by sitting in the ED bed.  Not on pain medications.  Uses a cane intermittently for ambulatory assistance for longer distances.  - Ice, heat      Hx of duodenitis/ulcer with contained perforation versus duodenal diverticulum seen on CT: Noted on CT during hospitalization in 9/2023.  - Continue PPI        Diet: Regular Diet Adult    DVT Prophylaxis: Pneumatic Compression Devices  Bosch Catheter: Not  present  Lines: None     Cardiac Monitoring: None  Code Status:  Full Code    Clinically Significant Risk Factors Present on Admission              # Hypoalbuminemia: Lowest albumin = 3.1 g/dL at 11/16/2023  3:14 PM, will monitor as appropriate    # Coagulation Defect: INR = 2.88 (Ref range: 0.85 - 1.15) and/or PTT = N/A, will monitor for bleeding         # Obesity: Estimated body mass index is 37.97 kg/m  as calculated from the following:    Height as of this encounter: 1.829 m (6').    Weight as of this encounter: 127 kg (280 lb).              Disposition Plan      Expected Discharge Date: 11/18/2023                The patient's care was discussed with the Attending Physician, Dr. Quiroz, Bedside Nurse, and Patient.    Kylee Collins PA-C  Hospitalist Service  [facility]  Securely message with [employer] (more info)  Text page via [employer] Paging/Directory     ______________________________________________________________________    Chief Complaint   Abnormal labs     History is obtained from the patient and chart review.     History of Present Illness   Crispin Ham is a 38 year old male with below past medical history, admitted on 11/16/2023. He presented a few days after being contacted by his outpatient GI team for abnormal culture results following paracentesis.  Noted to have a significantly elevated lactic acidosis, lab abnormalities consistent with chronic alcohol consumption and an acute alcohol withdrawal having recently relapsed.  Admitted to [facility] for further evaluation and treatment    Note culture broth growing propionibacterium acnes. Resulted on day 5 of incubation following paracentesis 11/9/2023 outpatient with [facility].  Patient contacted a few days prior to admission but did not report to the ED there after.  Presents on day of admission complaining of subjective chills, fatigue.  No worsening abdominal distention or abdominal pain.      Recent relapse on  alcohol this week.  Not currently on any medications for cirrhosis.  Slight facial jaundice noted. Appears dry.  Has not been eating or drinking, relapsed on alcohol consuming 1 L of vodka over the past 3 days.    Seen in the ED by Dr. Morales with above work-up.  Admitted to [facility] for further evaluation.    On my interview patient confirms the above history.  He denies any shortness of breath chest pain dizziness lightheadedness.  He reports general poor appetite, relapse with alcohol that did not seem to change any of his symptoms.  No worsening abdominal distention or pain.  Subjective chills but no reported fevers.     Past Medical History    Past Medical History:   Diagnosis Date    Alcohol abuse     Hypertension     Substance abuse (H)        Past Surgical History   Past Surgical History:   Procedure Laterality Date    ORTHOPEDIC SURGERY         Prior to Admission Medications   Prior to Admission Medications   Prescriptions Last Dose Informant Patient Reported? Taking?   Vitamin D, Cholecalciferol, 10 MCG (400 UNIT) TABS a week ago  Yes Yes   Sig: Take 1 tablet by mouth daily   ibuprofen (ADVIL/MOTRIN) 200 MG tablet Past Month  Yes Yes   Sig: Take 400 mg by mouth every 8 hours as needed for pain   multivitamin, therapeutic (THERA-VIT) TABS tablet a week ago  Yes Yes   Sig: Take 1 tablet by mouth daily   pantoprazole (PROTONIX) 40 MG EC tablet   No No   Sig: Take 1 tablet (40 mg) by mouth 2 times daily      Facility-Administered Medications: None        Review of Systems    The 10 point Review of Systems is negative other than noted in the HPI .    Social History   I have reviewed this patient's social history and updated it with pertinent information if needed.  Social History     Tobacco Use    Smoking status: Never   Substance Use Topics    Alcohol use: Yes     Comment: 1.75L per day    Drug use: Not Currently     Family History   Reviewed and noncontributory to current chief complaint.       Allergies    Allergies   Allergen Reactions    Excedrin Extra Strength [Aspirin-Acetaminophen-Caffeine]      puffy eyes and dry throat a few years ago. Tolerates ibuprofen and acetaminophen, but avoids aspirin        Physical Exam   Vital Signs: Temp: 97.8  F (36.6  C)   BP: 135/69 Pulse: 111   Resp: 20 SpO2: 97 %      Weight: 280 lbs 0 oz    CONSTITUTIONAL: Pt laying in bed, dressed in hospital garb. Appears comfortable. Cooperative with interview.   HEENT: Normocephalic, atraumatic.   CARDIOVASCULAR: RRR, no murmurs, rubs, or extra heart sounds appreciated. Pulses +2/4 and regular in upper and lower extremities, bilaterally.   RESPIRATORY: No increased work of breathing. CTA, bilat; no wheezes, rales, or rhonchi appreciated.  GASTROINTESTINAL:  Abdomen soft, non-distended. BS auscultated in all four quadrants.   MUSCULOSKELETAL: No gross deformities noted. Normal muscle tone.   HEMATOLOGIC/LYMPHATIC/IMMUNOLOGIC: 2+ ROSIBEL, bilat.   NEUROLOGIC: Alert and oriented to person, place, and time. No focal neuro deficits.   SKIN: Warm, dry, intact. Facial jaundice with scleral icterus noted.     Medical Decision Making       75 MINUTES SPENT BY ME on the date of service doing chart review, history, exam, documentation & further activities per the note.      Data     I have personally reviewed the following data over the past 24 hrs:    10.3  \   8.1 (L)   / 138 (L)     135 103 4.5 (L) /  135 (H)   3.4 20 (L) 0.45 (L) \     ALT: 49 AST: 222 (H) AP: 330 (H) TBILI: 8.4 (H)   ALB: 3.1 (L) TOT PROTEIN: 7.5 LIPASE: N/A     Procal: N/A CRP: N/A Lactic Acid: 4.3 (HH)       INR:  2.88 (H) PTT:  N/A   D-dimer:  N/A Fibrinogen:  N/A       Imaging results reviewed over the past 24 hrs:   No results found for this or any previous visit (from the past 24 hour(s)).

## 2023-11-17 NOTE — ED NOTES
[facility]  ED Nurse Handoff Report    ED Chief complaint: Abnormal Labs      ED Diagnosis:   Final diagnoses:   Liver disease   SBP (spontaneous bacterial peritonitis) (H)       Code Status: Not yet ordered    Allergies:   Allergies   Allergen Reactions    Excedrin Extra Strength [Aspirin-Acetaminophen-Caffeine]      puffy eyes and dry throat a few years ago. Tolerates ibuprofen and acetaminophen, but avoids aspirin       Patient Story: Patient presented with normal lab testing.  Patient reports that he was called into the ER for positive fluid cultures.  He has been feeling somewhat chilled and fatigued at home.  Has not taken his temperature but feels sometimes feverish.  Has felt generally sluggish compared to his discharge.  Denies any black or tarry stools.  Denies any cough, runny nose, abdominal pain.    Does not intermittent epistaxis on a daily basis for a couple months.    Focused Assessment:  Appears jaundiced. BLE edema with subsequent decreased mobility (uses cane at baseline).    Treatments and/or interventions provided: IV abx, IVF bolus  Patient's response to treatments and/or interventions: No adverse reactions    To be done/followed up on inpatient unit:  Inpatient orders    Does this patient have any cognitive concerns?:  None    Activity level - Baseline/Home:  Cane  Activity Level - Current:   Cane    Patient's Preferred language: English   Needed?: No    Isolation: None  Infection: Not Applicable  Patient tested for COVID 19 prior to admission: NO  Bariatric?: No    Vital Signs:   Vitals:    11/16/23 1503   BP: 135/69   Pulse: 111   Resp: 20   Temp: 97.8  F (36.6  C)   SpO2: 97%   Weight: 127 kg (280 lb)   Height: 1.829 m (6')       For the majority of the shift this patient's behavior was Green.   Behavioral interventions performed were N/A.    ED NURSE PHONE NUMBER: *[id number]

## 2023-11-17 NOTE — CONSULTS
Care Management Initial Consult    General Information  Assessment completed with: VM-chart review,    Type of CM/SW Visit: Progression of Care    Primary Care Provider verified and updated as needed: Yes   Readmission within the last 30 days: no previous admission in last 30 days      Reason for Consult: discharge planning  Advance Care Planning: Advance Care Planning Reviewed: no concerns identified          Communication Assessment  Patient's communication style: spoken language (English or Bilingual)    Hearing Difficulty or Deaf: no   Wear Glasses or Blind: yes    Cognitive  Cognitive/Neuro/Behavioral: WDL                      Living Environment:   People in home: parent(s)  Father Quan  Current living Arrangements: house      Able to return to prior arrangements: yes       Family/Social Support:  Care provided by: self  Provides care for: no one  Marital Status: Single  Parent(s)          Description of Support System:           Current Resources:   Patient receiving home care services: No     Community Resources:    Equipment currently used at home: cane, straight  Supplies currently used at home:      Employment/Financial:  Employment Status: disabled        Financial Concerns:             Does the patient's insurance plan have a 3 day qualifying hospital stay waiver?  No    Lifestyle & Psychosocial Needs:  Social Determinants of Health     Food Insecurity: Not on file   Depression: Not on file   Housing Stability: Not on file   Tobacco Use: Unknown (9/27/2023)    Patient History     Smoking Tobacco Use: Never     Smokeless Tobacco Use: Unknown     Passive Exposure: Not on file   Financial Resource Strain: Not on file   Alcohol Use: Not on file   Transportation Needs: Not on file   Physical Activity: Not on file   Interpersonal Safety: Not on file   Stress: Not on file   Social Connections: Not on file       Functional Status:  Prior to admission patient needed assistance:   Dependent ADLs::  Independent  Dependent IADLs:: Independent  Assesssment of Functional Status: Not at baseline with mobility, Not at  functional baseline    Mental Health Status: Unable to asses          Chemical Dependency Status:  Chemical Dependency Status: Current Concern  Chemical Dependency Management:  (refused CD RX)          Values/Beliefs:  Spiritual, Cultural Beliefs, Amish Practices, Values that affect care: no               Additional Information:  Met with patient in room to discuss plan of care. Patient presently lethargic,able to answer simple questions. Writer requested consent to speak with patients father Quan [phone]. Patient state yes.  Writer left a VM for patients father. Await reply.  Information gathered from chart noted patient was seen by PCP DR Baldwin  on 10/18 and was recommended outpt GI and Nephrology follow up.   Patient was followed by [facility] for his liver issues.  Per note patient was independent with ADLs and IADLS at baseline. Patient lives with his parents.  Per ED report patient  was called into the ER for positive fluid cultures.  He has been feeling somewhat chilled and fatigued at home.    Patient relapsed last week and had been drinking heavily over the last several days.   Disposition is pending clinical progression.   Await further information from patients parents.    Received a call from patients mother Juli  [phone] .Mother states patient at baseline was independent and holding a job at the [employer]. Patient lives with his parents,he has been withdrawn lately,patient has been sleeping most of the time,seems to have increased shortness of breath with activity,and has not been able to work. Juli stating patient had seen a psychiatrist in the past for depression,parents would like psych and CD to be consulted when medically stable.  Juli states she was in patients  bedroom last night and noted trails of blood on his clothes and bedsheets. Patient stated he had nose bleed.    Parents would like to be notified with any change in patients condition.     Yuriy Alfaro RN -[phone]

## 2023-11-17 NOTE — ED PROVIDER NOTES
History     Chief Complaint:  Abnormal Labs       HPI   Crispin Ham is a 38 year old male who presents with  abnormal lab testing.  Patient reports that he was called into the ER for positive fluid cultures.  He has been feeling somewhat chilled and fatigued at home.  Has not taken his temperature but feels sometimes feverish.  Has felt generally sluggish compared to his discharge.  Denies any black or tarry stools.  Denies any cough, runny nose, abdominal pain.       Independent Historian:    none    Review of External Notes:  Reviewed PCP note from 10/18/23 regarding chronic medical issues       Medications:    ibuprofen (ADVIL/MOTRIN) 200 MG tablet  multivitamin, therapeutic (THERA-VIT) TABS tablet  Vitamin D, Cholecalciferol, 10 MCG (400 UNIT) TABS  pantoprazole (PROTONIX) 40 MG EC tablet        Past Medical History:    Past Medical History:   Diagnosis Date    Alcohol abuse     Hypertension     Substance abuse (H)        Past Surgical History:    Past Surgical History:   Procedure Laterality Date    ORTHOPEDIC SURGERY            Physical Exam   Patient Vitals for the past 24 hrs:   BP Temp Temp src Pulse Resp SpO2 Height Weight   11/16/23 2013 124/81 97.4  F (36.3  C) Oral 107 22 94 % -- --   11/16/23 1503 135/69 97.8  F (36.6  C) -- 111 20 97 % 1.829 m (6') 127 kg (280 lb)        Physical Exam  General: Resting on the bed.  Head: No obvious trauma to head.  Ears, Nose, Throat:  External ears normal.  Nose normal.  No pharyngeal erythema, swelling or exudate.  Midline uvula.    Eyes:  Conjunctivae icterus  Pupils are equal, round, and reactive.   Neck: Normal range of motion.  Neck supple.   CV: Regular rate and rhythm.  No murmurs.      Respiratory: Effort normal and breath sounds normal.  No wheezing or crackles.   Gastrointestinal: Soft.  No distension. There is no tenderness.  There is no rigidity, no rebound and no guarding.   Neuro: Alert. Moving all extremities appropriately.  Normal speech.     Skin: Skin is warm and dry.  No rash noted.     Emergency Department Course       Laboratory:  Labs Ordered and Resulted from Time of ED Arrival to Time of ED Departure   COMPREHENSIVE METABOLIC PANEL - Abnormal       Result Value    Sodium 135      Potassium 3.4      Carbon Dioxide (CO2) 20 (*)     Anion Gap 12      Urea Nitrogen 4.5 (*)     Creatinine 0.45 (*)     GFR Estimate >90      Calcium 9.2      Chloride 103      Glucose 135 (*)     Alkaline Phosphatase 330 (*)      (*)     ALT 49      Protein Total 7.5      Albumin 3.1 (*)     Bilirubin Total 8.4 (*)    LACTIC ACID WHOLE BLOOD - Abnormal    Lactic Acid 4.9 (*)    ROUTINE UA WITH MICROSCOPIC REFLEX TO CULTURE - Abnormal    Color Urine Orange (*)     Appearance Urine Slightly Cloudy (*)     Glucose Urine 50 (*)     Bilirubin Urine Moderate (*)     Ketones Urine Negative      Specific Gravity Urine 1.028      Blood Urine Trace (*)     pH Urine 6.0      Protein Albumin Urine 100 (*)     Urobilinogen Urine 12.0 (*)     Nitrite Urine Negative      Leukocyte Esterase Urine Negative      Bacteria Urine Few (*)     Mucus Urine Present (*)     Calcium Oxalate Crystals Urine Few (*)     RBC Urine 6 (*)     WBC Urine 8 (*)     Squamous Epithelials Urine <1      Hyaline Casts Urine 20 (*)     Granular Casts Urine 20 (*)    CBC WITH PLATELETS AND DIFFERENTIAL - Abnormal    WBC Count 10.3      RBC Count 2.25 (*)     Hemoglobin 8.1 (*)     Hematocrit 22.9 (*)      (*)     MCH 36.0 (*)     MCHC 35.4      RDW 15.2 (*)     Platelet Count 138 (*)     % Neutrophils 74      % Lymphocytes 13      % Monocytes 8      % Eosinophils 3      % Basophils 1      % Immature Granulocytes 1      NRBCs per 100 WBC 0      Absolute Neutrophils 7.7      Absolute Lymphocytes 1.3      Absolute Monocytes 0.8      Absolute Eosinophils 0.3      Absolute Basophils 0.1      Absolute Immature Granulocytes 0.1      Absolute NRBCs 0.0     INR - Abnormal    INR 2.88 (*)    LACTIC ACID  WHOLE BLOOD - Abnormal    Lactic Acid 4.6 (*)    ETHYL ALCOHOL LEVEL - Abnormal    Alcohol ethyl 0.22 (*)    LACTIC ACID WHOLE BLOOD - Abnormal    Lactic Acid 4.3 (*)    LACTIC ACID WHOLE BLOOD - Abnormal    Lactic Acid 3.8 (*)    AMMONIA - Abnormal    Ammonia 80 (*)    TYPE AND SCREEN, ADULT    ABO/RH(D) A POS      Antibody Screen Negative      SPECIMEN EXPIRATION DATE [id number]     BLOOD CULTURE   BLOOD CULTURE   ABO/RH TYPE AND SCREEN        Procedures       Emergency Department Course & Assessments:             Interventions:  Medications   sodium chloride 0.9% BOLUS 500 mL (500 mLs Intravenous $New Bag 23)   sodium chloride 0.9 % infusion ( Intravenous $New Bag 23)   cefTRIAXone (ROCEPHIN) 2 g vial to attach to  ml bag for ADULTS or NS 50 ml bag for PEDS (0 g Intravenous Stopped 23)   sodium chloride 0.9% BOLUS 1,000 mL (0 mLs Intravenous Stopped 23)   ondansetron (ZOFRAN) injection 4 mg (4 mg Intravenous $Given 23)        Assessments:  1510 I met with patient, obtained history and performed examination.      Independent Interpretation (X-rays, CTs, rhythm strip):  None    Consultations/Discussion of Management or Tests:  ED Course as of 23   Thu  I updated patient on plan and results.    175 I spoke with Kylee Juarez PAC for Dr Quiroz        Social Determinants of Health affecting care:  none     Disposition:  The patient was admitted to the hospital under the care of Dr. Quiroz.     Impression & Plan    CMS Diagnoses: The Lactic acid level is elevated due to liver disease, at this time there is no sign of severe sepsis or septic shock. and None      Medical Decision Makin-year-old male presents to the ER with abnormal lab.  Vital signs are reassuring.  Broad differential was pursued including but not limited to electrolyte, metabolic, renal dysfunction, severe sepsis, septic shock, decompensated liver disease,  alcohol tox occasion, starvation ketoacidosis, alcohol dependence, etc.  CBC without significant leukocytosis, chronic anemia stable.  BMP with no acute electrolyte, metabolic or renal dysfunction, bilirubin, alk phos are elevated secondary to patient's known liver disease.  Elevated INR related to known liver disease.  Lactate elevated at 4.6, trending down with fluids, suspect this is likely related to decompensated liver disease as opposed to severe sepsis.  Patient did have abnormal peritoneal fluid culture and 2 g of ceftriaxone were given.  Blood cultures obtained and pending.  UA is unremarkable.  Type and cross obtained as well.  Patient will be admitted for IV antibiotics, fluids, GI consultation and likely repeat paracentesis to ensure that there is no signs of SBP.  Clinically his exam is benign.  I do not see indication for acute abdominal imaging.  Patient was admitted.     Diagnosis:    ICD-10-CM    1. Liver disease  K76.9       2. SBP (spontaneous bacterial peritonitis) (H)  K65.2            Discharge Medications:  New Prescriptions    No medications on file            11/16/2023   Hansa Morales MD Bennett, Jennifer L, MD  11/16/23 9028

## 2023-11-17 NOTE — PROVIDER NOTIFICATION
MD Notification    Notified Person: MD    Notified Person Name: Khoi Barraza    Notification Date/Time: 11/17/2023 2035    Notification Interaction: vocera    Purpose of Notification: hey FYI pt getting 1 unit of blood now. Hgb this am was 7.0. I also ordered q6hr Hgb checks. There was only a conditional order to check hgb. Not sure if you want to keep the q6hr I added or modify. Thanks!    Orders Received:    Comments:

## 2023-11-17 NOTE — PROGRESS NOTES
VSS ex tachy and increased to 4-5L NC. Abdomen is slightly distended. LS: wheezy and crackles at bases. A&Ox4. Lethargic. Difficult to understand speech/incoherent. Denies pain. X1 60mg lasix given. Incontinent bladder episode x1, external cath in place, AUO. Using purewick not male external cath. No BM this shift. R PIV SL (positional). L PIV running NS at 75mL/hr. CIWA: 5, 8, 10, 12, 9, 6. Ativan given. Dried blood in nares from old nose bleed. Cracked/scabbed lips. Abrasion on pubic area. Scattered scabs and bruising. Jaundice. Tele: ST. Received 1 unit PRBC, Hgb 7.0, 7.8. last Hgb at 1500: 8.1. Mag is low at 1.3, replacing now, recheck at 2230. INR: 2.88, vitamin K given. Ammonia went from 80 to 96. Lactulose started today. Chest xray done. Echo done. Had second paracentesis today, took 34mL out. On 2gm Na diet, poor intake. GI consulted for potential GI bleed.

## 2023-11-18 NOTE — CODE/RAPID RESPONSE
[facility]    House CALEB RRT Note  11/17/2023   Time Called: 18:56 pm     RRT called for: hypoxia     Over the last 24 hours Mr. Ham has gone from room air to 15 L oxy mask with SPO2 sats 88%.    Assessment & Plan     Acute hypoxic respiratory failure likely secondary to fluid volume overload in the setting of heart failure versus left pleural effusion  Acute on chronic diastolic congestive heart failure   Acute hepatic encephalopathy   On my arrival Mr. Hma is lying supine, HOB 60 degrees, leaning towards his left side with eyes closed in no apparent distress.  There is visible dried blood in bilateral naris, tongue, and bilateral fingertip nailbeds.  Opens eyes to voice and responds with one-word answers before drifting back to eyes closed.  , sinus rhythm.  /79, RR 24, SPO2 88% on 15 L oxy mask.  He is able to tell me his name, place, time, situation.  He denies pain, shortness of breath, dizziness or difficulties breathing.  Denies cough.  Nursing staff tells me he has drifted off while attempting to sip water earlier this afternoon.      Alternatively considered anemia given coagulapathy of liver disease however was just transfused and hgb 7.8. Less likely PTX given gradual increase in oxygen needs.  Could consider aspiration pneumonia however patient is already covered with ceftriaxone.    INTERVENTIONS:  -  -Continue IMC status overnight  -BiPAP   -ABG 1 hour post BiPAP  -Add on ammonia to a.m. labs (only 1 recorded stool this admission while on lactulose twice daily)  -Noted he was given 1 pPRBCs earlier this morning   -Noted IV Lasix 60 mg given at 13:28 PM with a >1L response.  -Stopped maintenance IVF's, defer further diuresis tonight could consider diuresis versus thoracentesis tomorrow 11/18  -Noted echo shows hyperdynamic left ventricular function with EF 65 to 70%.  No regional wall motion abnormalities, no valvulopathy and large left pleural  effusion.  -Noted chest x-ray performed this afternoon shows mild to moderate bibasilar atelectasis and minimal pleural fluid.    I personally reviewed the radiographs shallow inspiration, bibasilar atelectasis.    Working diagnosis: Fluid volume overload    At the end of the RRT Mr. Ham mentation significantly improved within the first 20 minutes of BiPAP placement.  Briskly opens eyes to voice and can follow two-step commands and x4 extremities.  SPO2 100% on 100% FiO2 on 12/6.  RT and nursing to wean FiO2 to maintain SPO2 greater than 92%.    [place] [facility] [place]    D defer further cares to hospitalist attending physician Dr. Barraza.     Interval History     Crispin Hamis a 38 year old male w/ PMH of alcohol abuse, chronic liver cirrhosis, hypertension, substance abuse, and recent paracentesis who was admitted on 11/16/2023 for positive culture from his ascitic fluid per GI.  Fortunately he has had a recent relapse and was drinking heavily prior to admission.  He is hospital day 1 with acute alcoholic hepatitis, liver cirrhosis and hepatic encephalopathy.    Code Status: Full Code    Allergies   Allergies   Allergen Reactions    Excedrin Extra Strength [Aspirin-Acetaminophen-Caffeine]      puffy eyes and dry throat a few years ago. Tolerates ibuprofen and acetaminophen, but avoids aspirin       Physical Exam   Vital Signs with Ranges:  Temp:  [97.4  F (36.3  C)-97.8  F (36.6  C)] 97.7  F (36.5  C)  Pulse:  [100-117] 113  Resp:  [16-36] 23  BP: (115-145)/(79-94) 129/79  SpO2:  [87 %-96 %] 87 %  I/O last 3 completed shifts:  In: 703.75 [P.O.:410]  Out: 150 [Urine:150]    Physical Exam  Vitals and nursing note reviewed.   Constitutional:       General: He is not in acute distress.     Appearance: He is overweight. He is ill-appearing.   HENT:      Nose:      Comments: Concern for prior epistaxis with old dried blood to bilateral naris     Mouth/Throat:      Mouth: Mucous membranes are moist.      Comments:  Old dried blood to tongue no evidence of oral lesions or injuries  Eyes:      Pupils: Pupils are equal, round, and reactive to light.   Cardiovascular:      Rate and Rhythm: Regular rhythm. Tachycardia present.      Pulses: Normal pulses.      Heart sounds: Normal heart sounds. No murmur heard.  Pulmonary:      Effort: Pulmonary effort is normal. No respiratory distress.      Breath sounds: Examination of the right-middle field reveals decreased breath sounds. Examination of the right-lower field reveals decreased breath sounds. Examination of the left-lower field reveals decreased breath sounds. Decreased breath sounds present.   Abdominal:      General: Bowel sounds are normal.      Palpations: Abdomen is soft.      Tenderness: There is no abdominal tenderness. There is no guarding.   Musculoskeletal:      Right lower le+ Edema present.      Left lower le+ Edema present.   Skin:     General: Skin is warm and dry.      Coloration: Skin is jaundiced.   Neurological:      Mental Status: He is lethargic.      GCS: GCS eye subscore is 3. GCS verbal subscore is 5. GCS motor subscore is 6.   Psychiatric:         Behavior: Behavior is cooperative.         Data     IMAGING: (X-ray/CT/MRI)   Recent Results (from the past 24 hour(s))   US Paracentesis without Albumin    Narrative    ULTRASOUND PARACENTESIS WITHOUT ALBUMIN 2023 10:59 AM     HISTORY: Previous culture from paracentesis +.    FINDINGS: Ultrasound was used to evaluate for the presence and best  approach for paracentesis. Written and oral informed consent was  obtained. A pause for the cause procedure to verify the correct  patient and correct procedure. The skin overlying the right lower  quadrant was prepped and draped in the usual sterile fashion. The  subcutaneous tissues were anesthetized with 10 mL 1% lidocaine. A  catheter was advanced into the peritoneal space and 34 mL of  straw  colored fluid was drained. There were no immediate  complications.  Ultrasound images were permanently stored. Patient left the ultrasound  suite in satisfactory condition.      Impression    IMPRESSION: Technically successful paracentesis without immediate  complications.    ZAYRA RODRIGUES MD         SYSTEM ID:  [id number]   XR Chest Port 1 View    Narrative    CHEST ONE VIEW  2023 12:10 PM     HISTORY: Shortness of breath.    COMPARISON: 2023      Impression    IMPRESSION: Mild-moderate bibasilar atelectasis and/or infiltrate,  minimal pleural fluid bilaterally.    ZAYRA RODRIGUES MD         SYSTEM ID:  [id number]   Echocardiogram Complete   Result Value    LVEF  65-70%    Narrative    [id number]  [id number]  [id number]  [id number]^KENDELL^DOYLE^LAURA     [facility]  Echocardiography Laboratory  [address] [place] [address]     Name: JIMMY COLLADO  MRN: [id number]  : 1985  Study Date: 2023 03:14 PM  Age: 38 yrs  Gender: Male  Patient Location: [facility]  Reason For Study: CHF  Ordering Physician: DOYLE RDZ  Performed By: Christiana Quispe     BSA: 2.5 m2  Height: 72 in  Weight: 280 lb  HR: 145  BP: 115/94 mmHg  ______________________________________________________________________________  Procedure  Complete Portable Echo Adult.  ______________________________________________________________________________  Interpretation Summary     The left ventricle is normal in structure, function and size.  Hyperdynamic left ventricular function  The rhythm was sinus tachycardia.  Large left pleural effusion  ______________________________________________________________________________  Left Ventricle  The left ventricle is normal in structure, function and size. There is normal  left ventricular wall thickness. Hyperdynamic left ventricular function. The  visual ejection fraction is 65-70%. No regional wall motion abnormalities  noted.     Right Ventricle  The right ventricle is normal in structure, function and size.      Atria  Normal left atrial size. Right atrial size is normal.     Mitral Valve  The mitral valve is normal in structure and function.     Tricuspid Valve  The tricuspid valve is normal in structure and function. There is trace  tricuspid regurgitation.     Aortic Valve  The aortic valve is normal in structure and function.     Pulmonic Valve  The pulmonic valve is not well visualized.     Pericardium  There is no pericardial effusion. Large left pleural effusion.     Rhythm  The rhythm was sinus tachycardia.     ______________________________________________________________________________  MMode/2D Measurements & Calculations  IVSd: 0.87 cm  LVIDd: 6.5 cm  LVIDs: 3.8 cm  LVPWd: 0.92 cm  FS: 41.1 %     LV mass(C)d: 243.4 grams  LV mass(C)dI: 99.0 grams/m2  Ao root diam: 3.9 cm  LA dimension: 4.6 cm  asc Aorta Diam: 3.2 cm  LA/Ao: 1.2  Ao root diam index Ht(cm/m): 2.1  Ao root diam index BSA (cm/m2): 1.6  Asc Ao diam index BSA (cm/m2): 1.3  Asc Ao diam index Ht(cm/m): 1.8  RWT: 0.29     Doppler Measurements & Calculations  PA acc time: 0.09 sec     ______________________________________________________________________________  Report approved by: Teri Rosas 11/17/2023 04:33 PM             CBC with Diff:  Recent Labs   Lab Test 11/17/23  1923 11/17/23  1231 11/17/23  0536 11/16/23  1514   WBC  --   --  7.6 10.3   HGB 7.8*   < > 7.0* 8.1*   MCV  --   --  103* 102*   PLT  --   --  101* 138*   INR  --   --   --  2.88*    < > = values in this interval not displayed.        Lactic Acid:    Lab Results   Component Value Date    LACT 2.4 11/17/2023           Comprehensive Metabolic Panel:  Recent Labs   Lab 11/17/23  1921 11/17/23  1231 11/17/23  0536   NA  --   --  136   POTASSIUM  --   --  3.5   CHLORIDE  --   --  104   CO2  --   --  20*   ANIONGAP  --   --  12   *  --  118*   BUN  --   --  5.3*   CR  --   --  0.40*   GFRESTIMATED  --   --  >90   MIGUELINA  --   --  8.8   MAG  --  1.3*  --    PHOS  --  2.9  --     PROTTOTAL  --   --  6.8   ALBUMIN  --   --  2.9*   BILITOTAL  --   --  7.5*   ALKPHOS  --   --  308*   AST  --   --  203*   ALT  --   --  46       INR:    Recent Labs   Lab Test 11/16/23  1514   INR 2.88*         BNP:  93    UA:  Recent Labs   Lab 11/16/23  1514   COLOR Orange*   APPEARANCE Slightly Cloudy*   URINEGLC 50*   URINEBILI Moderate*   URINEKETONE Negative   SG 1.028   UBLD Trace*   URINEPH 6.0   PROTEIN 100*   NITRITE Negative   LEUKEST Negative   RBCU 6*   WBCU 8*           Time Spent on this Encounter   I spent 35 minutes (1856 - 19:31) of critical care time on the unit/floor managing the care of Crispin Ham. Upon evaluation, this patient had a high probability of imminent or life-threatening deterioration due to acute hypoxic respiratory failure, which required my direct attention, intervention, and personal management including initiation of BiPAP and interpreting diagnostic studies.  100% of my time was spent at the bedside counseling the patient and/or coordinating care regarding services listed in this note.    Lakeisha Bowden NP  [facility]  Securely message with the Vocera Web Console (learn more here)  Text page via [employer] Paging/Directory

## 2023-11-18 NOTE — PROVIDER NOTIFICATION
"MD Notification    Notified Person: MD    Notified Person Name: Angeli Nevarez MD    Notification Date/Time: 11/17/23 2153    Notification Interaction: Vocera    Purpose of Notification: Patient was placed on BiPAP at 1900 and not tolerating being off. He has oral meds due. Can we switch lactulose to enema? Will need rectal tube order if yes. Protonix PO to IV? Ok to hold rifixamin? Please advise     Orders Received: \"Ok to hold oral meds tonight\"    Comments:    "

## 2023-11-18 NOTE — PROGRESS NOTES
Gastroenterology Progress Note     Subjective   Patient is a 38-year-old gentleman who has a history of alcohol abuse.  He presents with hypertension and abnormal culture results following paracentesis.    He established care at [facility] on October 27, 2023 for chronic liver disease.  He had alcoholic hepatitis.  Possible cirrhosis.  MELD NA on that visit was 33.  He has portal hypertension, splenomegaly, ascites.  He does not have varices.  Paracentesis was ordered for the ascites and was found to have abnormal culture result.  Anaerobic bacterial culture showed positive however white cell count was not positive.  He had subjective fevers and chills.  He presented to the emergency room without pain or distention.  He has had relapse of alcohol this week.    Paracentesis done on November 9 showed 1.7 ANC, albumin 0.5, and total protein 1.1 on his fluid. Culture showed Cutibacterium (Propionibacterium) acnes    Paracentesis done November 17, 2023 showed 70 ANC.  Cultures pending.  Blood cultures obtained.  He has been placed on ceftriaxone.    11/18/23 US guided bilateral thoracentesis with removal of 1.7 L of yellow-orange fluid from the right and 1 L from the left.     Also been found to have severe alcoholic hepatitis.     Objective     Vitals Blood pressure 126/82, pulse 112, temperature 98  F (36.7  C), temperature source Oral, resp. rate 22, height 1.829 m (6'), weight 127 kg (280 lb), SpO2 95%.          Physical Exam   General: awake, alert, oriented times three    Cardiovascular: RRR. On O2 mask, +edema    Chest: lungs are clear to auscultation bilaterally    Abdomen: soft, non-tender, non-distended, bowel sounds present    Neurologic: grossly intact, moves all four extremities         Laboratory     Electrolytes    Recent Labs   Lab 11/18/23  0543 11/17/23  1921 11/17/23  0536 11/17/23  0114 11/16/23  1514     --  136  --  135   POTASSIUM 3.1*  --  3.5  --  3.4   CHLORIDE 105  --  104  --  103   CO2 24   --  20*  --  20*   * 116* 118*   < > 135*   CR 0.47*  --  0.40*  --  0.45*   BUN 6.1  --  5.3*  --  4.5*    < > = values in this interval not displayed.      Hematology    Recent Labs   Lab 11/18/23  0543 11/17/23  1923 11/17/23  1452 11/17/23  1231 11/17/23  0536 11/16/23  1514   HGB 7.1* 7.8* 8.1*   < > 7.0* 8.1*   *  --   --   --  103* 102*   WBC 5.6  --   --   --  7.6 10.3   PLT 83*  --   --   --  101* 138*   INR 3.01*  --   --   --   --  2.88*    < > = values in this interval not displayed.      LFTs & Lipase    Recent Labs   Lab 11/18/23  0543 11/17/23  0536 11/16/23  1514   * 203* 222*   ALT 43 46 49   ALKPHOS 312* 308* 330*   BILITOTAL 8.1* 7.5* 8.4*     MELD 3.0: 27 at 11/18/2023  5:43 AM  MELD-Na: 27 at 11/18/2023  5:43 AM  Calculated from:  Serum Creatinine: 0.47 mg/dL (Using min of 1 mg/dL) at 11/18/2023  5:43 AM  Serum Sodium: 137 mmol/L at 11/18/2023  5:43 AM  Total Bilirubin: 8.1 mg/dL at 11/18/2023  5:43 AM  Serum Albumin: 2.7 g/dL at 11/18/2023  5:43 AM  INR(ratio): 3.01 at 11/18/2023  5:43 AM  Age at listing (hypothetical): 38 years  Sex: Male at 11/18/2023  5:43 AM      I have reviewed the current diagnostic and laboratory tests.           Impression and Plan    Alcoholic hepatitis.Meld-Na 27.   ALT 43 total bilirubin 8.1.  Due to the possibility of peritonitis or infection will not place patient on steroids.  Possible Monomicrobial non-neutrocytic bacterascites vs contaminant.  Presently on antibiotics.  Continue antibiotics for 5 day course.  Acute hypoxic respiratory failure, Acute on chronic diastolic congestive heart failure fluid overload, Bilateral pleural effusion US guided bilateral thoracentesis with removal of 1.7 L of yellow-orange fluid from the right and 1 L from the left 11/18/23.   Encephalopathy.  On lactulose and rifaximin.  Anemia.  EGD September 27, 2023 showed no varices but mild portal hypertensive gastropathy.  He has been having nosebleeds.   Continue PPI and monitor. Currently brown stool.  Hx of duodenitis/ulcer with contained perforation versus duodenal diverticulum seen on CT. Noted on CT during hospitalization in 9/2023. Continue PPI       30 minutes of total time was spent providing patient care including patient evaluation, reviewing documentation/test results, and .           Virgil Clayton MD  Thank you for the opportunity to participate in the care of this patient.   Please feel free to call me with any questions or concerns.  Phone number [phone].

## 2023-11-18 NOTE — CONSULTS
RADIOLOGY POST PROCEDURE NOTE    Patient name: Crispin Ham  MRN: [id number]  : 1985    Pre-procedure diagnosis: Bilateral pleural effusions  Post-procedure diagnosis: Same    Procedure Date/Time: 2023  1:14 PM  Procedure:    US guided bilateral thoracentesis with removal of 1.7 L of yellow-orange fluid from the right and 1 L from the left.  There is some residual fluid bilaterally, though reached maximum drainage in 1 sitting.      Estimated blood loss: 3 ml  Specimen(s) collected with description: Please see above.    The patient tolerated the procedure well with no immediate complications.  Significant findings:  Please see above.    See imaging dictation for procedural details.    Provider name: Deshaun Mitchell MD  Assistant(s):None

## 2023-11-18 NOTE — PLAN OF CARE
Goal Outcome Evaluation:      Plan of Care Reviewed With: patient, parent    Overall Patient Progress: improvingOverall Patient Progress: improving         Orientation: A&Ox5  Vitals/Pain: VSS on room air. Pt denies pain.  CIWA score: 0-1  Tele: tachycardia  Lines/Drains: PIV on RUE  LS: diminished on both bases  GI/: BS normoactive, x3 BM this shift. Pt having adequate urine output throughout shift.   Labs: Abnormal/Trends, Electrolyte Replacement- Potassium was replaced; Potassium draw in AM  Ambulation/Assist: A1 with GB/Wk  Skin/Wounds: edema on BLE  Plan: thoracentesis results still pending; cxr in AM

## 2023-11-18 NOTE — PROGRESS NOTES
[facility]    Hospitalist Progress Note    Brief Summary:  This is a 38-year-old male with history of alcohol abuse, on chronic liver cirrhosis, hypertension, substance abuse, was asked to come to the ED because of positive culture from his acetic fluid, by the GI, he relapsed recently and been drinking heavily before admission, noted to have significantly elevated lactic acid, elevated bilirubin, alkaline phosphatase and AST subsequently get admitted.      Assessment & Plan     Propionibacterium acnes ascites fluid culture: Likely a contaminant  Rule out SBP, lower suspicion:   Resulted on day 5 of incubation following paracentesis 11/9/2023 outpatient with [facility].  Patient contacted a few days prior to admission but did not report to the ED there after.  Presents on day of admission complaining of subjective chills, fatigue.  No worsening abdominal distention or abdominal pain.    Total cell count was 169 at the time, culture positive for Propionibacterium acne, likely a contaminant.  Ultrasound-guided diagnostic aspiration done today, was sent for cell count and cultures agree with that.  He was started on IV ceftriaxone admission we will continue with that, if the cell count and repeat culture negative then discontinue antibiotic otherwise treat for at least 5 days.  [facility] consulted and following.  Repeat paracentesis, ascitic fluid shows cell count of 648 with absolute neutrophil counts of only 72, unlikely to have SBP even though he was on IV antibiotic, I will continue IV cefazolin for now till the culture remain negative.    Acute alcoholic hepatitis  Alcoholic liver cirrhosis decompensated  Possible acute hepatic encephalopathy  Decompensated alcohol associated cirrhosis,  Established with [facility].  Recent relapse on alcohol this week, and was drinking heavily.  *Admission labs with , total bili 8.4, hemoglobin 8.1 with , platelets 138, INR 2.88.       MELD 3.0: 27  at 11/16/2023  3:14 PM  MELD-Na: 27 at 11/16/2023  3:14 PM  Carmen discriminant function: Of almost 100    Patient is about 20% 3-month mortality based on his MELD score, he will benefit from steroids but giving possibility of underlying SBP and GI bleeding, holding this rate for now.  If the cell count from the next ascites fluid come back normal, then can consider steroids as the patient is high risk for worsening at this time.  Ammonia level elevated and put him on lactulose 20 g 3 times daily and rifaximin 550 twice daily  He is much more awake and alert as compared to 11/17/2023  Recently EGD in September was negative for any varices      Acute hypoxic respiratory failure  Bilateral pleural effusion  Acute on chronic diastolic congestive heart failure fluid overload  Patient shortness of breath and was hypoxic on 11/17/2023.  IV fluid discontinued giving a dose of IV Lasix 60 mg times once.  Although BNP come back normal, but chest x-ray does show bilateral pleural effusion, on the echocardiogram that shows large pleural effusion on the left side.  Overnight 11/17/2023 had RRT and use BiPAP.  Now on supplemental oxygen with oxy mask.  Diminished air entry bilaterally because of pleural effusion  At this time we will consult IR to do bilateral thoracentesis.  And send fluid for analysis  Discussed with on-call interventional radiology he will do the procedure today.  Echocardiogram reviewed EF of 65 to 70% hyperdynamic left ventricle no wall motion abnormality  Repeat the dose of IV Lasix 40 mg times once today, once we have the thoracentesis and remains stable we can start him on scheduled Lasix and add the spironolactone.      Lactic acidosis: Possible type B  Patient has significantly elevated lactic acid on admission 4.9.  His blood pressure remained normal or elevated, with good perfusion.  This is likely secondary to significant alcohol intake/intoxication on admission  He was given IV fluids boluses in  the ED and continue IV fluids on admission.  Lactic acid now improved to 2.5.  Patient is not getting fluid overloaded with shortness of breath orthopnea and bilateral crackles on examination today.  Lower extremity edema is worsening.  Stop IV fluids at this time and continue to monitor       Alcohol dependence, high risk for withdrawal: Recent relapse this week drinking 1 L of vodka over the past 3 days.  Last drink was the evening prior to admission.  Slightly tremulous on admission.  History of alcohol withdrawal, no withdrawal seizures, reports prior hallucinations while going through withdrawal; nothing active at this time.  - CIWA protocol, as needed Ativan  - Multivitamin, thiamine, folate  - As needed Zyprexa for hallucinations  - Patient not interested in CD treatment at this time  - Cessation strongly recommended     Anemia acute on chronic  Coagulopathy of liver disease  Frequent nosebleeds:   Hemoglobin 8.1 with , platelets 138, INR 2.88.  Was in the ER 10/30/2023 for anemia and received 2 units packed red blood cells at that time.  Patient hemoglobin dropped to 7.0 this morning part of the reason is dilutional as well as got a lot of IV fluids overnight.  He does have nosebleed and some blood in his mouth.  No obvious hematemesis.  Keep him on IV Protonix 40 twice daily, recent EGD was negative for varices but does have portal gastropathy.  - Conditional orders to transfuse for hemoglobin less than 7, consent signed on admission  - Type and screen ordered    Agree with blood transfusion this morning as hemoglobin did drop to 7.0, with nosebleed cannot rule out GI bleeding altogether.  Keep him on IV Protonix 40 twice daily  Given his significantly elevated INR, I will start him on vitamin K 10 mg IV daily for x3 doses, INR still remains elevated, still have intermittent nosebleeds.  We will give him 2 units of FFP today.     Chronic hip and back pain: Reports chronic left greater than right hip  pain exacerbated by sitting in the ED bed.  Not on pain medications.  Uses a cane intermittently for ambulatory assistance for longer distances.  - Ice, heat        Hx of duodenitis/ulcer with contained perforation versus duodenal diverticulum seen on CT:   Noted on CT during hospitalization in 9/2023.  - Continue PPI           DVT Prophylaxis: Pneumatic Compression Devices  Code Status: Full Code    Disposition: Expected discharge in 2 to 3 days once more stable.    Khoi Barraza MD, MD  Text Page  (7am - 6pm)    Interval History   Overnight events noted, had RRT because of worsening hypoxia, used BiPAP overnight  Much more awake and alert today, have some mild shortness of breath no nausea vomiting    Overnight events reviewed with the nursing staff thank you the patient.      -Data reviewed today: I reviewed all new labs and imaging results over the last 24 hours. I personally reviewed no images or EKG's today.    Physical Exam   Temp: 98  F (36.7  C) Temp src: Oral BP: 126/82 Pulse: 112   Resp: 22 SpO2: 95 % O2 Device: Oxymask Oxygen Delivery: 6 LPM  Vitals:    11/16/23 1503   Weight: 127 kg (280 lb)     Vital Signs with Ranges  Temp:  [97.7  F (36.5  C)-98.1  F (36.7  C)] 98  F (36.7  C)  Pulse:  [103-117] 112  Resp:  [16-29] 22  BP: (104-136)/(59-94) 126/82  FiO2 (%):  [40 %-70 %] 40 %  SpO2:  [87 %-100 %] 95 %  I/O last 3 completed shifts:  In: 823.75 [P.O.:530]  Out: 2725 [Urine:2725]    Constitutional: Fatigued, tired, somewhat confused  Eyes: Sclera icteric  Respiratory: Diminished  air entry bilaterally, diffuse wheezing throughout his lung, and basal crackles positive Cardiovascular: Normal apical impulse, regular rate and rhythm, normal S1 and S2, no S3 or S4, and no murmur noted  GI: No scars, normal bowel sounds, soft, non-distended, non-tender, no masses palpated, no hepatosplenomegally  Musculoskeletal: 1-2+ lower extremity pitting edema present  Neurologic: No focal deficit, but fatigued and tired  and somewhat confused, but moving all 4 limbs    Medications    - MEDICATION INSTRUCTIONS -      - MEDICATION INSTRUCTIONS -        cefTRIAXone  2 g Intravenous Q24H    folic acid  1 mg Oral Daily    furosemide  40 mg Intravenous Once    ipratropium - albuterol 0.5 mg/2.5 mg/3 mL  3 mL Nebulization 3 times daily    lactulose  20 g Oral TID    multivitamin w/minerals  1 tablet Oral Daily    pantoprazole  40 mg Oral BID    phytonadione  10 mg Intravenous Q24H    potassium chloride  40 mEq Oral or Feeding Tube Once    rifaximin  550 mg Oral BID    sodium chloride (PF)  3 mL Intracatheter Q8H    thiamine  100 mg Oral Daily       Data   Recent Labs   Lab 11/18/23  0543 11/17/23  1923 11/17/23  1921 11/17/23  1452 11/17/23  1231 11/17/23  0536 11/17/23  0114 11/16/23  1514   WBC 5.6  --   --   --   --  7.6  --  10.3   HGB 7.1* 7.8*  --  8.1*   < > 7.0*  --  8.1*   *  --   --   --   --  103*  --  102*   PLT 83*  --   --   --   --  101*  --  138*   INR 3.01*  --   --   --   --   --   --  2.88*     --   --   --   --  136  --  135   POTASSIUM 3.1*  --   --   --   --  3.5  --  3.4   CHLORIDE 105  --   --   --   --  104  --  103   CO2 24  --   --   --   --  20*  --  20*   BUN 6.1  --   --   --   --  5.3*  --  4.5*   CR 0.47*  --   --   --   --  0.40*  --  0.45*   ANIONGAP 8  --   --   --   --  12  --  12   MGIUELINA 8.9  --   --   --   --  8.8  --  9.2   *  --  116*  --   --  118*   < > 135*   ALBUMIN 2.7*  --   --   --   --  2.9*  --  3.1*   PROTTOTAL 6.4  --   --   --   --  6.8  --  7.5   BILITOTAL 8.1*  --   --   --   --  7.5*  --  8.4*   ALKPHOS 312*  --   --   --   --  308*  --  330*   ALT 43  --   --   --   --  46  --  49   *  --   --   --   --  203*  --  222*    < > = values in this interval not displayed.       Recent Results (from the past 24 hour(s))   Echocardiogram Complete   Result Value    LVEF  65-70%    [facility]    [id number]  [id number]  [id number]  [id number]^RENATO^LAURA     [facility]  [facility]  Echocardiography Laboratory  [address] [place] [address]     Name: JIMMY COLLADO  MRN: [id number]  : 1985  Study Date: 2023 03:14 PM  Age: 38 yrs  Gender: Male  Patient Location: [facility]  Reason For Study: CHF  Ordering Physician: DOYLE RDZ  Performed By: Christiana Quispe     BSA: 2.5 m2  Height: 72 in  Weight: 280 lb  HR: 145  BP: 115/94 mmHg  ______________________________________________________________________________  Procedure  Complete Portable Echo Adult.  ______________________________________________________________________________  Interpretation Summary     The left ventricle is normal in structure, function and size.  Hyperdynamic left ventricular function  The rhythm was sinus tachycardia.  Large left pleural effusion  ______________________________________________________________________________  Left Ventricle  The left ventricle is normal in structure, function and size. There is normal  left ventricular wall thickness. Hyperdynamic left ventricular function. The  visual ejection fraction is 65-70%. No regional wall motion abnormalities  noted.     Right Ventricle  The right ventricle is normal in structure, function and size.     Atria  Normal left atrial size. Right atrial size is normal.     Mitral Valve  The mitral valve is normal in structure and function.     Tricuspid Valve  The tricuspid valve is normal in structure and function. There is trace  tricuspid regurgitation.     Aortic Valve  The aortic valve is normal in structure and function.     Pulmonic Valve  The pulmonic valve is not well visualized.     Pericardium  There is no pericardial effusion. Large left pleural effusion.     Rhythm  The rhythm was sinus tachycardia.     ______________________________________________________________________________  MMode/2D Measurements & Calculations  IVSd: 0.87 cm  LVIDd: 6.5 cm  LVIDs: 3.8 cm  LVPWd: 0.92 cm  FS: 41.1 %     LV mass(C)d:  243.4 grams  LV mass(C)dI: 99.0 grams/m2  Ao root diam: 3.9 cm  LA dimension: 4.6 cm  asc Aorta Diam: 3.2 cm  LA/Ao: 1.2  Ao root diam index Ht(cm/m): 2.1  Ao root diam index BSA (cm/m2): 1.6  Asc Ao diam index BSA (cm/m2): 1.3  Asc Ao diam index Ht(cm/m): 1.8  RWT: 0.29     Doppler Measurements & Calculations  PA acc time: 0.09 sec     ______________________________________________________________________________  Report approved by: Teri Rosas 11/17/2023 04:33 PM

## 2023-11-18 NOTE — PLAN OF CARE
IM[facility] status. Lethargic and disoriented to situation at start of shift but improved to alert and oriented x4 towards this AM. Vital signs stable on BIPAP until 0500, weaned to 8L Oxymask. Assist of 2, turn and repo this shift d/t lethargy and minimal movement in bed. NPO while on BiPAP, otherwise 2gm Na diet. Lungs sounds diminished.. Bowel sounds active. Passing flatus +. Small BM x2. Voiding adequately via external catheter. Scattered rash and bruising. Small wound on symphysis pubis. Generalized edema. Denies pain/nausea.  Tele Sinus tach. PIV SL. CIWA 4,3,2.

## 2023-11-18 NOTE — PROGRESS NOTES
MD Notification  Notified Person Name: DANYELL Barraza    Notification Date/Time: 11/18 1004   Notification Interaction: vocera message    RN message to MD: to have thora done today ordering MD must call IR MD to come in on weekend. Pt having dried blood around mouth and nose bleed this am, pt denies feeling like it is draining in the back of his throat, are we worried about esophageal varices? Stool continues to be dark and florence slightly bloody   MD response to RN: Will call IR, can start soft diet, He don't have varices, GI is on board and following as well   Orders Received: mechanical soft diet, thora completed, 2 units of plasma ordered

## 2023-11-19 NOTE — PROGRESS NOTES
Gastroenterology Progress Note     Subjective   Patient is a 38-year-old gentleman who has a history of alcohol abuse.  He presents with hypertension and abnormal culture results following paracentesis.    He established care at [facility] on October 27, 2023 for chronic liver disease.  He had alcoholic hepatitis.  Possible cirrhosis.  MELD NA on that visit was 33.  He has portal hypertension, splenomegaly, ascites.  He does not have varices.  Paracentesis was ordered for the ascites and was found to have abnormal culture result.  Anaerobic bacterial culture showed positive however white cell count was not positive.  He had subjective fevers and chills.  He presented to the emergency room without pain or distention.  He has had relapse of alcohol this week.    Paracentesis done on November 9 showed 1.7 ANC, albumin 0.5, and total protein 1.1 on his fluid. Culture showed Cutibacterium (Propionibacterium) acnes    Paracentesis done November 17, 2023 showed 70 ANC.  Cultures pending.  Blood cultures obtained.  He has been placed on ceftriaxone.    11/18/23 US guided bilateral thoracentesis with removal of 1.7 L of yellow-orange fluid from the right and 1 L from the left.     Also been found to have severe alcoholic hepatitis.  ------------------    [place] like [place]. Mouth dry. No other complaints today.     Objective     Vitals Blood pressure 105/60, pulse 106, temperature 97.9  F (36.6  C), temperature source Oral, resp. rate 18, height 1.829 m (6'), weight 124.3 kg (274 lb 0.5 oz), SpO2 97%.          Physical Exam   General: awake, alert, oriented times three    Cardiovascular: RRR. On O2 mask, +edema    Chest: lungs are clear to auscultation anteriorly. On supplemental O2 by face mask.    Abdomen: soft, non-tender, non-distended, bowel sounds present    Neurologic: grossly intact, moves all four extremities         Laboratory     Electrolytes    Recent Labs   Lab 11/19/23  0549 11/18/23  1622 11/18/23  0555  11/17/23 1921 11/17/23  0536     --  137  --  136   POTASSIUM 2.9* 3.5 3.1*  --  3.5   CHLORIDE 107  --  105  --  104   CO2 24  --  24  --  20*   *  --  115* 116* 118*   CR 0.49*  --  0.47*  --  0.40*   BUN 6.7  --  6.1  --  5.3*      Hematology    Recent Labs   Lab 11/19/23  0549 11/18/23  2345 11/18/23  1842 11/18/23  1321 11/18/23  0543 11/17/23  1231 11/17/23  0536 11/16/23  1514   HGB 6.7* 7.2* 6.9*   < > 7.1*   < > 7.0* 8.1*   *  --   --   --  103*  --  103* 102*   WBC 3.9*  --   --   --  5.6  --  7.6 10.3   PLT 74*  --   --   --  83*  --  101* 138*   INR 2.65*  --   --   --  3.01*  --   --  2.88*    < > = values in this interval not displayed.      LFTs & Lipase    Recent Labs   Lab 11/19/23  0549 11/18/23  0543 11/17/23  0536   * 192* 203*   ALT 36 43 46   ALKPHOS 263* 312* 308*   BILITOTAL 7.1* 8.1* 7.5*     MELD 3.0: 25 at 11/19/2023  5:49 AM  MELD-Na: 25 at 11/19/2023  5:49 AM  Calculated from:  Serum Creatinine: 0.49 mg/dL (Using min of 1 mg/dL) at 11/19/2023  5:49 AM  Serum Sodium: 139 mmol/L (Using max of 137 mmol/L) at 11/19/2023  5:49 AM  Total Bilirubin: 7.1 mg/dL at 11/19/2023  5:49 AM  Serum Albumin: 2.6 g/dL at 11/19/2023  5:49 AM  INR(ratio): 2.65 at 11/19/2023  5:49 AM  Age at listing (hypothetical): 38 years  Sex: Male at 11/19/2023  5:49 AM      I have reviewed the current diagnostic and laboratory tests.           Impression and Plan    Alcoholic hepatitis.Meld-Na 25 (27 yesterday).  AST improved [id number] yesterday to 146 today.  ALT remains normal at 36.  Total bilirubin improving to 7.1 from 8.1 yesterday.  Due to the possibility of peritonitis or infection will not place patient on steroids.  In addition labs seem to be moving in the correct direction.  Possible Monomicrobial non-neutrocytic bacterascites vs contaminant.  Presently on ceftriaxone.  Continue antibiotics for at total of a 5 day course for this purpose. Nov 19th is day 4.  Ascites. (Also lower ext  edema) On lasix 40 mg bid. Will add spironolactone 100 mg po every day. Cr is normal. Low Na diet. Also on potassium and phos for low levels of both. Will monitor K with addition of spironolactone.  Low K and phos. Being replaced. Adding spironolactone as above.  Low Magnesium. Being replaced  Acute hypoxic respiratory failure, Acute on chronic diastolic congestive heart failure fluid overload, Bilateral pleural effusion US guided bilateral thoracentesis with removal of 1.7 L of yellow-orange fluid from the right and 1 L from the left 11/18/23.   Encephalopathy.  On lactulose and rifaximin. No clinical encephalopathy at this time. 3 BM Nov 18th.  Anemia.  EGD September 27, 2023 showed no varices but mild portal hypertensive gastropathy.  He has been having nosebleeds.  Continue PPI and monitor. Currently brown stool.  Hx of duodenitis/ulcer with contained perforation versus duodenal diverticulum seen on CT in 9/2023. Continue PPI  Nutrition. Low sodium diet.  Fixed and elevated INR       30 minutes of total time was spent providing patient care including patient evaluation, reviewing documentation/test results, and .           Virgil Clayton MD  Thank you for the opportunity to participate in the care of this patient.   Please feel free to call me with any questions or concerns.  Phone number [phone].

## 2023-11-19 NOTE — PROGRESS NOTES
[facility]    Hospitalist Progress Note    Brief Summary:  This is a 38-year-old male with history of alcohol abuse, on chronic liver cirrhosis, hypertension, substance abuse, was asked to come to the ED because of positive culture from his acetic fluid, by the GI, he relapsed recently and been drinking heavily before admission, noted to have significantly elevated lactic acid, elevated bilirubin, alkaline phosphatase and AST subsequently get admitted.      Assessment & Plan     Propionibacterium acnes ascites fluid culture: Likely a contaminant  Rule out SBP, lower suspicion:   Resulted on day 5 of incubation following paracentesis 11/9/2023 outpatient with [facility].  Patient contacted a few days prior to admission but did not report to the ED there after.  Presents on day of admission complaining of subjective chills, fatigue.  No worsening abdominal distention or abdominal pain.    Total cell count was 169 at the time, culture positive for Propionibacterium acne, likely a contaminant.  Ultrasound-guided diagnostic aspiration done today, was sent for cell count and cultures agree with that.  He was started on IV ceftriaxone admission we will continue with that, if the cell count and repeat culture negative then discontinue antibiotic otherwise treat for at least 5 days.  [facility] consulted and following.  Repeat paracentesis, ascitic fluid shows cell count of 648 with absolute neutrophil counts of only 72, unlikely to have SBP even though he was on IV antibiotic, I will continue IV ceftriaxone 2 g daily for now to complete 5-day course, today is a 4th day of IV antibiotics    Acute alcoholic hepatitis  Alcoholic liver cirrhosis decompensated  Possible acute hepatic encephalopathy  Decompensated alcohol associated cirrhosis,  Established with [facility].  Recent relapse on alcohol this week, and was drinking heavily.  *Admission labs with , total bili 8.4, hemoglobin 8.1 with ,  platelets 138, INR 2.88.  On admit  MELD 3.0: 27 at 11/16/2023  3:14 PM  MELD-Na: 27 at 11/16/2023  3:14 PM  Carmen discriminant function: Of almost 100    Patient is about 20% 3-month mortality based on his MELD score, he will benefit from steroids but giving possibility of underlying SBP and GI bleeding, holding this rate for now.  If the cell count from the next ascites fluid come back normal, then can consider steroids as the patient is high risk for worsening at this time, will defer to GI   Ammonia level elevated and put him on lactulose 20 g 3 times daily and rifaximin 550 twice daily  He is much more awake and alert as compared to 11/17/2023  Recently EGD in September was negative for any varices  Given anasarca with lower extremity edema, bilateral pleural effusion, ascites I will start him on IV Lasix 40 mg twice daily and IV albumin every 8 hours.  We will add Aldactone once able to tolerate the Lasix.      Acute hypoxic respiratory failure  Bilateral pleural effusion  Status post bilateral thoracentesis on 11/18/2023  Acute on chronic diastolic congestive heart failure fluid overload  Patient shortness of breath and was hypoxic on 11/17/2023.  IV fluid discontinued giving a dose of IV Lasix 60 mg times once.  Although BNP come back normal, but chest x-ray does show bilateral pleural effusion, on the echocardiogram that shows large pleural effusion on the left side.  Overnight 11/17/2023 had RRT and use BiPAP.  Now on supplemental oxygen with oxy mask.  Improved air entry after thoracentesis  Echocardiogram reviewed EF of 65 to 70% hyperdynamic left ventricle no wall motion abnormality  He is status post bilateral thoracentesis 1.7 L from the right and about 1 L from the left.  With improved air entry and oxygen requirement.    At this time I will start on IV Lasix for milligrams twice daily with IV albumin and GI is already elevated more Aldactone 100 mg daily as well.    Lactic acidosis: Possible type  B  Patient has significantly elevated lactic acid on admission 4.9.  His blood pressure remained normal or elevated, with good perfusion.  This is likely secondary to significant alcohol intake/intoxication on admission  He was given IV fluids boluses in the ED and continue IV fluids on admission.  Lactic acid now improved to 2.5.  Patient is not getting fluid overloaded with shortness of breath orthopnea and bilateral crackles on examination today.  Lower extremity edema is worsening.  Stop IV fluids at this time and continue to monitor       Alcohol dependence, high risk for withdrawal: Recent relapse this week drinking 1 L of vodka over the past 3 days.  Last drink was the evening prior to admission.  Slightly tremulous on admission.  History of alcohol withdrawal, no withdrawal seizures, reports prior hallucinations while going through withdrawal; nothing active at this time.  - CIWA protocol, as needed Ativan  - Multivitamin, thiamine, folate  - As needed Zyprexa for hallucinations  - Patient not interested in CD treatment at this time  - Cessation strongly recommended     Anemia acute on chronic  Coagulopathy of liver disease  Frequent nosebleeds:   Hemoglobin 8.1 with , platelets 138, INR 2.88.  Was in the ER 10/30/2023 for anemia and received 2 units packed red blood cells at that time.  Patient hemoglobin dropped to 7.0 this morning part of the reason is dilutional as well as got a lot of IV fluids overnight.  He does have nosebleed and some blood in his mouth.  No obvious hematemesis.  Keep him on IV Protonix 40 twice daily, recent EGD was negative for varices but does have portal gastropathy.  - Conditional orders to transfuse for hemoglobin less than 7, consent signed on admission  - Type and screen ordered    He did have somewhat bloody thoracentesis, his hemoglobin dropped again on 11/19/2023 to 6.7.  We will transfuse him 1 unit of packed RBCs, no obvious signs of GI bleeding stools are brown,  no hematemesis he does have recurrent nosebleeds.    Keep him on IV Protonix 40 twice daily  Given his significantly elevated INR, I will start him on vitamin K 10 mg IV daily for x3 doses Zosyn, INR still remains elevated, still have intermittent nosebleeds.  Did give him 2 units FFP on 11/18/2023  Continue to monitor for now  GI is following does not concern about GI bleeding at this time which I agree with     Chronic hip and back pain: Reports chronic left greater than right hip pain exacerbated by sitting in the ED bed.  Not on pain medications.  Uses a cane intermittently for ambulatory assistance for longer distances.  - Ice, heat        Hx of duodenitis/ulcer with contained perforation versus duodenal diverticulum seen on CT:   Noted on CT during hospitalization in 9/2023.  -Keep him on IV Protonix 40 twice daily         Transfuse 1 unit of packed RBCs today  Check hemoglobin every 6 hours x 4  Started on IV Lasix 40 twice daily and IV albumin 25% 12.5 g every 8 hours  Agree with adding Aldactone 100 g daily  Incentive spirometry and flutter  Continue lactulose and rifaximin  Continue with mechanical soft diet.  Consider adding steroids if his culture remain negative he already received 4 days of IV antibiotics.  For alcoholic hepatitis      Discussed with the patient and the nursing staff to include the patient    DVT Prophylaxis: Pneumatic Compression Devices  Code Status: Full Code    Disposition: Expected discharge in 2 to 3 days once more stable.    Khoi Barraza MD, MD  Text Page  (7am - 6pm)    Interval History   Patient seen and evaluated this morning, more awake and alert, breathing comfortably, able to sleep better.  Has intermittent nosebleed overnight, denies any fever chills abdominal pain dysuria materia constipation at this time having bowel movements which are dark brown in color no melena, hematochezia or hematemesis at this time    No other significant event overnight    -Data reviewed  today: I reviewed all new labs and imaging results over the last 24 hours. I personally reviewed no images or EKG's today.    Physical Exam   Temp: 98.6  F (37  C) Temp src: Oral BP: 112/71 Pulse: 112   Resp: 20 SpO2: 94 % O2 Device: Nasal cannula Oxygen Delivery: 1 LPM  Vitals:    11/16/23 1503 11/19/23 0418   Weight: 127 kg (280 lb) 124.3 kg (274 lb 0.5 oz)     Vital Signs with Ranges  Temp:  [97.6  F (36.4  C)-98.6  F (37  C)] 98.6  F (37  C)  Pulse:  [102-121] 112  Resp:  [15-26] 20  BP: ()/(48-88) 112/71  SpO2:  [90 %-99 %] 94 %  I/O last 3 completed shifts:  In: 2314 [P.O.:1320; I.V.:106]  Out: 1125 [Urine:1125]    Constitutional: More awake, and alert, breathing comfortable  Eyes: Sclera icteric  Respiratory: Improved air entry bilaterally diminished at the bases, no more wheezing, crackles positive  Cardiovascular: Normal apical impulse, regular rate and rhythm, normal S1 and S2, no S3 or S4, and no murmur noted  GI: No scars, normal bowel sounds, soft, non-distended, non-tender, no masses palpated, no hepatosplenomegally  Musculoskeletal: 2+ lower extremity pitting edema present  Neurologic: No focal deficit, but fatigued and tired and somewhat confused, but moving all 4 limbs    Medications    - MEDICATION INSTRUCTIONS -      - MEDICATION INSTRUCTIONS -        albumin human  12.5 g Intravenous Q8H    cefTRIAXone  2 g Intravenous Q24H    folic acid  1 mg Oral Daily    furosemide  40 mg Intravenous BID    ipratropium - albuterol 0.5 mg/2.5 mg/3 mL  3 mL Nebulization 3 times daily    lactulose  20 g Oral TID    multivitamin w/minerals  1 tablet Oral Daily    pantoprazole  40 mg Intravenous BID    phytonadione  10 mg Intravenous Q24H    potassium & sodium phosphates  1 packet Oral or Feeding Tube Q4H    rifaximin  550 mg Oral BID    sodium chloride (PF)  3 mL Intracatheter Q8H    spironolactone  100 mg Oral Daily    thiamine  100 mg Oral Daily       Data   Recent Labs   Lab 11/19/23  0549 11/18/23  2341  11/18/23  1842 11/18/23  1622 11/18/23  1321 11/18/23  0543 11/17/23  1923 11/17/23  1921 11/17/23  1231 11/17/23  0536 11/17/23  0114 11/16/23  1514   WBC 3.9*  --   --   --   --  5.6  --   --   --  7.6  --  10.3   HGB 6.7* 7.2* 6.9*  --  7.4* 7.1*   < >  --    < > 7.0*  --  8.1*   *  --   --   --   --  103*  --   --   --  103*  --  102*   PLT 74*  --   --   --   --  83*  --   --   --  101*  --  138*   INR 2.65*  --   --   --   --  3.01*  --   --   --   --   --  2.88*     --   --   --   --  137  --   --   --  136  --  135   POTASSIUM 2.9*  --   --  3.5  --  3.1*  --   --   --  3.5  --  3.4   CHLORIDE 107  --   --   --   --  105  --   --   --  104  --  103   CO2 24  --   --   --   --  24  --   --   --  20*  --  20*   BUN 6.7  --   --   --   --  6.1  --   --   --  5.3*  --  4.5*   CR 0.49*  --   --   --   --  0.47*  --   --   --  0.40*  --  0.45*   ANIONGAP 8  --   --   --   --  8  --   --   --  12  --  12   MIGUELINA 8.9  --   --   --   --  8.9  --   --   --  8.8  --  9.2   *  --   --   --   --  115*  --  116*  --  118*   < > 135*   ALBUMIN 2.6*  --   --   --   --  2.7*  --   --   --  2.9*  --  3.1*   PROTTOTAL 5.9*  --   --   --  6.8 6.4  --   --   --  6.8  --  7.5   BILITOTAL 7.1*  --   --   --   --  8.1*  --   --   --  7.5*  --  8.4*   ALKPHOS 263*  --   --   --   --  312*  --   --   --  308*  --  330*   ALT 36  --   --   --   --  43  --   --   --  46  --  49   *  --   --   --   --  192*  --   --   --  203*  --  222*    < > = values in this interval not displayed.       Recent Results (from the past 24 hour(s))   XR Chest Port 1 View    Narrative    EXAM: XR CHEST PORT 1 VIEW  LOCATION: [facility]  DATE: 11/19/2023    INDICATION: f u post thoracentesis  COMPARISON: 11/17/2023      Impression    IMPRESSION: Improved aeration. Bilateral small-to-moderate pleural effusions decreased from prior. Adjacent airspace opacity could reflect atelectasis or pneumonitis. No  pneumothorax. Stable cardiomediastinal silhouette.

## 2023-11-19 NOTE — PROGRESS NOTES
"   11/19/23 8495   Appointment Info   Signing Clinician's Name / Credentials (OT) Romana Batres, OTR/L   Living Environment   People in Home parent(s)   Current Living Arrangements house   Home Accessibility stairs to enter home;stairs within home   Number of Stairs, Main Entrance 2   Stair Railings, Main Entrance none   Number of Stairs, Within Home, Primary greater than 10 stairs;other (see comments)  (pt stays on main floor)   Transportation Anticipated family or friend will provide;public transportation   Living Environment Comments Pt reports he lives with parents, his bed/bathroom on main floor, uses walk-in shower.   Self-Care   Usual Activity Tolerance moderate   Current Activity Tolerance poor   Regular Exercise No   Equipment Currently Used at Home cane, straight   Fall history within last six months no   Activity/Exercise/Self-Care Comment Pt reports recently struggling with LB dressing and toileting (wiping) due to pain in L hip/lower back otherwise was indep with basic self-cares. He uses a cane when outside house. His parents do all HH tasks and cooking as he has been spending most time sleeping in bedroom where previously (approx a month ago) he could do on own. 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 He does not drive; takes bus or gets rides. uses walk-in shower and states \"not a problem\". However, pt gave conflicting info at times. It may be due to fact that his ability to care for himself had declined prior to admit but prior to that he was more indep?? Pt has had at least 2 other admits over last 2-3months and continually declined CD treatment.   General Information   Onset of Illness/Injury or Date of Surgery 11/16/23   Referring Physician Kylee Collins PA-C   Patient/Family Therapy Goal Statement (OT) return home   Additional Occupational Profile Info/Pertinent History of Current Problem Copied from chart: " 38-year-old male with history of alcohol abuse, on chronic liver cirrhosis, hypertension, substance abuse, was asked to come to the ED because of positive culture from his acetic fluid, by the GI, he relapsed recently and been drinking heavily before admission, noted to have significantly elevated lactic acid, elevated bilirubin, alkaline phosphatase and AST subsequently  admitted. Has now had blood transfusion, thoracentesis.  See chart for complex course.   Existing Precautions/Restrictions fall;oxygen therapy device and L/min  (BLE edema)   Cognitive Status Examination   Orientation Status orientation to person, place and time   Affect/Mental Status (Cognitive) flat/blunted affect   Follows Commands WFL   Pain Assessment   Patient Currently in Pain Yes, see Vital Sign flowsheet   Range of Motion Comprehensive   General Range of Motion no range of motion deficits identified   Strength Comprehensive (MMT)   Comment, General Manual Muscle Testing (MMT) Assessment R anatoly 5/5, L anatoly 4/5.  R hand  with squeeze appears WNL, L hand weaker but functional   Coordination   Coordination Comments B hand tremoring, L worse but pt able to feed himself, use his phone with effort   Bed Mobility   Comment (Bed Mobility) Not assessed - up in chair   Transfers   Transfers sit-stand transfer   Sit-Stand Transfer   Sit-Stand [place] (Transfers) maximum assist (25% patient effort)   Balance   Balance Comments Mod A static standing   Activities of Daily Living   BADL Assessment/Intervention lower body dressing;toileting;grooming   Lower Body Dressing Assessment/Training   Position (Lower Body Dressing) supported sitting   [place] Level (Lower Body Dressing) maximum assist (25% patient effort);set up   Grooming Assessment/Training   Position (Grooming) supported sitting   [place] Level (Grooming) set up;minimum assist (75% patient effort)   Toileting   [place] Level (Toileting) maximum assist (25% patient effort)    Clinical Impression   Criteria for Skilled Therapeutic Interventions Met (OT) Yes, treatment indicated   OT Diagnosis decline in ADL/IADL   OT Problem List-Impairments impacting ADL problems related to;activity tolerance impaired;balance;coordination;mobility;range of motion (ROM);strength;pain  (BLE edema)   Assessment of Occupational Performance 5 or more Performance Deficits   Identified Performance Deficits functional mob, dressing, toileting, grooming, bathing, HH mgmt, community mob, work skills   Planned Therapy Interventions (OT) ADL retraining;IADL retraining;strengthening;transfer training;progressive activity/exercise;home program guidelines   Clinical Decision Making Complexity (OT) detailed assessment/moderate complexity   Risk & Benefits of therapy have been explained evaluation/treatment results reviewed;care plan/treatment goals reviewed;risks/benefits reviewed;current/potential barriers reviewed;participants voiced agreement with care plan;participants included;patient   OT Total Evaluation Time   OT Eval, Moderate Complexity Minutes (83601) 20   OT Goals   Therapy Frequency (OT) 5 times/week   OT Predicted Duration/Target Date for Goal Attainment 11/24/23   OT Goals Hygiene/Grooming;Lower Body Dressing;Toilet Transfer/Toileting   OT: Hygiene/Grooming modified independent   OT: Lower Body Dressing Modified independent;including set-up/clothing retrieval;using adaptive equipment   OT: Toilet Transfer/Toileting Modified independent;toilet transfer;cleaning and garment management;using adaptive equipment   Interventions   Interventions Quick Adds Self-Care/Home Management;Therapeutic Activity;Therapeutic Procedures/Exercise   Self-Care/Home Management   Self-Care/Home Mgmt/ADL, Compensatory, Meal Prep Minutes (76937) 10   Treatment Detail/Skilled Intervention OT: pt unable to lift feet off floor  consistently - significant edema both calves. Pt attempted to doff socks while seated in chair and unable  to tolerate bending toward feet citing L hip and lower back pain. Also noted tremoring in L hand. He had said he was indep with dressing so when asked how he managed it at home he attempted to demonstrate how he used one foot to hold sock down by toes and then pull foot out but was unable to move either foot to accomplish requiring Max A to remove. he could not pull either sock back on as can't lift either leg nor bend down. Attempted to get clothing over feet unsuccessfully requiring max A. Attempted grooming task and pt is right handed - R hand able to maintain  on grooming tools and could manage comb. However struggled holding cup with L due to tremoring. Pt oriented x 4 and to POTUS, some recent events and followed directives consistently although gave some conflicting reports.   Therapeutic Activities   Therapeutic Activity Minutes (19582) 10   Symptoms noted during/after treatment increased pain   Treatment Detail/Skilled Intervention Pt in chair upon arrival.  Nursing reported it had taken two people for bed to chair but improved for amb to bathroom requiring just one. Attempted sit to stand from chair unsuccessfully with Max A of 1 first trial. OT then manually positioned pt's feet in more supportive position (sig edema in B legs) and cued him to scoot closer to edge of chair. Pt did so in increments using chair arms/arms for support but reported increased L hip pain with effort. Performed sit-stand with Max A to walker. Plan was to attempt amb to bathroom however pt unable to advance feet safely enough to trial with just one person. Therefore returned to chair performing stand to sit with cueing for safe hand placement and Min A of 1.  Pt left in chair with all needs within reach and chair alarm activated. VS taken and initially BP low at 96/50  improving at end of session to 118/60, , 02 97% on 1L.   OT Discharge Planning   OT Plan Transfers with walker (may need two people), if does better amb  to bathroom and do toileting task/transfer (may need to consider raised toilet seat at home).  Pt is ESRD and life expectancy short - should medical POC change revise OT goals to include caregiver training and AE/home mods.   OT Discharge Recommendation (DC Rec) Transitional Care Facility   OT Rationale for DC Rec Currently patient requiring Max A with transfers, Max A with LB dressing, Max A with toileting. If were to return home at this time would need 24/7 care and full A with mobilities and ADLs. Pending progress, prognosis and medical POC may either improve to return home w/family A or decline requiring increased and/or hospice care.  OT to continue per POC with discharge recommendation updates daily.   OT Brief overview of current status Cognition appears to remain intact, requiring Max A for LB dressing due to BLE edema, L hip pain with bending, and BLE weakness. Required Max A with sit-stand from chair. Unable to amb with OT/one person.   Total Session Time   Timed Code Treatment Minutes 20   Total Session Time (sum of timed and untimed services) 40

## 2023-11-19 NOTE — PLAN OF CARE
Goal Outcome Evaluation:      Plan of Care Reviewed With: patient    Overall Patient Progress: improvingOverall Patient Progress: improving     Orientation: A&Ox4  Vitals/Pain: VSS on room air. Pt denies pain.    Tele: sinus tachycardia  Lines/Drains: PIV on both upper extremities on SL; 1 unit PRBC given  LS: clear; diminished on bases  CIWA score: 0  GI/: BS normoactive, x2 BM this shift. Pt having adequate urine output throughout shift, tea-colored in color.   Labs: replaced Potassium; re-check Potassium at 2018. Hgb is re-checked every 6 hours.  Ambulation/Assist: A1 to A2 with Gb and Wk  Skin/Wounds: jaundice in appearance, yellow sclera, edema on BLE, pea-sized open wound on symphysis pubis  Plan: work with PT and OT tomorrow morning

## 2023-11-19 NOTE — PLAN OF CARE
IMC status. Alert and oriented x4. Vital signs stable on 2L O2 via Oxymask at HS, was desating to 69% while asleep; otherwise RA while awake. Assist of 2, GB and walker to BSC. Tolerating diet. Lungs sounds diminished. Intermittent nosebleeds, observed some blood in mouth as well but unsure of source. Bowel sounds +. Passing flatus, Large BM x1. Voiding adequately. Scattered rash and bruising. Denies pain/nausea. CMS intact. Tele Sinus tach. PIV SL. Received 1unit PRBC this shift for Hgb of 6.9. CIWA 0.

## 2023-11-19 NOTE — PROGRESS NOTES
MD Notification  Notified Person Name: GONZALEZ Nevarez    Notification Date/Time: 11/18 at 1915    Notification Interaction: vocera message    RN message to MD: 2202 FYI critical Hgb 6.9, releasing conditional blood orders    Orders Received: conditional blood orders released

## 2023-11-20 NOTE — PROGRESS NOTES
[facility] Progress Note     Interval History:  Feeling OK. A+Ox4. Reports decreased appetite. Denies tenderness but states he has some abdominal discomfort associated with positioning in the bed. Weaned to room air today.     Physical Exam:    /71   Pulse 106   Temp 98.2  F (36.8  C) (Oral)   Resp 29   Ht 1.829 m (6')   Wt 119.9 kg (264 lb 6.4 oz)   SpO2 92%   BMI 35.86 kg/m    Temp (24hrs), Av.3  F (36.8  C), Min:97.9  F (36.6  C), Max:98.7  F (37.1  C)    Patient Vitals for the past 72 hrs:   Weight   23 0554 119.9 kg (264 lb 6.4 oz)   23 0418 124.3 kg (274 lb 0.5 oz)       Intake/Output Summary (Last 24 hours) at 2023 1018  Last data filed at 2023 1012  Gross per 24 hour   Intake 1735 ml   Output 1000 ml   Net 735 ml       Constitutional: No acute distress  Cardiovascular: RRR, normal S1/S2, ++lower leg edema   Respiratory: Effort normal, CTA bilaterally  Abdomen: Soft, nondistended, nontender, BS+   Neuro: No asterixis  Skin: Notable jaundice    Laboratory Data  Recent Labs   Lab Test 23  0535 23  2357 23  1731 23  1409 23  0549 23  1321 23  0543   WBC 4.0  --   --   --  3.9*  --  5.6   HGB 7.4* 7.2* 8.4*   < > 6.7*   < > 7.1*   *  --   --   --  101*  --  103*   PLT 62*  --   --   --  74*  --  83*   INR 2.68*  --   --   --  2.65*  --  3.01*    < > = values in this interval not displayed.     Recent Labs   Lab Test 23  0535 23  0220 23  1954 23  1409 23  0549 23  1622 23  0543     --   --   --  139  --  137   POTASSIUM 3.4 3.3* 3.4   < > 2.9*   < > 3.1*   CHLORIDE 104  --   --   --  107  --  105   CO2 24  --   --   --  24  --  24   BUN 5.4*  --   --   --  6.7  --  6.1   CR 0.50*  --   --   --  0.49*  --  0.47*   ANIONGAP 7  --   --   --  8  --  8   MIGUELINA 9.1  --   --   --  8.9  --  8.9    < > = values in this interval not displayed.     Recent Labs   Lab Test 23  0535  11/19/23  0549 11/18/23  0543 11/17/23  0536 11/16/23  1514 09/26/23  0724 09/25/23  1646 09/25/23  1537 09/25/23  1424 09/09/23  1119 09/08/23  1224 09/08/23  1147   ALBUMIN 2.9* 2.6* 2.7*   < > 3.1*   < >  --   --  2.4*   < >  --  2.3*   BILITOTAL 7.1* 7.1* 8.1*   < > 8.4*   < >  --   --  21.7*   < >  --  10.4*   DBIL 3.19* 3.34* 3.89*  --   --   --   --   --   --   --   --  7.66*   ALT 36 36 43   < > 49   < >  --   --  69   < >  --  82*   * 146* 192*   < > 222*   < >  --    < >  --    < >  --  331*   ALKPHOS 269* 263* 312*   < > 330*   < >  --   --  182*   < >  --  200*   PROTEIN  --   --   --   --  100*  --  30*  --   --   --  50*  --    LIPASE  --   --   --   --   --   --   --   --  237*  --   --  272*    < > = values in this interval not displayed.       Imaging  ULTRASOUND ABDOMEN COMPLETE  11/9/2023 11:54 AM  CLINICAL HISTORY: Alcoholic hepatitis with ascites (H28).  TECHNIQUE: Complete abdominal ultrasound.  COMPARISON: None.  FINDINGS:  GALLBLADDER: No cholelithiasis. Some gallbladder wall thickening is  noted; however, this is nonspecific in the presence of ascites prior  to the exam.  BILE DUCTS: Borderline generous extrahepatic bile duct. The common  duct measures 7 mm.  LIVER: Increased echogenicity from diffuse fatty infiltration. No  focal mass.  RIGHT KIDNEY: Normal size. Normal echogenicity with no hydronephrosis  or mass.   LEFT KIDNEY: Normal size. Normal echogenicity with no hydronephrosis  or mass.   SPLEEN: Mild splenomegaly at 14.4 cm.  PANCREAS: The visualized portions are normal.  AORTA: Normal in caliber.   IVC: Normal where visualized.  No ascites.                                                                      IMPRESSION:  1.  Hepatic steatosis.  2.  Mild splenomegaly.  3.  Some gallbladder wall thickening is noted; however, this is  nonspecific in the presence of ascites.    Assessment & Plan:  38-year-old gentleman who has a history of alcoholic liver disease. He presented  11/16/23 with hypertension and abnormal culture results following paracentesis, found to have severe alcoholic hepatitis.     Paracentesis done on November 9 showed 1.7 ANC, albumin 0.5, and total protein 1.1 on his fluid. Culture showed Cutibacterium (Propionibacterium) acnes     Paracentesis done November 17, 2023 showed 70 ANC.  Cultures pending -No anaerobic organisms isolated after 2 days .  Blood cultures negative. He was placed on ceftriaxone.     11/18/23 US guided bilateral thoracentesis with removal of 1.7 L of yellow-orange fluid from the right and 1 L from the left lung     Assessment/Plan   Alcoholic hepatitis.Meld-Na 26. AST improved from 192-->141. ALT remains normal at 36.  Total bilirubin 8-7.1 yesterday.  Stable today. Due to the possibility of peritonitis or infection will not place patient on steroids.   Possible Monomicrobial non-neutrocytic bacterascites vs contaminant.  Presently on ceftriaxone.  Continue antibiotics for at total of a 5 day course for this purpose. Nov 20th is day 5.  Recommend daily prophylaxis with one double strength Bactrim daily (to start tomorrow)  Ascites. (Also lower ext edema) On lasix 40 mg bid. Spironolactone 100 mg daily added 11/19. Cr is normal. Low Na diet. Monitor K. Had a thoracentesis over the wknd.  Was having Low K and phos. Normal at 5AM. Monitor  Low Magnesium. Being replaced  Acute hypoxic respiratory failure, Acute on chronic diastolic congestive heart failure fluid overload, Bilateral pleural effusion US guided bilateral thoracentesis with removal of 1.7 L of yellow-orange fluid from the right and 1 L from the left 11/18/23. Weaned to RA.   Encephalopathy.  On lactulose and rifaximin. No clinical encephalopathy at this time. 4 BMs Nov 19th - will decrease lactulose to BID.   Anemia.  EGD September 27, 2023 showed no varices but mild portal hypertensive gastropathy.  He has been having nosebleeds.  Continue PPI and monitor. Currently brown stool.  Hx of  duodenitis/ulcer with contained perforation versus duodenal diverticulum seen on CT in 9/2023. Continue PPI  Nutrition. Low sodium diet. Recommend supplements given decreased appetite   Fixed and elevated INR    Will discuss with Dr. Victorino Hampton, [facility]  Cell: [phone] until 12PM  Office: [phone]

## 2023-11-20 NOTE — PLAN OF CARE
Shift Summary ([id number]):    A&Ox4. VSS on room air. Denies pain/SOB. Hgb stable at this time. Up w/ Ax2 GBW. Ambulating in halls. Tolerating 2 g Na diet. Placed on 2 L FR. +3 BLE edema. On lactulose - had 2 BM this shift. Jaundiced w/ yellow sclera. Tele shows SR/ST. CIWA negative. Dry, cracked lips and intermittent nose bleeds. LS diminished w/ LLL fine crackles. PT/OT/GI following. POC ongoing.

## 2023-11-20 NOTE — UTILIZATION REVIEW
"  Admission Status; Secondary Review Determination         Under the authority of the Utilization Management Committee, the utilization review process indicated a secondary review on the above patient.  The review outcome is based on review of the medical records, discussions with staff, and applying clinical experience noted on the date of the review.        (X)      Inpatient Status Appropriate - This patient's medical care is consistent with medical management for inpatient care and reasonable inpatient medical practice.      () Observation Status Appropriate - This patient does not meet hospital inpatient criteria and is placed in observation status. If this patient's primary payer is Medicare and was admitted as an inpatient, Condition Code 44 should be used and patient status changed to \"observation\".   () Admission Status NOT Appropriate - This patient's medical care is not consistent with medical management for Inpatient or Observation Status.          RATIONALE FOR DETERMINATION     Review done secondary to in-house insurance denial.    38 year old male with a past medical history including alcohol and substance abuse as well as hypertension who was admitted on 11/16/2023. He presented a few days after being contacted by his outpatient GI team for abnormal culture results following paracentesis.  He was noted to have a significantly elevated lactic acidosis, electrolyte abnormalities, and lab abnormalities consistent with chronic alcohol consumption and an acute alcohol withdrawal having recently relapsed. [place] [facility] [place].  Cultures were obtained and patient was initiated on IV antibiotics.  Patient had a lactic acid level of 4.9 and was given IV fluids.  There was slight improvement to 4.3.  CIWA protocol was ordered, and patient received benzodiazepines.  Patient had IV and electrolyte replacement.  His initial hemoglobin was 8.2 and he was initiated on IV " Protonix.  His hemoglobin dropped to 7 and he was given packed red blood cells.  He had a very elevated INR and was given IV vitamin K.  On the second day of hospitalization, patient had a rapid response called secondary to hypoxia in which she had gone from room air to oxygen by oxy mask at 15 L and only able to obtain O2 saturations of 88%.  He was then placed on BiPAP.  He was found to have bilateral pleural effusions and underwent thoracentesis.  He has been initiated on albumin and Lasix.  He remains in the hospital on O2 by 3 L oxy mask with desaturations to 84% while asleep.  He continues to be critically ill and requires continued hospitalization.  He is appropriate for inpatient status.    The severity of illness, intensity of service provided, expected LOS and risk for adverse outcome make the care complex, high risk and appropriate for hospital admission.        The information on this document is developed by the utilization review team in order for the business office to ensure compliance.  This only denotes the appropriateness of proper admission status and does not reflect the quality of care rendered.         The definitions of Inpatient Status and Observation Status used in making the determination above are those provided in the CMS Coverage Manual, Chapter 1 and Chapter 6, section 70.4.      Sincerely,     Jose Beckett MD  Physician Advisor  Utilization Review/ Case Management  [facility].

## 2023-11-20 NOTE — PROGRESS NOTES
Care Management Follow Up    Length of Stay (days): 4    Expected Discharge Date: 11/22/2023     Concerns to be Addressed: adjustment to diagnosis/illness, care coordination/care conferences, cognitive/perceptual, compliance issue, coping/stress, discharge planning     Patient plan of care discussed at interdisciplinary rounds: Yes    Anticipated Discharge Disposition: Inpatient Chemical Dependency, Inpatient Mental Health     Anticipated Discharge Services: Mental Health Resources, Chemical Dependency Resources  Anticipated Discharge DME:      Patient/family educated on Medicare website which has current facility and service quality ratings:    Education Provided on the Discharge Plan:    Patient/Family in Agreement with the Plan: yes    Referrals Placed by CM/SW:    Private pay costs discussed: Not applicable    Additional Information:  Writer was told by RNCC that patient is open to TCU referrals. Writer sent out many referrals to [employer] accepting facilities for the patient. Patient will be updated when facilities get back to us regarding referrals. Patient is open to TCU per RNCC.    Cornel Luke [facility]  Care Management

## 2023-11-20 NOTE — CONSULTS
Care Management Follow Up    Length of Stay (days): 4    Expected Discharge Date: 11/22/2023 pending     Concerns to be Addressed: adjustment to diagnosis/illness, care coordination/care conferences, cognitive/perceptual, compliance issue, coping/stress, discharge planning     Patient plan of care discussed at interdisciplinary rounds: Yes    Anticipated Discharge Disposition: Inpatient Chemical Dependency, Inpatient Mental Health     Anticipated Discharge Services: Mental Health Resources, Chemical Dependency Resources  Anticipated Discharge DME:  NA    Patient/family educated on Medicare website which has current facility and service quality ratings:  NA  Education Provided on the Discharge Plan: yes   Patient/Family in Agreement with the Plan: yes    Referrals Placed by CM/SW:    Private pay costs discussed: Not applicable    Additional Information:  Please see Care Management consult completed 11/17 for more information.  OT is recommending TCU.  PT consult is pending.  Patient has [employer] insurance.  Patient is open to transitioning to a TCU, but we may have difficulty do to limited options with his insurance.  Will await PT recommendations, as we will need two therapies recommending.  The SW is aware and will be sending referrals after PT recs are known.  Patient's parents had asked for both CD and Psychiatry.  Patient is not interested in having a CD assessment.  A V2contact message has been sent to Dr Hector concerning having Psychiatry see.  Care Management will continue to follow for safe discharge planning.    Haley Durán RN  Inpatient Care Management  [phone]

## 2023-11-20 NOTE — PROGRESS NOTES
[facility]  Hospitalist Progress Note   11/20/2023          Assessment and Plan:       Crispin Ham is a 38 year old male with alcohol abuse, chronic liver cirrhosis, substance abuse, hypertension admitted on 11/16/2023 with positive ascitic fluid cultures.    Propionibacterium acnes ascites fluid culture: Likely a contaminant  --Outpatient Paracentesis 11/9 showed 1.7 ANC, albumin 0.5, and total protein 1.1 on his fluid. Culture showed Cutibacterium (Propionibacterium) acnes.   --Paracentesis 11/17, 2023 showed 70 ANC.  Cultures -No anaerobic organisms isolated after 2 days .  Blood cultures negative.   -- Continue IV ceftriaxone day 5/5.  [facility] following.  Appreciate comanagement.-    Severe alcoholic hepatitis  Alcoholic liver cirrhosis decompensated  Possible acute hepatic encephalopathy - improving   Severe hypoalbuminemia from underlying liver disease.  Established with [facility].  Recent relapse on alcohol this week, and was drinking heavily.  *Admission labs with , total bili 8.4, hemoglobin 8.1 with , platelets 138, INR 2.88. On admit MELD 3.0: 27 at 11/16/2023  3:14 PM  [facility] discriminant function: Of almost 100  Ultrasound abdomen 11/9 with hepatic steatosis, mild splenomegaly, some gallbladder wall thickening noted.  Ascites present.  --[facility] following, appreciate comanagement.  Antibiotics as above.  --Was on IV Lasix, switch to oral Lasix 40 mg twice daily, spironolactone 100 mg oral daily.  -2000 mL fluid restriction.  Intake output monitoring.  Daily weights.  --- Holding of steroids for now while on antibiotics and GI bleed.     --Switch IV Protonix to oral Protonix twice daily.  --Decrease lactulose to twice daily.  -Completed 5 doses of IV albumin.  Continue rifaximin.  -Monitor mental status closely.     Acute hypoxic respiratory failure likely from volume overload, atelectasis  Bilateral pleural effusion Status post  bilateral thoracentesis on 11/18/2023  Acute on chronic heart failure with preserved EF.  --On 11/17 had RRT, was on BiPAP.  Although BNP come back normal, chest x-ray does showed bilateral pleural effusion.  Echocardiogram EF of 65 to 70% hyperdynamic left ventricle no wall motion abnormality  --status post bilateral thoracentesis 1.7 L from the right and about 1 L from the left.    --- Discontinued IV fluids, received diuresis with intravenous Lasix.  Switch to 40 mg oral Lasix twice daily [11/20].  Continue spironolactone 100 mg oral daily [11/19].  Strict input output monitoring, daily weights.  Aggressive incentive spirometry.  Encourage ambulation.  Wean off O2     Lactic acidosis from underlying liver disease.  Lactic acid of 4.9, with IV fluids improved to 2.5.  Given liver disease, alcohol abuse hold off rechecking lactate unless decompensated.    Status post alcohol withdrawal.  Alcohol dependence.  Recent relapse this week drinking 1 L of vodka over the past 3 days PTA.  -- Treated with supportive care.    - CIWA protocol, as needed Ativan  - Multivitamin, thiamine, folate  - As needed Zyprexa for hallucinations  - Patient not interested in psychiatry, CD treatment at this time  - Cessation strongly recommended     Acute on chronic anemia, thrombocytopenia.  Status post blood transfusion  Coagulopathy of liver disease  Frequent nosebleeds:   At the time of admission Hemoglobin 8.1 with , platelets 138, INR 2.88.    Was in the ER 10/30/2023 for anemia and received 2 units packed red blood cells at that time.   --Recent EGD negative for varices.  -Received vitamin K for reversal of INR on 11/17.  Did receive 2 units FFP on 11/18/2023.  Hemoglobin dropped to 6.7 on 11/19 and received 1 unit of blood transfusion.  Continue Protonix, switch IV to oral twice daily.  [facility] following as above.  Monitor CBC levels closely, will transfuse for hemoglobin less than 7 or symptomatic.    Hx  of duodenitis/ulcer with contained perforation versus duodenal diverticulum seen on CT:   Noted on CT during hospitalization in 9/2023.  Continue Protonix twice daily.    Hypokalemia, hypomagnesemia likely from alcohol use, poor intake.  On electrolyte replacement protocol.  We will replace and monitor.    Physical deconditioning from medical illness  Chronic hip and back pain: Reports chronic left greater than right hip pain exacerbated by sitting in the ED bed.  Not on pain medications.  Uses a cane intermittently for ambulatory assistance for longer distances.  - Ice, heat  PT, OT evaluation.  Care management assistance with transition requested.     Orders Placed This Encounter      2 Gram Sodium Diet      DVT Prophylaxis: SCD, hold off pharmacological DVT prophylaxis in the setting of coagulopathy from liver disease  Code Status: Full Code  Disposition: Expected discharge in 2 days     Discussed with patient, bedside RN, gastroenterology team  >52 minutes spent by me on the date of service doing chart review, history, exam, documentation & further activities per the note.      Roque Hector MD        Interval History:      Patient lying in bed.  This morning weaned off O2, overnight per report desatting to 84%, was on 3 L oxy mask  Minimal ambulation out of bed, requiring assistance of 2.  Reports 2 bowel movements.  Denies any chest pain or palpitations.  Denies any nausea or vomiting.  Reports abdominal discomfort associated with positioning in bed.  Reports nosebleed resolved.  Telemetry sinus tachycardia  CIWA 0          Physical Exam:        Physical Exam   Temp:  [97.9  F (36.6  C)-98.6  F (37  C)] 98.2  F (36.8  C)  Pulse:  [100-112] 109  Resp:  [10-30] 29  BP: ()/(56-79) 117/64  SpO2:  [90 %-97 %] 93 %    Intake/Output Summary (Last 24 hours) at 11/20/2023 1421  Last data filed at 11/20/2023 1200  Gross per 24 hour   Intake 1435 ml   Output 600 ml   Net 835 ml       Admission Weight: 127 kg  (280 lb)  Current Weight: 119.9 kg (264 lb 6.4 oz)    PHYSICAL EXAM  GENERAL: Patient frail-appearing. Alert and oriented.  HEENT: Icterus present.  HEART: Regular rate and rhythm. S1S2. No murmurs  LUNGS: Bilateral decreased breath sounds, no wheezing or crackles.  Respirations unlabored  ABDOMEN: Distended, no guarding or rigidity, bowel sounds heard.    NEURO: Moving all extremities.  EXTREMITIES: 2+ pedal edema.   SKIN: Warm, dry.  Bruising present  PSYCHIATRY Cooperative       Medications:         albumin human  12.5 g Intravenous Q8H    cefTRIAXone  2 g Intravenous Q24H    folic acid  1 mg Oral Daily    furosemide  40 mg Oral BID    ipratropium - albuterol 0.5 mg/2.5 mg/3 mL  3 mL Nebulization 3 times daily    lactulose  20 g Oral BID    multivitamin w/minerals  1 tablet Oral Daily    pantoprazole  40 mg Oral BID AC    rifaximin  550 mg Oral BID    sodium chloride (PF)  3 mL Intracatheter Q8H    spironolactone  100 mg Oral Daily    thiamine  100 mg Oral Daily     bisacodyl, calcium carbonate, artificial tears, flumazenil, OLANZapine zydis **OR** haloperidol lactate, lidocaine 4%, lidocaine (buffered or not buffered), LORazepam **OR** LORazepam, melatonin, nitroGLYcerin, - MEDICATION INSTRUCTIONS -, ondansetron **OR** ondansetron, - MEDICATION INSTRUCTIONS -, polyethylene glycol, prochlorperazine **OR** prochlorperazine **OR** prochlorperazine, senna-docusate **OR** senna-docusate, sodium chloride (PF)         Data:      All new lab and imaging data was reviewed.

## 2023-11-20 NOTE — PLAN OF CARE
IMC status. Alert and oriented x4. Vital signs stable on 3L O2 via Oxymask at HS, was desating to 84% while asleep; otherwise on RA while awake. Assist of 2, GB and walker. Tolerating diet. Lungs sounds diminished, fine crackles at the bases. Bowel sounds +. Passing flatus. Large BM x2. Voiding adequately. Scattered rash and bruising. Small wound on symphysis pubis. Denies pain/nausea. CMS intact. Tele Sinus tach. PIV SL. CIWA 0, has baseline hand tremors. K 3.4 and 3.3 both were replaced. Recheck at 1036a.

## 2023-11-21 NOTE — PROGRESS NOTES
[id number]  Pt AO x4, VSS on RA, 3L oxymask NOC. Flat affect, pressured speech. C/O lower back pain (chronic) heat therapy applied. LS dim w/ crackles in bases. CIWAs discontinued, 0-1 thru shift. Dry cracked lips. +2 +3 BLE. Skin jaundiced, scleras yellow. Small pea-sized abrasion in pubic symphysis area. Up w/ a x 2 + GBW. Voiding in bedside urinal. +BM during day shift, on lactulose. Encourage pt to ambulate. 2gNa diet, 2L FR. PT/OT/GI following. Continue to monitor.

## 2023-11-21 NOTE — PLAN OF CARE
Goal Outcome Evaluation:      Plan of Care Reviewed With: patient    Overall Patient Progress: improvingOverall Patient Progress: improving    Orientations: A/Ox4  Vitals/Pain: Tachycardic, all other VSS on RA  Tele: ST  LS: crackles in bases  Lines/Drains: R and L PIV SL.   Skin/Wounds: Abrasion in perineum area. Jaundice. Intermittent bloody nose.   GI/: Rounded belly, BM+. Adequate uo. 2000 fluid restriction.   Labs: Hgb (8.5, 7.6) phos (3.1) K+ (3.5) mag (1.3)  Ambulation/Assist: SBA GB W, walk x1 in the evening   Sleep Quality: Fair, went to bed late   Plan: When medically ready possible dx to TCU

## 2023-11-21 NOTE — PROGRESS NOTES
Pt AO x 4, VSS on RA. Flat affect, pressured speech. Denies pain. LS dim w/ crackles in bases. +2, +3 BLE. Encourage ambulation. Skin jaundiced, sceleras yellow. Small pea-sized abrasion in pubic symphysis area. Mag replaced, results 1.8 WDL, recheck tomorrow AM. Hgb Q6H checks, 8.1 @ 1400. Up SBA w/ GBW. Voiding in bedside urinal, +BM, AUOP. 2gNa, 2L FR. Discharge pending TCU/Home w/ home care insurance acceptance.

## 2023-11-21 NOTE — PROGRESS NOTES
11/21/23 1014   Appointment Info   Signing Clinician's Name / Credentials (PT) Shadia Bashir, PT, DPT   Living Environment   People in Home parent(s)   Current Living Arrangements house   Home Accessibility stairs to enter home;stairs within home   Number of Stairs, Main Entrance 2   Stair Railings, Main Entrance none   Number of Stairs, Within Home, Primary greater than 10 stairs;other (see comments)   Stair Railings, Within Home, Primary railing on right side (ascending)   Transportation Anticipated family or friend will provide;public transportation   Living Environment Comments Pt lives with parents, tub shower, stairs to bedroom.   Self-Care   Usual Activity Tolerance moderate   Regular Exercise No   Equipment Currently Used at Home cane, straight   Fall history within last six months no   Activity/Exercise/Self-Care Comment Ind with ADLs but increased time due  to hip pain. Pt works at the [employer]. reports uses cane intermittently for ambulation when hip is bothering him.   General Information   Onset of Illness/Injury or Date of Surgery 11/16/23   Referring Physician Roque Hector MD   Patient/Family Therapy Goals Statement (PT) Would like to go home.   Pertinent History of Current Problem (include personal factors and/or comorbidities that impact the POC) Pt is a 38 year old male with below past medical history, admitted on 11/16/2023. He presented a few days after being contacted by his outpatient GI team for abnormal culture results following paracentesis.  Noted to have a significantly elevated lactic acidosis, lab abnormalities consistent with chronic alcohol consumption and an acute alcohol withdrawal having recently relapsed.  Admitted to [facility] for further evaluation and treatment.   Existing Precautions/Restrictions fall;cardiac   Cognition   Affect/Mental Status (Cognition) WFL   Orientation Status (Cognition) oriented x 4   Follows Commands (Cognition) WFL   Pain Assessment   Patient Currently in  Pain No   Integumentary/Edema   Integumentary/Edema Comments Bilateral LE2+ edema present L>R. Pt reports intermittenlty wears compression stockings.   Posture    Posture Forward head position;Protracted shoulders   Range of Motion (ROM)   ROM Comment Grossly WFL BUE and LE with functional transfers.   Strength (Manual Muscle Testing)   Strength (Manual Muscle Testing) Deficits observed during functional mobility   Strength Comments grossly antigravity strength BUE and LE with functional transfers. Global weakness.   Bed Mobility   Comment, (Bed Mobility) supine HOB flat>sitting EOB, CGA, increased effort.   Transfers   Comment, (Transfers) Sit>stand to FWW, Sathya   Gait/Stairs (Locomotion)   Distance in Feet (Gait) 5' eval + 300' treatment   Comment, (Gait/Stairs) Amb with FWW, CGA, wide TIKI, unsteady on feet, lateral path deviations.   Balance   Balance Comments able to maintain seated balance BUE support. Able to maintain standing balance in FWW, CGA   Sensory Examination   Sensory Perception patient reports no sensory changes   Clinical Impression   Criteria for Skilled Therapeutic Intervention Yes, treatment indicated   PT Diagnosis (PT) Impaired gait   Influenced by the following impairments decreased functional strength, impaired balance, decreased activity tolerance   Functional limitations due to impairments fall risk, impaired functional independence, impaired ADLs and IADLs   Clinical Presentation (PT Evaluation Complexity) stable   Clinical Presentation Rationale per MR and clinical judgement   Clinical Decision Making (Complexity) low complexity   Planned Therapy Interventions (PT) balance training;bed mobility training;gait training;home exercise program;patient/family education;ROM (range of motion);stair training;strengthening;stretching;transfer training;progressive activity/exercise;neuromuscular re-education   Risk & Benefits of therapy have been explained evaluation/treatment results reviewed;care  plan/treatment goals reviewed;risks/benefits reviewed;current/potential barriers reviewed;participants voiced agreement with care plan;participants included;patient   PT Total Evaluation Time   PT Eval, Low Complexity Minutes (21990) 9   Physical Therapy Goals   PT Frequency 3x/week   PT Predicted Duration/Target Date for Goal Attainment 11/28/23   PT Goals Bed Mobility;Transfers;Gait;Stairs;PT Goal 1;Aerobic Activity   PT: Bed Mobility Independent;Supine to/from sit;Rolling   PT: Transfers Modified independent;Sit to/from stand;Bed to/from chair;Assistive device  (least restrictive device)   PT: Gait Modified independent;Assistive device;Greater than 200 feet  (least restrictive device)   PT: Stairs Modified independent;10 stairs;Rail on right   PT: Perform aerobic activity with stable cardiovascular response continuous activity;15 minutes;ambulation   PT: Goal 1 Pt will score >22/30 on FGA in order to demonstrate a decreased risk for falls.   Interventions   Interventions Quick Adds Gait Training;Therapeutic Activity;Therapeutic Procedure   Therapeutic Procedure/Exercise   Ther. Procedure: strength, endurance, ROM, flexibillity Minutes (26924) 10   Symptoms Noted During/After Treatment fatigue   Treatment Detail/Skilled Intervention pt completed the following exercises for functional strenght and ROM benefits. pt completed set of 10 reps of the following. LAQ bilaterally. seated alternating marches, 20 reps. hip adduction pillow squeezes. Demo and cues for correct positioning and technique of exercises. Cues for slow and controled movement. pt tolerated well.   Therapeutic Activity   Therapeutic Activities: dynamic activities to improve functional performance Minutes (19834) 10   Symptoms Noted During/After Treatment Fatigue   Treatment Detail/Skilled Intervention pt agreed to therapy session. Increased time for room set up, line management. Cues for STs technique wtih L leg anterior due to hip pain. Cues for hand  placement and walker safety. Pt completed additional STS to FWW throughout session from lower height toilet, Sathya, cues for anterior weight shift of trunk. Increased time for pericares and doffing/donning pull up, Sathya. Discussed discharge planning with pt and rationale for TCU recommendation. Pt verbalized understanding. Pt left in upright chair, chair alarm on, all needs in reach. RN updated.   Gait Training   Gait Training Minutes (55849) 10   Symptoms Noted During/After Treatment (Gait Training) fatigue   Treatment Detail/Skilled Intervention pt amb in room and hallway, two bouts of ambulation. First bout with FWW, wide TIKI, CGA. pt completed the following dual tasks. vertical and horizontal head turns. Significant lateral path deviation, decrease in lui. High knee marches alternating for several reps, CGA due to balance. Pt amb short bout with SPC, increased lateral trunk sway, reaching for hallway rail, CGA. Recommend pt continue to use FWW.  Cues for upright gaze and posture, cues for walker proximity. Pt tolerated well.   Distance in Feet 300'   [place] (Gait Training) contact guard   Physical Assistance Level (Gait Training) set-up required;supervision;verbal cues;nonverbal cues (demo/gestures);1 person assist   Assistive Device (Gait Training) rolling walker   PT Discharge Planning   PT Plan balance assessment FGA vs DGI. review HEP   PT Discharge Recommendation (DC Rec) Transitional Care Facility   PT Rationale for DC Rec Pt tolerated session well. Pt below baseline functional independence. Pt limited by decreased functional strength, impaired balance, decreased activity tolerance, high fall risk. 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 Pt has had at least 2 recent hospitalizations in the past 3 months. Pt currently requiring Ax1 for mobility, transfers, ambulation with use of FWW. Pt would benefit from continued skilled therapy at TCU  prior to returning home. Will continue to update as appropriate.   PT Brief overview of current status beed mob, CGA. STS to FWW, CGA. Amb with FWW, CGA. Recommend pt amb with nursing staff 4x per day.   PT Equipment Needed at Discharge walker, rolling   Total Session Time   Timed Code Treatment Minutes 30   Total Session Time (sum of timed and untimed services) 39

## 2023-11-21 NOTE — PROGRESS NOTES
"Care Management Follow Up    Length of Stay (days): 5    Expected Discharge Date: 11/23/2023     Concerns to be Addressed: discharge planning     Patient plan of care discussed at interdisciplinary rounds: Yes    Anticipated Discharge Disposition: Home Care, Outpatient Rehab (PT, OT, SLP, Cardiac or Pulmonary)     Anticipated Discharge Services: Chemical Dependency Resources, Mental Health Resources  Anticipated Discharge DME:      Patient/family educated on Medicare website which has current facility and service quality ratings: yes  Education Provided on the Discharge Plan: Yes  Patient/Family in Agreement with the Plan: yes    Referrals Placed by CM/SW: Post Acute Facilities  Private pay costs discussed: Not applicable    Additional Information:  [facility] TCU has accepted pt. Next step is to pursue prior-auth from [employer]. Per [facility] liaison, this may take until next week due to the Holiday. SW met with pt and his father. Pt states his preference is to discharge home. Explained PT recommendation for TCU. Noted this is his third hospitalization since September. Father asked questions about TCU and wanted to discuss it. Father also asked about home care. Pt did seem receptive to home care and stated he has done out-pt PT/OT in the past. Pt lives with this parents, but father states they are not \"always around.\" SW explained the [facility] also has CD support groups, which Pranay may find beneficial. Also explained he could meet with a CD counselor here in the hospital or as an out-pt, if interested. Pt was noncommittal to either of the options. SW following.      JACK Munoz, LICSW  [phone] Desk phone  [phone] Cell/text (Preferred)  [facility]        "

## 2023-11-21 NOTE — PROGRESS NOTES
[facility]  Hospitalist Progress Note   11/21/2023          Assessment and Plan:       Crispin Ham is a 38 year old male with alcohol abuse, chronic liver cirrhosis, substance abuse, hypertension admitted on 11/16/2023 with positive ascitic fluid cultures.    Propionibacterium acnes ascites fluid culture: Likely a contaminant  --Outpatient Paracentesis 11/9 showed 1.7 ANC, albumin 0.5, and total protein 1.1 on his fluid. Culture showed Cutibacterium (Propionibacterium) acnes.   --Paracentesis 11/17, 2023 showed 70 ANC.  Cultures -No anaerobic organisms isolated after 2 days .  Blood cultures negative.   -- Completed 5 days of IV ceftriaxone on 11/20.  [facility] following.  Appreciate comanagement.    Severe alcoholic hepatitis  Alcoholic liver cirrhosis decompensated  Possible acute hepatic encephalopathy - improving   Severe hypoalbuminemia from underlying liver disease.  Established with [facility].  Recent relapse on alcohol this week, and was drinking heavily.  *Admission labs with , total bili 8.4, hemoglobin 8.1 with , platelets 138, INR 2.88. On admit MELD 3.0: 27 at 11/16/2023  3:14 PM  [facility] discriminant function: Of almost 100  Ultrasound abdomen 11/9 with hepatic steatosis, mild splenomegaly, some gallbladder wall thickening noted.  Ascites present.  --[facility] following, appreciate comanagement.  Antibiotics as above.  --Was on IV Lasix, switch to 40 mg oral Lasix twice daily on 11/20.  This morning again crackles, appears volume overloaded.  Will start on 40 mg IV Lasix twice daily.  Continue spironolactone 100 mg oral daily [11/19].   -2000 mL fluid restriction.  Intake output monitoring.  Daily weights.  --- Defer Steroids to [facility] team.     --Continue oral Protonix twice daily.  --Continue lactulose to twice daily.  -Completed 5 doses of IV albumin.  Continue rifaximin.  -Monitor mental status closely.     Acute  hypoxic respiratory failure likely from volume overload, atelectasis  Bilateral pleural effusion Status post bilateral thoracentesis on 11/18/2023  Acute on chronic heart failure with preserved EF.  --On 11/17 had RRT, was on BiPAP.  Although BNP come back normal, chest x-ray does showed bilateral pleural effusion.  Echocardiogram EF of 65 to 70% hyperdynamic left ventricle no wall motion abnormality  --status post bilateral thoracentesis 1.7 L from the right and about 1 L from the left.    --- Discontinued IV fluids, received diuresis with intravenous Lasix.  Diuresis with intravenous Lasix 40 mg twice daily.  Monitor volume status in a.m. and accordingly further diuresis.  Continue spironolactone 100 mg oral daily [11/19].  Strict input output monitoring, daily weights.  Aggressive incentive spirometry.  Encourage ambulation.  Wean off O2     Lactic acidosis from underlying liver disease.  Lactic acid of 4.9, with IV fluids improved to 2.5.  Given liver disease, alcohol abuse hold off rechecking lactate unless decompensated.    Status post alcohol withdrawal.  Alcohol dependence.  Recent relapse this week drinking 1 L of vodka over the past 3 days PTA.  -- Treated with supportive care.    - CIWA protocol, as needed Ativan  - Multivitamin, thiamine, folate  - As needed Zyprexa for hallucinations  - Patient not interested in psychiatry, CD treatment at this time, again revisited discussion on 11/21   - Cessation strongly recommended     Acute on chronic anemia, thrombocytopenia.  Status post blood transfusion  Coagulopathy of liver disease  Frequent nosebleeds:   At the time of admission Hemoglobin 8.1 with , platelets 138, INR 2.88.    Was in the ER 10/30/2023 for anemia and received 2 units packed red blood cells at that time.   --Recent EGD negative for varices.  -Received vitamin K for reversal of INR on 11/17.  Did receive 2 units FFP on 11/18/2023.  Hemoglobin dropped to 6.7 on 11/19 and received 1 unit  of blood transfusion.  Continue Protonix, switch IV to oral twice daily.  [facility] following as above.  Monitor CBC levels closely, will transfuse for hemoglobin less than 7 or symptomatic.    Hx of duodenitis/ulcer with contained perforation versus duodenal diverticulum seen on CT:   Noted on CT during hospitalization in 9/2023.  Continue Protonix twice daily.    Hypokalemia, hypomagnesemia likely from alcohol use, poor intake.  On electrolyte replacement protocol.        Situational mood disorder.  During hospital stay flat affect, denies any thoughts of hurting himself or others.  Offered psychiatry evaluation, patient declined.      Physical deconditioning from medical illness  Chronic hip and back pain: Reports chronic left greater than right hip pain exacerbated by sitting in the ED bed.  Not on pain medications.  Uses a cane intermittently for ambulatory assistance for longer distances.  - Ice, heat  PT, OT evaluation following.  Recommend TCU for rehabilitation.  Care management assistance with transition requested.     Orders Placed This Encounter      2 Gram Sodium Diet      DVT Prophylaxis: SCD, hold off pharmacological DVT prophylaxis in the setting of coagulopathy from liver disease  Code Status: Full Code  Disposition: Expected discharge in 2 days     Discussed with patient, bedside RN  >52 minutes spent by me on the date of service doing chart review, history, exam, documentation & further activities per the note.      Roque Hector MD        Interval History:      Patient lying in bed.  This morning on room air.  Per chart review overnight on 3 L oxy mask.  Complaining of shortness of breath with minimal ambulation out of bed.  Minimal ambulation out of bed, requiring assistance   Reports 2 bowel movements.  Denies any chest pain or palpitations.  Denies any nausea or vomiting.  Reports abdominal discomfort associated with positioning in bed.  Reports nosebleed  resolved.  Telemetry flat affect.         Physical Exam:        Physical Exam   Temp:  [97.9  F (36.6  C)-98.2  F (36.8  C)] 98.1  F (36.7  C)  Pulse:  [] 103  Resp:  [18-29] 18  BP: ()/(47-71) 98/47  SpO2:  [92 %-100 %] 95 %    Intake/Output Summary (Last 24 hours) at 11/20/2023 1421  Last data filed at 11/20/2023 1200  Gross per 24 hour   Intake 1435 ml   Output 600 ml   Net 835 ml       Admission Weight: 127 kg (280 lb)  Current Weight: 119.9 kg (264 lb 6.4 oz)    PHYSICAL EXAM  GENERAL: Patient frail-appearing. Alert and oriented.  HEENT: Icterus present.  HEART: Regular rate and rhythm. S1S2. No murmurs  LUNGS: Bilateral decreased breath sounds, faint crackles present.  Respirations unlabored  ABDOMEN: Distended, no guarding or rigidity, bowel sounds heard.    NEURO: Moving all extremities.  EXTREMITIES: 2+ pedal edema.   SKIN: Warm, dry.  Bruising present  PSYCHIATRY Cooperative       Medications:         cefTRIAXone  2 g Intravenous Q24H    folic acid  1 mg Oral Daily    furosemide  40 mg Oral BID    ipratropium - albuterol 0.5 mg/2.5 mg/3 mL  3 mL Nebulization 3 times daily    lactulose  20 g Oral BID    magnesium sulfate  4 g Intravenous Once    multivitamin w/minerals  1 tablet Oral Daily    pantoprazole  40 mg Oral BID AC    rifaximin  550 mg Oral BID    sodium chloride (PF)  3 mL Intracatheter Q8H    spironolactone  100 mg Oral Daily     bisacodyl, calcium carbonate, artificial tears, flumazenil, OLANZapine zydis **OR** haloperidol lactate, lidocaine 4%, lidocaine (buffered or not buffered), melatonin, nitroGLYcerin, - MEDICATION INSTRUCTIONS -, ondansetron **OR** ondansetron, - MEDICATION INSTRUCTIONS -, polyethylene glycol, prochlorperazine **OR** prochlorperazine **OR** prochlorperazine, senna-docusate **OR** senna-docusate, sodium chloride (PF)         Data:      All new lab and imaging data was reviewed.

## 2023-11-21 NOTE — PLAN OF CARE
Goal Outcome Evaluation:      Plan of Care Reviewed With: patient    Overall Patient Progress: no changeOverall Patient Progress: no change    Nursing Note    Patient Information  Name: Crispin Ham  Age: 38 year old    Admission Information  Date: 11/16/2023   Reason:Liver disease [K76.9]  SBP (spontaneous bacterial peritonitis) (H) [K65.2]     Assessment  Orientation/Neuro: Alert and Oriented x4  Cardiac/Tele: no tele, apical pulse regular, tachy at times  Resp: SNOWDEN  GI/: Voids small frequent amounts   No nausea, vomiting, abdominal pain, or constipation  Mobility: walks with assist   Pain: denies   Diet: Orders Placed This Encounter      2 Gram Sodium Diet    Vital Signs  B/P: 118/73, T: 97.9, P: 102, R: 20, O2: 98% on RA      Plan  Potential discharge to TCU for deconditioning    Melissa Pena RN

## 2023-11-22 NOTE — PLAN OF CARE
Goal Outcome Evaluation:    Orientations: AOx4  Vitals/Pain: VSS, Pt on RA, Pt c/o low back pain- PRN given per eMAR  Tele: SR/ST  Lines/Drains: RUE & LUE PIV x1  Skin/Wounds: Edematous flank and bilateral legs  GI/: WDL  LS: Diminished   Labs: Abnormal/Trends, Electrolyte Replacement- Q6 Heme, potassium, magnesium and phosphorus protocols completed. Potassium and magnesium replaced per eMAR   Ambulation/Assist: SBA  Sleep Quality: Fair

## 2023-11-22 NOTE — PLAN OF CARE
Goal Outcome Evaluation:      Plan of Care Reviewed With: patient    Overall Patient Progress: improvingOverall Patient Progress: improving    Orientations: A/Ox4  Vitals/Pain: VSS on RA, pain managed with repositioning  Tele: ST  LS: diminished   Lines/Drains: R and L PIV SL.   Skin/Wounds: Edematous flank and bilateral legs. Jaundice.  GI/: BM- BS+ adequate uo.   Labs: K+ (3.4) phos (3.5) mag (1.5) Hgb (8.4, 7.2)  Ambulation/Assist: SBA GB W  Sleep Quality: Fair went to bed after 0100  Plan: Waiting for TCU placement

## 2023-11-22 NOTE — PROGRESS NOTES
"SPIRITUAL HEALTH SERVICES - Progress Note  [facility]    Saw pt Crispin Ham per length of stay.    Patient/Family Understanding of Illness and Goals of Care - Pranay did not describe details of his illness or goals of care, but appeared in significant physical discomfort and described \"back pain\" and difficulty \"finding a comfortable position.\"    Distress and Loss - Pranay said \"things are piling up\" and that he feels significant stress.    Strengths, Coping, and Resources - Pranay said \"I've been trying to do things by myself for a while now, but I guess that's not going so great.\"    Meaning, Beliefs, and Spirituality - I asked Pranay whether he has a prayer or mindfulness practice that helps him, and he said not at this time.    Plan of Care - I let Pranay know how to request a follow-up  visit if desired. [facility] remains available to support as needed.    Laurel Kearney M.Ed.   Intern    [facility] routine referrals *[phone]  [facility] available 24/7 for emergent requests/referrals, either by paging the on-call  or by entering an ASAP/STAT consult in Epic (this will also page the on-call ).  "

## 2023-11-22 NOTE — PROGRESS NOTES
GASTROENTEROLOGY PROGRESS NOTE     IRP:    #1 Severe alcoholic hepatitis    Recommend avoiding steroids with recent SBP.  Likely cirrhosis based on recent CT appearance of liver.  Nutrition important for overall prognosis- high calorie high protein diet, tid supplements, consider dietician consult as needed.   [facility] liver clinic follow up (in 2-3 weeks) will be arranged.     #2 Recent SBP    Stop ceftriaxone, completed 5 days.  Start bactrim ds daily prophylaxis.  Received adequate albumin.  Cr near normal.    #3 Anemia, chronic  #4 Coagulopathy    Doubt new gi bleeding. EGD - no varices.  Mild fluctuations common. Likely slow oozing related to portal hypertensive gastropathy in the setting of coagulopathy of liver disease.  Transfuse as needed.   No role for repeat EGD.  Continue daily PO PPI.    #4 Leg edema    Ace wrapping- discussed with nurse.  Ambulation.  Continue iv lasix today. Would switch to 40 mg bid lasix from tomorrow PO, and increase aldactone to 200 mg. Monitor electrolytes/cr as we adjust diuretics. If stable, this can be a reasonable outpatient regimen until liver clinic follow up.    #5 Pleural effusion s/p thoracentesis  Improved resp status and CXR. No breathing on room air.    #6 Encephalopathy  Continue lactulose + rifaximin; appropriate dosing.    #7 Dispo  Ok to plan now. No new/additional recs.     GI will follow peripherally while inpatient.    Santino Gleason MD  [facility]  [phone]      ________________________________________________________________________      SUBJECTIVE:  Reports back spasms/pain that is new. Feeling weak but breathing better, and reports abdomen now feeling not tight/distended. Denies any bleeding/melena.       OBJECTIVE:  /57 (BP Location: Right arm)   Pulse 104   Temp 97.5  F (36.4  C) (Oral)   Resp 16   Ht 1.829 m (6')   Wt 115.8 kg (255 lb 6.4 oz)   SpO2 98%   BMI 34.64 kg/m    Temp (24hrs), Av.8  F (36.6  C), Min:97.5  F  (36.4  C), Max:98  F (36.7  C)    Patient Vitals for the past 72 hrs:   Weight   11/22/23 0500 115.8 kg (255 lb 6.4 oz)   11/21/23 0300 118.8 kg (261 lb 14.4 oz)   11/20/23 0554 119.9 kg (264 lb 6.4 oz)        PHYSICAL EXAM  GEN: No acute distress, lying on the bed.  Resp: breathing comfortably on room air.    Neuro: no asterixis.  ABD: soft, non-tender, mildly distended, no rebound or guarding.  Legs: severe b/l edema.      Additional Data:  I have reviewed the patient's new clinical lab results:     Recent Labs   Lab Test 11/22/23  1210 11/22/23  0538 11/22/23  0023 11/21/23  1417 11/21/23  0539 11/20/23  1254 11/20/23  0535 11/19/23  1409 11/19/23  0549 11/18/23  1321 11/18/23  0543   WBC  --   --   --   --   --   --  4.0  --  3.9*  --  5.6   HGB 8.8* 7.2* 8.4*   < > 7.6*   < > 7.4*   < > 6.7*   < > 7.1*   MCV  --   --   --   --   --   --  101*  --  101*  --  103*   PLT  --   --   --   --  63*  --  62*  --  74*  --  83*   INR  --   --   --   --   --   --  2.68*  --  2.65*  --  3.01*    < > = values in this interval not displayed.     Recent Labs   Lab Test 11/22/23  1210 11/22/23  0538 11/21/23  0539 11/20/23  1254 11/20/23  0535 11/19/23  1409 11/19/23  0549   NA  --  136  --   --  135  --  139   POTASSIUM 3.6 3.4 3.5   < > 3.4   < > 2.9*   CHLORIDE  --  102  --   --  104  --  107   CO2  --  26  --   --  24  --  24   BUN  --  3.3*  --   --  5.4*  --  6.7   CR  --  0.54*  --   --  0.50*  --  0.49*   ANIONGAP  --  8  --   --  7  --  8   MIGUELINA  --  9.2  --   --  9.1  --  8.9   GLC  --  102*  --   --  102*  --  100*    < > = values in this interval not displayed.     Recent Labs   Lab Test 11/22/23  0538 11/20/23  0535 11/19/23  0549 11/18/23  0543 11/17/23  0536 11/16/23  1514 09/26/23  0724 09/25/23  1646 09/25/23  1537 09/25/23  1424 09/09/23  1119 09/08/23  1224 09/08/23  1147   ALBUMIN 2.9* 2.9* 2.6* 2.7*   < > 3.1*   < >  --   --  2.4*   < >  --  2.3*   BILITOTAL 6.1* 7.1* 7.1* 8.1*   < > 8.4*   < >  --   --   21.7*   < >  --  10.4*   ALT 38 36 36 43   < > 49   < >  --   --  69   < >  --  82*   AST  --  141* 146* 192*   < > 222*   < >  --    < >  --    < >  --  331*   PROTEIN  --   --   --   --   --  100*  --  30*  --   --   --  50*  --    LIPASE  --   --   --   --   --   --   --   --   --  237*  --   --  272*    < > = values in this interval not displayed.       Total time: 45 minutes,  at least 50% time spent in coordination of care, counseling, and discussions with pt/family/team members.

## 2023-11-22 NOTE — PROGRESS NOTES
"CLINICAL NUTRITION SERVICES  -  ASSESSMENT NOTE      Recommendations Ordered by Registered Dietitian (RD):   Ordered 10 am snack of yogurt w/ fruit  Ordered 2 pm snack of hardboiled egg w/ string cheese  RD introduced self to pt, discussed role in care. RD encouraged pt to order sources of protein or try an oral protein supplement this admit for preservation of muscle mass. Reviewed low sodium diet order.      Malnutrition:   % Weight Loss:  Up to 7.5% in 3 months (moderate malnutrition)-- 5% in 2 months  % Intake:  <75% for >/= 3 months (moderate malnutrition)-- \"for a while\"  Subcutaneous Fat Loss:  None observed  Muscle Loss:  None observed  Fluid Retention:  Mild to moderate BLE, bilateral flank, bilateral hip edema     Malnutrition Diagnosis: Moderate malnutrition in the context of --  Chronic illness or disease         REASON FOR ASSESSMENT  Crispin Ham is a 38 year old male seen by Registered Dietitian for LOS.      NUTRITION HISTORY  Information obtained from chart review and d/w pt    PMH of Alcohol use disorder, Decompensated alcohol associated cirrhosis, Coagulopathy of liver disease, Frequent nosebleeds, Hx of duodenitis/ulcer w/ contained perforation vs duodenal diverticulum (9/2023)    Per chart review, pt known to [facility] RD services. Recently assessed on 10/2. Pt met moderate malnutrition criteria d/t <75% intakes x7 days and mild muscle loss. Received Ensure Max oral protein supplement.     Admitted for: Decompensated alcohol associated cirrhosis, Severe alcoholic hepatitis    H&P = \"He reports general poor appetite, relapse with alcohol that did not seem to change any of his symptoms.\" Pt reported drinking 1 L of vodka over 3 days prior to admission.      Pt reported he has had a poor appetite and \"not really\" eating much \"for a while.\" He was planning to drink Ensure oral protein supplements at home after recent discharge but never did d/t price. #. His wt has been variable recently d/t " "fluid he reported. He does think his wt is overall down.       CURRENT NUTRITION ORDERS  Diet: 2 Gram Sodium Diet  Fluid restriction 2000 ML FLUID      Current Intake/Tolerance:  Pt reported he has been eating \"light\" this admit. Has been able to order fine despite diet restriction. Aware of foods that are not allowed w/in diet- chicken tenders, pizza, quesadilla. Has not been ordering many protein sources. Eating cereal, muffins, fruit. RD encouraged pt to order sources of protein or try an oral protein supplement this admit for preservation of muscle mass. Pt open to scheduled snack of hard boiled egg, yogurt, or string cheese.   Pt has been receiving 3 meals/day per health touch. Yesterday, he received 1408 kcal and 38 grams of protein.  % intakes documented per nursing flow sheet.      NUTRITION FOCUSED PHYSICAL ASSESSMENT FOR DIAGNOSING MALNUTRITION)  Yes         Observed:    Jaundiced    Obtained from Chart/Interdisciplinary Team:  ED note = \"Appears jaundiced. BLE edema with subsequent decreased mobility (uses cane at baseline).\"   11/17: ECHO = visual ejection fraction is 65-70%.   11/18: paracentesis = 34 mL of straw colored fluid was drained.       ANTHROPOMETRICS  Height: 6' 0\"  Weight: 255 lbs 6.4 oz  Body mass index is 34.64 kg/m .  Weight Status:  Obesity Grade I BMI 30-34.9  IBW: 80.9 kg  % IBW: 143%  Weight History:   Overall wt loss of 6.1 kg over past 2 months (5%)  Difficult to assess trends w/ fluid-related shifts though do suspect some true wt loss w/ poor nutrition   11/22/23 115.8 kg (255 lb 6.4 oz)   10/30/23 127 kg (280 lb)   10/04/23 126 kg (277 lb 12.8 oz)-- w/ edema   09/11/23 121.9 kg (268 lb 11.9 oz)   03/11/22 98.4 kg (217 lb)     Care everywhere--  10/18/23 : 129.8 kg (286 lb 1.6 oz)  09/25/23 : 125.8 kg (277 lb 6.4 oz)  09/19/23 : 125.4 kg (276 lb 8 oz)  02/21/23 : 115.3 kg (254 lb 4.8 oz)  03/21/22 : 115.5 kg (254 lb 9.6 oz)      LABS  Component Value Date   BUN 3.3 (L) " "11/22/2023   CR 0.54 (L) 11/22/2023   MAG 1.5 (L) 11/22/2023   ALKPHOS 245 (H) 11/22/2023     Lab Results   Component Value Date   BILITOTAL 6.1 11/22/2023   BILITOTAL 7.1 11/20/2023     Lab 11/22/23  0538 11/20/23  0535 11/19/23  0549 11/18/23  0543 11/17/23  1921 11/17/23  0536   * 102* 100* 115* 116* 118*        MEDICATIONS   folic acid  1 mg Oral Daily    furosemide  40 mg Intravenous BID    lactulose  20 g Oral BID    magnesium sulfate  4 g Intravenous Once    multivitamin w/minerals  1 tablet Oral Daily    pantoprazole  40 mg Oral BID AC    spironolactone  100 mg Oral Daily         ASSESSED NUTRITION NEEDS PER APPROVED PRACTICE GUIDELINES:  Dosing Weight: 90 kg (adjusted, based on 115.8 kg-- 11/22)  Estimated Energy Needs: 1485-3352 kcal (25-30 Kcal/Kg)  Justification: maintenance  Estimated Protein Needs: 108-135 grams protein (1.2-1.5 g pro/Kg)  Justification: preservation of lean body mass  Estimated Fluid Needs: per provider pending fluid-status      MALNUTRITION:  % Weight Loss:  Up to 7.5% in 3 months (moderate malnutrition)-- 5% in 2 months  % Intake:  <75% for >/= 3 months (moderate malnutrition)-- \"for a while\"  Subcutaneous Fat Loss:  None observed  Muscle Loss:  None observed  Fluid Retention:  Mild to moderate BLE, bilateral flank, bilateral hip edema     Malnutrition Diagnosis: Moderate malnutrition in the context of --  Chronic illness or disease        NUTRITION DIAGNOSIS:  Malnutrition related to poor appetite, poor PO intake 2/2 ongoing alcohol use as evidenced by 5% wt loss over past 2 months, <75% intakes suspected \"for a while\"        NUTRITION INTERVENTIONS  Recommendations / Nutrition Prescription  Ordered 10 am snack of yogurt w/ fruit  Ordered 2 pm snack of hardboiled egg w/ string cheese  RD introduced self to pt, discussed role in care. RD encouraged pt to order sources of protein or try an oral protein supplement this admit for preservation of muscle mass. Reviewed low sodium " diet order.       Implementation  Modify composition of meals/snacks  Nutrition education     Nutrition Goals  Pt to consume >75% of 3 meals/day w/ protein source at each      MONITORING AND EVALUATION:  Progress towards goals will be monitored and evaluated per protocol and Practice Guidelines      Melita Patel RD, LD   Pager: [phone]

## 2023-11-22 NOTE — PROGRESS NOTES
[facility]  Hospitalist Progress Note   11/22/2023          Assessment and Plan:       Crispin Ham is a 38 year old male with alcohol abuse, chronic liver cirrhosis, substance abuse, hypertension admitted on 11/16/2023 with positive ascitic fluid cultures.    Propionibacterium acnes ascites fluid culture: Likely a contaminant  --Outpatient Paracentesis 11/9 showed 1.7 ANC, albumin 0.5, and total protein 1.1 on his fluid. Culture showed Cutibacterium (Propionibacterium) acnes.   --Paracentesis 11/17, 2023 showed 70 ANC.  Cultures -No anaerobic organisms isolated after 2 days .  Blood cultures negative.   -- Completed 5 days of IV ceftriaxone on 11/20.  [facility] following.  Appreciate comanagement.    Severe alcoholic hepatitis  Alcoholic liver cirrhosis decompensated  Acute hepatic encephalopathy - improved  Severe hypoalbuminemia from underlying liver disease.  Bilateral lower extremity edema from liver failure  Established with [facility].  Recent relapse on alcohol this week, and was drinking heavily.  *Admission labs with , total bili 8.4, hemoglobin 8.1 with , platelets 138, INR 2.88. On admit MELD 3.0: 27 at 11/16/2023  3:14 PM  [facility] discriminant function: Of almost 100  Ultrasound abdomen 11/9 with hepatic steatosis, mild splenomegaly, some gallbladder wall thickening noted.  Ascites present.  --[facility] following, appreciate comanagement.  Antibiotics as above.  --Was on IV Lasix, switch to 40 mg oral Lasix twice daily on 11/20.  Appears volume overloaded, switched back to IV Lasix on 11/21.  Continue 40 mg IV Lasix twice daily.  Continue spironolactone 100 mg oral daily [11/19].   Continue oral Protonix twice daily.  Continue lactulose to twice daily.  Completed 5 doses of IV albumin.  Continue rifaximin.  [facility] following, appreciate input.  Monitor mental status closely.  Limb elevation, Ace wraps  2000 mL fluid  restriction.  Intake output monitoring.  Daily weights.    Acute hypoxic respiratory failure likely from volume overload, atelectasis  Bilateral pleural effusion Status post bilateral thoracentesis on 11/18/2023  Acute on chronic heart failure with preserved EF.  --On 11/17 had RRT, was on BiPAP.  Although BNP come back normal, chest x-ray does showed bilateral pleural effusion.  Echocardiogram EF of 65 to 70% hyperdynamic left ventricle no wall motion abnormality  --Status post bilateral thoracentesis 1.7 L from the right and about 1 L from the left.    --Discontinued IV fluids, received diuresis with intravenous Lasix.  Diuresis with intravenous Lasix 40 mg twice daily.  Monitor volume status in a.m. and accordingly further diuresis.  Continue spironolactone 100 mg oral daily [11/19].  Strict input output monitoring, daily weights.  Aggressive incentive spirometry.  Encourage ambulation.  Wean off O2     Lactic acidosis from underlying liver disease.  Lactic acid of 4.9, with IV fluids improved to 2.5.  Given liver disease, alcohol abuse hold off rechecking lactate unless decompensated.    Status post alcohol withdrawal.  Alcohol dependence.  Recent relapse this week drinking 1 L of vodka over the past 3 days PTA.  -- Treated with supportive care.  CIWA monitoring discontinued  - Multivitamin, thiamine, folate  - As needed Zyprexa for hallucinations  - Again discussed with patient/his parents.  Patient adamantly declines psychiatry's, CD evaluation.    - Cessation strongly recommended     Acute on chronic anemia, thrombocytopenia.  Status post blood transfusion  Coagulopathy of liver disease  Frequent nosebleeds:   At the time of admission Hemoglobin 8.1 with , platelets 138, INR 2.88.    Was in the ER 10/30/2023 for anemia and received 2 units packed red blood cells at that time.   --Recent EGD negative for varices.  -Received vitamin K for reversal of INR on 11/17.  Did receive 2 units FFP on  11/18/2023.  Hemoglobin dropped to 6.7 on 11/19 and received 1 unit of blood transfusion.  --This morning hemoglobin again dropped to 7.2.  Recheck hemoglobin this afternoon.  We will transfuse for hemoglobin less than 7.5 or symptomatic.    Continue Protonix oral twice daily.  [facility] following as above.    Hx of duodenitis/ulcer with contained perforation versus duodenal diverticulum seen on CT:   Noted on CT during hospitalization in 9/2023.  Continue Protonix twice daily.  Given ongoing anemia will await GI input on EGD.    Hypokalemia, hypomagnesemia likely from alcohol use, poor intakem siuresis .  On electrolyte replacement protocol.      Situational mood disorder.  During hospital stay flat affect, denies any thoughts of hurting himself or others.  Offered psychiatry evaluation, patient declined.    Moderate malnutrition in the context of chronic illness.  Nutrition following, on supplements    Physical deconditioning from medical illness  Chronic hip and back pain:   Not on pain medications.  Uses a cane intermittently for ambulatory assistance for longer distances.  - Ice, heat  As needed Robaxin ordered.  Avoid narcotics  PT, OT evaluation following.  Recommend TCU for rehabilitation.  Declines, would like to discharge home.  Care management assistance with transition requested.     Orders Placed This Encounter      2 Gram Sodium Diet      DVT Prophylaxis: SCD, hold off pharmacological DVT prophylaxis in the setting of coagulopathy from liver disease  Code Status: Full Code  Disposition: Expected discharge in 1-2 days    Discussed with patient, his parents by the bedside, care coordinator, bedside RN  >52 minutes spent by me on the date of service doing chart review, history, exam, documentation & further activities per the note.      Roque Hector MD        Interval History:        Patient lying in bed.  This morning on room air.   Minimal ambulation out of bed, requiring  assistance   Having bowel movements.  Denies any chest pain or palpitations.  Denies any nausea or vomiting.  Reports abdominal discomfort associated with positioning in bed.  Reports nosebleed resolved.  Denies any blood in the stools or blood in the urine.         Physical Exam:        Physical Exam   Temp:  [97.5  F (36.4  C)-98.1  F (36.7  C)] 97.5  F (36.4  C)  Pulse:  [] 104  Resp:  [14-20] 16  BP: (102-126)/(57-85) 102/57  SpO2:  [97 %-100 %] 98 %    Intake/Output Summary (Last 24 hours) at 11/20/2023 1421  Last data filed at 11/20/2023 1200  Gross per 24 hour   Intake 1435 ml   Output 600 ml   Net 835 ml       Admission Weight: 127 kg (280 lb)  Current Weight: 119.9 kg (264 lb 6.4 oz)    PHYSICAL EXAM  GENERAL: Patient frail-appearing. Alert and oriented.  HEENT: Icterus present.  HEART: Regular rate and rhythm. S1S2. No murmurs  LUNGS: Bilateral decreased breath sounds, faint crackles present.  Respirations unlabored  ABDOMEN: Distended, no guarding or rigidity, bowel sounds heard.    NEURO: Moving all extremities.  EXTREMITIES: 2+ pedal edema.   SKIN: Warm, dry.  Bruising present  PSYCHIATRY Cooperative       Medications:         cefTRIAXone  2 g Intravenous Q24H    folic acid  1 mg Oral Daily    furosemide  40 mg Intravenous BID    lactulose  20 g Oral BID    magnesium sulfate  4 g Intravenous Once    multivitamin w/minerals  1 tablet Oral Daily    pantoprazole  40 mg Oral BID AC    rifaximin  550 mg Oral BID    sodium chloride (PF)  3 mL Intracatheter Q8H    spironolactone  100 mg Oral Daily     bisacodyl, calcium carbonate, artificial tears, flumazenil, OLANZapine zydis **OR** haloperidol lactate, ipratropium - albuterol 0.5 mg/2.5 mg/3 mL, lidocaine 4%, lidocaine (buffered or not buffered), melatonin, nitroGLYcerin, - MEDICATION INSTRUCTIONS -, ondansetron **OR** ondansetron, - MEDICATION INSTRUCTIONS -, polyethylene glycol, prochlorperazine **OR** prochlorperazine **OR** prochlorperazine,  senna-docusate **OR** senna-docusate, sodium chloride (PF)         Data:      All new lab and imaging data was reviewed.

## 2023-11-23 NOTE — PLAN OF CARE
Goal Outcome Evaluation:       A&O x4. VSS on RA. Lungs sounds - diminished Bowel Sounds - normoactive Urine Output - adequate in urinal.  Ambulation - SBA Diet - Low NA, fluid restriction 2000.  Pain controlled by - robaxin

## 2023-11-23 NOTE — PROVIDER NOTIFICATION
MD Notification    Notified Person: MD    Notified Person Name: Tawny KHALIL    Notification Date/Time:11/23/23  @ 1152    Notification Interaction:  Vocera    Purpose of Notification:  hgb this morning 7.5, patient asymptomatic. When do you want to recheck?    Orders Received:yes  Will recheck tomorrow am. Plan to discharge tomorrow    Comments:

## 2023-11-23 NOTE — PLAN OF CARE
Goal Outcome Evaluation:       Pt. A & O x 4. Jaundiced and frequent nosebleeds. Vitals stable on RA. Pain/ms spasms managed with robaxin and repositioning. Diminished lung sounds. Tele: NSR.  Distended abdomen. Active BS, had BM, passing flatus. Adequately voiding using urinal. PIV x2, SL. Strict I/O. 2 gm Na diet with 2000 ml fluid restriction. Edematous flank. BLE lymphedema, ACE wraps in place. On K/Mg/ Phos protocols, K replaced this shift. Abnormal labs/trends: Hgb (7.5). Up with SBA. Refuses assist with turn/repo.   Plan: Monitor for mental status changes, encourage ambulation. Awaiting for TCU placement

## 2023-11-23 NOTE — PROGRESS NOTES
[facility]  Hospitalist Progress Note   11/23/2023          Assessment and Plan:       Crispin Ham is a 38 year old male with alcohol abuse, chronic liver cirrhosis, substance abuse, hypertension admitted on 11/16/2023 with positive ascitic fluid cultures.    Admitted 9/8-9/11/2023 for hypovolemic hypotension, electrolyte abnormalities, duodenitis/ulcer with contained perforation versus duodenal diverticulum, alcohol induced hepatitis.   Readmitted 9/25 to 10/4 for decompensated alcoholic liver cirrhosis, portal hypertension with splenomegaly and ascites.    Propionibacterium acnes ascites fluid culture: Likely a contaminant  Recent SBP.  --Outpatient Paracentesis 11/9 showed 1.7 ANC, albumin 0.5, and total protein 1.1 on his fluid. Culture showed Cutibacterium (Propionibacterium) acnes.   --Paracentesis 11/17, 2023 showed 70 ANC.  Cultures -No anaerobic organisms isolated after 2 days.    Blood cultures negative.   --Completed 5 days of IV ceftriaxone on 11/20.  [facility] commended Bactrim double strength daily for prophylaxis.  Appreciate input.  Continue Bactrim daily, monitor renal function closely.     Severe alcoholic hepatitis  Alcoholic liver cirrhosis decompensated  Acute hepatic encephalopathy - improved  Severe hypoalbuminemia from underlying liver disease.  Established with [facility].  Recent relapse on alcohol this week, and was drinking heavily.  *Admission labs with , total bili 8.4, hemoglobin 8.1 with , platelets 138, INR 2.88. On admit MELD 3.0: 27 at 11/16/2023  3:14 PM  [facility] discriminant function: Of almost 100  Ultrasound abdomen 11/9 with hepatic steatosis, mild splenomegaly, some gallbladder wall thickening noted.  Ascites present.  --Patient was treated with IV antibiotics, intravenous diuresis, IV albumin, electrolyte replacement with which symptoms improving.  --Diuresis as below.    Lasix 40 mg twice daily, spironolactone 200 mg oral  daily.  Electrolyte replacement ordered.  Continue oral Protonix twice daily.  Continue lactulose to twice daily.  Continue rifaximin twice daily.  [facility] followed, recommend to avoid steroids with recent SB, no role for repeat EGD at this time.  Appreciate input.  Follow-up with [facility] liver clinic in 2 to 3 weeks .  Emphasized abstinence.  Patient declined CD/psychiatry evaluation.    Status post acute hypoxic respiratory failure likely from volume overload, atelectasis  Bilateral pleural effusion Status post bilateral thoracentesis on 11/18/2023  Acute on chronic heart failure with preserved EF.  Bilateral lower extremity edema from liver failure  --On 11/17 had RRT, was on BiPAP.  Although BNP come back normal, chest x-ray does showed bilateral pleural effusion.  Echocardiogram EF of 65 to 70% hyperdynamic left ventricle no wall motion abnormality  --Status post bilateral thoracentesis 1.7 L from the right and about 1 L from the left.    IV fluids discontinued, received aggressive diuresis with intravenous Lasix and 11/22.    Switch to oral Lasix 40 mg twice daily [11/23].  Was on spironolactone 100 mg oral daily [11/19], increase dose to 200 mg oral daily on 11/23.  Monitor renal function, electrolytes in a.m. and accordingly optimize dose of diuresis.  Next  Strict input output monitoring, daily weights.  Fluid restriction to 2000 mL daily.  Aggressive incentive spirometry. Encourage ambulation.  Wean off O2Limb elevation, Ace wraps    Status post alcohol withdrawal.  Alcohol dependence.  Recent relapse in the week prior to admission.  Drinking 1 L of vodka over the past 3 days PTA.  Treated with supportive care, withdrawal symptoms improved.  Continue thiamine multivitamin folic acid supplements.  -Again discussed with patient/his parents.  Patient's parents would like patient to seek assistance with rehabilitation services.  Patient adamantly declines psychiatry's, CD  evaluation.    Cessation strongly emphasized.     Acute on chronic anemia, thrombocytopenia.  Status post blood transfusion  Coagulopathy of liver disease  History of intermittent nosebleeds  Has been having ongoing anemia requiring blood transfusion, during previous admission had received 2 units of blood transfusion. Was in the ER 10/30/2023 for anemia and received 2 units packed red blood cells at that time.   At the time of admission Hemoglobin 8.1 with , platelets 138, INR 2.88.    --EGD 9/27/2023 Esophageal mucosal tear with bleeding (complication of clip placement). Hemostasis achieved with bipolar probe.  Portal hypertensive gastropathy  -Received vitamin K for reversal of INR on 11/17.  Did receive 2 units FFP on 11/18/2023.  Hemoglobin dropped to 6.7 on 11/19 and received 1 unit of blood transfusion.  --This morning hemoglobin at 7.5.  Monitor hemoglobin level in a.m. and will transfuse for hemoglobin less than 7.5 or symptomatic.  [facility] followed, no plans for EGD at this time.  Continue Protonix oral twice daily.  Anticipate will require blood transfusion soon.    Normal saline nose drops.    Hx of duodenitis/ulcer with contained perforation versus duodenal diverticulum seen on CT:   Portal hypertensive gastropathy  Noted on CT during hospitalization in 9/2023.  EGD and GI input as above   Continue Protonix twice daily.    Hypokalemia, hypomagnesemia likely from alcohol use, poor intake, diuresis.  Continue 40 mg  oral potassium daily while on diuresis.  Continue 400 mg oral magnesium oxide daily while on diuresis.  Monitor electrolytes in AM.     Lactic acidosis from underlying liver disease.  Lactic acid of 4.9, with IV fluids improved to 2.5.  Given liver disease, alcohol abuse hold off rechecking lactate unless decompensated.    Situational mood disorder.  During hospital stay flat affect, denies any thoughts of hurting himself or others.  Offered psychiatry evaluation,  patient declined.    Moderate malnutrition in the context of chronic illness.  Nutrition following, on supplements    Recent C. difficile infection.  During last admission positive for C. difficile and treated with 14 days of oral vancomycin.  No acute issues    Physical deconditioning from medical illness  Chronic hip and back pain:   Lives at home with his family.  Not on pain medications.  Uses a cane intermittently for ambulatory assistance for longer distances.  History of hepatic encephalopathy, alcohol use will avoid narcotics and benzodiazepines.  As needed ice, heat, as needed Robaxin.    PT, OT evaluation following.  Recommend TCU for rehabilitation.   Patient adamantly declines TCU, would like to discharge home.  I discussed with patient's mom/dad on 11/22.  Expressing concerns regarding discharge home.    Encourage ambulation today, likely discharge home with home care on 11/23 if patient agrees.    Care management assistance with transition requested.     Orders Placed This Encounter      2 Gram Sodium Diet      DVT Prophylaxis: SCD, hold off pharmacological DVT prophylaxis in the setting of coagulopathy from liver disease  Code Status: Full Code  Disposition: Expected discharge likely 11/24    Discussed with patient, floor RN.  >35 minutes spent by me on the date of service doing chart review, history, exam, documentation & further activities per the note.      Roque Hector MD        Interval History:        Patient lying in bed.  This morning on room air.   Minimal ambulation out of bed, requiring assistance.    Having bowel movements.  Denies any chest pain or palpitations.  Denies any nausea or vomiting.  Reports abdominal discomfort associated with positioning in bed.   Reports nosebleeds have resolved.   Denies any blood in the stools or blood in the urine.         Physical Exam:        Physical Exam   Temp:  [97.4  F (36.3  C)-97.9  F (36.6  C)] 97.4  F (36.3  C)  Pulse:  [] 100  Resp:   [16-17] 16  BP: ()/(49-76) 104/58  SpO2:  [91 %-98 %] 98 %    Intake/Output Summary (Last 24 hours) at 11/20/2023 1421  Last data filed at 11/20/2023 1200  Gross per 24 hour   Intake 1435 ml   Output 600 ml   Net 835 ml       Admission Weight: 127 kg (280 lb)  Current Weight: 119.9 kg (264 lb 6.4 oz)    PHYSICAL EXAM  GENERAL: Patient frail-appearing. Alert and oriented.  HEENT: Icterus present.  HEART: Regular rate and rhythm. S1S2. No murmurs  LUNGS: Bilateral decreased breath sounds, faint crackles present.  Respirations unlabored  ABDOMEN: Distended, no guarding or rigidity, bowel sounds heard.    NEURO: Moving all extremities.  EXTREMITIES: 2+ pedal edema.   SKIN: Warm, dry.  Bruising present  PSYCHIATRY Cooperative       Medications:         folic acid  1 mg Oral Daily    furosemide  40 mg Oral BID    lactulose  20 g Oral BID    magnesium oxide  400 mg Oral Daily    multivitamin w/minerals  1 tablet Oral Daily    pantoprazole  40 mg Oral BID AC    [START ON 11/24/2023] potassium chloride  40 mEq Oral Daily    rifaximin  550 mg Oral BID    sodium chloride (PF)  3 mL Intracatheter Q8H    spironolactone  200 mg Oral Daily    sulfamethoxazole-trimethoprim  1 tablet Oral Daily     bisacodyl, calcium carbonate, artificial tears, flumazenil, OLANZapine zydis **OR** haloperidol lactate, ipratropium - albuterol 0.5 mg/2.5 mg/3 mL, lidocaine 4%, lidocaine (buffered or not buffered), melatonin, methocarbamol, nitroGLYcerin, - MEDICATION INSTRUCTIONS -, ondansetron **OR** ondansetron, - MEDICATION INSTRUCTIONS -, polyethylene glycol, prochlorperazine **OR** prochlorperazine **OR** prochlorperazine, senna-docusate **OR** senna-docusate, sodium chloride (PF)         Data:      All new lab and imaging data was reviewed.

## 2023-11-23 NOTE — PLAN OF CARE
Goal Outcome Evaluation:  A&Ox4. VSS, on room air. Yellow sclera, jaundice, distended soft abdomen. Thoracentesis site cdi. Lymphedema wraps on. K< MG< and Ph WNL;mrecheck tomorrow am. HGB this morning 7.5; recheck tomorrow am. On lasix and lactulose. Up with SBA and walker. Ambulated on hallways. Plan to discharge home tomorrow.

## 2023-11-24 NOTE — PROGRESS NOTES
Care Management Discharge Note    Discharge Date: 11/24/2023       Discharge Disposition: Home-pt declined home care or OP therapy    Discharge Services:    Discharge DME:  walker    Discharge Transportation: family or friend will provide, public transportation    Private pay costs discussed: Not applicable    Does the patient's insurance plan have a 3 day qualifying hospital stay waiver?  No    PAS Confirmation Code:    Patient/family educated on Medicare website which has current facility and service quality ratings: yes    Education Provided on the Discharge Plan: Yes  Persons Notified of Discharge Plans: pt, bedside RN  Patient/Family in Agreement with the Plan: yes    Handoff Referral Completed: Yes    Additional Information:  Met with patient to discuss home care, as therapy recommending TCU and pt declined.  Pt declined home care.  Discussed having OP PT/OT and pt stated he already had orders from his last hospital follow up for OP PT/OT and is planning on doing this.  Discussed that another referral can can placed for OP therapy from this stay, in case previous referral no longer valid, and pt declined.    Pt stated he has a follow up with [facility] on Monday.  Hospital follow up scheduled with Dr Blake Canchola at [facility] for Thursday November 30th at 10:45am and added to AVS.    Pt stated one of his parents will be transporting him at discharge, as he lives with them.  CC instructed pt to let bedside nurse know if he changes his mind regarding home care and he stated if he changes his mind he will tell his nurse.    Keya Ayala RN, BS  Care Coordinator  [email or web address]

## 2023-11-24 NOTE — PLAN OF CARE
A&O x 4. Dyspnea on exertion, otherwise VSS on RA. Stand-by assist w/walker, ambulated hallways before bedtime. 2g sodium diet and 2000mL FR, tolerating well. Bilateral PIV SL. RLQ thora site, CDI. Abdomen soft, non-tender. BLE lymphedema wraps in place. Lower back pain managed w/PRN robaxin x1. Voids spontaneously, adequate UO. +1 loose BM this shift. Hgb 7.7 this AM. Plan to discharge home today. Continue plan of care.     Goal Outcome Evaluation:      Plan of Care Reviewed With: patient    Overall Patient Progress: improving    Outcome Evaluation: Possible discharge today to parents' home.

## 2023-11-24 NOTE — PLAN OF CARE
Physical Therapy Discharge Summary    Reason for therapy discharge:    Discharged to home.    Progress towards therapy goal(s). See goals on Care Plan in [facility] electronic health record for goal details.  Goals not met.  Barriers to achieving goals:   discharge from facility.    Therapy recommendation(s):    Continued therapy is recommended.  Rationale/Recommendations:  Recommended TCU, pt declined. Pt would benefit from Home PT-pt declined. Pt discharged with [facility], recommend pt utilize [facility] for all functional mobility.

## 2023-11-24 NOTE — DISCHARGE SUMMARY
Discharge Summary  Hospitalist    Date of Admission:  11/16/2023  Date of Discharge:  11/24/2023  Discharging Provider: Roque Hector MD    Primary Care Physician   [facility]  Primary Care Provider Phone Number: [phone]  Primary Care Provider Fax Number: [phone]    PRINCIPAL DIAGNOSIS  Propionibacterium acnes ascites fluid culture  Severe alcoholic hepatitis  Alcoholic liver cirrhosis decompensated  Acute hepatic encephalopathy - improved  Severe hypoalbuminemia from underlying liver disease.  Status post acute hypoxic respiratory failure likely from volume overload, atelectasis  Bilateral pleural effusion Status post bilateral thoracentesis on 11/18/2023  Acute on chronic heart failure with preserved EF.  Status post alcohol withdrawal.  Acute on chronic anemia, thrombocytopenia likely from underlying liver disease.  Status post blood transfusion  Coagulopathy of liver disease  Hypokalemia, hypomagnesemia likely from alcohol use, poor intake, diuresis.  Lactic acidosis from underlying liver disease.  Situational mood disorder.  Moderate malnutrition in the context of chronic illness.  Physical deconditioning from medical illness    Past Medical History:   Diagnosis Date    Alcohol abuse     Hypertension     Substance abuse (H)        History of Present Illness   Crispin Ham is an 38 year old male who presented with positive cultures.     Hospital Course   Crispin Ham is a 38 year old male with alcohol abuse, chronic liver cirrhosis, substance abuse, hypertension admitted on 11/16/2023 with positive ascitic fluid cultures.     Admitted 9/8-9/11/2023 for hypovolemic hypotension, electrolyte abnormalities, duodenitis/ulcer with contained perforation versus duodenal diverticulum, alcohol induced hepatitis.   Readmitted 9/25 to 10/4 for decompensated alcoholic liver cirrhosis, portal hypertension with splenomegaly and ascites.     Propionibacterium acnes ascites fluid  culture  Recent SBP.  --Outpatient Paracentesis 11/9 showed 1.7 ANC, albumin 0.5, and total protein 1.1 on his fluid. Culture showed Cutibacterium (Propionibacterium) acnes.   --Paracentesis 11/17, 2023 showed 70 ANC.  Cultures -No anaerobic organisms isolated after 2 days.    Blood cultures negative.   --Completed 5 days of IV ceftriaxone on 11/20.  [facility] commended Bactrim double strength daily for prophylaxis.   Continue Bactrim daily, monitor renal function in 5 days.     Severe alcoholic hepatitis  Alcoholic liver cirrhosis decompensated  Acute hepatic encephalopathy - improved  Severe hypoalbuminemia from underlying liver disease.  Established with [facility].  Recent relapse on alcohol this week, and was drinking heavily.  *Admission labs with , total bili 8.4, hemoglobin 8.1 with , platelets 138, INR 2.88.   On admit MELD 3.0: 27 at 11/16/2023  3:14 PM  Carmen discriminant function: Of almost 100  Ultrasound abdomen 11/9 with hepatic steatosis, mild splenomegaly, some gallbladder wall thickening noted.  Ascites present.  --Patient was treated with IV antibiotics, intravenous diuresis, IV albumin, electrolyte replacement with which symptoms improving.  --Diuresis as below.    Lasix 40 mg twice daily, spironolactone 200 mg oral daily.  Electrolyte replacement ordered.  Continue oral Protonix twice daily.  Continue lactulose to twice daily.  Continue rifaximin twice daily.  [facility] followed, recommend to avoid steroids with recent SBP, no role for repeat EGD at this time.    Follow-up with [facility] liver clinic in 2 to 3 weeks .  Emphasized abstinence.  Patient declined CD/psychiatry evaluation.  At high risk for readmission.     Status post acute hypoxic respiratory failure likely from volume overload, atelectasis  Bilateral pleural effusion Status post bilateral thoracentesis on 11/18/2023  Acute on chronic heart failure with preserved  EF.  Bilateral lower extremity edema from liver failure  --On 11/17 had RRT, was on BiPAP.  Although BNP come back normal, chest x-ray does showed bilateral pleural effusion.  Echocardiogram EF of 65 to 70% hyperdynamic left ventricle no wall motion abnormality.  Large left pleural effusion.  --Status post bilateral thoracentesis 1.7 L from the right and about 1 L from the left.    IV fluids discontinued, received aggressive diuresis with intravenous Lasix and 11/22.    Gastroenterology recommend oral Lasix twice daily, spironolactone 200 mg oral daily.  Switched to oral Lasix 40 mg twice daily ( 11/23) with hold parameters.  Was on spironolactone 100 mg oral daily [11/19], increase dose to 200 mg oral daily on 11/23 with hold parameters.  Continue KCl 40 mEq oral daily.  Continue magnesium 400 mg oral daily.  Monitor renal function, electrolytes, magnesium levels in 5 days, monitor volume status/weights and accordingly optimize further dose of diuresis.   Monitor home blood pressure, heart rate review on provider visit.  Hold parameters on diuretics place.  Fluid restriction to 2000 mL daily.  Aggressive incentive spirometry. Encourage ambulation.  Limb elevation, Ace wraps     Status post alcohol withdrawal.  Alcohol dependence.  Recent relapse in the week prior to admission.  Drinking 1 L of vodka over the past 3 days PTA.  Treated with supportive care, withdrawal symptoms improved.  Continue thiamine multivitamin folic acid supplements.  -Again discussed with patient/his parents.  Patient's parents would like patient to seek assistance with rehabilitation services.  Patient adamantly declines psychiatry's, CD evaluation.    Alcohol cessation strongly emphasized.  Emphasized to seek rehabilitation services as outpatient.     Acute on chronic anemia, thrombocytopenia likely from underlying liver disease.  Status post blood transfusion  Coagulopathy of liver disease  History of intermittent nosebleeds  Has been  having ongoing anemia requiring blood transfusion, during previous admission had received 2 units of blood transfusion. Was in the ER 10/30/2023 for anemia and received 2 units packed red blood cells at that time.   At the time of admission Hemoglobin 8.1 with , platelets 138, INR 2.88.    --EGD 9/27/2023 Esophageal mucosal tear with bleeding (complication of clip placement). Hemostasis achieved with bipolar probe.  Portal hypertensive gastropathy  -Received vitamin K for reversal of INR on 11/17.  Did receive 2 units FFP on 11/18/2023.  Hemoglobin dropped to 6.7 on 11/19 and received 1 unit of blood transfusion.  Over the last few days hemoglobin greater than 7.    [facility] followed, no plans for EGD at this time.  Continue Protonix oral twice daily.  Recommend monitoring hemoglobin level in 5 days or earlier if symptomatic.  Goal to transfuse if hemoglobin less than 7.  Emphasized need to avoid alcohol, NSAIDs.    Hx of duodenitis/ulcer with contained perforation versus duodenal diverticulum seen on CT:   Portal hypertensive gastropathy  Noted on CT during hospitalization in 9/2023.  EGD and GI input as above   Continue Protonix twice daily.     Hypokalemia, hypomagnesemia likely from alcohol use, poor intake, diuresis.  Continue 40 mg  oral potassium daily while on diuresis.  Continue 400 mg oral magnesium oxide daily while on diuresis.  Monitor electrolytes, magnesium levels in 5 days, accordingly optimize dose of diuresis/supplements     Lactic acidosis from underlying liver disease.  Lactic acid of 4.9, with IV fluids improved to 2.5.  Given liver disease, alcohol abuse hold off rechecking lactate unless decompensated.     Situational mood disorder.  During hospital stay flat affect, denies any thoughts of hurting himself or others.  Offered psychiatry evaluation, patient declined.  Encouraged to seek psychiatry evaluation as outpatient.     Moderate malnutrition in the context of  chronic illness.  Nutrition followed, continue supplements     Recent C. difficile infection.  During last admission positive for C. difficile and treated with 14 days of oral vancomycin.  No acute issues     Physical deconditioning from medical illness  Chronic hip and back pain:   Lives at home with his family.  PTA Not on pain medications.  Uses a cane intermittently for ambulatory assistance for longer distances.  History of hepatic encephalopathy, alcohol use will avoid narcotics and benzodiazepines.  As needed ice, heat, as needed Robaxin.  Prescription for Robaxin provided.  PT, OT evaluation followed.  Recommend TCU for rehabilitation.   Patient adamantly declines TCU, would like to discharge home.  I discussed with patient's mom/dad on 11/22.  They are concerned regarding patient discharging home but patient would like to discharge home.  Care management assistance with transition requested.  Seems somewhat reluctant for home care.  Note off of work provided    Roque Hector MD.    Pending Results   Unresulted Labs Ordered in the Past 30 Days of this Admission       No orders found from 10/17/2023 to 11/17/2023.               Physical Exam   Vitals:    11/22/23 0500 11/23/23 0534 11/24/23 0500   Weight: 115.8 kg (255 lb 6.4 oz) 110.2 kg (242 lb 15.2 oz) 112.9 kg (248 lb 12.8 oz)     Vital Signs with Ranges  Temp:  [97  F (36.1  C)-98.2  F (36.8  C)] 98  F (36.7  C)  Pulse:  [] 96  Resp:  [16-18] 18  BP: ()/(44-70) 106/60  SpO2:  [93 %-99 %] 98 %  I/O last 3 completed shifts:  In: 340 [P.O.:340]  Out: 550 [Urine:550]  PHYSICAL EXAM  GENERAL: Patient frail-appearing. Alert and oriented.  HEENT: Icterus present.  HEART: Regular rate and rhythm. S1S2. No murmurs  LUNGS: Bilateral decreased breath sounds, no crackles   Respirations unlabored  ABDOMEN: Distended, no guarding or rigidity, bowel sounds heard.    NEURO: Moving all extremities.  EXTREMITIES: 2+ pedal edema.   SKIN: Warm, dry.  Bruising  present  PSYCHIATRY Cooperative  )Consultations This Hospital Stay   PHYSICAL THERAPY ADULT IP CONSULT  OCCUPATIONAL THERAPY ADULT IP CONSULT  CARE MANAGEMENT / SOCIAL WORK IP CONSULT  GASTROENTEROLOGY IP CONSULT  VASCULAR ACCESS ADULT IP CONSULT  INTERVENTIONAL RADIOLOGY ADULT/PEDS IP CONSULT  VASCULAR ACCESS ADULT IP CONSULT  VASCULAR ACCESS ADULT IP CONSULT  CARE MANAGEMENT / SOCIAL WORK IP CONSULT  PHYSICAL THERAPY ADULT IP CONSULT  CARE MANAGEMENT / SOCIAL WORK IP CONSULT  LYMPHEDEMA THERAPY IP CONSULT    Time Spent on this Encounter   Roque VIGIL MD, personally saw the patient today and spent greater than 30 minutes discharging this patient.. Discussed with patient, bedside RN, care coordinator.    Discharge Disposition   Discharged to home  Condition at discharge: Fair.  At risk for readmission.  Declined TCU.    Discharge Orders      Follow-up and recommended labs and tests     Follow up with primary care provider, [facility], within 5 days for hospital follow- up.    The following labs/tests are recommended: CBC, CMP, Magnesium levels.     Activity    Your activity upon discharge: activity as tolerated and no driving until PCP visit.     Reason for your hospital stay    You were admitted to the hospital with positive ascites fluid cultures, treated with IV antibiotics.  Also noted to have decompensated alcoholic liver disease, coagulopathy, anemia.  Underwent aggressive diuresis, blood transfusion.  Followed by hospitalist team, [facility], plan for discharge with close follow-up.     Follow-up and recommended labs and tests     Monitor renal function, electrolytes, weights , volume status and accordingly optimize dose of diuretic on PCP visit.    Follow-up with [facility] in clinic in 2 weeks per schedule.    Consider seeking out psychiatry/chemical dependency services to assist with rehab.     Monitor and record    Monitor home blood pressures, heart  rate, daily weights review on provider visit and optimize diuretic dosing.     Discharge Instructions    Strongly consider abstinence from alcohol use.  Aggressive incentive spirometry.  Recommend to hold off driving until PCP visit.  Recommend Ace wraps, limb elevation.  Fall precautions.    Avoid hepatotoxic drugs including NSAIDs     Diet    Follow this diet upon discharge: Orders Placed This Encounter      Fluid restriction 2000 ML FLUID      Snacks/Supplements Adult: Ensure Clear; With Meals      Snacks/Supplements Adult: Other; 10 am: greek yogurt w/ fruit; 2 pm: HB egg w/ string cheese; Between Meals      2 Gram Sodium Diet       Discharge Medications   Current Discharge Medication List        START taking these medications    Details   folic acid (FOLVITE) 1 MG tablet Take 1 tablet (1 mg) by mouth daily  Qty: 30 tablet, Refills: 0    Associated Diagnoses: Alcohol abuse      furosemide (LASIX) 40 MG tablet Take 1 tablet (40 mg) by mouth 2 times daily Hold for systolic blood pressure less then 110. Monitor renal function closely.  Qty: 60 tablet, Refills: 0    Associated Diagnoses: SBP (spontaneous bacterial peritonitis) (H)      lactulose (CHRONULAC) 10 GM/15ML solution Take 30 mLs (20 g) by mouth 2 times daily Goal 2 - 3 soft bowel movements per day.  Qty: 473 mL, Refills: 0    Associated Diagnoses: SBP (spontaneous bacterial peritonitis) (H)      magnesium oxide (MAG-OX) 400 MG tablet Take 1 tablet (400 mg) by mouth daily  Qty: 15 tablet, Refills: 0    Associated Diagnoses: Electrolyte abnormality      methocarbamol (ROBAXIN) 500 MG tablet Take 1 tablet (500 mg) by mouth 2 times daily as needed for muscle spasms  Qty: 15 tablet, Refills: 0    Associated Diagnoses: Chronic back pain, unspecified back location, unspecified back pain laterality      potassium chloride (KLOR-CON) 20 MEQ packet Take 40 mEq by mouth daily  Qty: 15 packet, Refills: 0    Associated Diagnoses: Electrolyte abnormality      rifaximin  (XIFAXAN) 550 MG TABS tablet Take 1 tablet (550 mg) by mouth 2 times daily  Qty: 60 tablet, Refills: 0    Associated Diagnoses: Liver disease      spironolactone (ALDACTONE) 100 MG tablet Take 2 tablets (200 mg) by mouth daily Hold for systolic blood pressure less then 110. Monitor renal function closely.  Qty: 60 tablet, Refills: 0    Associated Diagnoses: Liver disease      sulfamethoxazole-trimethoprim (BACTRIM DS) 800-160 MG tablet Take 1 tablet by mouth daily  Qty: 14 tablet, Refills: 0    Associated Diagnoses: SBP (spontaneous bacterial peritonitis) (H); Acute liver failure without hepatic coma      thiamine (B-1) 100 MG tablet Take 1 tablet (100 mg) by mouth daily  Qty: 30 tablet, Refills: 0    Associated Diagnoses: Alcohol abuse           CONTINUE these medications which have CHANGED    Details   pantoprazole (PROTONIX) 40 MG EC tablet Take 1 tablet (40 mg) by mouth 2 times daily  Qty: 60 tablet, Refills: 0    Associated Diagnoses: Duodenal ulcer disease           CONTINUE these medications which have NOT CHANGED    Details   multivitamin, therapeutic (THERA-VIT) TABS tablet Take 1 tablet by mouth daily      Vitamin D, Cholecalciferol, 10 MCG (400 UNIT) TABS Take 1 tablet by mouth daily           STOP taking these medications       ibuprofen (ADVIL/MOTRIN) 200 MG tablet Comments:   Reason for Stopping:             Allergies   Allergies   Allergen Reactions    Excedrin Extra Strength [Aspirin-Acetaminophen-Caffeine]      puffy eyes and dry throat a few years ago. Tolerates ibuprofen and acetaminophen, but avoids aspirin       DATA  Most Recent 3 CBC's:  Recent Labs   Lab Test 11/24/23  0533 11/23/23  0525 11/22/23  1210 11/21/23  1417 11/21/23  0539 11/20/23  1254 11/20/23  0535 11/19/23  1409 11/19/23  0549 11/18/23  1321 11/18/23  0543   WBC  --   --   --   --   --   --  4.0  --  3.9*  --  5.6   HGB 7.7* 7.5* 8.8*   < > 7.6*   < > 7.4*   < > 6.7*   < > 7.1*   MCV  --   --   --   --   --   --  101*  --  101*   --  103*   PLT  --   --   --   --  63*  --  62*  --  74*  --  83*    < > = values in this interval not displayed.      Most Recent 3 BMP's:  Recent Labs   Lab Test 11/24/23  0533 11/23/23  1034 11/23/23  0525 11/22/23  1210 11/22/23  0538     --  136  --  136   POTASSIUM 3.5 3.6 3.3*   < > 3.4   CHLORIDE 102  --  102  --  102   CO2 24  --  26  --  26   BUN 3.4*  --  3.1*  --  3.3*   CR 0.70  --  0.56*  --  0.54*   ANIONGAP 9  --  8  --  8   MIGUELINA 9.4  --  9.3  --  9.2   *  --  98  --  102*    < > = values in this interval not displayed.     Most Recent 2 LFT's:  Recent Labs   Lab Test 11/23/23  0525 11/22/23  0538 11/20/23  0535   *  --  141*   ALT 48 38 36   ALKPHOS 247* 245* 269*   BILITOTAL 7.0* 6.1* 7.1*     Most Recent TSH, T4 and A1c Labs:  Recent Labs   Lab Test 09/08/23  1147   TSH 8.43*   T4 1.39     Results for orders placed or performed during the hospital encounter of 11/16/23   US Paracentesis without Albumin    Narrative    ULTRASOUND PARACENTESIS WITHOUT ALBUMIN November 17, 2023 10:59 AM     HISTORY: Previous culture from paracentesis +.    FINDINGS: Ultrasound was used to evaluate for the presence and best  approach for paracentesis. Written and oral informed consent was  obtained. A pause for the cause procedure to verify the correct  patient and correct procedure. The skin overlying the right lower  quadrant was prepped and draped in the usual sterile fashion. The  subcutaneous tissues were anesthetized with 10 mL 1% lidocaine. A  catheter was advanced into the peritoneal space and 34 mL of  straw  colored fluid was drained. There were no immediate complications.  Ultrasound images were permanently stored. Patient left the ultrasound  suite in satisfactory condition.      Impression    IMPRESSION: Technically successful paracentesis without immediate  complications.    ZAYRA RODRIGUES MD         SYSTEM ID:  [id number]   XR Chest Port 1 View    Narrative    CHEST ONE VIEW  11/17/2023  12:10 PM     HISTORY: Shortness of breath.    COMPARISON: 2023      Impression    IMPRESSION: Mild-moderate bibasilar atelectasis and/or infiltrate,  minimal pleural fluid bilaterally.    ZAYRA RODRIGUES MD         SYSTEM ID:  [id number]   XR Chest Port 1 View    Narrative    EXAM: XR CHEST PORT 1 VIEW  LOCATION: [facility]  DATE: 2023    INDICATION: f u post thoracentesis  COMPARISON: 2023      Impression    IMPRESSION: Improved aeration. Bilateral small-to-moderate pleural effusions decreased from prior. Adjacent airspace opacity could reflect atelectasis or pneumonitis. No pneumothorax. Stable cardiomediastinal silhouette.   Echocardiogram Complete     Value    LVEF  65-70%    Narrative    [id number]  [id number]  [id number]  [id number]^KENDELL^DOYLE^LAURA     [facility]  Echocardiography Laboratory  [address] [place] [address]     Name: JIMMY COLLADO  MRN: [id number]  : 1985  Study Date: 2023 03:14 PM  Age: 38 yrs  Gender: Male  Patient Location: [facility]  Reason For Study: CHF  Ordering Physician: DOYLE RDZ  Performed By: Christiana Quispe     BSA: 2.5 m2  Height: 72 in  Weight: 280 lb  HR: 145  BP: 115/94 mmHg  ______________________________________________________________________________  Procedure  Complete Portable Echo Adult.  ______________________________________________________________________________  Interpretation Summary     The left ventricle is normal in structure, function and size.  Hyperdynamic left ventricular function  The rhythm was sinus tachycardia.  Large left pleural effusion  ______________________________________________________________________________  Left Ventricle  The left ventricle is normal in structure, function and size. There is normal  left ventricular wall thickness. Hyperdynamic left ventricular function. The  visual ejection fraction is 65-70%. No regional wall motion  abnormalities  noted.     Right Ventricle  The right ventricle is normal in structure, function and size.     Atria  Normal left atrial size. Right atrial size is normal.     Mitral Valve  The mitral valve is normal in structure and function.     Tricuspid Valve  The tricuspid valve is normal in structure and function. There is trace  tricuspid regurgitation.     Aortic Valve  The aortic valve is normal in structure and function.     Pulmonic Valve  The pulmonic valve is not well visualized.     Pericardium  There is no pericardial effusion. Large left pleural effusion.     Rhythm  The rhythm was sinus tachycardia.     ______________________________________________________________________________  MMode/2D Measurements & Calculations  IVSd: 0.87 cm  LVIDd: 6.5 cm  LVIDs: 3.8 cm  LVPWd: 0.92 cm  FS: 41.1 %     LV mass(C)d: 243.4 grams  LV mass(C)dI: 99.0 grams/m2  Ao root diam: 3.9 cm  LA dimension: 4.6 cm  asc Aorta Diam: 3.2 cm  LA/Ao: 1.2  Ao root diam index Ht(cm/m): 2.1  Ao root diam index BSA (cm/m2): 1.6  Asc Ao diam index BSA (cm/m2): 1.3  Asc Ao diam index Ht(cm/m): 1.8  RWT: 0.29     Doppler Measurements & Calculations  PA acc time: 0.09 sec     ______________________________________________________________________________  Report approved by: Teri Rosas 11/17/2023 04:33 PM

## 2023-11-24 NOTE — PROGRESS NOTES
Patient is A/O x 4, VSS, a-febrile, denies pain or shortness of breath, up with SBA and walker, ambulated in the hallway and tolerated well, abdomen is distended soft and non-tender, patient has thoracentesis RLQ on 18th, site is CDI, lymph edema bilateral L/E, Wraps in place, plan to discharge to home to his parents tomorrow.

## 2023-11-24 NOTE — PLAN OF CARE
Orientation: A/Ox4   Vitals: VSS on RA   Mobility: SBA GBW  Diet: 2g sodium diet; 2L fluid restriction  GI/: continent BB; voiding adequately; no BM  Pain: pt denies pain  Drains/Devices: PIV x2 saline locked  Skin: BLE edema; lymphedema wraps    Pt discharged home at 1530; PIVs removed

## 2023-11-25 NOTE — PROGRESS NOTES
Occupational Therapy Discharge Summary    Reason for therapy discharge:    Discharged to home with home therapy.    Progress towards therapy goal(s). See goals on Care Plan in [facility] electronic health record for goal details.  Goals partially met.  Barriers to achieving goals:   discharge from facility.    Therapy recommendation(s):    Continued therapy is recommended.  Rationale/Recommendations:   pt limited due to B LE edema, balance, back pain, strength interfering with I with ADL's and functional mobilty and will require TCU to increase ADL and functional mobility, if home will need cont'd home or OP lymphedmea and OT, assist with LB dressing, assist with donning EdemaWear, total assist with IADLs, assist with bathing..

## 2023-11-26 NOTE — PROGRESS NOTES
Clinic Care Coordination Contact  [facility]: Post-Discharge Note  SITUATION                                                      Admission:    Admission Date: 11/16/23   Reason for Admission: positive ascitic fluid cultures  Discharge:   Discharge Date: 11/24/23  Discharge Diagnosis: SBP (spontaneous bacterial peritonitis    BACKGROUND                                                      Per hospital discharge summary and inpatient provider notes:    Crispin Ham is a 38 year old male with alcohol abuse, chronic liver cirrhosis, substance abuse, hypertension admitted on 11/16/2023 with positive ascitic fluid cultures.       ASSESSMENT           Discharge Assessment  How are you doing now that you are home?: I'm doing OK. My back hurts but I think it's just from the hospital bed and should work it's way out in a couple of days.  How are your symptoms? (Red Flag symptoms escalate to triage hotline per guidelines): Improved  Do you feel your condition is stable enough to be safe at home until your provider visit?: Yes  Does the patient have their discharge instructions? : Yes  Does the patient have questions regarding their discharge instructions? : No  Were you started on any new medications or were there changes to any of your previous medications? : Yes  Does the patient have all of their medications?: Yes  Do you have questions regarding any of your medications? : No  Do you have all of your needed medical supplies or equipment (DME)?  (i.e. oxygen tank, CPAP, cane, etc.): Yes (cane)  Discharge follow-up appointment scheduled within 14 calendar days? : Yes  Discharge Follow Up Appointment Date: 11/27/23  Discharge Follow Up Appointment Scheduled with?: Specialty Care Provider (GI; PCP follow up scheduled 11-30)         Post-op (Clinicians Only)  Did the patient have surgery or a procedure: Yes (thoracentesis; paracentesis)  Incision: closed    Patient treating back pain with heat, ice and stretching.  Currently sober. Has support from parents. Has chemical dependency treatment information from previous discharge. Wonders who can help with short-term disability paperwork. Advised can ask [facility] provider or PCP to see who can help. Taking medications as prescribed. No further questions or concerns. 24/7 [facility] nurse triage phone number provided to patient.       PLAN                                                      Outpatient Plan:  Follow up with primary care provider, [facility], within 5 days for hospital follow- up.  The following labs/tests are recommended: CBC, CMP, Magnesium levels. Follow up appointment scheduled for Thursday November 30th at 10:45am with Dr Blake Canchola (Dr Baldwin that you saw in October had no availability)  Follow-up and recommended labs and tests  Monitor renal function, electrolytes, weights , volume status and accordingly optimize dose of diuretic on PCP visit.  Follow-up with [facility] in clinic in 2 weeks per schedule.  Consider seeking out psychiatry/chemical dependency services to assist with rehab.    No future appointments.      For any urgent concerns, please contact our 24 hour nurse triage line: [phone] ([phone])         Mendy Clayton RN

## 2024-01-01 ENCOUNTER — APPOINTMENT (OUTPATIENT)
Dept: CARDIOLOGY | Facility: CLINIC | Age: 39
DRG: 811 | End: 2024-01-01
Attending: NURSE PRACTITIONER
Payer: COMMERCIAL

## 2024-01-01 ENCOUNTER — HOSPITAL ENCOUNTER (OUTPATIENT)
Facility: CLINIC | Age: 39
Discharge: HOME | End: 2024-07-01
Admitting: INTERNAL MEDICINE
Payer: COMMERCIAL

## 2024-01-01 ENCOUNTER — APPOINTMENT (OUTPATIENT)
Dept: PHYSICAL THERAPY | Facility: CLINIC | Age: 39
DRG: 432 | End: 2024-01-01
Payer: COMMERCIAL

## 2024-01-01 ENCOUNTER — APPOINTMENT (OUTPATIENT)
Dept: CARDIOLOGY | Facility: CLINIC | Age: 39
DRG: 432 | End: 2024-01-01
Attending: STUDENT IN AN ORGANIZED HEALTH CARE EDUCATION/TRAINING PROGRAM
Payer: COMMERCIAL

## 2024-01-01 ENCOUNTER — HOSPITAL ENCOUNTER (EMERGENCY)
Facility: CLINIC | Age: 39
Discharge: HOME | End: 2024-07-17
Attending: EMERGENCY MEDICINE | Admitting: EMERGENCY MEDICINE
Payer: COMMERCIAL

## 2024-01-01 ENCOUNTER — APPOINTMENT (OUTPATIENT)
Dept: PHYSICAL THERAPY | Facility: CLINIC | Age: 39
DRG: 432 | End: 2024-01-01
Attending: HOSPITALIST
Payer: COMMERCIAL

## 2024-01-01 ENCOUNTER — APPOINTMENT (OUTPATIENT)
Dept: GENERAL RADIOLOGY | Facility: CLINIC | Age: 39
DRG: 432 | End: 2024-01-01
Attending: INTERNAL MEDICINE
Payer: COMMERCIAL

## 2024-01-01 ENCOUNTER — APPOINTMENT (OUTPATIENT)
Dept: OCCUPATIONAL THERAPY | Facility: CLINIC | Age: 39
DRG: 432 | End: 2024-01-01
Payer: COMMERCIAL

## 2024-01-01 ENCOUNTER — APPOINTMENT (OUTPATIENT)
Dept: CT IMAGING | Facility: CLINIC | Age: 39
DRG: 432 | End: 2024-01-01
Attending: EMERGENCY MEDICINE
Payer: COMMERCIAL

## 2024-01-01 ENCOUNTER — HOSPITAL ENCOUNTER (INPATIENT)
Facility: CLINIC | Age: 39
LOS: 13 days | Discharge: HOME | DRG: 432 | End: 2024-04-30
Attending: EMERGENCY MEDICINE | Admitting: HOSPITALIST
Payer: COMMERCIAL

## 2024-01-01 ENCOUNTER — APPOINTMENT (OUTPATIENT)
Dept: CT IMAGING | Facility: CLINIC | Age: 39
DRG: 604 | End: 2024-01-01
Attending: HOSPITALIST
Payer: COMMERCIAL

## 2024-01-01 ENCOUNTER — APPOINTMENT (OUTPATIENT)
Dept: ULTRASOUND IMAGING | Facility: CLINIC | Age: 39
DRG: 432 | End: 2024-01-01
Attending: INTERNAL MEDICINE
Payer: COMMERCIAL

## 2024-01-01 ENCOUNTER — ANESTHESIA EVENT (OUTPATIENT)
Dept: SURGERY | Facility: CLINIC | Age: 39
DRG: 432 | End: 2024-01-01
Payer: COMMERCIAL

## 2024-01-01 ENCOUNTER — APPOINTMENT (OUTPATIENT)
Dept: GENERAL RADIOLOGY | Facility: CLINIC | Age: 39
DRG: 432 | End: 2024-01-01
Attending: HOSPITALIST
Payer: COMMERCIAL

## 2024-01-01 ENCOUNTER — APPOINTMENT (OUTPATIENT)
Dept: OCCUPATIONAL THERAPY | Facility: CLINIC | Age: 39
DRG: 432 | End: 2024-01-01
Attending: HOSPITALIST
Payer: COMMERCIAL

## 2024-01-01 ENCOUNTER — APPOINTMENT (OUTPATIENT)
Dept: ULTRASOUND IMAGING | Facility: CLINIC | Age: 39
End: 2024-01-01
Attending: EMERGENCY MEDICINE
Payer: COMMERCIAL

## 2024-01-01 ENCOUNTER — APPOINTMENT (OUTPATIENT)
Dept: GENERAL RADIOLOGY | Facility: CLINIC | Age: 39
DRG: 432 | End: 2024-01-01
Attending: PHYSICIAN ASSISTANT
Payer: COMMERCIAL

## 2024-01-01 ENCOUNTER — APPOINTMENT (OUTPATIENT)
Dept: CT IMAGING | Facility: CLINIC | Age: 39
DRG: 432 | End: 2024-01-01
Attending: STUDENT IN AN ORGANIZED HEALTH CARE EDUCATION/TRAINING PROGRAM
Payer: COMMERCIAL

## 2024-01-01 ENCOUNTER — HOSPITAL ENCOUNTER (INPATIENT)
Facility: CLINIC | Age: 39
LOS: 2 days | Discharge: HOME | DRG: 811 | End: 2024-08-08
Attending: STUDENT IN AN ORGANIZED HEALTH CARE EDUCATION/TRAINING PROGRAM | Admitting: INTERNAL MEDICINE
Payer: COMMERCIAL

## 2024-01-01 ENCOUNTER — APPOINTMENT (OUTPATIENT)
Dept: ULTRASOUND IMAGING | Facility: CLINIC | Age: 39
DRG: 432 | End: 2024-01-01
Attending: PHYSICIAN ASSISTANT
Payer: COMMERCIAL

## 2024-01-01 ENCOUNTER — ANESTHESIA (OUTPATIENT)
Dept: SURGERY | Facility: CLINIC | Age: 39
DRG: 432 | End: 2024-01-01
Payer: COMMERCIAL

## 2024-01-01 ENCOUNTER — TELEPHONE (OUTPATIENT)
Facility: CLINIC | Age: 39
End: 2024-01-01
Payer: COMMERCIAL

## 2024-01-01 ENCOUNTER — NURSE TRIAGE (OUTPATIENT)
Dept: NURSING | Facility: CLINIC | Age: 39
End: 2024-01-01
Payer: COMMERCIAL

## 2024-01-01 ENCOUNTER — HOSPITAL ENCOUNTER (EMERGENCY)
Facility: CLINIC | Age: 39
Discharge: HOME | End: 2024-04-09
Attending: PHYSICIAN ASSISTANT | Admitting: PHYSICIAN ASSISTANT
Payer: COMMERCIAL

## 2024-01-01 ENCOUNTER — PATIENT OUTREACH (OUTPATIENT)
Dept: CARE COORDINATION | Facility: CLINIC | Age: 39
End: 2024-01-01
Payer: COMMERCIAL

## 2024-01-01 ENCOUNTER — HOSPITAL ENCOUNTER (INPATIENT)
Facility: CLINIC | Age: 39
LOS: 2 days | Discharge: HOME | DRG: 604 | End: 2024-07-12
Attending: EMERGENCY MEDICINE | Admitting: HOSPITALIST
Payer: COMMERCIAL

## 2024-01-01 ENCOUNTER — ANESTHESIA (OUTPATIENT)
Dept: GASTROENTEROLOGY | Facility: CLINIC | Age: 39
DRG: 432 | End: 2024-01-01
Payer: COMMERCIAL

## 2024-01-01 ENCOUNTER — HOSPITAL ENCOUNTER (OUTPATIENT)
Dept: ULTRASOUND IMAGING | Facility: CLINIC | Age: 39
Discharge: HOME | End: 2024-04-16
Attending: PHYSICIAN ASSISTANT
Payer: COMMERCIAL

## 2024-01-01 ENCOUNTER — HOSPITAL ENCOUNTER (EMERGENCY)
Facility: CLINIC | Age: 39
Discharge: HOME | End: 2024-02-21
Attending: EMERGENCY MEDICINE | Admitting: EMERGENCY MEDICINE
Payer: COMMERCIAL

## 2024-01-01 ENCOUNTER — HOSPITAL ENCOUNTER (INPATIENT)
Facility: CLINIC | Age: 39
LOS: 3 days | Discharge: HOSPICE | DRG: 811 | End: 2024-09-14
Attending: EMERGENCY MEDICINE | Admitting: STUDENT IN AN ORGANIZED HEALTH CARE EDUCATION/TRAINING PROGRAM
Payer: COMMERCIAL

## 2024-01-01 ENCOUNTER — HOSPITAL ENCOUNTER (OUTPATIENT)
Facility: CLINIC | Age: 39
Discharge: HOME | End: 2024-04-16
Admitting: RADIOLOGY
Payer: COMMERCIAL

## 2024-01-01 ENCOUNTER — APPOINTMENT (OUTPATIENT)
Dept: PHYSICAL THERAPY | Facility: CLINIC | Age: 39
DRG: 432 | End: 2024-01-01
Attending: STUDENT IN AN ORGANIZED HEALTH CARE EDUCATION/TRAINING PROGRAM
Payer: COMMERCIAL

## 2024-01-01 ENCOUNTER — APPOINTMENT (OUTPATIENT)
Dept: ULTRASOUND IMAGING | Facility: CLINIC | Age: 39
DRG: 432 | End: 2024-01-01
Attending: NURSE PRACTITIONER
Payer: COMMERCIAL

## 2024-01-01 ENCOUNTER — ANESTHESIA EVENT (OUTPATIENT)
Dept: GASTROENTEROLOGY | Facility: CLINIC | Age: 39
DRG: 432 | End: 2024-01-01
Payer: COMMERCIAL

## 2024-01-01 ENCOUNTER — HOSPITAL ENCOUNTER (INPATIENT)
Facility: CLINIC | Age: 39
LOS: 11 days | Discharge: HOME | DRG: 432 | End: 2024-03-23
Attending: EMERGENCY MEDICINE | Admitting: HOSPITALIST
Payer: COMMERCIAL

## 2024-01-01 ENCOUNTER — TELEPHONE (OUTPATIENT)
Dept: MEDSURG UNIT | Facility: CLINIC | Age: 39
End: 2024-01-01
Payer: COMMERCIAL

## 2024-01-01 ENCOUNTER — APPOINTMENT (OUTPATIENT)
Dept: NUCLEAR MEDICINE | Facility: CLINIC | Age: 39
DRG: 432 | End: 2024-01-01
Attending: INTERNAL MEDICINE
Payer: COMMERCIAL

## 2024-01-01 ENCOUNTER — APPOINTMENT (OUTPATIENT)
Dept: CT IMAGING | Facility: CLINIC | Age: 39
DRG: 432 | End: 2024-01-01
Attending: INTERNAL MEDICINE
Payer: COMMERCIAL

## 2024-01-01 ENCOUNTER — APPOINTMENT (OUTPATIENT)
Dept: GENERAL RADIOLOGY | Facility: CLINIC | Age: 39
DRG: 811 | End: 2024-01-01
Attending: STUDENT IN AN ORGANIZED HEALTH CARE EDUCATION/TRAINING PROGRAM
Payer: COMMERCIAL

## 2024-01-01 ENCOUNTER — HOSPITAL ENCOUNTER (EMERGENCY)
Facility: CLINIC | Age: 39
Discharge: HOME | End: 2024-08-12
Attending: EMERGENCY MEDICINE | Admitting: EMERGENCY MEDICINE
Payer: COMMERCIAL

## 2024-01-01 ENCOUNTER — APPOINTMENT (OUTPATIENT)
Dept: ULTRASOUND IMAGING | Facility: CLINIC | Age: 39
DRG: 432 | End: 2024-01-01
Attending: HOSPITALIST
Payer: COMMERCIAL

## 2024-01-01 ENCOUNTER — HOSPITAL ENCOUNTER (OUTPATIENT)
Dept: ULTRASOUND IMAGING | Facility: CLINIC | Age: 39
Discharge: HOME | End: 2024-07-01
Attending: FAMILY MEDICINE
Payer: COMMERCIAL

## 2024-01-01 VITALS
TEMPERATURE: 98.5 F | DIASTOLIC BLOOD PRESSURE: 64 MMHG | BODY MASS INDEX: 35.39 KG/M2 | SYSTOLIC BLOOD PRESSURE: 101 MMHG | RESPIRATION RATE: 16 BRPM | WEIGHT: 261.3 LBS | OXYGEN SATURATION: 100 % | HEIGHT: 72 IN | HEART RATE: 102 BPM

## 2024-01-01 VITALS
TEMPERATURE: 97.4 F | HEIGHT: 72 IN | BODY MASS INDEX: 35.21 KG/M2 | RESPIRATION RATE: 16 BRPM | OXYGEN SATURATION: 100 % | WEIGHT: 260 LBS | HEART RATE: 91 BPM | DIASTOLIC BLOOD PRESSURE: 82 MMHG | SYSTOLIC BLOOD PRESSURE: 105 MMHG

## 2024-01-01 VITALS
WEIGHT: 227.4 LBS | RESPIRATION RATE: 16 BRPM | HEIGHT: 72 IN | DIASTOLIC BLOOD PRESSURE: 65 MMHG | OXYGEN SATURATION: 96 % | HEART RATE: 104 BPM | TEMPERATURE: 97.9 F | SYSTOLIC BLOOD PRESSURE: 113 MMHG | BODY MASS INDEX: 30.8 KG/M2

## 2024-01-01 VITALS
RESPIRATION RATE: 10 BRPM | WEIGHT: 270 LBS | HEIGHT: 72 IN | HEART RATE: 98 BPM | BODY MASS INDEX: 36.57 KG/M2 | TEMPERATURE: 98 F | SYSTOLIC BLOOD PRESSURE: 101 MMHG | OXYGEN SATURATION: 99 % | DIASTOLIC BLOOD PRESSURE: 67 MMHG

## 2024-01-01 VITALS
SYSTOLIC BLOOD PRESSURE: 101 MMHG | WEIGHT: 206.57 LBS | RESPIRATION RATE: 18 BRPM | BODY MASS INDEX: 28.02 KG/M2 | DIASTOLIC BLOOD PRESSURE: 64 MMHG | TEMPERATURE: 96.8 F | HEART RATE: 98 BPM | OXYGEN SATURATION: 100 %

## 2024-01-01 VITALS
TEMPERATURE: 97.8 F | DIASTOLIC BLOOD PRESSURE: 81 MMHG | SYSTOLIC BLOOD PRESSURE: 126 MMHG | OXYGEN SATURATION: 100 % | RESPIRATION RATE: 15 BRPM | HEART RATE: 107 BPM | HEIGHT: 72 IN | WEIGHT: 240 LBS | BODY MASS INDEX: 32.51 KG/M2

## 2024-01-01 VITALS
RESPIRATION RATE: 16 BRPM | TEMPERATURE: 98 F | OXYGEN SATURATION: 100 % | SYSTOLIC BLOOD PRESSURE: 110 MMHG | DIASTOLIC BLOOD PRESSURE: 64 MMHG | WEIGHT: 266.01 LBS | BODY MASS INDEX: 36.08 KG/M2 | HEART RATE: 86 BPM

## 2024-01-01 VITALS
RESPIRATION RATE: 12 BRPM | DIASTOLIC BLOOD PRESSURE: 57 MMHG | SYSTOLIC BLOOD PRESSURE: 97 MMHG | HEIGHT: 72 IN | HEART RATE: 95 BPM | TEMPERATURE: 98 F | OXYGEN SATURATION: 100 % | WEIGHT: 260 LBS | BODY MASS INDEX: 35.21 KG/M2

## 2024-01-01 VITALS
RESPIRATION RATE: 18 BRPM | BODY MASS INDEX: 32.55 KG/M2 | OXYGEN SATURATION: 100 % | HEART RATE: 93 BPM | TEMPERATURE: 97.6 F | WEIGHT: 240 LBS | DIASTOLIC BLOOD PRESSURE: 69 MMHG | SYSTOLIC BLOOD PRESSURE: 107 MMHG

## 2024-01-01 DIAGNOSIS — D64.9 ANEMIA, UNSPECIFIED TYPE: ICD-10-CM

## 2024-01-01 DIAGNOSIS — E87.1 HYPONATREMIA: ICD-10-CM

## 2024-01-01 DIAGNOSIS — K76.9 LIVER DISEASE: ICD-10-CM

## 2024-01-01 DIAGNOSIS — E87.70 HYPERVOLEMIA, UNSPECIFIED HYPERVOLEMIA TYPE: ICD-10-CM

## 2024-01-01 DIAGNOSIS — R79.89 INCREASED AMMONIA LEVEL: ICD-10-CM

## 2024-01-01 DIAGNOSIS — N17.9 ACUTE KIDNEY INJURY (H): ICD-10-CM

## 2024-01-01 DIAGNOSIS — K76.82 HEPATIC ENCEPHALOPATHY (H): ICD-10-CM

## 2024-01-01 DIAGNOSIS — R04.0 EPISTAXIS: ICD-10-CM

## 2024-01-01 DIAGNOSIS — K70.31 ALCOHOLIC CIRRHOSIS OF LIVER WITH ASCITES (H): ICD-10-CM

## 2024-01-01 DIAGNOSIS — K72.00 ACUTE LIVER FAILURE WITHOUT HEPATIC COMA: ICD-10-CM

## 2024-01-01 DIAGNOSIS — N17.9 AKI (ACUTE KIDNEY INJURY) (H): ICD-10-CM

## 2024-01-01 DIAGNOSIS — K70.11 ALCOHOLIC HEPATITIS WITH ASCITES (H): ICD-10-CM

## 2024-01-01 DIAGNOSIS — I89.0 LYMPHEDEMA: ICD-10-CM

## 2024-01-01 DIAGNOSIS — K70.30 ALCOHOLIC CIRRHOSIS OF LIVER WITHOUT ASCITES (H): ICD-10-CM

## 2024-01-01 DIAGNOSIS — D64.9 ANEMIA, UNSPECIFIED: ICD-10-CM

## 2024-01-01 DIAGNOSIS — I83.899 BLEEDING FROM VARICOSE VEIN: ICD-10-CM

## 2024-01-01 DIAGNOSIS — F41.9 ANXIETY: Chronic | ICD-10-CM

## 2024-01-01 DIAGNOSIS — R17 ELEVATED BILIRUBIN: ICD-10-CM

## 2024-01-01 DIAGNOSIS — E87.70 FLUID OVERLOAD: ICD-10-CM

## 2024-01-01 DIAGNOSIS — M79.89 LEFT LEG SWELLING: ICD-10-CM

## 2024-01-01 DIAGNOSIS — E87.5 HYPERKALEMIA: ICD-10-CM

## 2024-01-01 DIAGNOSIS — K72.10 END STAGE LIVER DISEASE (H): ICD-10-CM

## 2024-01-01 DIAGNOSIS — K26.9 DUODENAL ULCER DISEASE: ICD-10-CM

## 2024-01-01 DIAGNOSIS — D63.8 ANEMIA IN OTHER CHRONIC DISEASES CLASSIFIED ELSEWHERE: Primary | ICD-10-CM

## 2024-01-01 DIAGNOSIS — F10.90 ALCOHOL USE DISORDER: Primary | Chronic | ICD-10-CM

## 2024-01-01 DIAGNOSIS — M79.662 PAIN OF LEFT LOWER LEG: ICD-10-CM

## 2024-01-01 DIAGNOSIS — Z51.5 HOSPICE CARE PATIENT: Primary | ICD-10-CM

## 2024-01-01 DIAGNOSIS — R23.9 ALTERATION IN SKIN INTEGRITY DUE TO MOISTURE: Primary | ICD-10-CM

## 2024-01-01 DIAGNOSIS — D64.9 ACUTE ON CHRONIC ANEMIA: ICD-10-CM

## 2024-01-01 DIAGNOSIS — D63.8 ANEMIA IN OTHER CHRONIC DISEASES CLASSIFIED ELSEWHERE: ICD-10-CM

## 2024-01-01 LAB
ABO/RH(D): NORMAL
ACANTHOCYTES BLD QL SMEAR: ABNORMAL
ACANTHOCYTES BLD QL SMEAR: ABNORMAL
ACANTHOCYTES BLD QL SMEAR: SLIGHT
AFP SERPL-MCNC: 6.1 NG/ML
ALBUMIN SERPL BCG-MCNC: 2.7 G/DL (ref 3.5–5.2)
ALBUMIN SERPL BCG-MCNC: 2.8 G/DL (ref 3.5–5.2)
ALBUMIN SERPL BCG-MCNC: 2.9 G/DL (ref 3.5–5.2)
ALBUMIN SERPL BCG-MCNC: 3 G/DL (ref 3.5–5.2)
ALBUMIN SERPL BCG-MCNC: 3.1 G/DL (ref 3.5–5.2)
ALBUMIN SERPL BCG-MCNC: 3.2 G/DL (ref 3.5–5.2)
ALBUMIN SERPL BCG-MCNC: 3.2 G/DL (ref 3.5–5.2)
ALBUMIN SERPL BCG-MCNC: 3.3 G/DL (ref 3.5–5.2)
ALBUMIN SERPL BCG-MCNC: 3.3 G/DL (ref 3.5–5.2)
ALBUMIN SERPL BCG-MCNC: 3.4 G/DL (ref 3.5–5.2)
ALBUMIN SERPL BCG-MCNC: 3.4 G/DL (ref 3.5–5.2)
ALBUMIN SERPL BCG-MCNC: 3.5 G/DL (ref 3.5–5.2)
ALBUMIN SERPL BCG-MCNC: 3.5 G/DL (ref 3.5–5.2)
ALBUMIN SERPL BCG-MCNC: 3.7 G/DL (ref 3.5–5.2)
ALBUMIN SERPL BCG-MCNC: 3.7 G/DL (ref 3.5–5.2)
ALBUMIN SERPL BCG-MCNC: 3.9 G/DL (ref 3.5–5.2)
ALBUMIN UR-MCNC: 10 MG/DL
ALBUMIN UR-MCNC: 20 MG/DL
ALBUMIN UR-MCNC: 30 MG/DL
ALLEN'S TEST: ABNORMAL
ALP SERPL-CCNC: 101 U/L (ref 40–150)
ALP SERPL-CCNC: 106 U/L (ref 40–150)
ALP SERPL-CCNC: 109 U/L (ref 40–150)
ALP SERPL-CCNC: 111 U/L (ref 40–150)
ALP SERPL-CCNC: 117 U/L (ref 40–150)
ALP SERPL-CCNC: 118 U/L (ref 40–150)
ALP SERPL-CCNC: 123 U/L (ref 40–150)
ALP SERPL-CCNC: 126 U/L (ref 40–150)
ALP SERPL-CCNC: 126 U/L (ref 40–150)
ALP SERPL-CCNC: 127 U/L (ref 40–150)
ALP SERPL-CCNC: 131 U/L (ref 40–150)
ALP SERPL-CCNC: 134 U/L (ref 40–150)
ALP SERPL-CCNC: 140 U/L (ref 40–150)
ALP SERPL-CCNC: 140 U/L (ref 40–150)
ALP SERPL-CCNC: 145 U/L (ref 40–150)
ALP SERPL-CCNC: 151 U/L (ref 40–150)
ALP SERPL-CCNC: 156 U/L (ref 40–150)
ALP SERPL-CCNC: 163 U/L (ref 40–150)
ALP SERPL-CCNC: 165 U/L (ref 40–150)
ALP SERPL-CCNC: 166 U/L (ref 40–150)
ALP SERPL-CCNC: 174 U/L (ref 40–150)
ALP SERPL-CCNC: 177 U/L (ref 40–150)
ALP SERPL-CCNC: 182 U/L (ref 40–150)
ALP SERPL-CCNC: 184 U/L (ref 40–150)
ALP SERPL-CCNC: 205 U/L (ref 40–150)
ALP SERPL-CCNC: 222 U/L (ref 40–150)
ALP SERPL-CCNC: 223 U/L (ref 40–150)
ALP SERPL-CCNC: 225 U/L (ref 40–150)
ALP SERPL-CCNC: 243 U/L (ref 40–150)
ALP SERPL-CCNC: 98 U/L (ref 40–150)
ALP SERPL-CCNC: 99 U/L (ref 40–150)
ALT SERPL W P-5'-P-CCNC: 18 U/L (ref 0–70)
ALT SERPL W P-5'-P-CCNC: 19 U/L (ref 0–70)
ALT SERPL W P-5'-P-CCNC: 22 U/L (ref 0–70)
ALT SERPL W P-5'-P-CCNC: 23 U/L (ref 0–70)
ALT SERPL W P-5'-P-CCNC: 23 U/L (ref 0–70)
ALT SERPL W P-5'-P-CCNC: 28 U/L (ref 0–70)
ALT SERPL W P-5'-P-CCNC: 32 U/L (ref 0–70)
ALT SERPL W P-5'-P-CCNC: 34 U/L (ref 0–70)
ALT SERPL W P-5'-P-CCNC: 39 U/L (ref 0–70)
ALT SERPL W P-5'-P-CCNC: 43 U/L (ref 0–70)
ALT SERPL W P-5'-P-CCNC: 43 U/L (ref 0–70)
ALT SERPL W P-5'-P-CCNC: 44 U/L (ref 0–70)
ALT SERPL W P-5'-P-CCNC: 44 U/L (ref 0–70)
ALT SERPL W P-5'-P-CCNC: 45 U/L (ref 0–70)
ALT SERPL W P-5'-P-CCNC: 46 U/L (ref 0–70)
ALT SERPL W P-5'-P-CCNC: 46 U/L (ref 0–70)
ALT SERPL W P-5'-P-CCNC: 48 U/L (ref 0–70)
ALT SERPL W P-5'-P-CCNC: 49 U/L (ref 0–70)
ALT SERPL W P-5'-P-CCNC: 50 U/L (ref 0–70)
ALT SERPL W P-5'-P-CCNC: 51 U/L (ref 0–70)
ALT SERPL W P-5'-P-CCNC: 52 U/L (ref 0–70)
ALT SERPL W P-5'-P-CCNC: 52 U/L (ref 0–70)
ALT SERPL W P-5'-P-CCNC: 53 U/L (ref 0–70)
ALT SERPL W P-5'-P-CCNC: 54 U/L (ref 0–70)
ALT SERPL W P-5'-P-CCNC: 55 U/L (ref 0–70)
ALT SERPL W P-5'-P-CCNC: 56 U/L (ref 0–70)
ALT SERPL W P-5'-P-CCNC: 56 U/L (ref 0–70)
ALT SERPL W P-5'-P-CCNC: 58 U/L (ref 0–70)
ALT SERPL W P-5'-P-CCNC: 61 U/L (ref 0–70)
AMMONIA PLAS-SCNC: 110 UMOL/L (ref 16–60)
AMMONIA PLAS-SCNC: 133 UMOL/L (ref 16–60)
AMMONIA PLAS-SCNC: 146 UMOL/L (ref 16–60)
AMMONIA PLAS-SCNC: 153 UMOL/L (ref 16–60)
AMMONIA PLAS-SCNC: 218 UMOL/L (ref 16–60)
AMMONIA PLAS-SCNC: 38 UMOL/L (ref 16–60)
AMMONIA PLAS-SCNC: 42 UMOL/L (ref 16–60)
AMMONIA PLAS-SCNC: 47 UMOL/L (ref 16–60)
AMMONIA PLAS-SCNC: 60 UMOL/L (ref 16–60)
AMMONIA PLAS-SCNC: 60 UMOL/L (ref 16–60)
AMMONIA PLAS-SCNC: 62 UMOL/L (ref 16–60)
AMMONIA PLAS-SCNC: 63 UMOL/L (ref 16–60)
AMMONIA PLAS-SCNC: 71 UMOL/L (ref 16–60)
AMMONIA PLAS-SCNC: 90 UMOL/L (ref 16–60)
AMMONIA PLAS-SCNC: 96 UMOL/L (ref 16–60)
ANION GAP SERPL CALCULATED.3IONS-SCNC: 10 MMOL/L (ref 7–15)
ANION GAP SERPL CALCULATED.3IONS-SCNC: 11 MMOL/L (ref 7–15)
ANION GAP SERPL CALCULATED.3IONS-SCNC: 12 MMOL/L (ref 7–15)
ANION GAP SERPL CALCULATED.3IONS-SCNC: 12 MMOL/L (ref 7–15)
ANION GAP SERPL CALCULATED.3IONS-SCNC: 13 MMOL/L (ref 7–15)
ANION GAP SERPL CALCULATED.3IONS-SCNC: 4 MMOL/L (ref 7–15)
ANION GAP SERPL CALCULATED.3IONS-SCNC: 5 MMOL/L (ref 7–15)
ANION GAP SERPL CALCULATED.3IONS-SCNC: 6 MMOL/L (ref 7–15)
ANION GAP SERPL CALCULATED.3IONS-SCNC: 6 MMOL/L (ref 7–15)
ANION GAP SERPL CALCULATED.3IONS-SCNC: 7 MMOL/L (ref 7–15)
ANION GAP SERPL CALCULATED.3IONS-SCNC: 7 MMOL/L (ref 7–15)
ANION GAP SERPL CALCULATED.3IONS-SCNC: 8 MMOL/L (ref 7–15)
ANION GAP SERPL CALCULATED.3IONS-SCNC: 9 MMOL/L (ref 7–15)
ANTIBODY SCREEN: NEGATIVE
APPEARANCE UR: ABNORMAL
APPEARANCE UR: CLEAR
APPEARANCE UR: CLEAR
APTT PPP: 36 SECONDS (ref 22–38)
APTT PPP: 41 SECONDS (ref 22–38)
APTT PPP: 59 SECONDS (ref 22–38)
AST SERPL W P-5'-P-CCNC: 102 U/L (ref 0–45)
AST SERPL W P-5'-P-CCNC: 104 U/L (ref 0–45)
AST SERPL W P-5'-P-CCNC: 105 U/L (ref 0–45)
AST SERPL W P-5'-P-CCNC: 114 U/L (ref 0–45)
AST SERPL W P-5'-P-CCNC: 122 U/L (ref 0–45)
AST SERPL W P-5'-P-CCNC: 151 U/L (ref 0–45)
AST SERPL W P-5'-P-CCNC: 201 U/L (ref 0–45)
AST SERPL W P-5'-P-CCNC: 50 U/L (ref 0–45)
AST SERPL W P-5'-P-CCNC: 50 U/L (ref 0–45)
AST SERPL W P-5'-P-CCNC: 52 U/L (ref 0–45)
AST SERPL W P-5'-P-CCNC: 57 U/L (ref 0–45)
AST SERPL W P-5'-P-CCNC: 57 U/L (ref 0–45)
AST SERPL W P-5'-P-CCNC: 58 U/L (ref 0–45)
AST SERPL W P-5'-P-CCNC: 61 U/L (ref 0–45)
AST SERPL W P-5'-P-CCNC: 63 U/L (ref 0–45)
AST SERPL W P-5'-P-CCNC: 67 U/L (ref 0–45)
AST SERPL W P-5'-P-CCNC: 67 U/L (ref 0–45)
AST SERPL W P-5'-P-CCNC: 71 U/L (ref 0–45)
AST SERPL W P-5'-P-CCNC: 73 U/L (ref 0–45)
AST SERPL W P-5'-P-CCNC: 76 U/L (ref 0–45)
AST SERPL W P-5'-P-CCNC: 79 U/L (ref 0–45)
AST SERPL W P-5'-P-CCNC: 82 U/L (ref 0–45)
AST SERPL W P-5'-P-CCNC: 84 U/L (ref 0–45)
AST SERPL W P-5'-P-CCNC: 85 U/L (ref 0–45)
AST SERPL W P-5'-P-CCNC: 86 U/L (ref 0–45)
AST SERPL W P-5'-P-CCNC: 88 U/L (ref 0–45)
AST SERPL W P-5'-P-CCNC: 89 U/L (ref 0–45)
AST SERPL W P-5'-P-CCNC: 91 U/L (ref 0–45)
AST SERPL W P-5'-P-CCNC: 93 U/L (ref 0–45)
AST SERPL W P-5'-P-CCNC: 96 U/L (ref 0–45)
AST SERPL W P-5'-P-CCNC: 97 U/L (ref 0–45)
AST SERPL W P-5'-P-CCNC: ABNORMAL U/L
AST SERPL W P-5'-P-CCNC: ABNORMAL U/L
ATRIAL RATE - MUSE: 84 BPM
ATRIAL RATE - MUSE: 85 BPM
ATRIAL RATE - MUSE: 95 BPM
ATRIAL RATE - MUSE: 97 BPM
AUER BODIES BLD QL SMEAR: ABNORMAL
BACTERIA #/AREA URNS HPF: ABNORMAL /HPF
BACTERIA BLD CULT: NO GROWTH
BACTERIA BLD CULT: NO GROWTH
BASE EXCESS BLDA CALC-SCNC: -8.8 MMOL/L (ref -3–3)
BASE EXCESS BLDV CALC-SCNC: -8.9 MMOL/L (ref -3–3)
BASE EXCESS BLDV CALC-SCNC: 0.8 MMOL/L (ref -3–3)
BASE EXCESS BLDV CALC-SCNC: 1.7 MMOL/L (ref -3–3)
BASO STIPL BLD QL SMEAR: ABNORMAL
BASOPHILS # BLD AUTO: 0 10E3/UL (ref 0–0.2)
BASOPHILS # BLD AUTO: 0.1 10E3/UL (ref 0–0.2)
BASOPHILS NFR BLD AUTO: 0 %
BASOPHILS NFR BLD AUTO: 1 %
BASOPHILS NFR BLD AUTO: 2 %
BASOPHILS NFR BLD AUTO: 2 %
BI-PLANE LVEF ECHO: NORMAL
BILIRUB DIRECT SERPL-MCNC: 2.56 MG/DL (ref 0–0.3)
BILIRUB DIRECT SERPL-MCNC: 3 MG/DL (ref 0–0.3)
BILIRUB DIRECT SERPL-MCNC: 3.34 MG/DL (ref 0–0.3)
BILIRUB DIRECT SERPL-MCNC: 3.5 MG/DL (ref 0–0.3)
BILIRUB DIRECT SERPL-MCNC: 3.59 MG/DL (ref 0–0.3)
BILIRUB DIRECT SERPL-MCNC: 3.7 MG/DL (ref 0–0.3)
BILIRUB DIRECT SERPL-MCNC: 4.41 MG/DL (ref 0–0.3)
BILIRUB SERPL-MCNC: 10.1 MG/DL
BILIRUB SERPL-MCNC: 10.2 MG/DL
BILIRUB SERPL-MCNC: 10.2 MG/DL
BILIRUB SERPL-MCNC: 10.3 MG/DL
BILIRUB SERPL-MCNC: 10.7 MG/DL
BILIRUB SERPL-MCNC: 10.8 MG/DL
BILIRUB SERPL-MCNC: 10.9 MG/DL
BILIRUB SERPL-MCNC: 11.2 MG/DL
BILIRUB SERPL-MCNC: 11.3 MG/DL
BILIRUB SERPL-MCNC: 11.4 MG/DL
BILIRUB SERPL-MCNC: 11.8 MG/DL
BILIRUB SERPL-MCNC: 12.3 MG/DL
BILIRUB SERPL-MCNC: 12.5 MG/DL
BILIRUB SERPL-MCNC: 13.1 MG/DL
BILIRUB SERPL-MCNC: 14.1 MG/DL
BILIRUB SERPL-MCNC: 7.1 MG/DL
BILIRUB SERPL-MCNC: 7.6 MG/DL
BILIRUB SERPL-MCNC: 7.9 MG/DL
BILIRUB SERPL-MCNC: 8.1 MG/DL
BILIRUB SERPL-MCNC: 8.2 MG/DL
BILIRUB SERPL-MCNC: 8.3 MG/DL
BILIRUB SERPL-MCNC: 8.5 MG/DL
BILIRUB SERPL-MCNC: 8.5 MG/DL
BILIRUB SERPL-MCNC: 8.6 MG/DL
BILIRUB SERPL-MCNC: 8.7 MG/DL
BILIRUB SERPL-MCNC: 8.8 MG/DL
BILIRUB SERPL-MCNC: 9 MG/DL
BILIRUB SERPL-MCNC: 9.2 MG/DL
BILIRUB SERPL-MCNC: 9.5 MG/DL
BILIRUB SERPL-MCNC: 9.8 MG/DL
BILIRUB SERPL-MCNC: 9.8 MG/DL
BILIRUB UR QL STRIP: ABNORMAL
BILIRUB UR QL STRIP: NEGATIVE
BILIRUB UR QL STRIP: NEGATIVE
BITE CELLS BLD QL SMEAR: ABNORMAL
BLD PROD TYP BPU: NORMAL
BLISTER CELLS BLD QL SMEAR: ABNORMAL
BLOOD BANK CHART COMMENT: NORMAL
BLOOD COMPONENT TYPE: NORMAL
BUN SERPL-MCNC: 10.5 MG/DL (ref 6–20)
BUN SERPL-MCNC: 10.8 MG/DL (ref 6–20)
BUN SERPL-MCNC: 12.1 MG/DL (ref 6–20)
BUN SERPL-MCNC: 12.9 MG/DL (ref 6–20)
BUN SERPL-MCNC: 13.1 MG/DL (ref 6–20)
BUN SERPL-MCNC: 13.4 MG/DL (ref 6–20)
BUN SERPL-MCNC: 13.9 MG/DL (ref 6–20)
BUN SERPL-MCNC: 14.2 MG/DL (ref 6–20)
BUN SERPL-MCNC: 17 MG/DL (ref 6–20)
BUN SERPL-MCNC: 17.1 MG/DL (ref 6–20)
BUN SERPL-MCNC: 18.3 MG/DL (ref 6–20)
BUN SERPL-MCNC: 18.7 MG/DL (ref 6–20)
BUN SERPL-MCNC: 18.7 MG/DL (ref 6–20)
BUN SERPL-MCNC: 20.7 MG/DL (ref 6–20)
BUN SERPL-MCNC: 20.8 MG/DL (ref 6–20)
BUN SERPL-MCNC: 21.3 MG/DL (ref 6–20)
BUN SERPL-MCNC: 21.5 MG/DL (ref 6–20)
BUN SERPL-MCNC: 21.8 MG/DL (ref 6–20)
BUN SERPL-MCNC: 22.9 MG/DL (ref 6–20)
BUN SERPL-MCNC: 22.9 MG/DL (ref 6–20)
BUN SERPL-MCNC: 23.6 MG/DL (ref 6–20)
BUN SERPL-MCNC: 25.8 MG/DL (ref 6–20)
BUN SERPL-MCNC: 25.9 MG/DL (ref 6–20)
BUN SERPL-MCNC: 26.4 MG/DL (ref 6–20)
BUN SERPL-MCNC: 30.5 MG/DL (ref 6–20)
BUN SERPL-MCNC: 32.9 MG/DL (ref 6–20)
BUN SERPL-MCNC: 33 MG/DL (ref 6–20)
BUN SERPL-MCNC: 33.2 MG/DL (ref 6–20)
BUN SERPL-MCNC: 34.9 MG/DL (ref 6–20)
BUN SERPL-MCNC: 37.1 MG/DL (ref 6–20)
BUN SERPL-MCNC: 37.6 MG/DL (ref 6–20)
BUN SERPL-MCNC: 39.6 MG/DL (ref 6–20)
BUN SERPL-MCNC: 40 MG/DL (ref 6–20)
BUN SERPL-MCNC: 40.6 MG/DL (ref 6–20)
BUN SERPL-MCNC: 41 MG/DL (ref 6–20)
BUN SERPL-MCNC: 43.8 MG/DL (ref 6–20)
BUN SERPL-MCNC: 44.9 MG/DL (ref 6–20)
BUN SERPL-MCNC: 46.4 MG/DL (ref 6–20)
BUN SERPL-MCNC: 47.7 MG/DL (ref 6–20)
BUN SERPL-MCNC: 50.8 MG/DL (ref 6–20)
BUN SERPL-MCNC: 51.4 MG/DL (ref 6–20)
BUN SERPL-MCNC: 51.5 MG/DL (ref 6–20)
BUN SERPL-MCNC: 55.9 MG/DL (ref 6–20)
BUN SERPL-MCNC: 8.9 MG/DL (ref 6–20)
BUN SERPL-MCNC: 9.7 MG/DL (ref 6–20)
BURR CELLS BLD QL SMEAR: ABNORMAL
BURR CELLS BLD QL SMEAR: ABNORMAL
BURR CELLS BLD QL SMEAR: SLIGHT
CALCIUM SERPL-MCNC: 10 MG/DL (ref 8.8–10.4)
CALCIUM SERPL-MCNC: 10 MG/DL (ref 8.8–10.4)
CALCIUM SERPL-MCNC: 10.4 MG/DL (ref 8.8–10.4)
CALCIUM SERPL-MCNC: 10.7 MG/DL (ref 8.8–10.4)
CALCIUM SERPL-MCNC: 10.7 MG/DL (ref 8.8–10.4)
CALCIUM SERPL-MCNC: 8.6 MG/DL (ref 8.6–10)
CALCIUM SERPL-MCNC: 8.6 MG/DL (ref 8.6–10)
CALCIUM SERPL-MCNC: 8.7 MG/DL (ref 8.6–10)
CALCIUM SERPL-MCNC: 8.7 MG/DL (ref 8.6–10)
CALCIUM SERPL-MCNC: 8.8 MG/DL (ref 8.6–10)
CALCIUM SERPL-MCNC: 8.9 MG/DL (ref 8.6–10)
CALCIUM SERPL-MCNC: 8.9 MG/DL (ref 8.6–10)
CALCIUM SERPL-MCNC: 9 MG/DL (ref 8.6–10)
CALCIUM SERPL-MCNC: 9.1 MG/DL (ref 8.6–10)
CALCIUM SERPL-MCNC: 9.1 MG/DL (ref 8.6–10)
CALCIUM SERPL-MCNC: 9.2 MG/DL (ref 8.6–10)
CALCIUM SERPL-MCNC: 9.2 MG/DL (ref 8.6–10)
CALCIUM SERPL-MCNC: 9.3 MG/DL (ref 8.6–10)
CALCIUM SERPL-MCNC: 9.4 MG/DL (ref 8.6–10)
CALCIUM SERPL-MCNC: 9.5 MG/DL (ref 8.6–10)
CALCIUM SERPL-MCNC: 9.5 MG/DL (ref 8.8–10.4)
CALCIUM SERPL-MCNC: 9.6 MG/DL (ref 8.6–10)
CALCIUM SERPL-MCNC: 9.6 MG/DL (ref 8.6–10)
CALCIUM SERPL-MCNC: 9.6 MG/DL (ref 8.8–10.4)
CALCIUM SERPL-MCNC: 9.7 MG/DL (ref 8.6–10)
CALCIUM SERPL-MCNC: 9.7 MG/DL (ref 8.8–10.4)
CALCIUM SERPL-MCNC: 9.9 MG/DL (ref 8.6–10)
CALCIUM SERPL-MCNC: 9.9 MG/DL (ref 8.6–10)
CHLORIDE SERPL-SCNC: 100 MMOL/L (ref 98–107)
CHLORIDE SERPL-SCNC: 101 MMOL/L (ref 98–107)
CHLORIDE SERPL-SCNC: 101 MMOL/L (ref 98–107)
CHLORIDE SERPL-SCNC: 102 MMOL/L (ref 98–107)
CHLORIDE SERPL-SCNC: 102 MMOL/L (ref 98–107)
CHLORIDE SERPL-SCNC: 103 MMOL/L (ref 98–107)
CHLORIDE SERPL-SCNC: 103 MMOL/L (ref 98–107)
CHLORIDE SERPL-SCNC: 104 MMOL/L (ref 98–107)
CHLORIDE SERPL-SCNC: 105 MMOL/L (ref 98–107)
CHLORIDE SERPL-SCNC: 105 MMOL/L (ref 98–107)
CHLORIDE SERPL-SCNC: 106 MMOL/L (ref 98–107)
CHLORIDE SERPL-SCNC: 106 MMOL/L (ref 98–107)
CHLORIDE SERPL-SCNC: 107 MMOL/L (ref 98–107)
CHLORIDE SERPL-SCNC: 107 MMOL/L (ref 98–107)
CHLORIDE SERPL-SCNC: 108 MMOL/L (ref 98–107)
CHLORIDE SERPL-SCNC: 109 MMOL/L (ref 98–107)
CHLORIDE SERPL-SCNC: 110 MMOL/L (ref 98–107)
CHLORIDE SERPL-SCNC: 92 MMOL/L (ref 98–107)
CHLORIDE SERPL-SCNC: 94 MMOL/L (ref 98–107)
CHLORIDE SERPL-SCNC: 95 MMOL/L (ref 98–107)
CHLORIDE SERPL-SCNC: 96 MMOL/L (ref 98–107)
CHLORIDE SERPL-SCNC: 96 MMOL/L (ref 98–107)
CHLORIDE SERPL-SCNC: 97 MMOL/L (ref 98–107)
CHLORIDE SERPL-SCNC: 98 MMOL/L (ref 98–107)
CHLORIDE SERPL-SCNC: 99 MMOL/L (ref 98–107)
CODING SYSTEM: NORMAL
COHGB MFR BLD: 99.3 % (ref 96–97)
COLOR UR AUTO: ABNORMAL
COLOR UR AUTO: YELLOW
COLOR UR AUTO: YELLOW
CORTIS SERPL-MCNC: 7.1 UG/DL
CREAT SERPL-MCNC: 0.43 MG/DL (ref 0.67–1.17)
CREAT SERPL-MCNC: 0.47 MG/DL (ref 0.67–1.17)
CREAT SERPL-MCNC: 0.48 MG/DL (ref 0.67–1.17)
CREAT SERPL-MCNC: 0.49 MG/DL (ref 0.67–1.17)
CREAT SERPL-MCNC: 0.5 MG/DL (ref 0.67–1.17)
CREAT SERPL-MCNC: 0.51 MG/DL (ref 0.67–1.17)
CREAT SERPL-MCNC: 0.59 MG/DL (ref 0.67–1.17)
CREAT SERPL-MCNC: 0.6 MG/DL (ref 0.67–1.17)
CREAT SERPL-MCNC: 0.63 MG/DL (ref 0.67–1.17)
CREAT SERPL-MCNC: 0.66 MG/DL (ref 0.67–1.17)
CREAT SERPL-MCNC: 0.68 MG/DL (ref 0.67–1.17)
CREAT SERPL-MCNC: 0.78 MG/DL (ref 0.67–1.17)
CREAT SERPL-MCNC: 0.78 MG/DL (ref 0.67–1.17)
CREAT SERPL-MCNC: 0.79 MG/DL (ref 0.67–1.17)
CREAT SERPL-MCNC: 0.8 MG/DL (ref 0.67–1.17)
CREAT SERPL-MCNC: 0.9 MG/DL (ref 0.67–1.17)
CREAT SERPL-MCNC: 0.92 MG/DL (ref 0.67–1.17)
CREAT SERPL-MCNC: 0.98 MG/DL (ref 0.67–1.17)
CREAT SERPL-MCNC: 0.99 MG/DL (ref 0.67–1.17)
CREAT SERPL-MCNC: 0.99 MG/DL (ref 0.67–1.17)
CREAT SERPL-MCNC: 1.03 MG/DL (ref 0.67–1.17)
CREAT SERPL-MCNC: 1.03 MG/DL (ref 0.67–1.17)
CREAT SERPL-MCNC: 1.14 MG/DL (ref 0.67–1.17)
CREAT SERPL-MCNC: 1.17 MG/DL (ref 0.67–1.17)
CREAT SERPL-MCNC: 1.21 MG/DL (ref 0.67–1.17)
CREAT SERPL-MCNC: 1.22 MG/DL (ref 0.67–1.17)
CREAT SERPL-MCNC: 1.23 MG/DL (ref 0.67–1.17)
CREAT SERPL-MCNC: 1.29 MG/DL (ref 0.67–1.17)
CREAT SERPL-MCNC: 1.3 MG/DL (ref 0.67–1.17)
CREAT SERPL-MCNC: 1.41 MG/DL (ref 0.67–1.17)
CREAT SERPL-MCNC: 1.44 MG/DL (ref 0.67–1.17)
CREAT SERPL-MCNC: 1.47 MG/DL (ref 0.67–1.17)
CREAT SERPL-MCNC: 1.49 MG/DL (ref 0.67–1.17)
CREAT SERPL-MCNC: 1.52 MG/DL (ref 0.67–1.17)
CREAT SERPL-MCNC: 1.54 MG/DL (ref 0.67–1.17)
CREAT SERPL-MCNC: 1.69 MG/DL (ref 0.67–1.17)
CREAT SERPL-MCNC: 1.71 MG/DL (ref 0.67–1.17)
CREAT SERPL-MCNC: 1.71 MG/DL (ref 0.67–1.17)
CREAT SERPL-MCNC: 1.85 MG/DL (ref 0.67–1.17)
CREAT SERPL-MCNC: 1.9 MG/DL (ref 0.67–1.17)
CREAT SERPL-MCNC: 2.13 MG/DL (ref 0.67–1.17)
CREAT SERPL-MCNC: 2.16 MG/DL (ref 0.67–1.17)
CREAT SERPL-MCNC: 2.48 MG/DL (ref 0.67–1.17)
CREAT SERPL-MCNC: 2.6 MG/DL (ref 0.67–1.17)
CREAT SERPL-MCNC: 2.89 MG/DL (ref 0.67–1.17)
CREAT SERPL-MCNC: 3.1 MG/DL (ref 0.67–1.17)
CREAT SERPL-MCNC: 3.11 MG/DL (ref 0.67–1.17)
CREAT SERPL-MCNC: 3.14 MG/DL (ref 0.67–1.17)
CREAT UR-MCNC: 124.8 MG/DL
CREAT UR-MCNC: 25.4 MG/DL
CREAT UR-MCNC: 94.2 MG/DL
CROSSMATCH: NORMAL
DACRYOCYTES BLD QL SMEAR: ABNORMAL
DEPRECATED HCO3 PLAS-SCNC: 14 MMOL/L (ref 22–29)
DEPRECATED HCO3 PLAS-SCNC: 15 MMOL/L (ref 22–29)
DEPRECATED HCO3 PLAS-SCNC: 16 MMOL/L (ref 22–29)
DEPRECATED HCO3 PLAS-SCNC: 17 MMOL/L (ref 22–29)
DEPRECATED HCO3 PLAS-SCNC: 18 MMOL/L (ref 22–29)
DEPRECATED HCO3 PLAS-SCNC: 18 MMOL/L (ref 22–29)
DEPRECATED HCO3 PLAS-SCNC: 19 MMOL/L (ref 22–29)
DEPRECATED HCO3 PLAS-SCNC: 19 MMOL/L (ref 22–29)
DEPRECATED HCO3 PLAS-SCNC: 20 MMOL/L (ref 22–29)
DEPRECATED HCO3 PLAS-SCNC: 21 MMOL/L (ref 22–29)
DEPRECATED HCO3 PLAS-SCNC: 22 MMOL/L (ref 22–29)
DEPRECATED HCO3 PLAS-SCNC: 23 MMOL/L (ref 22–29)
DEPRECATED HCO3 PLAS-SCNC: 24 MMOL/L (ref 22–29)
DEPRECATED HCO3 PLAS-SCNC: 25 MMOL/L (ref 22–29)
DEPRECATED HCO3 PLAS-SCNC: 25 MMOL/L (ref 22–29)
DIASTOLIC BLOOD PRESSURE - MUSE: NORMAL MMHG
EGFRCR SERPLBLD CKD-EPI 2021: 25 ML/MIN/1.73M2
EGFRCR SERPLBLD CKD-EPI 2021: 25 ML/MIN/1.73M2
EGFRCR SERPLBLD CKD-EPI 2021: 26 ML/MIN/1.73M2
EGFRCR SERPLBLD CKD-EPI 2021: 28 ML/MIN/1.73M2
EGFRCR SERPLBLD CKD-EPI 2021: 32 ML/MIN/1.73M2
EGFRCR SERPLBLD CKD-EPI 2021: 33 ML/MIN/1.73M2
EGFRCR SERPLBLD CKD-EPI 2021: 40 ML/MIN/1.73M2
EGFRCR SERPLBLD CKD-EPI 2021: 40 ML/MIN/1.73M2
EGFRCR SERPLBLD CKD-EPI 2021: 46 ML/MIN/1.73M2
EGFRCR SERPLBLD CKD-EPI 2021: 48 ML/MIN/1.73M2
EGFRCR SERPLBLD CKD-EPI 2021: 52 ML/MIN/1.73M2
EGFRCR SERPLBLD CKD-EPI 2021: 52 ML/MIN/1.73M2
EGFRCR SERPLBLD CKD-EPI 2021: 60 ML/MIN/1.73M2
EGFRCR SERPLBLD CKD-EPI 2021: 61 ML/MIN/1.73M2
EGFRCR SERPLBLD CKD-EPI 2021: 62 ML/MIN/1.73M2
EGFRCR SERPLBLD CKD-EPI 2021: 63 ML/MIN/1.73M2
EGFRCR SERPLBLD CKD-EPI 2021: 65 ML/MIN/1.73M2
EGFRCR SERPLBLD CKD-EPI 2021: 66 ML/MIN/1.73M2
EGFRCR SERPLBLD CKD-EPI 2021: 73 ML/MIN/1.73M2
EGFRCR SERPLBLD CKD-EPI 2021: 74 ML/MIN/1.73M2
EGFRCR SERPLBLD CKD-EPI 2021: 78 ML/MIN/1.73M2
EGFRCR SERPLBLD CKD-EPI 2021: 78 ML/MIN/1.73M2
EGFRCR SERPLBLD CKD-EPI 2021: 80 ML/MIN/1.73M2
EGFRCR SERPLBLD CKD-EPI 2021: 82 ML/MIN/1.73M2
EGFRCR SERPLBLD CKD-EPI 2021: 85 ML/MIN/1.73M2
EGFRCR SERPLBLD CKD-EPI 2021: >90 ML/MIN/1.73M2
ELLIPTOCYTES BLD QL SMEAR: ABNORMAL
EOSINOPHIL # BLD AUTO: 0 10E3/UL (ref 0–0.7)
EOSINOPHIL # BLD AUTO: 0 10E3/UL (ref 0–0.7)
EOSINOPHIL # BLD AUTO: 0.1 10E3/UL (ref 0–0.7)
EOSINOPHIL # BLD AUTO: 0.2 10E3/UL (ref 0–0.7)
EOSINOPHIL NFR BLD AUTO: 0 %
EOSINOPHIL NFR BLD AUTO: 1 %
EOSINOPHIL NFR BLD AUTO: 2 %
EOSINOPHIL NFR BLD AUTO: 3 %
EOSINOPHIL NFR BLD AUTO: 4 %
EOSINOPHIL NFR BLD AUTO: 4 %
EOSINOPHIL NFR BLD AUTO: 7 %
ERYTHROCYTE [DISTWIDTH] IN BLOOD BY AUTOMATED COUNT: 14.1 % (ref 10–15)
ERYTHROCYTE [DISTWIDTH] IN BLOOD BY AUTOMATED COUNT: 14.9 % (ref 10–15)
ERYTHROCYTE [DISTWIDTH] IN BLOOD BY AUTOMATED COUNT: 15.2 % (ref 10–15)
ERYTHROCYTE [DISTWIDTH] IN BLOOD BY AUTOMATED COUNT: 15.3 % (ref 10–15)
ERYTHROCYTE [DISTWIDTH] IN BLOOD BY AUTOMATED COUNT: 20.1 % (ref 10–15)
ERYTHROCYTE [DISTWIDTH] IN BLOOD BY AUTOMATED COUNT: 20.9 % (ref 10–15)
ERYTHROCYTE [DISTWIDTH] IN BLOOD BY AUTOMATED COUNT: 21.7 % (ref 10–15)
ERYTHROCYTE [DISTWIDTH] IN BLOOD BY AUTOMATED COUNT: 21.8 % (ref 10–15)
ERYTHROCYTE [DISTWIDTH] IN BLOOD BY AUTOMATED COUNT: 22.1 % (ref 10–15)
ERYTHROCYTE [DISTWIDTH] IN BLOOD BY AUTOMATED COUNT: 22.6 % (ref 10–15)
ERYTHROCYTE [DISTWIDTH] IN BLOOD BY AUTOMATED COUNT: 23.2 % (ref 10–15)
ERYTHROCYTE [DISTWIDTH] IN BLOOD BY AUTOMATED COUNT: 23.3 % (ref 10–15)
ERYTHROCYTE [DISTWIDTH] IN BLOOD BY AUTOMATED COUNT: 23.4 % (ref 10–15)
ERYTHROCYTE [DISTWIDTH] IN BLOOD BY AUTOMATED COUNT: 23.6 % (ref 10–15)
ERYTHROCYTE [DISTWIDTH] IN BLOOD BY AUTOMATED COUNT: 23.7 % (ref 10–15)
ERYTHROCYTE [DISTWIDTH] IN BLOOD BY AUTOMATED COUNT: 23.9 % (ref 10–15)
ERYTHROCYTE [DISTWIDTH] IN BLOOD BY AUTOMATED COUNT: 25.4 % (ref 10–15)
ERYTHROCYTE [DISTWIDTH] IN BLOOD BY AUTOMATED COUNT: 27.2 % (ref 10–15)
ERYTHROCYTE [DISTWIDTH] IN BLOOD BY AUTOMATED COUNT: 27.2 % (ref 10–15)
ERYTHROCYTE [DISTWIDTH] IN BLOOD BY AUTOMATED COUNT: ABNORMAL %
ETHANOL SERPL-MCNC: 0.07 G/DL
ETHANOL SERPL-MCNC: <0.01 G/DL
FERRITIN SERPL-MCNC: 1982 NG/ML (ref 31–409)
FIBRINOGEN PPP-MCNC: 127 MG/DL (ref 170–490)
FIBRINOGEN PPP-MCNC: 140 MG/DL (ref 170–490)
FIBRINOGEN PPP-MCNC: 155 MG/DL (ref 170–490)
FLUAV RNA SPEC QL NAA+PROBE: NEGATIVE
FLUBV RNA RESP QL NAA+PROBE: NEGATIVE
FOLATE SERPL-MCNC: 29.1 NG/ML (ref 4.6–34.8)
FRACT EXCRET NA UR+SERPL-RTO: 0.4 %
FRACT EXCRET NA UR+SERPL-RTO: 7.4 %
FRACT EXCRET NA UR+SERPL-RTO: NORMAL %
FRAGMENTS BLD QL SMEAR: ABNORMAL
FRAGMENTS BLD QL SMEAR: SLIGHT
FRAGMENTS BLD QL SMEAR: SLIGHT
GLUCOSE BLDC GLUCOMTR-MCNC: 100 MG/DL (ref 70–99)
GLUCOSE BLDC GLUCOMTR-MCNC: 101 MG/DL (ref 70–99)
GLUCOSE BLDC GLUCOMTR-MCNC: 101 MG/DL (ref 70–99)
GLUCOSE BLDC GLUCOMTR-MCNC: 104 MG/DL (ref 70–99)
GLUCOSE BLDC GLUCOMTR-MCNC: 105 MG/DL (ref 70–99)
GLUCOSE BLDC GLUCOMTR-MCNC: 108 MG/DL (ref 70–99)
GLUCOSE BLDC GLUCOMTR-MCNC: 110 MG/DL (ref 70–99)
GLUCOSE BLDC GLUCOMTR-MCNC: 110 MG/DL (ref 70–99)
GLUCOSE BLDC GLUCOMTR-MCNC: 111 MG/DL (ref 70–99)
GLUCOSE BLDC GLUCOMTR-MCNC: 111 MG/DL (ref 70–99)
GLUCOSE BLDC GLUCOMTR-MCNC: 112 MG/DL (ref 70–99)
GLUCOSE BLDC GLUCOMTR-MCNC: 113 MG/DL (ref 70–99)
GLUCOSE BLDC GLUCOMTR-MCNC: 114 MG/DL (ref 70–99)
GLUCOSE BLDC GLUCOMTR-MCNC: 115 MG/DL (ref 70–99)
GLUCOSE BLDC GLUCOMTR-MCNC: 115 MG/DL (ref 70–99)
GLUCOSE BLDC GLUCOMTR-MCNC: 117 MG/DL (ref 70–99)
GLUCOSE BLDC GLUCOMTR-MCNC: 118 MG/DL (ref 70–99)
GLUCOSE BLDC GLUCOMTR-MCNC: 118 MG/DL (ref 70–99)
GLUCOSE BLDC GLUCOMTR-MCNC: 119 MG/DL (ref 70–99)
GLUCOSE BLDC GLUCOMTR-MCNC: 124 MG/DL (ref 70–99)
GLUCOSE BLDC GLUCOMTR-MCNC: 125 MG/DL (ref 70–99)
GLUCOSE BLDC GLUCOMTR-MCNC: 127 MG/DL (ref 70–99)
GLUCOSE BLDC GLUCOMTR-MCNC: 128 MG/DL (ref 70–99)
GLUCOSE BLDC GLUCOMTR-MCNC: 128 MG/DL (ref 70–99)
GLUCOSE BLDC GLUCOMTR-MCNC: 130 MG/DL (ref 70–99)
GLUCOSE BLDC GLUCOMTR-MCNC: 130 MG/DL (ref 70–99)
GLUCOSE BLDC GLUCOMTR-MCNC: 131 MG/DL (ref 70–99)
GLUCOSE BLDC GLUCOMTR-MCNC: 132 MG/DL (ref 70–99)
GLUCOSE BLDC GLUCOMTR-MCNC: 132 MG/DL (ref 70–99)
GLUCOSE BLDC GLUCOMTR-MCNC: 135 MG/DL (ref 70–99)
GLUCOSE BLDC GLUCOMTR-MCNC: 136 MG/DL (ref 70–99)
GLUCOSE BLDC GLUCOMTR-MCNC: 137 MG/DL (ref 70–99)
GLUCOSE BLDC GLUCOMTR-MCNC: 139 MG/DL (ref 70–99)
GLUCOSE BLDC GLUCOMTR-MCNC: 139 MG/DL (ref 70–99)
GLUCOSE BLDC GLUCOMTR-MCNC: 140 MG/DL (ref 70–99)
GLUCOSE BLDC GLUCOMTR-MCNC: 141 MG/DL (ref 70–99)
GLUCOSE BLDC GLUCOMTR-MCNC: 141 MG/DL (ref 70–99)
GLUCOSE BLDC GLUCOMTR-MCNC: 142 MG/DL (ref 70–99)
GLUCOSE BLDC GLUCOMTR-MCNC: 145 MG/DL (ref 70–99)
GLUCOSE BLDC GLUCOMTR-MCNC: 145 MG/DL (ref 70–99)
GLUCOSE BLDC GLUCOMTR-MCNC: 146 MG/DL (ref 70–99)
GLUCOSE BLDC GLUCOMTR-MCNC: 147 MG/DL (ref 70–99)
GLUCOSE BLDC GLUCOMTR-MCNC: 148 MG/DL (ref 70–99)
GLUCOSE BLDC GLUCOMTR-MCNC: 149 MG/DL (ref 70–99)
GLUCOSE BLDC GLUCOMTR-MCNC: 150 MG/DL (ref 70–99)
GLUCOSE BLDC GLUCOMTR-MCNC: 150 MG/DL (ref 70–99)
GLUCOSE BLDC GLUCOMTR-MCNC: 151 MG/DL (ref 70–99)
GLUCOSE BLDC GLUCOMTR-MCNC: 151 MG/DL (ref 70–99)
GLUCOSE BLDC GLUCOMTR-MCNC: 152 MG/DL (ref 70–99)
GLUCOSE BLDC GLUCOMTR-MCNC: 152 MG/DL (ref 70–99)
GLUCOSE BLDC GLUCOMTR-MCNC: 154 MG/DL (ref 70–99)
GLUCOSE BLDC GLUCOMTR-MCNC: 155 MG/DL (ref 70–99)
GLUCOSE BLDC GLUCOMTR-MCNC: 156 MG/DL (ref 70–99)
GLUCOSE BLDC GLUCOMTR-MCNC: 157 MG/DL (ref 70–99)
GLUCOSE BLDC GLUCOMTR-MCNC: 158 MG/DL (ref 70–99)
GLUCOSE BLDC GLUCOMTR-MCNC: 158 MG/DL (ref 70–99)
GLUCOSE BLDC GLUCOMTR-MCNC: 159 MG/DL (ref 70–99)
GLUCOSE BLDC GLUCOMTR-MCNC: 160 MG/DL (ref 70–99)
GLUCOSE BLDC GLUCOMTR-MCNC: 162 MG/DL (ref 70–99)
GLUCOSE BLDC GLUCOMTR-MCNC: 167 MG/DL (ref 70–99)
GLUCOSE BLDC GLUCOMTR-MCNC: 172 MG/DL (ref 70–99)
GLUCOSE BLDC GLUCOMTR-MCNC: 173 MG/DL (ref 70–99)
GLUCOSE BLDC GLUCOMTR-MCNC: 175 MG/DL (ref 70–99)
GLUCOSE BLDC GLUCOMTR-MCNC: 176 MG/DL (ref 70–99)
GLUCOSE BLDC GLUCOMTR-MCNC: 176 MG/DL (ref 70–99)
GLUCOSE BLDC GLUCOMTR-MCNC: 179 MG/DL (ref 70–99)
GLUCOSE BLDC GLUCOMTR-MCNC: 180 MG/DL (ref 70–99)
GLUCOSE BLDC GLUCOMTR-MCNC: 181 MG/DL (ref 70–99)
GLUCOSE BLDC GLUCOMTR-MCNC: 182 MG/DL (ref 70–99)
GLUCOSE BLDC GLUCOMTR-MCNC: 182 MG/DL (ref 70–99)
GLUCOSE BLDC GLUCOMTR-MCNC: 184 MG/DL (ref 70–99)
GLUCOSE BLDC GLUCOMTR-MCNC: 186 MG/DL (ref 70–99)
GLUCOSE BLDC GLUCOMTR-MCNC: 187 MG/DL (ref 70–99)
GLUCOSE BLDC GLUCOMTR-MCNC: 187 MG/DL (ref 70–99)
GLUCOSE BLDC GLUCOMTR-MCNC: 193 MG/DL (ref 70–99)
GLUCOSE BLDC GLUCOMTR-MCNC: 198 MG/DL (ref 70–99)
GLUCOSE BLDC GLUCOMTR-MCNC: 208 MG/DL (ref 70–99)
GLUCOSE BLDC GLUCOMTR-MCNC: 212 MG/DL (ref 70–99)
GLUCOSE BLDC GLUCOMTR-MCNC: 241 MG/DL (ref 70–99)
GLUCOSE BLDC GLUCOMTR-MCNC: 256 MG/DL (ref 70–99)
GLUCOSE BLDC GLUCOMTR-MCNC: 72 MG/DL (ref 70–99)
GLUCOSE BLDC GLUCOMTR-MCNC: 75 MG/DL (ref 70–99)
GLUCOSE BLDC GLUCOMTR-MCNC: 78 MG/DL (ref 70–99)
GLUCOSE BLDC GLUCOMTR-MCNC: 80 MG/DL (ref 70–99)
GLUCOSE BLDC GLUCOMTR-MCNC: 84 MG/DL (ref 70–99)
GLUCOSE BLDC GLUCOMTR-MCNC: 89 MG/DL (ref 70–99)
GLUCOSE BLDC GLUCOMTR-MCNC: 91 MG/DL (ref 70–99)
GLUCOSE BLDC GLUCOMTR-MCNC: 92 MG/DL (ref 70–99)
GLUCOSE BLDC GLUCOMTR-MCNC: 94 MG/DL (ref 70–99)
GLUCOSE BLDC GLUCOMTR-MCNC: 94 MG/DL (ref 70–99)
GLUCOSE BLDC GLUCOMTR-MCNC: 95 MG/DL (ref 70–99)
GLUCOSE BLDC GLUCOMTR-MCNC: 95 MG/DL (ref 70–99)
GLUCOSE BLDC GLUCOMTR-MCNC: 97 MG/DL (ref 70–99)
GLUCOSE BLDC GLUCOMTR-MCNC: 99 MG/DL (ref 70–99)
GLUCOSE SERPL-MCNC: 100 MG/DL (ref 70–99)
GLUCOSE SERPL-MCNC: 100 MG/DL (ref 70–99)
GLUCOSE SERPL-MCNC: 105 MG/DL (ref 70–99)
GLUCOSE SERPL-MCNC: 106 MG/DL (ref 70–99)
GLUCOSE SERPL-MCNC: 106 MG/DL (ref 70–99)
GLUCOSE SERPL-MCNC: 107 MG/DL (ref 70–99)
GLUCOSE SERPL-MCNC: 107 MG/DL (ref 70–99)
GLUCOSE SERPL-MCNC: 108 MG/DL (ref 70–99)
GLUCOSE SERPL-MCNC: 108 MG/DL (ref 70–99)
GLUCOSE SERPL-MCNC: 109 MG/DL (ref 70–99)
GLUCOSE SERPL-MCNC: 110 MG/DL (ref 70–99)
GLUCOSE SERPL-MCNC: 112 MG/DL (ref 70–99)
GLUCOSE SERPL-MCNC: 112 MG/DL (ref 70–99)
GLUCOSE SERPL-MCNC: 113 MG/DL (ref 70–99)
GLUCOSE SERPL-MCNC: 113 MG/DL (ref 70–99)
GLUCOSE SERPL-MCNC: 114 MG/DL (ref 70–99)
GLUCOSE SERPL-MCNC: 117 MG/DL (ref 70–99)
GLUCOSE SERPL-MCNC: 120 MG/DL (ref 70–99)
GLUCOSE SERPL-MCNC: 127 MG/DL (ref 70–99)
GLUCOSE SERPL-MCNC: 129 MG/DL (ref 70–99)
GLUCOSE SERPL-MCNC: 129 MG/DL (ref 70–99)
GLUCOSE SERPL-MCNC: 130 MG/DL (ref 70–99)
GLUCOSE SERPL-MCNC: 132 MG/DL (ref 70–99)
GLUCOSE SERPL-MCNC: 136 MG/DL (ref 70–99)
GLUCOSE SERPL-MCNC: 141 MG/DL (ref 70–99)
GLUCOSE SERPL-MCNC: 141 MG/DL (ref 70–99)
GLUCOSE SERPL-MCNC: 142 MG/DL (ref 70–99)
GLUCOSE SERPL-MCNC: 142 MG/DL (ref 70–99)
GLUCOSE SERPL-MCNC: 143 MG/DL (ref 70–99)
GLUCOSE SERPL-MCNC: 146 MG/DL (ref 70–99)
GLUCOSE SERPL-MCNC: 149 MG/DL (ref 70–99)
GLUCOSE SERPL-MCNC: 155 MG/DL (ref 70–99)
GLUCOSE SERPL-MCNC: 157 MG/DL (ref 70–99)
GLUCOSE SERPL-MCNC: 158 MG/DL (ref 70–99)
GLUCOSE SERPL-MCNC: 159 MG/DL (ref 70–99)
GLUCOSE SERPL-MCNC: 164 MG/DL (ref 70–99)
GLUCOSE SERPL-MCNC: 164 MG/DL (ref 70–99)
GLUCOSE SERPL-MCNC: 169 MG/DL (ref 70–99)
GLUCOSE SERPL-MCNC: 175 MG/DL (ref 70–99)
GLUCOSE SERPL-MCNC: 187 MG/DL (ref 70–99)
GLUCOSE SERPL-MCNC: 227 MG/DL (ref 70–99)
GLUCOSE SERPL-MCNC: 92 MG/DL (ref 70–99)
GLUCOSE SERPL-MCNC: 95 MG/DL (ref 70–99)
GLUCOSE SERPL-MCNC: 96 MG/DL (ref 70–99)
GLUCOSE SERPL-MCNC: 98 MG/DL (ref 70–99)
GLUCOSE UR STRIP-MCNC: NEGATIVE MG/DL
HAPTOGLOB SERPL-MCNC: <10 MG/DL (ref 30–200)
HBA1C MFR BLD: 5.1 %
HCO3 BLD-SCNC: 14 MMOL/L (ref 21–28)
HCO3 BLDV-SCNC: 16 MMOL/L (ref 21–28)
HCO3 BLDV-SCNC: 24 MMOL/L (ref 21–28)
HCO3 BLDV-SCNC: 25 MMOL/L (ref 21–28)
HCO3 SERPL-SCNC: 19 MMOL/L (ref 22–29)
HCO3 SERPL-SCNC: 21 MMOL/L (ref 22–29)
HCO3 SERPL-SCNC: 21 MMOL/L (ref 22–29)
HCO3 SERPL-SCNC: 22 MMOL/L (ref 22–29)
HCO3 SERPL-SCNC: 23 MMOL/L (ref 22–29)
HCO3 SERPL-SCNC: 24 MMOL/L (ref 22–29)
HCO3 SERPL-SCNC: 24 MMOL/L (ref 22–29)
HCO3 SERPL-SCNC: 25 MMOL/L (ref 22–29)
HCT VFR BLD AUTO: 12.8 % (ref 40–53)
HCT VFR BLD AUTO: 13.7 % (ref 40–53)
HCT VFR BLD AUTO: 14.4 % (ref 40–53)
HCT VFR BLD AUTO: 15.7 % (ref 40–53)
HCT VFR BLD AUTO: 15.9 % (ref 40–53)
HCT VFR BLD AUTO: 17 % (ref 40–53)
HCT VFR BLD AUTO: 18 % (ref 40–53)
HCT VFR BLD AUTO: 18.8 % (ref 40–53)
HCT VFR BLD AUTO: 19.1 % (ref 40–53)
HCT VFR BLD AUTO: 19.3 % (ref 40–53)
HCT VFR BLD AUTO: 19.3 % (ref 40–53)
HCT VFR BLD AUTO: 19.6 % (ref 40–53)
HCT VFR BLD AUTO: 19.8 % (ref 40–53)
HCT VFR BLD AUTO: 19.8 % (ref 40–53)
HCT VFR BLD AUTO: 19.9 % (ref 40–53)
HCT VFR BLD AUTO: 20 % (ref 40–53)
HCT VFR BLD AUTO: 20.3 % (ref 40–53)
HCT VFR BLD AUTO: 20.3 % (ref 40–53)
HCT VFR BLD AUTO: 20.4 % (ref 40–53)
HCT VFR BLD AUTO: 20.7 % (ref 40–53)
HCT VFR BLD AUTO: 20.7 % (ref 40–53)
HCT VFR BLD AUTO: 20.8 % (ref 40–53)
HCT VFR BLD AUTO: 21 % (ref 40–53)
HCT VFR BLD AUTO: 21.1 % (ref 40–53)
HCT VFR BLD AUTO: 21.2 % (ref 40–53)
HCT VFR BLD AUTO: 21.2 % (ref 40–53)
HCT VFR BLD AUTO: 21.7 % (ref 40–53)
HCT VFR BLD AUTO: 22.1 % (ref 40–53)
HCT VFR BLD AUTO: 22.1 % (ref 40–53)
HCT VFR BLD AUTO: 22.2 % (ref 40–53)
HCT VFR BLD AUTO: 22.3 % (ref 40–53)
HCT VFR BLD AUTO: 22.8 % (ref 40–53)
HCT VFR BLD AUTO: 22.9 % (ref 40–53)
HCT VFR BLD AUTO: 23.1 % (ref 40–53)
HCT VFR BLD AUTO: 23.3 % (ref 40–53)
HCT VFR BLD AUTO: 23.3 % (ref 40–53)
HCT VFR BLD AUTO: 23.4 % (ref 40–53)
HCT VFR BLD AUTO: 23.4 % (ref 40–53)
HCT VFR BLD AUTO: 23.5 % (ref 40–53)
HCT VFR BLD AUTO: 23.7 % (ref 40–53)
HCT VFR BLD AUTO: 24 % (ref 40–53)
HCT VFR BLD AUTO: 25.8 % (ref 40–53)
HCT VFR BLD AUTO: 25.8 % (ref 40–53)
HEMOCCULT STL QL: POSITIVE
HGB BLD-MCNC: 4.4 G/DL (ref 13.3–17.7)
HGB BLD-MCNC: 4.6 G/DL (ref 13.3–17.7)
HGB BLD-MCNC: 4.8 G/DL (ref 13.3–17.7)
HGB BLD-MCNC: 5.5 G/DL (ref 13.3–17.7)
HGB BLD-MCNC: 5.6 G/DL (ref 13.3–17.7)
HGB BLD-MCNC: 5.8 G/DL (ref 13.3–17.7)
HGB BLD-MCNC: 5.8 G/DL (ref 13.3–17.7)
HGB BLD-MCNC: 6.1 G/DL (ref 13.3–17.7)
HGB BLD-MCNC: 6.1 G/DL (ref 13.3–17.7)
HGB BLD-MCNC: 6.2 G/DL (ref 13.3–17.7)
HGB BLD-MCNC: 6.6 G/DL (ref 13.3–17.7)
HGB BLD-MCNC: 6.7 G/DL (ref 13.3–17.7)
HGB BLD-MCNC: 6.8 G/DL (ref 13.3–17.7)
HGB BLD-MCNC: 6.9 G/DL (ref 13.3–17.7)
HGB BLD-MCNC: 7 G/DL (ref 13.3–17.7)
HGB BLD-MCNC: 7.1 G/DL (ref 13.3–17.7)
HGB BLD-MCNC: 7.2 G/DL (ref 13.3–17.7)
HGB BLD-MCNC: 7.3 G/DL (ref 13.3–17.7)
HGB BLD-MCNC: 7.4 G/DL (ref 13.3–17.7)
HGB BLD-MCNC: 7.5 G/DL (ref 13.3–17.7)
HGB BLD-MCNC: 7.6 G/DL (ref 13.3–17.7)
HGB BLD-MCNC: 7.7 G/DL (ref 13.3–17.7)
HGB BLD-MCNC: 7.8 G/DL (ref 13.3–17.7)
HGB BLD-MCNC: 7.8 G/DL (ref 13.3–17.7)
HGB BLD-MCNC: 7.9 G/DL (ref 13.3–17.7)
HGB BLD-MCNC: 8 G/DL (ref 13.3–17.7)
HGB BLD-MCNC: 8.1 G/DL (ref 13.3–17.7)
HGB BLD-MCNC: 8.1 G/DL (ref 13.3–17.7)
HGB BLD-MCNC: 8.2 G/DL (ref 13.3–17.7)
HGB BLD-MCNC: 8.2 G/DL (ref 13.3–17.7)
HGB BLD-MCNC: 8.3 G/DL (ref 13.3–17.7)
HGB BLD-MCNC: 8.5 G/DL (ref 13.3–17.7)
HGB BLD-MCNC: 8.8 G/DL (ref 13.3–17.7)
HGB C CRYSTALS: ABNORMAL
HGB UR QL STRIP: NEGATIVE
HOLD SPECIMEN: NORMAL
HOWELL-JOLLY BOD BLD QL SMEAR: ABNORMAL
HYALINE CASTS: 12 /LPF
HYALINE CASTS: 5 /LPF
IMM GRANULOCYTES # BLD: 0 10E3/UL
IMM GRANULOCYTES # BLD: 0.1 10E3/UL
IMM GRANULOCYTES # BLD: 0.2 10E3/UL
IMM GRANULOCYTES NFR BLD: 0 %
IMM GRANULOCYTES NFR BLD: 1 %
IMM GRANULOCYTES NFR BLD: 2 %
INR PPP: 1.91 (ref 0.85–1.15)
INR PPP: 1.94 (ref 0.85–1.15)
INR PPP: 1.95 (ref 0.85–1.15)
INR PPP: 2.1 (ref 0.85–1.15)
INR PPP: 2.13 (ref 0.85–1.15)
INR PPP: 2.16 (ref 0.85–1.15)
INR PPP: 2.17 (ref 0.85–1.15)
INR PPP: 2.19 (ref 0.85–1.15)
INR PPP: 2.22 (ref 0.85–1.15)
INR PPP: 2.24 (ref 0.85–1.15)
INR PPP: 2.25 (ref 0.85–1.15)
INR PPP: 2.26 (ref 0.85–1.15)
INR PPP: 2.26 (ref 0.85–1.15)
INR PPP: 2.28 (ref 0.85–1.15)
INR PPP: 2.32 (ref 0.85–1.15)
INR PPP: 2.33 (ref 0.85–1.15)
INR PPP: 2.37 (ref 0.85–1.15)
INR PPP: 2.37 (ref 0.85–1.15)
INR PPP: 2.46 (ref 0.85–1.15)
INR PPP: 2.48 (ref 0.85–1.15)
INR PPP: 2.57 (ref 0.85–1.15)
INR PPP: 2.57 (ref 0.85–1.15)
INR PPP: 2.74 (ref 0.85–1.15)
INTERPRETATION ECG - MUSE: NORMAL
IRON BINDING CAPACITY (ROCHE): ABNORMAL
IRON SATN MFR SERPL: ABNORMAL %
IRON SERPL-MCNC: 160 UG/DL (ref 61–157)
IRON SERPL-MCNC: 185 UG/DL (ref 61–157)
IRON SERPL-MCNC: 201 UG/DL (ref 61–157)
ISSUE DATE AND TIME: NORMAL
KETONES UR STRIP-MCNC: ABNORMAL MG/DL
KETONES UR STRIP-MCNC: NEGATIVE MG/DL
KETONES UR STRIP-MCNC: NEGATIVE MG/DL
LABORATORY REPORT: NORMAL
LABORATORY REPORT: NORMAL
LACTATE SERPL-SCNC: 1.4 MMOL/L (ref 0.7–2)
LACTATE SERPL-SCNC: 1.5 MMOL/L (ref 0.7–2)
LACTATE SERPL-SCNC: 1.8 MMOL/L (ref 0.7–2)
LACTATE SERPL-SCNC: 1.9 MMOL/L (ref 0.7–2)
LDH SERPL L TO P-CCNC: 264 U/L (ref 0–250)
LDH SERPL L TO P-CCNC: 305 U/L (ref 0–250)
LEUKOCYTE ESTERASE UR QL STRIP: ABNORMAL
LEUKOCYTE ESTERASE UR QL STRIP: NEGATIVE
LEUKOCYTE ESTERASE UR QL STRIP: NEGATIVE
LIPASE SERPL-CCNC: 32 U/L (ref 13–60)
LIPASE SERPL-CCNC: 56 U/L (ref 13–60)
LVEF ECHO: NORMAL
LYMPHOCYTES # BLD AUTO: 0.4 10E3/UL (ref 0.8–5.3)
LYMPHOCYTES # BLD AUTO: 0.5 10E3/UL (ref 0.8–5.3)
LYMPHOCYTES # BLD AUTO: 0.5 10E3/UL (ref 0.8–5.3)
LYMPHOCYTES # BLD AUTO: 0.6 10E3/UL (ref 0.8–5.3)
LYMPHOCYTES # BLD AUTO: 0.7 10E3/UL (ref 0.8–5.3)
LYMPHOCYTES # BLD AUTO: 0.8 10E3/UL (ref 0.8–5.3)
LYMPHOCYTES # BLD AUTO: 0.9 10E3/UL (ref 0.8–5.3)
LYMPHOCYTES # BLD AUTO: 1.1 10E3/UL (ref 0.8–5.3)
LYMPHOCYTES NFR BLD AUTO: 10 %
LYMPHOCYTES NFR BLD AUTO: 10 %
LYMPHOCYTES NFR BLD AUTO: 11 %
LYMPHOCYTES NFR BLD AUTO: 12 %
LYMPHOCYTES NFR BLD AUTO: 13 %
LYMPHOCYTES NFR BLD AUTO: 15 %
LYMPHOCYTES NFR BLD AUTO: 15 %
LYMPHOCYTES NFR BLD AUTO: 16 %
LYMPHOCYTES NFR BLD AUTO: 17 %
LYMPHOCYTES NFR BLD AUTO: 20 %
LYMPHOCYTES NFR BLD AUTO: 21 %
LYMPHOCYTES NFR BLD AUTO: 24 %
LYMPHOCYTES NFR BLD AUTO: 9 %
LYMPHOCYTES NFR BLD AUTO: 9 %
MAGNESIUM SERPL-MCNC: 1.4 MG/DL (ref 1.7–2.3)
MAGNESIUM SERPL-MCNC: 1.7 MG/DL (ref 1.7–2.3)
MAGNESIUM SERPL-MCNC: 1.7 MG/DL (ref 1.7–2.3)
MAGNESIUM SERPL-MCNC: 1.8 MG/DL (ref 1.7–2.3)
MAGNESIUM SERPL-MCNC: 1.9 MG/DL (ref 1.7–2.3)
MAGNESIUM SERPL-MCNC: 2 MG/DL (ref 1.7–2.3)
MCH RBC QN AUTO: 32 PG (ref 26.5–33)
MCH RBC QN AUTO: 32 PG (ref 26.5–33)
MCH RBC QN AUTO: 32.2 PG (ref 26.5–33)
MCH RBC QN AUTO: 32.4 PG (ref 26.5–33)
MCH RBC QN AUTO: 32.5 PG (ref 26.5–33)
MCH RBC QN AUTO: 32.7 PG (ref 26.5–33)
MCH RBC QN AUTO: 32.7 PG (ref 26.5–33)
MCH RBC QN AUTO: 32.8 PG (ref 26.5–33)
MCH RBC QN AUTO: 33.1 PG (ref 26.5–33)
MCH RBC QN AUTO: 33.2 PG (ref 26.5–33)
MCH RBC QN AUTO: 33.3 PG (ref 26.5–33)
MCH RBC QN AUTO: 33.3 PG (ref 26.5–33)
MCH RBC QN AUTO: 33.5 PG (ref 26.5–33)
MCH RBC QN AUTO: 33.5 PG (ref 26.5–33)
MCH RBC QN AUTO: 33.6 PG (ref 26.5–33)
MCH RBC QN AUTO: 33.8 PG (ref 26.5–33)
MCH RBC QN AUTO: 33.8 PG (ref 26.5–33)
MCH RBC QN AUTO: 33.9 PG (ref 26.5–33)
MCH RBC QN AUTO: 34.1 PG (ref 26.5–33)
MCH RBC QN AUTO: 34.2 PG (ref 26.5–33)
MCH RBC QN AUTO: 34.2 PG (ref 26.5–33)
MCH RBC QN AUTO: 34.6 PG (ref 26.5–33)
MCH RBC QN AUTO: 34.6 PG (ref 26.5–33)
MCH RBC QN AUTO: 34.7 PG (ref 26.5–33)
MCH RBC QN AUTO: 34.8 PG (ref 26.5–33)
MCH RBC QN AUTO: 34.9 PG (ref 26.5–33)
MCH RBC QN AUTO: 35 PG (ref 26.5–33)
MCH RBC QN AUTO: 35.2 PG (ref 26.5–33)
MCH RBC QN AUTO: 35.3 PG (ref 26.5–33)
MCH RBC QN AUTO: 35.8 PG (ref 26.5–33)
MCH RBC QN AUTO: 35.9 PG (ref 26.5–33)
MCH RBC QN AUTO: 36.2 PG (ref 26.5–33)
MCH RBC QN AUTO: 36.7 PG (ref 26.5–33)
MCH RBC QN AUTO: 38.3 PG (ref 26.5–33)
MCH RBC QN AUTO: 38.4 PG (ref 26.5–33)
MCH RBC QN AUTO: 38.7 PG (ref 26.5–33)
MCH RBC QN AUTO: 39.2 PG (ref 26.5–33)
MCH RBC QN AUTO: 40 PG (ref 26.5–33)
MCH RBC QN AUTO: 41.4 PG (ref 26.5–33)
MCHC RBC AUTO-ENTMCNC: 32.9 G/DL (ref 31.5–36.5)
MCHC RBC AUTO-ENTMCNC: 33 G/DL (ref 31.5–36.5)
MCHC RBC AUTO-ENTMCNC: 33.3 G/DL (ref 31.5–36.5)
MCHC RBC AUTO-ENTMCNC: 33.6 G/DL (ref 31.5–36.5)
MCHC RBC AUTO-ENTMCNC: 33.9 G/DL (ref 31.5–36.5)
MCHC RBC AUTO-ENTMCNC: 33.9 G/DL (ref 31.5–36.5)
MCHC RBC AUTO-ENTMCNC: 34 G/DL (ref 31.5–36.5)
MCHC RBC AUTO-ENTMCNC: 34.1 G/DL (ref 31.5–36.5)
MCHC RBC AUTO-ENTMCNC: 34.2 G/DL (ref 31.5–36.5)
MCHC RBC AUTO-ENTMCNC: 34.2 G/DL (ref 31.5–36.5)
MCHC RBC AUTO-ENTMCNC: 34.3 G/DL (ref 31.5–36.5)
MCHC RBC AUTO-ENTMCNC: 34.4 G/DL (ref 31.5–36.5)
MCHC RBC AUTO-ENTMCNC: 34.4 G/DL (ref 31.5–36.5)
MCHC RBC AUTO-ENTMCNC: 34.5 G/DL (ref 31.5–36.5)
MCHC RBC AUTO-ENTMCNC: 34.5 G/DL (ref 31.5–36.5)
MCHC RBC AUTO-ENTMCNC: 34.6 G/DL (ref 31.5–36.5)
MCHC RBC AUTO-ENTMCNC: 34.8 G/DL (ref 31.5–36.5)
MCHC RBC AUTO-ENTMCNC: 34.8 G/DL (ref 31.5–36.5)
MCHC RBC AUTO-ENTMCNC: 34.9 G/DL (ref 31.5–36.5)
MCHC RBC AUTO-ENTMCNC: 35 G/DL (ref 31.5–36.5)
MCHC RBC AUTO-ENTMCNC: 35.1 G/DL (ref 31.5–36.5)
MCHC RBC AUTO-ENTMCNC: 35.2 G/DL (ref 31.5–36.5)
MCHC RBC AUTO-ENTMCNC: 35.2 G/DL (ref 31.5–36.5)
MCHC RBC AUTO-ENTMCNC: 35.3 G/DL (ref 31.5–36.5)
MCHC RBC AUTO-ENTMCNC: 35.4 G/DL (ref 31.5–36.5)
MCHC RBC AUTO-ENTMCNC: 35.5 G/DL (ref 31.5–36.5)
MCHC RBC AUTO-ENTMCNC: 35.5 G/DL (ref 31.5–36.5)
MCHC RBC AUTO-ENTMCNC: 35.6 G/DL (ref 31.5–36.5)
MCHC RBC AUTO-ENTMCNC: 35.6 G/DL (ref 31.5–36.5)
MCHC RBC AUTO-ENTMCNC: 35.7 G/DL (ref 31.5–36.5)
MCHC RBC AUTO-ENTMCNC: 35.8 G/DL (ref 31.5–36.5)
MCV RBC AUTO: 100 FL (ref 78–100)
MCV RBC AUTO: 101 FL (ref 78–100)
MCV RBC AUTO: 102 FL (ref 78–100)
MCV RBC AUTO: 103 FL (ref 78–100)
MCV RBC AUTO: 103 FL (ref 78–100)
MCV RBC AUTO: 106 FL (ref 78–100)
MCV RBC AUTO: 110 FL (ref 78–100)
MCV RBC AUTO: 110 FL (ref 78–100)
MCV RBC AUTO: 113 FL (ref 78–100)
MCV RBC AUTO: 116 FL (ref 78–100)
MCV RBC AUTO: 116 FL (ref 78–100)
MCV RBC AUTO: 121 FL (ref 78–100)
MCV RBC AUTO: 93 FL (ref 78–100)
MCV RBC AUTO: 94 FL (ref 78–100)
MCV RBC AUTO: 95 FL (ref 78–100)
MCV RBC AUTO: 96 FL (ref 78–100)
MCV RBC AUTO: 97 FL (ref 78–100)
MCV RBC AUTO: 98 FL (ref 78–100)
MCV RBC AUTO: 98 FL (ref 78–100)
MCV RBC AUTO: 99 FL (ref 78–100)
MCV RBC AUTO: 99 FL (ref 78–100)
MONOCYTES # BLD AUTO: 0.3 10E3/UL (ref 0–1.3)
MONOCYTES # BLD AUTO: 0.4 10E3/UL (ref 0–1.3)
MONOCYTES # BLD AUTO: 0.5 10E3/UL (ref 0–1.3)
MONOCYTES # BLD AUTO: 0.6 10E3/UL (ref 0–1.3)
MONOCYTES # BLD AUTO: 0.6 10E3/UL (ref 0–1.3)
MONOCYTES # BLD AUTO: 0.7 10E3/UL (ref 0–1.3)
MONOCYTES # BLD AUTO: 0.8 10E3/UL (ref 0–1.3)
MONOCYTES # BLD AUTO: 0.8 10E3/UL (ref 0–1.3)
MONOCYTES # BLD AUTO: 0.9 10E3/UL (ref 0–1.3)
MONOCYTES # BLD AUTO: 0.9 10E3/UL (ref 0–1.3)
MONOCYTES # BLD AUTO: 1 10E3/UL (ref 0–1.3)
MONOCYTES # BLD AUTO: 1 10E3/UL (ref 0–1.3)
MONOCYTES # BLD AUTO: 1.2 10E3/UL (ref 0–1.3)
MONOCYTES NFR BLD AUTO: 11 %
MONOCYTES NFR BLD AUTO: 12 %
MONOCYTES NFR BLD AUTO: 12 %
MONOCYTES NFR BLD AUTO: 13 %
MONOCYTES NFR BLD AUTO: 13 %
MONOCYTES NFR BLD AUTO: 14 %
MONOCYTES NFR BLD AUTO: 14 %
MONOCYTES NFR BLD AUTO: 15 %
MONOCYTES NFR BLD AUTO: 16 %
MONOCYTES NFR BLD AUTO: 17 %
MONOCYTES NFR BLD AUTO: 18 %
MONOCYTES NFR BLD AUTO: 18 %
MONOCYTES NFR BLD AUTO: 21 %
MONOCYTES NFR BLD AUTO: 5 %
MONOCYTES NFR BLD AUTO: 9 %
MONOCYTES NFR BLD AUTO: 9 %
MUCOUS THREADS #/AREA URNS LPF: PRESENT /LPF
MUCOUS THREADS #/AREA URNS LPF: PRESENT /LPF
NEUTROPHILS # BLD AUTO: 1.6 10E3/UL (ref 1.6–8.3)
NEUTROPHILS # BLD AUTO: 1.6 10E3/UL (ref 1.6–8.3)
NEUTROPHILS # BLD AUTO: 1.9 10E3/UL (ref 1.6–8.3)
NEUTROPHILS # BLD AUTO: 2.1 10E3/UL (ref 1.6–8.3)
NEUTROPHILS # BLD AUTO: 2.3 10E3/UL (ref 1.6–8.3)
NEUTROPHILS # BLD AUTO: 3 10E3/UL (ref 1.6–8.3)
NEUTROPHILS # BLD AUTO: 3.2 10E3/UL (ref 1.6–8.3)
NEUTROPHILS # BLD AUTO: 3.3 10E3/UL (ref 1.6–8.3)
NEUTROPHILS # BLD AUTO: 3.6 10E3/UL (ref 1.6–8.3)
NEUTROPHILS # BLD AUTO: 3.6 10E3/UL (ref 1.6–8.3)
NEUTROPHILS # BLD AUTO: 3.7 10E3/UL (ref 1.6–8.3)
NEUTROPHILS # BLD AUTO: 3.7 10E3/UL (ref 1.6–8.3)
NEUTROPHILS # BLD AUTO: 3.9 10E3/UL (ref 1.6–8.3)
NEUTROPHILS # BLD AUTO: 3.9 10E3/UL (ref 1.6–8.3)
NEUTROPHILS # BLD AUTO: 4.1 10E3/UL (ref 1.6–8.3)
NEUTROPHILS # BLD AUTO: 4.5 10E3/UL (ref 1.6–8.3)
NEUTROPHILS # BLD AUTO: 4.7 10E3/UL (ref 1.6–8.3)
NEUTROPHILS # BLD AUTO: 5.2 10E3/UL (ref 1.6–8.3)
NEUTROPHILS # BLD AUTO: 5.6 10E3/UL (ref 1.6–8.3)
NEUTROPHILS # BLD AUTO: 7.1 10E3/UL (ref 1.6–8.3)
NEUTROPHILS NFR BLD AUTO: 54 %
NEUTROPHILS NFR BLD AUTO: 54 %
NEUTROPHILS NFR BLD AUTO: 59 %
NEUTROPHILS NFR BLD AUTO: 60 %
NEUTROPHILS NFR BLD AUTO: 63 %
NEUTROPHILS NFR BLD AUTO: 64 %
NEUTROPHILS NFR BLD AUTO: 64 %
NEUTROPHILS NFR BLD AUTO: 65 %
NEUTROPHILS NFR BLD AUTO: 66 %
NEUTROPHILS NFR BLD AUTO: 68 %
NEUTROPHILS NFR BLD AUTO: 68 %
NEUTROPHILS NFR BLD AUTO: 69 %
NEUTROPHILS NFR BLD AUTO: 70 %
NEUTROPHILS NFR BLD AUTO: 70 %
NEUTROPHILS NFR BLD AUTO: 71 %
NEUTROPHILS NFR BLD AUTO: 73 %
NEUTROPHILS NFR BLD AUTO: 75 %
NEUTROPHILS NFR BLD AUTO: 77 %
NEUTROPHILS NFR BLD AUTO: 80 %
NEUTROPHILS NFR BLD AUTO: 81 %
NEUTS HYPERSEG BLD QL SMEAR: ABNORMAL
NITRATE UR QL: NEGATIVE
NRBC # BLD AUTO: 0 10E3/UL
NRBC BLD AUTO-RTO: 0 /100
NT-PROBNP SERPL-MCNC: 1305 PG/ML (ref 0–450)
NT-PROBNP SERPL-MCNC: 622 PG/ML (ref 0–450)
O2/TOTAL GAS SETTING VFR VENT: 0 %
O2/TOTAL GAS SETTING VFR VENT: 0 %
O2/TOTAL GAS SETTING VFR VENT: 30 %
O2/TOTAL GAS SETTING VFR VENT: 30 %
OSMOLALITY SERPL: 274 MMOL/KG (ref 275–295)
OSMOLALITY UR: 325 MMOL/KG (ref 100–1200)
OSMOLALITY UR: 351 MMOL/KG (ref 100–1200)
OSMOLALITY UR: 389 MMOL/KG (ref 100–1200)
OXYHGB MFR BLDV: 76 % (ref 70–75)
OXYHGB MFR BLDV: 91 % (ref 70–75)
OXYHGB MFR BLDV: 93 % (ref 70–75)
P AXIS - MUSE: 27 DEGREES
P AXIS - MUSE: 40 DEGREES
P AXIS - MUSE: 51 DEGREES
P AXIS - MUSE: 53 DEGREES
PATH REPORT.COMMENTS IMP SPEC: NORMAL
PATH REPORT.FINAL DX SPEC: NORMAL
PATH REPORT.FINAL DX SPEC: NORMAL
PATH REPORT.MICROSCOPIC SPEC OTHER STN: NORMAL
PCO2 BLD: 17 MM HG (ref 35–45)
PCO2 BLDV: 28 MM HG (ref 40–50)
PCO2 BLDV: 31 MM HG (ref 40–50)
PCO2 BLDV: 32 MM HG (ref 40–50)
PEEP: 5 CM H2O
PETH INTERPRETATION: NORMAL
PETH INTERPRETATION: NORMAL
PH BLD: 7.51 [PH] (ref 7.35–7.45)
PH BLDV: 7.36 [PH] (ref 7.32–7.43)
PH BLDV: 7.49 [PH] (ref 7.32–7.43)
PH BLDV: 7.5 [PH] (ref 7.32–7.43)
PH UR STRIP: 5.5 [PH] (ref 5–7)
PH UR STRIP: 5.5 [PH] (ref 5–7)
PH UR STRIP: 6 [PH] (ref 5–7)
PHOSPHATE SERPL-MCNC: 3 MG/DL (ref 2.5–4.5)
PHOSPHATE SERPL-MCNC: 3.1 MG/DL (ref 2.5–4.5)
PHOSPHATE SERPL-MCNC: 3.5 MG/DL (ref 2.5–4.5)
PHOSPHATE SERPL-MCNC: 3.6 MG/DL (ref 2.5–4.5)
PHOSPHATE SERPL-MCNC: 4.1 MG/DL (ref 2.5–4.5)
PHOSPHATE SERPL-MCNC: 4.2 MG/DL (ref 2.5–4.5)
PHOSPHATE SERPL-MCNC: 5.2 MG/DL (ref 2.5–4.5)
PHOSPHATE SERPL-MCNC: 5.7 MG/DL (ref 2.5–4.5)
PLAT MORPH BLD: ABNORMAL
PLATELET # BLD AUTO: 102 10E3/UL (ref 150–450)
PLATELET # BLD AUTO: 103 10E3/UL (ref 150–450)
PLATELET # BLD AUTO: 105 10E3/UL (ref 150–450)
PLATELET # BLD AUTO: 107 10E3/UL (ref 150–450)
PLATELET # BLD AUTO: 108 10E3/UL (ref 150–450)
PLATELET # BLD AUTO: 112 10E3/UL (ref 150–450)
PLATELET # BLD AUTO: 116 10E3/UL (ref 150–450)
PLATELET # BLD AUTO: 117 10E3/UL (ref 150–450)
PLATELET # BLD AUTO: 118 10E3/UL (ref 150–450)
PLATELET # BLD AUTO: 121 10E3/UL (ref 150–450)
PLATELET # BLD AUTO: 125 10E3/UL (ref 150–450)
PLATELET # BLD AUTO: 127 10E3/UL (ref 150–450)
PLATELET # BLD AUTO: 132 10E3/UL (ref 150–450)
PLATELET # BLD AUTO: 139 10E3/UL (ref 150–450)
PLATELET # BLD AUTO: 146 10E3/UL (ref 150–450)
PLATELET # BLD AUTO: 154 10E3/UL (ref 150–450)
PLATELET # BLD AUTO: 221 10E3/UL (ref 150–450)
PLATELET # BLD AUTO: 53 10E3/UL (ref 150–450)
PLATELET # BLD AUTO: 55 10E3/UL (ref 150–450)
PLATELET # BLD AUTO: 59 10E3/UL (ref 150–450)
PLATELET # BLD AUTO: 61 10E3/UL (ref 150–450)
PLATELET # BLD AUTO: 63 10E3/UL (ref 150–450)
PLATELET # BLD AUTO: 65 10E3/UL (ref 150–450)
PLATELET # BLD AUTO: 67 10E3/UL (ref 150–450)
PLATELET # BLD AUTO: 69 10E3/UL (ref 150–450)
PLATELET # BLD AUTO: 69 10E3/UL (ref 150–450)
PLATELET # BLD AUTO: 70 10E3/UL (ref 150–450)
PLATELET # BLD AUTO: 71 10E3/UL (ref 150–450)
PLATELET # BLD AUTO: 75 10E3/UL (ref 150–450)
PLATELET # BLD AUTO: 77 10E3/UL (ref 150–450)
PLATELET # BLD AUTO: 78 10E3/UL (ref 150–450)
PLATELET # BLD AUTO: 79 10E3/UL (ref 150–450)
PLATELET # BLD AUTO: 79 10E3/UL (ref 150–450)
PLATELET # BLD AUTO: 84 10E3/UL (ref 150–450)
PLATELET # BLD AUTO: 85 10E3/UL (ref 150–450)
PLATELET # BLD AUTO: 85 10E3/UL (ref 150–450)
PLATELET # BLD AUTO: 86 10E3/UL (ref 150–450)
PLATELET # BLD AUTO: 87 10E3/UL (ref 150–450)
PLATELET # BLD AUTO: 88 10E3/UL (ref 150–450)
PLPETH BLD-MCNC: 44 NG/ML
PLPETH BLD-MCNC: 77 NG/ML
PO2 BLD: 157 MM HG (ref 80–105)
PO2 BLDV: 41 MM HG (ref 25–47)
PO2 BLDV: 61 MM HG (ref 25–47)
PO2 BLDV: 69 MM HG (ref 25–47)
POLYCHROMASIA BLD QL SMEAR: ABNORMAL
POPETH BLD-MCNC: 47 NG/ML
POPETH BLD-MCNC: 77 NG/ML
POTASSIUM SERPL-SCNC: 3.5 MMOL/L (ref 3.4–5.3)
POTASSIUM SERPL-SCNC: 3.6 MMOL/L (ref 3.4–5.3)
POTASSIUM SERPL-SCNC: 3.8 MMOL/L (ref 3.4–5.3)
POTASSIUM SERPL-SCNC: 3.9 MMOL/L (ref 3.4–5.3)
POTASSIUM SERPL-SCNC: 4 MMOL/L (ref 3.4–5.3)
POTASSIUM SERPL-SCNC: 4 MMOL/L (ref 3.4–5.3)
POTASSIUM SERPL-SCNC: 4.1 MMOL/L (ref 3.4–5.3)
POTASSIUM SERPL-SCNC: 4.2 MMOL/L (ref 3.4–5.3)
POTASSIUM SERPL-SCNC: 4.2 MMOL/L (ref 3.4–5.3)
POTASSIUM SERPL-SCNC: 4.3 MMOL/L (ref 3.4–5.3)
POTASSIUM SERPL-SCNC: 4.4 MMOL/L (ref 3.4–5.3)
POTASSIUM SERPL-SCNC: 4.5 MMOL/L (ref 3.4–5.3)
POTASSIUM SERPL-SCNC: 4.6 MMOL/L (ref 3.4–5.3)
POTASSIUM SERPL-SCNC: 4.7 MMOL/L (ref 3.4–5.3)
POTASSIUM SERPL-SCNC: 4.8 MMOL/L (ref 3.4–5.3)
POTASSIUM SERPL-SCNC: 4.9 MMOL/L (ref 3.4–5.3)
POTASSIUM SERPL-SCNC: 4.9 MMOL/L (ref 3.4–5.3)
POTASSIUM SERPL-SCNC: 5 MMOL/L (ref 3.4–5.3)
POTASSIUM SERPL-SCNC: 5 MMOL/L (ref 3.4–5.3)
POTASSIUM SERPL-SCNC: 5.1 MMOL/L (ref 3.4–5.3)
POTASSIUM SERPL-SCNC: 5.1 MMOL/L (ref 3.4–5.3)
POTASSIUM SERPL-SCNC: 5.2 MMOL/L (ref 3.4–5.3)
POTASSIUM SERPL-SCNC: 5.3 MMOL/L (ref 3.4–5.3)
POTASSIUM SERPL-SCNC: 5.3 MMOL/L (ref 3.4–5.3)
POTASSIUM SERPL-SCNC: 5.4 MMOL/L (ref 3.4–5.3)
POTASSIUM SERPL-SCNC: 5.4 MMOL/L (ref 3.4–5.3)
POTASSIUM SERPL-SCNC: 5.5 MMOL/L (ref 3.4–5.3)
POTASSIUM SERPL-SCNC: 5.6 MMOL/L (ref 3.4–5.3)
POTASSIUM SERPL-SCNC: 5.6 MMOL/L (ref 3.4–5.3)
POTASSIUM SERPL-SCNC: 5.7 MMOL/L (ref 3.4–5.3)
POTASSIUM SERPL-SCNC: 5.7 MMOL/L (ref 3.4–5.3)
POTASSIUM SERPL-SCNC: 6 MMOL/L (ref 3.4–5.3)
POTASSIUM SERPL-SCNC: 6.1 MMOL/L (ref 3.4–5.3)
POTASSIUM SERPL-SCNC: 6.1 MMOL/L (ref 3.4–5.3)
POTASSIUM SERPL-SCNC: 6.2 MMOL/L (ref 3.4–5.3)
PR INTERVAL - MUSE: 142 MS
PR INTERVAL - MUSE: 170 MS
PR INTERVAL - MUSE: 182 MS
PR INTERVAL - MUSE: 198 MS
PROCALCITONIN SERPL IA-MCNC: 0.18 NG/ML
PROCALCITONIN SERPL IA-MCNC: 0.36 NG/ML
PROT SERPL-MCNC: 5.4 G/DL (ref 6.4–8.3)
PROT SERPL-MCNC: 5.6 G/DL (ref 6.4–8.3)
PROT SERPL-MCNC: 5.6 G/DL (ref 6.4–8.3)
PROT SERPL-MCNC: 5.7 G/DL (ref 6.4–8.3)
PROT SERPL-MCNC: 5.8 G/DL (ref 6.4–8.3)
PROT SERPL-MCNC: 5.8 G/DL (ref 6.4–8.3)
PROT SERPL-MCNC: 5.9 G/DL (ref 6.4–8.3)
PROT SERPL-MCNC: 6 G/DL (ref 6.4–8.3)
PROT SERPL-MCNC: 6.1 G/DL (ref 6.4–8.3)
PROT SERPL-MCNC: 6.2 G/DL (ref 6.4–8.3)
PROT SERPL-MCNC: 6.3 G/DL (ref 6.4–8.3)
PROT SERPL-MCNC: 6.4 G/DL (ref 6.4–8.3)
PROT SERPL-MCNC: 6.5 G/DL (ref 6.4–8.3)
PROT SERPL-MCNC: 6.6 G/DL (ref 6.4–8.3)
PROT SERPL-MCNC: 6.6 G/DL (ref 6.4–8.3)
PROT SERPL-MCNC: 6.7 G/DL (ref 6.4–8.3)
PROT SERPL-MCNC: 6.8 G/DL (ref 6.4–8.3)
PROT SERPL-MCNC: 6.9 G/DL (ref 6.4–8.3)
PROT SERPL-MCNC: 7 G/DL (ref 6.4–8.3)
PROT SERPL-MCNC: 7.1 G/DL (ref 6.4–8.3)
PROT SERPL-MCNC: 7.8 G/DL (ref 6.4–8.3)
PROT SERPL-MCNC: 8.1 G/DL (ref 6.4–8.3)
QRS DURATION - MUSE: 104 MS
QRS DURATION - MUSE: 96 MS
QRS DURATION - MUSE: 98 MS
QRS DURATION - MUSE: 98 MS
QT - MUSE: 348 MS
QT - MUSE: 380 MS
QT - MUSE: 382 MS
QT - MUSE: 382 MS
QTC - MUSE: 437 MS
QTC - MUSE: 451 MS
QTC - MUSE: 454 MS
QTC - MUSE: 482 MS
R AXIS - MUSE: 13 DEGREES
R AXIS - MUSE: 19 DEGREES
R AXIS - MUSE: 25 DEGREES
R AXIS - MUSE: 26 DEGREES
RBC # BLD AUTO: 1.1 10E6/UL (ref 4.4–5.9)
RBC # BLD AUTO: 1.24 10E6/UL (ref 4.4–5.9)
RBC # BLD AUTO: 1.33 10E6/UL (ref 4.4–5.9)
RBC # BLD AUTO: 1.4 10E6/UL (ref 4.4–5.9)
RBC # BLD AUTO: 1.43 10E6/UL (ref 4.4–5.9)
RBC # BLD AUTO: 1.56 10E6/UL (ref 4.4–5.9)
RBC # BLD AUTO: 1.79 10E6/UL (ref 4.4–5.9)
RBC # BLD AUTO: 1.8 10E6/UL (ref 4.4–5.9)
RBC # BLD AUTO: 1.85 10E6/UL (ref 4.4–5.9)
RBC # BLD AUTO: 1.85 10E6/UL (ref 4.4–5.9)
RBC # BLD AUTO: 1.95 10E6/UL (ref 4.4–5.9)
RBC # BLD AUTO: 1.96 10E6/UL (ref 4.4–5.9)
RBC # BLD AUTO: 1.97 10E6/UL (ref 4.4–5.9)
RBC # BLD AUTO: 1.98 10E6/UL (ref 4.4–5.9)
RBC # BLD AUTO: 1.99 10E6/UL (ref 4.4–5.9)
RBC # BLD AUTO: 2.02 10E6/UL (ref 4.4–5.9)
RBC # BLD AUTO: 2.07 10E6/UL (ref 4.4–5.9)
RBC # BLD AUTO: 2.1 10E6/UL (ref 4.4–5.9)
RBC # BLD AUTO: 2.1 10E6/UL (ref 4.4–5.9)
RBC # BLD AUTO: 2.12 10E6/UL (ref 4.4–5.9)
RBC # BLD AUTO: 2.16 10E6/UL (ref 4.4–5.9)
RBC # BLD AUTO: 2.17 10E6/UL (ref 4.4–5.9)
RBC # BLD AUTO: 2.21 10E6/UL (ref 4.4–5.9)
RBC # BLD AUTO: 2.22 10E6/UL (ref 4.4–5.9)
RBC # BLD AUTO: 2.22 10E6/UL (ref 4.4–5.9)
RBC # BLD AUTO: 2.26 10E6/UL (ref 4.4–5.9)
RBC # BLD AUTO: 2.27 10E6/UL (ref 4.4–5.9)
RBC # BLD AUTO: 2.28 10E6/UL (ref 4.4–5.9)
RBC # BLD AUTO: 2.29 10E6/UL (ref 4.4–5.9)
RBC # BLD AUTO: 2.3 10E6/UL (ref 4.4–5.9)
RBC # BLD AUTO: 2.33 10E6/UL (ref 4.4–5.9)
RBC # BLD AUTO: 2.35 10E6/UL (ref 4.4–5.9)
RBC # BLD AUTO: 2.37 10E6/UL (ref 4.4–5.9)
RBC # BLD AUTO: 2.37 10E6/UL (ref 4.4–5.9)
RBC # BLD AUTO: 2.38 10E6/UL (ref 4.4–5.9)
RBC # BLD AUTO: 2.43 10E6/UL (ref 4.4–5.9)
RBC # BLD AUTO: 2.45 10E6/UL (ref 4.4–5.9)
RBC # BLD AUTO: 2.45 10E6/UL (ref 4.4–5.9)
RBC # BLD AUTO: 2.53 10E6/UL (ref 4.4–5.9)
RBC # BLD AUTO: 2.57 10E6/UL (ref 4.4–5.9)
RBC # BLD AUTO: 2.68 10E6/UL (ref 4.4–5.9)
RBC AGGLUT BLD QL: ABNORMAL
RBC MORPH BLD: ABNORMAL
RBC URINE: 0 /HPF
RBC URINE: 1 /HPF
RBC URINE: 1 /HPF
RETICS # AUTO: 0.07 10E6/UL (ref 0.03–0.1)
RETICS # AUTO: 0.07 10E6/UL (ref 0.03–0.1)
RETICS # AUTO: 0.08 10E6/UL (ref 0.03–0.1)
RETICS/RBC NFR AUTO: 2.7 % (ref 0.5–2)
RETICS/RBC NFR AUTO: 3.8 % (ref 0.5–2)
RETICS/RBC NFR AUTO: 3.8 % (ref 0.5–2)
ROULEAUX BLD QL SMEAR: ABNORMAL
RSV RNA SPEC NAA+PROBE: NEGATIVE
SAO2 % BLDA: 97 % (ref 92–100)
SAO2 % BLDV: 78.1 % (ref 70–75)
SAO2 % BLDV: 93.1 % (ref 70–75)
SAO2 % BLDV: 95.9 % (ref 70–75)
SARS-COV-2 RNA RESP QL NAA+PROBE: NEGATIVE
SCANNED LAB RESULT: NORMAL
SICKLE CELLS BLD QL SMEAR: ABNORMAL
SMUDGE CELLS BLD QL SMEAR: ABNORMAL
SODIUM SERPL-SCNC: 117 MMOL/L (ref 135–145)
SODIUM SERPL-SCNC: 118 MMOL/L (ref 135–145)
SODIUM SERPL-SCNC: 119 MMOL/L (ref 135–145)
SODIUM SERPL-SCNC: 119 MMOL/L (ref 135–145)
SODIUM SERPL-SCNC: 121 MMOL/L (ref 135–145)
SODIUM SERPL-SCNC: 121 MMOL/L (ref 135–145)
SODIUM SERPL-SCNC: 122 MMOL/L (ref 135–145)
SODIUM SERPL-SCNC: 123 MMOL/L (ref 135–145)
SODIUM SERPL-SCNC: 124 MMOL/L (ref 135–145)
SODIUM SERPL-SCNC: 125 MMOL/L (ref 135–145)
SODIUM SERPL-SCNC: 126 MMOL/L (ref 135–145)
SODIUM SERPL-SCNC: 127 MMOL/L (ref 135–145)
SODIUM SERPL-SCNC: 128 MMOL/L (ref 135–145)
SODIUM SERPL-SCNC: 128 MMOL/L (ref 135–145)
SODIUM SERPL-SCNC: 129 MMOL/L (ref 135–145)
SODIUM SERPL-SCNC: 130 MMOL/L (ref 135–145)
SODIUM SERPL-SCNC: 131 MMOL/L (ref 135–145)
SODIUM SERPL-SCNC: 132 MMOL/L (ref 135–145)
SODIUM SERPL-SCNC: 132 MMOL/L (ref 135–145)
SODIUM SERPL-SCNC: 133 MMOL/L (ref 135–145)
SODIUM SERPL-SCNC: 133 MMOL/L (ref 135–145)
SODIUM SERPL-SCNC: 134 MMOL/L (ref 135–145)
SODIUM SERPL-SCNC: 135 MMOL/L (ref 135–145)
SODIUM SERPL-SCNC: 136 MMOL/L (ref 135–145)
SODIUM SERPL-SCNC: 137 MMOL/L (ref 135–145)
SODIUM SERPL-SCNC: 137 MMOL/L (ref 135–145)
SODIUM SERPL-SCNC: 138 MMOL/L (ref 135–145)
SODIUM SERPL-SCNC: 138 MMOL/L (ref 135–145)
SODIUM SERPL-SCNC: 139 MMOL/L (ref 135–145)
SODIUM SERPL-SCNC: 140 MMOL/L (ref 135–145)
SODIUM UR-SCNC: 106 MMOL/L
SODIUM UR-SCNC: 22 MMOL/L
SODIUM UR-SCNC: 32 MMOL/L
SODIUM UR-SCNC: 63 MMOL/L
SODIUM UR-SCNC: <20 MMOL/L
SP GR UR STRIP: 1.01 (ref 1–1.03)
SP GR UR STRIP: 1.02 (ref 1–1.03)
SP GR UR STRIP: 1.03 (ref 1–1.03)
SPECIMEN EXPIRATION DATE: NORMAL
SPHEROCYTES BLD QL SMEAR: ABNORMAL
SQUAMOUS EPITHELIAL: <1 /HPF
SQUAMOUS EPITHELIAL: <1 /HPF
STOMATOCYTES BLD QL SMEAR: ABNORMAL
SYSTOLIC BLOOD PRESSURE - MUSE: NORMAL MMHG
T AXIS - MUSE: 37 DEGREES
T AXIS - MUSE: 40 DEGREES
T AXIS - MUSE: 40 DEGREES
T AXIS - MUSE: 44 DEGREES
TARGETS BLD QL SMEAR: ABNORMAL
TOXIC GRANULES BLD QL SMEAR: ABNORMAL
TROPONIN T SERPL HS-MCNC: 59 NG/L
TROPONIN T SERPL HS-MCNC: 65 NG/L
UNIT ABO/RH: NORMAL
UNIT NUMBER: NORMAL
UNIT STATUS: NORMAL
UNIT TYPE ISBT: 5100
UNIT TYPE ISBT: 5100
UNIT TYPE ISBT: 600
UNIT TYPE ISBT: 6200
UPPER GI ENDOSCOPY: NORMAL
UPPER GI ENDOSCOPY: NORMAL
UROBILINOGEN UR STRIP-MCNC: 2 MG/DL
UROBILINOGEN UR STRIP-MCNC: NORMAL MG/DL
UROBILINOGEN UR STRIP-MCNC: NORMAL MG/DL
VARIANT LYMPHS BLD QL SMEAR: ABNORMAL
VENTRICULAR RATE- MUSE: 84 BPM
VENTRICULAR RATE- MUSE: 85 BPM
VENTRICULAR RATE- MUSE: 95 BPM
VENTRICULAR RATE- MUSE: 97 BPM
VIT B12 SERPL-MCNC: 1167 PG/ML (ref 232–1245)
VIT B12 SERPL-MCNC: 1201 PG/ML (ref 232–1245)
WBC # BLD AUTO: 10 10E3/UL (ref 4–11)
WBC # BLD AUTO: 10.7 10E3/UL (ref 4–11)
WBC # BLD AUTO: 11.1 10E3/UL (ref 4–11)
WBC # BLD AUTO: 11.5 10E3/UL (ref 4–11)
WBC # BLD AUTO: 13 10E3/UL (ref 4–11)
WBC # BLD AUTO: 2.7 10E3/UL (ref 4–11)
WBC # BLD AUTO: 3 10E3/UL (ref 4–11)
WBC # BLD AUTO: 3.3 10E3/UL (ref 4–11)
WBC # BLD AUTO: 3.3 10E3/UL (ref 4–11)
WBC # BLD AUTO: 3.6 10E3/UL (ref 4–11)
WBC # BLD AUTO: 3.6 10E3/UL (ref 4–11)
WBC # BLD AUTO: 3.9 10E3/UL (ref 4–11)
WBC # BLD AUTO: 3.9 10E3/UL (ref 4–11)
WBC # BLD AUTO: 4.1 10E3/UL (ref 4–11)
WBC # BLD AUTO: 4.2 10E3/UL (ref 4–11)
WBC # BLD AUTO: 4.8 10E3/UL (ref 4–11)
WBC # BLD AUTO: 4.8 10E3/UL (ref 4–11)
WBC # BLD AUTO: 4.9 10E3/UL (ref 4–11)
WBC # BLD AUTO: 5.1 10E3/UL (ref 4–11)
WBC # BLD AUTO: 5.1 10E3/UL (ref 4–11)
WBC # BLD AUTO: 5.5 10E3/UL (ref 4–11)
WBC # BLD AUTO: 5.5 10E3/UL (ref 4–11)
WBC # BLD AUTO: 5.7 10E3/UL (ref 4–11)
WBC # BLD AUTO: 5.7 10E3/UL (ref 4–11)
WBC # BLD AUTO: 5.8 10E3/UL (ref 4–11)
WBC # BLD AUTO: 6 10E3/UL (ref 4–11)
WBC # BLD AUTO: 6 10E3/UL (ref 4–11)
WBC # BLD AUTO: 6.1 10E3/UL (ref 4–11)
WBC # BLD AUTO: 6.4 10E3/UL (ref 4–11)
WBC # BLD AUTO: 6.7 10E3/UL (ref 4–11)
WBC # BLD AUTO: 6.8 10E3/UL (ref 4–11)
WBC # BLD AUTO: 7 10E3/UL (ref 4–11)
WBC # BLD AUTO: 7.3 10E3/UL (ref 4–11)
WBC # BLD AUTO: 7.3 10E3/UL (ref 4–11)
WBC # BLD AUTO: 7.7 10E3/UL (ref 4–11)
WBC # BLD AUTO: 7.7 10E3/UL (ref 4–11)
WBC # BLD AUTO: 7.9 10E3/UL (ref 4–11)
WBC # BLD AUTO: 8.1 10E3/UL (ref 4–11)
WBC # BLD AUTO: 8.6 10E3/UL (ref 4–11)
WBC # BLD AUTO: 9.2 10E3/UL (ref 4–11)
WBC # BLD AUTO: 9.5 10E3/UL (ref 4–11)
WBC URINE: 1 /HPF
WBC URINE: 3 /HPF
WBC URINE: 3 /HPF

## 2024-01-01 PROCEDURE — 250N000009 HC RX 250: Performed by: INTERNAL MEDICINE

## 2024-01-01 PROCEDURE — 258N000003 HC RX IP 258 OP 636: Performed by: INTERNAL MEDICINE

## 2024-01-01 PROCEDURE — 250N000013 HC RX MED GY IP 250 OP 250 PS 637: Performed by: PHYSICIAN ASSISTANT

## 2024-01-01 PROCEDURE — 85025 COMPLETE CBC W/AUTO DIFF WBC: CPT | Performed by: HOSPITALIST

## 2024-01-01 PROCEDURE — 36415 COLL VENOUS BLD VENIPUNCTURE: CPT | Performed by: INTERNAL MEDICINE

## 2024-01-01 PROCEDURE — 84484 ASSAY OF TROPONIN QUANT: CPT | Performed by: EMERGENCY MEDICINE

## 2024-01-01 PROCEDURE — 86923 COMPATIBILITY TEST ELECTRIC: CPT | Performed by: EMERGENCY MEDICINE

## 2024-01-01 PROCEDURE — 85027 COMPLETE CBC AUTOMATED: CPT | Performed by: STUDENT IN AN ORGANIZED HEALTH CARE EDUCATION/TRAINING PROGRAM

## 2024-01-01 PROCEDURE — 99233 SBSQ HOSP IP/OBS HIGH 50: CPT | Performed by: ANESTHESIOLOGY

## 2024-01-01 PROCEDURE — 99222 1ST HOSP IP/OBS MODERATE 55: CPT | Performed by: INTERNAL MEDICINE

## 2024-01-01 PROCEDURE — 76705 ECHO EXAM OF ABDOMEN: CPT

## 2024-01-01 PROCEDURE — 250N000013 HC RX MED GY IP 250 OP 250 PS 637: Performed by: INTERNAL MEDICINE

## 2024-01-01 PROCEDURE — 250N000013 HC RX MED GY IP 250 OP 250 PS 637: Performed by: STUDENT IN AN ORGANIZED HEALTH CARE EDUCATION/TRAINING PROGRAM

## 2024-01-01 PROCEDURE — 85025 COMPLETE CBC W/AUTO DIFF WBC: CPT | Performed by: STUDENT IN AN ORGANIZED HEALTH CARE EDUCATION/TRAINING PROGRAM

## 2024-01-01 PROCEDURE — 999N000141 HC STATISTIC PRE-PROCEDURE NURSING ASSESSMENT: Performed by: INTERNAL MEDICINE

## 2024-01-01 PROCEDURE — 250N000009 HC RX 250: Performed by: ANESTHESIOLOGY

## 2024-01-01 PROCEDURE — 99291 CRITICAL CARE FIRST HOUR: CPT | Mod: 25

## 2024-01-01 PROCEDURE — 250N000009 HC RX 250

## 2024-01-01 PROCEDURE — 82040 ASSAY OF SERUM ALBUMIN: CPT | Performed by: EMERGENCY MEDICINE

## 2024-01-01 PROCEDURE — 84295 ASSAY OF SERUM SODIUM: CPT | Performed by: PHYSICIAN ASSISTANT

## 2024-01-01 PROCEDURE — 99231 SBSQ HOSP IP/OBS SF/LOW 25: CPT | Performed by: INTERNAL MEDICINE

## 2024-01-01 PROCEDURE — 99207 PR APP CREDIT; MD BILLING SHARED VISIT: CPT

## 2024-01-01 PROCEDURE — 250N000011 HC RX IP 250 OP 636: Performed by: INTERNAL MEDICINE

## 2024-01-01 PROCEDURE — 36415 COLL VENOUS BLD VENIPUNCTURE: CPT | Performed by: HOSPITALIST

## 2024-01-01 PROCEDURE — 85018 HEMOGLOBIN: CPT | Performed by: HOSPITALIST

## 2024-01-01 PROCEDURE — 85610 PROTHROMBIN TIME: CPT | Performed by: INTERNAL MEDICINE

## 2024-01-01 PROCEDURE — 83930 ASSAY OF BLOOD OSMOLALITY: CPT | Performed by: INTERNAL MEDICINE

## 2024-01-01 PROCEDURE — 99238 HOSP IP/OBS DSCHRG MGMT 30/<: CPT | Performed by: INTERNAL MEDICINE

## 2024-01-01 PROCEDURE — 82140 ASSAY OF AMMONIA: CPT | Performed by: STUDENT IN AN ORGANIZED HEALTH CARE EDUCATION/TRAINING PROGRAM

## 2024-01-01 PROCEDURE — 250N000013 HC RX MED GY IP 250 OP 250 PS 637: Performed by: HOSPITALIST

## 2024-01-01 PROCEDURE — 250N000011 HC RX IP 250 OP 636: Performed by: STUDENT IN AN ORGANIZED HEALTH CARE EDUCATION/TRAINING PROGRAM

## 2024-01-01 PROCEDURE — 99284 EMERGENCY DEPT VISIT MOD MDM: CPT | Mod: 25

## 2024-01-01 PROCEDURE — 86900 BLOOD TYPING SEROLOGIC ABO: CPT | Performed by: PHYSICIAN ASSISTANT

## 2024-01-01 PROCEDURE — 258N000003 HC RX IP 258 OP 636: Performed by: HOSPITALIST

## 2024-01-01 PROCEDURE — 250N000011 HC RX IP 250 OP 636: Mod: JZ | Performed by: INTERNAL MEDICINE

## 2024-01-01 PROCEDURE — P9016 RBC LEUKOCYTES REDUCED: HCPCS | Performed by: EMERGENCY MEDICINE

## 2024-01-01 PROCEDURE — 94003 VENT MGMT INPAT SUBQ DAY: CPT

## 2024-01-01 PROCEDURE — P9016 RBC LEUKOCYTES REDUCED: HCPCS | Performed by: PHYSICIAN ASSISTANT

## 2024-01-01 PROCEDURE — 93306 TTE W/DOPPLER COMPLETE: CPT | Mod: 26 | Performed by: INTERNAL MEDICINE

## 2024-01-01 PROCEDURE — 85004 AUTOMATED DIFF WBC COUNT: CPT | Performed by: HOSPITALIST

## 2024-01-01 PROCEDURE — 258N000001 HC RX 258: Performed by: HOSPITALIST

## 2024-01-01 PROCEDURE — 84295 ASSAY OF SERUM SODIUM: CPT | Performed by: HOSPITALIST

## 2024-01-01 PROCEDURE — 250N000011 HC RX IP 250 OP 636: Performed by: NURSE PRACTITIONER

## 2024-01-01 PROCEDURE — 2894A VOIDCORRECT: CPT | Performed by: INTERNAL MEDICINE

## 2024-01-01 PROCEDURE — 86923 COMPATIBILITY TEST ELECTRIC: CPT | Performed by: PHYSICIAN ASSISTANT

## 2024-01-01 PROCEDURE — 84100 ASSAY OF PHOSPHORUS: CPT | Performed by: HOSPITALIST

## 2024-01-01 PROCEDURE — 250N000011 HC RX IP 250 OP 636: Performed by: HOSPITALIST

## 2024-01-01 PROCEDURE — 82570 ASSAY OF URINE CREATININE: CPT | Performed by: EMERGENCY MEDICINE

## 2024-01-01 PROCEDURE — 250N000009 HC RX 250: Performed by: STUDENT IN AN ORGANIZED HEALTH CARE EDUCATION/TRAINING PROGRAM

## 2024-01-01 PROCEDURE — 120N000013 HC R&B IMCU

## 2024-01-01 PROCEDURE — 99232 SBSQ HOSP IP/OBS MODERATE 35: CPT | Performed by: INTERNAL MEDICINE

## 2024-01-01 PROCEDURE — 93978 VASCULAR STUDY: CPT

## 2024-01-01 PROCEDURE — 86900 BLOOD TYPING SEROLOGIC ABO: CPT | Performed by: EMERGENCY MEDICINE

## 2024-01-01 PROCEDURE — 82140 ASSAY OF AMMONIA: CPT | Performed by: INTERNAL MEDICINE

## 2024-01-01 PROCEDURE — 99283 EMERGENCY DEPT VISIT LOW MDM: CPT

## 2024-01-01 PROCEDURE — 120N000001 HC R&B MED SURG/OB

## 2024-01-01 PROCEDURE — 99233 SBSQ HOSP IP/OBS HIGH 50: CPT | Performed by: INTERNAL MEDICINE

## 2024-01-01 PROCEDURE — 99292 CRITICAL CARE ADDL 30 MIN: CPT | Mod: 25 | Performed by: INTERNAL MEDICINE

## 2024-01-01 PROCEDURE — 82247 BILIRUBIN TOTAL: CPT | Performed by: INTERNAL MEDICINE

## 2024-01-01 PROCEDURE — 83010 ASSAY OF HAPTOGLOBIN QUANT: CPT | Performed by: INTERNAL MEDICINE

## 2024-01-01 PROCEDURE — 80048 BASIC METABOLIC PNL TOTAL CA: CPT | Performed by: INTERNAL MEDICINE

## 2024-01-01 PROCEDURE — 82374 ASSAY BLOOD CARBON DIOXIDE: CPT | Performed by: INTERNAL MEDICINE

## 2024-01-01 PROCEDURE — 999N000157 HC STATISTIC RCP TIME EA 10 MIN

## 2024-01-01 PROCEDURE — 83735 ASSAY OF MAGNESIUM: CPT | Performed by: INTERNAL MEDICINE

## 2024-01-01 PROCEDURE — 250N000011 HC RX IP 250 OP 636: Performed by: PHYSICIAN ASSISTANT

## 2024-01-01 PROCEDURE — 36415 COLL VENOUS BLD VENIPUNCTURE: CPT | Performed by: STUDENT IN AN ORGANIZED HEALTH CARE EDUCATION/TRAINING PROGRAM

## 2024-01-01 PROCEDURE — 99223 1ST HOSP IP/OBS HIGH 75: CPT | Performed by: STUDENT IN AN ORGANIZED HEALTH CARE EDUCATION/TRAINING PROGRAM

## 2024-01-01 PROCEDURE — 93005 ELECTROCARDIOGRAM TRACING: CPT

## 2024-01-01 PROCEDURE — 250N000011 HC RX IP 250 OP 636: Performed by: ANESTHESIOLOGY

## 2024-01-01 PROCEDURE — 84460 ALANINE AMINO (ALT) (SGPT): CPT | Performed by: INTERNAL MEDICINE

## 2024-01-01 PROCEDURE — 97116 GAIT TRAINING THERAPY: CPT | Mod: GP | Performed by: PHYSICAL THERAPIST

## 2024-01-01 PROCEDURE — 99207 PR NO BILLABLE SERVICE THIS VISIT: CPT | Performed by: HOSPITALIST

## 2024-01-01 PROCEDURE — 85027 COMPLETE CBC AUTOMATED: CPT | Performed by: HOSPITALIST

## 2024-01-01 PROCEDURE — 83735 ASSAY OF MAGNESIUM: CPT | Performed by: STUDENT IN AN ORGANIZED HEALTH CARE EDUCATION/TRAINING PROGRAM

## 2024-01-01 PROCEDURE — 85018 HEMOGLOBIN: CPT | Performed by: INTERNAL MEDICINE

## 2024-01-01 PROCEDURE — 82565 ASSAY OF CREATININE: CPT | Performed by: HOSPITALIST

## 2024-01-01 PROCEDURE — P9016 RBC LEUKOCYTES REDUCED: HCPCS | Performed by: INTERNAL MEDICINE

## 2024-01-01 PROCEDURE — 85610 PROTHROMBIN TIME: CPT | Performed by: PHYSICIAN ASSISTANT

## 2024-01-01 PROCEDURE — 85610 PROTHROMBIN TIME: CPT | Performed by: NURSE PRACTITIONER

## 2024-01-01 PROCEDURE — 999N000065 XR ABDOMEN PORT 1 VIEW

## 2024-01-01 PROCEDURE — 84295 ASSAY OF SERUM SODIUM: CPT | Performed by: INTERNAL MEDICINE

## 2024-01-01 PROCEDURE — 84100 ASSAY OF PHOSPHORUS: CPT | Performed by: INTERNAL MEDICINE

## 2024-01-01 PROCEDURE — 99291 CRITICAL CARE FIRST HOUR: CPT | Performed by: ANESTHESIOLOGY

## 2024-01-01 PROCEDURE — 80048 BASIC METABOLIC PNL TOTAL CA: CPT | Performed by: HOSPITALIST

## 2024-01-01 PROCEDURE — 96374 THER/PROPH/DIAG INJ IV PUSH: CPT

## 2024-01-01 PROCEDURE — 97140 MANUAL THERAPY 1/> REGIONS: CPT | Mod: GP

## 2024-01-01 PROCEDURE — 84132 ASSAY OF SERUM POTASSIUM: CPT | Performed by: HOSPITALIST

## 2024-01-01 PROCEDURE — C9113 INJ PANTOPRAZOLE SODIUM, VIA: HCPCS | Performed by: INTERNAL MEDICINE

## 2024-01-01 PROCEDURE — 36430 TRANSFUSION BLD/BLD COMPNT: CPT

## 2024-01-01 PROCEDURE — 36415 COLL VENOUS BLD VENIPUNCTURE: CPT | Performed by: PHYSICIAN ASSISTANT

## 2024-01-01 PROCEDURE — 99223 1ST HOSP IP/OBS HIGH 75: CPT | Performed by: NURSE PRACTITIONER

## 2024-01-01 PROCEDURE — 36415 COLL VENOUS BLD VENIPUNCTURE: CPT | Performed by: EMERGENCY MEDICINE

## 2024-01-01 PROCEDURE — 85027 COMPLETE CBC AUTOMATED: CPT | Performed by: INTERNAL MEDICINE

## 2024-01-01 PROCEDURE — 93321 DOPPLER ECHO F-UP/LMTD STD: CPT | Mod: 26 | Performed by: INTERNAL MEDICINE

## 2024-01-01 PROCEDURE — 84155 ASSAY OF PROTEIN SERUM: CPT | Performed by: STUDENT IN AN ORGANIZED HEALTH CARE EDUCATION/TRAINING PROGRAM

## 2024-01-01 PROCEDURE — 86880 COOMBS TEST DIRECT: CPT | Performed by: INTERNAL MEDICINE

## 2024-01-01 PROCEDURE — 250N000012 HC RX MED GY IP 250 OP 636 PS 637: Performed by: HOSPITALIST

## 2024-01-01 PROCEDURE — 84155 ASSAY OF PROTEIN SERUM: CPT | Performed by: NURSE PRACTITIONER

## 2024-01-01 PROCEDURE — 81001 URINALYSIS AUTO W/SCOPE: CPT | Performed by: HOSPITALIST

## 2024-01-01 PROCEDURE — 74176 CT ABD & PELVIS W/O CONTRAST: CPT

## 2024-01-01 PROCEDURE — 258N000003 HC RX IP 258 OP 636: Performed by: NURSE PRACTITIONER

## 2024-01-01 PROCEDURE — 250N000012 HC RX MED GY IP 250 OP 636 PS 637: Performed by: INTERNAL MEDICINE

## 2024-01-01 PROCEDURE — 36620 INSERTION CATHETER ARTERY: CPT | Performed by: INTERNAL MEDICINE

## 2024-01-01 PROCEDURE — 86900 BLOOD TYPING SEROLOGIC ABO: CPT | Performed by: INTERNAL MEDICINE

## 2024-01-01 PROCEDURE — 250N000011 HC RX IP 250 OP 636: Performed by: NURSE ANESTHETIST, CERTIFIED REGISTERED

## 2024-01-01 PROCEDURE — 80048 BASIC METABOLIC PNL TOTAL CA: CPT | Performed by: EMERGENCY MEDICINE

## 2024-01-01 PROCEDURE — 99291 CRITICAL CARE FIRST HOUR: CPT | Mod: 25 | Performed by: INTERNAL MEDICINE

## 2024-01-01 PROCEDURE — 85018 HEMOGLOBIN: CPT | Performed by: NURSE PRACTITIONER

## 2024-01-01 PROCEDURE — 84295 ASSAY OF SERUM SODIUM: CPT | Performed by: NURSE PRACTITIONER

## 2024-01-01 PROCEDURE — 97535 SELF CARE MNGMENT TRAINING: CPT | Mod: GO

## 2024-01-01 PROCEDURE — 258N000003 HC RX IP 258 OP 636: Performed by: EMERGENCY MEDICINE

## 2024-01-01 PROCEDURE — 83735 ASSAY OF MAGNESIUM: CPT | Performed by: HOSPITALIST

## 2024-01-01 PROCEDURE — 370N000017 HC ANESTHESIA TECHNICAL FEE, PER MIN: Performed by: INTERNAL MEDICINE

## 2024-01-01 PROCEDURE — 97116 GAIT TRAINING THERAPY: CPT | Mod: GP

## 2024-01-01 PROCEDURE — 250N000011 HC RX IP 250 OP 636: Mod: JA | Performed by: INTERNAL MEDICINE

## 2024-01-01 PROCEDURE — 82607 VITAMIN B-12: CPT | Performed by: INTERNAL MEDICINE

## 2024-01-01 PROCEDURE — 36415 COLL VENOUS BLD VENIPUNCTURE: CPT | Performed by: NURSE PRACTITIONER

## 2024-01-01 PROCEDURE — 85610 PROTHROMBIN TIME: CPT | Performed by: EMERGENCY MEDICINE

## 2024-01-01 PROCEDURE — 82248 BILIRUBIN DIRECT: CPT | Performed by: NURSE PRACTITIONER

## 2024-01-01 PROCEDURE — 85048 AUTOMATED LEUKOCYTE COUNT: CPT | Performed by: PHYSICIAN ASSISTANT

## 2024-01-01 PROCEDURE — 85018 HEMOGLOBIN: CPT | Performed by: PHYSICIAN ASSISTANT

## 2024-01-01 PROCEDURE — 80053 COMPREHEN METABOLIC PANEL: CPT | Performed by: EMERGENCY MEDICINE

## 2024-01-01 PROCEDURE — 85025 COMPLETE CBC W/AUTO DIFF WBC: CPT | Performed by: INTERNAL MEDICINE

## 2024-01-01 PROCEDURE — 97530 THERAPEUTIC ACTIVITIES: CPT | Mod: GP | Performed by: PHYSICAL THERAPIST

## 2024-01-01 PROCEDURE — 85025 COMPLETE CBC W/AUTO DIFF WBC: CPT | Performed by: PHYSICIAN ASSISTANT

## 2024-01-01 PROCEDURE — 84132 ASSAY OF SERUM POTASSIUM: CPT | Performed by: PHYSICIAN ASSISTANT

## 2024-01-01 PROCEDURE — 250N000013 HC RX MED GY IP 250 OP 250 PS 637: Performed by: NURSE PRACTITIONER

## 2024-01-01 PROCEDURE — 82077 ASSAY SPEC XCP UR&BREATH IA: CPT | Performed by: EMERGENCY MEDICINE

## 2024-01-01 PROCEDURE — 80053 COMPREHEN METABOLIC PANEL: CPT | Performed by: STUDENT IN AN ORGANIZED HEALTH CARE EDUCATION/TRAINING PROGRAM

## 2024-01-01 PROCEDURE — 84132 ASSAY OF SERUM POTASSIUM: CPT | Performed by: EMERGENCY MEDICINE

## 2024-01-01 PROCEDURE — 97161 PT EVAL LOW COMPLEX 20 MIN: CPT | Mod: GP | Performed by: PHYSICAL THERAPIST

## 2024-01-01 PROCEDURE — 80321 ALCOHOLS BIOMARKERS 1OR 2: CPT | Performed by: NURSE PRACTITIONER

## 2024-01-01 PROCEDURE — P9016 RBC LEUKOCYTES REDUCED: HCPCS | Performed by: HOSPITALIST

## 2024-01-01 PROCEDURE — 97165 OT EVAL LOW COMPLEX 30 MIN: CPT | Mod: GO | Performed by: OCCUPATIONAL THERAPIST

## 2024-01-01 PROCEDURE — 84460 ALANINE AMINO (ALT) (SGPT): CPT | Performed by: NURSE PRACTITIONER

## 2024-01-01 PROCEDURE — 82248 BILIRUBIN DIRECT: CPT | Performed by: STUDENT IN AN ORGANIZED HEALTH CARE EDUCATION/TRAINING PROGRAM

## 2024-01-01 PROCEDURE — 99222 1ST HOSP IP/OBS MODERATE 55: CPT | Performed by: PSYCHIATRY & NEUROLOGY

## 2024-01-01 PROCEDURE — 71045 X-RAY EXAM CHEST 1 VIEW: CPT

## 2024-01-01 PROCEDURE — 80053 COMPREHEN METABOLIC PANEL: CPT | Performed by: INTERNAL MEDICINE

## 2024-01-01 PROCEDURE — G0463 HOSPITAL OUTPT CLINIC VISIT: HCPCS

## 2024-01-01 PROCEDURE — 82140 ASSAY OF AMMONIA: CPT | Performed by: EMERGENCY MEDICINE

## 2024-01-01 PROCEDURE — 99233 SBSQ HOSP IP/OBS HIGH 50: CPT | Performed by: HOSPITALIST

## 2024-01-01 PROCEDURE — P9016 RBC LEUKOCYTES REDUCED: HCPCS | Performed by: STUDENT IN AN ORGANIZED HEALTH CARE EDUCATION/TRAINING PROGRAM

## 2024-01-01 PROCEDURE — 20610 DRAIN/INJ JOINT/BURSA W/O US: CPT | Mod: LT

## 2024-01-01 PROCEDURE — 82746 ASSAY OF FOLIC ACID SERUM: CPT | Performed by: INTERNAL MEDICINE

## 2024-01-01 PROCEDURE — 86900 BLOOD TYPING SEROLOGIC ABO: CPT | Performed by: STUDENT IN AN ORGANIZED HEALTH CARE EDUCATION/TRAINING PROGRAM

## 2024-01-01 PROCEDURE — 82728 ASSAY OF FERRITIN: CPT | Performed by: INTERNAL MEDICINE

## 2024-01-01 PROCEDURE — 86901 BLOOD TYPING SEROLOGIC RH(D): CPT | Performed by: INTERNAL MEDICINE

## 2024-01-01 PROCEDURE — 43235 EGD DIAGNOSTIC BRUSH WASH: CPT | Performed by: INTERNAL MEDICINE

## 2024-01-01 PROCEDURE — 82310 ASSAY OF CALCIUM: CPT | Performed by: INTERNAL MEDICINE

## 2024-01-01 PROCEDURE — 250N000013 HC RX MED GY IP 250 OP 250 PS 637: Performed by: EMERGENCY MEDICINE

## 2024-01-01 PROCEDURE — 0DJ08ZZ INSPECTION OF UPPER INTESTINAL TRACT, VIA NATURAL OR ARTIFICIAL OPENING ENDOSCOPIC: ICD-10-PCS | Performed by: INTERNAL MEDICINE

## 2024-01-01 PROCEDURE — 83735 ASSAY OF MAGNESIUM: CPT | Performed by: EMERGENCY MEDICINE

## 2024-01-01 PROCEDURE — 200N000001 HC R&B ICU

## 2024-01-01 PROCEDURE — 250N000011 HC RX IP 250 OP 636

## 2024-01-01 PROCEDURE — 82040 ASSAY OF SERUM ALBUMIN: CPT | Performed by: PHYSICIAN ASSISTANT

## 2024-01-01 PROCEDURE — 99223 1ST HOSP IP/OBS HIGH 75: CPT | Performed by: HOSPITALIST

## 2024-01-01 PROCEDURE — 999N000009 HC STATISTIC AIRWAY CARE

## 2024-01-01 PROCEDURE — 87040 BLOOD CULTURE FOR BACTERIA: CPT | Performed by: HOSPITALIST

## 2024-01-01 PROCEDURE — 99254 IP/OBS CNSLTJ NEW/EST MOD 60: CPT | Performed by: INTERNAL MEDICINE

## 2024-01-01 PROCEDURE — 85004 AUTOMATED DIFF WBC COUNT: CPT | Performed by: EMERGENCY MEDICINE

## 2024-01-01 PROCEDURE — 80076 HEPATIC FUNCTION PANEL: CPT | Performed by: HOSPITALIST

## 2024-01-01 PROCEDURE — 82247 BILIRUBIN TOTAL: CPT | Performed by: EMERGENCY MEDICINE

## 2024-01-01 PROCEDURE — 999N000065 XR CHEST PORT 1 VIEW

## 2024-01-01 PROCEDURE — 99292 CRITICAL CARE ADDL 30 MIN: CPT

## 2024-01-01 PROCEDURE — 3E0U33Z INTRODUCTION OF ANTI-INFLAMMATORY INTO JOINTS, PERCUTANEOUS APPROACH: ICD-10-PCS

## 2024-01-01 PROCEDURE — 85060 BLOOD SMEAR INTERPRETATION: CPT | Performed by: PATHOLOGY

## 2024-01-01 PROCEDURE — 250N000012 HC RX MED GY IP 250 OP 636 PS 637: Performed by: EMERGENCY MEDICINE

## 2024-01-01 PROCEDURE — 3E033XZ INTRODUCTION OF VASOPRESSOR INTO PERIPHERAL VEIN, PERCUTANEOUS APPROACH: ICD-10-PCS | Performed by: HOSPITALIST

## 2024-01-01 PROCEDURE — 85025 COMPLETE CBC W/AUTO DIFF WBC: CPT | Performed by: EMERGENCY MEDICINE

## 2024-01-01 PROCEDURE — 82105 ALPHA-FETOPROTEIN SERUM: CPT | Performed by: INTERNAL MEDICINE

## 2024-01-01 PROCEDURE — 51798 US URINE CAPACITY MEASURE: CPT

## 2024-01-01 PROCEDURE — 250N000012 HC RX MED GY IP 250 OP 636 PS 637: Performed by: STUDENT IN AN ORGANIZED HEALTH CARE EDUCATION/TRAINING PROGRAM

## 2024-01-01 PROCEDURE — A9537 TC99M MEBROFENIN: HCPCS | Performed by: HOSPITALIST

## 2024-01-01 PROCEDURE — 250N000009 HC RX 250: Performed by: PHYSICIAN ASSISTANT

## 2024-01-01 PROCEDURE — 84155 ASSAY OF PROTEIN SERUM: CPT | Performed by: INTERNAL MEDICINE

## 2024-01-01 PROCEDURE — 86923 COMPATIBILITY TEST ELECTRIC: CPT | Performed by: STUDENT IN AN ORGANIZED HEALTH CARE EDUCATION/TRAINING PROGRAM

## 2024-01-01 PROCEDURE — 82040 ASSAY OF SERUM ALBUMIN: CPT | Performed by: INTERNAL MEDICINE

## 2024-01-01 PROCEDURE — 99223 1ST HOSP IP/OBS HIGH 75: CPT | Performed by: INTERNAL MEDICINE

## 2024-01-01 PROCEDURE — 99239 HOSP IP/OBS DSCHRG MGMT >30: CPT | Performed by: INTERNAL MEDICINE

## 2024-01-01 PROCEDURE — 82805 BLOOD GASES W/O2 SATURATION: CPT | Performed by: INTERNAL MEDICINE

## 2024-01-01 PROCEDURE — 250N000013 HC RX MED GY IP 250 OP 250 PS 637: Performed by: ANESTHESIOLOGY

## 2024-01-01 PROCEDURE — 99207 PR NO BILLABLE SERVICE THIS VISIT: CPT | Performed by: INTERNAL MEDICINE

## 2024-01-01 PROCEDURE — 999N000001 HC CANCELLED SURGERY UP TO 15 MINS: Performed by: INTERNAL MEDICINE

## 2024-01-01 PROCEDURE — 96374 THER/PROPH/DIAG INJ IV PUSH: CPT | Mod: 59

## 2024-01-01 PROCEDURE — 83605 ASSAY OF LACTIC ACID: CPT | Performed by: INTERNAL MEDICINE

## 2024-01-01 PROCEDURE — 250N000009 HC RX 250: Performed by: HOSPITALIST

## 2024-01-01 PROCEDURE — HZ2ZZZZ DETOXIFICATION SERVICES FOR SUBSTANCE ABUSE TREATMENT: ICD-10-PCS | Performed by: INTERNAL MEDICINE

## 2024-01-01 PROCEDURE — 84484 ASSAY OF TROPONIN QUANT: CPT | Performed by: NURSE PRACTITIONER

## 2024-01-01 PROCEDURE — 83935 ASSAY OF URINE OSMOLALITY: CPT | Performed by: INTERNAL MEDICINE

## 2024-01-01 PROCEDURE — 82533 TOTAL CORTISOL: CPT | Performed by: INTERNAL MEDICINE

## 2024-01-01 PROCEDURE — 83550 IRON BINDING TEST: CPT | Performed by: INTERNAL MEDICINE

## 2024-01-01 PROCEDURE — 85730 THROMBOPLASTIN TIME PARTIAL: CPT | Performed by: INTERNAL MEDICINE

## 2024-01-01 PROCEDURE — 258N000003 HC RX IP 258 OP 636: Performed by: NURSE ANESTHETIST, CERTIFIED REGISTERED

## 2024-01-01 PROCEDURE — 85384 FIBRINOGEN ACTIVITY: CPT | Performed by: INTERNAL MEDICINE

## 2024-01-01 PROCEDURE — 83550 IRON BINDING TEST: CPT | Performed by: NURSE PRACTITIONER

## 2024-01-01 PROCEDURE — 93971 EXTREMITY STUDY: CPT | Mod: LT

## 2024-01-01 PROCEDURE — 82140 ASSAY OF AMMONIA: CPT | Performed by: HOSPITALIST

## 2024-01-01 PROCEDURE — 85018 HEMOGLOBIN: CPT | Performed by: STUDENT IN AN ORGANIZED HEALTH CARE EDUCATION/TRAINING PROGRAM

## 2024-01-01 PROCEDURE — 99255 IP/OBS CONSLTJ NEW/EST HI 80: CPT | Mod: 25 | Performed by: NURSE PRACTITIONER

## 2024-01-01 PROCEDURE — 99254 IP/OBS CNSLTJ NEW/EST MOD 60: CPT | Performed by: PSYCHIATRY & NEUROLOGY

## 2024-01-01 PROCEDURE — 83615 LACTATE (LD) (LDH) ENZYME: CPT | Performed by: NURSE PRACTITIONER

## 2024-01-01 PROCEDURE — 93325 DOPPLER ECHO COLOR FLOW MAPG: CPT | Mod: 26 | Performed by: INTERNAL MEDICINE

## 2024-01-01 PROCEDURE — P9047 ALBUMIN (HUMAN), 25%, 50ML: HCPCS | Performed by: INTERNAL MEDICINE

## 2024-01-01 PROCEDURE — 84145 PROCALCITONIN (PCT): CPT | Performed by: INTERNAL MEDICINE

## 2024-01-01 PROCEDURE — 999N000128 HC STATISTIC PERIPHERAL IV START W/O US GUIDANCE

## 2024-01-01 PROCEDURE — 80053 COMPREHEN METABOLIC PANEL: CPT | Performed by: HOSPITALIST

## 2024-01-01 PROCEDURE — 99291 CRITICAL CARE FIRST HOUR: CPT | Performed by: INTERNAL MEDICINE

## 2024-01-01 PROCEDURE — 85045 AUTOMATED RETICULOCYTE COUNT: CPT | Performed by: INTERNAL MEDICINE

## 2024-01-01 PROCEDURE — 36556 INSERT NON-TUNNEL CV CATH: CPT | Performed by: INTERNAL MEDICINE

## 2024-01-01 PROCEDURE — C8929 TTE W OR WO FOL WCON,DOPPLER: HCPCS

## 2024-01-01 PROCEDURE — 71046 X-RAY EXAM CHEST 2 VIEWS: CPT

## 2024-01-01 PROCEDURE — 73502 X-RAY EXAM HIP UNI 2-3 VIEWS: CPT

## 2024-01-01 PROCEDURE — 83615 LACTATE (LD) (LDH) ENZYME: CPT | Performed by: INTERNAL MEDICINE

## 2024-01-01 PROCEDURE — 85610 PROTHROMBIN TIME: CPT | Performed by: STUDENT IN AN ORGANIZED HEALTH CARE EDUCATION/TRAINING PROGRAM

## 2024-01-01 PROCEDURE — 83010 ASSAY OF HAPTOGLOBIN QUANT: CPT | Performed by: NURSE PRACTITIONER

## 2024-01-01 PROCEDURE — 99233 SBSQ HOSP IP/OBS HIGH 50: CPT | Performed by: STUDENT IN AN ORGANIZED HEALTH CARE EDUCATION/TRAINING PROGRAM

## 2024-01-01 PROCEDURE — 30903 CONTROL OF NOSEBLEED: CPT | Mod: LT

## 2024-01-01 PROCEDURE — 82947 ASSAY GLUCOSE BLOOD QUANT: CPT | Performed by: INTERNAL MEDICINE

## 2024-01-01 PROCEDURE — 255N000002 HC RX 255 OP 636

## 2024-01-01 PROCEDURE — 258N000003 HC RX IP 258 OP 636: Performed by: ANESTHESIOLOGY

## 2024-01-01 PROCEDURE — 343N000001 HC RX 343: Performed by: HOSPITALIST

## 2024-01-01 PROCEDURE — 83540 ASSAY OF IRON: CPT | Performed by: INTERNAL MEDICINE

## 2024-01-01 PROCEDURE — 83690 ASSAY OF LIPASE: CPT | Performed by: INTERNAL MEDICINE

## 2024-01-01 PROCEDURE — 83735 ASSAY OF MAGNESIUM: CPT | Performed by: NURSE PRACTITIONER

## 2024-01-01 PROCEDURE — 82140 ASSAY OF AMMONIA: CPT | Performed by: NURSE PRACTITIONER

## 2024-01-01 PROCEDURE — 99223 1ST HOSP IP/OBS HIGH 75: CPT | Performed by: PHYSICIAN ASSISTANT

## 2024-01-01 PROCEDURE — 86900 BLOOD TYPING SEROLOGIC ABO: CPT | Performed by: HOSPITALIST

## 2024-01-01 PROCEDURE — 81001 URINALYSIS AUTO W/SCOPE: CPT | Performed by: EMERGENCY MEDICINE

## 2024-01-01 PROCEDURE — P9016 RBC LEUKOCYTES REDUCED: HCPCS

## 2024-01-01 PROCEDURE — 43235 EGD DIAGNOSTIC BRUSH WASH: CPT | Performed by: NURSE ANESTHETIST, CERTIFIED REGISTERED

## 2024-01-01 PROCEDURE — 84300 ASSAY OF URINE SODIUM: CPT | Performed by: STUDENT IN AN ORGANIZED HEALTH CARE EDUCATION/TRAINING PROGRAM

## 2024-01-01 PROCEDURE — 84100 ASSAY OF PHOSPHORUS: CPT | Performed by: NURSE PRACTITIONER

## 2024-01-01 PROCEDURE — 84450 TRANSFERASE (AST) (SGOT): CPT | Performed by: STUDENT IN AN ORGANIZED HEALTH CARE EDUCATION/TRAINING PROGRAM

## 2024-01-01 PROCEDURE — 36415 COLL VENOUS BLD VENIPUNCTURE: CPT

## 2024-01-01 PROCEDURE — 82272 OCCULT BLD FECES 1-3 TESTS: CPT | Performed by: EMERGENCY MEDICINE

## 2024-01-01 PROCEDURE — 97535 SELF CARE MNGMENT TRAINING: CPT | Mod: GO | Performed by: OCCUPATIONAL THERAPIST

## 2024-01-01 PROCEDURE — 86923 COMPATIBILITY TEST ELECTRIC: CPT | Performed by: HOSPITALIST

## 2024-01-01 PROCEDURE — 97530 THERAPEUTIC ACTIVITIES: CPT | Mod: GP

## 2024-01-01 PROCEDURE — 97530 THERAPEUTIC ACTIVITIES: CPT | Mod: GO

## 2024-01-01 PROCEDURE — 82570 ASSAY OF URINE CREATININE: CPT | Performed by: HOSPITALIST

## 2024-01-01 PROCEDURE — 093K7ZZ CONTROL BLEEDING IN NASAL MUCOSA AND SOFT TISSUE, VIA NATURAL OR ARTIFICIAL OPENING: ICD-10-PCS | Performed by: OTOLARYNGOLOGY

## 2024-01-01 PROCEDURE — 99207 PR APP CREDIT; MD BILLING SHARED VISIT: CPT | Performed by: INTERNAL MEDICINE

## 2024-01-01 PROCEDURE — 99285 EMERGENCY DEPT VISIT HI MDM: CPT | Mod: 25

## 2024-01-01 PROCEDURE — P9012 CRYOPRECIPITATE EACH UNIT: HCPCS | Performed by: INTERNAL MEDICINE

## 2024-01-01 PROCEDURE — 99418 PROLNG IP/OBS E/M EA 15 MIN: CPT | Performed by: STUDENT IN AN ORGANIZED HEALTH CARE EDUCATION/TRAINING PROGRAM

## 2024-01-01 PROCEDURE — 93325 DOPPLER ECHO COLOR FLOW MAPG: CPT

## 2024-01-01 PROCEDURE — 71250 CT THORAX DX C-: CPT

## 2024-01-01 PROCEDURE — 82947 ASSAY GLUCOSE BLOOD QUANT: CPT | Performed by: HOSPITALIST

## 2024-01-01 PROCEDURE — 84295 ASSAY OF SERUM SODIUM: CPT | Performed by: STUDENT IN AN ORGANIZED HEALTH CARE EDUCATION/TRAINING PROGRAM

## 2024-01-01 PROCEDURE — 83880 ASSAY OF NATRIURETIC PEPTIDE: CPT | Performed by: STUDENT IN AN ORGANIZED HEALTH CARE EDUCATION/TRAINING PROGRAM

## 2024-01-01 PROCEDURE — 84300 ASSAY OF URINE SODIUM: CPT | Performed by: INTERNAL MEDICINE

## 2024-01-01 PROCEDURE — 84300 ASSAY OF URINE SODIUM: CPT | Performed by: HOSPITALIST

## 2024-01-01 PROCEDURE — 70450 CT HEAD/BRAIN W/O DYE: CPT

## 2024-01-01 PROCEDURE — 710N000009 HC RECOVERY PHASE 1, LEVEL 1, PER MIN: Performed by: INTERNAL MEDICINE

## 2024-01-01 PROCEDURE — 84132 ASSAY OF SERUM POTASSIUM: CPT | Performed by: INTERNAL MEDICINE

## 2024-01-01 PROCEDURE — 85014 HEMATOCRIT: CPT | Performed by: INTERNAL MEDICINE

## 2024-01-01 PROCEDURE — 85004 AUTOMATED DIFF WBC COUNT: CPT | Performed by: INTERNAL MEDICINE

## 2024-01-01 PROCEDURE — 86923 COMPATIBILITY TEST ELECTRIC: CPT | Performed by: INTERNAL MEDICINE

## 2024-01-01 PROCEDURE — 99497 ADVNCD CARE PLAN 30 MIN: CPT | Mod: 25 | Performed by: NURSE PRACTITIONER

## 2024-01-01 PROCEDURE — 85014 HEMATOCRIT: CPT | Performed by: EMERGENCY MEDICINE

## 2024-01-01 PROCEDURE — 83935 ASSAY OF URINE OSMOLALITY: CPT | Performed by: STUDENT IN AN ORGANIZED HEALTH CARE EDUCATION/TRAINING PROGRAM

## 2024-01-01 PROCEDURE — 85041 AUTOMATED RBC COUNT: CPT | Performed by: EMERGENCY MEDICINE

## 2024-01-01 PROCEDURE — 74174 CTA ABD&PLVS W/CONTRAST: CPT

## 2024-01-01 PROCEDURE — 85025 COMPLETE CBC W/AUTO DIFF WBC: CPT | Performed by: NURSE PRACTITIONER

## 2024-01-01 PROCEDURE — 258N000001 HC RX 258: Performed by: EMERGENCY MEDICINE

## 2024-01-01 PROCEDURE — 255N000002 HC RX 255 OP 636: Performed by: NURSE PRACTITIONER

## 2024-01-01 PROCEDURE — 82962 GLUCOSE BLOOD TEST: CPT

## 2024-01-01 PROCEDURE — 83690 ASSAY OF LIPASE: CPT | Performed by: EMERGENCY MEDICINE

## 2024-01-01 PROCEDURE — 84132 ASSAY OF SERUM POTASSIUM: CPT | Performed by: STUDENT IN AN ORGANIZED HEALTH CARE EDUCATION/TRAINING PROGRAM

## 2024-01-01 PROCEDURE — 94002 VENT MGMT INPAT INIT DAY: CPT

## 2024-01-01 PROCEDURE — 99232 SBSQ HOSP IP/OBS MODERATE 35: CPT | Performed by: STUDENT IN AN ORGANIZED HEALTH CARE EDUCATION/TRAINING PROGRAM

## 2024-01-01 PROCEDURE — 82805 BLOOD GASES W/O2 SATURATION: CPT | Performed by: STUDENT IN AN ORGANIZED HEALTH CARE EDUCATION/TRAINING PROGRAM

## 2024-01-01 PROCEDURE — 360N000075 HC SURGERY LEVEL 2, PER MIN: Performed by: INTERNAL MEDICINE

## 2024-01-01 PROCEDURE — 83880 ASSAY OF NATRIURETIC PEPTIDE: CPT | Performed by: EMERGENCY MEDICINE

## 2024-01-01 PROCEDURE — 99239 HOSP IP/OBS DSCHRG MGMT >30: CPT | Performed by: HOSPITALIST

## 2024-01-01 PROCEDURE — 3E0U3BZ INTRODUCTION OF ANESTHETIC AGENT INTO JOINTS, PERCUTANEOUS APPROACH: ICD-10-PCS

## 2024-01-01 PROCEDURE — 93308 TTE F-UP OR LMTD: CPT | Mod: 26 | Performed by: INTERNAL MEDICINE

## 2024-01-01 PROCEDURE — 999N000208 ECHOCARDIOGRAM COMPLETE

## 2024-01-01 PROCEDURE — P9016 RBC LEUKOCYTES REDUCED: HCPCS | Performed by: NURSE PRACTITIONER

## 2024-01-01 PROCEDURE — 97530 THERAPEUTIC ACTIVITIES: CPT | Mod: GO | Performed by: OCCUPATIONAL THERAPIST

## 2024-01-01 PROCEDURE — 999N000287 HC ICU ADULT ROUNDING, EACH 10 MINS

## 2024-01-01 PROCEDURE — 82310 ASSAY OF CALCIUM: CPT | Performed by: HOSPITALIST

## 2024-01-01 PROCEDURE — 87637 SARSCOV2&INF A&B&RSV AMP PRB: CPT | Performed by: HOSPITALIST

## 2024-01-01 PROCEDURE — 83036 HEMOGLOBIN GLYCOSYLATED A1C: CPT | Performed by: HOSPITALIST

## 2024-01-01 PROCEDURE — 999N000040 HC STATISTIC CONSULT NO CHARGE VASC ACCESS

## 2024-01-01 PROCEDURE — 250N000011 HC RX IP 250 OP 636: Performed by: EMERGENCY MEDICINE

## 2024-01-01 PROCEDURE — 85049 AUTOMATED PLATELET COUNT: CPT | Performed by: INTERNAL MEDICINE

## 2024-01-01 PROCEDURE — 31500 INSERT EMERGENCY AIRWAY: CPT | Mod: GC | Performed by: INTERNAL MEDICINE

## 2024-01-01 PROCEDURE — 84450 TRANSFERASE (AST) (SGOT): CPT | Performed by: INTERNAL MEDICINE

## 2024-01-01 PROCEDURE — 85018 HEMOGLOBIN: CPT

## 2024-01-01 PROCEDURE — 78227 HEPATOBIL SYST IMAGE W/DRUG: CPT

## 2024-01-01 PROCEDURE — 5A1945Z RESPIRATORY VENTILATION, 24-96 CONSECUTIVE HOURS: ICD-10-PCS | Performed by: HOSPITALIST

## 2024-01-01 PROCEDURE — 86923 COMPATIBILITY TEST ELECTRIC: CPT | Performed by: NURSE PRACTITIONER

## 2024-01-01 PROCEDURE — 999N000283 HC VASCULAR ACCESS VENIPUCTURE ASSIST

## 2024-01-01 PROCEDURE — 83935 ASSAY OF URINE OSMOLALITY: CPT | Performed by: HOSPITALIST

## 2024-01-01 PROCEDURE — 85027 COMPLETE CBC AUTOMATED: CPT | Performed by: EMERGENCY MEDICINE

## 2024-01-01 PROCEDURE — 43235 EGD DIAGNOSTIC BRUSH WASH: CPT | Performed by: ANESTHESIOLOGY

## 2024-01-01 PROCEDURE — 83735 ASSAY OF MAGNESIUM: CPT | Performed by: PHYSICIAN ASSISTANT

## 2024-01-01 PROCEDURE — 82248 BILIRUBIN DIRECT: CPT | Performed by: INTERNAL MEDICINE

## 2024-01-01 PROCEDURE — 80053 COMPREHEN METABOLIC PANEL: CPT | Performed by: PHYSICIAN ASSISTANT

## 2024-01-01 RX ORDER — MIRTAZAPINE 7.5 MG/1
7.5 TABLET, FILM COATED ORAL AT BEDTIME
Status: DISCONTINUED | OUTPATIENT
Start: 2024-01-01 | End: 2024-01-01 | Stop reason: HOSPADM

## 2024-01-01 RX ORDER — LIDOCAINE 40 MG/G
CREAM TOPICAL
Status: DISCONTINUED | OUTPATIENT
Start: 2024-01-01 | End: 2024-01-01 | Stop reason: HOSPADM

## 2024-01-01 RX ORDER — SPIRONOLACTONE 25 MG/1
50 TABLET ORAL DAILY
Status: DISCONTINUED | OUTPATIENT
Start: 2024-01-01 | End: 2024-01-01 | Stop reason: HOSPADM

## 2024-01-01 RX ORDER — AMOXICILLIN 250 MG
2 CAPSULE ORAL 2 TIMES DAILY PRN
Status: DISCONTINUED | OUTPATIENT
Start: 2024-01-01 | End: 2024-01-01 | Stop reason: HOSPADM

## 2024-01-01 RX ORDER — LACTULOSE 10 G/15ML
20 SOLUTION ORAL 2 TIMES DAILY
Qty: 1800 ML | Refills: 0 | Status: ON HOLD | OUTPATIENT
Start: 2024-01-01 | End: 2024-01-01

## 2024-01-01 RX ORDER — SODIUM CHLORIDE 9 MG/ML
INJECTION, SOLUTION INTRAVENOUS CONTINUOUS
Status: DISCONTINUED | OUTPATIENT
Start: 2024-01-01 | End: 2024-01-01

## 2024-01-01 RX ORDER — CIPROFLOXACIN 500 MG/1
500 TABLET, FILM COATED ORAL EVERY 24 HOURS
Status: DISCONTINUED | OUTPATIENT
Start: 2024-01-01 | End: 2024-01-01 | Stop reason: HOSPADM

## 2024-01-01 RX ORDER — POTASSIUM CHLORIDE 7.45 MG/ML
10 INJECTION INTRAVENOUS ONCE
Status: DISCONTINUED | OUTPATIENT
Start: 2024-01-01 | End: 2024-01-01

## 2024-01-01 RX ORDER — LIDOCAINE 40 MG/G
CREAM TOPICAL
Status: DISCONTINUED | OUTPATIENT
Start: 2024-01-01 | End: 2024-01-01

## 2024-01-01 RX ORDER — OXYMETAZOLINE HYDROCHLORIDE 0.05 G/100ML
2 SPRAY NASAL 2 TIMES DAILY
Status: DISCONTINUED | OUTPATIENT
Start: 2024-01-01 | End: 2024-01-01

## 2024-01-01 RX ORDER — METHYLPREDNISOLONE SODIUM SUCCINATE 40 MG/ML
20 INJECTION, POWDER, LYOPHILIZED, FOR SOLUTION INTRAMUSCULAR; INTRAVENOUS EVERY 24 HOURS
Status: DISCONTINUED | OUTPATIENT
Start: 2024-01-01 | End: 2024-01-01

## 2024-01-01 RX ORDER — SODIUM CHLORIDE 9 MG/ML
INJECTION, SOLUTION INTRAVENOUS CONTINUOUS
Status: ACTIVE | OUTPATIENT
Start: 2024-01-01 | End: 2024-01-01

## 2024-01-01 RX ORDER — SODIUM CHLORIDE, SODIUM LACTATE, POTASSIUM CHLORIDE, CALCIUM CHLORIDE 600; 310; 30; 20 MG/100ML; MG/100ML; MG/100ML; MG/100ML
INJECTION, SOLUTION INTRAVENOUS CONTINUOUS
Status: DISCONTINUED | OUTPATIENT
Start: 2024-01-01 | End: 2024-01-01 | Stop reason: HOSPADM

## 2024-01-01 RX ORDER — CALCIUM GLUCONATE 94 MG/ML
1 INJECTION, SOLUTION INTRAVENOUS ONCE
Status: COMPLETED | OUTPATIENT
Start: 2024-01-01 | End: 2024-01-01

## 2024-01-01 RX ORDER — ONDANSETRON 4 MG/1
4 TABLET, ORALLY DISINTEGRATING ORAL EVERY 6 HOURS PRN
Status: DISCONTINUED | OUTPATIENT
Start: 2024-01-01 | End: 2024-01-01 | Stop reason: HOSPADM

## 2024-01-01 RX ORDER — AMPICILLIN AND SULBACTAM 2; 1 G/1; G/1
3 INJECTION, POWDER, FOR SOLUTION INTRAMUSCULAR; INTRAVENOUS EVERY 6 HOURS
Status: DISCONTINUED | OUTPATIENT
Start: 2024-01-01 | End: 2024-01-01 | Stop reason: HOSPADM

## 2024-01-01 RX ORDER — MAGNESIUM SULFATE HEPTAHYDRATE 40 MG/ML
4 INJECTION, SOLUTION INTRAVENOUS ONCE
Status: COMPLETED | OUTPATIENT
Start: 2024-01-01 | End: 2024-01-01

## 2024-01-01 RX ORDER — LACTULOSE 10 G/15ML
20 SOLUTION ORAL 2 TIMES DAILY
Status: DISCONTINUED | OUTPATIENT
Start: 2024-01-01 | End: 2024-01-01

## 2024-01-01 RX ORDER — FENTANYL CITRATE 0.05 MG/ML
25 INJECTION, SOLUTION INTRAMUSCULAR; INTRAVENOUS EVERY 5 MIN PRN
Status: DISCONTINUED | OUTPATIENT
Start: 2024-01-01 | End: 2024-01-01 | Stop reason: HOSPADM

## 2024-01-01 RX ORDER — FUROSEMIDE 10 MG/ML
20 INJECTION INTRAMUSCULAR; INTRAVENOUS ONCE
Status: COMPLETED | OUTPATIENT
Start: 2024-01-01 | End: 2024-01-01

## 2024-01-01 RX ORDER — POLYETHYLENE GLYCOL 3350 17 G/17G
34 POWDER, FOR SOLUTION ORAL 2 TIMES DAILY
Status: DISCONTINUED | OUTPATIENT
Start: 2024-01-01 | End: 2024-01-01

## 2024-01-01 RX ORDER — NICOTINE POLACRILEX 4 MG
15-30 LOZENGE BUCCAL
Status: DISCONTINUED | OUTPATIENT
Start: 2024-01-01 | End: 2024-01-01

## 2024-01-01 RX ORDER — PROCHLORPERAZINE 25 MG
25 SUPPOSITORY, RECTAL RECTAL EVERY 12 HOURS PRN
Status: DISCONTINUED | OUTPATIENT
Start: 2024-01-01 | End: 2024-01-01 | Stop reason: HOSPADM

## 2024-01-01 RX ORDER — PANTOPRAZOLE SODIUM 40 MG/1
40 TABLET, DELAYED RELEASE ORAL 2 TIMES DAILY
Qty: 60 TABLET | Refills: 0 | Status: SHIPPED | OUTPATIENT
Start: 2024-01-01 | End: 2024-01-01

## 2024-01-01 RX ORDER — BUPIVACAINE HYDROCHLORIDE 5 MG/ML
1 INJECTION, SOLUTION EPIDURAL; INTRACAUDAL ONCE
Status: COMPLETED | OUTPATIENT
Start: 2024-01-01 | End: 2024-01-01

## 2024-01-01 RX ORDER — GABAPENTIN 300 MG/1
300 CAPSULE ORAL 2 TIMES DAILY
Status: COMPLETED | OUTPATIENT
Start: 2024-01-01 | End: 2024-01-01

## 2024-01-01 RX ORDER — LACTULOSE 10 G/15ML
20 SOLUTION ORAL 3 TIMES DAILY
Status: DISCONTINUED | OUTPATIENT
Start: 2024-01-01 | End: 2024-01-01

## 2024-01-01 RX ORDER — MAGNESIUM OXIDE 400 MG/1
400 TABLET ORAL DAILY
Status: DISCONTINUED | OUTPATIENT
Start: 2024-01-01 | End: 2024-01-01 | Stop reason: HOSPADM

## 2024-01-01 RX ORDER — NICOTINE POLACRILEX 4 MG
15-30 LOZENGE BUCCAL
Status: DISCONTINUED | OUTPATIENT
Start: 2024-01-01 | End: 2024-01-01 | Stop reason: HOSPADM

## 2024-01-01 RX ORDER — LIDOCAINE HYDROCHLORIDE 10 MG/ML
30 INJECTION, SOLUTION EPIDURAL; INFILTRATION; INTRACAUDAL; PERINEURAL ONCE
Status: COMPLETED | OUTPATIENT
Start: 2024-01-01 | End: 2024-01-01

## 2024-01-01 RX ORDER — FENTANYL CITRATE 50 UG/ML
25 INJECTION, SOLUTION INTRAMUSCULAR; INTRAVENOUS
Status: DISCONTINUED | OUTPATIENT
Start: 2024-01-01 | End: 2024-01-01

## 2024-01-01 RX ORDER — OXYCODONE HYDROCHLORIDE 5 MG/1
5 TABLET ORAL EVERY 4 HOURS PRN
Status: DISCONTINUED | OUTPATIENT
Start: 2024-01-01 | End: 2024-01-01 | Stop reason: HOSPADM

## 2024-01-01 RX ORDER — FUROSEMIDE 10 MG/ML
40 INJECTION INTRAMUSCULAR; INTRAVENOUS ONCE
Status: COMPLETED | OUTPATIENT
Start: 2024-01-01 | End: 2024-01-01

## 2024-01-01 RX ORDER — ALBUMIN (HUMAN) 12.5 G/50ML
25 SOLUTION INTRAVENOUS 2 TIMES DAILY
Qty: 400 ML | Refills: 0 | Status: COMPLETED | OUTPATIENT
Start: 2024-01-01 | End: 2024-01-01

## 2024-01-01 RX ORDER — MIDODRINE HYDROCHLORIDE 5 MG/1
5 TABLET ORAL
COMMUNITY

## 2024-01-01 RX ORDER — CHLORHEXIDINE GLUCONATE ORAL RINSE 1.2 MG/ML
15 SOLUTION DENTAL EVERY 12 HOURS
Status: DISCONTINUED | OUTPATIENT
Start: 2024-01-01 | End: 2024-01-01

## 2024-01-01 RX ORDER — DEXTROSE MONOHYDRATE 25 G/50ML
25 INJECTION, SOLUTION INTRAVENOUS ONCE
Status: COMPLETED | OUTPATIENT
Start: 2024-01-01 | End: 2024-01-01

## 2024-01-01 RX ORDER — NALOXONE HYDROCHLORIDE 0.4 MG/ML
0.1 INJECTION, SOLUTION INTRAMUSCULAR; INTRAVENOUS; SUBCUTANEOUS
Status: DISCONTINUED | OUTPATIENT
Start: 2024-01-01 | End: 2024-01-01 | Stop reason: HOSPADM

## 2024-01-01 RX ORDER — OLANZAPINE 5 MG/1
5-10 TABLET, ORALLY DISINTEGRATING ORAL EVERY 6 HOURS PRN
Status: DISCONTINUED | OUTPATIENT
Start: 2024-01-01 | End: 2024-01-01 | Stop reason: HOSPADM

## 2024-01-01 RX ORDER — ALBUMIN (HUMAN) 12.5 G/50ML
25 SOLUTION INTRAVENOUS 3 TIMES DAILY PRN
Status: DISCONTINUED | OUTPATIENT
Start: 2024-01-01 | End: 2024-01-01

## 2024-01-01 RX ORDER — MINERAL OIL/HYDROPHIL PETROLAT
OINTMENT (GRAM) TOPICAL
Status: DISCONTINUED | OUTPATIENT
Start: 2024-01-01 | End: 2024-01-01 | Stop reason: HOSPADM

## 2024-01-01 RX ORDER — NALTREXONE HYDROCHLORIDE 50 MG/1
50 TABLET, FILM COATED ORAL DAILY
Status: DISCONTINUED | OUTPATIENT
Start: 2024-01-01 | End: 2024-01-01 | Stop reason: HOSPADM

## 2024-01-01 RX ORDER — AMOXICILLIN 250 MG
1 CAPSULE ORAL 2 TIMES DAILY PRN
Status: DISCONTINUED | OUTPATIENT
Start: 2024-01-01 | End: 2024-01-01 | Stop reason: HOSPADM

## 2024-01-01 RX ORDER — LACTULOSE 10 G/15ML
20 SOLUTION ORAL ONCE
Status: COMPLETED | OUTPATIENT
Start: 2024-01-01 | End: 2024-01-01

## 2024-01-01 RX ORDER — ONDANSETRON 2 MG/ML
4 INJECTION INTRAMUSCULAR; INTRAVENOUS EVERY 6 HOURS PRN
Status: DISCONTINUED | OUTPATIENT
Start: 2024-01-01 | End: 2024-01-01 | Stop reason: HOSPADM

## 2024-01-01 RX ORDER — OXYMETAZOLINE HYDROCHLORIDE 0.05 G/100ML
1 SPRAY NASAL 3 TIMES DAILY PRN
Status: DISCONTINUED | OUTPATIENT
Start: 2024-01-01 | End: 2024-01-01 | Stop reason: HOSPADM

## 2024-01-01 RX ORDER — MAGNESIUM OXIDE 400 MG/1
800 TABLET ORAL ONCE
Status: COMPLETED | OUTPATIENT
Start: 2024-01-01 | End: 2024-01-01

## 2024-01-01 RX ORDER — NALOXONE HYDROCHLORIDE 0.4 MG/ML
0.2 INJECTION, SOLUTION INTRAMUSCULAR; INTRAVENOUS; SUBCUTANEOUS
Status: DISCONTINUED | OUTPATIENT
Start: 2024-01-01 | End: 2024-01-01 | Stop reason: HOSPADM

## 2024-01-01 RX ORDER — SPIRONOLACTONE 50 MG/1
50 TABLET, FILM COATED ORAL DAILY
COMMUNITY
End: 2024-01-01

## 2024-01-01 RX ORDER — GINSENG 100 MG
CAPSULE ORAL 2 TIMES DAILY
Status: DISCONTINUED | OUTPATIENT
Start: 2024-01-01 | End: 2024-01-01 | Stop reason: HOSPADM

## 2024-01-01 RX ORDER — ROPIVACAINE IN 0.9% SOD CHL/PF 0.1 %
.03-.125 PLASTIC BAG, INJECTION (ML) EPIDURAL CONTINUOUS
Status: DISCONTINUED | OUTPATIENT
Start: 2024-01-01 | End: 2024-01-01

## 2024-01-01 RX ORDER — OXYCODONE HYDROCHLORIDE 5 MG/1
5 TABLET ORAL
Status: DISCONTINUED | OUTPATIENT
Start: 2024-01-01 | End: 2024-01-01

## 2024-01-01 RX ORDER — FOLIC ACID 1 MG/1
1 TABLET ORAL DAILY
Status: DISCONTINUED | OUTPATIENT
Start: 2024-01-01 | End: 2024-01-01

## 2024-01-01 RX ORDER — OCTREOTIDE ACETATE 200 UG/ML
50 INJECTION INTRAVENOUS ONCE
Status: COMPLETED | OUTPATIENT
Start: 2024-01-01 | End: 2024-01-01

## 2024-01-01 RX ORDER — PREDNISONE 20 MG/1
40 TABLET ORAL DAILY
Status: DISCONTINUED | OUTPATIENT
Start: 2024-01-01 | End: 2024-01-01 | Stop reason: HOSPADM

## 2024-01-01 RX ORDER — GINSENG 100 MG
CAPSULE ORAL 2 TIMES DAILY
Qty: 144 G | Refills: 0 | Status: ON HOLD | OUTPATIENT
Start: 2024-01-01 | End: 2024-01-01

## 2024-01-01 RX ORDER — MUPIROCIN 20 MG/G
OINTMENT TOPICAL 3 TIMES DAILY
Status: DISCONTINUED | OUTPATIENT
Start: 2024-01-01 | End: 2024-01-01 | Stop reason: HOSPADM

## 2024-01-01 RX ORDER — FUROSEMIDE 40 MG
40 TABLET ORAL 2 TIMES DAILY
Status: ON HOLD | COMMUNITY
End: 2024-01-01

## 2024-01-01 RX ORDER — MAGNESIUM OXIDE 400 MG/1
400 TABLET ORAL DAILY
COMMUNITY

## 2024-01-01 RX ORDER — MULTIPLE VITAMINS W/ MINERALS TAB 9MG-400MCG
1 TAB ORAL DAILY
Status: DISCONTINUED | OUTPATIENT
Start: 2024-01-01 | End: 2024-01-01

## 2024-01-01 RX ORDER — GABAPENTIN 100 MG/1
100 CAPSULE ORAL EVERY 8 HOURS
Qty: 9 CAPSULE | Refills: 0 | Status: DISCONTINUED | OUTPATIENT
Start: 2024-01-01 | End: 2024-01-01

## 2024-01-01 RX ORDER — DEXTROSE MONOHYDRATE 25 G/50ML
25-50 INJECTION, SOLUTION INTRAVENOUS
Status: DISCONTINUED | OUTPATIENT
Start: 2024-01-01 | End: 2024-01-01 | Stop reason: HOSPADM

## 2024-01-01 RX ORDER — TRIAMCINOLONE ACETONIDE 40 MG/ML
40 INJECTION, SUSPENSION INTRA-ARTICULAR; INTRAMUSCULAR ONCE
Status: DISCONTINUED | OUTPATIENT
Start: 2024-01-01 | End: 2024-01-01

## 2024-01-01 RX ORDER — LACTULOSE 10 G/15ML
20 SOLUTION ORAL 2 TIMES DAILY
Status: DISCONTINUED | OUTPATIENT
Start: 2024-01-01 | End: 2024-01-01 | Stop reason: HOSPADM

## 2024-01-01 RX ORDER — CIPROFLOXACIN 500 MG/1
500 TABLET, FILM COATED ORAL
Status: DISCONTINUED | OUTPATIENT
Start: 2024-01-01 | End: 2024-01-01 | Stop reason: HOSPADM

## 2024-01-01 RX ORDER — ONDANSETRON 4 MG/1
4 TABLET, ORALLY DISINTEGRATING ORAL EVERY 30 MIN PRN
Status: DISCONTINUED | OUTPATIENT
Start: 2024-01-01 | End: 2024-01-01 | Stop reason: HOSPADM

## 2024-01-01 RX ORDER — MIDODRINE HYDROCHLORIDE 5 MG/1
5 TABLET ORAL
Status: DISCONTINUED | OUTPATIENT
Start: 2024-01-01 | End: 2024-01-01 | Stop reason: HOSPADM

## 2024-01-01 RX ORDER — DEXTROSE MONOHYDRATE 25 G/50ML
25-50 INJECTION, SOLUTION INTRAVENOUS
Status: DISCONTINUED | OUTPATIENT
Start: 2024-01-01 | End: 2024-01-01

## 2024-01-01 RX ORDER — NALOXONE HYDROCHLORIDE 0.4 MG/ML
0.4 INJECTION, SOLUTION INTRAMUSCULAR; INTRAVENOUS; SUBCUTANEOUS
Status: DISCONTINUED | OUTPATIENT
Start: 2024-01-01 | End: 2024-01-01 | Stop reason: HOSPADM

## 2024-01-01 RX ORDER — SENNOSIDES 8.6 MG
1 TABLET ORAL 2 TIMES DAILY PRN
Status: DISCONTINUED | OUTPATIENT
Start: 2024-01-01 | End: 2024-01-01 | Stop reason: HOSPADM

## 2024-01-01 RX ORDER — SPIRONOLACTONE 100 MG/1
200 TABLET, FILM COATED ORAL DAILY
Status: DISCONTINUED | OUTPATIENT
Start: 2024-01-01 | End: 2024-01-01 | Stop reason: HOSPADM

## 2024-01-01 RX ORDER — DEXMEDETOMIDINE HYDROCHLORIDE 4 UG/ML
.1-1.2 INJECTION, SOLUTION INTRAVENOUS CONTINUOUS
Status: DISCONTINUED | OUTPATIENT
Start: 2024-01-01 | End: 2024-01-01

## 2024-01-01 RX ORDER — CEFTRIAXONE 1 G/1
1 INJECTION, POWDER, FOR SOLUTION INTRAMUSCULAR; INTRAVENOUS EVERY 24 HOURS
Status: DISCONTINUED | OUTPATIENT
Start: 2024-01-01 | End: 2024-01-01

## 2024-01-01 RX ORDER — FUROSEMIDE 10 MG/ML
60 INJECTION INTRAMUSCULAR; INTRAVENOUS ONCE
Status: COMPLETED | OUTPATIENT
Start: 2024-01-01 | End: 2024-01-01

## 2024-01-01 RX ORDER — POLYETHYLENE GLYCOL 3350 17 G/17G
17 POWDER, FOR SOLUTION ORAL 2 TIMES DAILY PRN
Status: DISCONTINUED | OUTPATIENT
Start: 2024-01-01 | End: 2024-01-01 | Stop reason: HOSPADM

## 2024-01-01 RX ORDER — IOPAMIDOL 408 MG/ML
20 INJECTION, SOLUTION INTRATHECAL ONCE
Status: COMPLETED | OUTPATIENT
Start: 2024-01-01 | End: 2024-01-01

## 2024-01-01 RX ORDER — SPIRONOLACTONE 50 MG/1
50 TABLET, FILM COATED ORAL DAILY
Status: DISCONTINUED | OUTPATIENT
Start: 2024-01-01 | End: 2024-01-01

## 2024-01-01 RX ORDER — OXYMETAZOLINE HYDROCHLORIDE 0.05 G/100ML
1 SPRAY NASAL 3 TIMES DAILY PRN
Qty: 15 ML | Refills: 0 | Status: SHIPPED | OUTPATIENT
Start: 2024-01-01

## 2024-01-01 RX ORDER — FOLIC ACID 1 MG/1
1 TABLET ORAL DAILY
Status: DISCONTINUED | OUTPATIENT
Start: 2024-01-01 | End: 2024-01-01 | Stop reason: HOSPADM

## 2024-01-01 RX ORDER — PROCHLORPERAZINE MALEATE 10 MG
10 TABLET ORAL EVERY 6 HOURS PRN
Status: DISCONTINUED | OUTPATIENT
Start: 2024-01-01 | End: 2024-01-01 | Stop reason: HOSPADM

## 2024-01-01 RX ORDER — GABAPENTIN 300 MG/1
300 CAPSULE ORAL EVERY 8 HOURS
Qty: 6 CAPSULE | Refills: 0 | Status: DISCONTINUED | OUTPATIENT
Start: 2024-01-01 | End: 2024-01-01

## 2024-01-01 RX ORDER — LACTULOSE 10 G/15ML
20 SOLUTION ORAL 3 TIMES DAILY
Status: SHIPPED
Start: 2024-01-01

## 2024-01-01 RX ORDER — SULFAMETHOXAZOLE/TRIMETHOPRIM 800-160 MG
1 TABLET ORAL DAILY
Status: DISCONTINUED | OUTPATIENT
Start: 2024-01-01 | End: 2024-01-01

## 2024-01-01 RX ORDER — LACTULOSE 10 G/15ML
20 SOLUTION ORAL ONCE
Status: DISCONTINUED | OUTPATIENT
Start: 2024-01-01 | End: 2024-01-01

## 2024-01-01 RX ORDER — PANTOPRAZOLE SODIUM 40 MG/1
40 TABLET, DELAYED RELEASE ORAL 2 TIMES DAILY
Status: DISCONTINUED | OUTPATIENT
Start: 2024-01-01 | End: 2024-01-01 | Stop reason: HOSPADM

## 2024-01-01 RX ORDER — PIPERACILLIN SODIUM, TAZOBACTAM SODIUM 4; .5 G/20ML; G/20ML
4.5 INJECTION, POWDER, LYOPHILIZED, FOR SOLUTION INTRAVENOUS EVERY 6 HOURS
Status: DISCONTINUED | OUTPATIENT
Start: 2024-01-01 | End: 2024-01-01

## 2024-01-01 RX ORDER — MIDODRINE HYDROCHLORIDE 5 MG/1
15 TABLET ORAL
Qty: 270 TABLET | Refills: 0 | Status: SHIPPED | OUTPATIENT
Start: 2024-01-01 | End: 2024-01-01

## 2024-01-01 RX ORDER — FOLIC ACID 1 MG/1
1 TABLET ORAL ONCE
Status: COMPLETED | OUTPATIENT
Start: 2024-01-01 | End: 2024-01-01

## 2024-01-01 RX ORDER — CLONIDINE HYDROCHLORIDE 0.1 MG/1
0.1 TABLET ORAL EVERY 8 HOURS
Status: DISCONTINUED | OUTPATIENT
Start: 2024-01-01 | End: 2024-01-01

## 2024-01-01 RX ORDER — HALOPERIDOL 5 MG/ML
2.5-5 INJECTION INTRAMUSCULAR EVERY 6 HOURS PRN
Status: DISCONTINUED | OUTPATIENT
Start: 2024-01-01 | End: 2024-01-01 | Stop reason: HOSPADM

## 2024-01-01 RX ORDER — BISACODYL 10 MG
10 SUPPOSITORY, RECTAL RECTAL DAILY PRN
Status: DISCONTINUED | OUTPATIENT
Start: 2024-01-01 | End: 2024-01-01 | Stop reason: HOSPADM

## 2024-01-01 RX ORDER — LACTULOSE 10 G/15ML
10 SOLUTION ORAL 2 TIMES DAILY
Status: ON HOLD | COMMUNITY
End: 2024-01-01

## 2024-01-01 RX ORDER — ETOMIDATE 2 MG/ML
20 INJECTION INTRAVENOUS ONCE
Status: COMPLETED | OUTPATIENT
Start: 2024-01-01 | End: 2024-01-01

## 2024-01-01 RX ORDER — OXYCODONE HYDROCHLORIDE 5 MG/1
10 TABLET ORAL
Status: DISCONTINUED | OUTPATIENT
Start: 2024-01-01 | End: 2024-01-01

## 2024-01-01 RX ORDER — ALBUMIN (HUMAN) 12.5 G/50ML
25 SOLUTION INTRAVENOUS EVERY 8 HOURS
Status: COMPLETED | OUTPATIENT
Start: 2024-01-01 | End: 2024-01-01

## 2024-01-01 RX ORDER — OXYMETAZOLINE HYDROCHLORIDE 0.05 G/100ML
2 SPRAY NASAL ONCE
Status: COMPLETED | OUTPATIENT
Start: 2024-01-01 | End: 2024-01-01

## 2024-01-01 RX ORDER — LORAZEPAM 0.5 MG/1
.25-.5 TABLET ORAL EVERY 4 HOURS PRN
Qty: 30 TABLET | Refills: 0 | Status: SHIPPED | OUTPATIENT
Start: 2024-01-01

## 2024-01-01 RX ORDER — HEPARIN SODIUM 5000 [USP'U]/.5ML
5000 INJECTION, SOLUTION INTRAVENOUS; SUBCUTANEOUS EVERY 12 HOURS
Status: DISCONTINUED | OUTPATIENT
Start: 2024-01-01 | End: 2024-01-01 | Stop reason: HOSPADM

## 2024-01-01 RX ORDER — CEFTRIAXONE 2 G/1
2 INJECTION, POWDER, FOR SOLUTION INTRAMUSCULAR; INTRAVENOUS EVERY 24 HOURS
Status: DISCONTINUED | OUTPATIENT
Start: 2024-01-01 | End: 2024-01-01

## 2024-01-01 RX ORDER — MIDAZOLAM HCL IN 0.9 % NACL/PF 1 MG/ML
1-8 PLASTIC BAG, INJECTION (ML) INTRAVENOUS CONTINUOUS
Status: DISCONTINUED | OUTPATIENT
Start: 2024-01-01 | End: 2024-01-01

## 2024-01-01 RX ORDER — ONDANSETRON 4 MG/1
4 TABLET, ORALLY DISINTEGRATING ORAL EVERY 30 MIN PRN
Status: DISCONTINUED | OUTPATIENT
Start: 2024-01-01 | End: 2024-01-01

## 2024-01-01 RX ORDER — SPIRONOLACTONE 50 MG/1
50 TABLET, FILM COATED ORAL DAILY
Status: ON HOLD | COMMUNITY
Start: 2024-01-01 | End: 2024-01-01

## 2024-01-01 RX ORDER — SPIRONOLACTONE 50 MG/1
50 TABLET, FILM COATED ORAL DAILY
Qty: 30 TABLET | Refills: 0 | Status: ON HOLD | OUTPATIENT
Start: 2024-01-01 | End: 2024-01-01

## 2024-01-01 RX ORDER — DEXTROSE MONOHYDRATE 100 MG/ML
INJECTION, SOLUTION INTRAVENOUS CONTINUOUS PRN
Status: DISCONTINUED | OUTPATIENT
Start: 2024-01-01 | End: 2024-01-01 | Stop reason: HOSPADM

## 2024-01-01 RX ORDER — CALCIUM CARBONATE 500 MG/1
1000 TABLET, CHEWABLE ORAL 4 TIMES DAILY PRN
Status: DISCONTINUED | OUTPATIENT
Start: 2024-01-01 | End: 2024-01-01 | Stop reason: HOSPADM

## 2024-01-01 RX ORDER — HYDROMORPHONE HYDROCHLORIDE 1 MG/ML
1-2 SOLUTION ORAL
Qty: 50 ML | Refills: 0 | Status: SHIPPED | OUTPATIENT
Start: 2024-01-01

## 2024-01-01 RX ORDER — PROPOFOL 10 MG/ML
INJECTION, EMULSION INTRAVENOUS PRN
Status: DISCONTINUED | OUTPATIENT
Start: 2024-01-01 | End: 2024-01-01

## 2024-01-01 RX ORDER — MULTIPLE VITAMINS W/ MINERALS TAB 9MG-400MCG
1 TAB ORAL DAILY
Status: DISCONTINUED | OUTPATIENT
Start: 2024-01-01 | End: 2024-01-01 | Stop reason: HOSPADM

## 2024-01-01 RX ORDER — LACTULOSE 10 G/15ML
20 SOLUTION ORAL DAILY
Status: DISCONTINUED | OUTPATIENT
Start: 2024-01-01 | End: 2024-01-01

## 2024-01-01 RX ORDER — HYDROCODONE BITARTRATE AND ACETAMINOPHEN 5; 325 MG/1; MG/1
2 TABLET ORAL ONCE
Status: COMPLETED | OUTPATIENT
Start: 2024-01-01 | End: 2024-01-01

## 2024-01-01 RX ORDER — TRANEXAMIC ACID 100 MG/ML
INJECTION, SOLUTION INTRAVENOUS ONCE
Status: COMPLETED | OUTPATIENT
Start: 2024-01-01 | End: 2024-01-01

## 2024-01-01 RX ORDER — GABAPENTIN 300 MG/1
600 CAPSULE ORAL EVERY 8 HOURS
Status: COMPLETED | OUTPATIENT
Start: 2024-01-01 | End: 2024-01-01

## 2024-01-01 RX ORDER — LABETALOL HYDROCHLORIDE 5 MG/ML
10 INJECTION, SOLUTION INTRAVENOUS
Status: DISCONTINUED | OUTPATIENT
Start: 2024-01-01 | End: 2024-01-01 | Stop reason: HOSPADM

## 2024-01-01 RX ORDER — FUROSEMIDE 10 MG/ML
40 INJECTION INTRAMUSCULAR; INTRAVENOUS
Status: DISCONTINUED | OUTPATIENT
Start: 2024-01-01 | End: 2024-01-01

## 2024-01-01 RX ORDER — FUROSEMIDE 40 MG
40 TABLET ORAL 2 TIMES DAILY
Status: ON HOLD | COMMUNITY
Start: 2024-01-01 | End: 2024-01-01

## 2024-01-01 RX ORDER — CIPROFLOXACIN 500 MG/1
500 TABLET, FILM COATED ORAL EVERY 24 HOURS
Qty: 60 TABLET | Refills: 1 | Status: SHIPPED | OUTPATIENT
Start: 2024-01-01 | End: 2024-01-01

## 2024-01-01 RX ORDER — MIRTAZAPINE 7.5 MG/1
7.5 TABLET, FILM COATED ORAL AT BEDTIME
Qty: 30 TABLET | Refills: 0 | Status: SHIPPED | OUTPATIENT
Start: 2024-01-01

## 2024-01-01 RX ORDER — MEPERIDINE HYDROCHLORIDE 25 MG/ML
12.5 INJECTION INTRAMUSCULAR; INTRAVENOUS; SUBCUTANEOUS EVERY 5 MIN PRN
Status: DISCONTINUED | OUTPATIENT
Start: 2024-01-01 | End: 2024-01-01 | Stop reason: HOSPADM

## 2024-01-01 RX ORDER — FUROSEMIDE 40 MG
40 TABLET ORAL DAILY
Status: SHIPPED
Start: 2024-01-01

## 2024-01-01 RX ORDER — AMOXICILLIN AND CLAVULANATE POTASSIUM 250; 125 MG/1; MG/1
1 TABLET, FILM COATED ORAL 2 TIMES DAILY
Qty: 10 TABLET | Refills: 0 | Status: SHIPPED | OUTPATIENT
Start: 2024-01-01 | End: 2024-01-01

## 2024-01-01 RX ORDER — HYDRALAZINE HYDROCHLORIDE 20 MG/ML
2.5-5 INJECTION INTRAMUSCULAR; INTRAVENOUS EVERY 10 MIN PRN
Status: DISCONTINUED | OUTPATIENT
Start: 2024-01-01 | End: 2024-01-01 | Stop reason: HOSPADM

## 2024-01-01 RX ORDER — SPIRONOLACTONE 50 MG/1
50 TABLET, FILM COATED ORAL DAILY
Status: DISCONTINUED | OUTPATIENT
Start: 2024-01-01 | End: 2024-01-01 | Stop reason: HOSPADM

## 2024-01-01 RX ORDER — FLUMAZENIL 0.1 MG/ML
0.2 INJECTION, SOLUTION INTRAVENOUS
Status: DISCONTINUED | OUTPATIENT
Start: 2024-01-01 | End: 2024-01-01

## 2024-01-01 RX ORDER — LORAZEPAM 2 MG/ML
1-2 INJECTION INTRAMUSCULAR EVERY 30 MIN PRN
Status: DISCONTINUED | OUTPATIENT
Start: 2024-01-01 | End: 2024-01-01 | Stop reason: HOSPADM

## 2024-01-01 RX ORDER — TORSEMIDE 100 MG/1
100 TABLET ORAL
Status: DISCONTINUED | OUTPATIENT
Start: 2024-01-01 | End: 2024-01-01

## 2024-01-01 RX ORDER — FUROSEMIDE 40 MG
40 TABLET ORAL
Status: DISCONTINUED | OUTPATIENT
Start: 2024-01-01 | End: 2024-01-01

## 2024-01-01 RX ORDER — DEXTROSE, SODIUM CHLORIDE, SODIUM LACTATE, POTASSIUM CHLORIDE, AND CALCIUM CHLORIDE 5; .6; .31; .03; .02 G/100ML; G/100ML; G/100ML; G/100ML; G/100ML
1000 INJECTION, SOLUTION INTRAVENOUS ONCE
Status: COMPLETED | OUTPATIENT
Start: 2024-01-01 | End: 2024-01-01

## 2024-01-01 RX ORDER — LACTULOSE 10 G/15ML
20 SOLUTION ORAL 3 TIMES DAILY
Status: DISCONTINUED | OUTPATIENT
Start: 2024-01-01 | End: 2024-01-01 | Stop reason: HOSPADM

## 2024-01-01 RX ORDER — FUROSEMIDE 20 MG
20 TABLET ORAL DAILY
Qty: 60 TABLET | Refills: 1 | Status: ON HOLD | OUTPATIENT
Start: 2024-01-01 | End: 2024-01-01

## 2024-01-01 RX ORDER — CIPROFLOXACIN 500 MG/1
500 TABLET, FILM COATED ORAL EVERY 24 HOURS
COMMUNITY
Start: 2024-01-01

## 2024-01-01 RX ORDER — METHYLPREDNISOLONE SODIUM SUCCINATE 40 MG/ML
40 INJECTION, POWDER, LYOPHILIZED, FOR SOLUTION INTRAMUSCULAR; INTRAVENOUS EVERY 24 HOURS
Status: DISCONTINUED | OUTPATIENT
Start: 2024-01-01 | End: 2024-01-01

## 2024-01-01 RX ORDER — HYDROMORPHONE HCL IN WATER/PF 6 MG/30 ML
0.4 PATIENT CONTROLLED ANALGESIA SYRINGE INTRAVENOUS EVERY 5 MIN PRN
Status: DISCONTINUED | OUTPATIENT
Start: 2024-01-01 | End: 2024-01-01 | Stop reason: HOSPADM

## 2024-01-01 RX ORDER — MUPIROCIN 20 MG/G
OINTMENT TOPICAL 2 TIMES DAILY
Qty: 30 G | Refills: 0 | Status: SHIPPED | OUTPATIENT
Start: 2024-01-01 | End: 2024-01-01

## 2024-01-01 RX ORDER — SPIRONOLACTONE 50 MG/1
50 TABLET, FILM COATED ORAL DAILY
Status: ON HOLD | COMMUNITY
End: 2024-01-01

## 2024-01-01 RX ORDER — OXYMETAZOLINE HYDROCHLORIDE 0.05 G/100ML
2 SPRAY NASAL 3 TIMES DAILY PRN
Status: DISCONTINUED | OUTPATIENT
Start: 2024-01-01 | End: 2024-01-01 | Stop reason: HOSPADM

## 2024-01-01 RX ORDER — POTASSIUM CHLORIDE 1.5 G/1.58G
40 POWDER, FOR SOLUTION ORAL DAILY
Status: DISCONTINUED | OUTPATIENT
Start: 2024-01-01 | End: 2024-01-01

## 2024-01-01 RX ORDER — FUROSEMIDE 40 MG
40 TABLET ORAL 2 TIMES DAILY
Status: DISCONTINUED | OUTPATIENT
Start: 2024-01-01 | End: 2024-01-01 | Stop reason: HOSPADM

## 2024-01-01 RX ORDER — PANTOPRAZOLE SODIUM 40 MG/1
40 TABLET, DELAYED RELEASE ORAL
Status: DISCONTINUED | OUTPATIENT
Start: 2024-01-01 | End: 2024-01-01 | Stop reason: HOSPADM

## 2024-01-01 RX ORDER — TRAZODONE HYDROCHLORIDE 50 MG/1
50 TABLET, FILM COATED ORAL
Status: ON HOLD | COMMUNITY
End: 2024-01-01

## 2024-01-01 RX ORDER — FUROSEMIDE 10 MG/ML
20 INJECTION INTRAMUSCULAR; INTRAVENOUS
Status: DISCONTINUED | OUTPATIENT
Start: 2024-01-01 | End: 2024-01-01

## 2024-01-01 RX ORDER — PHYTONADIONE 5 MG/1
5 TABLET ORAL ONCE
Status: DISCONTINUED | OUTPATIENT
Start: 2024-01-01 | End: 2024-01-01

## 2024-01-01 RX ORDER — LORAZEPAM 1 MG/1
1-2 TABLET ORAL EVERY 30 MIN PRN
Status: DISCONTINUED | OUTPATIENT
Start: 2024-01-01 | End: 2024-01-01 | Stop reason: HOSPADM

## 2024-01-01 RX ORDER — CARBOXYMETHYLCELLULOSE SODIUM 5 MG/ML
1-2 SOLUTION/ DROPS OPHTHALMIC
Status: DISCONTINUED | OUTPATIENT
Start: 2024-01-01 | End: 2024-01-01 | Stop reason: HOSPADM

## 2024-01-01 RX ORDER — FLUMAZENIL 0.1 MG/ML
0.2 INJECTION, SOLUTION INTRAVENOUS
Status: DISCONTINUED | OUTPATIENT
Start: 2024-01-01 | End: 2024-01-01 | Stop reason: HOSPADM

## 2024-01-01 RX ORDER — FUROSEMIDE 20 MG
20 TABLET ORAL DAILY
Qty: 30 TABLET | Refills: 0 | Status: SHIPPED | OUTPATIENT
Start: 2024-01-01 | End: 2024-01-01

## 2024-01-01 RX ORDER — FENTANYL CITRATE 0.05 MG/ML
50 INJECTION, SOLUTION INTRAMUSCULAR; INTRAVENOUS EVERY 5 MIN PRN
Status: DISCONTINUED | OUTPATIENT
Start: 2024-01-01 | End: 2024-01-01 | Stop reason: HOSPADM

## 2024-01-01 RX ORDER — NALTREXONE HYDROCHLORIDE 50 MG/1
50 TABLET, FILM COATED ORAL DAILY
COMMUNITY

## 2024-01-01 RX ORDER — MIDODRINE HYDROCHLORIDE 5 MG/1
15 TABLET ORAL
Status: DISCONTINUED | OUTPATIENT
Start: 2024-01-01 | End: 2024-01-01 | Stop reason: HOSPADM

## 2024-01-01 RX ORDER — ONDANSETRON 2 MG/ML
4 INJECTION INTRAMUSCULAR; INTRAVENOUS EVERY 30 MIN PRN
Status: DISCONTINUED | OUTPATIENT
Start: 2024-01-01 | End: 2024-01-01

## 2024-01-01 RX ORDER — LORAZEPAM 2 MG/ML
1-2 INJECTION INTRAMUSCULAR EVERY 30 MIN PRN
Status: DISCONTINUED | OUTPATIENT
Start: 2024-01-01 | End: 2024-01-01

## 2024-01-01 RX ORDER — THIAMINE HYDROCHLORIDE 100 MG/ML
100 INJECTION, SOLUTION INTRAMUSCULAR; INTRAVENOUS DAILY
Status: DISCONTINUED | OUTPATIENT
Start: 2024-01-01 | End: 2024-01-01 | Stop reason: HOSPADM

## 2024-01-01 RX ORDER — GABAPENTIN 100 MG/1
100 CAPSULE ORAL EVERY 8 HOURS
Status: COMPLETED | OUTPATIENT
Start: 2024-01-01 | End: 2024-01-01

## 2024-01-01 RX ORDER — MIDODRINE HYDROCHLORIDE 10 MG/1
20 TABLET ORAL
Status: DISCONTINUED | OUTPATIENT
Start: 2024-01-01 | End: 2024-01-01

## 2024-01-01 RX ORDER — IOPAMIDOL 755 MG/ML
135 INJECTION, SOLUTION INTRAVASCULAR ONCE
Status: COMPLETED | OUTPATIENT
Start: 2024-01-01 | End: 2024-01-01

## 2024-01-01 RX ORDER — LIDOCAINE HYDROCHLORIDE 10 MG/ML
10 INJECTION, SOLUTION EPIDURAL; INFILTRATION; INTRACAUDAL; PERINEURAL ONCE
Status: DISCONTINUED | OUTPATIENT
Start: 2024-01-01 | End: 2024-01-01 | Stop reason: HOSPADM

## 2024-01-01 RX ORDER — LANOLIN ALCOHOL/MO/W.PET/CERES
3 CREAM (GRAM) TOPICAL EVERY EVENING
Status: DISCONTINUED | OUTPATIENT
Start: 2024-01-01 | End: 2024-01-01 | Stop reason: HOSPADM

## 2024-01-01 RX ORDER — ALBUTEROL SULFATE 0.83 MG/ML
2.5 SOLUTION RESPIRATORY (INHALATION) EVERY 4 HOURS PRN
Status: DISCONTINUED | OUTPATIENT
Start: 2024-01-01 | End: 2024-01-01 | Stop reason: HOSPADM

## 2024-01-01 RX ORDER — MULTIVITAMIN,THERAPEUTIC
1 TABLET ORAL ONCE
Status: COMPLETED | OUTPATIENT
Start: 2024-01-01 | End: 2024-01-01

## 2024-01-01 RX ORDER — SODIUM BICARBONATE 650 MG/1
1300 TABLET ORAL 2 TIMES DAILY
Status: DISCONTINUED | OUTPATIENT
Start: 2024-01-01 | End: 2024-01-01

## 2024-01-01 RX ORDER — LORAZEPAM 1 MG/1
1-2 TABLET ORAL EVERY 30 MIN PRN
Status: DISCONTINUED | OUTPATIENT
Start: 2024-01-01 | End: 2024-01-01

## 2024-01-01 RX ORDER — NOREPINEPHRINE BITARTRATE 0.02 MG/ML
.01-.6 INJECTION, SOLUTION INTRAVENOUS CONTINUOUS
Status: DISCONTINUED | OUTPATIENT
Start: 2024-01-01 | End: 2024-01-01

## 2024-01-01 RX ORDER — DIAZEPAM 10 MG/2ML
5-10 INJECTION, SOLUTION INTRAMUSCULAR; INTRAVENOUS EVERY 30 MIN PRN
Status: DISCONTINUED | OUTPATIENT
Start: 2024-01-01 | End: 2024-01-01

## 2024-01-01 RX ORDER — PREDNISONE 20 MG/1
40 TABLET ORAL DAILY
Qty: 10 TABLET | Refills: 0 | Status: SHIPPED | OUTPATIENT
Start: 2024-01-01 | End: 2024-01-01

## 2024-01-01 RX ORDER — MIDODRINE HYDROCHLORIDE 2.5 MG/1
2.5 TABLET ORAL
Status: DISCONTINUED | OUTPATIENT
Start: 2024-01-01 | End: 2024-01-01

## 2024-01-01 RX ORDER — DIAZEPAM 5 MG
10 TABLET ORAL EVERY 30 MIN PRN
Status: DISCONTINUED | OUTPATIENT
Start: 2024-01-01 | End: 2024-01-01

## 2024-01-01 RX ORDER — MULTIVITAMIN,THERAPEUTIC
1 TABLET ORAL DAILY
Status: DISCONTINUED | OUTPATIENT
Start: 2024-01-01 | End: 2024-01-01 | Stop reason: HOSPADM

## 2024-01-01 RX ORDER — FUROSEMIDE 20 MG
20 TABLET ORAL
Status: DISCONTINUED | OUTPATIENT
Start: 2024-01-01 | End: 2024-01-01 | Stop reason: HOSPADM

## 2024-01-01 RX ORDER — KIT FOR THE PREPARATION OF TECHNETIUM TC 99M MEBROFENIN 45 MG/10ML
6 INJECTION, POWDER, LYOPHILIZED, FOR SOLUTION INTRAVENOUS ONCE
Status: COMPLETED | OUTPATIENT
Start: 2024-01-01 | End: 2024-01-01

## 2024-01-01 RX ORDER — TRIAMCINOLONE ACETONIDE 40 MG/ML
40 INJECTION, SUSPENSION INTRA-ARTICULAR; INTRAMUSCULAR ONCE
Status: COMPLETED | OUTPATIENT
Start: 2024-01-01 | End: 2024-01-01

## 2024-01-01 RX ORDER — FUROSEMIDE 40 MG
40 TABLET ORAL
Status: DISCONTINUED | OUTPATIENT
Start: 2024-01-01 | End: 2024-01-01 | Stop reason: HOSPADM

## 2024-01-01 RX ORDER — HYDROMORPHONE HCL IN WATER/PF 6 MG/30 ML
0.2 PATIENT CONTROLLED ANALGESIA SYRINGE INTRAVENOUS EVERY 5 MIN PRN
Status: DISCONTINUED | OUTPATIENT
Start: 2024-01-01 | End: 2024-01-01 | Stop reason: HOSPADM

## 2024-01-01 RX ORDER — GUAIFENESIN/DEXTROMETHORPHAN 100-10MG/5
10 SYRUP ORAL EVERY 4 HOURS PRN
Status: DISCONTINUED | OUTPATIENT
Start: 2024-01-01 | End: 2024-01-01 | Stop reason: HOSPADM

## 2024-01-01 RX ORDER — METHOCARBAMOL 500 MG/1
500 TABLET, FILM COATED ORAL 2 TIMES DAILY PRN
Status: DISCONTINUED | OUTPATIENT
Start: 2024-01-01 | End: 2024-01-01 | Stop reason: HOSPADM

## 2024-01-01 RX ORDER — HYDROMORPHONE HYDROCHLORIDE 1 MG/ML
.5-1 INJECTION, SOLUTION INTRAMUSCULAR; INTRAVENOUS; SUBCUTANEOUS
Status: DISCONTINUED | OUTPATIENT
Start: 2024-01-01 | End: 2024-01-01 | Stop reason: HOSPADM

## 2024-01-01 RX ORDER — PROCHLORPERAZINE MALEATE 5 MG
5 TABLET ORAL EVERY 6 HOURS PRN
Status: DISCONTINUED | OUTPATIENT
Start: 2024-01-01 | End: 2024-01-01 | Stop reason: HOSPADM

## 2024-01-01 RX ORDER — OXYCODONE HCL 20 MG/ML
5-10 CONCENTRATE, ORAL ORAL
Status: DISCONTINUED | OUTPATIENT
Start: 2024-01-01 | End: 2024-01-01 | Stop reason: HOSPADM

## 2024-01-01 RX ORDER — POTASSIUM CHLORIDE 1500 MG/1
40 TABLET, EXTENDED RELEASE ORAL ONCE
Status: COMPLETED | OUTPATIENT
Start: 2024-01-01 | End: 2024-01-01

## 2024-01-01 RX ORDER — PROPOFOL 10 MG/ML
INJECTION, EMULSION INTRAVENOUS CONTINUOUS PRN
Status: DISCONTINUED | OUTPATIENT
Start: 2024-01-01 | End: 2024-01-01

## 2024-01-01 RX ORDER — ONDANSETRON 2 MG/ML
4 INJECTION INTRAMUSCULAR; INTRAVENOUS EVERY 30 MIN PRN
Status: DISCONTINUED | OUTPATIENT
Start: 2024-01-01 | End: 2024-01-01 | Stop reason: HOSPADM

## 2024-01-01 RX ORDER — BISACODYL 10 MG
10 SUPPOSITORY, RECTAL RECTAL
Status: DISCONTINUED | OUTPATIENT
Start: 2024-01-01 | End: 2024-01-01 | Stop reason: HOSPADM

## 2024-01-01 RX ORDER — FUROSEMIDE 20 MG
20 TABLET ORAL 2 TIMES DAILY
Status: ON HOLD | COMMUNITY
End: 2024-01-01

## 2024-01-01 RX ORDER — PANTOPRAZOLE SODIUM 40 MG/1
40 TABLET, DELAYED RELEASE ORAL 2 TIMES DAILY
Qty: 60 TABLET | Refills: 0 | Status: SHIPPED | OUTPATIENT
Start: 2024-01-01

## 2024-01-01 RX ORDER — SPIRONOLACTONE 50 MG/1
50 TABLET, FILM COATED ORAL DAILY
Qty: 30 TABLET | Refills: 0 | Status: SHIPPED | OUTPATIENT
Start: 2024-01-01

## 2024-01-01 RX ORDER — FUROSEMIDE 20 MG
20 TABLET ORAL DAILY
Status: DISCONTINUED | OUTPATIENT
Start: 2024-01-01 | End: 2024-01-01 | Stop reason: HOSPADM

## 2024-01-01 RX ADMIN — PIPERACILLIN AND TAZOBACTAM 4.5 G: 4; .5 INJECTION, POWDER, FOR SOLUTION INTRAVENOUS at 04:51

## 2024-01-01 RX ADMIN — SODIUM CHLORIDE 85 ML: 9 INJECTION, SOLUTION INTRAVENOUS at 21:39

## 2024-01-01 RX ADMIN — HEPARIN SODIUM 5000 UNITS: 5000 INJECTION, SOLUTION INTRAVENOUS; SUBCUTANEOUS at 11:14

## 2024-01-01 RX ADMIN — Medication 1 TABLET: at 08:43

## 2024-01-01 RX ADMIN — Medication 1 TABLET: at 09:48

## 2024-01-01 RX ADMIN — THIAMINE HCL TAB 100 MG 100 MG: 100 TAB at 07:27

## 2024-01-01 RX ADMIN — LACTULOSE 20 G: 10 SOLUTION ORAL at 20:42

## 2024-01-01 RX ADMIN — Medication 1 TABLET: at 08:36

## 2024-01-01 RX ADMIN — METHYLPREDNISOLONE SODIUM SUCCINATE 40 MG: 40 INJECTION, POWDER, FOR SOLUTION INTRAMUSCULAR; INTRAVENOUS at 11:18

## 2024-01-01 RX ADMIN — RIFAXIMIN 550 MG: 550 TABLET ORAL at 20:39

## 2024-01-01 RX ADMIN — HEPARIN SODIUM 5000 UNITS: 5000 INJECTION, SOLUTION INTRAVENOUS; SUBCUTANEOUS at 20:23

## 2024-01-01 RX ADMIN — AMPICILLIN SODIUM AND SULBACTAM SODIUM 3 G: 2; 1 INJECTION, POWDER, FOR SOLUTION INTRAMUSCULAR; INTRAVENOUS at 08:13

## 2024-01-01 RX ADMIN — RIFAXIMIN 550 MG: 550 TABLET ORAL at 21:03

## 2024-01-01 RX ADMIN — SODIUM BICARBONATE 650 MG TABLET 1300 MG: at 20:39

## 2024-01-01 RX ADMIN — SODIUM ZIRCONIUM CYCLOSILICATE 10 G: 5 POWDER, FOR SUSPENSION ORAL at 13:08

## 2024-01-01 RX ADMIN — FUROSEMIDE 40 MG: 10 INJECTION, SOLUTION INTRAMUSCULAR; INTRAVENOUS at 09:55

## 2024-01-01 RX ADMIN — LACTULOSE 20 G: 20 SOLUTION ORAL at 18:04

## 2024-01-01 RX ADMIN — RIFAXIMIN 550 MG: 550 TABLET ORAL at 20:49

## 2024-01-01 RX ADMIN — LACTULOSE 20 G: 20 SOLUTION ORAL at 22:10

## 2024-01-01 RX ADMIN — LACTULOSE 20 G: 20 SOLUTION ORAL at 15:41

## 2024-01-01 RX ADMIN — FOLIC ACID 1 MG: 1 TABLET ORAL at 08:06

## 2024-01-01 RX ADMIN — MIDODRINE HYDROCHLORIDE 5 MG: 5 TABLET ORAL at 17:01

## 2024-01-01 RX ADMIN — RIFAXIMIN 550 MG: 550 TABLET ORAL at 20:38

## 2024-01-01 RX ADMIN — PHENYLEPHRINE HYDROCHLORIDE 100 MCG: 10 INJECTION INTRAVENOUS at 13:34

## 2024-01-01 RX ADMIN — INSULIN ASPART 1 UNITS: 100 INJECTION, SOLUTION INTRAVENOUS; SUBCUTANEOUS at 17:50

## 2024-01-01 RX ADMIN — PANTOPRAZOLE SODIUM 40 MG: 40 TABLET, DELAYED RELEASE ORAL at 08:00

## 2024-01-01 RX ADMIN — LACTULOSE 20 G: 20 SOLUTION ORAL at 20:43

## 2024-01-01 RX ADMIN — FUROSEMIDE 20 MG: 10 INJECTION, SOLUTION INTRAMUSCULAR; INTRAVENOUS at 16:53

## 2024-01-01 RX ADMIN — SPIRONOLACTONE 50 MG: 25 TABLET ORAL at 13:01

## 2024-01-01 RX ADMIN — RIFAXIMIN 550 MG: 550 TABLET ORAL at 20:47

## 2024-01-01 RX ADMIN — MUPIROCIN: 20 OINTMENT TOPICAL at 16:00

## 2024-01-01 RX ADMIN — MIDODRINE HYDROCHLORIDE 5 MG: 5 TABLET ORAL at 13:01

## 2024-01-01 RX ADMIN — CEFTRIAXONE SODIUM 1 G: 1 INJECTION, POWDER, FOR SOLUTION INTRAMUSCULAR; INTRAVENOUS at 09:17

## 2024-01-01 RX ADMIN — PANTOPRAZOLE SODIUM 40 MG: 40 TABLET, DELAYED RELEASE ORAL at 09:39

## 2024-01-01 RX ADMIN — NALTREXONE HYDROCHLORIDE 50 MG: 50 TABLET, FILM COATED ORAL at 08:15

## 2024-01-01 RX ADMIN — PANTOPRAZOLE SODIUM 40 MG: 40 TABLET, DELAYED RELEASE ORAL at 08:58

## 2024-01-01 RX ADMIN — FOLIC ACID 1 MG: 1 TABLET ORAL at 09:48

## 2024-01-01 RX ADMIN — CIPROFLOXACIN HYDROCHLORIDE 500 MG: 500 TABLET, FILM COATED ORAL at 16:58

## 2024-01-01 RX ADMIN — METHOCARBAMOL 500 MG: 500 TABLET ORAL at 20:53

## 2024-01-01 RX ADMIN — PANTOPRAZOLE SODIUM 40 MG: 40 INJECTION, POWDER, FOR SOLUTION INTRAVENOUS at 02:24

## 2024-01-01 RX ADMIN — MIDODRINE HYDROCHLORIDE 5 MG: 5 TABLET ORAL at 08:15

## 2024-01-01 RX ADMIN — FUROSEMIDE 20 MG: 40 TABLET ORAL at 12:04

## 2024-01-01 RX ADMIN — CLONIDINE HYDROCHLORIDE 0.1 MG: 0.1 TABLET ORAL at 17:15

## 2024-01-01 RX ADMIN — FOLIC ACID 1 MG: 1 TABLET ORAL at 16:19

## 2024-01-01 RX ADMIN — RIFAXIMIN 550 MG: 550 TABLET ORAL at 20:02

## 2024-01-01 RX ADMIN — MELATONIN TAB 3 MG 3 MG: 3 TAB at 21:04

## 2024-01-01 RX ADMIN — NALTREXONE HYDROCHLORIDE 50 MG: 50 TABLET, FILM COATED ORAL at 09:15

## 2024-01-01 RX ADMIN — MIRTAZAPINE 7.5 MG: 7.5 TABLET, FILM COATED ORAL at 22:05

## 2024-01-01 RX ADMIN — MULTIVITAMIN TABLET 1 TABLET: TABLET at 13:01

## 2024-01-01 RX ADMIN — INSULIN ASPART 2 UNITS: 100 INJECTION, SOLUTION INTRAVENOUS; SUBCUTANEOUS at 11:25

## 2024-01-01 RX ADMIN — MULTIVITAMIN TABLET 1 TABLET: TABLET at 16:41

## 2024-01-01 RX ADMIN — SODIUM BICARBONATE: 84 INJECTION, SOLUTION INTRAVENOUS at 18:12

## 2024-01-01 RX ADMIN — VASOPRESSIN: 20 INJECTION, SOLUTION INTRAVENOUS at 18:34

## 2024-01-01 RX ADMIN — Medication 1 TABLET: at 08:10

## 2024-01-01 RX ADMIN — LACTULOSE 20 G: 20 SOLUTION ORAL at 21:09

## 2024-01-01 RX ADMIN — VANCOMYCIN HYDROCHLORIDE 1500 MG: 10 INJECTION, POWDER, LYOPHILIZED, FOR SOLUTION INTRAVENOUS at 04:51

## 2024-01-01 RX ADMIN — DEXTROSE MONOHYDRATE 300 ML: 100 INJECTION, SOLUTION INTRAVENOUS at 00:45

## 2024-01-01 RX ADMIN — FUROSEMIDE 20 MG: 40 TABLET ORAL at 09:48

## 2024-01-01 RX ADMIN — CEFTRIAXONE SODIUM 2 G: 2 INJECTION, POWDER, FOR SOLUTION INTRAMUSCULAR; INTRAVENOUS at 10:05

## 2024-01-01 RX ADMIN — FUROSEMIDE 20 MG: 20 TABLET ORAL at 16:18

## 2024-01-01 RX ADMIN — LACTULOSE 20 G: 20 SOLUTION ORAL at 14:40

## 2024-01-01 RX ADMIN — PHYTONADIONE 10 MG: 10 INJECTION, EMULSION INTRAMUSCULAR; INTRAVENOUS; SUBCUTANEOUS at 12:45

## 2024-01-01 RX ADMIN — RIFAXIMIN 550 MG: 550 TABLET ORAL at 09:48

## 2024-01-01 RX ADMIN — Medication 1 TABLET: at 08:00

## 2024-01-01 RX ADMIN — SULFAMETHOXAZOLE AND TRIMETHOPRIM 1 TABLET: 800; 160 TABLET ORAL at 08:57

## 2024-01-01 RX ADMIN — Medication 1 TABLET: at 09:17

## 2024-01-01 RX ADMIN — THIAMINE HYDROCHLORIDE 100 MG: 100 INJECTION, SOLUTION INTRAMUSCULAR; INTRAVENOUS at 07:54

## 2024-01-01 RX ADMIN — MIDODRINE HYDROCHLORIDE 15 MG: 5 TABLET ORAL at 11:39

## 2024-01-01 RX ADMIN — RIFAXIMIN 550 MG: 550 TABLET ORAL at 07:59

## 2024-01-01 RX ADMIN — Medication 800 MG: at 16:41

## 2024-01-01 RX ADMIN — PANTOPRAZOLE SODIUM 40 MG: 40 INJECTION, POWDER, FOR SOLUTION INTRAVENOUS at 21:43

## 2024-01-01 RX ADMIN — PANTOPRAZOLE SODIUM 40 MG: 40 TABLET, DELAYED RELEASE ORAL at 20:53

## 2024-01-01 RX ADMIN — LACTULOSE 20 G: 10 SOLUTION ORAL at 08:44

## 2024-01-01 RX ADMIN — SULFAMETHOXAZOLE AND TRIMETHOPRIM 1 TABLET: 800; 160 TABLET ORAL at 08:44

## 2024-01-01 RX ADMIN — RIFAXIMIN 550 MG: 550 TABLET ORAL at 09:49

## 2024-01-01 RX ADMIN — CEFTRIAXONE SODIUM 2 G: 2 INJECTION, POWDER, FOR SOLUTION INTRAMUSCULAR; INTRAVENOUS at 23:59

## 2024-01-01 RX ADMIN — LACTULOSE 20 G: 20 SOLUTION ORAL at 09:15

## 2024-01-01 RX ADMIN — ROCURONIUM BROMIDE 50 MG: 10 INJECTION, SOLUTION INTRAVENOUS at 12:15

## 2024-01-01 RX ADMIN — FOLIC ACID 1 MG: 1 TABLET ORAL at 09:39

## 2024-01-01 RX ADMIN — MIDODRINE HYDROCHLORIDE 20 MG: 10 TABLET ORAL at 17:24

## 2024-01-01 RX ADMIN — PANTOPRAZOLE SODIUM 40 MG: 40 TABLET, DELAYED RELEASE ORAL at 20:38

## 2024-01-01 RX ADMIN — DEXMEDETOMIDINE HYDROCHLORIDE 0.5 MCG/KG/HR: 400 INJECTION INTRAVENOUS at 07:44

## 2024-01-01 RX ADMIN — LACTULOSE 20 G: 20 SOLUTION ORAL at 20:09

## 2024-01-01 RX ADMIN — FUROSEMIDE 20 MG: 40 TABLET ORAL at 09:38

## 2024-01-01 RX ADMIN — Medication 1 TABLET: at 09:28

## 2024-01-01 RX ADMIN — PANTOPRAZOLE SODIUM 40 MG: 40 TABLET, DELAYED RELEASE ORAL at 20:41

## 2024-01-01 RX ADMIN — MIDODRINE HYDROCHLORIDE 15 MG: 5 TABLET ORAL at 09:47

## 2024-01-01 RX ADMIN — LACTULOSE 20 G: 10 SOLUTION ORAL at 08:57

## 2024-01-01 RX ADMIN — GABAPENTIN 100 MG: 100 CAPSULE ORAL at 15:23

## 2024-01-01 RX ADMIN — SODIUM CHLORIDE 10 UNITS: 9 INJECTION, SOLUTION INTRAVENOUS at 06:09

## 2024-01-01 RX ADMIN — SODIUM CHLORIDE 1000 ML: 9 INJECTION, SOLUTION INTRAVENOUS at 01:00

## 2024-01-01 RX ADMIN — FOLIC ACID 1 MG: 1 TABLET ORAL at 08:18

## 2024-01-01 RX ADMIN — RIFAXIMIN 550 MG: 550 TABLET ORAL at 19:54

## 2024-01-01 RX ADMIN — GABAPENTIN 300 MG: 300 CAPSULE ORAL at 08:15

## 2024-01-01 RX ADMIN — FUROSEMIDE 40 MG: 40 TABLET ORAL at 13:01

## 2024-01-01 RX ADMIN — MUPIROCIN: 20 OINTMENT TOPICAL at 22:00

## 2024-01-01 RX ADMIN — LACTULOSE 20 G: 10 SOLUTION ORAL at 08:09

## 2024-01-01 RX ADMIN — MIDODRINE HYDROCHLORIDE 15 MG: 5 TABLET ORAL at 08:00

## 2024-01-01 RX ADMIN — SODIUM CHLORIDE: 9 INJECTION, SOLUTION INTRAVENOUS at 14:43

## 2024-01-01 RX ADMIN — MIRTAZAPINE 7.5 MG: 7.5 TABLET, FILM COATED ORAL at 21:19

## 2024-01-01 RX ADMIN — LACTULOSE 20 G: 20 SOLUTION ORAL at 15:57

## 2024-01-01 RX ADMIN — PANTOPRAZOLE SODIUM 40 MG: 40 TABLET, DELAYED RELEASE ORAL at 08:43

## 2024-01-01 RX ADMIN — Medication 25 MCG/HR: at 02:32

## 2024-01-01 RX ADMIN — Medication 400 MG: at 17:15

## 2024-01-01 RX ADMIN — INSULIN ASPART 1 UNITS: 100 INJECTION, SOLUTION INTRAVENOUS; SUBCUTANEOUS at 03:57

## 2024-01-01 RX ADMIN — PANTOPRAZOLE SODIUM 40 MG: 40 TABLET, DELAYED RELEASE ORAL at 20:02

## 2024-01-01 RX ADMIN — MIDODRINE HYDROCHLORIDE 5 MG: 5 TABLET ORAL at 14:11

## 2024-01-01 RX ADMIN — RIFAXIMIN 550 MG: 550 TABLET ORAL at 20:29

## 2024-01-01 RX ADMIN — SODIUM CHLORIDE 8.9 UNITS: 9 INJECTION, SOLUTION INTRAVENOUS at 08:46

## 2024-01-01 RX ADMIN — MUPIROCIN: 20 OINTMENT TOPICAL at 09:49

## 2024-01-01 RX ADMIN — DEXTROSE MONOHYDRATE 300 ML: 100 INJECTION, SOLUTION INTRAVENOUS at 08:33

## 2024-01-01 RX ADMIN — THIAMINE HCL TAB 100 MG 100 MG: 100 TAB at 08:15

## 2024-01-01 RX ADMIN — MIDODRINE HYDROCHLORIDE 15 MG: 5 TABLET ORAL at 11:05

## 2024-01-01 RX ADMIN — FUROSEMIDE 60 MG: 10 INJECTION, SOLUTION INTRAMUSCULAR; INTRAVENOUS at 16:30

## 2024-01-01 RX ADMIN — Medication 1 TABLET: at 08:06

## 2024-01-01 RX ADMIN — FOLIC ACID 1 MG: 1 TABLET ORAL at 08:58

## 2024-01-01 RX ADMIN — CIPROFLOXACIN HYDROCHLORIDE 500 MG: 500 TABLET, FILM COATED ORAL at 13:42

## 2024-01-01 RX ADMIN — RIFAXIMIN 550 MG: 550 TABLET ORAL at 21:10

## 2024-01-01 RX ADMIN — LACTULOSE 20 G: 20 SOLUTION ORAL at 07:27

## 2024-01-01 RX ADMIN — THIAMINE HCL TAB 100 MG 100 MG: 100 TAB at 09:48

## 2024-01-01 RX ADMIN — MIDODRINE HYDROCHLORIDE 15 MG: 5 TABLET ORAL at 12:04

## 2024-01-01 RX ADMIN — MUPIROCIN: 20 OINTMENT TOPICAL at 22:55

## 2024-01-01 RX ADMIN — PANTOPRAZOLE SODIUM 40 MG: 40 TABLET, DELAYED RELEASE ORAL at 08:18

## 2024-01-01 RX ADMIN — FUROSEMIDE 20 MG: 20 TABLET ORAL at 10:11

## 2024-01-01 RX ADMIN — NOREPINEPHRINE BITARTRATE 0.08 MCG/KG/MIN: 0.02 INJECTION, SOLUTION INTRAVENOUS at 21:18

## 2024-01-01 RX ADMIN — THIAMINE HCL TAB 100 MG 100 MG: 100 TAB at 14:11

## 2024-01-01 RX ADMIN — RIFAXIMIN 550 MG: 550 TABLET ORAL at 08:14

## 2024-01-01 RX ADMIN — LACTULOSE 20 G: 20 SOLUTION ORAL at 16:40

## 2024-01-01 RX ADMIN — THIAMINE HCL TAB 100 MG 100 MG: 100 TAB at 08:35

## 2024-01-01 RX ADMIN — RIFAXIMIN 550 MG: 550 TABLET ORAL at 11:06

## 2024-01-01 RX ADMIN — MIDAZOLAM HYDROCHLORIDE 1 MG/HR: 1 INJECTION, SOLUTION INTRAVENOUS at 12:39

## 2024-01-01 RX ADMIN — PANTOPRAZOLE SODIUM 40 MG: 40 TABLET, DELAYED RELEASE ORAL at 21:03

## 2024-01-01 RX ADMIN — MIDODRINE HYDROCHLORIDE 15 MG: 5 TABLET ORAL at 17:51

## 2024-01-01 RX ADMIN — FUROSEMIDE 40 MG: 40 TABLET ORAL at 08:08

## 2024-01-01 RX ADMIN — ALBUMIN HUMAN 25 G: 0.25 SOLUTION INTRAVENOUS at 01:06

## 2024-01-01 RX ADMIN — MIDODRINE HYDROCHLORIDE 5 MG: 5 TABLET ORAL at 18:42

## 2024-01-01 RX ADMIN — Medication 5 MG: at 01:22

## 2024-01-01 RX ADMIN — PANTOPRAZOLE SODIUM 40 MG: 40 TABLET, DELAYED RELEASE ORAL at 08:06

## 2024-01-01 RX ADMIN — MULTIVITAMIN TABLET 1 TABLET: TABLET at 08:08

## 2024-01-01 RX ADMIN — RIFAXIMIN 550 MG: 550 TABLET ORAL at 09:39

## 2024-01-01 RX ADMIN — CIPROFLOXACIN HYDROCHLORIDE 500 MG: 500 TABLET, FILM COATED ORAL at 06:35

## 2024-01-01 RX ADMIN — MULTIVITAMIN TABLET 1 TABLET: TABLET at 08:14

## 2024-01-01 RX ADMIN — RIFAXIMIN 550 MG: 550 TABLET ORAL at 08:00

## 2024-01-01 RX ADMIN — INSULIN ASPART 1 UNITS: 100 INJECTION, SOLUTION INTRAVENOUS; SUBCUTANEOUS at 03:56

## 2024-01-01 RX ADMIN — Medication 1 TABLET: at 08:18

## 2024-01-01 RX ADMIN — MUPIROCIN: 20 OINTMENT TOPICAL at 09:51

## 2024-01-01 RX ADMIN — PANTOPRAZOLE SODIUM 40 MG: 40 INJECTION, POWDER, FOR SOLUTION INTRAVENOUS at 01:06

## 2024-01-01 RX ADMIN — THIAMINE HCL TAB 100 MG 100 MG: 100 TAB at 08:56

## 2024-01-01 RX ADMIN — FOLIC ACID 1 MG: 1 TABLET ORAL at 08:08

## 2024-01-01 RX ADMIN — LACTULOSE 20 G: 20 SOLUTION ORAL at 08:35

## 2024-01-01 RX ADMIN — INSULIN ASPART 1 UNITS: 100 INJECTION, SOLUTION INTRAVENOUS; SUBCUTANEOUS at 00:23

## 2024-01-01 RX ADMIN — RIFAXIMIN 550 MG: 550 TABLET ORAL at 08:41

## 2024-01-01 RX ADMIN — PROPOFOL 200 MCG/KG/MIN: 10 INJECTION, EMULSION INTRAVENOUS at 13:26

## 2024-01-01 RX ADMIN — LACTULOSE 20 G: 10 SOLUTION ORAL at 20:29

## 2024-01-01 RX ADMIN — LACTULOSE 20 G: 10 SOLUTION ORAL at 08:43

## 2024-01-01 RX ADMIN — LACTULOSE 20 G: 10 SOLUTION ORAL at 08:17

## 2024-01-01 RX ADMIN — FOLIC ACID 1 MG: 1 TABLET ORAL at 08:15

## 2024-01-01 RX ADMIN — RIFAXIMIN 550 MG: 550 TABLET ORAL at 09:07

## 2024-01-01 RX ADMIN — INSULIN ASPART 1 UNITS: 100 INJECTION, SOLUTION INTRAVENOUS; SUBCUTANEOUS at 19:34

## 2024-01-01 RX ADMIN — DEXTROSE MONOHYDRATE 300 ML: 100 INJECTION, SOLUTION INTRAVENOUS at 05:56

## 2024-01-01 RX ADMIN — LACTULOSE 20 G: 10 SOLUTION ORAL at 20:46

## 2024-01-01 RX ADMIN — NOREPINEPHRINE BITARTRATE 0.08 MCG/KG/MIN: 0.02 INJECTION, SOLUTION INTRAVENOUS at 02:12

## 2024-01-01 RX ADMIN — AMPICILLIN SODIUM AND SULBACTAM SODIUM 3 G: 2; 1 INJECTION, POWDER, FOR SOLUTION INTRAMUSCULAR; INTRAVENOUS at 02:46

## 2024-01-01 RX ADMIN — PANTOPRAZOLE SODIUM 40 MG: 40 TABLET, DELAYED RELEASE ORAL at 09:28

## 2024-01-01 RX ADMIN — PANTOPRAZOLE SODIUM 40 MG: 40 TABLET, DELAYED RELEASE ORAL at 21:59

## 2024-01-01 RX ADMIN — SODIUM CHLORIDE: 9 INJECTION, SOLUTION INTRAVENOUS at 14:05

## 2024-01-01 RX ADMIN — NOREPINEPHRINE BITARTRATE 0.06 MCG/KG/MIN: 0.02 INJECTION, SOLUTION INTRAVENOUS at 18:40

## 2024-01-01 RX ADMIN — PANTOPRAZOLE SODIUM 40 MG: 40 TABLET, DELAYED RELEASE ORAL at 08:44

## 2024-01-01 RX ADMIN — MIDAZOLAM 4 MG: 1 INJECTION INTRAMUSCULAR; INTRAVENOUS at 12:02

## 2024-01-01 RX ADMIN — PANTOPRAZOLE SODIUM 40 MG: 40 TABLET, DELAYED RELEASE ORAL at 08:41

## 2024-01-01 RX ADMIN — OCTREOTIDE ACETATE 50 MCG/HR: 200 INJECTION, SOLUTION INTRAVENOUS; SUBCUTANEOUS at 17:16

## 2024-01-01 RX ADMIN — Medication 1 TABLET: at 08:56

## 2024-01-01 RX ADMIN — INSULIN ASPART 2 UNITS: 100 INJECTION, SOLUTION INTRAVENOUS; SUBCUTANEOUS at 18:04

## 2024-01-01 RX ADMIN — MIDODRINE HYDROCHLORIDE 20 MG: 10 TABLET ORAL at 08:06

## 2024-01-01 RX ADMIN — CHLORHEXIDINE GLUCONATE 0.12% ORAL RINSE 15 ML: 1.2 LIQUID ORAL at 07:59

## 2024-01-01 RX ADMIN — OXYMETAZOLINE HYDROCHLORIDE 2 SPRAY: 0.05 SPRAY NASAL at 21:12

## 2024-01-01 RX ADMIN — PANTOPRAZOLE SODIUM 40 MG: 40 TABLET, DELAYED RELEASE ORAL at 20:29

## 2024-01-01 RX ADMIN — NOREPINEPHRINE BITARTRATE 0.07 MCG/KG/MIN: 0.02 INJECTION, SOLUTION INTRAVENOUS at 08:01

## 2024-01-01 RX ADMIN — Medication 1 TABLET: at 09:38

## 2024-01-01 RX ADMIN — SODIUM CHLORIDE 500 ML: 9 INJECTION, SOLUTION INTRAVENOUS at 12:34

## 2024-01-01 RX ADMIN — FUROSEMIDE 20 MG: 20 TABLET ORAL at 08:44

## 2024-01-01 RX ADMIN — PANTOPRAZOLE SODIUM 40 MG: 40 INJECTION, POWDER, FOR SOLUTION INTRAVENOUS at 11:16

## 2024-01-01 RX ADMIN — SODIUM ZIRCONIUM CYCLOSILICATE 10 G: 5 POWDER, FOR SUSPENSION ORAL at 22:27

## 2024-01-01 RX ADMIN — NOREPINEPHRINE BITARTRATE 0.07 MCG/KG/MIN: 0.02 INJECTION, SOLUTION INTRAVENOUS at 00:53

## 2024-01-01 RX ADMIN — AMPICILLIN SODIUM AND SULBACTAM SODIUM 3 G: 2; 1 INJECTION, POWDER, FOR SOLUTION INTRAMUSCULAR; INTRAVENOUS at 13:47

## 2024-01-01 RX ADMIN — PANTOPRAZOLE SODIUM 40 MG: 40 TABLET, DELAYED RELEASE ORAL at 20:14

## 2024-01-01 RX ADMIN — INSULIN ASPART 1 UNITS: 100 INJECTION, SOLUTION INTRAVENOUS; SUBCUTANEOUS at 11:44

## 2024-01-01 RX ADMIN — CIPROFLOXACIN HYDROCHLORIDE 500 MG: 500 TABLET, FILM COATED ORAL at 16:00

## 2024-01-01 RX ADMIN — SODIUM ZIRCONIUM CYCLOSILICATE 10 G: 5 POWDER, FOR SUSPENSION ORAL at 14:12

## 2024-01-01 RX ADMIN — CEFTRIAXONE SODIUM 2 G: 2 INJECTION, POWDER, FOR SOLUTION INTRAMUSCULAR; INTRAVENOUS at 20:23

## 2024-01-01 RX ADMIN — FUROSEMIDE 20 MG: 20 TABLET ORAL at 18:38

## 2024-01-01 RX ADMIN — FOLIC ACID 1 MG: 1 TABLET ORAL at 08:00

## 2024-01-01 RX ADMIN — PIPERACILLIN AND TAZOBACTAM 4.5 G: 4; .5 INJECTION, POWDER, FOR SOLUTION INTRAVENOUS at 15:52

## 2024-01-01 RX ADMIN — Medication 400 MG: at 08:57

## 2024-01-01 RX ADMIN — Medication 400 MG: at 16:19

## 2024-01-01 RX ADMIN — SPIRONOLACTONE 50 MG: 50 TABLET, FILM COATED ORAL at 09:48

## 2024-01-01 RX ADMIN — MIDODRINE HYDROCHLORIDE 15 MG: 5 TABLET ORAL at 14:33

## 2024-01-01 RX ADMIN — SODIUM ZIRCONIUM CYCLOSILICATE 10 G: 5 POWDER, FOR SUSPENSION ORAL at 17:03

## 2024-01-01 RX ADMIN — INSULIN ASPART 1 UNITS: 100 INJECTION, SOLUTION INTRAVENOUS; SUBCUTANEOUS at 01:26

## 2024-01-01 RX ADMIN — RIFAXIMIN 550 MG: 550 TABLET ORAL at 20:14

## 2024-01-01 RX ADMIN — THIAMINE HCL TAB 100 MG 100 MG: 100 TAB at 08:58

## 2024-01-01 RX ADMIN — GABAPENTIN 300 MG: 300 CAPSULE ORAL at 18:39

## 2024-01-01 RX ADMIN — PHYTONADIONE 10 MG: 10 INJECTION, EMULSION INTRAMUSCULAR; INTRAVENOUS; SUBCUTANEOUS at 17:19

## 2024-01-01 RX ADMIN — PHYTONADIONE 5 MG: 10 INJECTION, EMULSION INTRAMUSCULAR; INTRAVENOUS; SUBCUTANEOUS at 15:52

## 2024-01-01 RX ADMIN — GABAPENTIN 600 MG: 300 CAPSULE ORAL at 01:50

## 2024-01-01 RX ADMIN — CLONIDINE HYDROCHLORIDE 0.1 MG: 0.1 TABLET ORAL at 01:50

## 2024-01-01 RX ADMIN — FOLIC ACID 1 MG: 1 TABLET ORAL at 08:44

## 2024-01-01 RX ADMIN — THIAMINE HYDROCHLORIDE 100 MG: 100 INJECTION, SOLUTION INTRAMUSCULAR; INTRAVENOUS at 09:30

## 2024-01-01 RX ADMIN — HEPARIN SODIUM 5000 UNITS: 5000 INJECTION, SOLUTION INTRAVENOUS; SUBCUTANEOUS at 20:48

## 2024-01-01 RX ADMIN — MIDODRINE HYDROCHLORIDE 5 MG: 5 TABLET ORAL at 10:52

## 2024-01-01 RX ADMIN — MEBROFENIN 7 MILLICURIE: 45 INJECTION, POWDER, LYOPHILIZED, FOR SOLUTION INTRAVENOUS at 13:10

## 2024-01-01 RX ADMIN — INSULIN ASPART 1 UNITS: 100 INJECTION, SOLUTION INTRAVENOUS; SUBCUTANEOUS at 12:29

## 2024-01-01 RX ADMIN — CIPROFLOXACIN HYDROCHLORIDE 500 MG: 500 TABLET, FILM COATED ORAL at 08:44

## 2024-01-01 RX ADMIN — RIFAXIMIN 550 MG: 550 TABLET ORAL at 15:39

## 2024-01-01 RX ADMIN — LACTULOSE 20 G: 10 SOLUTION ORAL at 08:36

## 2024-01-01 RX ADMIN — SODIUM CHLORIDE 8.9 UNITS: 9 INJECTION, SOLUTION INTRAVENOUS at 21:11

## 2024-01-01 RX ADMIN — LACTULOSE 20 G: 10 SOLUTION ORAL at 09:47

## 2024-01-01 RX ADMIN — ALBUMIN HUMAN 25 G: 0.25 SOLUTION INTRAVENOUS at 23:07

## 2024-01-01 RX ADMIN — FOLIC ACID 1 MG: 1 TABLET ORAL at 16:41

## 2024-01-01 RX ADMIN — CLONIDINE HYDROCHLORIDE 0.1 MG: 0.1 TABLET ORAL at 17:29

## 2024-01-01 RX ADMIN — CHLORHEXIDINE GLUCONATE 0.12% ORAL RINSE 15 ML: 1.2 LIQUID ORAL at 20:39

## 2024-01-01 RX ADMIN — SPIRONOLACTONE 50 MG: 25 TABLET ORAL at 09:24

## 2024-01-01 RX ADMIN — MIDODRINE HYDROCHLORIDE 15 MG: 5 TABLET ORAL at 09:28

## 2024-01-01 RX ADMIN — RIFAXIMIN 550 MG: 550 TABLET ORAL at 10:11

## 2024-01-01 RX ADMIN — AMPICILLIN SODIUM AND SULBACTAM SODIUM 3 G: 2; 1 INJECTION, POWDER, FOR SOLUTION INTRAMUSCULAR; INTRAVENOUS at 14:15

## 2024-01-01 RX ADMIN — CLONIDINE HYDROCHLORIDE 0.1 MG: 0.1 TABLET ORAL at 08:57

## 2024-01-01 RX ADMIN — LACTULOSE 20 G: 20 SOLUTION ORAL at 11:00

## 2024-01-01 RX ADMIN — HEPARIN SODIUM 5000 UNITS: 5000 INJECTION, SOLUTION INTRAVENOUS; SUBCUTANEOUS at 09:48

## 2024-01-01 RX ADMIN — PIPERACILLIN AND TAZOBACTAM 4.5 G: 4; .5 INJECTION, POWDER, FOR SOLUTION INTRAVENOUS at 16:37

## 2024-01-01 RX ADMIN — FUROSEMIDE 20 MG: 40 TABLET ORAL at 08:00

## 2024-01-01 RX ADMIN — RIFAXIMIN 550 MG: 550 TABLET ORAL at 20:43

## 2024-01-01 RX ADMIN — SODIUM CHLORIDE: 9 INJECTION, SOLUTION INTRAVENOUS at 13:22

## 2024-01-01 RX ADMIN — POTASSIUM CHLORIDE 40 MEQ: 1500 TABLET, EXTENDED RELEASE ORAL at 16:41

## 2024-01-01 RX ADMIN — RIFAXIMIN 550 MG: 550 TABLET ORAL at 20:53

## 2024-01-01 RX ADMIN — MUPIROCIN: 20 OINTMENT TOPICAL at 21:06

## 2024-01-01 RX ADMIN — PANTOPRAZOLE SODIUM 40 MG: 40 TABLET, DELAYED RELEASE ORAL at 19:52

## 2024-01-01 RX ADMIN — Medication 400 MG: at 08:15

## 2024-01-01 RX ADMIN — TRIAMCINOLONE ACETONIDE 40 MG: 40 INJECTION, SUSPENSION INTRA-ARTICULAR; INTRAMUSCULAR at 11:59

## 2024-01-01 RX ADMIN — MIDODRINE HYDROCHLORIDE 15 MG: 5 TABLET ORAL at 17:07

## 2024-01-01 RX ADMIN — PIPERACILLIN AND TAZOBACTAM 4.5 G: 4; .5 INJECTION, POWDER, FOR SOLUTION INTRAVENOUS at 04:23

## 2024-01-01 RX ADMIN — CIPROFLOXACIN HYDROCHLORIDE 500 MG: 500 TABLET, FILM COATED ORAL at 18:02

## 2024-01-01 RX ADMIN — HEPARIN SODIUM 5000 UNITS: 5000 INJECTION, SOLUTION INTRAVENOUS; SUBCUTANEOUS at 21:58

## 2024-01-01 RX ADMIN — INSULIN ASPART 1 UNITS: 100 INJECTION, SOLUTION INTRAVENOUS; SUBCUTANEOUS at 12:30

## 2024-01-01 RX ADMIN — PANTOPRAZOLE SODIUM 40 MG: 40 TABLET, DELAYED RELEASE ORAL at 21:19

## 2024-01-01 RX ADMIN — CIPROFLOXACIN HYDROCHLORIDE 500 MG: 500 TABLET, FILM COATED ORAL at 18:10

## 2024-01-01 RX ADMIN — METHOCARBAMOL 500 MG: 500 TABLET ORAL at 08:43

## 2024-01-01 RX ADMIN — MIDODRINE HYDROCHLORIDE 5 MG: 5 TABLET ORAL at 06:12

## 2024-01-01 RX ADMIN — SPIRONOLACTONE 50 MG: 25 TABLET ORAL at 09:38

## 2024-01-01 RX ADMIN — ALBUMIN HUMAN 25 G: 0.25 SOLUTION INTRAVENOUS at 18:40

## 2024-01-01 RX ADMIN — MICONAZOLE NITRATE: 2 POWDER TOPICAL at 21:04

## 2024-01-01 RX ADMIN — MIDODRINE HYDROCHLORIDE 5 MG: 5 TABLET ORAL at 08:07

## 2024-01-01 RX ADMIN — THIAMINE HCL TAB 100 MG 100 MG: 100 TAB at 11:06

## 2024-01-01 RX ADMIN — LACTULOSE 20 G: 20 SOLUTION ORAL at 09:49

## 2024-01-01 RX ADMIN — Medication 400 MG: at 08:18

## 2024-01-01 RX ADMIN — GABAPENTIN 100 MG: 100 CAPSULE ORAL at 16:06

## 2024-01-01 RX ADMIN — LACTULOSE 20 G: 20 SOLUTION ORAL at 22:05

## 2024-01-01 RX ADMIN — CIPROFLOXACIN HYDROCHLORIDE 500 MG: 500 TABLET, FILM COATED ORAL at 06:39

## 2024-01-01 RX ADMIN — SODIUM CHLORIDE, SODIUM LACTATE, POTASSIUM CHLORIDE, CALCIUM CHLORIDE AND DEXTROSE MONOHYDRATE 1000 ML: 5; 600; 310; 30; 20 INJECTION, SOLUTION INTRAVENOUS at 14:31

## 2024-01-01 RX ADMIN — RIFAXIMIN 550 MG: 550 TABLET ORAL at 08:43

## 2024-01-01 RX ADMIN — FOLIC ACID 1 MG: 1 TABLET ORAL at 09:28

## 2024-01-01 RX ADMIN — MIDODRINE HYDROCHLORIDE 15 MG: 5 TABLET ORAL at 18:02

## 2024-01-01 RX ADMIN — POLYETHYLENE GLYCOL 3350 34 G: 17 POWDER, FOR SOLUTION ORAL at 21:34

## 2024-01-01 RX ADMIN — DEXTROSE MONOHYDRATE 300 ML: 100 INJECTION, SOLUTION INTRAVENOUS at 21:20

## 2024-01-01 RX ADMIN — PIPERACILLIN AND TAZOBACTAM 4.5 G: 4; .5 INJECTION, POWDER, FOR SOLUTION INTRAVENOUS at 23:44

## 2024-01-01 RX ADMIN — INSULIN ASPART 1 UNITS: 100 INJECTION, SOLUTION INTRAVENOUS; SUBCUTANEOUS at 04:41

## 2024-01-01 RX ADMIN — BUPIVACAINE HYDROCHLORIDE 1 ML: 5 INJECTION, SOLUTION EPIDURAL; INTRACAUDAL; PERINEURAL at 11:55

## 2024-01-01 RX ADMIN — PANTOPRAZOLE SODIUM 40 MG: 40 TABLET, DELAYED RELEASE ORAL at 17:01

## 2024-01-01 RX ADMIN — LACTULOSE 20 G: 10 SOLUTION ORAL at 09:49

## 2024-01-01 RX ADMIN — PANTOPRAZOLE SODIUM 40 MG: 40 INJECTION, POWDER, FOR SOLUTION INTRAVENOUS at 10:31

## 2024-01-01 RX ADMIN — POLYETHYLENE GLYCOL 3350 34 G: 17 POWDER, FOR SOLUTION ORAL at 10:31

## 2024-01-01 RX ADMIN — NALTREXONE HYDROCHLORIDE 50 MG: 50 TABLET, FILM COATED ORAL at 15:39

## 2024-01-01 RX ADMIN — FOLIC ACID 1 MG: 1 TABLET ORAL at 08:10

## 2024-01-01 RX ADMIN — INSULIN ASPART 1 UNITS: 100 INJECTION, SOLUTION INTRAVENOUS; SUBCUTANEOUS at 00:20

## 2024-01-01 RX ADMIN — PANTOPRAZOLE SODIUM 40 MG: 40 TABLET, DELAYED RELEASE ORAL at 21:07

## 2024-01-01 RX ADMIN — RIFAXIMIN 550 MG: 550 TABLET ORAL at 20:25

## 2024-01-01 RX ADMIN — METHYLPREDNISOLONE SODIUM SUCCINATE 40 MG: 40 INJECTION, POWDER, FOR SOLUTION INTRAMUSCULAR; INTRAVENOUS at 12:27

## 2024-01-01 RX ADMIN — MIDODRINE HYDROCHLORIDE 5 MG: 5 TABLET ORAL at 11:06

## 2024-01-01 RX ADMIN — INSULIN ASPART 1 UNITS: 100 INJECTION, SOLUTION INTRAVENOUS; SUBCUTANEOUS at 20:57

## 2024-01-01 RX ADMIN — RIFAXIMIN 550 MG: 550 TABLET ORAL at 21:02

## 2024-01-01 RX ADMIN — CALCIUM GLUCONATE 1 G: 98 INJECTION, SOLUTION INTRAVENOUS at 21:05

## 2024-01-01 RX ADMIN — LACTULOSE 20 G: 20 SOLUTION ORAL at 14:11

## 2024-01-01 RX ADMIN — DEXMEDETOMIDINE HYDROCHLORIDE 0.2 MCG/KG/HR: 400 INJECTION INTRAVENOUS at 05:29

## 2024-01-01 RX ADMIN — LACTULOSE 20 G: 20 SOLUTION ORAL at 13:38

## 2024-01-01 RX ADMIN — TRANEXAMIC ACID: 1 INJECTION, SOLUTION INTRAVENOUS at 13:00

## 2024-01-01 RX ADMIN — ALBUMIN HUMAN 25 G: 0.25 SOLUTION INTRAVENOUS at 12:55

## 2024-01-01 RX ADMIN — RIFAXIMIN 550 MG: 550 TABLET ORAL at 21:19

## 2024-01-01 RX ADMIN — LACTULOSE 20 G: 10 SOLUTION ORAL at 20:39

## 2024-01-01 RX ADMIN — GABAPENTIN 100 MG: 100 CAPSULE ORAL at 08:00

## 2024-01-01 RX ADMIN — Medication 400 MG: at 21:10

## 2024-01-01 RX ADMIN — PANTOPRAZOLE SODIUM 40 MG: 40 INJECTION, POWDER, FOR SOLUTION INTRAVENOUS at 10:57

## 2024-01-01 RX ADMIN — Medication 1 TABLET: at 17:15

## 2024-01-01 RX ADMIN — Medication 400 MG: at 08:00

## 2024-01-01 RX ADMIN — MIDODRINE HYDROCHLORIDE 5 MG: 5 TABLET ORAL at 13:42

## 2024-01-01 RX ADMIN — PHYTONADIONE 5 MG: 10 INJECTION, EMULSION INTRAMUSCULAR; INTRAVENOUS; SUBCUTANEOUS at 22:49

## 2024-01-01 RX ADMIN — LACTULOSE 20 G: 10 SOLUTION ORAL at 10:26

## 2024-01-01 RX ADMIN — PANTOPRAZOLE SODIUM 40 MG: 40 TABLET, DELAYED RELEASE ORAL at 09:49

## 2024-01-01 RX ADMIN — DEXMEDETOMIDINE HYDROCHLORIDE 0.6 MCG/KG/HR: 400 INJECTION INTRAVENOUS at 02:12

## 2024-01-01 RX ADMIN — Medication 1 TABLET: at 08:58

## 2024-01-01 RX ADMIN — MELATONIN TAB 3 MG 3 MG: 3 TAB at 20:22

## 2024-01-01 RX ADMIN — CIPROFLOXACIN HYDROCHLORIDE 500 MG: 500 TABLET, FILM COATED ORAL at 16:53

## 2024-01-01 RX ADMIN — INSULIN ASPART 1 UNITS: 100 INJECTION, SOLUTION INTRAVENOUS; SUBCUTANEOUS at 13:47

## 2024-01-01 RX ADMIN — METHYLPREDNISOLONE SODIUM SUCCINATE 40 MG: 40 INJECTION, POWDER, FOR SOLUTION INTRAMUSCULAR; INTRAVENOUS at 11:05

## 2024-01-01 RX ADMIN — FOLIC ACID 1 MG: 1 TABLET ORAL at 11:06

## 2024-01-01 RX ADMIN — MIDODRINE HYDROCHLORIDE 15 MG: 5 TABLET ORAL at 18:05

## 2024-01-01 RX ADMIN — THIAMINE HCL TAB 100 MG 100 MG: 100 TAB at 08:44

## 2024-01-01 RX ADMIN — LACTULOSE 20 G: 10 SOLUTION ORAL at 08:00

## 2024-01-01 RX ADMIN — HEPARIN SODIUM 5000 UNITS: 5000 INJECTION, SOLUTION INTRAVENOUS; SUBCUTANEOUS at 09:07

## 2024-01-01 RX ADMIN — PIPERACILLIN AND TAZOBACTAM 4.5 G: 4; .5 INJECTION, POWDER, FOR SOLUTION INTRAVENOUS at 09:38

## 2024-01-01 RX ADMIN — SODIUM ZIRCONIUM CYCLOSILICATE 10 G: 5 POWDER, FOR SUSPENSION ORAL at 09:00

## 2024-01-01 RX ADMIN — MICONAZOLE NITRATE: 2 POWDER TOPICAL at 08:58

## 2024-01-01 RX ADMIN — RIFAXIMIN 550 MG: 550 TABLET ORAL at 08:58

## 2024-01-01 RX ADMIN — MUPIROCIN: 20 OINTMENT TOPICAL at 09:25

## 2024-01-01 RX ADMIN — SODIUM CHLORIDE: 9 INJECTION, SOLUTION INTRAVENOUS at 17:01

## 2024-01-01 RX ADMIN — PANTOPRAZOLE SODIUM 40 MG: 40 TABLET, DELAYED RELEASE ORAL at 20:39

## 2024-01-01 RX ADMIN — Medication 400 MG: at 08:44

## 2024-01-01 RX ADMIN — CEFTRIAXONE SODIUM 2 G: 2 INJECTION, POWDER, FOR SOLUTION INTRAMUSCULAR; INTRAVENOUS at 08:37

## 2024-01-01 RX ADMIN — PANTOPRAZOLE SODIUM 40 MG: 40 TABLET, DELAYED RELEASE ORAL at 10:12

## 2024-01-01 RX ADMIN — SODIUM BICARBONATE 650 MG TABLET 1300 MG: at 08:18

## 2024-01-01 RX ADMIN — FOLIC ACID 1 MG: 1 TABLET ORAL at 08:41

## 2024-01-01 RX ADMIN — ALBUMIN HUMAN 12.5 G: 0.25 SOLUTION INTRAVENOUS at 16:35

## 2024-01-01 RX ADMIN — MUPIROCIN: 20 OINTMENT TOPICAL at 11:05

## 2024-01-01 RX ADMIN — INSULIN ASPART 1 UNITS: 100 INJECTION, SOLUTION INTRAVENOUS; SUBCUTANEOUS at 17:29

## 2024-01-01 RX ADMIN — MICONAZOLE NITRATE: 2 POWDER TOPICAL at 11:42

## 2024-01-01 RX ADMIN — INSULIN ASPART 1 UNITS: 100 INJECTION, SOLUTION INTRAVENOUS; SUBCUTANEOUS at 16:46

## 2024-01-01 RX ADMIN — Medication 400 MG: at 10:11

## 2024-01-01 RX ADMIN — POLYETHYLENE GLYCOL 3350 34 G: 17 POWDER, FOR SOLUTION ORAL at 10:15

## 2024-01-01 RX ADMIN — CIPROFLOXACIN HYDROCHLORIDE 500 MG: 500 TABLET, FILM COATED ORAL at 08:36

## 2024-01-01 RX ADMIN — PANTOPRAZOLE SODIUM 40 MG: 40 INJECTION, POWDER, FOR SOLUTION INTRAVENOUS at 09:58

## 2024-01-01 RX ADMIN — CEFTRIAXONE SODIUM 2 G: 2 INJECTION, POWDER, FOR SOLUTION INTRAMUSCULAR; INTRAVENOUS at 08:18

## 2024-01-01 RX ADMIN — INSULIN ASPART 1 UNITS: 100 INJECTION, SOLUTION INTRAVENOUS; SUBCUTANEOUS at 00:33

## 2024-01-01 RX ADMIN — LACTULOSE 20 G: 20 SOLUTION ORAL at 20:12

## 2024-01-01 RX ADMIN — PANTOPRAZOLE SODIUM 40 MG: 40 TABLET, DELAYED RELEASE ORAL at 08:36

## 2024-01-01 RX ADMIN — INSULIN ASPART 1 UNITS: 100 INJECTION, SOLUTION INTRAVENOUS; SUBCUTANEOUS at 00:54

## 2024-01-01 RX ADMIN — Medication 2 SPRAY: at 21:17

## 2024-01-01 RX ADMIN — IOPAMIDOL 2 ML: 408 INJECTION, SOLUTION INTRATHECAL at 11:55

## 2024-01-01 RX ADMIN — FOLIC ACID 1 MG: 1 TABLET ORAL at 08:14

## 2024-01-01 RX ADMIN — RIFAXIMIN 550 MG: 550 TABLET ORAL at 20:42

## 2024-01-01 RX ADMIN — SODIUM CHLORIDE: 9 INJECTION, SOLUTION INTRAVENOUS at 05:38

## 2024-01-01 RX ADMIN — MIDODRINE HYDROCHLORIDE 15 MG: 5 TABLET ORAL at 09:48

## 2024-01-01 RX ADMIN — Medication 2 SPRAY: at 21:58

## 2024-01-01 RX ADMIN — SODIUM CHLORIDE: 9 INJECTION, SOLUTION INTRAVENOUS at 22:17

## 2024-01-01 RX ADMIN — Medication 1 TABLET: at 08:15

## 2024-01-01 RX ADMIN — PANTOPRAZOLE SODIUM 40 MG: 40 TABLET, DELAYED RELEASE ORAL at 08:08

## 2024-01-01 RX ADMIN — PIPERACILLIN AND TAZOBACTAM 4.5 G: 4; .5 INJECTION, POWDER, FOR SOLUTION INTRAVENOUS at 11:20

## 2024-01-01 RX ADMIN — PREDNISONE 40 MG: 20 TABLET ORAL at 08:20

## 2024-01-01 RX ADMIN — THIAMINE HYDROCHLORIDE 100 MG: 100 INJECTION, SOLUTION INTRAMUSCULAR; INTRAVENOUS at 07:59

## 2024-01-01 RX ADMIN — GABAPENTIN 600 MG: 300 CAPSULE ORAL at 17:15

## 2024-01-01 RX ADMIN — LACTULOSE 20 G: 20 SOLUTION ORAL at 08:01

## 2024-01-01 RX ADMIN — FOLIC ACID 1 MG: 1 TABLET ORAL at 08:36

## 2024-01-01 RX ADMIN — SODIUM ZIRCONIUM CYCLOSILICATE 10 G: 5 POWDER, FOR SUSPENSION ORAL at 15:57

## 2024-01-01 RX ADMIN — PHYTONADIONE 10 MG: 10 INJECTION, EMULSION INTRAMUSCULAR; INTRAVENOUS; SUBCUTANEOUS at 08:58

## 2024-01-01 RX ADMIN — THIAMINE HCL TAB 100 MG 100 MG: 100 TAB at 08:14

## 2024-01-01 RX ADMIN — MIDODRINE HYDROCHLORIDE 5 MG: 5 TABLET ORAL at 17:40

## 2024-01-01 RX ADMIN — CIPROFLOXACIN HYDROCHLORIDE 500 MG: 500 TABLET, FILM COATED ORAL at 10:26

## 2024-01-01 RX ADMIN — RIFAXIMIN 550 MG: 550 TABLET ORAL at 21:13

## 2024-01-01 RX ADMIN — SODIUM ZIRCONIUM CYCLOSILICATE 10 G: 5 POWDER, FOR SUSPENSION ORAL at 21:58

## 2024-01-01 RX ADMIN — INSULIN ASPART 1 UNITS: 100 INJECTION, SOLUTION INTRAVENOUS; SUBCUTANEOUS at 20:23

## 2024-01-01 RX ADMIN — PANTOPRAZOLE SODIUM 40 MG: 40 TABLET, DELAYED RELEASE ORAL at 08:15

## 2024-01-01 RX ADMIN — MIDODRINE HYDROCHLORIDE 5 MG: 5 TABLET ORAL at 08:13

## 2024-01-01 RX ADMIN — MIDODRINE HYDROCHLORIDE 5 MG: 5 TABLET ORAL at 12:46

## 2024-01-01 RX ADMIN — PANTOPRAZOLE SODIUM 40 MG: 40 TABLET, DELAYED RELEASE ORAL at 09:48

## 2024-01-01 RX ADMIN — LACTULOSE 20 G: 20 SOLUTION ORAL at 11:07

## 2024-01-01 RX ADMIN — RIFAXIMIN 550 MG: 550 TABLET ORAL at 20:52

## 2024-01-01 RX ADMIN — IOPAMIDOL 135 ML: 755 INJECTION, SOLUTION INTRAVENOUS at 21:39

## 2024-01-01 RX ADMIN — LACTULOSE 20 G: 20 SOLUTION ORAL at 15:34

## 2024-01-01 RX ADMIN — PIPERACILLIN AND TAZOBACTAM 4.5 G: 4; .5 INJECTION, POWDER, FOR SOLUTION INTRAVENOUS at 21:43

## 2024-01-01 RX ADMIN — PHENYLEPHRINE HYDROCHLORIDE 200 MCG: 10 INJECTION INTRAVENOUS at 13:39

## 2024-01-01 RX ADMIN — GABAPENTIN 300 MG: 300 CAPSULE ORAL at 01:15

## 2024-01-01 RX ADMIN — SODIUM ZIRCONIUM CYCLOSILICATE 10 G: 5 POWDER, FOR SUSPENSION ORAL at 17:19

## 2024-01-01 RX ADMIN — SODIUM ZIRCONIUM CYCLOSILICATE 10 G: 5 POWDER, FOR SUSPENSION ORAL at 10:46

## 2024-01-01 RX ADMIN — LACTULOSE 20 G: 20 SOLUTION ORAL at 20:39

## 2024-01-01 RX ADMIN — Medication 2 SPRAY: at 09:09

## 2024-01-01 RX ADMIN — INSULIN ASPART 1 UNITS: 100 INJECTION, SOLUTION INTRAVENOUS; SUBCUTANEOUS at 18:13

## 2024-01-01 RX ADMIN — PANTOPRAZOLE SODIUM 40 MG: 40 TABLET, DELAYED RELEASE ORAL at 21:04

## 2024-01-01 RX ADMIN — WATER 200 G: 100 IRRIGANT IRRIGATION at 23:21

## 2024-01-01 RX ADMIN — PIPERACILLIN AND TAZOBACTAM 4.5 G: 4; .5 INJECTION, POWDER, FOR SOLUTION INTRAVENOUS at 15:43

## 2024-01-01 RX ADMIN — SODIUM CHLORIDE 10 UNITS: 9 INJECTION, SOLUTION INTRAVENOUS at 00:46

## 2024-01-01 RX ADMIN — FUROSEMIDE 20 MG: 10 INJECTION, SOLUTION INTRAMUSCULAR; INTRAVENOUS at 13:57

## 2024-01-01 RX ADMIN — VANCOMYCIN HYDROCHLORIDE 2000 MG: 10 INJECTION, POWDER, LYOPHILIZED, FOR SOLUTION INTRAVENOUS at 15:45

## 2024-01-01 RX ADMIN — Medication 400 MG: at 08:43

## 2024-01-01 RX ADMIN — SPIRONOLACTONE 50 MG: 25 TABLET ORAL at 09:28

## 2024-01-01 RX ADMIN — INSULIN ASPART 1 UNITS: 100 INJECTION, SOLUTION INTRAVENOUS; SUBCUTANEOUS at 23:36

## 2024-01-01 RX ADMIN — PIPERACILLIN AND TAZOBACTAM 4.5 G: 4; .5 INJECTION, POWDER, FOR SOLUTION INTRAVENOUS at 21:15

## 2024-01-01 RX ADMIN — CLONIDINE HYDROCHLORIDE 0.1 MG: 0.1 TABLET ORAL at 01:15

## 2024-01-01 RX ADMIN — CIPROFLOXACIN HYDROCHLORIDE 500 MG: 500 TABLET, FILM COATED ORAL at 14:11

## 2024-01-01 RX ADMIN — FOLIC ACID 1 MG: 1 TABLET ORAL at 17:15

## 2024-01-01 RX ADMIN — PANTOPRAZOLE SODIUM 40 MG: 40 TABLET, DELAYED RELEASE ORAL at 21:40

## 2024-01-01 RX ADMIN — INSULIN ASPART 2 UNITS: 100 INJECTION, SOLUTION INTRAVENOUS; SUBCUTANEOUS at 20:38

## 2024-01-01 RX ADMIN — Medication 1 TABLET: at 08:44

## 2024-01-01 RX ADMIN — MIDAZOLAM 2 MG: 1 INJECTION INTRAMUSCULAR; INTRAVENOUS at 12:33

## 2024-01-01 RX ADMIN — METHYLPREDNISOLONE SODIUM SUCCINATE 40 MG: 40 INJECTION, POWDER, FOR SOLUTION INTRAMUSCULAR; INTRAVENOUS at 11:55

## 2024-01-01 RX ADMIN — THIAMINE HYDROCHLORIDE 100 MG: 100 INJECTION, SOLUTION INTRAMUSCULAR; INTRAVENOUS at 08:59

## 2024-01-01 RX ADMIN — SODIUM ZIRCONIUM CYCLOSILICATE 10 G: 5 POWDER, FOR SUSPENSION ORAL at 22:10

## 2024-01-01 RX ADMIN — SODIUM CHLORIDE, POTASSIUM CHLORIDE, SODIUM LACTATE AND CALCIUM CHLORIDE 500 ML: 600; 310; 30; 20 INJECTION, SOLUTION INTRAVENOUS at 00:48

## 2024-01-01 RX ADMIN — CLONIDINE HYDROCHLORIDE 0.1 MG: 0.1 TABLET ORAL at 08:44

## 2024-01-01 RX ADMIN — PANTOPRAZOLE SODIUM 40 MG: 40 TABLET, DELAYED RELEASE ORAL at 21:13

## 2024-01-01 RX ADMIN — INSULIN ASPART 1 UNITS: 100 INJECTION, SOLUTION INTRAVENOUS; SUBCUTANEOUS at 23:55

## 2024-01-01 RX ADMIN — DEXMEDETOMIDINE HYDROCHLORIDE 0.2 MCG/KG/HR: 400 INJECTION INTRAVENOUS at 17:42

## 2024-01-01 RX ADMIN — Medication 400 MG: at 08:10

## 2024-01-01 RX ADMIN — RIFAXIMIN 550 MG: 550 TABLET ORAL at 08:10

## 2024-01-01 RX ADMIN — PANTOPRAZOLE SODIUM 40 MG: 40 TABLET, DELAYED RELEASE ORAL at 20:22

## 2024-01-01 RX ADMIN — LACTULOSE 20 G: 20 SOLUTION ORAL at 20:48

## 2024-01-01 RX ADMIN — Medication 400 MG: at 08:13

## 2024-01-01 RX ADMIN — SODIUM CHLORIDE 9.2 UNITS: 9 INJECTION, SOLUTION INTRAVENOUS at 09:03

## 2024-01-01 RX ADMIN — DEXTROSE MONOHYDRATE 25 G: 25 INJECTION, SOLUTION INTRAVENOUS at 08:47

## 2024-01-01 RX ADMIN — MIRTAZAPINE 7.5 MG: 7.5 TABLET, FILM COATED ORAL at 21:02

## 2024-01-01 RX ADMIN — MIDODRINE HYDROCHLORIDE 15 MG: 5 TABLET ORAL at 14:59

## 2024-01-01 RX ADMIN — RIFAXIMIN 550 MG: 550 TABLET ORAL at 11:00

## 2024-01-01 RX ADMIN — Medication 2 SPRAY: at 08:15

## 2024-01-01 RX ADMIN — CEFTRIAXONE SODIUM 2 G: 2 INJECTION, POWDER, FOR SOLUTION INTRAMUSCULAR; INTRAVENOUS at 08:07

## 2024-01-01 RX ADMIN — LACTULOSE 20 G: 10 SOLUTION ORAL at 09:51

## 2024-01-01 RX ADMIN — OCTREOTIDE ACETATE 50 MCG/HR: 200 INJECTION, SOLUTION INTRAVENOUS; SUBCUTANEOUS at 15:35

## 2024-01-01 RX ADMIN — NALTREXONE HYDROCHLORIDE 50 MG: 50 TABLET, FILM COATED ORAL at 08:20

## 2024-01-01 RX ADMIN — FUROSEMIDE 40 MG: 10 INJECTION, SOLUTION INTRAVENOUS at 09:49

## 2024-01-01 RX ADMIN — MIDODRINE HYDROCHLORIDE 15 MG: 5 TABLET ORAL at 17:28

## 2024-01-01 RX ADMIN — SODIUM ZIRCONIUM CYCLOSILICATE 10 G: 5 POWDER, FOR SUSPENSION ORAL at 08:04

## 2024-01-01 RX ADMIN — GABAPENTIN 100 MG: 100 CAPSULE ORAL at 08:18

## 2024-01-01 RX ADMIN — RIFAXIMIN 550 MG: 550 TABLET ORAL at 20:09

## 2024-01-01 RX ADMIN — PANTOPRAZOLE SODIUM 40 MG: 40 TABLET, DELAYED RELEASE ORAL at 10:26

## 2024-01-01 RX ADMIN — LACTULOSE 20 G: 10 SOLUTION ORAL at 08:01

## 2024-01-01 RX ADMIN — INSULIN ASPART 1 UNITS: 100 INJECTION, SOLUTION INTRAVENOUS; SUBCUTANEOUS at 00:26

## 2024-01-01 RX ADMIN — BACITRACIN: 500 OINTMENT TOPICAL at 11:09

## 2024-01-01 RX ADMIN — HEPARIN SODIUM 5000 UNITS: 5000 INJECTION, SOLUTION INTRAVENOUS; SUBCUTANEOUS at 20:38

## 2024-01-01 RX ADMIN — PANTOPRAZOLE SODIUM 40 MG: 40 TABLET, DELAYED RELEASE ORAL at 06:32

## 2024-01-01 RX ADMIN — PANTOPRAZOLE SODIUM 40 MG: 40 INJECTION, POWDER, FOR SOLUTION INTRAVENOUS at 21:09

## 2024-01-01 RX ADMIN — PHYTONADIONE 10 MG: 10 INJECTION, EMULSION INTRAMUSCULAR; INTRAVENOUS; SUBCUTANEOUS at 08:15

## 2024-01-01 RX ADMIN — RIFAXIMIN 550 MG: 550 TABLET ORAL at 08:56

## 2024-01-01 RX ADMIN — SODIUM CHLORIDE 500 ML: 9 INJECTION, SOLUTION INTRAVENOUS at 06:10

## 2024-01-01 RX ADMIN — THIAMINE HCL TAB 100 MG 100 MG: 100 TAB at 09:49

## 2024-01-01 RX ADMIN — SODIUM ZIRCONIUM CYCLOSILICATE 10 G: 5 POWDER, FOR SUSPENSION ORAL at 21:05

## 2024-01-01 RX ADMIN — THIAMINE HCL TAB 100 MG 100 MG: 100 TAB at 16:19

## 2024-01-01 RX ADMIN — RIFAXIMIN 550 MG: 550 TABLET ORAL at 08:35

## 2024-01-01 RX ADMIN — THIAMINE HCL TAB 100 MG 100 MG: 100 TAB at 08:00

## 2024-01-01 RX ADMIN — LACTULOSE 20 G: 20 SOLUTION ORAL at 21:19

## 2024-01-01 RX ADMIN — RIFAXIMIN 550 MG: 550 TABLET ORAL at 08:18

## 2024-01-01 RX ADMIN — LACTULOSE 20 G: 20 SOLUTION ORAL at 08:58

## 2024-01-01 RX ADMIN — NOREPINEPHRINE BITARTRATE 0.05 MCG/KG/MIN: 0.02 INJECTION, SOLUTION INTRAVENOUS at 23:25

## 2024-01-01 RX ADMIN — FOLIC ACID 1 MG: 1 TABLET ORAL at 15:34

## 2024-01-01 RX ADMIN — DEXTROSE MONOHYDRATE 300 ML: 100 INJECTION, SOLUTION INTRAVENOUS at 08:45

## 2024-01-01 RX ADMIN — HEPARIN SODIUM 5000 UNITS: 5000 INJECTION, SOLUTION INTRAVENOUS; SUBCUTANEOUS at 09:47

## 2024-01-01 RX ADMIN — METHYLPREDNISOLONE SODIUM SUCCINATE 40 MG: 40 INJECTION, POWDER, FOR SOLUTION INTRAMUSCULAR; INTRAVENOUS at 12:04

## 2024-01-01 RX ADMIN — THIAMINE HYDROCHLORIDE 100 MG: 100 INJECTION, SOLUTION INTRAMUSCULAR; INTRAVENOUS at 20:06

## 2024-01-01 RX ADMIN — SODIUM CHLORIDE: 9 INJECTION, SOLUTION INTRAVENOUS at 00:36

## 2024-01-01 RX ADMIN — INSULIN ASPART 1 UNITS: 100 INJECTION, SOLUTION INTRAVENOUS; SUBCUTANEOUS at 09:35

## 2024-01-01 RX ADMIN — PANTOPRAZOLE SODIUM 40 MG: 40 TABLET, DELAYED RELEASE ORAL at 20:47

## 2024-01-01 RX ADMIN — Medication 400 MG: at 10:26

## 2024-01-01 RX ADMIN — GABAPENTIN 600 MG: 300 CAPSULE ORAL at 08:43

## 2024-01-01 RX ADMIN — LACTULOSE 20 G: 10 SOLUTION ORAL at 09:05

## 2024-01-01 RX ADMIN — LACTULOSE 20 G: 10 SOLUTION ORAL at 20:23

## 2024-01-01 RX ADMIN — PIPERACILLIN AND TAZOBACTAM 4.5 G: 4; .5 INJECTION, POWDER, FOR SOLUTION INTRAVENOUS at 09:56

## 2024-01-01 RX ADMIN — HYDROCODONE BITARTRATE AND ACETAMINOPHEN 2 TABLET: 5; 325 TABLET ORAL at 17:38

## 2024-01-01 RX ADMIN — FOLIC ACID 1 MG: 1 TABLET ORAL at 10:26

## 2024-01-01 RX ADMIN — RIFAXIMIN 550 MG: 550 TABLET ORAL at 08:44

## 2024-01-01 RX ADMIN — INSULIN ASPART 1 UNITS: 100 INJECTION, SOLUTION INTRAVENOUS; SUBCUTANEOUS at 16:52

## 2024-01-01 RX ADMIN — NOREPINEPHRINE BITARTRATE 0.05 MCG/KG/MIN: 0.02 INJECTION, SOLUTION INTRAVENOUS at 23:10

## 2024-01-01 RX ADMIN — RIFAXIMIN 550 MG: 550 TABLET ORAL at 19:52

## 2024-01-01 RX ADMIN — NOREPINEPHRINE BITARTRATE 0.03 MCG/KG/MIN: 0.02 INJECTION, SOLUTION INTRAVENOUS at 12:10

## 2024-01-01 RX ADMIN — CHLORHEXIDINE GLUCONATE 0.12% ORAL RINSE 15 ML: 1.2 LIQUID ORAL at 20:12

## 2024-01-01 RX ADMIN — SODIUM BICARBONATE 650 MG TABLET 1300 MG: at 08:00

## 2024-01-01 RX ADMIN — CHOLECALCIFEROL (VITAMIN D3) 10 MCG (400 UNIT) TABLET 10 MCG: at 17:01

## 2024-01-01 RX ADMIN — INSULIN ASPART 3 UNITS: 100 INJECTION, SOLUTION INTRAVENOUS; SUBCUTANEOUS at 20:59

## 2024-01-01 RX ADMIN — PHENYLEPHRINE HYDROCHLORIDE 150 MCG: 10 INJECTION INTRAVENOUS at 13:36

## 2024-01-01 RX ADMIN — SODIUM CHLORIDE 250 ML: 9 INJECTION, SOLUTION INTRAVENOUS at 21:27

## 2024-01-01 RX ADMIN — LACTULOSE 20 G: 10 SOLUTION ORAL at 09:48

## 2024-01-01 RX ADMIN — SPIRONOLACTONE 50 MG: 25 TABLET ORAL at 08:08

## 2024-01-01 RX ADMIN — HUMAN ALBUMIN MICROSPHERES AND PERFLUTREN 9 ML: 10; .22 INJECTION, SOLUTION INTRAVENOUS at 08:21

## 2024-01-01 RX ADMIN — LACTULOSE 20 G: 10 SOLUTION ORAL at 21:13

## 2024-01-01 RX ADMIN — SODIUM ZIRCONIUM CYCLOSILICATE 10 G: 5 POWDER, FOR SUSPENSION ORAL at 14:43

## 2024-01-01 RX ADMIN — THIAMINE HCL TAB 100 MG 100 MG: 100 TAB at 09:07

## 2024-01-01 RX ADMIN — INSULIN ASPART 1 UNITS: 100 INJECTION, SOLUTION INTRAVENOUS; SUBCUTANEOUS at 20:56

## 2024-01-01 RX ADMIN — PANTOPRAZOLE SODIUM 40 MG: 40 INJECTION, POWDER, FOR SOLUTION INTRAVENOUS at 21:15

## 2024-01-01 RX ADMIN — LACTULOSE 20 G: 20 SOLUTION ORAL at 14:57

## 2024-01-01 RX ADMIN — LACTULOSE 20 G: 20 SOLUTION ORAL at 19:41

## 2024-01-01 RX ADMIN — RIFAXIMIN 550 MG: 550 TABLET ORAL at 20:07

## 2024-01-01 RX ADMIN — RIFAXIMIN 550 MG: 550 TABLET ORAL at 20:22

## 2024-01-01 RX ADMIN — FUROSEMIDE 40 MG: 40 TABLET ORAL at 21:19

## 2024-01-01 RX ADMIN — AMPICILLIN SODIUM AND SULBACTAM SODIUM 3 G: 2; 1 INJECTION, POWDER, FOR SOLUTION INTRAMUSCULAR; INTRAVENOUS at 20:07

## 2024-01-01 RX ADMIN — PIPERACILLIN AND TAZOBACTAM 4.5 G: 4; .5 INJECTION, POWDER, FOR SOLUTION INTRAVENOUS at 03:51

## 2024-01-01 RX ADMIN — INSULIN ASPART 1 UNITS: 100 INJECTION, SOLUTION INTRAVENOUS; SUBCUTANEOUS at 12:31

## 2024-01-01 RX ADMIN — THIAMINE HCL TAB 100 MG 100 MG: 100 TAB at 09:28

## 2024-01-01 RX ADMIN — AMPICILLIN SODIUM AND SULBACTAM SODIUM 3 G: 2; 1 INJECTION, POWDER, FOR SOLUTION INTRAMUSCULAR; INTRAVENOUS at 08:14

## 2024-01-01 RX ADMIN — NOREPINEPHRINE BITARTRATE 0.08 MCG/KG/MIN: 0.02 INJECTION, SOLUTION INTRAVENOUS at 09:06

## 2024-01-01 RX ADMIN — Medication 1 TABLET: at 10:26

## 2024-01-01 RX ADMIN — MELATONIN TAB 3 MG 3 MG: 3 TAB at 21:58

## 2024-01-01 RX ADMIN — FUROSEMIDE 20 MG: 20 TABLET ORAL at 10:26

## 2024-01-01 RX ADMIN — LACTULOSE 20 G: 10 SOLUTION ORAL at 20:41

## 2024-01-01 RX ADMIN — PANTOPRAZOLE SODIUM 40 MG: 40 INJECTION, POWDER, FOR SOLUTION INTRAVENOUS at 11:08

## 2024-01-01 RX ADMIN — THIAMINE HCL TAB 100 MG 100 MG: 100 TAB at 16:41

## 2024-01-01 RX ADMIN — RIFAXIMIN 550 MG: 550 TABLET ORAL at 08:15

## 2024-01-01 RX ADMIN — PANTOPRAZOLE SODIUM 40 MG: 40 TABLET, DELAYED RELEASE ORAL at 21:02

## 2024-01-01 RX ADMIN — SODIUM BICARBONATE 650 MG TABLET 1300 MG: at 20:29

## 2024-01-01 RX ADMIN — ALBUMIN HUMAN 12.5 G: 0.25 SOLUTION INTRAVENOUS at 16:27

## 2024-01-01 RX ADMIN — FOLIC ACID 1 MG: 1 TABLET ORAL at 14:11

## 2024-01-01 RX ADMIN — SODIUM ZIRCONIUM CYCLOSILICATE 10 G: 5 POWDER, FOR SUSPENSION ORAL at 12:58

## 2024-01-01 RX ADMIN — SODIUM BICARBONATE 650 MG TABLET 1300 MG: at 11:04

## 2024-01-01 RX ADMIN — MUPIROCIN: 20 OINTMENT TOPICAL at 21:00

## 2024-01-01 RX ADMIN — METHYLPREDNISOLONE SODIUM SUCCINATE 40 MG: 40 INJECTION, POWDER, FOR SOLUTION INTRAMUSCULAR; INTRAVENOUS at 12:39

## 2024-01-01 RX ADMIN — THIAMINE HCL TAB 100 MG 100 MG: 100 TAB at 09:39

## 2024-01-01 RX ADMIN — PANTOPRAZOLE SODIUM 40 MG: 40 TABLET, DELAYED RELEASE ORAL at 20:42

## 2024-01-01 RX ADMIN — INSULIN ASPART 1 UNITS: 100 INJECTION, SOLUTION INTRAVENOUS; SUBCUTANEOUS at 05:10

## 2024-01-01 RX ADMIN — ALBUMIN HUMAN 25 G: 0.25 SOLUTION INTRAVENOUS at 10:54

## 2024-01-01 RX ADMIN — LACTULOSE 20 G: 10 SOLUTION ORAL at 22:04

## 2024-01-01 RX ADMIN — LACTULOSE 20 G: 20 SOLUTION ORAL at 08:08

## 2024-01-01 RX ADMIN — SPIRONOLACTONE 50 MG: 25 TABLET ORAL at 08:00

## 2024-01-01 RX ADMIN — VASOPRESSIN: 20 INJECTION, SOLUTION INTRAVENOUS at 09:56

## 2024-01-01 RX ADMIN — NALTREXONE HYDROCHLORIDE 50 MG: 50 TABLET, FILM COATED ORAL at 11:12

## 2024-01-01 RX ADMIN — HEPARIN SODIUM 5000 UNITS: 5000 INJECTION, SOLUTION INTRAVENOUS; SUBCUTANEOUS at 20:14

## 2024-01-01 RX ADMIN — PREDNISONE 40 MG: 20 TABLET ORAL at 10:53

## 2024-01-01 RX ADMIN — OXYMETAZOLINE HYDROCHLORIDE 2 SPRAY: 0.05 SPRAY NASAL at 13:29

## 2024-01-01 RX ADMIN — LACTULOSE 20 G: 10 SOLUTION ORAL at 21:04

## 2024-01-01 RX ADMIN — Medication 2 SPRAY: at 08:22

## 2024-01-01 RX ADMIN — MIDODRINE HYDROCHLORIDE 15 MG: 5 TABLET ORAL at 18:13

## 2024-01-01 RX ADMIN — CHLORHEXIDINE GLUCONATE 0.12% ORAL RINSE 15 ML: 1.2 LIQUID ORAL at 08:35

## 2024-01-01 RX ADMIN — METHOCARBAMOL 500 MG: 500 TABLET ORAL at 21:51

## 2024-01-01 RX ADMIN — HEPARIN SODIUM 5000 UNITS: 5000 INJECTION, SOLUTION INTRAVENOUS; SUBCUTANEOUS at 09:51

## 2024-01-01 RX ADMIN — GABAPENTIN 100 MG: 100 CAPSULE ORAL at 23:49

## 2024-01-01 RX ADMIN — RIFAXIMIN 550 MG: 550 TABLET ORAL at 08:08

## 2024-01-01 RX ADMIN — SODIUM ZIRCONIUM CYCLOSILICATE 10 G: 5 POWDER, FOR SUSPENSION ORAL at 15:51

## 2024-01-01 RX ADMIN — CIPROFLOXACIN HYDROCHLORIDE 500 MG: 500 TABLET, FILM COATED ORAL at 17:28

## 2024-01-01 RX ADMIN — CHOLECALCIFEROL (VITAMIN D3) 10 MCG (400 UNIT) TABLET 10 MCG: at 08:20

## 2024-01-01 RX ADMIN — ETOMIDATE 20 MG: 20 INJECTION, SOLUTION INTRAVENOUS at 12:15

## 2024-01-01 RX ADMIN — POLYETHYLENE GLYCOL 3350 34 G: 17 POWDER, FOR SOLUTION ORAL at 20:12

## 2024-01-01 RX ADMIN — SODIUM ZIRCONIUM CYCLOSILICATE 10 G: 5 POWDER, FOR SUSPENSION ORAL at 10:10

## 2024-01-01 RX ADMIN — CHLORHEXIDINE GLUCONATE 0.12% ORAL RINSE 15 ML: 1.2 LIQUID ORAL at 20:09

## 2024-01-01 RX ADMIN — MUPIROCIN: 20 OINTMENT TOPICAL at 17:25

## 2024-01-01 RX ADMIN — Medication 50 MCG/HR: at 12:19

## 2024-01-01 RX ADMIN — MUPIROCIN: 20 OINTMENT TOPICAL at 21:17

## 2024-01-01 RX ADMIN — INSULIN ASPART 1 UNITS: 100 INJECTION, SOLUTION INTRAVENOUS; SUBCUTANEOUS at 04:23

## 2024-01-01 RX ADMIN — PANTOPRAZOLE SODIUM 40 MG: 40 TABLET, DELAYED RELEASE ORAL at 20:25

## 2024-01-01 RX ADMIN — PANTOPRAZOLE SODIUM 40 MG: 40 INJECTION, POWDER, FOR SOLUTION INTRAVENOUS at 21:34

## 2024-01-01 RX ADMIN — FOLIC ACID 1 MG: 1 TABLET ORAL at 08:56

## 2024-01-01 RX ADMIN — SODIUM ZIRCONIUM CYCLOSILICATE 10 G: 5 POWDER, FOR SUSPENSION ORAL at 21:26

## 2024-01-01 RX ADMIN — METHYLPREDNISOLONE SODIUM SUCCINATE 40 MG: 40 INJECTION, POWDER, FOR SOLUTION INTRAMUSCULAR; INTRAVENOUS at 13:02

## 2024-01-01 RX ADMIN — RIFAXIMIN 550 MG: 550 TABLET ORAL at 08:36

## 2024-01-01 RX ADMIN — RIFAXIMIN 550 MG: 550 TABLET ORAL at 21:04

## 2024-01-01 RX ADMIN — FUROSEMIDE 20 MG: 40 TABLET ORAL at 09:28

## 2024-01-01 RX ADMIN — AMPICILLIN SODIUM AND SULBACTAM SODIUM 3 G: 2; 1 INJECTION, POWDER, FOR SOLUTION INTRAMUSCULAR; INTRAVENOUS at 01:51

## 2024-01-01 RX ADMIN — GABAPENTIN 600 MG: 300 CAPSULE ORAL at 08:58

## 2024-01-01 RX ADMIN — RIFAXIMIN 550 MG: 550 TABLET ORAL at 10:26

## 2024-01-01 RX ADMIN — LACTULOSE 20 G: 10 SOLUTION ORAL at 20:25

## 2024-01-01 RX ADMIN — THIAMINE HCL TAB 100 MG 100 MG: 100 TAB at 11:01

## 2024-01-01 RX ADMIN — DEXTROSE MONOHYDRATE 25 G: 25 INJECTION, SOLUTION INTRAVENOUS at 21:12

## 2024-01-01 RX ADMIN — MIDODRINE HYDROCHLORIDE 15 MG: 5 TABLET ORAL at 12:27

## 2024-01-01 RX ADMIN — LACTULOSE 20 G: 20 SOLUTION ORAL at 15:06

## 2024-01-01 RX ADMIN — MIDODRINE HYDROCHLORIDE 15 MG: 5 TABLET ORAL at 12:12

## 2024-01-01 RX ADMIN — DEXTROSE MONOHYDRATE 25 G: 25 INJECTION, SOLUTION INTRAVENOUS at 00:46

## 2024-01-01 RX ADMIN — GABAPENTIN 300 MG: 300 CAPSULE ORAL at 20:29

## 2024-01-01 RX ADMIN — LACTULOSE 20 G: 10 SOLUTION ORAL at 19:52

## 2024-01-01 RX ADMIN — INSULIN ASPART 1 UNITS: 100 INJECTION, SOLUTION INTRAVENOUS; SUBCUTANEOUS at 21:59

## 2024-01-01 RX ADMIN — FOLIC ACID 1 MG: 1 TABLET ORAL at 10:11

## 2024-01-01 RX ADMIN — PANTOPRAZOLE SODIUM 40 MG: 40 INJECTION, POWDER, FOR SOLUTION INTRAVENOUS at 09:55

## 2024-01-01 RX ADMIN — Medication 400 MG: at 08:36

## 2024-01-01 RX ADMIN — DEXTROSE MONOHYDRATE 25 G: 25 INJECTION, SOLUTION INTRAVENOUS at 05:48

## 2024-01-01 RX ADMIN — INSULIN ASPART 1 UNITS: 100 INJECTION, SOLUTION INTRAVENOUS; SUBCUTANEOUS at 20:06

## 2024-01-01 RX ADMIN — GABAPENTIN 600 MG: 300 CAPSULE ORAL at 00:34

## 2024-01-01 RX ADMIN — RIFAXIMIN 550 MG: 550 TABLET ORAL at 09:15

## 2024-01-01 RX ADMIN — NALTREXONE HYDROCHLORIDE 50 MG: 50 TABLET, FILM COATED ORAL at 08:07

## 2024-01-01 RX ADMIN — CLONIDINE HYDROCHLORIDE 0.1 MG: 0.1 TABLET ORAL at 00:34

## 2024-01-01 RX ADMIN — RIFAXIMIN 550 MG: 550 TABLET ORAL at 07:27

## 2024-01-01 RX ADMIN — PIPERACILLIN AND TAZOBACTAM 4.5 G: 4; .5 INJECTION, POWDER, FOR SOLUTION INTRAVENOUS at 21:34

## 2024-01-01 RX ADMIN — SODIUM CHLORIDE: 9 INJECTION, SOLUTION INTRAVENOUS at 12:44

## 2024-01-01 RX ADMIN — GABAPENTIN 600 MG: 300 CAPSULE ORAL at 17:29

## 2024-01-01 RX ADMIN — FOLIC ACID 1 MG: 1 TABLET ORAL at 09:19

## 2024-01-01 RX ADMIN — FUROSEMIDE 20 MG: 20 TABLET ORAL at 16:47

## 2024-01-01 RX ADMIN — RIFAXIMIN 550 MG: 550 TABLET ORAL at 21:59

## 2024-01-01 RX ADMIN — FOLIC ACID 1 MG: 1 TABLET ORAL at 13:01

## 2024-01-01 RX ADMIN — PROPOFOL 40 MG: 10 INJECTION, EMULSION INTRAVENOUS at 13:31

## 2024-01-01 RX ADMIN — SODIUM ZIRCONIUM CYCLOSILICATE 10 G: 5 POWDER, FOR SUSPENSION ORAL at 10:50

## 2024-01-01 RX ADMIN — CLONIDINE HYDROCHLORIDE 0.1 MG: 0.1 TABLET ORAL at 08:15

## 2024-01-01 RX ADMIN — PIPERACILLIN AND TAZOBACTAM 4.5 G: 4; .5 INJECTION, POWDER, FOR SOLUTION INTRAVENOUS at 04:47

## 2024-01-01 RX ADMIN — PANTOPRAZOLE SODIUM 40 MG: 40 TABLET, DELAYED RELEASE ORAL at 15:57

## 2024-01-01 RX ADMIN — RIFAXIMIN 550 MG: 550 TABLET ORAL at 20:41

## 2024-01-01 RX ADMIN — HEPARIN SODIUM 5000 UNITS: 5000 INJECTION, SOLUTION INTRAVENOUS; SUBCUTANEOUS at 09:49

## 2024-01-01 RX ADMIN — SINCALIDE 2.1 MCG: 5 INJECTION, POWDER, LYOPHILIZED, FOR SOLUTION INTRAVENOUS at 14:16

## 2024-01-01 RX ADMIN — SODIUM ZIRCONIUM CYCLOSILICATE 10 G: 5 POWDER, FOR SUSPENSION ORAL at 09:07

## 2024-01-01 RX ADMIN — HEPARIN SODIUM 5000 UNITS: 5000 INJECTION, SOLUTION INTRAVENOUS; SUBCUTANEOUS at 20:47

## 2024-01-01 RX ADMIN — LIDOCAINE HYDROCHLORIDE 3 ML: 10 INJECTION, SOLUTION EPIDURAL; INFILTRATION; INTRACAUDAL; PERINEURAL at 11:53

## 2024-01-01 RX ADMIN — SODIUM ZIRCONIUM CYCLOSILICATE 10 G: 5 POWDER, FOR SUSPENSION ORAL at 21:27

## 2024-01-01 RX ADMIN — LACTULOSE 20 G: 20 SOLUTION ORAL at 21:01

## 2024-01-01 RX ADMIN — LACTULOSE 20 G: 10 SOLUTION ORAL at 20:54

## 2024-01-01 RX ADMIN — NOREPINEPHRINE BITARTRATE 0.07 MCG/KG/MIN: 0.02 INJECTION, SOLUTION INTRAVENOUS at 06:58

## 2024-01-01 RX ADMIN — OCTREOTIDE ACETATE 50 MCG: 200 INJECTION INTRAVENOUS at 17:14

## 2024-01-01 RX ADMIN — THIAMINE HCL TAB 100 MG 100 MG: 100 TAB at 17:15

## 2024-01-01 RX ADMIN — PANTOPRAZOLE SODIUM 40 MG: 40 INJECTION, POWDER, FOR SOLUTION INTRAVENOUS at 14:43

## 2024-01-01 RX ADMIN — BACITRACIN: 500 OINTMENT TOPICAL at 21:09

## 2024-01-01 RX ADMIN — MIDODRINE HYDROCHLORIDE 15 MG: 5 TABLET ORAL at 09:07

## 2024-01-01 RX ADMIN — MIDODRINE HYDROCHLORIDE 5 MG: 5 TABLET ORAL at 17:28

## 2024-01-01 RX ADMIN — LACTULOSE 20 G: 10 SOLUTION ORAL at 20:01

## 2024-01-01 RX ADMIN — RIFAXIMIN 550 MG: 550 TABLET ORAL at 21:07

## 2024-01-01 RX ADMIN — Medication 1 TABLET: at 10:11

## 2024-01-01 RX ADMIN — AMPICILLIN SODIUM AND SULBACTAM SODIUM 3 G: 2; 1 INJECTION, POWDER, FOR SOLUTION INTRAMUSCULAR; INTRAVENOUS at 14:17

## 2024-01-01 RX ADMIN — SODIUM CHLORIDE 1000 ML: 9 INJECTION, SOLUTION INTRAVENOUS at 19:13

## 2024-01-01 RX ADMIN — CIPROFLOXACIN HYDROCHLORIDE 500 MG: 500 TABLET, FILM COATED ORAL at 09:59

## 2024-01-01 RX ADMIN — MIDODRINE HYDROCHLORIDE 5 MG: 5 TABLET ORAL at 09:48

## 2024-01-01 RX ADMIN — MIDODRINE HYDROCHLORIDE 5 MG: 5 TABLET ORAL at 19:27

## 2024-01-01 RX ADMIN — Medication 50 MCG: at 08:03

## 2024-01-01 RX ADMIN — INSULIN ASPART 1 UNITS: 100 INJECTION, SOLUTION INTRAVENOUS; SUBCUTANEOUS at 16:31

## 2024-01-01 RX ADMIN — MIDODRINE HYDROCHLORIDE 15 MG: 5 TABLET ORAL at 08:56

## 2024-01-01 RX ADMIN — RIFAXIMIN 550 MG: 550 TABLET ORAL at 21:40

## 2024-01-01 RX ADMIN — SODIUM ZIRCONIUM CYCLOSILICATE 10 G: 5 POWDER, FOR SUSPENSION ORAL at 08:19

## 2024-01-01 RX ADMIN — RIFAXIMIN 550 MG: 550 TABLET ORAL at 20:12

## 2024-01-01 RX ADMIN — HEPARIN SODIUM 5000 UNITS: 5000 INJECTION, SOLUTION INTRAVENOUS; SUBCUTANEOUS at 08:06

## 2024-01-01 RX ADMIN — INSULIN ASPART 3 UNITS: 100 INJECTION, SOLUTION INTRAVENOUS; SUBCUTANEOUS at 21:14

## 2024-01-01 RX ADMIN — INSULIN ASPART 1 UNITS: 100 INJECTION, SOLUTION INTRAVENOUS; SUBCUTANEOUS at 01:02

## 2024-01-01 RX ADMIN — PANTOPRAZOLE SODIUM 40 MG: 40 TABLET, DELAYED RELEASE ORAL at 08:10

## 2024-01-01 RX ADMIN — PIPERACILLIN AND TAZOBACTAM 4.5 G: 4; .5 INJECTION, POWDER, FOR SOLUTION INTRAVENOUS at 16:42

## 2024-01-01 RX ADMIN — CIPROFLOXACIN HYDROCHLORIDE 500 MG: 500 TABLET, FILM COATED ORAL at 15:08

## 2024-01-01 RX ADMIN — DEXTROSE MONOHYDRATE 25 G: 25 INJECTION, SOLUTION INTRAVENOUS at 09:04

## 2024-01-01 RX ADMIN — LACTULOSE 20 G: 10 SOLUTION ORAL at 10:12

## 2024-01-01 RX ADMIN — INSULIN ASPART 1 UNITS: 100 INJECTION, SOLUTION INTRAVENOUS; SUBCUTANEOUS at 11:45

## 2024-01-01 RX ADMIN — MUPIROCIN: 20 OINTMENT TOPICAL at 17:31

## 2024-01-01 RX ADMIN — ALBUMIN HUMAN 25 G: 0.25 SOLUTION INTRAVENOUS at 20:49

## 2024-01-01 RX ADMIN — RIFAXIMIN 550 MG: 550 TABLET ORAL at 09:28

## 2024-01-01 RX ADMIN — LACTULOSE 20 G: 10 SOLUTION ORAL at 09:15

## 2024-01-01 RX ADMIN — Medication 1 TABLET: at 15:34

## 2024-01-01 RX ADMIN — RIFAXIMIN 550 MG: 550 TABLET ORAL at 07:56

## 2024-01-01 RX ADMIN — PIPERACILLIN AND TAZOBACTAM 4.5 G: 4; .5 INJECTION, POWDER, FOR SOLUTION INTRAVENOUS at 10:31

## 2024-01-01 RX ADMIN — MIDODRINE HYDROCHLORIDE 5 MG: 5 TABLET ORAL at 18:18

## 2024-01-01 RX ADMIN — HEPARIN SODIUM 5000 UNITS: 5000 INJECTION, SOLUTION INTRAVENOUS; SUBCUTANEOUS at 21:07

## 2024-01-01 RX ADMIN — RIFAXIMIN 550 MG: 550 TABLET ORAL at 08:16

## 2024-01-01 RX ADMIN — MIDODRINE HYDROCHLORIDE 15 MG: 5 TABLET ORAL at 18:08

## 2024-01-01 RX ADMIN — SODIUM ZIRCONIUM CYCLOSILICATE 10 G: 5 POWDER, FOR SUSPENSION ORAL at 15:24

## 2024-01-01 RX ADMIN — CHLORHEXIDINE GLUCONATE 0.12% ORAL RINSE 15 ML: 1.2 LIQUID ORAL at 08:59

## 2024-01-01 RX ADMIN — CHOLECALCIFEROL (VITAMIN D3) 10 MCG (400 UNIT) TABLET 10 MCG: at 08:15

## 2024-01-01 RX ADMIN — MUPIROCIN: 20 OINTMENT TOPICAL at 16:57

## 2024-01-01 RX ADMIN — FUROSEMIDE 40 MG: 10 INJECTION, SOLUTION INTRAMUSCULAR; INTRAVENOUS at 15:55

## 2024-01-01 RX ADMIN — ALBUMIN HUMAN 25 G: 0.25 SOLUTION INTRAVENOUS at 09:01

## 2024-01-01 RX ADMIN — PANTOPRAZOLE SODIUM 40 MG: 40 INJECTION, POWDER, FOR SOLUTION INTRAVENOUS at 23:46

## 2024-01-01 RX ADMIN — WATER 200 G: 100 IRRIGANT IRRIGATION at 03:19

## 2024-01-01 RX ADMIN — ALBUMIN HUMAN 25 G: 0.25 SOLUTION INTRAVENOUS at 20:07

## 2024-01-01 RX ADMIN — LACTULOSE 20 G: 10 SOLUTION ORAL at 21:40

## 2024-01-01 RX ADMIN — LACTULOSE 20 G: 20 SOLUTION ORAL at 08:14

## 2024-01-01 RX ADMIN — SODIUM CHLORIDE, POTASSIUM CHLORIDE, SODIUM LACTATE AND CALCIUM CHLORIDE: 600; 310; 30; 20 INJECTION, SOLUTION INTRAVENOUS at 13:24

## 2024-01-01 RX ADMIN — GABAPENTIN 100 MG: 100 CAPSULE ORAL at 01:09

## 2024-01-01 RX ADMIN — Medication 2 SPRAY: at 16:03

## 2024-01-01 RX ADMIN — NALTREXONE HYDROCHLORIDE 50 MG: 50 TABLET, FILM COATED ORAL at 13:01

## 2024-01-01 RX ADMIN — AMPICILLIN SODIUM AND SULBACTAM SODIUM 3 G: 2; 1 INJECTION, POWDER, FOR SOLUTION INTRAMUSCULAR; INTRAVENOUS at 20:49

## 2024-01-01 RX ADMIN — MIDODRINE HYDROCHLORIDE 2.5 MG: 2.5 TABLET ORAL at 18:55

## 2024-01-01 RX ADMIN — MIDODRINE HYDROCHLORIDE 5 MG: 5 TABLET ORAL at 09:15

## 2024-01-01 RX ADMIN — SODIUM ZIRCONIUM CYCLOSILICATE 10 G: 5 POWDER, FOR SUSPENSION ORAL at 14:26

## 2024-01-01 RX ADMIN — MUPIROCIN: 20 OINTMENT TOPICAL at 15:08

## 2024-01-01 RX ADMIN — MAGNESIUM SULFATE HEPTAHYDRATE 4 G: 40 INJECTION, SOLUTION INTRAVENOUS at 22:34

## 2024-01-01 RX ADMIN — CEFTRIAXONE SODIUM 1 G: 1 INJECTION, POWDER, FOR SOLUTION INTRAMUSCULAR; INTRAVENOUS at 07:59

## 2024-01-01 RX ADMIN — INSULIN ASPART 1 UNITS: 100 INJECTION, SOLUTION INTRAVENOUS; SUBCUTANEOUS at 00:17

## 2024-01-01 RX ADMIN — PANTOPRAZOLE SODIUM 40 MG: 40 TABLET, DELAYED RELEASE ORAL at 09:07

## 2024-01-01 RX ADMIN — PANTOPRAZOLE SODIUM 40 MG: 40 TABLET, DELAYED RELEASE ORAL at 08:56

## 2024-01-01 RX ADMIN — PANTOPRAZOLE SODIUM 40 MG: 40 TABLET, DELAYED RELEASE ORAL at 09:15

## 2024-01-01 RX ADMIN — LACTULOSE 20 G: 10 SOLUTION ORAL at 08:15

## 2024-01-01 RX ADMIN — Medication 50 MCG: at 20:03

## 2024-01-01 RX ADMIN — MIDODRINE HYDROCHLORIDE 15 MG: 5 TABLET ORAL at 11:52

## 2024-01-01 RX ADMIN — INSULIN ASPART 1 UNITS: 100 INJECTION, SOLUTION INTRAVENOUS; SUBCUTANEOUS at 12:08

## 2024-01-01 RX ADMIN — Medication 2 SPRAY: at 17:02

## 2024-01-01 ASSESSMENT — ACTIVITIES OF DAILY LIVING (ADL)
ADLS_ACUITY_SCORE: 53
ADLS_ACUITY_SCORE: 51
ADLS_ACUITY_SCORE: 27
ADLS_ACUITY_SCORE: 34
ADLS_ACUITY_SCORE: 33
ADLS_ACUITY_SCORE: 41
ADLS_ACUITY_SCORE: 27
ADLS_ACUITY_SCORE: 57
ADLS_ACUITY_SCORE: 33
ADLS_ACUITY_SCORE: 38
ADLS_ACUITY_SCORE: 36
ADLS_ACUITY_SCORE: 27
ADLS_ACUITY_SCORE: 49
ADLS_ACUITY_SCORE: 27
ADLS_ACUITY_SCORE: 38
ADLS_ACUITY_SCORE: 46
ADLS_ACUITY_SCORE: 27
ADLS_ACUITY_SCORE: 39
ADLS_ACUITY_SCORE: 26
ADLS_ACUITY_SCORE: 27
ADLS_ACUITY_SCORE: 26
ADLS_ACUITY_SCORE: 27
ADLS_ACUITY_SCORE: 27
ADLS_ACUITY_SCORE: 26
ADLS_ACUITY_SCORE: 43
ADLS_ACUITY_SCORE: 38
ADLS_ACUITY_SCORE: 26
ADLS_ACUITY_SCORE: 30
ADLS_ACUITY_SCORE: 37
ADLS_ACUITY_SCORE: 49
ADLS_ACUITY_SCORE: 27
ADLS_ACUITY_SCORE: 40
ADLS_ACUITY_SCORE: 26
ADLS_ACUITY_SCORE: 27
ADLS_ACUITY_SCORE: 34
ADLS_ACUITY_SCORE: 34
ADLS_ACUITY_SCORE: 27
ADLS_ACUITY_SCORE: 27
ADLS_ACUITY_SCORE: 53
ADLS_ACUITY_SCORE: 38
ADLS_ACUITY_SCORE: 37
ADLS_ACUITY_SCORE: 27
ADLS_ACUITY_SCORE: 46
ADLS_ACUITY_SCORE: 51
ADLS_ACUITY_SCORE: 46
ADLS_ACUITY_SCORE: 26
ADLS_ACUITY_SCORE: 27
ADLS_ACUITY_SCORE: 51
ADLS_ACUITY_SCORE: 53
ADLS_ACUITY_SCORE: 51
ADLS_ACUITY_SCORE: 51
ADLS_ACUITY_SCORE: 26
ADLS_ACUITY_SCORE: 38
ADLS_ACUITY_SCORE: 31
ADLS_ACUITY_SCORE: 34
CONCENTRATING,_REMEMBERING_OR_MAKING_DECISIONS_DIFFICULTY: NO
ADLS_ACUITY_SCORE: 37
ADLS_ACUITY_SCORE: 34
ADLS_ACUITY_SCORE: 40
ADLS_ACUITY_SCORE: 42
ADLS_ACUITY_SCORE: 57
ADLS_ACUITY_SCORE: 38
ADLS_ACUITY_SCORE: 38
ADLS_ACUITY_SCORE: 26
ADLS_ACUITY_SCORE: 38
ADLS_ACUITY_SCORE: 31
ADLS_ACUITY_SCORE: 27
ADLS_ACUITY_SCORE: 34
ADLS_ACUITY_SCORE: 26
ADLS_ACUITY_SCORE: 27
CHANGE_IN_FUNCTIONAL_STATUS_SINCE_ONSET_OF_CURRENT_ILLNESS/INJURY: YES
ADLS_ACUITY_SCORE: 28
ADLS_ACUITY_SCORE: 38
ADLS_ACUITY_SCORE: 46
ADLS_ACUITY_SCORE: 28
DEPENDENT_IADLS:: INDEPENDENT
ADLS_ACUITY_SCORE: 53
ADLS_ACUITY_SCORE: 34
ADLS_ACUITY_SCORE: 46
ADLS_ACUITY_SCORE: 42
ADLS_ACUITY_SCORE: 51
ADLS_ACUITY_SCORE: 53
ADLS_ACUITY_SCORE: 27
ADLS_ACUITY_SCORE: 38
ADLS_ACUITY_SCORE: 37
ADLS_ACUITY_SCORE: 53
ADLS_ACUITY_SCORE: 34
ADLS_ACUITY_SCORE: 57
ADLS_ACUITY_SCORE: 26
ADLS_ACUITY_SCORE: 49
ADLS_ACUITY_SCORE: 33
ADLS_ACUITY_SCORE: 42
ADLS_ACUITY_SCORE: 36
ADLS_ACUITY_SCORE: 27
ADLS_ACUITY_SCORE: 55
ADLS_ACUITY_SCORE: 57
ADLS_ACUITY_SCORE: 25
ADLS_ACUITY_SCORE: 38
ADLS_ACUITY_SCORE: 26
ADLS_ACUITY_SCORE: 57
ADLS_ACUITY_SCORE: 40
ADLS_ACUITY_SCORE: 31
ADLS_ACUITY_SCORE: 27
ADLS_ACUITY_SCORE: 38
ADLS_ACUITY_SCORE: 27
ADLS_ACUITY_SCORE: 26
ADLS_ACUITY_SCORE: 40
ADLS_ACUITY_SCORE: 37
ADLS_ACUITY_SCORE: 46
ADLS_ACUITY_SCORE: 27
ADLS_ACUITY_SCORE: 28
ADLS_ACUITY_SCORE: 53
ADLS_ACUITY_SCORE: 28
ADLS_ACUITY_SCORE: 46
ADLS_ACUITY_SCORE: 37
ADLS_ACUITY_SCORE: 29
ADLS_ACUITY_SCORE: 31
ADLS_ACUITY_SCORE: 40
ADLS_ACUITY_SCORE: 26
ADLS_ACUITY_SCORE: 42
ADLS_ACUITY_SCORE: 53
DIFFICULTY_EATING/SWALLOWING: NO
ADLS_ACUITY_SCORE: 30
ADLS_ACUITY_SCORE: 38
ADLS_ACUITY_SCORE: 40
ADLS_ACUITY_SCORE: 27
ADLS_ACUITY_SCORE: 26
ADLS_ACUITY_SCORE: 40
ADLS_ACUITY_SCORE: 27
ADLS_ACUITY_SCORE: 53
ADLS_ACUITY_SCORE: 53
ADLS_ACUITY_SCORE: 27
ADLS_ACUITY_SCORE: 29
ADLS_ACUITY_SCORE: 27
ADLS_ACUITY_SCORE: 30
ADLS_ACUITY_SCORE: 31
ADLS_ACUITY_SCORE: 55
ADLS_ACUITY_SCORE: 46
ADLS_ACUITY_SCORE: 34
ADLS_ACUITY_SCORE: 27
ADLS_ACUITY_SCORE: 38
ADLS_ACUITY_SCORE: 57
ADLS_ACUITY_SCORE: 49
ADLS_ACUITY_SCORE: 34
ADLS_ACUITY_SCORE: 29
ADLS_ACUITY_SCORE: 27
ADLS_ACUITY_SCORE: 26
ADLS_ACUITY_SCORE: 27
ADLS_ACUITY_SCORE: 57
ADLS_ACUITY_SCORE: 53
ADLS_ACUITY_SCORE: 26
ADLS_ACUITY_SCORE: 51
ADLS_ACUITY_SCORE: 32
ADLS_ACUITY_SCORE: 34
ADLS_ACUITY_SCORE: 34
ADLS_ACUITY_SCORE: 32
ADLS_ACUITY_SCORE: 39
ADLS_ACUITY_SCORE: 57
ADLS_ACUITY_SCORE: 34
ADLS_ACUITY_SCORE: 57
ADLS_ACUITY_SCORE: 57
ADLS_ACUITY_SCORE: 26
ADLS_ACUITY_SCORE: 26
HEARING_DIFFICULTY_OR_DEAF: NO
ADLS_ACUITY_SCORE: 57
ADLS_ACUITY_SCORE: 26
ADLS_ACUITY_SCORE: 32
ADLS_ACUITY_SCORE: 38
ADLS_ACUITY_SCORE: 40
ADLS_ACUITY_SCORE: 34
ADLS_ACUITY_SCORE: 55
ADLS_ACUITY_SCORE: 42
ADLS_ACUITY_SCORE: 26
NUMBER_OF_TIMES_PATIENT_HAS_FALLEN_WITHIN_LAST_SIX_MONTHS: 2
DRESSING/BATHING_DIFFICULTY: NO
ADLS_ACUITY_SCORE: 53
ADLS_ACUITY_SCORE: 42
ADLS_ACUITY_SCORE: 53
ADLS_ACUITY_SCORE: 27
ADLS_ACUITY_SCORE: 34
ADLS_ACUITY_SCORE: 46
ADLS_ACUITY_SCORE: 26
ADLS_ACUITY_SCORE: 41
ADLS_ACUITY_SCORE: 27
ADLS_ACUITY_SCORE: 28
ADLS_ACUITY_SCORE: 26
ADLS_ACUITY_SCORE: 40
ADLS_ACUITY_SCORE: 57
ADLS_ACUITY_SCORE: 57
EQUIPMENT_CURRENTLY_USED_AT_HOME: CANE, STRAIGHT
ADLS_ACUITY_SCORE: 46
ADLS_ACUITY_SCORE: 26
ADLS_ACUITY_SCORE: 36
DEPENDENT_IADLS:: TRANSPORTATION
ADLS_ACUITY_SCORE: 42
ADLS_ACUITY_SCORE: 57
ADLS_ACUITY_SCORE: 26
ADLS_ACUITY_SCORE: 34
ADLS_ACUITY_SCORE: 33
ADLS_ACUITY_SCORE: 26
ADLS_ACUITY_SCORE: 46
ADLS_ACUITY_SCORE: 32
ADLS_ACUITY_SCORE: 34
ADLS_ACUITY_SCORE: 26
ADLS_ACUITY_SCORE: 34
ADLS_ACUITY_SCORE: 57
ADLS_ACUITY_SCORE: 32
ADLS_ACUITY_SCORE: 26
ADLS_ACUITY_SCORE: 27
ADLS_ACUITY_SCORE: 57
ADLS_ACUITY_SCORE: 28
ADLS_ACUITY_SCORE: 27
ADLS_ACUITY_SCORE: 53
ADLS_ACUITY_SCORE: 38
ADLS_ACUITY_SCORE: 32
ADLS_ACUITY_SCORE: 30
ADLS_ACUITY_SCORE: 31
ADLS_ACUITY_SCORE: 42
HEARING_DIFFICULTY_OR_DEAF: NO
ADLS_ACUITY_SCORE: 42
ADLS_ACUITY_SCORE: 37
ADLS_ACUITY_SCORE: 28
ADLS_ACUITY_SCORE: 29
ADLS_ACUITY_SCORE: 46
ADLS_ACUITY_SCORE: 37
ADLS_ACUITY_SCORE: 27
ADLS_ACUITY_SCORE: 38
ADLS_ACUITY_SCORE: 27
ADLS_ACUITY_SCORE: 57
ADLS_ACUITY_SCORE: 57
ADLS_ACUITY_SCORE: 42
ADLS_ACUITY_SCORE: 33
ADLS_ACUITY_SCORE: 27
ADLS_ACUITY_SCORE: 38
ADLS_ACUITY_SCORE: 27
ADLS_ACUITY_SCORE: 27
ADLS_ACUITY_SCORE: 37
ADLS_ACUITY_SCORE: 55
ADLS_ACUITY_SCORE: 57
ADLS_ACUITY_SCORE: 27
ADLS_ACUITY_SCORE: 57
ADLS_ACUITY_SCORE: 57
ADLS_ACUITY_SCORE: 34
ADLS_ACUITY_SCORE: 40
ADLS_ACUITY_SCORE: 46
ADLS_ACUITY_SCORE: 35
ADLS_ACUITY_SCORE: 32
ADLS_ACUITY_SCORE: 57
ADLS_ACUITY_SCORE: 37
ADLS_ACUITY_SCORE: 39
ADLS_ACUITY_SCORE: 36
ADLS_ACUITY_SCORE: 27
ADLS_ACUITY_SCORE: 26
ADLS_ACUITY_SCORE: 53
ADLS_ACUITY_SCORE: 57
ADLS_ACUITY_SCORE: 26
ADLS_ACUITY_SCORE: 38
ADLS_ACUITY_SCORE: 27
ADLS_ACUITY_SCORE: 57
ADLS_ACUITY_SCORE: 26
ADLS_ACUITY_SCORE: 55
ADLS_ACUITY_SCORE: 27
CHANGE_IN_FUNCTIONAL_STATUS_SINCE_ONSET_OF_CURRENT_ILLNESS/INJURY: YES
ADLS_ACUITY_SCORE: 37
ADLS_ACUITY_SCORE: 38
ADLS_ACUITY_SCORE: 38
ADLS_ACUITY_SCORE: 34
ADLS_ACUITY_SCORE: 28
ADLS_ACUITY_SCORE: 57
ADLS_ACUITY_SCORE: 26
ADLS_ACUITY_SCORE: 34
ADLS_ACUITY_SCORE: 34
ADLS_ACUITY_SCORE: 27
ADLS_ACUITY_SCORE: 27
ADLS_ACUITY_SCORE: 46
ADLS_ACUITY_SCORE: 38
ADLS_ACUITY_SCORE: 30
CONCENTRATING,_REMEMBERING_OR_MAKING_DECISIONS_DIFFICULTY: NO
ADLS_ACUITY_SCORE: 57
ADLS_ACUITY_SCORE: 29
FALL_HISTORY_WITHIN_LAST_SIX_MONTHS: YES
ADLS_ACUITY_SCORE: 37
ADLS_ACUITY_SCORE: 27
ADLS_ACUITY_SCORE: 36
ADLS_ACUITY_SCORE: 32
ADLS_ACUITY_SCORE: 34
ADLS_ACUITY_SCORE: 26
PREVIOUS_RESPONSIBILITIES: MEAL PREP;HOUSEKEEPING;LAUNDRY;SHOPPING;MEDICATION MANAGEMENT;FINANCES
ADLS_ACUITY_SCORE: 57
ADLS_ACUITY_SCORE: 26
ADLS_ACUITY_SCORE: 27
ADLS_ACUITY_SCORE: 46
ADLS_ACUITY_SCORE: 27
ADLS_ACUITY_SCORE: 27
ADLS_ACUITY_SCORE: 46
ADLS_ACUITY_SCORE: 32
DIFFICULTY_EATING/SWALLOWING: NO
ADLS_ACUITY_SCORE: 53
ADLS_ACUITY_SCORE: 40
ADLS_ACUITY_SCORE: 38
ADLS_ACUITY_SCORE: 30
ADLS_ACUITY_SCORE: 38
ADLS_ACUITY_SCORE: 34
ADLS_ACUITY_SCORE: 27
ADLS_ACUITY_SCORE: 57
ADLS_ACUITY_SCORE: 28
ADLS_ACUITY_SCORE: 46
ADLS_ACUITY_SCORE: 27
ADLS_ACUITY_SCORE: 38
ADLS_ACUITY_SCORE: 42
ADLS_ACUITY_SCORE: 27
ADLS_ACUITY_SCORE: 27
ADLS_ACUITY_SCORE: 26
ADLS_ACUITY_SCORE: 34
ADLS_ACUITY_SCORE: 33
ADLS_ACUITY_SCORE: 26
ADLS_ACUITY_SCORE: 53
ADLS_ACUITY_SCORE: 37
ADLS_ACUITY_SCORE: 27
ADLS_ACUITY_SCORE: 34
ADLS_ACUITY_SCORE: 43
ADLS_ACUITY_SCORE: 27
ADLS_ACUITY_SCORE: 27
ADLS_ACUITY_SCORE: 28
ADLS_ACUITY_SCORE: 46
ADLS_ACUITY_SCORE: 27
ADLS_ACUITY_SCORE: 34
ADLS_ACUITY_SCORE: 57
ADLS_ACUITY_SCORE: 46
ADLS_ACUITY_SCORE: 27
ADLS_ACUITY_SCORE: 57
ADLS_ACUITY_SCORE: 27
ADLS_ACUITY_SCORE: 51
ADLS_ACUITY_SCORE: 26
ADLS_ACUITY_SCORE: 27
ADLS_ACUITY_SCORE: 55
ADLS_ACUITY_SCORE: 34
DRESSING/BATHING_DIFFICULTY: NO
ADLS_ACUITY_SCORE: 57
ADLS_ACUITY_SCORE: 26
ADLS_ACUITY_SCORE: 46
ADLS_ACUITY_SCORE: 51
ADLS_ACUITY_SCORE: 57
ADLS_ACUITY_SCORE: 38
ADLS_ACUITY_SCORE: 34
TOILETING_ISSUES: NO
ADLS_ACUITY_SCORE: 38
ADLS_ACUITY_SCORE: 41
ADLS_ACUITY_SCORE: 37
ADLS_ACUITY_SCORE: 27
ADLS_ACUITY_SCORE: 26
ADLS_ACUITY_SCORE: 26
ADLS_ACUITY_SCORE: 31
ADLS_ACUITY_SCORE: 26
ADLS_ACUITY_SCORE: 46
ADLS_ACUITY_SCORE: 34
ADLS_ACUITY_SCORE: 57
ADLS_ACUITY_SCORE: 33
ADLS_ACUITY_SCORE: 26
ADLS_ACUITY_SCORE: 55
ADLS_ACUITY_SCORE: 35
ADLS_ACUITY_SCORE: 34
ADLS_ACUITY_SCORE: 27
ADLS_ACUITY_SCORE: 27
ADLS_ACUITY_SCORE: 38
ADLS_ACUITY_SCORE: 27
ADLS_ACUITY_SCORE: 40
ADLS_ACUITY_SCORE: 40
ADLS_ACUITY_SCORE: 51
ADLS_ACUITY_SCORE: 38
ADLS_ACUITY_SCORE: 26
ADLS_ACUITY_SCORE: 38
ADLS_ACUITY_SCORE: 26
ADLS_ACUITY_SCORE: 26
ADLS_ACUITY_SCORE: 36
ADLS_ACUITY_SCORE: 27
ADLS_ACUITY_SCORE: 28
ADLS_ACUITY_SCORE: 42
ADLS_ACUITY_SCORE: 34
ADLS_ACUITY_SCORE: 28
ADLS_ACUITY_SCORE: 57
ADLS_ACUITY_SCORE: 53
ADLS_ACUITY_SCORE: 57
ADLS_ACUITY_SCORE: 27
ADLS_ACUITY_SCORE: 37
ADLS_ACUITY_SCORE: 27
ADLS_ACUITY_SCORE: 55
ADLS_ACUITY_SCORE: 36
WALKING_OR_CLIMBING_STAIRS: AMBULATION DIFFICULTY, REQUIRES EQUIPMENT
ADLS_ACUITY_SCORE: 27
ADLS_ACUITY_SCORE: 27
ADLS_ACUITY_SCORE: 38
ADLS_ACUITY_SCORE: 38
ADLS_ACUITY_SCORE: 37
ADLS_ACUITY_SCORE: 38
ADLS_ACUITY_SCORE: 26
ADLS_ACUITY_SCORE: 26
ADLS_ACUITY_SCORE: 34
ADLS_ACUITY_SCORE: 46
ADLS_ACUITY_SCORE: 27
ADLS_ACUITY_SCORE: 31
ADLS_ACUITY_SCORE: 51
ADLS_ACUITY_SCORE: 27
ADLS_ACUITY_SCORE: 27
ADLS_ACUITY_SCORE: 53
ADLS_ACUITY_SCORE: 41
ADLS_ACUITY_SCORE: 37
ADLS_ACUITY_SCORE: 55
ADLS_ACUITY_SCORE: 39
ADLS_ACUITY_SCORE: 42
ADLS_ACUITY_SCORE: 53
ADLS_ACUITY_SCORE: 26
ADLS_ACUITY_SCORE: 27
ADLS_ACUITY_SCORE: 34
ADLS_ACUITY_SCORE: 38
ADLS_ACUITY_SCORE: 30
ADLS_ACUITY_SCORE: 57
ADLS_ACUITY_SCORE: 36
ADLS_ACUITY_SCORE: 26
ADLS_ACUITY_SCORE: 27
ADLS_ACUITY_SCORE: 40
ADLS_ACUITY_SCORE: 57
ADLS_ACUITY_SCORE: 37
ADLS_ACUITY_SCORE: 26
ADLS_ACUITY_SCORE: 26
ADLS_ACUITY_SCORE: 37
ADLS_ACUITY_SCORE: 57
ADLS_ACUITY_SCORE: 26
ADLS_ACUITY_SCORE: 27
ADLS_ACUITY_SCORE: 46
ADLS_ACUITY_SCORE: 53
ADLS_ACUITY_SCORE: 37
ADLS_ACUITY_SCORE: 26
ADLS_ACUITY_SCORE: 27
ADLS_ACUITY_SCORE: 27
ADLS_ACUITY_SCORE: 26
ADLS_ACUITY_SCORE: 31
ADLS_ACUITY_SCORE: 27
ADLS_ACUITY_SCORE: 37
ADLS_ACUITY_SCORE: 26
ADLS_ACUITY_SCORE: 27
TOILETING_ISSUES: NO
ADLS_ACUITY_SCORE: 34
ADLS_ACUITY_SCORE: 37
ADLS_ACUITY_SCORE: 34
ADLS_ACUITY_SCORE: 26
ADLS_ACUITY_SCORE: 27
ADLS_ACUITY_SCORE: 34
ADLS_ACUITY_SCORE: 30
ADLS_ACUITY_SCORE: 28
ADLS_ACUITY_SCORE: 57
ADLS_ACUITY_SCORE: 46
ADLS_ACUITY_SCORE: 27
ADLS_ACUITY_SCORE: 46
ADLS_ACUITY_SCORE: 40
ADLS_ACUITY_SCORE: 26
ADLS_ACUITY_SCORE: 26
ADLS_ACUITY_SCORE: 27
ADLS_ACUITY_SCORE: 34
ADLS_ACUITY_SCORE: 26
ADLS_ACUITY_SCORE: 33
ADLS_ACUITY_SCORE: 57
ADLS_ACUITY_SCORE: 40
ADLS_ACUITY_SCORE: 26
ADLS_ACUITY_SCORE: 42
ADLS_ACUITY_SCORE: 49
ADLS_ACUITY_SCORE: 26
ADLS_ACUITY_SCORE: 32
ADLS_ACUITY_SCORE: 26
ADLS_ACUITY_SCORE: 28
ADLS_ACUITY_SCORE: 34
ADLS_ACUITY_SCORE: 28
ADLS_ACUITY_SCORE: 30
ADLS_ACUITY_SCORE: 27
ADLS_ACUITY_SCORE: 33
ADLS_ACUITY_SCORE: 40
ADLS_ACUITY_SCORE: 36
ADLS_ACUITY_SCORE: 46
ADLS_ACUITY_SCORE: 33
ADLS_ACUITY_SCORE: 31
ADLS_ACUITY_SCORE: 53
ADLS_ACUITY_SCORE: 34
ADLS_ACUITY_SCORE: 38
ADLS_ACUITY_SCORE: 46
ADLS_ACUITY_SCORE: 38
ADLS_ACUITY_SCORE: 26
ADLS_ACUITY_SCORE: 38
ADLS_ACUITY_SCORE: 27
ADLS_ACUITY_SCORE: 36
ADLS_ACUITY_SCORE: 27
ADLS_ACUITY_SCORE: 57
ADLS_ACUITY_SCORE: 27
ADLS_ACUITY_SCORE: 29
ADLS_ACUITY_SCORE: 37
ADLS_ACUITY_SCORE: 27
ADLS_ACUITY_SCORE: 57
ADLS_ACUITY_SCORE: 57
ADLS_ACUITY_SCORE: 37
ADLS_ACUITY_SCORE: 26
ADLS_ACUITY_SCORE: 27
ADLS_ACUITY_SCORE: 31
ADLS_ACUITY_SCORE: 27
ADLS_ACUITY_SCORE: 38
DEPENDENT_IADLS:: TRANSPORTATION
ADLS_ACUITY_SCORE: 27
ADLS_ACUITY_SCORE: 40
ADLS_ACUITY_SCORE: 27
ADLS_ACUITY_SCORE: 26
ADLS_ACUITY_SCORE: 26
ADLS_ACUITY_SCORE: 33
ADLS_ACUITY_SCORE: 27
ADLS_ACUITY_SCORE: 27
ADLS_ACUITY_SCORE: 31
ADLS_ACUITY_SCORE: 57
ADLS_ACUITY_SCORE: 27
ADLS_ACUITY_SCORE: 57
ADLS_ACUITY_SCORE: 57
ADLS_ACUITY_SCORE: 36
ADLS_ACUITY_SCORE: 27
ADLS_ACUITY_SCORE: 27
WEAR_GLASSES_OR_BLIND: YES
ADLS_ACUITY_SCORE: 34
ADLS_ACUITY_SCORE: 32
ADLS_ACUITY_SCORE: 27
ADLS_ACUITY_SCORE: 34
ADLS_ACUITY_SCORE: 57
ADLS_ACUITY_SCORE: 37
ADLS_ACUITY_SCORE: 26
ADLS_ACUITY_SCORE: 31
ADLS_ACUITY_SCORE: 41
ADLS_ACUITY_SCORE: 38
ADLS_ACUITY_SCORE: 30
ADLS_ACUITY_SCORE: 26
ADLS_ACUITY_SCORE: 46
ADLS_ACUITY_SCORE: 40
ADLS_ACUITY_SCORE: 38
ADLS_ACUITY_SCORE: 37
ADLS_ACUITY_SCORE: 27
ADLS_ACUITY_SCORE: 27
ADLS_ACUITY_SCORE: 26
ADLS_ACUITY_SCORE: 26
ADLS_ACUITY_SCORE: 27
ADLS_ACUITY_SCORE: 40
ADLS_ACUITY_SCORE: 38
ADLS_ACUITY_SCORE: 51
ADLS_ACUITY_SCORE: 27
ADLS_ACUITY_SCORE: 26
ADLS_ACUITY_SCORE: 38
ADLS_ACUITY_SCORE: 27
DOING_ERRANDS_INDEPENDENTLY_DIFFICULTY: NO
ADLS_ACUITY_SCORE: 27
ADLS_ACUITY_SCORE: 26
ADLS_ACUITY_SCORE: 28
ADLS_ACUITY_SCORE: 33
ADLS_ACUITY_SCORE: 51
ADLS_ACUITY_SCORE: 53
ADLS_ACUITY_SCORE: 34
ADLS_ACUITY_SCORE: 26
ADLS_ACUITY_SCORE: 34
ADLS_ACUITY_SCORE: 27
ADLS_ACUITY_SCORE: 26
ADLS_ACUITY_SCORE: 34
ADLS_ACUITY_SCORE: 46
ADLS_ACUITY_SCORE: 57
ADLS_ACUITY_SCORE: 26
ADLS_ACUITY_SCORE: 27
ADLS_ACUITY_SCORE: 26
ADLS_ACUITY_SCORE: 27
ADLS_ACUITY_SCORE: 27
ADLS_ACUITY_SCORE: 26
ADLS_ACUITY_SCORE: 53
ADLS_ACUITY_SCORE: 57
ADLS_ACUITY_SCORE: 57
ADLS_ACUITY_SCORE: 28
ADLS_ACUITY_SCORE: 40
ADLS_ACUITY_SCORE: 32
ADLS_ACUITY_SCORE: 37
ADLS_ACUITY_SCORE: 38
ADLS_ACUITY_SCORE: 27
ADLS_ACUITY_SCORE: 57
ADLS_ACUITY_SCORE: 27
ADLS_ACUITY_SCORE: 36
ADLS_ACUITY_SCORE: 38
ADLS_ACUITY_SCORE: 28
ADLS_ACUITY_SCORE: 34
ADLS_ACUITY_SCORE: 49
ADLS_ACUITY_SCORE: 27
ADLS_ACUITY_SCORE: 27
ADLS_ACUITY_SCORE: 43
ADLS_ACUITY_SCORE: 55
ADLS_ACUITY_SCORE: 27
ADLS_ACUITY_SCORE: 28
ADLS_ACUITY_SCORE: 57
ADLS_ACUITY_SCORE: 27
ADLS_ACUITY_SCORE: 26
ADLS_ACUITY_SCORE: 26
ADLS_ACUITY_SCORE: 27
ADLS_ACUITY_SCORE: 34
ADLS_ACUITY_SCORE: 28
ADLS_ACUITY_SCORE: 57
ADLS_ACUITY_SCORE: 53
DIFFICULTY_COMMUNICATING: NO
ADLS_ACUITY_SCORE: 27
ADLS_ACUITY_SCORE: 36
ADLS_ACUITY_SCORE: 37
ADLS_ACUITY_SCORE: 53
ADLS_ACUITY_SCORE: 36
ADLS_ACUITY_SCORE: 46
ADLS_ACUITY_SCORE: 26
ADLS_ACUITY_SCORE: 46
ADLS_ACUITY_SCORE: 40
ADLS_ACUITY_SCORE: 38
ADLS_ACUITY_SCORE: 42
ADLS_ACUITY_SCORE: 38
ADLS_ACUITY_SCORE: 27
ADLS_ACUITY_SCORE: 30
ADLS_ACUITY_SCORE: 51
ADLS_ACUITY_SCORE: 26
ADLS_ACUITY_SCORE: 37
ADLS_ACUITY_SCORE: 26
ADLS_ACUITY_SCORE: 27
ADLS_ACUITY_SCORE: 31
ADLS_ACUITY_SCORE: 43
ADLS_ACUITY_SCORE: 33
ADLS_ACUITY_SCORE: 26
ADLS_ACUITY_SCORE: 34
ADLS_ACUITY_SCORE: 51
ADLS_ACUITY_SCORE: 27
ADLS_ACUITY_SCORE: 27
ADLS_ACUITY_SCORE: 42
ADLS_ACUITY_SCORE: 27
ADLS_ACUITY_SCORE: 46
ADLS_ACUITY_SCORE: 40
ADLS_ACUITY_SCORE: 27
ADLS_ACUITY_SCORE: 57
ADLS_ACUITY_SCORE: 53
ADLS_ACUITY_SCORE: 26
ADLS_ACUITY_SCORE: 57
ADLS_ACUITY_SCORE: 53
ADLS_ACUITY_SCORE: 46
ADLS_ACUITY_SCORE: 27
ADLS_ACUITY_SCORE: 37
ADLS_ACUITY_SCORE: 27
ADLS_ACUITY_SCORE: 49
ADLS_ACUITY_SCORE: 33
ADLS_ACUITY_SCORE: 46
ADLS_ACUITY_SCORE: 35
ADLS_ACUITY_SCORE: 26
ADLS_ACUITY_SCORE: 27
DOING_ERRANDS_INDEPENDENTLY_DIFFICULTY: YES
ADLS_ACUITY_SCORE: 46
ADLS_ACUITY_SCORE: 25
ADLS_ACUITY_SCORE: 26
ADLS_ACUITY_SCORE: 28
ADLS_ACUITY_SCORE: 27
ADLS_ACUITY_SCORE: 27
ADLS_ACUITY_SCORE: 53
ADLS_ACUITY_SCORE: 26
ADLS_ACUITY_SCORE: 46
ADLS_ACUITY_SCORE: 27
ADLS_ACUITY_SCORE: 38
ADLS_ACUITY_SCORE: 27
ADLS_ACUITY_SCORE: 38
ADLS_ACUITY_SCORE: 40
ADLS_ACUITY_SCORE: 46
ADLS_ACUITY_SCORE: 27
ADLS_ACUITY_SCORE: 25
ADLS_ACUITY_SCORE: 27
ADLS_ACUITY_SCORE: 27
ADLS_ACUITY_SCORE: 26
ADLS_ACUITY_SCORE: 27
ADLS_ACUITY_SCORE: 27
WALKING_OR_CLIMBING_STAIRS_DIFFICULTY: YES
ADLS_ACUITY_SCORE: 40
ADLS_ACUITY_SCORE: 26
ADLS_ACUITY_SCORE: 25
ADLS_ACUITY_SCORE: 26
ADLS_ACUITY_SCORE: 38
ADLS_ACUITY_SCORE: 53
ADLS_ACUITY_SCORE: 28
ADLS_ACUITY_SCORE: 32
ADLS_ACUITY_SCORE: 26
ADLS_ACUITY_SCORE: 38
ADLS_ACUITY_SCORE: 37
ADLS_ACUITY_SCORE: 31
ADLS_ACUITY_SCORE: 57
ADLS_ACUITY_SCORE: 40
ADLS_ACUITY_SCORE: 37
ADLS_ACUITY_SCORE: 32
ADLS_ACUITY_SCORE: 34
ADLS_ACUITY_SCORE: 46
ADLS_ACUITY_SCORE: 51
ADLS_ACUITY_SCORE: 46
ADLS_ACUITY_SCORE: 37
ADLS_ACUITY_SCORE: 27
ADLS_ACUITY_SCORE: 26
ADLS_ACUITY_SCORE: 57
ADLS_ACUITY_SCORE: 55
ADLS_ACUITY_SCORE: 27
ADLS_ACUITY_SCORE: 46
ADLS_ACUITY_SCORE: 27
ADLS_ACUITY_SCORE: 46
ADLS_ACUITY_SCORE: 40
ADLS_ACUITY_SCORE: 27
ADLS_ACUITY_SCORE: 26
ADLS_ACUITY_SCORE: 41
ADLS_ACUITY_SCORE: 57
ADLS_ACUITY_SCORE: 26
ADLS_ACUITY_SCORE: 57
ADLS_ACUITY_SCORE: 26
ADLS_ACUITY_SCORE: 46
ADLS_ACUITY_SCORE: 55
ADLS_ACUITY_SCORE: 27
ADLS_ACUITY_SCORE: 46
ADLS_ACUITY_SCORE: 26
ADLS_ACUITY_SCORE: 40
ADLS_ACUITY_SCORE: 42
ADLS_ACUITY_SCORE: 28
ADLS_ACUITY_SCORE: 34
ADLS_ACUITY_SCORE: 26
WALKING_OR_CLIMBING_STAIRS_DIFFICULTY: NO
ADLS_ACUITY_SCORE: 46
ADLS_ACUITY_SCORE: 32
ADLS_ACUITY_SCORE: 26
ADLS_ACUITY_SCORE: 53
ADLS_ACUITY_SCORE: 38
ADLS_ACUITY_SCORE: 26
ADLS_ACUITY_SCORE: 37
ADLS_ACUITY_SCORE: 33
ADLS_ACUITY_SCORE: 26
ADLS_ACUITY_SCORE: 37
ADLS_ACUITY_SCORE: 38
ADLS_ACUITY_SCORE: 27
ADLS_ACUITY_SCORE: 42
ADLS_ACUITY_SCORE: 29
DIFFICULTY_COMMUNICATING: NO
ADLS_ACUITY_SCORE: 38
ADLS_ACUITY_SCORE: 40
ADLS_ACUITY_SCORE: 27
ADLS_ACUITY_SCORE: 51
ADLS_ACUITY_SCORE: 34
ADLS_ACUITY_SCORE: 46
ADLS_ACUITY_SCORE: 27
ADLS_ACUITY_SCORE: 26
ADLS_ACUITY_SCORE: 28
ADLS_ACUITY_SCORE: 57
ADLS_ACUITY_SCORE: 37
ADLS_ACUITY_SCORE: 27
WEAR_GLASSES_OR_BLIND: YES
ADLS_ACUITY_SCORE: 42
ADLS_ACUITY_SCORE: 26
ADLS_ACUITY_SCORE: 37
ADLS_ACUITY_SCORE: 31
ADLS_ACUITY_SCORE: 42
ADLS_ACUITY_SCORE: 26
ADLS_ACUITY_SCORE: 27
ADLS_ACUITY_SCORE: 26
ADLS_ACUITY_SCORE: 34
ADLS_ACUITY_SCORE: 32
ADLS_ACUITY_SCORE: 27
ADLS_ACUITY_SCORE: 33
ADLS_ACUITY_SCORE: 32
ADLS_ACUITY_SCORE: 57
ADLS_ACUITY_SCORE: 26
ADLS_ACUITY_SCORE: 27
ADLS_ACUITY_SCORE: 32
ADLS_ACUITY_SCORE: 27
ADLS_ACUITY_SCORE: 31
ADLS_ACUITY_SCORE: 37
ADLS_ACUITY_SCORE: 26
ADLS_ACUITY_SCORE: 32
ADLS_ACUITY_SCORE: 32
DEPENDENT_IADLS:: TRANSPORTATION
ADLS_ACUITY_SCORE: 34
ADLS_ACUITY_SCORE: 27
ADLS_ACUITY_SCORE: 53
ADLS_ACUITY_SCORE: 27

## 2024-01-01 ASSESSMENT — COLUMBIA-SUICIDE SEVERITY RATING SCALE - C-SSRS
2. HAVE YOU ACTUALLY HAD ANY THOUGHTS OF KILLING YOURSELF IN THE PAST MONTH?: NO
1. IN THE PAST MONTH, HAVE YOU WISHED YOU WERE DEAD OR WISHED YOU COULD GO TO SLEEP AND NOT WAKE UP?: NO
2. HAVE YOU ACTUALLY HAD ANY THOUGHTS OF KILLING YOURSELF IN THE PAST MONTH?: NO
6. HAVE YOU EVER DONE ANYTHING, STARTED TO DO ANYTHING, OR PREPARED TO DO ANYTHING TO END YOUR LIFE?: NO
2. HAVE YOU ACTUALLY HAD ANY THOUGHTS OF KILLING YOURSELF IN THE PAST MONTH?: NO
6. HAVE YOU EVER DONE ANYTHING, STARTED TO DO ANYTHING, OR PREPARED TO DO ANYTHING TO END YOUR LIFE?: NO
6. HAVE YOU EVER DONE ANYTHING, STARTED TO DO ANYTHING, OR PREPARED TO DO ANYTHING TO END YOUR LIFE?: NO
1. IN THE PAST MONTH, HAVE YOU WISHED YOU WERE DEAD OR WISHED YOU COULD GO TO SLEEP AND NOT WAKE UP?: NO
6. HAVE YOU EVER DONE ANYTHING, STARTED TO DO ANYTHING, OR PREPARED TO DO ANYTHING TO END YOUR LIFE?: NO
6. HAVE YOU EVER DONE ANYTHING, STARTED TO DO ANYTHING, OR PREPARED TO DO ANYTHING TO END YOUR LIFE?: NO
2. HAVE YOU ACTUALLY HAD ANY THOUGHTS OF KILLING YOURSELF IN THE PAST MONTH?: NO
1. IN THE PAST MONTH, HAVE YOU WISHED YOU WERE DEAD OR WISHED YOU COULD GO TO SLEEP AND NOT WAKE UP?: NO
2. HAVE YOU ACTUALLY HAD ANY THOUGHTS OF KILLING YOURSELF IN THE PAST MONTH?: NO
1. IN THE PAST MONTH, HAVE YOU WISHED YOU WERE DEAD OR WISHED YOU COULD GO TO SLEEP AND NOT WAKE UP?: NO
1. IN THE PAST MONTH, HAVE YOU WISHED YOU WERE DEAD OR WISHED YOU COULD GO TO SLEEP AND NOT WAKE UP?: NO

## 2024-02-21 NOTE — ED PROVIDER NOTES
History     Chief Complaint:  Leg Pain       HPI   Crispin Ham is a 38 year old male who presents for evaluation of left lower leg pain for 2-3 weeks. He was seen in clinic and prescribed trazodone. His symptoms were initially tolerable, but more recently the pain has worsened. He denies any associated trauma or injury to the leg. He notes intermittent leg swelling in the area.      Independent Historian:   None - Patient Only    Review of External Notes:   2/13/24 Family Medicine Note: Seen for leg pain.      Medications:    Folvite  Lasix  Chronulac  Robaxin  Protonix  Aldactone  Trazodone    Past Medical History:    Alcohol abuse  Hypertension  Septic shock  Acute renal failure  Liver failure  Spontaneous bacterial peritonitis  Substance abuse  Depression  Chronic heart failure   Hyperlipidemia    Past Surgical History:    Orthopedic surgery     Physical Exam        Physical Exam    Patient Vitals for the past 24 hrs:   BP Temp Temp src Pulse Resp SpO2 Height Weight   02/21/24 2000 126/81 -- -- 107 15 -- -- --   02/21/24 1935 -- -- -- 104 10 -- -- --   02/21/24 1920 -- -- -- 106 -- -- -- --   02/21/24 1905 (!) 146/90 97.8  F (36.6  C) -- 107 18 -- -- --   02/21/24 1820 133/89 98  F (36.7  C) -- 105 18 100 % -- --   02/21/24 1755 134/89 97.8  F (36.6  C) -- 103 10 100 % -- --   02/21/24 1739 (!) 133/96 98.2  F (36.8  C) -- 100 -- 100 % -- --   02/21/24 1725 123/84 98.1  F (36.7  C) -- 96 18 -- -- --   02/21/24 1616 134/81 -- -- 106 -- 97 % -- --   02/21/24 1430 -- -- -- -- -- 100 % -- --   02/21/24 1415 -- -- -- -- -- 97 % -- --   02/21/24 1400 117/77 -- -- -- -- -- -- --   02/21/24 1146 131/68 98.3  F (36.8  C) Oral 111 16 100 % 1.829 m (6') 108.9 kg (240 lb)       General: Alert and Interactive.   Head: No signs of trauma.   Mouth/Throat: Oropharynx is clear and moist.   Eyes: Conjunctivae are normal. Pupils are equal, round, and reactive to light.   Neck: Normal range of motion. No nuchal rigidity.    CV: Normal rate and regular rhythm.    Resp: Effort normal and breath sounds normal. No respiratory distress.   GI: Soft. There is no tenderness or guarding.   MSK: Normal range of motion.  Patient is bilateral lower extremity edema, left greater than right.  Dorsalis pedis pulses are palpable symmetric.  Neuro: The patient is alert and oriented to person, place, and time.  PERRLA, EOMI, strength in upper/lower extremities normal and symmetrical.   Sensation normal. Speech normal.  GCS eye subscore is 4. GCS verbal subscore is 5. GCS motor subscore is 6.   Skin: Skin is warm and dry.  The patient's jaundice  Psych: normal mood and affect. behavior is normal.     Emergency Department Course   ECG  ECG results from 09/25/23   EKG 12-lead, tracing only     Value    Systolic Blood Pressure     Diastolic Blood Pressure     Ventricular Rate 99    Atrial Rate 99    NY Interval 168    QRS Duration 92        QTc 431    P Axis 41    R AXIS -5    T Axis -8    Interpretation ECG      Sinus rhythm  Possible Lateral infarct , age undetermined  Possible Inferior infarct , age undetermined  Abnormal ECG  When compared with ECG of 08-SEP-2023 11:51,  Inverted T waves have replaced nonspecific T wave abnormality in Inferior leads  Confirmed by GENERATED REPORT, COMPUTER ([id number]),  Aurelia Rodrigues ([id number]) on 9/26/2023 7:25:01 AM           Imaging:  US Lower Extremity Venous Duplex Left   Final Result   IMPRESSION:   1.  No deep venous thrombosis in the left lower extremity.      KRISTI CARDONA MD            SYSTEM ID:  [id number]             Laboratory:  Labs Ordered and Resulted from Time of ED Arrival to Time of ED Departure   COMPREHENSIVE METABOLIC PANEL - Abnormal       Result Value    Sodium 130 (*)     Potassium 4.6      Carbon Dioxide (CO2) 21 (*)     Anion Gap 10      Urea Nitrogen 13.1      Creatinine 0.59 (*)     GFR Estimate >90      Calcium 9.7      Chloride 99      Glucose 112 (*)     Alkaline Phosphatase 243  (*)      (*)     ALT 52      Protein Total 8.1      Albumin 3.4 (*)     Bilirubin Total 12.5 (*)    INR - Abnormal    INR 1.91 (*)    CBC WITH PLATELETS AND DIFFERENTIAL - Abnormal    WBC Count 8.1      RBC Count 1.80 (*)     Hemoglobin 6.9 (*)     Hematocrit 20.3 (*)      (*)     MCH 38.3 (*)     MCHC 34.0      RDW 15.3 (*)     Platelet Count 116 (*)     % Neutrophils 68      % Lymphocytes 13      % Monocytes 15      % Eosinophils 1      % Basophils 2      % Immature Granulocytes 1      NRBCs per 100 WBC 0      Absolute Neutrophils 5.6      Absolute Lymphocytes 1.1      Absolute Monocytes 1.2      Absolute Eosinophils 0.1      Absolute Basophils 0.1      Absolute Immature Granulocytes 0.1      Absolute NRBCs 0.0     TYPE AND SCREEN, ADULT    ABO/RH(D) A POS      Antibody Screen Negative      SPECIMEN EXPIRATION DATE 95929171821414     PREPARE RED BLOOD CELLS (UNIT)    Blood Component Type Red Blood Cells      Product Code L9392Y73      Unit Status Transfused      Unit Number [id number]      CROSSMATCH Compatible      CODING SYSTEM [id number]      ISSUE DATE AND TIME 32516433082523      UNIT ABO/RH A+      UNIT TYPE ISBT 6200     PREPARE RED BLOOD CELLS (UNIT)   TRANSFUSE RED BLOOD CELLS (UNIT)   ABO/RH TYPE AND SCREEN        Emergency Department Course & Assessments:    Interventions:  Medications   HYDROcodone-acetaminophen (NORCO) 5-325 MG per tablet 2 tablet (2 tablets Oral $Given 2/21/24 3093)     Packed red blood cells /1 unit blood transfusion    Independent Interpretation (X-rays, CTs, rhythm strip):  Lower extremity ultrasound shows no evidence of DVT    Assessments/Consultations/Discussion of Management or Tests:  ED Course as of 02/21/24 1249   Wed Feb 21, 2024   1245 I obtained history and examined the patient, as noted above.       Social Determinants of Health affecting care:   Healthcare Access/Compliance and Stress/Adjustment Disorders    Disposition:  The patient was discharged to  home.     Impression & Plan        Medical Decision Making:  This patient is presenting to the ER with left lower leg swelling and pain.  There is no evidence for DVT.  No indication of an arterial injury or occlusion.  The patient has a history of hepatic failure.  His bilirubin is elevated.  In addition, the patient is anemia has worsened.  He is now below 7 for hemoglobin.  He will require blood transfusion.  I am recommending admission to the hospital but he is declining.  He will agree to the blood transfusion but wants to be discharged home.  The patient will return to the ER with worsening condition.  He will follow-up with his GI physician/hepatologist as soon as possible.      Diagnosis:    ICD-10-CM    1. Pain of left lower leg  M79.662       2. Left leg swelling  M79.89       3. Acute liver failure without hepatic coma  K72.00       4. Anemia, unspecified type  D64.9            Critical care time: 40 minutes.      Scribe Disclosure:  Lopez VIGIL, am serving as a scribe at 12:23 PM on 2/21/2024 to document services personally performed by Dylan Herrera MD based on my observations and the provider's statements to me.        Dylan Herrera MD  02/21/24 6468

## 2024-02-21 NOTE — ED TRIAGE NOTES
Pt ambulated into ED, jaundiced. Pt reports left leg pain and swelling for last 3 weeks. Pitting edema noted in LLE. Hx of alcoholic liver disease. Pt reports drinking over the weekend, last drink yesterday morning. Reports SNOWDEN x3 weeks. Pt also reports N/V.

## 2024-02-21 NOTE — ED NOTES
DATE/TIME OF CALL RECEIVED FROM LAB:  02/21/24 at 1:47 PM   LAB TEST:  hgb  LAB VALUE:  6.9  PROVIDER NOTIFIED?: Yes  PROVIDER NAME: Valenciaarcelia  DATE/TIME LAB VALUE REPORTED TO PROVIDER: 02/21-1345p  MECHANISM OF PROVIDER NOTIFICATION: Face-To-Face  PROVIDER RESPONSE: okay

## 2024-03-12 PROBLEM — D64.9 ANEMIA, UNSPECIFIED TYPE: Status: ACTIVE | Noted: 2023-09-08

## 2024-03-12 PROBLEM — E87.1 HYPONATREMIA: Status: ACTIVE | Noted: 2024-01-01

## 2024-03-12 PROBLEM — N17.9 ACUTE KIDNEY INJURY (H): Status: ACTIVE | Noted: 2024-01-01

## 2024-03-12 NOTE — ED TRIAGE NOTES
Hmg 6.1. No active bleeding. Hx anemia      Triage Assessment (Adult)       Row Name 03/12/24 0957          Triage Assessment    Airway WDL WDL        Respiratory WDL    Respiratory WDL X        Skin Circulation/Temperature WDL    Skin Circulation/Temperature WDL X        Cardiac WDL    Cardiac WDL WDL        Peripheral/Neurovascular WDL    Peripheral Neurovascular WDL WDL        Cognitive/Neuro/Behavioral WDL    Cognitive/Neuro/Behavioral WDL WDL

## 2024-03-12 NOTE — ED NOTES
[facility]  ED Nurse Handoff Report    ED Chief complaint: Abnormal Labs      ED Diagnosis:   Final diagnoses:   Acute kidney injury (H24)   Anemia, unspecified type   Hyponatremia       Code Status: admitting physician to address    Allergies:   Allergies   Allergen Reactions    Excedrin Extra Strength [Aspirin-Acetaminophen-Caffeine]      puffy eyes and dry throat a few years ago. Tolerates ibuprofen and acetaminophen, but avoids aspirin       Patient Story: pt referred here from doctor due to abnormal lab result  Focused Assessment:  pt appears jaundiced, but resting comfortably in the bed. Pt denies headache, light headedness, dizziness, or unsteadiness with walking; denies any onset of new pain. No SOB, no increased WOB. Pt endorses slight fatigue.     Treatments and/or interventions provided: PRBCs x2  Labs Ordered and Resulted from Time of ED Arrival to Time of ED Departure   COMPREHENSIVE METABOLIC PANEL - Abnormal       Result Value    Sodium 121 (*)     Potassium 5.5 (*)     Carbon Dioxide (CO2) 16 (*)     Anion Gap 13      Urea Nitrogen 41.0 (*)     Creatinine 2.16 (*)     GFR Estimate 40 (*)     Calcium 9.7      Chloride 92 (*)     Glucose 109 (*)     Alkaline Phosphatase 223 (*)      (*)     ALT 56      Protein Total 7.8      Albumin 3.3 (*)     Bilirubin Total 9.0 (*)    CBC WITH PLATELETS AND DIFFERENTIAL - Abnormal    WBC Count 9.2      RBC Count 1.43 (*)     Hemoglobin 5.6 (*)     Hematocrit 15.7 (*)      (*)     MCH 39.2 (*)     MCHC 35.7      RDW 14.9      Platelet Count 132 (*)     % Neutrophils 77      % Lymphocytes 9      % Monocytes 11      % Eosinophils 1      % Basophils 0      % Immature Granulocytes 2      NRBCs per 100 WBC 0      Absolute Neutrophils 7.1      Absolute Lymphocytes 0.8      Absolute Monocytes 1.0      Absolute Eosinophils 0.1      Absolute Basophils 0.0      Absolute Immature Granulocytes 0.2      Absolute NRBCs 0.0     ETHYL ALCOHOL LEVEL -  Abnormal    Alcohol ethyl 0.07 (*)    INR - Abnormal    INR 1.94 (*)    MAGNESIUM - Normal    Magnesium 1.8     FRACTIONAL EXCRETION OF SODIUM    Creatinine Urine mg/dL 25.4      Sodium Urine mmol/L 106      %FENA 7.4     OCCULT BLOOD STOOL   TYPE AND SCREEN, ADULT    ABO/RH(D) A POS      Antibody Screen Negative      SPECIMEN EXPIRATION DATE [id number]     PREPARE RED BLOOD CELLS (UNIT)    Blood Component Type Red Blood Cells      Product Code V9959W21      Unit Status Transfused      Unit Number [id number]      CROSSMATCH Compatible      CODING SYSTEM [id number]      ISSUE DATE AND TIME [id number]      UNIT ABO/RH A+      UNIT TYPE ISBT 6200     PREPARE RED BLOOD CELLS (UNIT)   TRANSFUSE RED BLOOD CELLS (UNIT)   TRANSFUSE RED BLOOD CELLS (UNIT)   ABO/RH TYPE AND SCREEN   FRACTIONAL EXCRETION OF SODIUM     Medications   lactulose (CHRONULAC) solution 20 g (has no administration in time range)   methocarbamol (ROBAXIN) tablet 500 mg (has no administration in time range)   pantoprazole (PROTONIX) EC tablet 40 mg (has no administration in time range)   rifaximin (XIFAXAN) tablet 550 mg (has no administration in time range)       Patient's response to treatments and/or interventions: tolerated    To be done/followed up on inpatient unit:  see orders    Does this patient have any cognitive concerns?:  none    Activity level - Baseline/Home:  Independent  Activity Level - Current:   Stand with Assist    Patient's Preferred language: English   Needed?: No    Isolation: None  Infection: Not Applicable  Patient tested for COVID 19 prior to admission: NO  Bariatric?: No    Vital Signs:   Vitals:    03/12/24 1215 03/12/24 1230 03/12/24 1245 03/12/24 1400   BP: 113/69 117/66  120/66   BP Location:       Pulse: 94 82 86 83   Resp: 16 14 12 12   Temp:       TempSrc:       SpO2: 100% 100% 100% 100%   Weight:       Height:           Cardiac Rhythm:     Was the PSS-3 completed:   Yes  What interventions are  required if any?               Family Comments: none  OBS brochure/video discussed/provided to patient/family: No  For the majority of the shift this patient's behavior was Green.   Behavioral interventions performed were none.    ED NURSE PHONE NUMBER: [phone]

## 2024-03-12 NOTE — PHARMACY-ADMISSION MEDICATION HISTORY
Pharmacist Admission Medication History    Admission medication history is complete. The information provided in this note is only as accurate as the sources available at the time of the update.    Information Source(s): Patient and CareEverywhere/SureScripts via in-person    Pertinent Information: patient a little confused. Completed list to best of ability     Changes made to PTA medication list:  Added: trazodone   Deleted: None  Changed: None    Allergies reviewed with patient and updates made in EHR: no    Medication History Completed By: Adilia Paz [facility] 3/12/2024 2:49 PM    PTA Med List   Medication Sig Last Dose    folic acid (FOLVITE) 1 MG tablet Take 1 tablet (1 mg) by mouth daily     furosemide (LASIX) 40 MG tablet Take 1 tablet (40 mg) by mouth 2 times daily Hold for systolic blood pressure less then 110. Monitor renal function closely.     lactulose (CHRONULAC) 10 GM/15ML solution Take 30 mLs (20 g) by mouth 2 times daily Goal 2 - 3 soft bowel movements per day.     magnesium oxide (MAG-OX) 400 MG tablet Take 1 tablet (400 mg) by mouth daily (Patient taking differently: Take 400 mg by mouth at bedtime)     menthol (ICY HOT) 5 % PTCH Apply 1 patch topically every 8 hours as needed for muscle soreness     multivitamin, therapeutic (THERA-VIT) TABS tablet Take 1 tablet by mouth daily     pantoprazole (PROTONIX) 40 MG EC tablet Take 1 tablet (40 mg) by mouth 2 times daily     spironolactone (ALDACTONE) 100 MG tablet Take 2 tablets (200 mg) by mouth daily Hold for systolic blood pressure less then 110. Monitor renal function closely.     sulfamethoxazole-trimethoprim (BACTRIM DS) 800-160 MG tablet Take 1 tablet by mouth daily     thiamine (B-1) 100 MG tablet Take 1 tablet (100 mg) by mouth daily     traZODone (DESYREL) 50 MG tablet Take 50 mg by mouth nightly as needed for sleep     Vitamin D, Cholecalciferol, 10 MCG (400 UNIT) TABS Take 1 tablet by mouth daily      Adilia Paz PharmD

## 2024-03-12 NOTE — ED PROVIDER NOTES
History     Chief Complaint:  Abnormal Labs     HPI   Crispin Ham is a 38 year old male with history of hypertension, alcohol abuse, liver failure with ascites, anemia, and septic shock who presents for evaluation of abnormal labs. The patient reports that he saw his primary provider yesterday for left leg cramping and routine blood work showed that he was anemic with a hemoglobin of 6.1. States that for a couple of days, he has felt fatigued, lightheaded, and nauseous. Notes that he fell yesterday after getting out of bed too quickly and also fell last week but denies any head trauma. Adds that he did have some alcoholic beverages last week. Reports that he has had blood transfusions in the past but states there is not a known source of bleeding. Denies chest pain, vomiting, abdominal pain, and black or tarry stools.     Independent Historian:   None - Patient Only    Review of External Notes:   I reviewed clinic note from yesterday when the patient was found to have hemoglobin of 6.1.     Medications:    Furosemide  Lactulose  Methocarbamol  Pantoprazole  Xifaxan  Spironolactone  Bactrim    Past Medical History:    Alcohol abuse  Hypertension  Substance abuse  Septic shock  Anemia  Liver failure  Spontaneous bacterial peritonitis   Ascites due to alcoholic hepatitis  TIKA  Depression   Obesity     Past Surgical History:    ORIF wrist, left     Physical Exam   Patient Vitals for the past 24 hrs:   BP Temp Temp src Pulse Resp SpO2 Height Weight   03/12/24 1400 120/66 -- -- 83 12 100 % -- --   03/12/24 1245 -- -- -- 86 12 100 % -- --   03/12/24 1230 117/66 -- -- 82 14 100 % -- --   03/12/24 1215 113/69 -- -- 94 16 100 % -- --   03/12/24 1200 118/60 -- -- 91 14 100 % -- --   03/12/24 1158 118/60 97.7  F (36.5  C) -- 97 14 -- -- --   03/12/24 1030 110/53 -- -- 90 12 100 % -- --   03/12/24 1015 100/51 -- -- 90 -- 100 % -- --   03/12/24 0959 (!) 100/28 97.7  F (36.5  C) Temporal 104 26 100 % 1.829 m (6') 88.5 kg  (195 lb)      Physical Exam  General: Resting on the bed.  Head: No obvious trauma to head.  Ears, Nose, Throat:  External ears normal.  Nose normal.    Eyes:  Conjunctival icterus.  Pupils are equal, round, and reactive.   Neck: Normal range of motion.  Neck supple.   CV: Regular rate and rhythm.  No murmurs.      Respiratory: Effort normal and breath sounds normal.  No wheezing or crackles.   Gastrointestinal: Soft.  No distension. There is no tenderness.  There is no rigidity, no rebound and no guarding.   Neuro: Alert. Moving all extremities appropriately.  Normal speech.    Skin: Skin is warm and dry.  No rash noted. Jaundiced.      Emergency Department Course   ECG  ECG taken at 1217, ECG read at 1221  Sinus rhythm   When compared to 9/25/23, no significant change was found.  Rate 84 bpm. NY interval 142 ms. QRS duration 98 ms. QT/QTc 382/451 ms. P-R-T axes 40 26 40.   Read by: Hansa Morales MD    Laboratory:  Labs Ordered and Resulted from Time of ED Arrival to Time of ED Departure   COMPREHENSIVE METABOLIC PANEL - Abnormal       Result Value    Sodium 121 (*)     Potassium 5.5 (*)     Carbon Dioxide (CO2) 16 (*)     Anion Gap 13      Urea Nitrogen 41.0 (*)     Creatinine 2.16 (*)     GFR Estimate 40 (*)     Calcium 9.7      Chloride 92 (*)     Glucose 109 (*)     Alkaline Phosphatase 223 (*)      (*)     ALT 56      Protein Total 7.8      Albumin 3.3 (*)     Bilirubin Total 9.0 (*)    CBC WITH PLATELETS AND DIFFERENTIAL - Abnormal    WBC Count 9.2      RBC Count 1.43 (*)     Hemoglobin 5.6 (*)     Hematocrit 15.7 (*)      (*)     MCH 39.2 (*)     MCHC 35.7      RDW 14.9      Platelet Count 132 (*)     % Neutrophils 77      % Lymphocytes 9      % Monocytes 11      % Eosinophils 1      % Basophils 0      % Immature Granulocytes 2      NRBCs per 100 WBC 0      Absolute Neutrophils 7.1      Absolute Lymphocytes 0.8      Absolute Monocytes 1.0      Absolute Eosinophils 0.1      Absolute  Basophils 0.0      Absolute Immature Granulocytes 0.2      Absolute NRBCs 0.0     ETHYL ALCOHOL LEVEL - Abnormal    Alcohol ethyl 0.07 (*)    INR - Abnormal    INR 1.94 (*)    MAGNESIUM - Normal    Magnesium 1.8     FRACTIONAL EXCRETION OF SODIUM    Creatinine Urine mg/dL 25.4      Sodium Urine mmol/L 106      %FENA 7.4     OCCULT BLOOD STOOL   TYPE AND SCREEN, ADULT    ABO/RH(D) A POS      Antibody Screen Negative      SPECIMEN EXPIRATION DATE [id number]     PREPARE RED BLOOD CELLS (UNIT)    Blood Component Type Red Blood Cells      Product Code C0149G57      Unit Status Transfused      Unit Number [id number]      CROSSMATCH Compatible      CODING SYSTEM [id number]      ISSUE DATE AND TIME [id number]      UNIT ABO/RH A+      UNIT TYPE ISBT 6200     PREPARE RED BLOOD CELLS (UNIT)   TRANSFUSE RED BLOOD CELLS (UNIT)   TRANSFUSE RED BLOOD CELLS (UNIT)   ABO/RH TYPE AND SCREEN   FRACTIONAL EXCRETION OF SODIUM      Emergency Department Course & Assessments:    Interventions:  Medications   lactulose (CHRONULAC) solution 20 g (has no administration in time range)   methocarbamol (ROBAXIN) tablet 500 mg (has no administration in time range)   pantoprazole (PROTONIX) EC tablet 40 mg (has no administration in time range)   rifaximin (XIFAXAN) tablet 550 mg (has no administration in time range)        Assessments:  1021 I obtained history and examined the patient as noted above.     Independent Interpretation (X-rays, CTs, rhythm strip):  None     Consultations/Discussion of Management or Tests:  ED Course as of 03/12/24 1449   Tue Mar 12, 2024   1129 Reassessed the patient.  Recommended strongly admission.  He agrees with admission at this time for acute on chronic kidney disease, hyponatremia, anemia.   1159 I spoke with Loyda Lewis for admission with Dr Chavira       Social Determinants of Health affecting care:   None    Disposition:  The patient was admitted to the hospital under the care of Dr. Chavira.      Impression & Plan    Medical Decision Makin-year-old male history of alcohol cirrhosis, anemia, CKD presents to the emergency department for abnormal labs.  Vitals are stable.  Broad differential was considered including but not limited to ACS, arrhythmia, anemia, electrolyte, metabolic, renal dysfunction, spontaneous bacterial peritonitis, ascites, dehydration, volume overload, etc.  CBC notable for hemoglobin 5.6.  Thrombocytopenia, no indication for transfusion at this time.  BMP notable for marked hyponatremia and mild hyperkalemia.  No EKG changes related to hyperkalemia.  Acute on chronic kidney dysfunction.  2 units of blood given will reassess potassium and kidney function following transfusions.  LFTs consistent with known cirrhosis and elevated bilirubin.  Patient unfortunately is intoxicated 0.07.  INR elevated related to cirrhosis and liver disease.  Patient declined rectal examination.  Reports ongoing history of acute on chronic kidney as well as anemia disease.  Plan to transfuse, additional sodium studies, admit to the hospitalist.      Diagnosis:    ICD-10-CM    1. Acute kidney injury (H24)  N17.9       2. Anemia, unspecified type  D64.9       3. Hyponatremia  E87.1       4. Alcoholic cirrhosis of liver without ascites (H)  K70.30          Scribe Disclosure:  Sarah VIGIL, am serving as a scribe at 10:35 AM on 3/12/2024 to document services personally performed by Hansa Morales MD based on my observations and the provider's statements to me.     3/12/2024   Hansa Morales MD Bennett, Jennifer L, MD  [phone]

## 2024-03-12 NOTE — PROGRESS NOTES
.RECEIVING UNIT ED HANDOFF REVIEW    ED Nurse Handoff Report was reviewed by: Hanna Clayton RN on March 12, 2024 at 3:55 PM

## 2024-03-12 NOTE — PLAN OF CARE
Goal Outcome Evaluation:       Summary: Weakness, fall x2, dizziness, Hemoglobin 5.6, ETOH, hyponatremia 121, GATITO  DATE & TIME: 3/12/24 2905-2326    Cognitive Concerns/ Orientation : A&O x4   BEHAVIOR & AGGRESSION TOOL COLOR: Green  CIWA SCORE: 0   ABNL VS/O2: VSS, RA  MOBILITY: SBA with cane/gait belt  PAIN MANAGMENT: c/o left hip/back pain, ice packs applied  DIET: Regular  BOWEL/BLADDER: continent, voided x1, no BM -need urine sample just ordered    ABNL LAB/BG: Hemoglobin 5.6- currently on 2nd unit of blood- order to recheck hemoglobin after blood completed,   -- recheck -MD notified- order to give liter NS & recheck NA 2300, CR 2.16, K 5.5, Albumin 3.3,   DRAIN/DEVICES: 2 pivs (2nd unit blood currently transfusing)   TELEMETRY RHYTHM: NSR  SKIN: SCATTERED BRUISING, REFUSED FULL SKIN ASSESSMENT  TESTS/PROCEDURES: NONE  D/C DAY/GOALS/PLACE: PENDING  OTHER IMPORTANT INFO: GI consult pending

## 2024-03-12 NOTE — H&P
[facility]  History and Physical - Hospitalist Service       Date of Admission:  3/12/2024  PRIMARY CARE PROVIDER:    Shey, Eduar Abbott    Assessment & Plan   Crispin Ham is a 38 year old male with PMH of alcohol abuse, chronic liver cirrhosis, hx SBP, substance abuse, HTN, chronic HFpEF, hx pleural effusion s/p b/l thoracentesis 11/2023 who presented to the ED on 3/12/24 for evaluation of abnormal labs.    Patient saw his PCP yesterday for LLE cramping x 1 month. His blood work came back with hgb 6.1 and he was referred to the ED. He states he has been feeling fatigued, lightheaded, and nauseous. He stood too quickly yesterday getting out of bed and fell 2/2 lightheadedness. He did not hit his head or lose consciousness. He denies any chest pain, dyspnea, hematemesis, melena, hematochezia. He gets nosebleeds every few days, somewhat exacerbated by his own manipulation, and bleeding can at times be prolonged. He has no active bleeding at this time. No fevers, chills, abdominal pain. He states he drinks about 1/2 pint of vodka daily, last drink yesterday afternoon.    In the ED, afebrile with  and /28. CBC with WBC 9.2, hgb 5.6, platelets 132. CMP with sodium 121, potassium 5.5, mag 1.8, bicarb 16, BUN 21, creatinine 2.16, alk phos 223, ALT 56, , total bili 9.0. Alcohol ethyl level 0.07. Two units PRBC ordered to be transfused in the ED.    Acute on chronic anemia  Coagulopathy of liver disease  History of duodenitis/ulcer (09/2023)  *Hgb 5.6, down from 6.9 on 2/21/24 previously running around 7.4-8. Platelets stable at 132.  *Endorses nosebleeds every 3 days or so which can be somewhat prolonged with his hx coagulopathy. No current active bleeding.  *Denies hematemesis, melena, hematochezia. He does have some nausea.  *Symptomatic with complaints of positional dizziness, fatigue. Declined rectal examination in the ED.  *Last EGD 9/2/723 with esophageal  mucosal tear with bleeding (complication of clip placement) hemostasis achieved with bipolar probe. Portal hypertensive gastropathy.  -[facility] consulted  -Check INR  -Continue PTA Protonix 40 mg BID  -Check hgb post transfusion and again in the AM, transfuse for hgb <7    Acute kidney injury  *Creatinine 2.16 on admission, was 0.59 on 2/21/24  -HOLD PTA Spironolactone, Lasix  -FENa pending but likely not accurate in the setting of diuretics  -Avoid nephrotoxic medications  -Repeat CMP tomorrow AM    Hyperkalemia  *K 5.5 on admission  -Hold PTA potassium chloride 40 mEq daily, spironolactone 200 mg daily  -Recheck tomorrow AM  -Continuous telemetry    Hyponatremia  *Na 121 on admission, was 130 on 2/21/24 but otherwise was running 124-129 in the fall. Felt to be somewhat volume depleted on examination.  -Holding PTA diuretics as mentioned above  -Check sodium level q6h    Severe alcoholic hepatitis  Severe hyperbilirubinemia from underlying liver disease  *Established with [facility]. Currently without significant ascites on examination.  *Alk phos 223, ALT 56, , total bili 9.0 (stable compared to 2/21/24)  -[facility] consulted  -Continue PTA PPI, Lactulose, Rifaximin, Bactrim (SBP prophylaxis)  -HOLD PTA Spironolactone, Lasix with GATITO    MELD 3.0: 34 at 3/12/2024 10:00 AM  MELD-Na: 33 at 3/12/2024 10:00 AM  Calculated from:  Serum Creatinine: 2.16 mg/dL at 3/12/2024 10:00 AM  Serum Sodium: 121 mmol/L (Using min of 125 mmol/L) at 3/12/2024 10:00 AM  Total Bilirubin: 9.0 mg/dL at 3/12/2024 10:00 AM  Serum Albumin: 3.3 g/dL at 3/12/2024 10:00 AM  INR(ratio): 1.94 at 3/12/2024 10:00 AM  Age at listing (hypothetical): 38 years  Sex: Male at 3/12/2024 10:00 AM      Chronic HFpEF  *Echo 11/16/23 with EF 65-70%, no RWMA or any significant valvular disease  *No current complaints of shortness of breath or increased LE edema  -Holding PTA Lasix as noted above  -Monitor I&O  -Daily weights    LLE cramping  *Venous US LLE on 2/21/24  was negative  *Xray left tibia and fibula 3/11/24 no acute fracture  *K 5.5, mag 1.8  -Supportive cares    Alcohol dependence  History of alcohol withdrawal syndrome  *Last drink yesterday afternoon. No hx of withdrawal seizures. He has been drinking about 1/2 pint of vodka per day.  -Monitor on CIWA protocol  -Multivitamin, folic acid, thiamine supplementation  -Alcohol cessation encouraged    Chronic hip and back pain  -Ice or heat as needed    Clinically Significant Risk Factors Present on Admission        # Hyperkalemia: Highest K = 5.5 mmol/L in last 2 days, will monitor as appropriate  # Hyponatremia: Lowest Na = 121 mmol/L in last 2 days, will monitor as appropriate   # Hypercalcemia: corrected calcium is >10.1, will monitor as appropriate    # Hypoalbuminemia: Lowest albumin = 3.3 g/dL at 3/12/2024 10:00 AM, will monitor as appropriate  # Coagulation Defect: INR = 1.94 (Ref range: 0.85 - 1.15) and/or PTT = N/A, will monitor for bleeding     # Acute Kidney Injury, unspecified: based on a >150% or 0.3 mg/dL increase in last creatinine compared to past 90 day average, will monitor renal function       # Overweight: Estimated body mass index is 26.45 kg/m  as calculated from the following:    Height as of this encounter: 1.829 m (6').    Weight as of this encounter: 88.5 kg (195 lb).                 Diet:  Regular  DVT Prophylaxis: Pneumatic Compression Devices  Bosch Catheter: Not present  Lines: None     Cardiac Monitoring: None  Code Status:  DNR/DNI, confirmed with patient in the presence of bedside nurse         Disposition Plan      Expected Discharge Date: 03/14/2024             Entered: Loyda Lewis PA-C 03/12/2024, 12:36 PM     The patient's care was discussed with the Attending Physician, Dr. Chavira and Patient.    Loyda Lewis PA-C  [facility]  Securely message with the Vocera Web Console (learn more  here)      ______________________________________________________________________    Chief Complaint   Abnormal labs    History is obtained from the patient and EMR.      History of Present Illness   Crispin Ham is a 38 year old male with PMH of alcohol abuse, chronic liver cirrhosis, hx SBP, substance abuse, HTN, chronic HFpEF, hx pleural effusion s/p b/l thoracentesis 11/2023 who presented to the ED on 3/12/24 for evaluation of abnormal labs.    Patient saw his PCP yesterday for LLE cramping x 1 month. His blood work came back with hgb 6.1 and he was referred to the ED. He states he has been feeling fatigued, lightheaded, and nauseous. He stood too quickly yesterday getting out of bed and fell 2/2 lightheadedness. He did not hit his head or lose consciousness. He denies any chest pain, dyspnea, hematemesis, melena, hematochezia. He gets nosebleeds every few days, somewhat exacerbated by his own manipulation, and bleeding can at times be prolonged. He has no active bleeding at this time. No fevers, chills, abdominal pain. He states he drinks about 1/2 pint of vodka daily, last drink yesterday afternoon.    In the ED, afebrile with  and /28. CBC with WBC 9.2, hgb 5.6, platelets 132. CMP with sodium 121, potassium 5.5, mag 1.8, bicarb 16, BUN 21, creatinine 2.16, alk phos 223, ALT 56, , total bili 9.0. Alcohol ethyl level 0.07. Two units PRBC ordered to be transfused in the ED.    Past Medical History    I have reviewed this patient's medical history and updated it with pertinent information if needed.   Past Medical History:   Diagnosis Date    Alcohol abuse     Hypertension     Substance abuse (H)        Prior to Admission Medications   Prior to Admission Medications   Prescriptions Last Dose Informant Patient Reported? Taking?   Vitamin D, Cholecalciferol, 10 MCG (400 UNIT) TABS   Yes No   Sig: Take 1 tablet by mouth daily   folic acid (FOLVITE) 1 MG tablet   No No   Sig: Take 1 tablet (1  mg) by mouth daily   furosemide (LASIX) 40 MG tablet   No No   Sig: Take 1 tablet (40 mg) by mouth 2 times daily Hold for systolic blood pressure less then 110. Monitor renal function closely.   lactulose (CHRONULAC) 10 GM/15ML solution   No No   Sig: Take 30 mLs (20 g) by mouth 2 times daily Goal 2 - 3 soft bowel movements per day.   magnesium oxide (MAG-OX) 400 MG tablet   No No   Sig: Take 1 tablet (400 mg) by mouth daily   methocarbamol (ROBAXIN) 500 MG tablet   No No   Sig: Take 1 tablet (500 mg) by mouth 2 times daily as needed for muscle spasms   multivitamin, therapeutic (THERA-VIT) TABS tablet   Yes No   Sig: Take 1 tablet by mouth daily   pantoprazole (PROTONIX) 40 MG EC tablet   No No   Sig: Take 1 tablet (40 mg) by mouth 2 times daily   potassium chloride (KLOR-CON) 20 MEQ packet   No No   Sig: Take 40 mEq by mouth daily   rifaximin (XIFAXAN) 550 MG TABS tablet   No No   Sig: Take 1 tablet (550 mg) by mouth 2 times daily   spironolactone (ALDACTONE) 100 MG tablet   No No   Sig: Take 2 tablets (200 mg) by mouth daily Hold for systolic blood pressure less then 110. Monitor renal function closely.   sulfamethoxazole-trimethoprim (BACTRIM DS) 800-160 MG tablet   No No   Sig: Take 1 tablet by mouth daily   thiamine (B-1) 100 MG tablet   No No   Sig: Take 1 tablet (100 mg) by mouth daily      Facility-Administered Medications: None     Allergies   Allergies   Allergen Reactions    Excedrin Extra Strength [Aspirin-Acetaminophen-Caffeine]      puffy eyes and dry throat a few years ago. Tolerates ibuprofen and acetaminophen, but avoids aspirin       Physical Exam   Vital Signs: Temp: 97.7  F (36.5  C) Temp src: Temporal BP: 113/69 Pulse: 94   Resp: 16 SpO2: 100 % O2 Device: None (Room air)    Weight: 195 lbs 0 oz    Constitutional: Awake, alert, cooperative, no apparent distress.    ENT: Normocephalic, without obvious abnormality, atraumatic. Eye pupils are equal. Normal sclera.    Neck: Supple, symmetrical,  trachea midline  Pulmonary: No increased work of breathing, good air exchange, clear to auscultation bilaterally  Cardiovascular: Regular rate and rhythm  GI: Normal bowel sounds, soft, non-distended, non-tender.   Skin: Visualized skin appeared clear.  Neuro: Oriented x 3. Moves all extremities spontaneously. No focal neuro deficits.  Psych:  Normal affect and mood  Extremities: Normal muscle tone. No gross deformities noted. Calves non-tender b/l. No pitting edema b/l LE    Medical Decision Making       75 MINUTES SPENT BY ME on the date of service doing chart review, history, exam, documentation & further activities per the note.         Data   Data reviewed today: I reviewed all medications, new labs and imaging results over the last 24 hours. I personally reviewed no images or EKG's today.      I have personally reviewed the following data over the past 24 hrs:    9.2  \   5.6 (LL)   / 132 (L)     121 (L) 92 (L) 41.0 (H) /  109 (H)   5.5 (H) 16 (L) 2.16 (H) \     ALT: 56 AST: 122 (H) AP: 223 (H) TBILI: 9.0 (H)   ALB: 3.3 (L) TOT PROTEIN: 7.8 LIPASE: N/A     INR:  1.94 (H) PTT:  N/A   D-dimer:  N/A Fibrinogen:  N/A       Imaging results reviewed over the past 24 hrs:   No results found for this or any previous visit (from the past 24 hour(s)).

## 2024-03-13 NOTE — SIGNIFICANT EVENT
Significant Event Note    Time of event: 6:21 PM March 12, 2024    Description of event:  I was notified of the following lab results :    Na @ 10:00: 121  Na @ 17:45: 117    Patient did not receive any fluids at that time.     Plan:  --1L of IV fluids.  --Recheck Na in 4 hours.    Discussed with: bedside nurse    Angeli Nevarez MD    ----------------------------------------------------------------  Addendum:    After 1L of NS.    Na @ 22:59: 122    Plan:  --Na goal tomorrow at 6 PM is 125 - 129  --Continue low dose IV fluids.  --Recheck Na in 6 hours.     no

## 2024-03-13 NOTE — CONSULTS
GASTROENTEROLOGY CONSULTATION      Crispin Ham  [address]  [facility] [address]  38 year old male     Admission Date/Time: 3/12/2024  Primary Care Provider: Shey, Eduar Abbott     We were asked to see the patient in consultation by Dr. Chavira for evaluation of liver disease.    ASSESSMENT:    1.  Acute alcoholic hepatitis with underlying likely chronic alcoholic cirrhosis-decompensated.  Complications:  A.  Ascites-patient is maintained on spironolactone 3 mg daily, furosemide 80 mg daily.  History of SBP in the past, he is maintained on Bactrim for prophylaxis.  We will pursue ultrasound to evaluate for any residual ascites.  B.  Varices-no varices on endoscopy in 2023, we will repeat endoscopy, given anemia.  C.  Encephalopathy-currently no encephalopathy, maintained on lactulose and rifaximin twice daily.    MELD 3.0: 30 at 3/13/2024 11:11 AM  MELD-Na: 32 at 3/13/2024 11:11 AM  Calculated from:  Serum Creatinine: 1.21 mg/dL at 3/13/2024  7:26 AM  Serum Sodium: 122 mmol/L (Using min of 125 mmol/L) at 3/13/2024 11:11 AM  Total Bilirubin: 11.8 mg/dL at 3/13/2024  7:26 AM  Serum Albumin: 3.0 g/dL at 3/13/2024  7:26 AM  INR(ratio): 2.13 at 3/13/2024  7:26 AM  Age at listing (hypothetical): 38 years  Sex: Male at 3/13/2024 11:11 AM      2.  Acute on chronic anemia-suspect this is secondary to his significant, severe epistaxis which was chronic for up to 2 months, has improved as of late, along with component of marrow suppression from his alcohol use.  Certainly portal hypertensive gastropathy could be contributing.  The Hemoccult positive test would have a high positivity rate in a patient with end-stage liver disease with a history of epistaxis, thus uncertain significance.    3.  Hyponatremia, mild hyperkalemia and mild renal insufficiency-management per primary service    RECOMMENDATIONS:  -Abdominal ultrasound to assess for ascites, consider nephrology consult.  - N.p.o. at midnight for  upper endoscopy tomorrow  - Monitor MELD labs  - If ongoing electrolyte abnormalities and renal insufficiency, consider nephrology consult  - Monitor hemoglobin, transfuse necessary    Rory Baldwin MD   [facility]  [phone]      ________________________________________________________________________        HPI:  Crispin Ham is a 38 year old male who has a history of acute alcohol hepatitis with likely chronic alcoholic cirrhosis.  He was last hospitalized 2023 and last seen in our liver clinic 2023.  His liver disease is complicated by ascites with SBP in the past, on prophylaxis with Bactrim and spironolactone 200 mg a day/furosemide 80 mg daily.  Also, hepatic encephalopathy, maintained on lactulose twice daily and rifaximin twice a day.    He has had ongoing intermittent alcohol abuse.  At times he will drink half liter, more recently it has been a half a pint and his last alcohol use was 2024.  His brother  in January of alcohol disease.  Of note, he does have a history of HFpEF and pleural effusion status post bilateral thoracentesis 2023.  He was seen in his primary care provider and hemoglobin came back at 7.1.  He has had fatigue lightheadedness and mild nausea.  He denies any abdominal distention.  He previously had lower extremity edema, this is improved.  He has had significant epistaxis for a couple of months he was having significant bleeding daily from his nose.  He does report in the last week this has improved.  He denies any melena or hematochezia.    Endoscopy  - Upper endoscopy, 2023: There was a 4 mm polyp removed from the esophagus clip placed for hemostasis.  There is a small less than 5 mm mucosal tear with mild oozing seen in the distal esophagus after application of the clip, this was likely from the clip and retching during procedure.  Hemostasis with bipolar probe.  Moderate portal hypertensive gastropathy.   Duodenum normal.    Imaging  - Abdominal ultrasound, November 2023: Hepatic steatosis, mild splenomegaly, some gallbladder wall thickening this is nonspecific in the presence of ascites.  - Last paracentesis of record was November 17, 2023 this was diagnostic only.     ROS: A comprehensive ten point review of systems was negative aside from those in mentioned in the HPI.      PAST MEDICAL HISTORY:  Past Medical History:   Diagnosis Date    Alcohol abuse     Hypertension     Substance abuse (H)      PAST SURGICAL HISTORY:  Past Surgical History:   Procedure Laterality Date    ORTHOPEDIC SURGERY       SOCIAL HISTORY:  Social History     Tobacco Use    Smoking status: Never   Substance Use Topics    Alcohol use: Yes     Comment: 1.75L per day    Drug use: Not Currently     FAMILY HISTORY:  No family history on file.  ALLERGIES:   Allergies   Allergen Reactions    Excedrin Extra Strength [Aspirin-Acetaminophen-Caffeine]      puffy eyes and dry throat a few years ago. Tolerates ibuprofen and acetaminophen, but avoids aspirin     MEDICATIONS:   Prior to Admission medications    Medication Sig Start Date End Date Taking? Authorizing Provider   folic acid (FOLVITE) 1 MG tablet Take 1 tablet (1 mg) by mouth daily 11/25/23  Yes Roque Hector MD   furosemide (LASIX) 40 MG tablet Take 1 tablet (40 mg) by mouth 2 times daily Hold for systolic blood pressure less then 110. Monitor renal function closely. 11/24/23  Yes Roque Hector MD   lactulose (CHRONULAC) 10 GM/15ML solution Take 30 mLs (20 g) by mouth 2 times daily Goal 2 - 3 soft bowel movements per day. 11/24/23  Yes Roque Hector MD   magnesium oxide (MAG-OX) 400 MG tablet Take 1 tablet (400 mg) by mouth daily  Patient taking differently: Take 400 mg by mouth at bedtime 11/24/23  Yes Roque Hector MD   menthol (ICY HOT) 5 % PTCH Apply 1 patch topically every 8 hours as needed for muscle soreness   Yes Unknown, Entered By History   multivitamin,  therapeutic (THERA-VIT) TABS tablet Take 1 tablet by mouth daily   Yes Unknown, Entered By History   pantoprazole (PROTONIX) 40 MG EC tablet Take 1 tablet (40 mg) by mouth 2 times daily 11/24/23  Yes Roque Hector MD   spironolactone (ALDACTONE) 100 MG tablet Take 2 tablets (200 mg) by mouth daily Hold for systolic blood pressure less then 110. Monitor renal function closely. 11/25/23  Yes Roque Hector MD   sulfamethoxazole-trimethoprim (BACTRIM DS) 800-160 MG tablet Take 1 tablet by mouth daily 11/25/23  Yes Roque Hector MD   thiamine (B-1) 100 MG tablet Take 1 tablet (100 mg) by mouth daily 11/24/23  Yes Roque Hector MD   traZODone (DESYREL) 50 MG tablet Take 50 mg by mouth nightly as needed for sleep   Yes Unknown, Entered By History   Vitamin D, Cholecalciferol, 10 MCG (400 UNIT) TABS Take 1 tablet by mouth daily   Yes Unknown, Entered By History   rifaximin (XIFAXAN) 550 MG TABS tablet Take 1 tablet (550 mg) by mouth 2 times daily  Patient not taking: Reported on 3/12/2024 11/24/23   Roque Hector MD     PHYSICAL EXAM:   /61   Pulse 98   Temp 97.6  F (36.4  C) (Oral)   Resp 18   Ht 1.829 m (6')   Wt 88.5 kg (195 lb)   SpO2 100%   BMI 26.45 kg/m     GEN: Alert, NAD.  ABIDA: AT, icteric, OP without erythema, exudate, or ulcers.    LYMPH: No LAD noted.  HRT: RRR, no ROSIBEL  LUNGS: CTA  ABD: +BS, non-tender, non-distended, no rebound or guarding.  SKIN: No rash,  + jaundice   NEURO: MS intact       ADDITIONAL DATA:   I reviewed the patient's new clinical lab test results.   Recent Labs   Lab Test 03/13/24  1111 03/13/24  0726 03/12/24  2259 03/12/24  1000 02/21/24  1257   WBC 7.0  --   --  9.2 8.1   HGB 6.8*  --  6.2* 5.6* 6.9*   MCV 99  --   --  110* 113*   PLT 88*  --   --  132* 116*   INR  --  2.13*  --  1.94* 1.91*     Recent Labs   Lab Test 03/13/24  1111 03/13/24  0726 03/13/24  0352 03/12/24  1745 03/12/24  1000 02/21/24  1257   * 117* 118*   < > 121* 130*    POTASSIUM  --  5.7*  --   --  5.5* 4.6   CHLORIDE  --  94*  --   --  92* 99   CO2  --  14*  --   --  16* 21*   BUN  --  34.9*  --   --  41.0* 13.1   CR  --  1.21*  --   --  2.16* 0.59*   ANIONGAP  --  9  --   --  13 10   MIGUELINA  --  9.5  --   --  9.7 9.7   GLC  --  92  --   --  109* 112*    < > = values in this interval not displayed.     Recent Labs   Lab Test 03/13/24  0726 03/12/24  1000 02/21/24  1257 11/17/23  0536 11/16/23  1514 09/26/23  0724 09/25/23  1646 09/25/23  1537 09/25/23  1424 09/09/23  1119 09/08/23  1224 09/08/23  1147   ALBUMIN 3.0* 3.3* 3.4*   < > 3.1*   < >  --   --  2.4*   < >  --  2.3*   BILITOTAL 11.8* 9.0* 12.5*   < > 8.4*   < >  --   --  21.7*   < >  --  10.4*   ALT 44 56 52   < > 49   < >  --   --  69   < >  --  82*   * 122* 201*   < > 222*   < >  --    < >  --    < >  --  331*   PROTEIN  --   --   --   --  100*  --  30*  --   --   --  50*  --    LIPASE  --   --   --   --   --   --   --   --  237*  --   --  272*    < > = values in this interval not displayed.        I reviewed the patient's new imaging results - none

## 2024-03-13 NOTE — CONSULTS
CLINICAL NUTRITION SERVICES  -  ASSESSMENT NOTE    Recommendations Ordered by Registered Dietitian (RD):   - continue MVI/M, folic acid, thiamine  - Encourage adequate oral intake, RDN ordering ensure BID to help increase intake     Future Recommendations/Considerations:   Very concerned about pt's ability to consume adequate nutrition when he leaves the hospital. Given frequent hospital admissions, reported nausea and feelings of fullness soon after eating, poor intake, and consistently down trending wt. Would highly consider tube feeding if within pt's GOC/POC.      MALNUTRITION:  % Weight Loss:  > 5% in 1 month -- 14% loss in the past month, 30% loss over the past 5 months  % Intake:  <75% for >/= 3 months   Subcutaneous Fat Loss:  Orbital region mild depletion  Muscle Loss:  Temporal region mild depletion, Clavicle bone region mild depletion, and Acromion bone region mild depletion  Fluid Retention:  +1 trace BLE edema    Malnutrition Diagnosis: Severe malnutrition  In Context of:  Acute illness or injury  Chronic illness or disease      REASON FOR ASSESSMENT  Crispin Ham is a 38 year old male seen by Registered Dietitian for +MST    Crispin Ham admitted for evaluation of abnormal labs   Found to have GATITO, anemia, hyponatremia.     PMH: hypertension, alcohol abuse, liver failure with ascites, anemia, and septic shock      Per chart review, pt known to [facility] RD services. Recently assessed on 11/22/23. Pt met moderate malnutrition criteria d/t <75% intakes >= 3 months, 5% wt loss in 2 months. Has tried Ensure Max oral protein supplement in the past     NUTRITION HISTORY  Information obtained from pt.   - MD noted pt he has felt fatigued, lightheaded, and nauseous a couple days PTA. It is also noted the pt drinks 1/2 pint vodka per day.   - Pt stated he typically has a couple small meals per day. He can only handle a small volume of food before he starts to feel full, he experiences some nausea when this  "happens.   - He will have one ensure every couple of days, he expressed concerns about cost of ensures.     CURRENT NUTRITION ORDERS  Diet Order:  Regular     Current Intake/Tolerance:   - Per Health [facility]: dinner ordered, breakfast ordered today   - Per Flowsheet: 100% dinner yesterday, 100% intake of breakfast today   - Per pt report: pt states nausea has improved since being in the hospital. He states his appetite is good, although he is still feels full after a couple bites of food.   - RDN d/w pt the ascites can be attributing to his feelings of fullness. Discussed small frequent meals, sipping on calorie containing liquids to increase intake. Also suggested ONS which pt was agreeable to.   - Also discussed alternatives to Ensure including using protein powders and mixing it with milk or ice cream.     Food Allergies/Intolerances: NKFA    Labs: reviewed; Na 117 (L), K 5.7 (H)    Medications: reviewed; folic acid, lasix, lactulose, mag ox, MVI/M, pot chloride, spironolactone, thiamine, IVF    Skin: +1 trace BLE edema  - jaundice noted    I/Os: no BM noted      ANTHROPOMETRICS  Height: 6' 0\"  Weight: 195 lbs 0 oz  Body mass index is 26.45 kg/m .  IBW: 178 lbs  %IBW: 111%  Weight History:   Wt Readings from Last 10 Encounters:   03/12/24 88.5 kg (195 lb)   02/21/24 108.9 kg (240 lb)   11/24/23 112.9 kg (248 lb 12.8 oz)   10/30/23 127 kg (280 lb)   10/04/23 126 kg (277 lb 12.8 oz)   09/11/23 121.9 kg (268 lb 11.9 oz)   03/11/22 98.4 kg (217 lb)    - reported UBW of 280 lbs  - per CE: 1/8/24 - 236 lbs, 2/13 - 227 lbs. In one month pt has lost 14% of weight which is clinically significant, some losses could be r/t fluid shifts. Wts appear to be down trending since October 2023. Since October pt has lost ~30% of his wt (5 months).       ASSESSED NUTRITION NEEDS PER APPROVED PRACTICE GUIDELINES:  Dosing Weight 88.5 kg (3/12 wt)   Estimated Energy Needs: 0657-7448 kcals (30-35 Kcal/Kg)  Justification: maintenance, " "cirrhosis   Estimated Protein Needs: 106-133 grams protein (1.2-1.5 g pro/Kg)  Justification: maintenance  Estimated Fluid Needs: 1 mL/Kcal  Justification: maintenance OR per provider pending fluid status      NUTRITION DIAGNOSIS:  Malnutrition related to poor appetite, poor PO intake d/t ongoing alcohol use as evidenced by 14% wt loss over past month, <75% intakes suspected \"for a while\", mild muscle losses.     NUTRITION INTERVENTIONS  Recommendations / Nutrition Prescription  See above.     Implementation  Nutrition education: small frequent meals, ONS  Medical Food Supplement      Nutrition Goals  Consume % of meals and/or supplements TID      MONITORING AND EVALUATION:  Progress towards goals will be monitored and evaluated per protocol and Practice Guidelines      Elsa Puente, MS, RD, LD  Clinical Dietitian  Pager: [phone]          "

## 2024-03-13 NOTE — PROGRESS NOTES
[facility]    Medicine Progress Note - Hospitalist Service    Date of Admission:  3/12/2024    Assessment & Plan   Crispin Ham is a 38 year old male with PMH of alcohol abuse, chronic liver cirrhosis, hx SBP, substance abuse, HTN, chronic HFpEF, hx pleural effusion s/p b/l thoracentesis 11/2023 who presented to the ED on 3/12/24 for evaluation of abnormal labs.     Patient saw his PCP yesterday for LLE cramping x 1 month. His blood work came back with hgb 6.1 and he was referred to the ED. He states he has been feeling fatigued, lightheaded, and nauseous. He stood too quickly yesterday getting out of bed and fell 2/2 lightheadedness. He did not hit his head or lose consciousness. He denies any chest pain, dyspnea, hematemesis, melena, hematochezia. He gets nosebleeds every few days, somewhat exacerbated by his own manipulation, and bleeding can at times be prolonged. He has no active bleeding at this time. No fevers, chills, abdominal pain. He states he drinks about 1/2 pint of vodka daily, last drink yesterday afternoon.     In the ED, afebrile with  and /28. CBC with WBC 9.2, hgb 5.6, platelets 132. CMP with sodium 121, potassium 5.5, mag 1.8, bicarb 16, BUN 21, creatinine 2.16, alk phos 223, ALT 56, , total bili 9.0. Alcohol ethyl level 0.07. Two units PRBC ordered to be transfused in the ED.     Acute on chronic anemia  Coagulopathy of liver disease  History of duodenitis/ulcer (09/2023)  *Hgb 5.6, down from 6.9 on 2/21/24 previously running around 7.4-8. Platelets stable at 132.  *Endorses nosebleeds every 3 days or so which can be somewhat prolonged with his hx coagulopathy. No current active bleeding.  *Denies hematemesis, melena, hematochezia. He does have some nausea.  *Symptomatic with complaints of positional dizziness, fatigue. Declined rectal examination in the ED.  *Last EGD 9/2/723 with esophageal mucosal tear with bleeding (complication of clip  placement) hemostasis achieved with bipolar probe. Portal hypertensive gastropathy.  *3/12-3/13 transfused 4U PRBCs  *hemoccult positive, but this could also be due to recent epistaxis  -[facility] appreciated  - trend hgb after each unit and again in AM  -Continue PTA Protonix 40 mg BID  -conditional transfusion orders in place     Acute kidney injury  *Creatinine 2.16 on admission, was 0.59 on 2/21/24  - Cr improved to 1.21 on 3/13, continue to trend  -HOLD PTA Spironolactone, Lasix     Hyperkalemia  *K 5.5 on admission  -Hold PTA potassium chloride 40 mEq daily, spironolactone 200 mg daily  -monitor     Hyponatremia  *Na 121 on admission, was 130 on 2/21/24 but otherwise was running 124-129 in the fall. Felt to be somewhat volume depleted on examination.  -Holding PTA diuretics as mentioned above, received 1L IVF overnight 3/12  -Check sodium level q6h  -no further fluids at this time     Severe alcoholic hepatitis  Severe hyperbilirubinemia from underlying liver disease  *Established with [facility]. Currently without significant ascites on examination.  *Alk phos 223, ALT 56, , total bili 9.0 (stable compared to 2/21/24)  *3/13 Abd US: negative for ascites  - [facility] appreciated  - NPO at [facility] for upper endoscopy 3/14  - Continue PTA PPI, Lactulose, Rifaximin, Bactrim (SBP prophylaxis)  - HOLD PTA Spironolactone, Lasix with GATITO     Chronic HFpEF  *Echo 11/16/23 with EF 65-70%, no RWMA or any significant valvular disease  *No current complaints of shortness of breath or increased LE edema  -Holding PTA Lasix as noted above  -Monitor I&O  -Daily weights     LLE cramping  *Venous US LLE on 2/21/24 was negative  *Xray left tibia and fibula 3/11/24 no acute fracture  *K 5.5, mag 1.8  -Supportive cares     Alcohol dependence  History of alcohol withdrawal syndrome  *Last drink 3/11 afternoon. No hx of withdrawal seizures. He has been drinking about 1/2 pint of vodka per day.  -Monitor on [facility] protocol  -Multivitamin, folic  acid, thiamine supplementation  -Alcohol cessation encouraged     Chronic hip and back pain  -Ice or heat as needed    Severe malnutrition  Appreciate nutrition, recommendation for tube feeding  -will discuss with patient on 3/14          Diet: Regular Diet Adult  Snacks/Supplements Adult: Ensure Enlive; Between Meals    DVT Prophylaxis: Pneumatic Compression Devices  Bosch Catheter: Not present  Lines: None     Cardiac Monitoring: None  Code Status: No CPR- Do NOT Intubate      Clinically Significant Risk Factors        # Hyperkalemia: Highest K = 5.7 mmol/L in last 2 days, will monitor as appropriate  # Hyponatremia: Lowest Na = 117 mmol/L in last 2 days, will monitor as appropriate   # Hypercalcemia: corrected calcium is >10.1, will monitor as appropriate    # Hypoalbuminemia: Lowest albumin = 3 g/dL at 3/13/2024  7:26 AM, will monitor as appropriate    # Coagulation Defect: INR = 2.13 (Ref range: 0.85 - 1.15) and/or PTT = N/A, will monitor for bleeding  # Thrombocytopenia: Lowest platelets = 88 in last 2 days, will monitor for bleeding          # Overweight: Estimated body mass index is 26.45 kg/m  as calculated from the following:    Height as of this encounter: 1.829 m (6').    Weight as of this encounter: 88.5 kg (195 lb)., PRESENT ON ADMISSION  # Severe Malnutrition: based on nutrition assessment, PRESENT ON ADMISSION          Disposition Plan      Expected Discharge Date: 03/19/2024                    Mariana Chavira DO  Hospitalist Service  [facility]  Securely message with [employer] (more info)  Text page via [facility] Paging/Directory   ______________________________________________________________________    Interval History   Patient seen and examined. He is doing okay. Concerned about his FMLA most of all. Discussed his ongoing low hgb and need for transfusions. Denies any recent bleeding from nose or otherwise.  Did not sleep well due to blood transfusions and blood draws. Denies  feeling abdominal fullness.    Physical Exam   Vital Signs: Temp: 97.7  F (36.5  C) Temp src: Oral BP: 108/54 Pulse: 100   Resp: 18 SpO2: 100 % O2 Device: None (Room air)    Weight: 195 lbs 0 oz    Constitutional: Awake, alert, cooperative, no apparent distress, +jaundice with icteric sclera  Respiratory: Clear to auscultation bilaterally, no crackles or wheezing  Cardiovascular: Regular rate and rhythm, normal S1 and S2, and no murmur noted  GI: Normal bowel sounds, soft, non-distended, non-tender  Skin/Integumen: No rashes, no cyanosis, no edema  Other:      Medical Decision Making       45 MINUTES SPENT BY ME on the date of service doing chart review, history, exam, documentation & further activities per the note.      Data     I have personally reviewed the following data over the past 24 hrs:    7.0  \   6.8 (LL)   / 88 (L)     122 (L) 94 (L) 34.9 (H) /  92   5.7 (H) 14 (L) 1.21 (H) \     ALT: 44 AST: 105 (H) AP: 174 (H) TBILI: 11.8 (H)   ALB: 3.0 (L) TOT PROTEIN: 7.0 LIPASE: N/A     INR:  2.13 (H) PTT:  N/A   D-dimer:  N/A Fibrinogen:  N/A       Imaging results reviewed over the past 24 hrs:   Recent Results (from the past 24 hour(s))   US Abdomen Limited    Narrative    US ABDOMEN LIMITED 3/13/2024 4:05 PM    CLINICAL HISTORY: assess for ascites  TECHNIQUE: Limited abdominal ultrasound.    COMPARISON: Paracentesis 11/17/2023      Impression    IMPRESSION:    Targeted ultrasound scanning of the 4 abdominal quadrants was  performed demonstrating no ascites.    SHARON DUFFY MD         SYSTEM ID:  [id number]

## 2024-03-13 NOTE — PLAN OF CARE
Goal Outcome Evaluation:       Summary: Weakness, fall x2, dizziness, Hemoglobin 5.6, ETOH, hyponatremia 121, GATITO  DATE & TIME: 3/13/24 7495-2218   Cognitive Concerns/ Orientation : A&O x4   BEHAVIOR & AGGRESSION TOOL COLOR: Green  CIWA SCORE: 3, 3, 3   ABNL VS/O2: VSS, RA  MOBILITY: sba with cane/gait belt/ up in recliner chair, denies dizziness/lightheadedness   PAIN MANAGMENT: c/o left hip/back pain, ice packs applied, PRN muscle relaxant given this am   DIET: Regular- good appetite, no nausea, NPO after midnight   BOWEL/BLADDER: continent, using urinal/bathroom, BM x1 dark brown loose stool  ABNL LAB/BG: Hemoglobin 6.8-4th blood transfusion given this afternoon, - recheck both levels at 1800 -hemoglobin after blood transfusion 7.4.  Bili 11.8, CR 1.21, K 5.7  DRAIN/DEVICES: 2 PIVs SL  TELEMETRY RHYTHM: NSR  SKIN: SCATTERED BRUISING, REFUSED FULL SKIN ASSESSMENT  TESTS/PROCEDURES: None, EGD tomorrow, US abdomen to check for ascites negative  D/C DAY/GOALS/PLACE: pending improvement, lab stabilization   OTHER IMPORTANT INFO: GI Consult, patient requesting CD consult.

## 2024-03-13 NOTE — PLAN OF CARE
Goal Outcome Evaluation:       Summary: Weakness, fall x2, dizziness, Hemoglobin 5.6, ETOH, hyponatremia 121, GATITO  DATE & TIME: 3/12/24 [id number]   Cognitive Concerns/ Orientation : A&O x4   BEHAVIOR & AGGRESSION TOOL COLOR: Green  CIWA SCORE: 0,0,0   ABNL VS/O2: vss, RA  MOBILITY: sba with cane/gait belt  PAIN MANAGMENT: c/o left hip/back pain, ice packs applied  DIET: Regular  BOWEL/BLADDER: continent, voided x1  ABNL LAB/BG: Hemoglobin 6.2- after 2 units transfused, another 1 unit transferred overnight ,118-every 6 hours checks, CR 2.16, K 5.5, Albumin 3.3, , Positive  occult blood  DRAIN/DEVICES: 2 PIVs  TELEMETRY RHYTHM: NSR  SKIN: SCATTERED BRUISING, REFUSED FULL SKIN ASSESSMENT  TESTS/PROCEDURES: NONE  D/C DAY/GOALS/PLACE: PENDING  OTHER IMPORTANT INFO: GI Consult

## 2024-03-14 NOTE — PROGRESS NOTES
GASTROENTEROLOGY PROGRESS NOTE     IMPRESSION:  1.  Acute alcoholic hepatitis with underlying likely chronic alcoholic cirrhosis-decompensated.  Complications:  A.  Ascites-patient is maintained on spironolactone 200 mg daily, furosemide 80 mg daily.  History of SBP in the past, he is maintained on Bactrim for prophylaxis.  Ultrasound negative for ascites  B.  Varices-no varices on endoscopy in 2023, we will repeat endoscopy, given anemia.  C.  Encephalopathy-currently no encephalopathy, maintained on lactulose and rifaximin twice daily.    MELD 3.0: 31 at 3/14/2024 12:49 PM  MELD-Na: 32 at 3/14/2024 12:49 PM  Calculated from:  Serum Creatinine: 1.44 mg/dL at 3/14/2024  7:38 AM  Serum Sodium: 119 mmol/L (Using min of 125 mmol/L) at 3/14/2024 12:49 PM  Total Bilirubin: 8.7 mg/dL at 3/14/2024  7:38 AM  Serum Albumin: 3.0 g/dL at 3/14/2024  7:38 AM  INR(ratio): 2.24 at 3/14/2024  7:38 AM  Age at listing (hypothetical): 38 years  Sex: Male at 3/14/2024 12:49 PM    2. Acute on chronic anemia-suspect this is secondary to his significant, severe epistaxis which was chronic for up to 2 months, has improved as of late, along with component of marrow suppression from his alcohol use. Certainly portal hypertensive gastropathy could be contributing. The Hemoccult positive test would have a high positivity rate in a patient with end-stage liver disease with a history of epistaxis, thus uncertain significance.   - High potassium today, EGD had to be deferred until tomorrow    3. Hyponatremia, hyperkalemia - mgmt per primary service    RECOMMENDATIONS:  - NPO at MN  - EGD in AM    Rory Baldwin MD  [facility]  [phone]      ________________________________________________________________________      SUBJECTIVE:  Patient denies complaints. No reported GI bleeding.       OBJECTIVE:  BP 91/45 (BP Location: Left arm)   Pulse 85   Temp 97.3  F (36.3  C) (Oral)   Resp 16   Ht 1.829 m (6')   Wt 89.4 kg (197 lb  3.2 oz)   SpO2 99%   BMI 26.75 kg/m    Temp (24hrs), Av.6  F (36.4  C), Min:97.3  F (36.3  C), Max:98  F (36.7  C)    Patient Vitals for the past 72 hrs:   Weight   24 0633 89.4 kg (197 lb 3.2 oz)   24 0959 88.5 kg (195 lb)        PHYSICAL EXAM  GEN: Alert, NAD.    HRT: reg  LUNGS: CTA  ABD: +BS, non-tender, non-distended, no rebound or guarding.    Additional Data:  I have reviewed the patient's new clinical lab results:     Recent Labs   Lab Test 24  0738 24  1805 24  1111 24  0726 24  2259 24  1000   WBC 7.7  --  7.0  --   --  9.2   HGB 7.3* 7.4* 6.8*  --    < > 5.6*   MCV 96  --  99  --   --  110*   PLT 85*  --  88*  --   --  132*   INR 2.24*  --   --  2.13*  --  1.94*    < > = values in this interval not displayed.     Recent Labs   Lab Test 24  1249 24  1043 24  0947 24  0738 24  0045 24  1805 24  1111 24  0726 24  1745 24  1000   *  --   --  119* 118* 118*   < > 117*   < > 121*   POTASSIUM 5.4*  --  5.3 6.1*  --   --   --  5.7*  --  5.5*   CHLORIDE  --   --   --  95*  --   --   --  94*  --  92*   CO2  --   --   --  16*  --   --   --  14*  --  16*   BUN  --   --   --  46.4*  --   --   --  34.9*  --  41.0*   CR  --   --   --  1.44*  --  1.29*  --  1.21*  --  2.16*   ANIONGAP  --   --   --  8  --   --   --  9  --  13   MIGUELINA  --   --   --  9.5  --   --   --  9.5  --  9.7   GLC  --  158*  --  95  --   --   --  92  --  109*    < > = values in this interval not displayed.     Recent Labs   Lab Test 24  0738 24  0726 24  1000 23  0536 23  1514 23  0724 23  1646 23  1537 23  1424 23  1119 23  1224 23  1147   ALBUMIN 3.0* 3.0* 3.3*   < > 3.1*   < >  --   --  2.4*   < >  --  2.3*   BILITOTAL 8.7* 11.8* 9.0*   < > 8.4*   < >  --   --  21.7*   < >  --  10.4*   ALT 45 44 56   < > 49   < >  --   --  69   < >  --  82*   AST 88* 105* 122*    < > 222*   < >  --    < >  --    < >  --  331*   PROTEIN  --   --   --   --  100*  --  30*  --   --   --  50*  --    LIPASE  --   --   --   --   --   --   --   --  237*  --   --  272*    < > = values in this interval not displayed.

## 2024-03-14 NOTE — PLAN OF CARE
Goal Outcome Evaluation:       Summary: Weakness, fall x2, dizziness, Hemoglobin 7.4, ETOH, hyponatremia 118, GATITO  DATE & TIME: 3/14/24 7645-9761                                                                                    Cognitive Concerns/ Orientation : A&Ox4   BEHAVIOR & AGGRESSION TOOL COLOR: Green  CIWA SCORE: 1, 1, 1                                                                                            ABNL VS/O2: VSS on RA, BP soft this evening-Clonidine held   MOBILITY: SBA w/GB/cane, ambulated in halls  PAIN MANAGMENT: denies  DIET: Regular 1200 FR, NPO after midnight for EGD   BOWEL/BLADDER: Continent B/B, urine tea colored, stool dark brown    ABNL LAB/BG: Hemoglobin 7.2, , 119, 118- MD notified of all levels, K 6.1-corrected with medications- recheck 5.3 & 5.4, AST 88, Bili 8.7  DRAIN/DEVICES: IV SL- stopped fluids after last  and started on FR    TELEMETRY RHYTHM: None   SKIN: dry/Jaundice  TESTS/PROCEDURES: EGD 3/15  D/C DAY/GOALS/PLACE: pending improvement/POC  OTHER IMPORTANT INFO: GI following, CD consult

## 2024-03-14 NOTE — PLAN OF CARE
Goal Outcome Evaluation:    Summary: Weakness, fall x2, dizziness, Hemoglobin 7.4, ETOH, hyponatremia 118, G[place]O  DATE & TIME: 3/13/24 7291-4067                                                                                         Cognitive Concerns/ Orientation : A&O x4   BEHAVIOR & AGGRESSION TOOL COLOR: Green  CIWA SCORE: 2                                                                                             ABNL VS/O2: vss, RA  MOBILITY: sba with cane/gait belt  PAIN MANAGMENT: robaxin bid prn  DIET: Regular, npo at midnight  BOWEL/BLADDER: continent BB  ABNL LAB/BG: Hemoglobin 7.4--after 2 units transfused (previous shift), -every 6 hours checks, CR 1.29, K 5.7, Albumin 3.0, . Positive occult blood  DRAIN/DEVICES: 2 PIVs LUE SL  TELEMETRY RHYTHM: Sinus tachy  SKIN: jaundice  TESTS/PROCEDURES: EGD in a.m. tomorrow  D/C DAY/GOALS/PLACE: PENDING  OTHER IMPORTANT INFO: GI Consult

## 2024-03-14 NOTE — PROGRESS NOTES
MD Notification    Notified Person: MD    Notified Person Name:Dr Knight    Notification Date/Time:0132    Notification Interaction:vocera paging    Purpose of Notification:Na-118    Orders Received:awaiting for orders    Comments: continue to monitor/Na checks

## 2024-03-14 NOTE — PROGRESS NOTES
Attempted to call patient to discuss possible treatment options and to possibly complete an assessment. Phone rang 10+ times and no answer will continue to attempt to reach patient.   Aroldo Bustos [facility] on 3/14/2024 at 12:36 PM

## 2024-03-14 NOTE — PLAN OF CARE
Goal Outcome Evaluation:  DATE & TIME: 3/13/24 3634-2093                                                                                       Cognitive Concerns/ Orientation : A&Ox4   BEHAVIOR & AGGRESSION TOOL COLOR: Green  CIWA SCORE: 1/0                                                                                            ABNL VS/O2: VSS on RA  MOBILITY: SBA w/GB/cane  PAIN MANAGMENT: denies  DIET: Regular, NPO at midnight  BOWEL/BLADDER: Continent B/B    ABNL LAB/BG: Hemoglobin 6.8 and 7.4--after 2 units transfused on am shift, /118-MD updated, Q6 hours checks, CR 1.29, K 5.7, Albumin 3.0,   DRAIN/DEVICES: 2 PIVs LUE SL  TELEMETRY RHYTHM: SR to ST  SKIN: dry/Jaundice  TESTS/PROCEDURES: AM labs, EGD today 3/14  D/C DAY/GOALS/PLACE: pending improvement/POC  OTHER IMPORTANT INFO: Pt alert/cooperative, no BM this shift, voiding adequately, GI following.

## 2024-03-14 NOTE — PROGRESS NOTES
[facility]    Medicine Progress Note - Hospitalist Service    Date of Admission:  3/12/2024    Assessment & Plan   Crispin Ham is a 38 year old male with PMH of alcohol abuse, chronic liver cirrhosis, hx SBP, substance abuse, HTN, chronic HFpEF, hx pleural effusion s/p b/l thoracentesis 11/2023 who presented to the ED on 3/12/24 for evaluation of abnormal labs.     Patient saw his PCP yesterday for LLE cramping x 1 month. His blood work came back with hgb 6.1 and he was referred to the ED. He states he has been feeling fatigued, lightheaded, and nauseous. He stood too quickly yesterday getting out of bed and fell 2/2 lightheadedness. He did not hit his head or lose consciousness. He denies any chest pain, dyspnea, hematemesis, melena, hematochezia. He gets nosebleeds every few days, somewhat exacerbated by his own manipulation, and bleeding can at times be prolonged. He has no active bleeding at this time. No fevers, chills, abdominal pain. He states he drinks about 1/2 pint of vodka daily, last drink yesterday afternoon.     In the ED, afebrile with  and /28. CBC with WBC 9.2, hgb 5.6, platelets 132. CMP with sodium 121, potassium 5.5, mag 1.8, bicarb 16, BUN 21, creatinine 2.16, alk phos 223, ALT 56, , total bili 9.0. Alcohol ethyl level 0.07. Two units PRBC ordered to be transfused in the ED.     Acute on chronic anemia  Coagulopathy of liver disease  History of duodenitis/ulcer (09/2023)  *Hgb 5.6, down from 6.9 on 2/21/24 previously running around 7.4-8. Platelets stable at 132.  *Endorses nosebleeds every 3 days or so which can be somewhat prolonged with his hx coagulopathy. No current active bleeding.  *Denies hematemesis, melena, hematochezia. He does have some nausea.  *Symptomatic with complaints of positional dizziness, fatigue. Declined rectal examination in the ED.  *Last EGD 9/2/723 with esophageal mucosal tear with bleeding (complication of clip  placement) hemostasis achieved with bipolar probe. Portal hypertensive gastropathy.  *3/12-3/13 transfused 4U PRBCs  *hemoccult positive, but this could also be due to recent epistaxis  -[facility] appreciated  - trend hgb, next check at 6pm 3/14 then in AM  -Continue PTA Protonix 40 mg BID  -conditional transfusion orders in place for hgb<7     Acute kidney injury  *Creatinine 2.16 on admission, was 0.59 on 2/21/24  - Cr overall improving, but uptrending 3/14  - IVF x900ml on 3/14 given FENA 0.4 (off diuretics)--further fluids held by cross cover physician after 6pm recheck returned  - HOLD PTA Spironolactone, Lasix     Hyperkalemia  -Hold PTA potassium chloride 40 mEq daily, spironolactone 200 mg daily  -mod hyperkalemia protocol ordered, D10/insulin, which shifted his Potassium to 5.4  - trend Potassium at 6pm and in AM     Hyponatremia  *Na 121 on admission, was 130 on 2/21/24 but otherwise was running 124-129 in the fall. Felt to be somewhat volume depleted on examination, received fluids, but sodium has bounced between 117-122.  -Holding PTA diuretics  -Check sodium level q8h  - IVF as above, may consult nephrology 3/15 if still no pattern of improvement     Severe alcoholic hepatitis  Severe hyperbilirubinemia from underlying liver disease  *Established with [facility]. Currently without significant ascites on examination.  *Alk phos 223, ALT 56, , total bili 9.0 (stable compared to 2/21/24)  *3/13 Abd US: negative for ascites  - [facility] appreciated  - NPO at MN for upper endoscopy 3/15  - Continue PTA PPI, Lactulose, Rifaximin, Bactrim (SBP prophylaxis)  - HOLD PTA Spironolactone, Lasix  - Chem dep consulted     Chronic HFpEF  *Echo 11/16/23 with EF 65-70%, no RWMA or any significant valvular disease  *No current complaints of shortness of breath or increased LE edema  -Holding PTA Lasix as noted above  -Monitor I&O  -Daily weights     LLE cramping  *Venous US LLE on 2/21/24 was negative  *Xray left tibia and fibula  3/11/24 no acute fracture  *K 5.5, mag 1.8  -Supportive cares     Alcohol dependence  History of alcohol withdrawal syndrome  *Last drink 3/11 afternoon. No hx of withdrawal seizures. He has been drinking about 1/2 pint of vodka per day.  -Monitor on [facility] protocol  -Multivitamin, folic acid, thiamine supplementation  -Alcohol cessation encouraged     Chronic hip and back pain  -Ice or heat as needed    Severe malnutrition  Appreciate nutrition, recommendation for tube feeding  - discussed with patient on 3/14, but he doesn't seem to understand his severity of malnutrition as he feels he is eating well          Diet: Snacks/Supplements Adult: Ensure Enlive; Between Meals  NPO per Anesthesia Guidelines for Procedure/Surgery Except for: Meds  Regular Diet Adult    DVT Prophylaxis: Pneumatic Compression Devices  Bosch Catheter: Not present  Lines: None     Cardiac Monitoring: None  Code Status: No CPR- Do NOT Intubate      Clinically Significant Risk Factors        # Hyperkalemia: Highest K = 6.1 mmol/L in last 2 days, will monitor as appropriate  # Hyponatremia: Lowest Na = 117 mmol/L in last 2 days, will monitor as appropriate   # Hypercalcemia: corrected calcium is >10.1, will monitor as appropriate    # Hypoalbuminemia: Lowest albumin = 3 g/dL at 3/14/2024  7:38 AM, will monitor as appropriate    # Coagulation Defect: INR = 2.24 (Ref range: 0.85 - 1.15) and/or PTT = N/A, will monitor for bleeding  # Thrombocytopenia: Lowest platelets = 85 in last 2 days, will monitor for bleeding          # Overweight: Estimated body mass index is 26.75 kg/m  as calculated from the following:    Height as of this encounter: 1.829 m (6').    Weight as of this encounter: 89.4 kg (197 lb 3.2 oz)., PRESENT ON ADMISSION  # Severe Malnutrition: based on nutrition assessment, PRESENT ON ADMISSION   # Financial/Environmental Concerns: none         Disposition Plan      Expected Discharge Date: 03/19/2024      Destination: home with family               Mariana Chavira DO  Hospitalist Service  [facility]  Securely message with [employer] (more info)  Text page via [employer] Paging/Directory   ______________________________________________________________________    Interval History   Patient seen and examined. He is feeling okay. Explained high potassium and need to hold off on EGD today. He feels his appetite is good, particularly in hospital and doesn't quite understand his malnutrition, but we did discuss feeding tubes. Reviewed care plan with IV fluids and potassium plan of care.  Discussed with RN and GI Dr Baldwin    Physical Exam   Vital Signs: Temp: 97.3  F (36.3  C) Temp src: Oral BP: 91/45 Pulse: 85   Resp: 16 SpO2: 99 % O2 Device: None (Room air)    Weight: 197 lbs 3.2 oz    Constitutional: Awake, alert, cooperative, no apparent distress, +jaundice with icteric sclera  Respiratory: Clear to auscultation bilaterally, no crackles or wheezing  Cardiovascular: Regular rate and rhythm, normal S1 and S2, and no murmur noted  GI: Normal bowel sounds, soft, non-distended, non-tender  Skin/Integumen: No rashes, no cyanosis, no edema  Other:      Medical Decision Making       50 MINUTES SPENT BY ME on the date of service doing chart review, history, exam, documentation & further activities per the note.      Data     I have personally reviewed the following data over the past 24 hrs:    7.7  \   7.3 (L)   / 85 (L)     119 (LL) 95 (L) 46.4 (H) /  158 (H)   5.4 (H) 16 (L) 1.44 (H) \     ALT: 45 AST: 88 (H) AP: 222 (H) TBILI: 8.7 (H)   ALB: 3.0 (L) TOT PROTEIN: 6.9 LIPASE: N/A     INR:  2.24 (H) PTT:  N/A   D-dimer:  N/A Fibrinogen:  N/A       Imaging results reviewed over the past 24 hrs:   No results found for this or any previous visit (from the past 24 hour(s)).

## 2024-03-14 NOTE — CONSULTS
Care Management Initial Consult    General Information  Assessment completed with: Patient,    Type of CM/SW Visit: Initial Assessment    Primary Care Provider verified and updated as needed: Yes (Dr Blake MENDOSA, [facility])   Readmission within the last 30 days: no previous admission in last 30 days      Reason for Consult: discharge planning, substance use concerns  Advance Care Planning: Advance Care Planning Reviewed: no concerns identified     General Information Comments: High readmission risk    Communication Assessment  Patient's communication style: spoken language (English or Bilingual)    Hearing Difficulty or Deaf: no   Wear Glasses or Blind: yes    Cognitive  Cognitive/Neuro/Behavioral: WDL          Orientation: oriented x 4  Mood/Behavior: calm, cooperative          Living Environment:   People in home: parent(s)     Current living Arrangements: house      Able to return to prior arrangements: yes  Living Arrangement Comments: patient lives w/both parents    Family/Social Support:  Care provided by: self  Provides care for: no one  Marital Status: Single  Parent(s)          Description of Support System: Involved    Support Assessment: Adequate family and caregiver support    Current Resources:   Patient receiving home care services: No     Community Resources: None  Equipment currently used at home: cane, straight  Supplies currently used at home: None    Employment/Financial:  Employment Status: employed full-time     Employment/ Comments: Customer Service  Financial Concerns: none           Does the patient's insurance plan have a 3 day qualifying hospital stay waiver?  No    Lifestyle & Psychosocial Needs:  Social Determinants of Health     Food Insecurity: Not on file   Depression: Not on file   Housing Stability: Not on file   Tobacco Use: Unknown (2/21/2024)    Patient History     Smoking Tobacco Use: Never     Smokeless Tobacco Use: Unknown     Passive Exposure: Not on file    Financial Resource Strain: Not on file   Alcohol Use: Not on file   Transportation Needs: Not on file   Physical Activity: Not on file   Interpersonal Safety: Not on file   Stress: Not on file   Social Connections: Not on file       Functional Status:  Prior to admission patient needed assistance:   Dependent ADLs:: Independent  Dependent IADLs:: Independent       Mental Health Status:  Mental Health Status: Current Concern  Mental Health Management: Other (see comment) (Older brother  of complications of alcoholism in January)    Chemical Dependency Status:  Chemical Dependency Status: Current Concern (Patient reports drinking a liter of vodka 2-3 days per week)  Chemical Dependency Management: Other (see comment) (On line support group)          Values/Beliefs:  Spiritual, Cultural Beliefs, Mormonism Practices, Values that affect care: no               Additional Information:  Care Coordinator met with patient per automatic CM consult due to high readmission risk.   Role of care management was explained  Patient has a history of alcohol abuse, chronic liver cirrhosis, hx SBP, HTN, chronic HFpEF.    Patient was admitted -2023 for hypovolemic hypotension, electrolyte abnormalities, duodenitis/ulcer with contained perforation versus duodenal diverticulum, alcohol induced hepatitis.  Then readmitted  to 10/4 for decompensated alcoholic liver cirrhosis, portal hypertension with splenomegaly and ascites and admitted 2023 with positive ascitic fluid cultures.  CD and Psychiatry were offered and his parents had asked for this, but patient declined.  Discussed that the CD  was trying to call him, pt reports he was on the phone and will await return call.  It is difficult to determine if patient would like some help in way of treatment.   Patient reports he is currently drinking  a liter of vodka 2-3 days per week.  He reports he does not really have a craving for alcohol.  He noted he was  "able to sustain from much alcohol use from Oct 23 to .  One of his older brothers  in January due to complications of alcohol.  Patient reports he feels he drinks alcohol to \"numb his head\" from the physical pain he has in his left hip and calf.  Hopefully once his electrolytes normalize, his pain will improve.  Nutrition is poor, Dietician is following.  Patient works in Customer Service and uses a cane when left hip pain is more severe.    Patient reports he has follow up with [facility] scheduled in April.  He reports difficulty with getting timely appointments with [facility].  Patient prefers to schedule PCP appointment and will have transportation from his parents when he is discharged.      Unfortunately, with patient's underlying health problems in addition to alcohol consumption, if he chooses to continue to use alcohol, will be at greater risk for readmission.  Care Management will continue to follow and be available to help with any discharge needs patient may have.    Haley Durán RN  Inpatient Care Management  [phone]     "

## 2024-03-15 NOTE — PROVIDER NOTIFICATION
MD Notification    Notified Person: MD    Notified Person Name: Dr. Chavira    Notification Date/Time: 3/15/24 6391    Notification Interaction: Vocera Message    Purpose of Notification: Morning labs came back with Hemoglobin 6.8, Potassium 6.1 and Sodium 117     Orders Received:  1. He meets criteria for his conditional blood transfusion of one unit.  2. Ordered hyperkalemia protocol. He shouldn't have endoscopy with his current numbers    Comments:

## 2024-03-15 NOTE — PROGRESS NOTES
[facility]    Medicine Progress Note - Hospitalist Service    Date of Admission:  3/12/2024    Assessment & Plan   Crispin Ham is a 38 year old male with PMH of alcohol abuse, chronic liver cirrhosis, hx SBP, substance abuse, HTN, chronic HFpEF, hx pleural effusion s/p b/l thoracentesis 11/2023 who presented to the ED on 3/12/24 for evaluation of abnormal labs.     Patient saw his PCP yesterday for LLE cramping x 1 month. His blood work came back with hgb 6.1 and he was referred to the ED. He states he has been feeling fatigued, lightheaded, and nauseous. He stood too quickly yesterday getting out of bed and fell 2/2 lightheadedness. He did not hit his head or lose consciousness. He denies any chest pain, dyspnea, hematemesis, melena, hematochezia. He gets nosebleeds every few days, somewhat exacerbated by his own manipulation, and bleeding can at times be prolonged. He has no active bleeding at this time. No fevers, chills, abdominal pain. He states he drinks about 1/2 pint of vodka daily, last drink yesterday afternoon.     Hospital stay complicated by persistent hyperkalemia, hyponatremia and need for blood transfusions.     Acute on chronic anemia  Coagulopathy of liver disease  History of duodenitis/ulcer (09/2023)  *Hgb 5.6, down from 6.9 on 2/21/24 previously running around 7.4-8. Platelets stable at 132.  *Endorses nosebleeds every 3 days or so which can be somewhat prolonged with his hx coagulopathy. No current active bleeding.  *Denies hematemesis, melena, hematochezia. He does have some nausea.  *Symptomatic with complaints of positional dizziness, fatigue. Declined rectal examination in the ED.  *Last EGD 9/2/723 with esophageal mucosal tear with bleeding (complication of clip placement) hemostasis achieved with bipolar probe. Portal hypertensive gastropathy.  *3/12-3/13 transfused 4U PRBCs  *3/15 1U PRBCs (ordered, but waiting to give until K <5.3)  *hemoccult positive,  but this could also be due to recent epistaxis  - [facility] appreciated  - hgb in AM  - Continue PTA Protonix 40 mg BID  - conditional transfusion orders in place for hgb<7     Acute kidney injury  *Creatinine 2.16 on admission, was 0.59 on 2/21/24  - intermittently given fluids this admit with variable effect on Cr  - Daily BMP  - held PTA Spironolactone, Lasix     Hyperkalemia  -Hold PTA potassium chloride 40 mEq daily, spironolactone 200 mg daily  - given insulin and dextrose x3 for K>6 so far this admit  - lokelma 10mg TID x6 doses starting 3/15  - trend potassium q4h overnight 3/15  - low potassium diet  - Nephrology appreciated     Hyponatremia  *Na 121 on admission, was 130 on 2/21/24 but otherwise was running 124-129 in the fall. Felt to be somewhat volume depleted on examination, received fluids, but sodium has bounced between 117-122.  - Holding PTA diuretics  - sodium bicarbonate 1300mg BID added 3/15  - 2% NS @30ml/hr added 3/15  - Check sodium level q4h  - fluid restriction 1200ml  - nephrology appreciated     Severe alcoholic hepatitis  Severe hyperbilirubinemia from underlying liver disease  *Established with [facility]. Currently without significant ascites on examination.  *Alk phos 223, ALT 56, , total bili 9.0 (stable compared to 2/21/24)  *3/13 Abd US: negative for ascites  - [facility] appreciated  - NPO at [place] for upper endoscopy 3/16  - Continue PTA PPI, Lactulose, Rifaximin  - switched PTA bactrim to IV ceftriaxone on 3/15 given association with hyperkalemia  - 3/15: hold off on starting midodrine, monitor BPs off scheduled clonidine and after blood transfusion  - HOLD PTA Spironolactone, Lasix  - Chem dep appreciated, patient only interested in AA at this time     Chronic HFpEF  *Echo 11/16/23 with EF 65-70%, no RWMA or any significant valvular disease  *No current complaints of shortness of breath or increased LE edema  -Holding PTA Lasix as noted above  -Monitor I&O  -Daily weights (weight is  climbing on 3/15)     LLE cramping  *Venous US LLE on 2/21/24 was negative  *Xray left tibia and fibula 3/11/24 no acute fracture  -Supportive cares     Alcohol dependence  History of alcohol withdrawal syndrome  *Last drink 3/11 afternoon. No hx of withdrawal seizures. He has been drinking about 1/2 pint of vodka per day.  -no signs withdrawal, stop CIWA monitoring 3/15  -Multivitamin, folic acid, thiamine supplementation  -Gabapentin taper  -stop clonidine 3/15  -Alcohol cessation encouraged     Chronic hip and back pain  -Ice or heat as needed    Severe malnutrition  Appreciate nutrition, recommendation for tube feeding  - discussed with patient on 3/14, but he doesn't seem to understand his severity of malnutrition as he feels he is eating well          Diet: Snacks/Supplements Adult: Ensure Enlive; Between Meals  Fluid restriction 1200 ML FLUID  NPO per Anesthesia Guidelines for Procedure/Surgery Except for: Meds  2 Gram K Diet    DVT Prophylaxis: Pneumatic Compression Devices  Bosch Catheter: Not present  Lines: None     Cardiac Monitoring: ACTIVE order. Indication: Electrolyte Imbalance (24 hours)- Magnesium <1.3 mg/ml; Potassium < =2.8 or > 5.5 mg/ml  Code Status: No CPR- Do NOT Intubate      Clinically Significant Risk Factors        # Hyperkalemia: Highest K = 6.1 mmol/L in last 2 days, will monitor as appropriate  # Hyponatremia: Lowest Na = 117 mmol/L in last 2 days, will monitor as appropriate   # Hypercalcemia: corrected calcium is >10.1, will monitor as appropriate    # Hypoalbuminemia: Lowest albumin = 3 g/dL at 3/14/2024  7:38 AM, will monitor as appropriate    # Coagulation Defect: INR = 2.24 (Ref range: 0.85 - 1.15) and/or PTT = N/A, will monitor for bleeding  # Thrombocytopenia: Lowest platelets = 78 in last 2 days, will monitor for bleeding          # Overweight: Estimated body mass index is 27.56 kg/m  as calculated from the following:    Height as of this encounter: 1.829 m (6').    Weight as  of this encounter: 92.2 kg (203 lb 3.2 oz)., PRESENT ON ADMISSION  # Severe Malnutrition: based on nutrition assessment, PRESENT ON ADMISSION   # Financial/Environmental Concerns: none         Disposition Plan      Expected Discharge Date: 03/19/2024      Destination: home with family              Mariana Chavira, DO  Hospitalist Service  [facility]  Securely message with [employer] (more info)  Text page via [employer] Paging/Directory   ______________________________________________________________________    Interval History   Patient seen and examined. He is feeling okay. Seems a bit tired. Frustrated that he could not have his EGD this morning as his electrolytes were abnormal again. Reports some bleeding with nose, but nothing faucet like. Had some blood in toilet. Discussed with Dr Juan and Dr Feliz.    Physical Exam   Vital Signs: Temp: 98.1  F (36.7  C) Temp src: Oral BP: (!) 94/37 Pulse: 100   Resp: 18 SpO2: 100 % O2 Device: None (Room air)    Weight: 203 lbs 3.2 oz    Constitutional: Awake, alert, cooperative, no apparent distress, +jaundice with icteric sclera  Respiratory: Clear to auscultation bilaterally, no crackles or wheezing  Cardiovascular: Regular rate and rhythm, normal S1 and S2, and no murmur noted  GI: Normal bowel sounds, soft, non-distended, non-tender  Skin/Integumen: No rashes, no cyanosis, no edema  Other:      Medical Decision Making       55 MINUTES SPENT BY ME on the date of service doing chart review, history, exam, documentation & further activities per the note.      Data     I have personally reviewed the following data over the past 24 hrs:    7.9  \   6.8 (LL)   / 78 (L)     117 (LL) 94 (L) 47.7 (H) /  115 (H)   5.5 (H) 15 (L) 1.47 (H) \       Imaging results reviewed over the past 24 hrs:   No results found for this or any previous visit (from the past 24 hour(s)).

## 2024-03-15 NOTE — PROVIDER NOTIFICATION
MD Notification    Notified Person: MD    Notified Person Name: Dr. Chavira    Notification Date/Time: 3/15/24 1215    Notification Interaction: Vocera Message    Purpose of Notification: Sodium recheck was 118 and potassium was 5.5    Orders Received:    Comments:

## 2024-03-15 NOTE — PROVIDER NOTIFICATION
MD Notification    Notified Person: MD    Notified Person Name: Fan    Notification Date/Time:3/15/2024 @ 4:33 PM    Notification Interaction:vocera connect    Purpose of Notification: critical Na 117    Orders Received:    Comments:

## 2024-03-15 NOTE — CONSULTS
"Met with patient to discuss possible treatment options and to possibly complete an assessment. Patient states \"I'm not sure really where to begin, I don't think I'm ready for any kind of outpatient or Residential Program.  I think I would like to start with trying some AA meetings and meet with people in person\".  Patient confirmed he was only interested in AA and staff will place information for AA meetings in his AVS.  Patient was informed that if he changes his mind and wants additional help or treatment to request staff to re-place consult and this staff could talk to him again.     Information was also placed in AVS where he could complete an assessment if he becomes interested in treatment outside of his hospital.     Aroldo Bustos [facility] on 3/15/2024 at 9:18 AM    "

## 2024-03-15 NOTE — PROGRESS NOTES
Gastroenterology Follow Up Note    Crispin Ham MRN#  [id number]   Age:  38 year old YOB: 1985    Date of Admission:  3/12/2024     Subjective   Scheduled EGD was canceled this morning due to severe hyponatremia and hyperkalemia.  Clinically patient reports doing well and denies any ongoing GI symptoms.  No abdominal pain, nausea, or emesis.  Reports brown colored stool overnight.  Afebrile and hemodynamically stable.       MEDICATIONS & ALLERGIES   Current medication list reviewed.      Allergies   Allergen Reactions    Excedrin Extra Strength [Aspirin-Acetaminophen-Caffeine]      puffy eyes and dry throat a few years ago. Tolerates ibuprofen and acetaminophen, but avoids aspirin    Nsaids Angioedema     Patient reports having a reaction of puffy eyes and dry throat a few years ago, but he has taken Advil in 2023 and did not react.          OBJECTIVE   Vitals BP 97/46 (Cuff Size: Adult Regular)   Pulse 81   Temp 97.5  F (36.4  C) (Oral)   Resp 18   Ht 1.829 m (6')   Wt 92.2 kg (203 lb 3.2 oz)   SpO2 98%   BMI 27.56 kg/m          General:   Well-developed young CM in NAD.   HEENT:   NC/AT. MMM. Icteric sclerae.   Lungs:  No respiratory distress.   Heart:  RRR. +S1, S2.    Abdomen:   Soft. Mild distention without evidence of tense ascites. No tenderness. BS active.    Ext/MS:   No cyanosis or erythema.    Neuro:   No focal deficits noted. No asterixis.   Skin:  Diffuse jaundice. No other obvious rashes or lesions noted.         LABORATORY AND IMAGING    ELECTROLYTE PANEL   Recent Labs   Lab Test 03/15/24  0640 03/14/24  2300 03/14/24  1812 03/14/24  0947 03/14/24  0738 03/13/24  1111 03/13/24  0726   * 122* 118*   < > 119*   < > 117*   POTASSIUM 6.1* 5.1 6.0*   < > 6.1*  --  5.7*   BUN 47.7*  --   --   --  46.4*  --  34.9*   CR 1.47*  --  1.54*  --  1.44*   < > 1.21*   MIGUELINA 9.5  --   --   --  9.5  --  9.5    < > = values in this interval not displayed.      HEMATOLOGY PANEL    Recent Labs   Lab Test 03/15/24  0640 03/14/24  1812 03/14/24  0738 03/13/24  1805 03/13/24  1111 03/13/24  0726 03/12/24  2259 03/12/24  1000   WBC 7.9  --  7.7  --  7.0  --   --  9.2   HGB 6.8* 7.2* 7.3*   < > 6.8*  --    < > 5.6*   MCV 97  --  96  --  99  --   --  110*   PLT 78*  --  85*  --  88*  --   --  132*   INR  --   --  2.24*  --   --  2.13*  --  1.94*    < > = values in this interval not displayed.      LIVER AND PANCREAS PANEL   Recent Labs   Lab Test 03/14/24  0738 03/13/24  0726 03/12/24  1000 11/17/23  0536 11/16/23  1514 09/26/23  0724 09/25/23  1646 09/25/23  1537 09/25/23  1424 09/09/23  1119 09/08/23  1224 09/08/23  1147   ALBUMIN 3.0* 3.0* 3.3*   < > 3.1*   < >  --   --  2.4*   < >  --  2.3*   BILITOTAL 8.7* 11.8* 9.0*   < > 8.4*   < >  --   --  21.7*   < >  --  10.4*   ALT 45 44 56   < > 49   < >  --   --  69   < >  --  82*   AST 88* 105* 122*   < > 222*   < >  --    < >  --    < >  --  331*   PROTEIN  --   --   --   --  100*  --  30*  --   --   --  50*  --    LIPASE  --   --   --   --   --   --   --   --  237*  --   --  272*    < > = values in this interval not displayed.      I have reviewed the current diagnostic and laboratory tests.     Assessment / Recommendations:     Assessment:  Acute alcoholic hepatitis with likely underlying cirrhosis.  MELD-Na: 32.  Discriminant function also high, but not a candidate for steroids due to concern for GI bleeding.  Acute on chronic anemia.  Suspect multifactorial etiology with possible contributing etiologies of epistaxis, slow-pace GI blood loss from portal hypertensive gastropathy, angiectasia, erosive reflux esophagitis, and bone marrow suppression from heavy alcohol use.  With lack of overt GI bleeding and stable hemodynamics, low suspicion for variceal or peptic ulcer bleeding.  With persistent nature of anemia on admission, upper endoscopy evaluation is indicated, but has been canceled last couple of days due to severe electrolyte  imbalance.  History of ascites and SBP.  Maintained on Furosemide 80 mg, Spironolactone 200 mg, and prophylaaxis Bactrim daily prior to admission.  On hold due to electrolyte imbalance.    History of encephalopathy.  On Lactulose and Rifaximin.  No evidence of confusion or asterixis on exam today.    Recommendations:  Tentative plan for EGD tomorrow with MAC in OR.  Ok to resume diet today.  NPO after midnight.  Continue to monitor hemoglobin and transfuse as needed.  Agree with holding diuretics and Bactrim.  Correction of electrolyte imbalance per primary and consulting nephrology teams.  Complete abstinence from alcohol on discharge encouraged.       Total time spent in chart review, direct medical discussion, examination, and documentation was 25 minutes.    Chato Juan MD  [facility]  [phone]  I appreciate the opportunity to participate in the care of this patient.   Please feel free to call me with any questions or concerns.

## 2024-03-15 NOTE — PROVIDER NOTIFICATION
MD Notification    Notified Person: MD    Notified Person Name: Dr. Chavira    Notification Date/Time: 3/15/24 4256    Notification Interaction: Phone call    Purpose of Notification: Blood pressure 94/37 and is asymptomatic.    Orders Received: Hold off on giving fluids and blood. To wait until 6 PM labs come back. Can give blood if potassium recheck is < 5.3.    Comments:

## 2024-03-15 NOTE — PROVIDER NOTIFICATION
MD Notification    Notified Person: MD    Notified Person Name:  Paulajosh    Notification Date/Time: 3/15/24 6539    Notification Interaction: [employer]    Purpose of Notification: 617 - BP 89/34 and is asymptomatic. Last BP was 94/37 and MD ordered NS 2% @ 30 mL. Please advise liver failure and acute kidney failure. Would Midodrine be appropriate?    Orders Received:    Comments:

## 2024-03-15 NOTE — PLAN OF CARE
Summary: Weakness, fall x2, dizziness, Hemoglobin 7.4, ETOH, hyponatremia 118, GATITO  DATE &TIME: 3/14/24 1900-3/15/24 0730                                                                        Cognitive Concerns/ Orientation : A&Ox4   BEHAVIOR & AGGRESSION TOOL COLOR: Green  CIWA SCORE: 2,1                                                                           ABNL VS/O2: VSS on RA  MOBILITY: SBA w/GB/cane  PAIN MANAGMENT: denies  DIET:  NPO for EGD, was on regular diet  BOWEL/BLADDER: Continent B/B, urine tea colored, no BM this shift  ABNL LAB/BG: Hemoglobin 7.3, , K 6.0-corrected with medications- recheck 5.1   DRAIN/DEVICES: New PIV R arm saline locked  TELEMETRY RHYTHM: NA  SKIN: dry/Jaundiced  TESTS/PROCEDURES: EGD 3/15  D/C DAY/GOALS/PLACE: pending improvement  OTHER IMPORTANT INFO: GI following

## 2024-03-15 NOTE — PROVIDER NOTIFICATION
MD Notification    Notified Person: MD    Notified Person Name: Dr. Best    Notification Date/Time: 3/14/24 2003    Notification Interaction: Vocera page    Purpose of Notification: K back up to 6.0, was 5.4 and 5.3 after correction with meds.  Any intervention or monitor for now?    Orders Received: meds ordered to correct K    Comments:

## 2024-03-15 NOTE — OR NURSING
Procedures - Endoscopy Inpatient status  Procedure: Upper endoscopy  Indications: anemia   Therapies/Interventions none unable to perform exam due to critical lab values. Tenative plan is to perform exam on Saturday March 16th @ 9am    SBAR to:   Letty Whitfield RN Station [address]     Registered Nurse     Since 3/15/2024     Patient transported via litter to and from station 66 by Patient Transport Services.    Mary Kate Mccabe RN, BSN, CGRN  Endoscopy staff

## 2024-03-15 NOTE — DISCHARGE INSTRUCTIONS
Use the below links to find AA meetings near your residence:    [email or web address]    [email or web address]    If you become interested in treatment contact [facility] and request an assessment for treatment:  [facility]  [address] [place] [address]  Scheduling & Intake: [phone]  General Questions: [phone]        Patient has been referred for Intensive Outpatient Treatment at [facility] and [facility].  Patient should call the programs to begin treatment.      Referrals/ Alternatives:  [facility] [address] [address] [phone]     [facility] [address] [place], [place] [address] [phone]

## 2024-03-15 NOTE — PROGRESS NOTES
Brief Nephrology Note     Na still hovering at 117. Urine Na <20, urine Osm 389 suggesting prerenal etiology. With his liver disease, seems more likely from that than overt hypovolemia.     Will start 2% hypertonic saline gtt at slow rate 30 ml/h to try to slowly increase his sodium levels. Goal Na by tomorrow ~123-125. Stop saline if he reaches that overnight. Continue q4h Na check.         Mae Feliz MD   [facility], Nephrology

## 2024-03-15 NOTE — CONSULTS
[facility]    Nephrology Consultation     Date of Admission:  3/12/2024    Assessment & Plan     Crispin Ham is a 38 year old male with PMH alcoholic cirrhosis, HTN, HFpEF, substance abuse who was admitted on 3/12/2024 acute blood loss anemia.     Assessment:    Severe hyponatremia   Initial Na 121, but fluctuating between 117-122. Baseline Na probably low ~130. Urine Na 106 --> 63 --> 32. Urine Osm 325. Initial presentation consistent with SIADH. Will repeat all labs now to determine if IVF vs other treatment needed. Patient currently asymptomatic. Pt has also required D10 for insulin shifting, 300 ml boluses at a time. With improvement in K, hopefully won't be needing these going forward.   - urine Na and Osm   - 1.2L fluid restriction   - agree with holding diuretics   - q4h Na   - limit D10 as able   - normal Na diet     Hyperkalemia   K 6.1 this AM. Intermittently hyperkalemic this admission. Pt also with significant acidosis and anemia requiring multiple blood transfusions exacerbating hyperkalemia.   - lokelma 10 g TID x6 doses, then daily   - low K diet    - agree with holding spironolactone and bactrim    NAGMA   Bicarb low at 15. Low suspicion for starvation or alcoholic ketoacidosis as it has been persistent. Possible that it's related to renal insuffiency alone. pH normal 7.36, pCO2 low at 28. Possible that he has some respiratory alkalosis related to cirrhosis, or may just be compensatory for his acidosis.   - sodium bicarb 1300 mg BID     GATITO   Baseline Cr ~0.5. Cr on admission 2.16 and improving. Suspect some prerenal GATITO as he did respond to IVF. Now appears euvolemic, possibly has some ATN.   - daily BMP   - hold spironolactone and bactrim    Severe anemia   Hgb today 6.8. Initial concerns for nosebleeds, however, doesn't seem enough to cause persistent anemia and multiple blood transfusions. GI planning for EGD, concern for possible portal hypertensive gastropathy.  "  - transfuse prn for Hgb <7    Alcoholic cirrhosis   Alcoholic hepatitis   Hyperbilirubinemia   Coagulopathy   Chronic HFpEF     Plan discussed with Dr. Chavira. Reviewed notes from Dr. Chavira, Dr. Baldwin (GI).     Mae Feliz MD   [facility] - Nephrology  [phone]  --------------------------------------------------------------------------------------------  Reason for Consult     I was asked to see the patient for abnormal electrolytes .    Primary Care Physician     Blake Canchola    Chief Complaint     fatigue    History is obtained from the patient and chart review.      History of Present Illness     Crispin Ham is a 38 year old male who presents with abnormal labs.     He initially presented to PCP with fatigue, was found to have very abnormal labs including Hgb in 6s, hyponatremia, GATITO, hyperkalemia. Thus he was referred to the ED. PTA lasix, spironolactone, and K supplementation were held. He received NS from 3/12-3/14 with stabilization of sodium. 1.2L fluid restriction started 3/14. Patient was to get EGD done today, but it was canceled due to electrolyte abnormalities.     He notes feeling some fatigue in general. Maybe feels a little foggy headed, but this has been persistent for awhile now. Reports trying to \"stay hydrated\", we discussed fluid restriction. He reports appetite has improved during this hospitalization, he's eating ok. Denies n/v. Has minimal abd pain. Has darkish stools. Denies dry mouth or excessive thirst.       Past Medical History   I have reviewed this patient's medical history and updated it with pertinent information if needed.   Past Medical History:   Diagnosis Date    Alcohol abuse     Hypertension     Substance abuse (H)        Past Surgical History   I have reviewed this patient's surgical history and updated it with pertinent information if needed.  Past Surgical History:   Procedure Laterality Date    ORTHOPEDIC SURGERY         Prior to Admission " Medications   Prior to Admission Medications   Prescriptions Last Dose Informant Patient Reported? Taking?   Vitamin D, Cholecalciferol, 10 MCG (400 UNIT) TABS  Self Yes Yes   Sig: Take 1 tablet by mouth daily   folic acid (FOLVITE) 1 MG tablet  Self No Yes   Sig: Take 1 tablet (1 mg) by mouth daily   furosemide (LASIX) 40 MG tablet  Self No Yes   Sig: Take 1 tablet (40 mg) by mouth 2 times daily Hold for systolic blood pressure less then 110. Monitor renal function closely.   lactulose (CHRONULAC) 10 GM/15ML solution  Self No Yes   Sig: Take 30 mLs (20 g) by mouth 2 times daily Goal 2 - 3 soft bowel movements per day.   magnesium oxide (MAG-OX) 400 MG tablet  Self No Yes   Sig: Take 1 tablet (400 mg) by mouth daily   Patient taking differently: Take 400 mg by mouth at bedtime   menthol (ICY HOT) 5 % PTCH  Self Yes Yes   Sig: Apply 1 patch topically every 8 hours as needed for muscle soreness   multivitamin, therapeutic (THERA-VIT) TABS tablet  Self Yes Yes   Sig: Take 1 tablet by mouth daily   pantoprazole (PROTONIX) 40 MG EC tablet  Self No Yes   Sig: Take 1 tablet (40 mg) by mouth 2 times daily   rifaximin (XIFAXAN) 550 MG TABS tablet Not Taking Self No No   Sig: Take 1 tablet (550 mg) by mouth 2 times daily   Patient not taking: Reported on 3/12/2024   spironolactone (ALDACTONE) 100 MG tablet  Self No Yes   Sig: Take 2 tablets (200 mg) by mouth daily Hold for systolic blood pressure less then 110. Monitor renal function closely.   sulfamethoxazole-trimethoprim (BACTRIM DS) 800-160 MG tablet  Self No Yes   Sig: Take 1 tablet by mouth daily   thiamine (B-1) 100 MG tablet  Self No Yes   Sig: Take 1 tablet (100 mg) by mouth daily   traZODone (DESYREL) 50 MG tablet  Self Yes Yes   Sig: Take 50 mg by mouth nightly as needed for sleep      Facility-Administered Medications: None     Allergies   Allergies   Allergen Reactions    Excedrin Extra Strength [Aspirin-Acetaminophen-Caffeine]      puffy eyes and dry throat a  few years ago. Tolerates ibuprofen and acetaminophen, but avoids aspirin    Nsaids Angioedema     Patient reports having a reaction of puffy eyes and dry throat a few years ago, but he has taken Advil in 2023 and did not react.       Social History   I have reviewed this patient's social history and updated it with pertinent information if needed. Crispin Ham  reports that he has never smoked. He does not have any smokeless tobacco history on file. He reports current alcohol use. He reports that he does not currently use drugs.    Family History   I have reviewed this patient's family history and updated it with pertinent information if needed.   History reviewed. No pertinent family history.    Review of Systems   The 10 point Review of Systems is negative other than noted in the HPI.     Physical Exam   Temp: 97.6  F (36.4  C) Temp src: Oral BP: 102/55 Pulse: 85   Resp: 16 SpO2: 100 % O2 Device: None (Room air)    Vital Signs with Ranges  Temp:  [97.3  F (36.3  C)-97.8  F (36.6  C)] 97.6  F (36.4  C)  Pulse:  [81-96] 85  Resp:  [16-18] 16  BP: ()/(34-55) 102/55  SpO2:  [98 %-100 %] 100 %  203 lbs 3.2 oz    GENERAL: NAD, jaundice  HEENT:  Normocephalic. MMM, scleral icterus  CV: RRR, no murmurs  RESP: Clear bilaterally with good efforts  GI: distended but soft  MUSCULOSKELETAL: no LE edema  SKIN: jaundice  NEURO:  Strength normal and symmetric. Alert, oriented, normal but somewhat slow speech  PSYCH: mood good, affect appropriate      Data   BMP  Recent Labs   Lab 03/15/24  0929 03/15/24  0831 03/15/24  0640 03/15/24  0107 03/14/24  2313 03/14/24  2300 03/14/24  2104 03/14/24  1812 03/14/24  1249 03/14/24  0947 03/14/24  0738 03/14/24  0045 03/13/24  1805 03/13/24  1111 03/13/24  0726 03/12/24  1745 03/12/24  1000   NA  --   --  117*  --   --  122*  --  118* 119*  --  119*   < > 118*   < > 117*   < > 121*   POTASSIUM  --   --  6.1*  --   --  5.1  --  6.0* 5.4*   < > 6.1*  --   --   --  5.7*  --  5.5*  "  CHLORIDE  --   --  94*  --   --   --   --   --   --   --  95*  --   --   --  94*  --  92*   MIGUELINA  --   --  9.5  --   --   --   --   --   --   --  9.5  --   --   --  9.5  --  9.7   CO2  --   --  15*  --   --   --   --   --   --   --  16*  --   --   --  14*  --  16*   BUN  --   --  47.7*  --   --   --   --   --   --   --  46.4*  --   --   --  34.9*  --  41.0*   CR  --   --  1.47*  --   --   --   --  1.54*  --   --  1.44*  --  1.29*  --  1.21*  --  2.16*   * 95 106* 130*   < >  --    < >  --   --    < > 95  --   --   --  92  --  109*    < > = values in this interval not displayed.     [email or web address](phos:4)  CBC)  Recent Labs   Lab 03/15/24  0640 03/14/24  1812 03/14/24  0738 03/13/24  1805 03/13/24  1111 03/12/24  2259 03/12/24  1000   WBC 7.9  --  7.7  --  7.0  --  9.2   HGB 6.8* 7.2* 7.3* 7.4* 6.8*   < > 5.6*   HCT 19.3*  --  20.7*  --  19.3*  --  15.7*   MCV 97  --  96  --  99  --  110*   PLT 78*  --  85*  --  88*  --  132*    < > = values in this interval not displayed.     Recent Labs   Lab 03/14/24  0738   AST 88*   ALT 45   ALKPHOS 222*   BILITOTAL 8.7*     Recent Labs   Lab 03/14/24  0738   INR 2.24*     No results found for: \"D2VIT\", \"D3VIT\", \"DTOT\"  Recent Labs   Lab 03/15/24  0640   HGB 6.8*   HCT 19.3*   MCV 97     No results for input(s): \"PTHI\" in the last 168 hours.  Color Urine (no units)   Date Value   11/16/2023 Orange (A)     Appearance Urine (no units)   Date Value   11/16/2023 Slightly Cloudy (A)     Glucose Urine (mg/dL)   Date Value   11/16/2023 50 (A)     Bilirubin Urine (no units)   Date Value   11/16/2023 Moderate (A)     Ketones Urine (mg/dL)   Date Value   11/16/2023 Negative     Specific Gravity Urine (no units)   Date Value   11/16/2023 1.028     pH Urine (no units)   Date Value   11/16/2023 6.0     Protein Albumin Urine (mg/dL)   Date Value   11/16/2023 100 (A)     Nitrite Urine (no units)   Date Value   11/16/2023 Negative     Leukocyte Esterase Urine (no units)   Date Value "   11/16/2023 Negative           Mae Feliz MD   [facility] - Nephrology  [phone]

## 2024-03-15 NOTE — PLAN OF CARE
Goal Outcome Evaluation:    Summary: Weakness, fall x2, Dizziness, Anemia, ETOH, hyponatremia, GATITO    DATE &TIME: 3/15/24 6753-3499                                                           Cognitive Concerns/Orientation: A&Ox4   BEHAVIOR & AGGRESSION TOOL COLOR: Green, calm and cooperative.  CIWA SCORE: 0, 0, 0                                                                        ABNL VS/O2: VSS on RA, except soft BP (as low as 89/34 - MD aware). Started on scheduled Midodrine TID  MOBILITY: SBA w/GB & cane  PAIN MANAGMENT: denies  DIET:  2g K/ 1,200 FR. NPO at midnight  BOWEL/BLADDER: Continent B/B, urine tea colored, x1 bloody stool this shift  ABNL LAB/BG: Hgb 6.8 (replacement pending an improved K level to <5.3); K 6.1 (Moderate Hyperkalemia protocol performed x1 - recheck K 5.5, 5.5); Na 117 (recheck 118, 117); Cr 1.69  DRAIN/DEVICES: R PIV AC SL, R PIV forearm infusing 2% NS @ 30 mL/hr  TELEMETRY RHYTHM: NSR  SKIN: Dry/Jaundiced  TESTS/PROCEDURES: EGD 3/15. Urine sample results pending  D/C DAY/GOALS/PLACE: pending improvement  OTHER IMPORTANT INFO: GI and Nephrology following. Q4 NA checks. Brief bloody nose this evening

## 2024-03-15 NOTE — PROVIDER NOTIFICATION
MD Notification    Notified Person: MD    Notified Person Name: Dr. Best    Notification Date/Time: 3/14/24 2331    Notification Interaction: Vocera page    Purpose of Notification: Na 122 up from 118 at 1812, any adjustments?    Orders Received: none    Comments:

## 2024-03-16 NOTE — PROGRESS NOTES
Blood Transfusion    Patient hgb was 6.7, K+ improved to 5.1, <5.3, MD notified and patient was transfused 1 unit of blood. No transfusion reaction noted, VSS. Will continue to monitor

## 2024-03-16 NOTE — PROGRESS NOTES
[facility]    Medicine Progress Note - Hospitalist Service    Date of Admission:  3/12/2024    Assessment & Plan   Crispin Ham is a 38 year old male with PMH of alcohol abuse, chronic liver cirrhosis, hx SBP, substance abuse, HTN, chronic HFpEF, hx pleural effusion s/p b/l thoracentesis 11/2023 who presented to the ED on 3/12/24 for evaluation of abnormal labs.     Patient saw his PCP the day prior to admission for LLE cramping, fatigue and lightheadedness x 1 month. His blood work came back with hgb 6.1 and he was referred to the ED. Hospital stay complicated by persistent hyperkalemia, hyponatremia and need for blood transfusions.     Acute on chronic anemia  Coagulopathy of liver disease  History of duodenitis/ulcer (09/2023)  *Hgb 5.6, down from 6.9 on 2/21/24 previously running around 7.4-8. Platelets stable at 132.  *Endorses nosebleeds every 3 days or so which can be somewhat prolonged with his hx coagulopathy. No current active bleeding.  *Denies hematemesis, melena, hematochezia. He does have some nausea.  *Symptomatic with complaints of positional dizziness, fatigue. Declined rectal examination in the ED.  *Last EGD 9/2/23 with esophageal mucosal tear with bleeding (complication of clip placement) hemostasis achieved with bipolar probe. Portal hypertensive gastropathy.  *3/12-3/13 transfused 4U PRBCs  *3/15 1U PRBCs   -Hemoglobin appropriately improved to 7.7 this morning after transfusion  [facility] following, appreciate input.  -Status post EGD 3/16 which showed portal hypertensive gastropathy, grade 1 and small esophageal varices with no stigmata of bleeding  -Will recheck hemoglobin in the morning  - Continue PTA Protonix 40 mg BID  - conditional transfusion orders in place for hgb<7     Acute kidney injury   Hyponatremia  *Na 121 on admission, was 130 on 2/21/24 but otherwise was running 124-129 in the fall. Felt to be somewhat volume depleted on examination,  received fluids, but sodium has bounced between 117-122.  - Holding PTA diuretics  - sodium bicarbonate 1300mg BID added 3/15  -Treated with 2% NS @30ml/hr started on 3/15  -Sodium has appropriately improved to 124 this morning  - fluid restriction 1200ml  *Creatinine 2.16 on admission, was 0.59 on 2/21/24  -Gradually improving and is 1.52 today  - held PTA Spironolactone, Lasix  -Nephrology following, appreciate input  -IV fluids changed to sodium bicarb solution, 150 meq     Hyperkalemia  -Hold PTA potassium chloride 40 mEq daily, spironolactone 200 mg daily  - given insulin and dextrose x3 for K>6 so far this admit  - lokelma 10mg TID x6 doses starting 3/15  - low potassium diet  -Potassium today is 5, fluids changed to bicarb containing fluid  -Monitor labs     Severe alcoholic hepatitis  Severe hyperbilirubinemia from underlying liver disease  *Established with [facility]. Currently without significant ascites on examination.  *Alk phos 223, ALT 56, , total bili 9.0 (stable compared to 2/21/24)  *3/13 Abd US: negative for ascites  - [facility] following, appreciate input  - Continue PTA PPI, Lactulose, Rifaximin  - switched PTA bactrim to IV ceftriaxone on 3/15 given association with hyperkalemia  - HOLD PTA Spironolactone, Lasix  - Chem dep appreciated, patient only interested in AA at this time     Chronic HFpEF  *Echo 11/16/23 with EF 65-70%, no RWMA or any significant valvular disease  *No current complaints of shortness of breath or increased LE edema  -Holding PTA Lasix as noted above  -Monitor I&O  -Watch for worsening with IV fluids     LLE cramping  *Venous US LLE on 2/21/24 was negative  *Xray left tibia and fibula 3/11/24 no acute fracture  -Supportive cares     Alcohol dependence  History of alcohol withdrawal syndrome  *Last drink 3/11 afternoon. No hx of withdrawal seizures. He has been drinking about 1/2 pint of vodka per day.  -no signs withdrawal, CIWA monitoring has been stopped  -Multivitamin,  folic acid, thiamine supplementation  -Gabapentin taper  -stop clonidine 3/15  -Alcohol cessation encouraged     Chronic hip and back pain  -Ice or heat as needed     Severe malnutrition  Appreciate nutrition, recommendation for tube feeding  - discussed with patient on 3/14, but he doesn't seem to understand his severity of malnutrition as he feels he is eating well             Diet: Snacks/Supplements Adult: Ensure Enlive; Between Meals  Fluid restriction 1200 ML FLUID  NPO per Anesthesia Guidelines for Procedure/Surgery Except for: Meds    DVT Prophylaxis: Pneumatic Compression Devices  Bosch Catheter: Not present  Lines: None     Cardiac Monitoring: ACTIVE order. Indication: Electrolyte Imbalance (24 hours)- Magnesium <1.3 mg/ml; Potassium < =2.8 or > 5.5 mg/ml  Code Status: No CPR- Do NOT Intubate      Clinically Significant Risk Factors        # Hyperkalemia: Highest K = 6.1 mmol/L in last 2 days, will monitor as appropriate  # Hyponatremia: Lowest Na = 117 mmol/L in last 2 days, will monitor as appropriate      # Hypoalbuminemia: Lowest albumin = 3 g/dL at 3/14/2024  7:38 AM, will monitor as appropriate  # Coagulation Defect: INR = 2.24 (Ref range: 0.85 - 1.15) and/or PTT = N/A, will monitor for bleeding  # Thrombocytopenia: Lowest platelets = 78 in last 2 days, will monitor for bleeding          # Overweight: Estimated body mass index is 27.56 kg/m  as calculated from the following:    Height as of this encounter: 1.829 m (6').    Weight as of this encounter: 92.2 kg (203 lb 3.2 oz)., PRESENT ON ADMISSION  # Severe Malnutrition: based on nutrition assessment, PRESENT ON ADMISSION   # Financial/Environmental Concerns: none         Disposition Plan     Expected Discharge Date: 03/19/2024      Destination: home with family              Jane Elmore MD  Hospitalist Service  [facility]  Securely message with [employer] (more info)  Text page via [employer] Paging/Directory    ______________________________________________________________________    Interval History   Patient with no new complaints.  Was hoping to get discharged soon.  Discussed with Dr. Juan and updated patient of results of EGD and treatment plan.  Still getting IV fluids so I did mention the patient that it all depends on his labs, his clinical status.  No new nursing concerns.    Physical Exam   Vital Signs: Temp: 97.7  F (36.5  C) Temp src: Oral BP: 98/51 Pulse: 63   Resp: 17 SpO2: 91 % O2 Device: None (Room air)    Weight: 203 lbs 3.2 oz    Exam:  Constitutional: Awake, alert and no distress.  Appears jaundiced.  Appears comfortable  Head: Normocephalic. No masses, lesions, tenderness or abnormalities  ENMT: ENT exam normal, no neck nodes or sinus tenderness  Cardiovascular: RRR.  No murmurs, no rubs or JVD  Respiratory: Normal WOB,b/l equal air entry, no wheezes or crackles   Gastrointestinal: Abdomen soft, non-tender. BS normal. No masses, organomegaly  : Deferred      Medical Decision Making       45 MINUTES SPENT BY ME on the date of service doing chart review, history, exam, documentation & further activities per the note.      Data     I have personally reviewed the following data over the past 24 hrs:    8.6  \   7.7 (L)   / 79 (L)     124 (L) 97 (L) 55.9 (H) /  108 (H)   5.0 16 (L) 1.52 (H) \     ALT: 48 AST: 97 (H) AP: 225 (H) TBILI: 7.6 (H)   ALB: 3.1 (L) TOT PROTEIN: 7.0 LIPASE: N/A       Imaging results reviewed over the past 24 hrs:   No results found for this or any previous visit (from the past 24 hour(s)).  Recent Labs   Lab 03/16/24  1513 03/16/24  1044 03/15/24  2359 03/15/24  2038 03/15/24  1527 03/15/24  1407 03/15/24  1310 03/15/24  0831 03/15/24  0640 03/14/24  0947 03/14/24  0738 03/13/24  1111 03/13/24  0726 03/12/24  1745 03/12/24  1000   WBC  --  8.6  --   --   --   --   --   --  7.9  --  7.7   < >  --   --  9.2   HGB  --  7.7* 6.7*  --   --   --   --   --  6.8*   < > 7.3*   < >  --     < > 5.6*   MCV  --  96  --   --   --   --   --   --  97  --  96   < >  --   --  110*   PLT  --  79*  --   --   --   --   --   --  78*  --  85*   < >  --   --  132*   INR  --   --   --   --   --   --   --   --   --   --  2.24*  --  2.13*  --  1.94*   * 123* 118* 118* 117*  --   --    < > 117*   < > 119*   < > 117*   < > 121*   POTASSIUM  --  5.0 5.1 5.7* 5.5*  --   --    < > 6.1*   < > 6.1*  --  5.7*  --  5.5*   CHLORIDE  --  97*  --   --   --   --   --   --  94*  --  95*  --  94*  --  92*   CO2  --  16*  --   --   --   --   --   --  15*  --  16*  --  14*  --  16*   BUN  --  55.9*  --   --   --   --   --   --  47.7*  --  46.4*  --  34.9*  --  41.0*   CR  --  1.52*  --   --  1.69*  --   --   --  1.47*   < > 1.44*   < > 1.21*  --  2.16*   ANIONGAP  --  10  --   --   --   --   --   --  8  --  8  --  9  --  13   MIGUELINA  --  9.7  --   --   --   --   --   --  9.5  --  9.5  --  9.5  --  9.7   GLC  --  108*  --   --   --  115* 127*   < > 106*   < > 95  --  92  --  109*   ALBUMIN  --  3.1*  --   --   --   --   --   --   --   --  3.0*  --  3.0*  --  3.3*   PROTTOTAL  --  7.0  --   --   --   --   --   --   --   --  6.9  --  7.0  --  7.8   BILITOTAL  --  7.6*  --   --   --   --   --   --   --   --  8.7*  --  11.8*  --  9.0*   ALKPHOS  --  225*  --   --   --   --   --   --   --   --  222*  --  174*  --  223*   ALT  --  48  --   --   --   --   --   --   --   --  45  --  44  --  56   AST  --  97*  --   --   --   --   --   --   --   --  88*  --  105*  --  122*    < > = values in this interval not displayed.

## 2024-03-16 NOTE — ANESTHESIA PREPROCEDURE EVALUATION
Anesthesia Pre-Procedure Evaluation    Patient: Crispin Ham   MRN: [id number] : 1985        Procedure : Procedure(s):  ESOPHAGOGASTRODUODENOSCOPY          Past Medical History:   Diagnosis Date    Alcohol abuse     Hypertension     Substance abuse (H)       Past Surgical History:   Procedure Laterality Date    COLONOSCOPY      EGD    ORTHOPEDIC SURGERY      wrist  surgery      Allergies   Allergen Reactions    Excedrin Extra Strength [Aspirin-Acetaminophen-Caffeine]      puffy eyes and dry throat a few years ago. Tolerates ibuprofen and acetaminophen, but avoids aspirin    Nsaids Angioedema     Patient reports having a reaction of puffy eyes and dry throat a few years ago, but he has taken Advil in  and did not react.      Social History     Tobacco Use    Smoking status: Never    Smokeless tobacco: Not on file   Substance Use Topics    Alcohol use: Yes     Comment: 1.75L per day      Wt Readings from Last 1 Encounters:   03/15/24 92.2 kg (203 lb 3.2 oz)        Anesthesia Evaluation   Pt has had prior anesthetic.     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      (+)             liver disease,    (-) GERD   Renal/Genitourinary:     (+) renal disease,             Endo:    (-) Type II DM   Psychiatric/Substance Use:     (+)   alcohol abuse      Infectious Disease:       Malignancy:       Other:            Physical Exam    Airway        Mallampati: II   TM distance: > 3 FB   Neck ROM: full   Mouth opening: > 3 cm    Respiratory Devices and Support         Dental  no notable dental history     (+) Modest Abnormalities - crowns, retainers, 1 or 2 missing  "teeth      Cardiovascular   cardiovascular exam normal          Pulmonary   pulmonary exam normal                OUTSIDE LABS:  CBC:   Lab Results   Component Value Date    WBC 8.6 03/16/2024    WBC 7.9 03/15/2024    HGB 7.7 (L) 03/16/2024    HGB 6.7 (LL) 03/15/2024    HCT 21.7 (L) 03/16/2024    HCT 19.3 (L) 03/15/2024    PLT 79 (L) 03/16/2024    PLT 78 (L) 03/15/2024     BMP:   Lab Results   Component Value Date     (L) 03/16/2024     (LL) 03/15/2024    POTASSIUM 5.0 03/16/2024    POTASSIUM 5.1 03/15/2024    CHLORIDE 97 (L) 03/16/2024    CHLORIDE 94 (L) 03/15/2024    CO2 16 (L) 03/16/2024    CO2 15 (L) 03/15/2024    BUN 55.9 (H) 03/16/2024    BUN 47.7 (H) 03/15/2024    CR 1.52 (H) 03/16/2024    CR 1.69 (H) 03/15/2024     (H) 03/16/2024     (H) 03/15/2024     COAGS:   Lab Results   Component Value Date    INR 2.24 (H) 03/14/2024     POC: No results found for: \"BGM\", \"HCG\", \"HCGS\"  HEPATIC:   Lab Results   Component Value Date    ALBUMIN 3.1 (L) 03/16/2024    PROTTOTAL 7.0 03/16/2024    ALT 48 03/16/2024    AST 97 (H) 03/16/2024    ALKPHOS 225 (H) 03/16/2024    BILITOTAL 7.6 (H) 03/16/2024    JAM 82 (H) 11/18/2023     OTHER:   Lab Results   Component Value Date    PH 7.47 (H) 11/17/2023    LACT 2.4 (H) 11/17/2023    MIGUELINA 9.7 03/16/2024    PHOS 4.1 03/16/2024    MAG 1.9 03/16/2024    LIPASE 237 (H) 09/25/2023    TSH 8.43 (H) 09/08/2023    T4 1.39 09/08/2023       Anesthesia Plan    ASA Status:  3    NPO Status:  NPO Appropriate    Anesthesia Type: MAC.     - Reason for MAC: straight local not clinically adequate              Consents    Anesthesia Plan(s) and associated risks, benefits, and realistic alternatives discussed. Questions answered and patient/representative(s) expressed understanding.     - Discussed:     - Discussed with:  Patient            Postoperative Care    Pain management: Multi-modal analgesia.   PONV prophylaxis: Ondansetron (or other 5HT-3), Background Propofol " Infusion     Comments:               Prachi Jauregui MD, MD    I have reviewed the pertinent notes and labs in the chart from the past 30 days and (re)examined the patient.  Any updates or changes from those notes are reflected in this note.

## 2024-03-16 NOTE — PROVIDER NOTIFICATION
MD Notification    Notified Person: MD    Notified Person Name: Dr. Montes De Oca    Notification Date/Time: 3/16/24 4600    Notification Interaction: [employer] page. Phone call returned by on call Nephrologist Dr. Montes De Oca    Purpose of Notification: Nephrology note 3/15 stated to start Pt. On 2% NS at 30 mL/hr to slowly increase his sodium levels. Goal for NA by tomorrow to be 123-125. Sodium level today is 123. Do you want to stop IV fluids?    Orders Received: Continue IV fluids NS 2% @ 30 mL/hr    Comments:

## 2024-03-16 NOTE — ANESTHESIA CARE TRANSFER NOTE
Patient: Crispin Ham    Procedure: Procedure(s):  ESOPHAGOGASTRODUODENOSCOPY       Diagnosis: Anemia, unspecified type [D64.9]  Diagnosis Additional Information: No value filed.    Anesthesia Type:   MAC     Note:    Oropharynx: oropharynx clear of all foreign objects  Level of Consciousness: drowsy and awake  Oxygen Supplementation: nasal cannula  Level of Supplemental Oxygen (L/min / FiO2): 4  Independent Airway: airway patency satisfactory and stable  Dentition: dentition unchanged  Vital Signs Stable: post-procedure vital signs reviewed and stable  Report to RN Given: handoff report given  Patient transferred to: PACU    Handoff Report: Identifed the Patient, Identified the Reponsible Provider, Reviewed the pertinent medical history, Discussed the surgical course, Reviewed Intra-OP anesthesia mangement and issues during anesthesia, Set expectations for post-procedure period and Allowed opportunity for questions and acknowledgement of understanding      Vitals:  Vitals Value Taken Time   BP     Temp     Pulse 90 03/16/24 1346   Resp 14 03/16/24 1346   SpO2 100 % 03/16/24 1346   Vitals shown include unfiled device data.    Electronically Signed By: MARILYN Mccall CRNA  March 16, 2024  1:47 PM

## 2024-03-16 NOTE — PLAN OF CARE
Goal Outcome Evaluation:       DATE &TIME:3/15/24-3/15/24 4793-8757                                                          Cognitive Concerns/Orientation: A&Ox4   BEHAVIOR & AGGRESSION TOOL COLOR: Green, calm and cooperative.                                                             ABNL VS/O2: Hypotension improved after blood administration - MD aware). Gave 250 ml NS Bolus  CIWA SCORE: 0,0  MOBILITY: SBA w/GB & cane  PAIN MANAGMENT: endorsed hip pain, but declined intervention  DIET:  2g K, 1,200 FR. (NPO at midnight)  BOWEL/BLADDER: Continent B/B, with tea colored UOP  ABNL LAB/BG: Hgb 6.7- transfused 1 unit of blood. K+ improved to 5.1- on Q4 hr recheck.  Na 118  on Q 4 hr checks, (MD aware),  Cr 1.69  DRAIN/DEVICES: R PIV AC SL, R PIV forearm infusing 2% NS @ 30 mL/hr  TELEMETRY RHYTHM: NSR  SKIN: Dry/Jaundiced  TESTS/PROCEDURES: EGD 3/16.   D/C DAY/GOALS/PLACE: pending improvement  OTHER IMPORTANT INFO: GI and Nephrology following. Q4 NA checks.

## 2024-03-16 NOTE — PLAN OF CARE
Goal Outcome Evaluation:    Summary: Weakness, fall x2, Dizziness, Anemia, ETOH, hyponatremia, hyperkalemia, GATITO     DATE &TIME: 3/16/24 0780-9297  Cognitive Concerns/Orientation: A&O x4   BEHAVIOR & AGGRESSION TOOL COLOR: Green, calm and cooperative.                                                             ABNL VS/O2: VSS on RA, except soft BP's (MD aware)  CIWA SCORE: discontinued  MOBILITY: SBA w/GB & cane  PAIN MANAGMENT: C/O hip pain, but declined intervention  DIET:  2g K, 1,200 FR  BOWEL/BLADDER: Continent B/B, with tea colored UOP  ABNL LAB/BG: Hgb 7.7 (trending up);  (trending up); K 5.0 (trending down); BUN 55.9; Cr 1.52; Bili Total 7.6; PLT 79  DRAIN/DEVICES: L PIV SL, R PIV infusing Sodium bicarb 150 mEq/L D5W @ 100 mL (x1 Liter only)  TELEMETRY RHYTHM: NSR  SKIN: Dry/Jaundiced  TESTS/PROCEDURES: none new  D/C DAY/GOALS/PLACE: pending improvement  OTHER IMPORTANT INFO: GI and Nephrology following. Q4 NA checks. EGD completed 3/16 with no significant findings

## 2024-03-16 NOTE — PROGRESS NOTES
Renal Medicine Progress Note            Assessment/Plan:     38 year old male with alcoholic cirrhosis, HTN, HFpEF, substance abuse who was admitted on 3/12/2024 acute blood loss anemia.     # Severe acute kidney inury  # Severe hyponatremia: Improving appropriately with 2% NS. Repeat urina sodium is <20-30.   # Alcoholic liver cirrhosis:   # Acute alcoholic hepatitis   # Acute on chronic anemia  # FEN: no peripheral edema. Hyperkalemia normalized    Plan:  # Stop 2% NS. Start d5 + 150 meq bicarb at 100 ml/hr x 1 liter  # Goal Na correction 6-8 me/24 hrs. Cont serial Na check.  # Continue free water restriction  # Normalizing bicarb will help with hyperkalemia        Interval History:     Pt is eating lunch.   No questions or concerns from him  Sodium improving appropriately          Medications and Allergies:      [START ON 3/17/2024] cefTRIAXone  2 g Intravenous Q24H    folic acid  1 mg Oral Daily    [Held by provider] furosemide  40 mg Oral BID    gabapentin  100 mg Oral Q8H    lactulose  20 g Oral BID    magnesium oxide  400 mg Oral Daily    multivitamin w/minerals  1 tablet Oral Daily    pantoprazole  40 mg Oral BID    [Held by provider] potassium chloride  40 mEq Oral Daily    rifaximin  550 mg Oral BID    sodium bicarbonate  1,300 mg Oral BID    sodium chloride (PF)  3 mL Intracatheter Q8H    sodium zirconium cyclosilicate  10 g Oral TID    Followed by    [START ON 3/17/2024] sodium zirconium cyclosilicate  10 g Oral Daily    [Held by provider] spironolactone  200 mg Oral Daily    [Held by provider] sulfamethoxazole-trimethoprim  1 tablet Oral Daily        Allergies   Allergen Reactions    Excedrin Extra Strength [Aspirin-Acetaminophen-Caffeine]      puffy eyes and dry throat a few years ago. Tolerates ibuprofen and acetaminophen, but avoids aspirin    Nsaids Angioedema     Patient reports having a reaction of puffy eyes and dry throat a few years ago, but he has taken Advil in 2023 and did not react.             Physical Exam:   Vitals were reviewed   , Blood pressure 109/63, pulse 96, temperature 97.5  F (36.4  C), temperature source Oral, resp. rate 16, height 1.829 m (6'), weight 92.2 kg (203 lb 3.2 oz), SpO2 100%.    Wt Readings from Last 3 Encounters:   03/15/24 92.2 kg (203 lb 3.2 oz)   02/21/24 108.9 kg (240 lb)   11/24/23 112.9 kg (248 lb 12.8 oz)       Intake/Output Summary (Last 24 hours) at 3/16/2024 1549  Last data filed at 3/16/2024 1540  Gross per 24 hour   Intake 2701 ml   Output 1550 ml   Net 1151 ml       GENERAL APPEARANCE: pleasant, NAD, alert  HEENT:  Eyes/ears/nose/neck grossly normal  RESP: lungs cta b c good efforts, no crackles, rhonchi or wheezes  CV: RRR, nl S1/S2,   ABDOMEN: Soft, NT  EXTREMITIES/SKIN: no rashes/lesions on observed skin; no edema  NEURO: Awake, alert and conversing normally         Data:     CBC RESULTS:     Recent Labs   Lab 03/16/24  1044 03/15/24  2359 03/15/24  0640 03/14/24  1812 03/14/24  0738 03/13/24  1805 03/13/24  1111 03/12/24  2259 03/12/24  1000   WBC 8.6  --  7.9  --  7.7  --  7.0  --  9.2   RBC 2.27*  --  1.99*  --  2.16*  --  1.96*  --  1.43*   HGB 7.7* 6.7* 6.8* 7.2* 7.3* 7.4* 6.8*   < > 5.6*   HCT 21.7*  --  19.3*  --  20.7*  --  19.3*  --  15.7*   PLT 79*  --  78*  --  85*  --  88*  --  132*    < > = values in this interval not displayed.       Basic Metabolic Panel:  Recent Labs   Lab 03/16/24  1044 03/15/24  2359 03/15/24  2038 03/15/24  1527 03/15/24  1407 03/15/24  1310 03/15/24  1219 03/15/24  1108 03/15/24  1054 03/15/24  0831 03/15/24  0640 03/14/24  2104 03/14/24  1812 03/14/24  0947 03/14/24  0738 03/14/24  0045 03/13/24  1805 03/13/24  1111 03/13/24  0726 03/12/24  1745 03/12/24  1000   * 118* 118* 117*  --   --   --   --  118*  --  117*   < > 118*   < > 119*   < > 118*   < > 117*   < > 121*   POTASSIUM 5.0 5.1 5.7* 5.5*  --   --   --   --  5.5*  --  6.1*   < > 6.0*   < > 6.1*  --   --   --  5.7*  --  5.5*   CHLORIDE 97*  --   --   --   --    --   --   --   --   --  94*  --   --   --  95*  --   --   --  94*  --  92*   CO2 16*  --   --   --   --   --   --   --   --   --  15*  --   --   --  16*  --   --   --  14*  --  16*   BUN 55.9*  --   --   --   --   --   --   --   --   --  47.7*  --   --   --  46.4*  --   --   --  34.9*  --  41.0*   CR 1.52*  --   --  1.69*  --   --   --   --   --   --  1.47*  --  1.54*  --  1.44*  --  1.29*  --  1.21*  --  2.16*   *  --   --   --  115* 127* 113* 117* 108*   < > 106*   < >  --    < > 95  --   --   --  92  --  109*   MIGUELINA 9.7  --   --   --   --   --   --   --   --   --  9.5  --   --   --  9.5  --   --   --  9.5  --  9.7    < > = values in this interval not displayed.       INR  Recent Labs   Lab 03/14/24  0738 03/13/24  0726 03/12/24  1000   INR 2.24* 2.13* 1.94*      Attestation:   I have reviewed today's relevant vital signs, notes, medications, labs and imaging.    Emir Degroot MD  [facility]  Office phone :[phone]  Pager: [phone]

## 2024-03-16 NOTE — ANESTHESIA POSTPROCEDURE EVALUATION
Patient: Crispin Ham    Procedure: Procedure(s):  ESOPHAGOGASTRODUODENOSCOPY       Anesthesia Type:  MAC    Note:     Postop Pain Control: Uneventful            Sign Out: Well controlled pain   PONV: No   Neuro/Psych: Uneventful            Sign Out: Acceptable/Baseline neuro status   Airway/Respiratory: Uneventful            Sign Out: Acceptable/Baseline resp. status   CV/Hemodynamics: Uneventful            Sign Out: Acceptable CV status; No obvious hypovolemia; No obvious fluid overload   Other NRE:    DID A NON-ROUTINE EVENT OCCUR? No           Last vitals:  Vitals Value Taken Time   /61 03/16/24 1415   Temp     Pulse 90 03/16/24 1425   Resp 18 03/16/24 1425   SpO2 100 % 03/16/24 1426   Vitals shown include unfiled device data.    Electronically Signed By: Prachi Jauregui MD, MD  March 16, 2024  2:53 PM

## 2024-03-17 NOTE — PROGRESS NOTES
Brief House CALEB Note    I was asked to review Mr. Ham for an episode of hypotension at 21:40 pm.     NIBPs 86/41 & 84/38 around 20:30/21:00 pm this evening, one hour prior to notification. Patient was asymptomatic at the time and asked to go for a walk around the unit which was tolerated well.     At 22:00 pm on my arrival pt lying supine in hospital bed in no acute distress. Alert and oriented. Stated feelings of fatigue but otherwise no dizziness, chest pain, sob, or discomfort. Denies hematochezia or hematuria. No vomiting. Skin is yellow, warm and dry. NIBP 93/39 on left arm,  and regular.     INTERVENTIONS:  -stat hgb check if <7.0 will transfuse x1u PRBCs   -Re check BP at 0200   -RN to notify nephrology regarding sodium drop from 124 to 121 with sodium bicarb gtt infusing       Physical Exam  Constitutional:       General: He is awake.      Appearance: He is ill-appearing. He is not toxic-appearing or diaphoretic.   HENT:      Mouth/Throat:      Mouth: Mucous membranes are moist.   Eyes:      Pupils: Pupils are equal, round, and reactive to light.   Cardiovascular:      Rate and Rhythm: Regular rhythm. Tachycardia present.      Pulses: Normal pulses.      Heart sounds: Normal heart sounds.   Pulmonary:      Effort: Pulmonary effort is normal.      Breath sounds: Normal breath sounds.   Abdominal:      General: Bowel sounds are increased. There is no distension.      Palpations: Abdomen is soft.      Tenderness: There is no abdominal tenderness.   Skin:     General: Skin is warm and dry.      Capillary Refill: Capillary refill takes 2 to 3 seconds.      Coloration: Skin is jaundiced.   Neurological:      Mental Status: He is alert.      GCS: GCS eye subscore is 4. GCS verbal subscore is 5. GCS motor subscore is 6.   Psychiatric:         Attention and Perception: Attention normal.         Mood and Affect: Mood normal.         Speech: Speech normal.         Behavior: Behavior normal. Behavior is  cooperative.         Lakeisha Bowden NP  [facility]  Securely message with the [employer] Web Console (learn more here)  Text page via [employer] Paging/Directory    I spent 25 mins at the bedside and on the unit planning and coordinating the care and services outlined in this note.

## 2024-03-17 NOTE — PROGRESS NOTES
"MD Notification    Notified Person: MD    Notified Person Name: Emir Degroot MD     Notification Date/Time: 3/16/24 ,1025    Notification Interaction: Telephone call back    Purpose of Notification: Low Na of 121 from 124    Orders Received:     Comments: Instructed to recheck Na at 2am, and if its 122 or low to page the hospitalist and tell them that I spoke with the on-call Nephrologist. \"To stop current IV fluids and restart 2% NS at 30 ml/r\"  "

## 2024-03-17 NOTE — PLAN OF CARE
Goal Outcome Evaluation:       DATE &TIME: 3/16/24-3/17/24 9700-9922  Cognitive Concerns/Orientation: A&O x4   BEHAVIOR & AGGRESSION TOOL COLOR: Green, calm and cooperative.                                                             ABNL VS/O2: VSS on RA, except Hypotension (MD aware). BP improved to low 100's after blood transfusion  CIWA SCORE: NA  MOBILITY: SBA w/GB & cane. Patient ambulated the unit. Appears fatigue but denies lightheadedness or dizziness  PAIN MANAGMENT: denies  DIET:  2g K, 1,200 FR  BOWEL/BLADDER: Continent B/B, with tea colored UOP  ABNL LAB/BG: Hgb 7.5, Na down to 121, 122 (MD notified) on Q4hr checks, BUN 43.8; Bili Total 7.1; PLT 70  DRAIN/DEVICES:PIV x2, SL  TELEMETRY RHYTHM: SR  SKIN: Dry/Jaundiced  TESTS/PROCEDURES: none new  D/C DAY/GOALS/PLACE: pending improvement  OTHER IMPORTANT INFO: GI and Nephrology following.  EGD completed 3/16 with no significant findings    Patient received x1 unit of PRBCs for Hgb of 7.0

## 2024-03-17 NOTE — PROGRESS NOTES
Renal Medicine Progress Note            Assessment/Plan:     38 year old male with alcoholic cirrhosis, HTN, HFpEF, substance abuse who was admitted on 3/12/2024 acute blood loss anemia.      # Severe acute kidney inury  # Severe hyponatremia: Improving appropriately with 2% NS. Repeat urina sodium is <20-30.   # Alcoholic liver cirrhosis:   # Acute alcoholic hepatitis   # Acute on chronic anemia  # FEN: no peripheral edema. Hyperkalemia normalized     Plan:  # NS at 125 ml/hr  # Cont serial Na check. Call us if Na <123 and or >130        Interval History:     Afebrile. Tachy.  BP is soft.  Excellent urine output.  GATITO improved nicely.  Urine Na <20         Medications and Allergies:      cefTRIAXone  2 g Intravenous Q24H    folic acid  1 mg Oral Daily    [Held by provider] furosemide  40 mg Oral BID    gabapentin  100 mg Oral Q8H    lactulose  20 g Oral BID    magnesium oxide  400 mg Oral Daily    multivitamin w/minerals  1 tablet Oral Daily    pantoprazole  40 mg Oral BID    [Held by provider] potassium chloride  40 mEq Oral Daily    rifaximin  550 mg Oral BID    sodium bicarbonate  1,300 mg Oral BID    sodium chloride (PF)  3 mL Intracatheter Q8H    sodium zirconium cyclosilicate  10 g Oral Daily    [Held by provider] spironolactone  200 mg Oral Daily    [Held by provider] sulfamethoxazole-trimethoprim  1 tablet Oral Daily        Allergies   Allergen Reactions    Excedrin Extra Strength [Aspirin-Acetaminophen-Caffeine]      puffy eyes and dry throat a few years ago. Tolerates ibuprofen and acetaminophen, but avoids aspirin    Nsaids Angioedema     Patient reports having a reaction of puffy eyes and dry throat a few years ago, but he has taken Advil in 2023 and did not react.            Physical Exam:   Vitals were reviewed   , Blood pressure 104/57, pulse 105, temperature 97.8  F (36.6  C), temperature source Oral, resp. rate 16, height 1.829 m (6'), weight 96.8 kg (213 lb 8 oz), SpO2 100%.    Wt Readings from  Last 3 Encounters:   03/17/24 96.8 kg (213 lb 8 oz)   02/21/24 108.9 kg (240 lb)   11/24/23 112.9 kg (248 lb 12.8 oz)       Intake/Output Summary (Last 24 hours) at 3/17/2024 1305  Last data filed at 3/17/2024 0855  Gross per 24 hour   Intake 2430 ml   Output 1350 ml   Net 1080 ml     GENERAL APPEARANCE: pleasant, NAD, alert  HEENT:  Eyes/ears/nose/neck grossly normal  RESP: lungs cta b c good efforts, no crackles, rhonchi or wheezes  CV: RRR, nl S1/S2,   ABDOMEN: Soft, NT  EXTREMITIES/SKIN: no rashes/lesions on observed skin; no edema  NEURO: Awake, alert and conversing normally         Data:     CBC RESULTS:     Recent Labs   Lab 03/17/24  1133 03/17/24  0536 03/16/24  2258 03/16/24  1044 03/15/24  2359 03/15/24  0640 03/14/24  1812 03/14/24  0738 03/13/24  1805 03/13/24  1111 03/12/24  2259 03/12/24  1000   WBC  --  4.9  --  8.6  --  7.9  --  7.7  --  7.0  --  9.2   RBC  --  2.26*  --  2.27*  --  1.99*  --  2.16*  --  1.96*  --  1.43*   HGB 8.3* 7.5* 7.0* 7.7* 6.7* 6.8*   < > 7.3*   < > 6.8*   < > 5.6*   HCT  --  21.1*  --  21.7*  --  19.3*  --  20.7*  --  19.3*  --  15.7*   PLT  --  70*  --  79*  --  78*  --  85*  --  88*  --  132*    < > = values in this interval not displayed.       Basic Metabolic Panel:  Recent Labs   Lab 03/17/24  0536 03/16/24  2258 03/16/24  2057 03/16/24  1847 03/16/24  1513 03/16/24  1044 03/15/24  2359 03/15/24  2038 03/15/24  1527 03/15/24  1407 03/15/24  1310 03/15/24  1219 03/15/24  1108 03/15/24  1054 03/15/24  0831 03/15/24  0640 03/14/24  2104 03/14/24  1812 03/14/24  0947 03/14/24  0738 03/13/24  1111 03/13/24  0726 03/12/24  1745 03/12/24  1000   * 122* 121* 124* 124* 123* 118* 118* 117*  --   --   --   --  118*  --  117*   < > 118*   < > 119*   < > 117*   < > 121*   POTASSIUM 4.3  --   --   --   --  5.0 5.1 5.7* 5.5*  --   --   --   --  5.5*  --  6.1*   < > 6.0*   < > 6.1*  --  5.7*  --  5.5*   CHLORIDE 97*  --   --   --   --  97*  --   --   --   --   --   --   --   --    --  94*  --   --   --  95*  --  94*  --  92*   CO2 19*  --   --   --   --  16*  --   --   --   --   --   --   --   --   --  15*  --   --   --  16*  --  14*  --  16*   BUN 43.8*  --   --   --   --  55.9*  --   --   --   --   --   --   --   --   --  47.7*  --   --   --  46.4*  --  34.9*  --  41.0*   CR 1.03  --   --   --   --  1.52*  --   --  1.69*  --   --   --   --   --   --  1.47*  --  1.54*  --  1.44*   < > 1.21*  --  2.16*   *  --   --   --   --  108*  --   --   --  115* 127* 113* 117* 108*   < > 106*   < >  --    < > 95  --  92  --  109*   MIGUELINA 9.0  --   --   --   --  9.7  --   --   --   --   --   --   --   --   --  9.5  --   --   --  9.5  --  9.5  --  9.7    < > = values in this interval not displayed.       INR  Recent Labs   Lab 03/14/24  0738 03/13/24  0726 03/12/24  1000   INR 2.24* 2.13* 1.94*      Attestation:   I have reviewed today's relevant vital signs, notes, medications, labs and imaging.    Emir Degroot MD  [facility] Nephrology  Office phone :[phone]  Pager: [phone]

## 2024-03-17 NOTE — PLAN OF CARE
Goal Outcome Evaluation:    Summary: Weakness, fall x2, Dizziness, Anemia, ETOH, hyponatremia, hyperkalemia, GATITO     DATE &TIME: 3/17/24 9911-0296  Cognitive Concerns/Orientation: A&O x4   BEHAVIOR & AGGRESSION TOOL COLOR: Green, calm and cooperative.                                                             ABNL VS/O2: VSS on RA, except for tachy at 105-115. Soft BP's (MD aware).  CIWA SCORE: NA  MOBILITY: SBA w/GB & cane. Patient ambulated the unit. Appears fatigue but denies lightheadedness or dizziness  PAIN MANAGMENT: denies  DIET:  2g K, 1,200 FR  BOWEL/BLADDER: Continent B/B. x1 small/bloody stool this shift  ABNL LAB/BG: Hgb 8.3, 7.4 (Q6hr check); , 124 (Q4hr check - to page Neph if <123 and >130 per note); BUN 43.8; Bili Total 7.1; PLT 70  DRAIN/DEVICES: L PIV SL, R PIV infusing NS @125 mL/hr  TELEMETRY RHYTHM: Sinus Tachy  SKIN: Dry/Jaundiced  TESTS/PROCEDURES: none new  D/C DAY/GOALS/PLACE: pending improvement  OTHER IMPORTANT INFO: GI and Nephrology following. Psych consulted.

## 2024-03-17 NOTE — PROGRESS NOTES
Blood Transfusion      Patient's Hgb dropped to 7.0, MD notified and he received 1 unit of PRBC which was completed with no reactions noted. Will continue to monitor patient.

## 2024-03-17 NOTE — PROGRESS NOTES
[facility]    Medicine Progress Note - Hospitalist Service    Date of Admission:  3/12/2024    Assessment & Plan   Crispin Ham is a 38 year old male with PMH of alcohol abuse, chronic liver cirrhosis, hx SBP, substance abuse, HTN, chronic HFpEF, hx pleural effusion s/p b/l thoracentesis 11/2023 who presented to the ED on 3/12/24 for evaluation of abnormal labs.     Patient saw his PCP the day prior to admission for LLE cramping, fatigue and lightheadedness x 1 month. His blood work came back with hgb 6.1 and he was referred to the ED. Hospital stay complicated by persistent hyperkalemia, hyponatremia and need for blood transfusions.     Acute on chronic anemia  Coagulopathy of liver disease  History of duodenitis/ulcer (09/2023)  *Hgb 5.6, down from 6.9 on 2/21/24 previously running around 7.4-8. Platelets stable at 132.  *Endorses nosebleeds every 3 days or so which can be somewhat prolonged with his hx coagulopathy. No current active bleeding.  *Denies hematemesis, melena, hematochezia. He does have some nausea.  *Symptomatic with complaints of positional dizziness, fatigue. Declined rectal examination in the ED.  *Last EGD 9/2/23 with esophageal mucosal tear with bleeding (complication of clip placement) hemostasis achieved with bipolar probe. Portal hypertensive gastropathy.  -3/12-3/13 transfused 4U PRBCs, 3/15 1U PRBCs, 3/17 early morning 1U PRBC transfusion  [facility] following, appreciate input.  -Status post EGD 3/16 which showed portal hypertensive gastropathy, grade 1 and small esophageal varices with no stigmata of bleeding  -Due to drop in hemoglobin will check hemoglobin every 8 hours to make sure it remains stable  - Continue PTA Protonix 40 mg BID  - conditional transfusion orders in place for hgb<7     Acute kidney injury   Hyponatremia  Hyperkalemia  *Na 121 on admission, was 130 on 2/21/24 but otherwise was running 124-129 in the fall. Felt to be somewhat volume  depleted on examination, received fluids, but sodium has bounced between 117-122.  Creatinine 2.16 on admission, was 0.59 on 2/21/2024 potassium had increased up to 5.7  - Holding PTA Aldactone, potassium chloride, Lasix  -Treated with 2% NS @30ml/hr started on 3/15-3/16 and then with 150 meq sodium bicarb solution 3/16-3/17  -Treated with Lokelma, low potassium, fluid restriction  -Sodium most recent 123, creatinine has improved to 1.03 and potassium 4.3  -Nephrology following, appreciate input  - fluid restriction 1200ml, low potassium diet  -Patient now started on normal saline, fluid management per nephrology     Severe alcoholic hepatitis  Severe hyperbilirubinemia from underlying liver disease  Alcohol dependence with ongoing alcohol use disorder  History of alcohol withdrawal  *Established with [facility]. Currently without significant ascites on examination.  Last drink 3/11 afternoon, no history of withdrawal seizure and due to no signs of withdrawal CIWA monitoring has been stopped  *Alk phos 223, ALT 56, , total bili 9.0 (stable compared to 2/21/24)  *3/13 Abd US: negative for ascites  - [facility] following, appreciate input  - Continue PTA PPI, Lactulose, Rifaximin  - switched PTA bactrim to IV ceftriaxone on 3/15 given association with hyperkalemia  - HOLD PTA Spironolactone, Lasix  - Chem dep appreciated, patient only interested in AA at this time  -Continue multivitamin folic acid and thiamine supplementation, gabapentin taper  -I had a long discussion with patient's mother who was worried that alcohol Anonymous may not be enough and he might need inpatient treatment.  Hoping to get commitment started so patient can get help  -Psychiatry consulted to see if commitment can be started     Chronic HFpEF  *Echo 11/16/23 with EF 65-70%, no RWMA or any significant valvular disease  *No current complaints of shortness of breath or increased LE edema  -Holding PTA Lasix as noted above  -Monitor I&O  -Watch for  worsening with IV fluids     LLE cramping  *Venous US LLE on 2/21/24 was negative  *Xray left tibia and fibula 3/11/24 no acute fracture  -Supportive cares        Chronic hip and back pain  -Ice or heat as needed     Severe malnutrition  Appreciate nutrition, recommendation for tube feeding  -Patient not interested     Diet: Snacks/Supplements Adult: Ensure Enlive; Between Meals  Fluid restriction 1200 ML FLUID  2 Gram K Diet    DVT Prophylaxis: Pneumatic Compression Devices  Bosch Catheter: Not present  Lines: None     Cardiac Monitoring: None  Code Status: No CPR- Do NOT Intubate      Clinically Significant Risk Factors        # Hyperkalemia: Highest K = 5.7 mmol/L in last 2 days, will monitor as appropriate  # Hyponatremia: Lowest Na = 117 mmol/L in last 2 days, will monitor as appropriate   # Hypercalcemia: corrected calcium is >10.1, will monitor as appropriate    # Hypoalbuminemia: Lowest albumin = 2.7 g/dL at 3/17/2024  5:36 AM, will monitor as appropriate     # Thrombocytopenia: Lowest platelets = 70 in last 2 days, will monitor for bleeding          # Overweight: Estimated body mass index is 28.96 kg/m  as calculated from the following:    Height as of this encounter: 1.829 m (6').    Weight as of this encounter: 96.8 kg (213 lb 8 oz).   # Severe Malnutrition: based on nutrition assessment      # Financial/Environmental Concerns: none         Disposition Plan     Expected Discharge Date: 03/19/2024      Destination: home with family              Jane Elmore MD  Hospitalist Service  [facility]  Securely message with [employer] (more info)  Text page via [employer] Paging/Directory   ______________________________________________________________________    Interval History   Patient was hoping to get discharged today.  Updated him that given blood transfusion last night and ongoing low sodium continues to be monitored while in the hospital and further transfusion as needed.  He denies any  further bleeding though.  Patient later wanted me to call and update his mom who I called and updated.  Mother very worried that he might relapse if he gets home.  Any kind of help will be useful per the mother.  Mother is hoping that he go to inpatient rehab    Physical Exam   Vital Signs: Temp: 98.7  F (37.1  C) Temp src: Oral BP: 100/63 Pulse: 113   Resp: 18 SpO2: 93 % O2 Device: None (Room air)    Weight: 213 lbs 8 oz    Exam:  Constitutional: Awake, alert and no distress.  Appears jaundiced.  Appears comfortable  Head: Normocephalic. No masses, lesions, tenderness or abnormalities  ENMT: ENT exam normal, no neck nodes or sinus tenderness  Cardiovascular: RRR.  No murmurs, no rubs or JVD  Respiratory: Normal WOB,b/l equal air entry, no wheezes or crackles   Gastrointestinal: Abdomen soft, non-tender. BS normal. No masses, organomegaly  : Deferred      Medical Decision Making       48 MINUTES SPENT BY ME on the date of service doing chart review, history, exam, documentation & further activities per the note.      Data     I have personally reviewed the following data over the past 24 hrs:    4.9  \   7.5 (L)   / 70 (L)     123 (L) 97 (L) 43.8 (H) /  106 (H)   4.3 19 (L) 1.03 \     ALT: 45 AST: 91 (H) AP: 182 (H) TBILI: 7.1 (H)   ALB: 2.7 (L) TOT PROTEIN: 6.1 (L) LIPASE: N/A       Imaging results reviewed over the past 24 hrs:   No results found for this or any previous visit (from the past 24 hour(s)).  Recent Labs   Lab 03/17/24  0536 03/16/24  2258 03/16/24  2057 03/16/24  1513 03/16/24  1044 03/15/24  2359 03/15/24  2038 03/15/24  1527 03/15/24  1407 03/15/24  0831 03/15/24  0640 03/14/24  0947 03/14/24  0738 03/13/24  1111 03/13/24  0726 03/12/24  1745 03/12/24  1000   WBC 4.9  --   --   --  8.6  --   --   --   --   --  7.9  --  7.7   < >  --   --  9.2   HGB 7.5* 7.0*  --   --  7.7* 6.7*  --   --   --   --  6.8*   < > 7.3*   < >  --    < > 5.6*   MCV 93  --   --   --  96  --   --   --   --   --  97  --  96    < >  --   --  110*   PLT 70*  --   --   --  79*  --   --   --   --   --  78*  --  85*   < >  --   --  132*   INR  --   --   --   --   --   --   --   --   --   --   --   --  2.24*  --  2.13*  --  1.94*   * 122* 121*   < > 123* 118*   < > 117*  --    < > 117*   < > 119*   < > 117*   < > 121*   POTASSIUM 4.3  --   --   --  5.0 5.1   < > 5.5*  --    < > 6.1*   < > 6.1*  --  5.7*  --  5.5*   CHLORIDE 97*  --   --   --  97*  --   --   --   --   --  94*  --  95*  --  94*  --  92*   CO2 19*  --   --   --  16*  --   --   --   --   --  15*  --  16*  --  14*  --  16*   BUN 43.8*  --   --   --  55.9*  --   --   --   --   --  47.7*  --  46.4*  --  34.9*  --  41.0*   CR 1.03  --   --   --  1.52*  --   --  1.69*  --   --  1.47*   < > 1.44*   < > 1.21*  --  2.16*   ANIONGAP 7  --   --   --  10  --   --   --   --   --  8  --  8  --  9  --  13   MIGUELINA 9.0  --   --   --  9.7  --   --   --   --   --  9.5  --  9.5  --  9.5  --  9.7   *  --   --   --  108*  --   --   --  115*   < > 106*   < > 95  --  92  --  109*   ALBUMIN 2.7*  --   --   --  3.1*  --   --   --   --   --   --   --  3.0*  --  3.0*  --  3.3*   PROTTOTAL 6.1*  --   --   --  7.0  --   --   --   --   --   --   --  6.9  --  7.0  --  7.8   BILITOTAL 7.1*  --   --   --  7.6*  --   --   --   --   --   --   --  8.7*  --  11.8*  --  9.0*   ALKPHOS 182*  --   --   --  225*  --   --   --   --   --   --   --  222*  --  174*  --  223*   ALT 45  --   --   --  48  --   --   --   --   --   --   --  45  --  44  --  56   AST 91*  --   --   --  97*  --   --   --   --   --   --   --  88*  --  105*  --  122*    < > = values in this interval not displayed.

## 2024-03-18 NOTE — PROGRESS NOTES
[facility]    Nephrology Progress Note     Assessment & Plan     38 year old male with alcoholic cirrhosis, HTN, HFpEF, substance abuse who was admitted on 3/12/2024 acute blood loss anemia.      Severe acute kidney inury - better.  Cr down to 0.8  Severe hyponatremia: Improving appropriately with 2% NS. Repeat urina sodium is <20-30. Na up to 127 then to 125.  It appears Uosm is high enough that isotonic saline may not raise the serum sodium level.    Alcoholic liver cirrhosis:   Acute alcoholic hepatitis   Acute on chronic anemia  FEN: no peripheral edema. Hyperkalemia normalized     Plan:    Hold normal saline.  Check sodium in AM  Continue fluid restriction.    Will check AM cortisol given presentation with hyponatremia and hypokalemia - though admittedly this is common in heavy alcohol users.    Holding diuretics.         Jose Nelson MD  [facility] - Nephrology  [phone]    Interval History     Na up to 127 then to 125  He is on normal saline at 125 mL/hour.    U osm  done 3/15 was 389. Susie < 20.      Physical Exam   Temp: 98.4  F (36.9  C) Temp src: Oral BP: 110/59 Pulse: 96   Resp: 16 SpO2: 98 % O2 Device: None (Room air)    Vitals:    03/14/24 0633 03/15/24 0643 03/17/24 0608   Weight: 89.4 kg (197 lb 3.2 oz) 92.2 kg (203 lb 3.2 oz) 96.8 kg (213 lb 8 oz)     Vital Signs with Ranges  Temp:  [97.8  F (36.6  C)-98.4  F (36.9  C)] 98.4  F (36.9  C)  Pulse:  [] 96  Resp:  [16-18] 16  BP: ()/(42-66) 110/59  SpO2:  [98 %-99 %] 98 %  I/O last 3 completed shifts:  In: 2343.75 [P.O.:400; I.V.:1943.75]  Out: 1025 [Urine:1025]    GENERAL APPEARANCE: pleasant, NAD, a & o  HEENT:  Eyes/ears/nose/neck grossly normal  RESP: lungs cta b c good efforts, no crackles, rhonchi or wheezes  CV: RRR, nl S1/S2, no m/r/g   ABDOMEN: o/s/nt/nd, bs present  EXTREMITIES/SKIN: no rashes/lesions; no edema    Medications    [Held by provider] sodium chloride 125 mL/hr at 03/18/24 0585       cefTRIAXone  2 g Intravenous Q24H    folic acid  1 mg Oral Daily    [Held by provider] furosemide  40 mg Oral BID    lactulose  20 g Oral BID    magnesium oxide  400 mg Oral Daily    multivitamin w/minerals  1 tablet Oral Daily    pantoprazole  40 mg Oral BID    [Held by provider] potassium chloride  40 mEq Oral Daily    rifaximin  550 mg Oral BID    sodium chloride (PF)  3 mL Intracatheter Q8H    sodium zirconium cyclosilicate  10 g Oral Daily    [Held by provider] spironolactone  200 mg Oral Daily    [Held by provider] sulfamethoxazole-trimethoprim  1 tablet Oral Daily       Data   BMP  Recent Labs   Lab 03/18/24  1403 03/18/24  1108 03/18/24  0449 03/18/24  0049 03/17/24  1744 03/17/24  0536 03/16/24  1513 03/16/24  1044 03/15/24  2359 03/15/24  2038 03/15/24  1527 03/15/24  1407 03/15/24  0831 03/15/24  0640   * 127* 124*  124* 124*   < > 123*   < > 123* 118*   < > 117*  --    < > 117*   POTASSIUM  --   --  4.5  --   --  4.3  --  5.0 5.1   < > 5.5*  --    < > 6.1*   CHLORIDE  --   --  97*  --   --  97*  --  97*  --   --   --   --   --  94*   MIGUELINA  --   --  8.6  --   --  9.0  --  9.7  --   --   --   --   --  9.5   CO2  --   --  19*  --   --  19*  --  16*  --   --   --   --   --  15*   BUN  --   --  33.2*  --   --  43.8*  --  55.9*  --   --   --   --   --  47.7*   CR  --   --  0.80  --   --  1.03  --  1.52*  --   --  1.69*  --   --  1.47*   GLC  --   --  107*  --   --  106*  --  108*  --   --   --  115*   < > 106*    < > = values in this interval not displayed.     Phos@[email or web address](phos:4)  CBC)  Recent Labs   Lab 03/18/24  1108 03/18/24  0449 03/18/24  0049 03/17/24  1744 03/17/24  1133 03/17/24  0536 03/16/24  2258 03/16/24  1044   WBC 5.1 4.9  --   --   --  4.9  --  8.6   HGB 7.9* 7.0* 7.1* 7.4*   < > 7.5*   < > 7.7*   HCT 22.9* 19.6*  --   --   --  21.1*  --  21.7*   MCV 94 93  --   --   --  93  --  96   PLT 65* 69*  --   --   --  70*  --  79*    < > = values in this interval not displayed.      Recent Labs   Lab 03/17/24  0536   AST 91*   ALT 45   ALKPHOS 182*   BILITOTAL 7.1*     Recent Labs   Lab 03/18/24  1108   INR 2.22*         Attestation:   I have reviewed today's relevant vital signs, notes, medications, labs and imaging.

## 2024-03-18 NOTE — PROGRESS NOTES
Gastroenterology Follow Up Note    Crispin Ham MRN#  [id number]   Age:  38 year old YOB: 1985    Date of Admission:  3/12/2024     Subjective   Hemoglobin decreased to 7.0 g/dL overnight with 1 unit of pRBC transfusion.  Patient reports two bowel movements today, each with dark brown stools without any evidence of christiano hematochezia or melena.  Denies any ongoing abdominal pain, nausea, or emesis.  Hoping for discharge to home tomorrow.       MEDICATIONS & ALLERGIES   Current medication list reviewed.      Allergies   Allergen Reactions    Excedrin Extra Strength [Aspirin-Acetaminophen-Caffeine]      puffy eyes and dry throat a few years ago. Tolerates ibuprofen and acetaminophen, but avoids aspirin    Nsaids Angioedema     Patient reports having a reaction of puffy eyes and dry throat a few years ago, but he has taken Advil in 2023 and did not react.          OBJECTIVE   Vitals BP 96/42 (BP Location: Right arm, Patient Position: Semi-Gibson's, Cuff Size: Adult Regular)   Pulse 104   Temp 97.8  F (36.6  C) (Oral)   Resp 18   Ht 1.829 m (6')   Wt 96.8 kg (213 lb 8 oz)   SpO2 98%   BMI 28.96 kg/m          General:   Well-developed young CM in NAD.   HEENT:   NC/AT. MMM. Icteric sclerae.   Lungs:  No respiratory distress.   Heart:  RRR. +S1, S2.    Abdomen:   Soft. Mild distention without evidence of tense ascites. No tenderness. BS active.    Ext/MS:   No cyanosis or erythema.    Neuro:   No focal deficits noted. No asterixis.   Skin:  Diffuse jaundice. No other obvious rashes or lesions noted.         LABORATORY AND IMAGING    ELECTROLYTE PANEL   Recent Labs   Lab Test 03/17/24  1744 03/17/24  0536 03/16/24  2258 03/16/24  1513 03/16/24  1044 03/15/24  2359 03/15/24  2038 03/15/24  1527 03/15/24  1054 03/15/24  0640   * 123* 122*   < > 123* 118*   < > 117*   < > 117*   POTASSIUM  --  4.3  --   --  5.0 5.1   < > 5.5*   < > 6.1*   BUN  --  43.8*  --   --  55.9*  --   --   --   --   47.7*   CR  --  1.03  --   --  1.52*  --   --  1.69*  --  1.47*   MIGUELINA  --  9.0  --   --  9.7  --   --   --   --  9.5    < > = values in this interval not displayed.      HEMATOLOGY PANEL   Recent Labs   Lab Test 03/17/24  1744 03/17/24  1133 03/17/24  0536 03/16/24  2258 03/16/24  1044 03/15/24  2359 03/15/24  0640 03/14/24  1812 03/14/24  0738 03/13/24  1111 03/13/24  0726 03/12/24  2259 03/12/24  1000   WBC  --   --  4.9  --  8.6  --  7.9  --  7.7   < >  --   --  9.2   HGB 7.4* 8.3* 7.5*   < > 7.7*   < > 6.8*   < > 7.3*   < >  --    < > 5.6*   MCV  --   --  93  --  96  --  97  --  96   < >  --   --  110*   PLT  --   --  70*  --  79*  --  78*  --  85*   < >  --   --  132*   INR  --   --   --   --   --   --   --   --  2.24*  --  2.13*  --  1.94*    < > = values in this interval not displayed.      LIVER AND PANCREAS PANEL   Recent Labs   Lab Test 03/17/24  0536 03/16/24  1044 03/14/24  0738 11/17/23  0536 11/16/23  1514 09/26/23  0724 09/25/23  1646 09/25/23  1537 09/25/23  1424 09/09/23  1119 09/08/23  1224 09/08/23  1147   ALBUMIN 2.7* 3.1* 3.0*   < > 3.1*   < >  --   --  2.4*   < >  --  2.3*   BILITOTAL 7.1* 7.6* 8.7*   < > 8.4*   < >  --   --  21.7*   < >  --  10.4*   ALT 45 48 45   < > 49   < >  --   --  69   < >  --  82*   AST 91* 97* 88*   < > 222*   < >  --    < >  --    < >  --  331*   PROTEIN  --   --   --   --  100*  --  30*  --   --   --  50*  --    LIPASE  --   --   --   --   --   --   --   --  237*  --   --  272*    < > = values in this interval not displayed.      I have reviewed the current diagnostic and laboratory tests.     Assessment / Recommendations:     Assessment:  Acute alcoholic hepatitis with likely underlying cirrhosis.  MELD-Na: 32.  Discriminant function also high, but not a candidate for steroids due to concern for GI bleeding.  Acute on chronic anemia.  EGD on 03/16 with portal hypertensive gastropathy and small non-bleeding esophageal varices.  No overt Gi bleeding.  Suspect  multifactorial etiology with possible contributing etiologies of epistaxis, slow-pace GI blood loss from portal hypertensive gastropathy, and bone marrow suppression from heavy alcohol use.    History of ascites and SBP.  Maintained on Furosemide 80 mg, Spironolactone 200 mg, and prophylaaxis Bactrim daily prior to admission.  On hold due to electrolyte imbalance.    History of encephalopathy.  On Lactulose and Rifaximin.  No evidence of confusion or asterixis on exam today.    Recommendations:  Continue supportive care from hepatobiliary standpoint.  Monitor hemoglobin and transfuse pRBC as needed.  Advance diet as tolerated.  Pantoprazole 40 mg once daily.  Management of diuretics as per renal team.  Complete abstinence from alcohol on discharge encouraged.       Total time spent in chart review, direct medical discussion, examination, and documentation was 25 minutes.    Chato Juan MD  [facility]  [phone]  I appreciate the opportunity to participate in the care of this patient.   Please feel free to call me with any questions or concerns.

## 2024-03-18 NOTE — PROGRESS NOTES
[facility]    Medicine Progress Note - Hospitalist Service    Date of Admission:  3/12/2024    Assessment & Plan   Crispin Ham is a 38 year old male with PMH of alcohol abuse, chronic liver cirrhosis, hx SBP, substance abuse, HTN, chronic HFpEF, hx pleural effusion s/p b/l thoracentesis 11/2023 who presented to the ED on 3/12/24 for evaluation of abnormal labs.     Patient saw his PCP the day prior to admission for LLE cramping, fatigue and lightheadedness x 1 month. His blood work came back with hgb 6.1 and he was referred to the ED. Hospital stay complicated by persistent hyperkalemia, hyponatremia and need for blood transfusions.     Acute on chronic anemia  Coagulopathy of liver disease  History of duodenitis/ulcer (09/2023)  *Hgb 5.6, down from 6.9 on 2/21/24 previously running around 7.4-8. Platelets stable at 132.  *Endorses nosebleeds every 3 days or so which can be somewhat prolonged with his hx coagulopathy. No current active bleeding.  *Denies hematemesis, melena, hematochezia. He does have some nausea.  *Symptomatic with complaints of positional dizziness, fatigue. Declined rectal examination in the ED.  *Last EGD 9/2/23 with esophageal mucosal tear with bleeding (complication of clip placement) hemostasis achieved with bipolar probe. Portal hypertensive gastropathy.  -So far has received 8 units of PRBC transfusion during this hospitalization with most recent transfusion earlier this morning.  Patient reports that he is having brown stool  -[facility] following, appreciate input.  -Status post EGD 3/16 which showed portal hypertensive gastropathy, grade 1 and small esophageal varices with no stigmata of bleeding.  Discussed with GI, likely [facility] outpatient but since patient reports having brown stool GI does not think his symptoms are related to GI blood loss  -Iron studies, B12, folate, LDH, haptoglobin, reticulocyte count ordered earlier today shows elevated  reticulocyte count but only minimally elevated LDH.  Haptoglobin/folate and vitamin B12 are pending.  -Discussed with Dr. Silva from hematology, recommended doing chest abdomen pelvis CT scan to rule out bleeding.  CT scan ordered  - Continue PTA Protonix 40 mg BID  - conditional transfusion orders in place for hgb<7     Acute kidney injury   Hyponatremia  Hyperkalemia  *Na 121 on admission, was 130 on 2/21/24 but otherwise was running 124-129 in the fall. Felt to be somewhat volume depleted on examination, received fluids, but sodium has bounced between 117-122.  Creatinine 2.16 on admission, was 0.59 on 2/21/2024 potassium had increased up to 5.7  - Holding PTA Aldactone, potassium chloride, Lasix  -Treated initially with 2% NS then later 150 meq sodium bicarb solution and then normal saline  -Treated with Lokelma, low potassium, fluid restriction  -Sodium most recent 124, creatinine and potassium has normalized  -Nephrology following, appreciate input  - fluid restriction 1200ml, low potassium diet  -Patient now started on normal saline, fluid management per nephrology     Severe alcoholic hepatitis  Severe hyperbilirubinemia from underlying liver disease  Alcohol dependence with ongoing alcohol use disorder  History of alcohol withdrawal  *Established with [facility]. Currently without significant ascites on examination.  Last drink 3/11 afternoon, no history of withdrawal seizure and due to no signs of withdrawal CIWA monitoring has been stopped  *Alk phos 223, ALT 56, , total bili 9.0 (stable compared to 2/21/24)  *3/13 Abd US: negative for ascites  - [facility] following, appreciate input  - Continue PTA PPI, Lactulose, Rifaximin  - switched PTA bactrim to IV ceftriaxone on 3/15 given association with hyperkalemia  - HOLD PTA Spironolactone, Lasix  - Chem dep appreciated, patient only interested in AA at this time  -Continue multivitamin folic acid and thiamine supplementation, gabapentin taper  -I had a long  discussion with patient's mother on 3/17 who was worried that alcohol Anonymous may not be enough and he might need inpatient treatment.  Hoping to get commitment started so patient can get help  -Psychiatry consulted to see if commitment can be started     Chronic HFpEF  *Echo 11/16/23 with EF 65-70%, no RWMA or any significant valvular disease  *No current complaints of shortness of breath or increased LE edema  -Holding PTA Lasix as noted above  -Monitor I&O  -Watch for worsening with IV fluids     LLE cramping  *Venous US LLE on 2/21/24 was negative  *Xray left tibia and fibula 3/11/24 no acute fracture  -Supportive cares        Chronic hip and back pain  -Ice or heat as needed     Severe malnutrition  Appreciate nutrition, recommendation for tube feeding  -Patient not interested     Diet: Snacks/Supplements Adult: Ensure Enlive; Between Meals  Fluid restriction 1200 ML FLUID  2 Gram K Diet    DVT Prophylaxis: Pneumatic Compression Devices  Bosch Catheter: Not present  Lines: None     Cardiac Monitoring: ACTIVE order. Indication: Electrolyte Imbalance (24 hours)- Magnesium <1.3 mg/ml; Potassium < =2.8 or > 5.5 mg/ml  Code Status: No CPR- Do NOT Intubate      Clinically Significant Risk Factors         # Hyponatremia: Lowest Na = 121 mmol/L in last 2 days, will monitor as appropriate   # Hypercalcemia: corrected calcium is >10.1, will monitor as appropriate    # Hypoalbuminemia: Lowest albumin = 2.7 g/dL at 3/17/2024  5:36 AM, will monitor as appropriate     # Thrombocytopenia: Lowest platelets = 69 in last 2 days, will monitor for bleeding          # Overweight: Estimated body mass index is 28.96 kg/m  as calculated from the following:    Height as of this encounter: 1.829 m (6').    Weight as of this encounter: 96.8 kg (213 lb 8 oz).   # Severe Malnutrition: based on nutrition assessment      # Financial/Environmental Concerns: none         Disposition Plan     Expected Discharge Date: 03/18/2024       Destination: home with family              Jane Elmore MD  Hospitalist Service  [facility]  Securely message with [employer] (more info)  Text page via [facility] Paging/Directory   ______________________________________________________________________    Interval History   Patient with no new complaints.  Disappointed that he is not able to go home.  Also updated him that I had discussed with his mom who recommended that he go to the inpatient rehab unit.  Also updated him that psychiatry will be seeing him soon.  No new nursing concerns.    Physical Exam   Vital Signs: Temp: 98  F (36.7  C) Temp src: Oral BP: 118/66 Pulse: 94   Resp: 18 SpO2: 99 % O2 Device: None (Room air)    Weight: 213 lbs 8 oz    Exam:  Constitutional: Awake, alert and no distress.  Appears jaundiced.  Appears comfortable  Head: Normocephalic. No masses, lesions, tenderness or abnormalities  ENMT: ENT exam normal, no neck nodes or sinus tenderness  Cardiovascular: RRR.  No murmurs, no rubs or JVD  Respiratory: Normal WOB,b/l equal air entry, no wheezes or crackles   Gastrointestinal: Abdomen soft, non-tender. BS normal. No masses, organomegaly  : Deferred      Medical Decision Making       51 MINUTES SPENT BY ME on the date of service doing chart review, history, exam, documentation & further activities per the note.      Data     I have personally reviewed the following data over the past 24 hrs:    4.9  \   7.0 (L)   / 69 (L)     124 (L); 124 (L) 97 (L) 33.2 (H) /  107 (H)   4.5 19 (L) 0.80 \       Imaging results reviewed over the past 24 hrs:   No results found for this or any previous visit (from the past 24 hour(s)).  Recent Labs   Lab 03/18/24  0449 03/18/24  0049 03/17/24  1744 03/17/24  1133 03/17/24  0536 03/16/24  1513 03/16/24  1044 03/14/24  0947 03/14/24  0738 03/13/24  1111 03/13/24  0726 03/12/24  1745 03/12/24  1000   WBC 4.9  --   --   --  4.9  --  8.6   < > 7.7   < >  --   --  9.2   HGB 7.0* 7.1* 7.4*    < > 7.5*   < > 7.7*   < > 7.3*   < >  --    < > 5.6*   MCV 93  --   --   --  93  --  96   < > 96   < >  --   --  110*   PLT 69*  --   --   --  70*  --  79*   < > 85*   < >  --   --  132*   INR  --   --   --   --   --   --   --   --  2.24*  --  2.13*  --  1.94*   *  124* 124* 124*  --  123*   < > 123*   < > 119*   < > 117*   < > 121*   POTASSIUM 4.5  --   --   --  4.3  --  5.0   < > 6.1*  --  5.7*  --  5.5*   CHLORIDE 97*  --   --   --  97*  --  97*   < > 95*  --  94*  --  92*   CO2 19*  --   --   --  19*  --  16*   < > 16*  --  14*  --  16*   BUN 33.2*  --   --   --  43.8*  --  55.9*   < > 46.4*  --  34.9*  --  41.0*   CR 0.80  --   --   --  1.03  --  1.52*   < > 1.44*   < > 1.21*  --  2.16*   ANIONGAP 8  --   --   --  7  --  10   < > 8  --  9  --  13   MIGUELINA 8.6  --   --   --  9.0  --  9.7   < > 9.5  --  9.5  --  9.7   *  --   --   --  106*  --  108*   < > 95  --  92  --  109*   ALBUMIN  --   --   --   --  2.7*  --  3.1*  --  3.0*  --  3.0*  --  3.3*   PROTTOTAL  --   --   --   --  6.1*  --  7.0  --  6.9  --  7.0  --  7.8   BILITOTAL  --   --   --   --  7.1*  --  7.6*  --  8.7*  --  11.8*  --  9.0*   ALKPHOS  --   --   --   --  182*  --  225*  --  222*  --  174*  --  223*   ALT  --   --   --   --  45  --  48  --  45  --  44  --  56   AST  --   --   --   --  91*  --  97*  --  88*  --  105*  --  122*    < > = values in this interval not displayed.

## 2024-03-18 NOTE — PLAN OF CARE
Goal Outcome Evaluation:       DATE &TIME: 3/17/24 [id number]  Cognitive Concerns/Orientation: A&O x4   BEHAVIOR & AGGRESSION TOOL COLOR: Green, calm and cooperative.                                                             ABNL VS/O2: Soft BP, HR tachy at times in low 100's, other VSS on RA,   CIWA SCORE: NA  MOBILITY: SBA w/GB & cane. Patient ambulated the unit. Appears fatigue but denies lightheadedness or dizziness  PAIN MANAGMENT: denies  DIET:  2g K, 1,200 FR  BOWEL/BLADDER: Continent of B/B Had x1 soft/brown BM   ABNL LAB/BG: Hgb 7.4 (Q6hr check);  (Q4hr check - to page Neph if <123 and >130 per note); BUN 43.8; Bili Total 7.1; PLT 70  DRAIN/DEVICES: L PIV SL, R PIV infusing NS @125 mL/hr  TELEMETRY RHYTHM: Sinus Tachy  SKIN: Dry/Jaundiced  TESTS/PROCEDURES: none new  D/C DAY/GOALS/PLACE: pending improvement  OTHER IMPORTANT INFO: GI and Nephrology following. Psych consulted.

## 2024-03-18 NOTE — PLAN OF CARE
Goal Outcome Evaluation:      Plan of Care Reviewed With: patient    Overall Patient Progress: no changeOverall Patient Progress: no change     Summary: Weakness, fall x2, Dizziness, Anemia, ETOH, hyponatremia, hyperkalemia, GATITO     DATE &TIME: 3/17/2024 -3/18/2024 [id number]    Cognitive Concerns/Orientation: A&O x4   BEHAVIOR & AGGRESSION TOOL COLOR: Green                      ABNL VS/O2: Soft BP, HR tachy at times in low 100's, other VSS on RA,   MOBILITY: SBA w/GB & cane. Patient ambulated the unit.   PAIN MANAGMENT: denied pain this shift   DIET:  2g Na, 1,200 FR  BOWEL/BLADDER: Continent of B/B   ABNL LAB/BG: Hgb 7.1- 1 unit of RBC given (Q6hr check);  (Q4hr check - to page Neph if <123 and >130 per note); BUN 33.2; Bili Total 7.1; PLT 69  DRAIN/DEVICES: L PIV SL, R PIV infusing NS @125 mL/hr  TELEMETRY RHYTHM: NSR  SKIN: Dry/Jaundiced  TESTS/PROCEDURES: None this shift  D/C DAY/GOALS/PLACE: pending improvement  OTHER IMPORTANT INFO: GI and Nephrology following. Psych consulted.

## 2024-03-18 NOTE — CONSULTS
This consult has been requested by Dr. Jane Elmore for anemia.    Mr. Ham is a 38-year-old gentleman with multiple medical problem including alcohol abuse, liver cirrhosis and hypertension.  Patient was seen in clinic on 03/11/2024 and had multiple labs done.  CBC revealed severe anemia with hemoglobin of 6.1.  Patient was advised to go to emergency room.    Patient presented to emergency room on 03/12/2024 and had multiple investigations done.  -WBC of 9.2, hemoglobin of 5.6 and platelet of 132.  MCV of 110.  -CMP revealed multiple abnormalities including hyponatremia, hyperkalemia, elevated creatinine and elevated LFT.    Patient has had multiple investigations done for anemia.  -Reticulocyte of 2.7%.  -Elevated iron of 160.  Patient had already received multiple units of blood transfusion.  -Mildly elevated LDH of 264.  -Peripheral blood smear reveals normocytic, normochromic anemia and moderate thrombocytopenia.  No schistocytes.  No morphologic features of hemolysis.  -His stool for occult blood is positive.  -EGD reveals grade 1 and small esophageal varices.  No stigmata of bleeding.  There is portal hypertensive gastropathy.  No endoscopic evidence of active or recent upper GI bleed.    I reviewed his previous labs.  Patient has chronic macrocytic anemia.  In the past he has required blood transfusion.    Patient denies bleeding from any site.  Denies any black stool.    During this hospitalization, he has received multiple units of blood transfusion.  His hemoglobin today is 7.9.    Patient has generalized weakness.  With transfusion there has been some improvement in the weakness.  Some headache.  Some lightheadedness.  No chest pain.  No shortness of breath at rest.  No severe abdominal pain.  Some nausea.  No recurrent vomiting.  No urinary or bowel complaints.  No bleeding from any site.  No black stool.  All other review of system is negative.    Allergies: Reviewed.    Medications:  Reviewed.    Past medical history:  -Alcohol abuse.  -Liver cirrhosis.  -Chronic anemia.  -Hypertension.  -Spontaneous bacterial peritonitis  -Generalized anxiety disorder/depression.  -Left wrist ORIF.    Social history:  -No smoking.  -Alcohol abuse.    Exam:  He is alert and oriented x 3.  Not in any respiratory distress or pain.  Vitals: Reviewed.  Rest of the system is not examined.    Labs: Reviewed.    Assessment:  1.  A 38-year-old gentleman with acute on chronic macrocytic anemia.  Chronic anemia is secondary to liver cirrhosis and also bone marrow suppression by alcohol abuse.  Acute worsening likely from slow GI bleed.  2.  Alcoholic liver cirrhosis.    Recommendation:  -Transfuse for hemoglobin below 7.0.  -Consider video capsule endoscopy if hemoglobin continues to drop.  -Stop alcohol.  -Check haptoglobin.  -CT abdomen and pelvis.    Discussion:  1.  I had a long discussion with the patient regarding his anemia.  Explained to the patient that he is chronically anemic.  Chronic anemia is due to liver cirrhosis and alcohol abuse.  Explained to him that alcohol abuse can cause suppression of bone marrow.  He has been admitted with acute worsening of anemia.  Explained to him that he likely had slow GI bleed causing acute worsening of anemia.    Patient was evaluated by GI.  He had EGD done.  No active bleeding seen.  Patient has continued to require multiple units of blood transfusion raising the possibility of bleeding in the small intestine.  If his hemoglobin continues to drop, he should have video capsule endoscopy done.      Since patient has been requiring multiple transfusion, we need to rule out hemolysis.  LDH is only mildly elevated.  Peripheral blood smear review did not reveal any overt evidence of hemolysis.  Will check a haptoglobin.  Haptoglobin may be low as patient received multiple transfusion.    Retroperitoneal bleed causing worsening of anemia needs to be ruled out.  I would recommend  CT abdomen and pelvis.    If above investigations are negative, will consider bone marrow aspiration biopsy.    2.  For anemia, patient should be transfused for hemoglobin below 7.0 or if he is symptomatic.    3.  Patient advised to quit alcohol.    4.  He had a few questions which were all answered.  Hematology/oncology will continue to follow.  Case discussed with Dr. Elmore.    Thanks for the consult.    Total time spent 80 minutes.  Time spent in today's visit, review of chart/investigations today, communicating with other providers and documentation today.

## 2024-03-18 NOTE — PROGRESS NOTES
GASTROENTEROLOGY PROGRESS NOTE     CC: Abnormal labs     SUBJECTIVE:  Patient is wanting to go home. He states that we are not doing anything different. Denies nausea or vomiting. He has ongoing intermittent bloody nose from picking. No Black or bloody stools.      OBJECTIVE:  General Appearance:  Not in distress  /66   Pulse 94   Temp 98  F (36.7  C) (Oral)   Resp 18   Ht 1.829 m (6')   Wt 96.8 kg (213 lb 8 oz)   SpO2 99%   BMI 28.96 kg/m    Temp (24hrs), Av  F (36.7  C), Min:97.8  F (36.6  C), Max:98.4  F (36.9  C)    Patient Vitals for the past 72 hrs:   Weight   24 0608 96.8 kg (213 lb 8 oz)       Intake/Output Summary (Last 24 hours) at 3/18/2024 0823  Last data filed at 3/18/2024 0538  Gross per 24 hour   Intake 2533.75 ml   Output 1425 ml   Net 1108.75 ml        PHYSICAL EXAM  General: alert, oriented, NAD  SKIN: no suspicious lesions, rashes, jaundice, or spider angiomas   EYES: Scleral icterus noted.  RESPIRATORY: Non labored breathing  GASTROINTESTINAL: Active bowel sounds,  abdomen soft and no tenderness on palpation.   NEURO:Grossly WNL.  Extremities: 1+ BLE edema noted   Additional Comments:  ROS, FH, SH: See initial GI consult for details.          Recent Labs   Lab Test 24  0449 24  0049 24  1744 24  1133 24  0536 24  2258 24  1044 24  1812 24  0738 24  1111 24  0726 24  2259 24  1000   WBC 4.9  --   --   --  4.9  --  8.6   < > 7.7   < >  --   --  9.2   HGB 7.0* 7.1* 7.4*   < > 7.5*   < > 7.7*   < > 7.3*   < >  --    < > 5.6*   MCV 93  --   --   --  93  --  96   < > 96   < >  --   --  110*   PLT 69*  --   --   --  70*  --  79*   < > 85*   < >  --   --  132*   INR  --   --   --   --   --   --   --   --  2.24*  --  2.13*  --  1.94*    < > = values in this interval not displayed.     Recent Labs   Lab Test 24  0449 24  0536 24  1044   POTASSIUM 4.5 4.3 5.0   CHLORIDE 97* 97* 97*   CO2  19* 19* 16*   BUN 33.2* 43.8* 55.9*   ANIONGAP 8 7 10     Recent Labs   Lab Test 03/17/24  0536 03/16/24  1044 03/14/24  0738 11/17/23  0536 11/16/23  1514 09/26/23  0724 09/25/23  1646 09/25/23  1537 09/25/23  1424 09/09/23  1119 09/08/23  1224 09/08/23  1147   ALBUMIN 2.7* 3.1* 3.0*   < > 3.1*   < >  --   --  2.4*   < >  --  2.3*   BILITOTAL 7.1* 7.6* 8.7*   < > 8.4*   < >  --   --  21.7*   < >  --  10.4*   ALT 45 48 45   < > 49   < >  --   --  69   < >  --  82*   AST 91* 97* 88*   < > 222*   < >  --    < >  --    < >  --  331*   PROTEIN  --   --   --   --  100*  --  30*  --   --   --  50*  --    LIPASE  --   --   --   --   --   --   --   --  237*  --   --  272*    < > = values in this interval not displayed.        Imaging and procedures:    I have reviewed the patient's new clinical lab results    Problem list pertaining to GI:  Alcoholic hepatitis  ? Cirrhosis of liver - MRI elastography is scheduled in April   History of SBP  Anemia  Hypoalbuminemia  Coagulopathy  Thrombocytopenia    Assessment: This is a 38-year-old male patient with a history of cirrhosis of liver secondary to alcohol use, SBP, hypertension, chronic kidney disease, bilateral pleural effusion status post thoracentesis, is currently admitted for management of abnormal labs.  Patient was seen in his primary care clinic for an evaluation of lightheadedness, and lower leg cramping and fatigue and he was noted to have significant anemia with a hemoglobin of 6.1.  Hence, he was sent to hospital for further management.    Admission labs were notable for hemoglobin 5.6, hematocrit 15.7, platelets 132, WBC within normal range.  Basic metabolic panel notable for albumin 3.3, total bilirubin 9, ALT 56, , sodium 121, potassium 5.5, creatinine 2.1, GFR 40, and INR 1.9 EGD on 3/16 portal hypertensive gastropathy, and small nonbleeding esophageal varices.  No overt GI bleeding.  His anemia was thought to be multifactorial including epistaxis, slow  bleeding from portal hypertensive gastropathy, bone marrow suppression from heavy alcohol use, and ? Hemolysis.     Advised against going home today due to the electrolyte abnormalities and significant anemia requiring blood transfusions (total 8 units this admission). Not a candidate for steroids given the history of ongoing epistaxis and history of SBP.     Plan:  - Nephrology is managing electrolyte abnormalities- diuretics on hold  - Significant anemia not responding to multiple transfusion. Consider hematology consult.   - Pantorprazole 40 mg daily  - Alcohol cessation  - Monitor hgb and transfuse  - Continue Multivitamin, folic acid and thiamine  - Continue lactulose and rifaximin   - Continue Ceftriaxone for now, transition to oral antibiotics on discharge consider cipro 250 mg twice daily  - Follow-up in liver clinic as scheduled in April    I will discuss with Dr. Vogt.     Time spent: 30 minutes, greater than 50% of the visit was spent in counseling/coordination of care.     Anna Rogers CNP  [facility] ([facility])  Mobile # [phone]

## 2024-03-18 NOTE — PROVIDER NOTIFICATION
MD Notification    Notified Person: MD    Notified Person Name: Dr. Knight    Notification Date/Time: 3/18/24 @ 0513    Notification Interaction: Vocera     Purpose of Notification: Hi FYI, pts. hemoglobin 7. Orders to transfuse if 7 or less. Will give 1 unit of blood    Orders Received:    Comments:

## 2024-03-18 NOTE — PLAN OF CARE
Goal Outcome Evaluation:  Summary: Weakness, fall x2, Dizziness, Anemia, ETOH, hyponatremia, hyperkalemia, GATITO     DATE &TIME: 3/18/2024 [id number]     Cognitive Concerns/Orientation: A&O x4   BEHAVIOR & AGGRESSION TOOL COLOR: Green                      ABNL VS/O2: Soft BP, HR tachy at times in low 100's, other VSS on RA,   MOBILITY: Independent w/ cane. Encouraged to ambulate in j[place] more frequently.  PAIN MANAGMENT: denied pain this shift   DIET:  2g Na, 1,200 FR  BOWEL/BLADDER: Continent of B/B   ABNL LAB/BG: Hgb 7.9- 1 unit of RBC given early this morning (Q6hr check);  (Q8hr check -  page Neph if <123 and >130 per note); PLT 65  DRAIN/DEVICES: L PIV SL, R PIV SL, fluids discontinued  TELEMETRY RHYTHM: Discontinued  SKIN: Dry/Jaundiced  TESTS/PROCEDURES: CT of chest/abdomen/pelvis ordered by Hem/Onc to R/O retroperitoneal bleed (not completed yet) if negative may move forward with bone marrow aspiration biopsy.  D/C DAY/GOALS/PLACE: pending improvement  OTHER IMPORTANT INFO: GI, Nephrology & Hem/Onc following. Psych  consulted.

## 2024-03-19 NOTE — PLAN OF CARE
Goal Outcome Evaluation:      Plan of Care Reviewed With: patient    Overall Patient Progress: improvingOverall Patient Progress: improving    Outcome Evaluation: PO - meet estimated needs    - Continue meals TID and Ensure BID

## 2024-03-19 NOTE — PROGRESS NOTES
[facility]    Medicine Progress Note - Hospitalist Service    Date of Admission:  3/12/2024    Assessment & Plan   Crispin Ham is a 38 year old male with PMH of alcohol abuse, chronic liver cirrhosis, hx SBP, substance abuse, HTN, chronic HFpEF, hx pleural effusion s/p b/l thoracentesis 11/2023 who presented to the ED on 3/12/24 for evaluation of abnormal labs.     Patient saw his PCP the day prior to admission for LLE cramping, fatigue and lightheadedness x 1 month. His blood work came back with hgb 6.1 and he was referred to the ED. Hospital stay complicated by persistent hyperkalemia, hyponatremia and need for blood transfusions.     Acute on chronic anemia  Coagulopathy of liver disease  History of duodenitis/ulcer (09/2023)  *Hgb 5.6, down from 6.9 on 2/21/24 previously running around 7.4-8. Platelets stable at 132.  *Endorses nosebleeds every 3 days or so which can be somewhat prolonged with his hx coagulopathy. No current active bleeding.  *Denies hematemesis, melena, hematochezia. He does have some nausea.  *Symptomatic with complaints of positional dizziness, fatigue. Declined rectal examination in the ED.  *Last EGD 9/2/23 with esophageal mucosal tear with bleeding (complication of clip placement) hemostasis achieved with bipolar probe. Portal hypertensive gastropathy.  -So far has received 8 units of PRBC transfusion during this hospitalization with most recent transfusion-3/18  -Patient reports that he is having brown stool  -[facility] following, appreciate input.  -Status post EGD 3/16 which showed portal hypertensive gastropathy, grade 1 and small esophageal varices with no stigmata of bleeding.  Discussed with GI, likely [facility] outpatient but since patient reports having brown stool GI does not think his symptoms are related to GI blood loss  -Iron studies, B12, folate-does not suggest deficiency,  reticulocyte count elevated , minimally elevated LDH.   Haptoglobin low but expected with multiple transfusion given hemolysis associated with transfusion  -CT chest abdomen and pelvis with no evidence of internal bleeding  -Discussed with Dr. Silva, given possibility of hemolyzing the transfused blood and clinical stability, Dr. Silva recommended changing transfusion threshold to less than 6  - Continue PTA Protonix 40 mg BID  - conditional transfusion orders in place for hgb<6     Possible multifocal pneumonia  Small to moderate right pleural effusion  -CT chest abdomen pelvis to evaluate for internal bleeding as above suggested multifocal pneumonia and right pleural effusion.  -Patient already on ceftriaxone for SBP prophylaxis as below  -Patient with no respiratory symptoms including no cough, shortness of breath, chest pain and on room air  -I have a very strong suspicion the infiltrates on CT likely from third spacing  -Check procalcitonin  -Resume PTA Lasix at low-dose as below    Distended thick-walled gallbladder  -Gallbladder ultrasound ordered by GI to exclude cholecystitis  -Could be from third spacing, Lasix started as below    Acute kidney injury  Hyponatremia  Hyperkalemia  *Na 121 on admission, was 130 on 2/21/24 but otherwise was running 124-129 in the fall. Felt to be somewhat volume depleted on examination, received fluids, but sodium has bounced between 117-122.  Creatinine 2.16 on admission, was 0.59 on 2/21/2024 potassium had increased up to 5.7  -Currently holding PTA Aldactone, potassium chloride, Lasix  -Treated initially with 2% NS then later 150 meq sodium bicarb solution and then normal saline  -Treated with Lokelma, low potassium, fluid restriction  -Creatinine and potassium has now normalized and is 127 today  -Nephrology was following, now signed off  -Recommended starting low-dose of his PTA diuretics.  -Blood pressure soft so we will start with PTA Lasix but at a lower dose of 20 mg twice daily  -Continue with fluid restriction 1200ml,  low potassium diet     Severe Malnutrition   -In the context of acute illness or injury, chronic illness or disease  -Dietitian following  -Supplements    Severe alcoholic hepatitis  Severe hyperbilirubinemia from underlying liver disease  Alcohol dependence with ongoing alcohol use disorder  History of alcohol withdrawal  *Established with [facility]. Currently without significant ascites on examination.  Last drink 3/11 afternoon, no history of withdrawal seizure and due to no signs of withdrawal CIWA monitoring has been stopped  *Alk phos 223, ALT 56, , total bili 9.0 (stable compared to 2/21/24)  *3/13 Abd US: negative for ascites  - [facility] following, appreciate input  - Continue PTA PPI, Lactulose, Rifaximin  - switched PTA bactrim to IV ceftriaxone on 3/15 given association with hyperkalemia  - HOLD PTA Spironolactone, resume PTA Lasix at a lower dose  -Continue multivitamin folic acid and thiamine supplementation, gabapentin taper  - Chem dep appreciated, patient only interested in AA at this time  -I had a long discussion with patient's mother on 3/17 who was worried that alcohol Anonymous may not be enough and he might need inpatient treatment.  Hoping to get commitment started so patient can get help  -Psychiatry consult appreciated, discussed with psychiatry, likely will meet criteria for commitment.     Chronic HFpEF  *Echo 11/16/23 with EF 65-70%, no RWMA or any significant valvular disease  -Lasix 20 mg p.o. twice daily initiated and IV fluid discontinued  -Monitor I&O     LLE cramping  *Venous US LLE on 2/21/24 was negative  *Xray left tibia and fibula 3/11/24 no acute fracture  -Supportive cares     Chronic hip and back pain  -Ice or heat as needed     Severe malnutrition  Appreciate nutrition, recommendation for tube feeding  -Patient not interested     Diet: Snacks/Supplements Adult: Ensure Enlive; Between Meals  Fluid restriction 1200 ML FLUID  Combination Diet 2 gm NA Diet    DVT Prophylaxis:  Pneumatic Compression Devices  Bosch Catheter: Not present  Lines: None     Cardiac Monitoring: None  Code Status: No CPR- Do NOT Intubate      Clinically Significant Risk Factors         # Hyponatremia: Lowest Na = 124 mmol/L in last 2 days, will monitor as appropriate      # Hypoalbuminemia: Lowest albumin = 2.7 g/dL at 3/17/2024  5:36 AM, will monitor as appropriate    # Coagulation Defect: INR = 2.22 (Ref range: 0.85 - 1.15) and/or PTT = N/A, will monitor for bleeding  # Thrombocytopenia: Lowest platelets = 65 in last 2 days, will monitor for bleeding          # Obesity: Estimated body mass index is 30.79 kg/m  as calculated from the following:    Height as of this encounter: 1.829 m (6').    Weight as of this encounter: 103 kg (227 lb).   # Severe Malnutrition: based on nutrition assessment      # Financial/Environmental Concerns: none         Disposition Plan     Expected Discharge Date: 03/20/2024      Destination: home with family              Jane Elmore MD  Hospitalist Service  [facility]  Securely message with [employer] (more info)  Text page via Interview Paging/Directory   ______________________________________________________________________    Interval History   Patient with no new complaints.  Disappointed about not being able to go home.  Discussed with nephrology, psychiatry and hematology as above.  No other nursing concerns.    Physical Exam   Vital Signs: Temp: 98.4  F (36.9  C) Temp src: Oral BP: 101/50 Pulse: 104   Resp: 18 SpO2: 96 % O2 Device: None (Room air)    Weight: 227 lbs 0 oz    Exam:  Constitutional: Awake, alert and no distress.  Appears jaundiced.  Appears comfortable  Head: Normocephalic. No masses, lesions, tenderness or abnormalities  ENMT: ENT exam normal, no neck nodes or sinus tenderness  Cardiovascular: RRR.  No murmurs, no rubs or JVD  Respiratory: Normal WOB,b/l equal air entry, no wheezes or crackles   Gastrointestinal: Abdomen soft, non-tender. BS  normal. No masses, organomegaly  : Deferred      Medical Decision Making       52 MINUTES SPENT BY ME on the date of service doing chart review, history, exam, documentation & further activities per the note.      Data     I have personally reviewed the following data over the past 24 hrs:    5.1  \   7.9 (L)   / 65 (L)     125 (L) N/A N/A /  N/A   N/A N/A N/A \     INR:  2.22 (H) PTT:  N/A   D-dimer:  N/A Fibrinogen:  N/A     Ferritin:  N/A % Retic:  2.7 (H) LDH:  264 (H)       Imaging results reviewed over the past 24 hrs:   Recent Results (from the past 24 hour(s))   CT Chest Abdomen Pelvis w/o Contrast    Narrative    EXAM: CT CHEST ABDOMEN PELVIS W/O CONTRAST  LOCATION: [facility]  DATE: 3/18/2024    INDICATION: Progressive anemia , Rule out internal bleeding  COMPARISON: 09/25/2023  TECHNIQUE: CT scan of the chest, abdomen, and pelvis was performed without IV contrast. Multiplanar reformats were obtained. Dose reduction techniques were used. Exam significantly limited by lack of intravenous contrast. Active hemorrhage cannot be   excluded in this setting.  CONTRAST: None.    FINDINGS:   LUNGS AND PLEURA: Heterogeneous groundglass opacity dependently within the right upper lobe and in the superior segment of the right lower lobe. Small to moderate right pleural effusion, increased in interval.    MEDIASTINUM/AXILLAE: No significant mediastinal or hilar adenopathy.    CORONARY ARTERY CALCIFICATION: None.    HEPATOBILIARY: Distended gallbladder. Gallbladder wall thickening. No calcified stones. Morphologic changes of cirrhosis.    PANCREAS: Unremarkable    SPLEEN: Splenomegaly reaching 16.7 cm    ADRENAL GLANDS: Unremarkable    KIDNEYS/BLADDER: No nephroureterolithiasis or hydronephrosis. Decompressed bladder.    BOWEL: Normal caliber appendix. No bowel obstruction. Moderate stranding in the mesentery.    LYMPH NODES: Numerous subcentimeter retroperitoneal nodes.    VASCULATURE: No  abdominal aortic aneurysm. Multiple prominent suspected portosystemic collaterals in the upper abdomen as well as recanalized periumbilical vein.    PELVIC ORGANS: Small volume of free fluid.    MUSCULOSKELETAL: Multiple likely subacute anterior right lower rib fractures. Numerous thoracolumbar compression fractures, new in the interval including T5, T7, T8, T9, T8, T11, T12, and L1-L5. Small fluid containing umbilical hernia.      Impression    IMPRESSION:  1.  Heterogeneous right-sided groundglass opacities suspicious for multifocal pneumonia.  2.  Small to moderate right pleural effusion.  3.  Distended thick-walled gallbladder. Further evaluation with gallbladder ultrasound recommended to exclude cholecystitis.  4.  Morphologic changes of cirrhosis with ascites.  5.  Associated splenomegaly.  6.  Additional findings as above.     Recent Labs   Lab 03/18/24  1403 03/18/24  1108 03/18/24  0449 03/18/24  0049 03/17/24  1133 03/17/24  0536 03/16/24  1513 03/16/24  1044 03/14/24  0947 03/14/24  0738 03/13/24  1111 03/13/24  0726   WBC  --  5.1 4.9  --   --  4.9  --  8.6   < > 7.7   < >  --    HGB  --  7.9* 7.0* 7.1*   < > 7.5*   < > 7.7*   < > 7.3*   < >  --    MCV  --  94 93  --   --  93  --  96   < > 96   < >  --    PLT  --  65* 69*  --   --  70*  --  79*   < > 85*   < >  --    INR  --  2.22*  --   --   --   --   --   --   --  2.24*  --  2.13*   * 127* 124*  124* 124*   < > 123*   < > 123*   < > 119*   < > 117*   POTASSIUM  --   --  4.5  --   --  4.3  --  5.0   < > 6.1*  --  5.7*   CHLORIDE  --   --  97*  --   --  97*  --  97*   < > 95*  --  94*   CO2  --   --  19*  --   --  19*  --  16*   < > 16*  --  14*   BUN  --   --  33.2*  --   --  43.8*  --  55.9*   < > 46.4*  --  34.9*   CR  --   --  0.80  --   --  1.03  --  1.52*   < > 1.44*   < > 1.21*   ANIONGAP  --   --  8  --   --  7  --  10   < > 8  --  9   MIGUELINA  --   --  8.6  --   --  9.0  --  9.7   < > 9.5  --  9.5   GLC  --   --  107*  --   --  106*  --  108*    < > 95  --  92   ALBUMIN  --   --   --   --   --  2.7*  --  3.1*  --  3.0*  --  3.0*   PROTTOTAL  --   --   --   --   --  6.1*  --  7.0  --  6.9  --  7.0   BILITOTAL  --   --   --   --   --  7.1*  --  7.6*  --  8.7*  --  11.8*   ALKPHOS  --   --   --   --   --  182*  --  225*  --  222*  --  174*   ALT  --   --   --   --   --  45  --  48  --  45  --  44   AST  --   --   --   --   --  91*  --  97*  --  88*  --  105*    < > = values in this interval not displayed.

## 2024-03-19 NOTE — PLAN OF CARE
Summary: Weakness, fall x2, Dizziness, Anemia, ETOH, hyponatremia, hyperkalemia, GATITO     DATE &TIME: 3/18/24 1900-3/19/24 0730  Cognitive Concerns/Orientation: A&O x4   BEHAVIOR & AGGRESSION TOOL COLOR: Green                      ABNL VS/O2: Soft BP, HR tachy at times in low 100s on RA  MOBILITY: Independent/ SBA w/ cane, denies dizziness  PAIN MANAGMENT: Denies pain  DIET:  2g Na, 1,200 FR  BOWEL/BLADDER: Continent of B/B   ABNL LAB/BG: Hgb 7.9; ; Next labs this morning; page Neph if <123 and >130 per note); PLT 65  DRAIN/DEVICES: L PIV SL, R PIV SL  TELEMETRY RHYTHM: NA  SKIN: Dry/Jaundiced  TESTS/PROCEDURES: CT of chest/abdomen/pelvis completed, neg for source of bleed; per Hem/Onc may move forward with bone marrow aspiration biopsy.  D/C DAY/GOALS/PLACE: pending improvement  OTHER IMPORTANT INFO: GI, Nephrology & Hem/Onc following. Psych consulted.

## 2024-03-19 NOTE — PROVIDER NOTIFICATION
MD Notification    Notified Person: MD    Notified Person Name: Dr. Elmore    Notification Date/Time: 3/19, 0848    Notification Interaction: vocera    Purpose of Notification: Hi GI just ordered an abdominal ultrasound to look at 607's gallbladder but US just called and said he needs to be NPOfor 8 hours prior. Can you please put that NPO order in? Thanks     Orders Received: NPO    Comments:

## 2024-03-19 NOTE — PROGRESS NOTES
CLINICAL NUTRITION SERVICES - REASSESSMENT NOTE    Recommendations Ordered by Registered Dietitian (RD): Continue meals TID and Ensure BID    Malnutrition:   % Weight Loss:  > 5% in 1 month -- 14% loss in the past month, 30% loss over the past 5 months  % Intake:  <75% for >/= 3 months   Subcutaneous Fat Loss:  Orbital region mild depletion  Muscle Loss:  Temporal region mild depletion, Clavicle bone region mild depletion, and Acromion bone region mild depletion  Fluid Retention:  +1 trace BLE edema     Malnutrition Diagnosis: Severe malnutrition  In Context of:  Acute illness or injury  Chronic illness or disease     EVALUATION OF PROGRESS TOWARD GOALS   Diet:  NPO, 1200 ml FR and kendy Ensure between meals @ 10 am and 2 pm    Intake/Tolerance:  Pranay reported to be eating well and noted he actually has been finishing meals TID, not just BID. He wanted to keep receiving the Ensure BID and had no other nutrition related concerns.  - Flowsheets show a good appetite and x2 intakes/day of 100%, when not NPO .     ASSESSED NUTRITION NEEDS:  Dosing Weight 88.5 kg (3/12 wt)   Estimated Energy Needs: 0611-8348 kcals (30-35 Kcal/Kg)  Justification: maintenance, cirrhosis   Estimated Protein Needs: 106-133 grams protein (1.2-1.5 g pro/Kg)  Justification: maintenance  Estimated Fluid Needs: 1 mL/Kcal  Justification: maintenance OR per provider pending fluid status    NEW FINDINGS:   Weight:   03/19/24 0629 103 kg (227 lb) Standing scale   03/17/24 0608 96.8 kg (213 lb 8 oz) Standing scale   03/15/24 0643 92.2 kg (203 lb 3.2 oz) --   03/14/24 0633 89.4 kg (197 lb 3.2 oz) Standing scale   03/12/24 0959 88.5 kg (195 lb) --     GI: LBM x1 on 3/18    Skin: Trace edema    Meds:    cefTRIAXone  2 g Intravenous Q24H    folic acid  1 mg Oral Daily    [Held by provider] furosemide  40 mg Oral BID    lactulose  20 g Oral BID    magnesium oxide  400 mg Oral Daily    multivitamin w/minerals  1 tablet Oral Daily    pantoprazole  40 mg Oral  "BID    [Held by provider] potassium chloride  40 mEq Oral Daily    rifaximin  550 mg Oral BID    sodium chloride (PF)  3 mL Intracatheter Q8H    sodium zirconium cyclosilicate  10 g Oral Daily    [Held by provider] spironolactone  200 mg Oral Daily    [Held by provider] sulfamethoxazole-trimethoprim  1 tablet Oral Daily      Labs:   Electrolytes  Sodium (mmol/L)   Date Value   03/19/2024 127 (L)     Potassium (mmol/L)   Date Value   03/19/2024 4.6   03/11/2022 3.7     Magnesium (mg/dL)   Date Value   03/16/2024 1.9     Phosphorus (mg/dL)   Date Value   03/16/2024 4.1    Blood Glucose  Glucose (mg/dL)   Date Value   03/19/2024 158 (H)   03/11/2022 122 (H)     GLUCOSE BY METER POCT (mg/dL)   Date Value   03/15/2024 115 (H)    Renal  Urea Nitrogen (mg/dL)   Date Value   03/19/2024 25.9 (H)   03/11/2022 6 (L)     Creatinine (mg/dL)   Date Value   03/19/2024 0.68          Previous Goals:   Consume % of meals and/or supplements TID  Evaluation: Met    Previous Nutrition Diagnosis:   Malnutrition related to poor appetite, poor PO intake d/t ongoing alcohol use as evidenced by 14% wt loss over past month, <75% intakes suspected \"for a while\", mild muscle losses.    Evaluation: Improving    CURRENT NUTRITION DIAGNOSIS  Malnutrition related to poor appetite, poor PO intake PTA d/t ongoing alcohol use as evidenced by 14% wt loss over past month, <75% intakes suspected \"for a while\" PTA, and mild muscle losses.      INTERVENTIONS  Recommendations / Nutrition Prescription  Continue meals TID and Ensure BID     Implementation  General/Healthful diet     Goals  PO - meet estimated needs    MONITORING AND EVALUATION:  Progress towards goals will be monitored and evaluated per protocol and Practice Guideline    Mekhi Jang RD, LD  "

## 2024-03-19 NOTE — PROGRESS NOTES
GASTROENTEROLOGY PROGRESS NOTE     CC: Abnormal labs     SUBJECTIVE:  No new concerns. He is curious about lab results.     OBJECTIVE:  General Appearance:  Not in distress  /50 (BP Location: Right arm, Patient Position: Supine)   Pulse 107   Temp 98  F (36.7  C) (Oral)   Resp 18   Ht 1.829 m (6')   Wt 103 kg (227 lb)   SpO2 97%   BMI 30.79 kg/m    Temp (24hrs), Av  F (36.7  C), Min:97.8  F (36.6  C), Max:98.4  F (36.9  C)    Patient Vitals for the past 72 hrs:   Weight   24 0629 103 kg (227 lb)   24 0608 96.8 kg (213 lb 8 oz)       Intake/Output Summary (Last 24 hours) at 3/18/2024 0823  Last data filed at 3/18/2024 0538  Gross per 24 hour   Intake 2533.75 ml   Output 1425 ml   Net 1108.75 ml        PHYSICAL EXAM  General: alert, oriented, NAD  SKIN: Jaundice, dry  EYES: Scleral icterus noted.  RESPIRATORY: Non labored breathing  GASTROINTESTINAL: Active bowel sounds,  abdomen soft and  RUQ tenderness on palpation.   NEURO:Grossly WNL.  Extremities: 1+ BLE edema noted   Additional Comments:  ROS, FH, SH: See initial GI consult for details.        Recent Labs   Lab Test 24  1108 24  0449 24  0049 24  1133 24  0536 24  1812 24  0738 24  1111 24  0726   WBC 5.1 4.9  --   --  4.9   < > 7.7   < >  --    HGB 7.9* 7.0* 7.1*   < > 7.5*   < > 7.3*   < >  --    MCV 94 93  --   --  93   < > 96   < >  --    PLT 65* 69*  --   --  70*   < > 85*   < >  --    INR 2.22*  --   --   --   --   --  2.24*  --  2.13*    < > = values in this interval not displayed.     Recent Labs   Lab Test 24  0449 24  0536 24  1044   POTASSIUM 4.5 4.3 5.0   CHLORIDE 97* 97* 97*   CO2 19* 19* 16*   BUN 33.2* 43.8* 55.9*   ANIONGAP 8 7 10     Recent Labs   Lab Test 24  0536 24  1044 24  0738 23  0536 23  1514 23  0724 23  1646 23  1537 23  1424 23  1119 23  1224 23  1147   ALBUMIN  2.7* 3.1* 3.0*   < > 3.1*   < >  --   --  2.4*   < >  --  2.3*   BILITOTAL 7.1* 7.6* 8.7*   < > 8.4*   < >  --   --  21.7*   < >  --  10.4*   ALT 45 48 45   < > 49   < >  --   --  69   < >  --  82*   AST 91* 97* 88*   < > 222*   < >  --    < >  --    < >  --  331*   PROTEIN  --   --   --   --  100*  --  30*  --   --   --  50*  --    LIPASE  --   --   --   --   --   --   --   --  237*  --   --  272*    < > = values in this interval not displayed.     MELD 3.0: 28 at 3/18/2024  2:03 PM  MELD-Na: 30 at 3/18/2024  2:03 PM  Calculated from:  Serum Creatinine: 0.80 mg/dL (Using min of 1 mg/dL) at 3/18/2024  4:49 AM  Serum Sodium: 125 mmol/L at 3/18/2024  2:03 PM  Total Bilirubin: 7.1 mg/dL at 3/17/2024  5:36 AM  Serum Albumin: 2.7 g/dL at 3/17/2024  5:36 AM  INR(ratio): 2.22 at 3/18/2024 11:08 AM  Age at listing (hypothetical): 38 years  Sex: Male at 3/18/2024  2:03 PM       Imaging and procedures:    EGD 3/16 Dr. Juan   Impression:          1. Grade I and small (< 5 mm) esophageal varices.                             No stigmata of bleding.                             2. Portal hypertensive gastropathy.                             3. An endoclip was found in the gastric cardia,                             from prior polypectomy in 09/2023. Not removed.                             4. Normal examined duodenum.                             5. No specimens collected.                             6. No endoscopic evidnce of active or recent upper                             GI blood loss. Suspect acute on chronic anemia                             secondary to now resolved epistaxis, slow-pace GI                             blood loss from portal hypertensive gastropathy,                             and bone marrow suppression from heavy alcohol use.   Recommendation: 1. [facility].                             2. Advance diet as tolerated.                             3. Continue present  medications including                             Pantoprazole.                             4. Monitor hemoglobin and transfuse as needed.   CTCAP 3/18  IMPRESSION:  1.  Heterogeneous right-sided groundglass opacities suspicious for multifocal pneumonia.  2.  Small to moderate right pleural effusion.  3.  Distended thick-walled gallbladder. Further evaluation with gallbladder ultrasound recommended to exclude cholecystitis.  4.  Morphologic changes of cirrhosis with ascites.  5.  Associated splenomegaly.  6.  Additional findings as above.  I have reviewed the patient's new clinical lab results    Problem list pertaining to GI:  Alcoholic hepatitis  ? Cirrhosis of liver - MRI elastography is scheduled in April   History of SBP  Anemia  Hypoalbuminemia  Coagulopathy  Thrombocytopenia    Assessment: This is a 38-year-old male patient with a history of cirrhosis of liver secondary to alcohol use, SBP, hypertension, chronic kidney disease, bilateral pleural effusion status post thoracentesis, is currently admitted for management of abnormal labs.  Patient was seen in his primary care clinic for an evaluation of lightheadedness, and lower leg cramping and fatigue and he was noted to have significant anemia with a hemoglobin of 6.1.  Hence, he was sent to hospital for further management.    Admission labs were notable for hemoglobin 5.6, hematocrit 15.7, platelets 132, WBC within normal range.  Basic metabolic panel notable for albumin 3.3, total bilirubin 9, ALT 56, , sodium 121, potassium 5.5, creatinine 2.1, GFR 40, and INR 1.9 EGD on 3/16 portal hypertensive gastropathy, and small nonbleeding esophageal varices.  No overt GI bleeding.  His anemia was thought to be multifactorial including epistaxis, slow bleeding from portal hypertensive gastropathy, bone marrow suppression from heavy alcohol use, and ? Hemolysis.     Advised against going home today due to the electrolyte abnormalities and significant anemia  requiring blood transfusions (total 8 units this admission). Not a candidate for steroids given the history of ongoing epistaxis and history of SBP and recent CT with concern for pneumonia. Less likely to have hemolysis per hematology (Haptoglobin <10, LDH minimally elevated,  high iron, vitamin B 12 and folic acid wnl). CTAP also showed moderate right pleural effusion, Distended thick-walled gallbladder, liver morphologic changes of cirrhosis with ascites, and splenomegaly.     Plan:  - Nephrology is managing electrolyte abnormalities- diuretics on hold  - Significant anemia not responding to multiple transfusion. Consider hematology consult  - Pantorprazole 40 mg daily  - Alcohol cessation  - Monitor hgb and transfuse  - Continue Multivitamin, folic acid and thiamine  - Continue lactulose and rifaximin   - Continue Ceftriaxone for now, transition to oral antibiotics on discharge consider cipro 250 mg twice daily  - US abdomen to  assess the GB.   - Follow-up in liver clinic as scheduled in April  - Colonoscopy less likely to yield any additional information in the absence of over GI  bleeding, patient declines colonoscopy    I will discuss with Dr. Vogt.     Time spent: 30 minutes, greater than 50% of the visit was spent in counseling/coordination of care.     Anna Rogers CNP  [facility] ([facility])  Mobile # [phone]

## 2024-03-19 NOTE — PROGRESS NOTES
He has had a nice increase is serum sodium with fluid restriction alone.    Na = 127.    I think he could be discharged from my standpoint.  He still has other issues that may need more evaluation or monitoring I realize (anemia).    I know he is anxious to go home.    He must continue to follow a  2 g sodium diet and a fluid restriction of 1200 mL per day.    Jose Nelson MD

## 2024-03-19 NOTE — PROVIDER NOTIFICATION
MD Notification    Notified Person: MD    Notified Person Name: Dr. Olguin    Notification Date/Time:     Notification Interaction: Vocera     Purpose of Notification: Hi, 617 had ultrasound completed a little earlier than expected. Can you change his diet back. Pt getting anxious about eating. Thanks     Can you please put order for previous diet . I don't remember what diet he had     Patient was on 2g Na diet.     Can you please put the order in     Orders Received: 2g Na diet    Comments:

## 2024-03-19 NOTE — CONSULTS
Initial Psychiatric Consult   Consult date: March 19, 2024         Reason for Consult, requesting source:      Alcoholism, question commitment    Requesting source: Mariana Chavira    Labs and imaging reviewed. Patient seen and evaluated by Hannah Quezada MD          HPI:     38-year-old male with alcoholism, chronic liver cirrhosis, SBP, substance abuse, hypertension history of pleural effusion status post bilateral thoracentesis 11/22/2023 who scented to the ED for evaluation of abnormal labs.  Patient had been seen by his PCP the day prior for left lower extremity cramping, fatigue and lightheadedness for a month.  Blood work showed hemoglobin of 6.1.  His hospital stay has been complicated by persistent hyperkalemia, hyponatremia and need for blood transfusions.  Patient is thrombocytopenic, had an MCV of 110, and presented with a transaminitis.  Patient also had a total bilirubin of 7.1 at admission.  Patient told a chemical dependency counselor that he wanted to go to AA, and is not ready for kind of treatment at this point.    I did see the patient today and also discussed with Dr. Underwood and RN at length.  Patient indicated that he is working on getting outpatient chemical dependency treatment set up.  This has been challenging, particularly given that his sodium today is 123.  I did ask for the substance abuse  to return to help the patient coordinate his outpatient chemical dependency treatment.  I see that this has been completed, which we appreciate.    Patient will remain in the hospital over the weekend.  As long as he is willing to go to treatment, we are somewhat limited in being able to petition.  If the patient changes his mind, and decides to leave when he is still confused or medically unwell, I have asked Dr. Underwood to place the patient on a 72-hour hold, and I will petition for commitment on Monday.  If he is medically cleared over the weekend, then then that will not be  necessary.          Past Psychiatric History:     Patient does have a history of psychiatric admission in 2004, when he had suicidal ideation.  Is admitted to [facility] after telling police at [place] that he was suicidal.  He was apparently walking on the edge of the seventh floor ramp at the time when he was brought in.  Patient was diagnosed with dysthymia with social anxiety disorder.  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        Substance Use and History:     Patient has very severe alcohol use disorder with life-threatening medical sequelae.        Past Medical History:   PAST MEDICAL HISTORY:   Past Medical History:   Diagnosis Date    Alcohol abuse     Hypertension     Substance abuse (H)        PAST SURGICAL HISTORY:   Past Surgical History:   Procedure Laterality Date    COLONOSCOPY      EGD    ESOPHAGOSCOPY, GASTROSCOPY, DUODENOSCOPY (EGD), COMBINED N/A 3/16/2024    Procedure: ESOPHAGOGASTRODUODENOSCOPY;  Surgeon: Chato Juan MD;  Location:  OR    ORTHOPEDIC SURGERY      wrist  surgery             Family History:   FAMILY HISTORY: History reviewed. No pertinent family history.    Family Psychiatric History:         Social History:   SOCIAL HISTORY:   Social History     Tobacco Use    Smoking status: Never    Smokeless tobacco: Not on file   Substance Use Topics    Alcohol use: Yes     Comment: 1.75L per day            Physical ROS:   The 10 point Review of Systems is negative other than noted in the HPI or here.           Medications:      cefTRIAXone  2 g Intravenous Q24H    folic acid  1 mg Oral Daily    [Held by provider] furosemide  40 mg Oral BID    lactulose  20 g Oral BID    magnesium oxide  400 mg Oral Daily    multivitamin w/minerals  1 tablet Oral Daily    pantoprazole  40 mg Oral BID    [Held by provider] potassium chloride  40 mEq Oral Daily    rifaximin  550 mg Oral BID    sodium chloride (PF)  3 mL Intracatheter Q8H    sodium  zirconium cyclosilicate  10 g Oral Daily    [Held by provider] spironolactone  200 mg Oral Daily    [Held by provider] sulfamethoxazole-trimethoprim  1 tablet Oral Daily              Allergies:     Allergies   Allergen Reactions    Excedrin Extra Strength [Aspirin-Acetaminophen-Caffeine]      puffy eyes and dry throat a few years ago. Tolerates ibuprofen and acetaminophen, but avoids aspirin    Nsaids Angioedema     Patient reports having a reaction of puffy eyes and dry throat a few years ago, but he has taken Advil in 2023 and did not react.          Labs and Imaging:     Recent Results (from the past 48 hour(s))   Hemoglobin    Collection Time: 03/17/24 11:33 AM   Result Value Ref Range    Hemoglobin 8.3 (L) 13.3 - 17.7 g/dL   Hemoglobin    Collection Time: 03/17/24  5:44 PM   Result Value Ref Range    Hemoglobin 7.4 (L) 13.3 - 17.7 g/dL   Sodium    Collection Time: 03/17/24  5:44 PM   Result Value Ref Range    Sodium 124 (L) 135 - 145 mmol/L   Hemoglobin    Collection Time: 03/18/24 12:49 AM   Result Value Ref Range    Hemoglobin 7.1 (L) 13.3 - 17.7 g/dL   Sodium    Collection Time: 03/18/24 12:49 AM   Result Value Ref Range    Sodium 124 (L) 135 - 145 mmol/L   Sodium    Collection Time: 03/18/24  4:49 AM   Result Value Ref Range    Sodium 124 (L) 135 - 145 mmol/L   Basic metabolic panel    Collection Time: 03/18/24  4:49 AM   Result Value Ref Range    Sodium 124 (L) 135 - 145 mmol/L    Potassium 4.5 3.4 - 5.3 mmol/L    Chloride 97 (L) 98 - 107 mmol/L    Carbon Dioxide (CO2) 19 (L) 22 - 29 mmol/L    Anion Gap 8 7 - 15 mmol/L    Urea Nitrogen 33.2 (H) 6.0 - 20.0 mg/dL    Creatinine 0.80 0.67 - 1.17 mg/dL    GFR Estimate >90 >60 mL/min/1.73m2    Calcium 8.6 8.6 - 10.0 mg/dL    Glucose 107 (H) 70 - 99 mg/dL   CBC with platelets and differential    Collection Time: 03/18/24  4:49 AM   Result Value Ref Range    WBC Count 4.9 4.0 - 11.0 10e3/uL    RBC Count 2.10 (L) 4.40 - 5.90 10e6/uL    Hemoglobin 7.0 (L) 13.3 -  17.7 g/dL    Hematocrit 19.6 (L) 40.0 - 53.0 %    MCV 93 78 - 100 fL    MCH 33.3 (H) 26.5 - 33.0 pg    MCHC 35.7 31.5 - 36.5 g/dL    RDW 23.7 (H) 10.0 - 15.0 %    Platelet Count 69 (L) 150 - 450 10e3/uL    % Neutrophils 66 %    % Lymphocytes 13 %    % Monocytes 15 %    % Eosinophils 4 %    % Basophils 1 %    % Immature Granulocytes 1 %    NRBCs per 100 WBC 0 <1 /100    Absolute Neutrophils 3.3 1.6 - 8.3 10e3/uL    Absolute Lymphocytes 0.6 (L) 0.8 - 5.3 10e3/uL    Absolute Monocytes 0.7 0.0 - 1.3 10e3/uL    Absolute Eosinophils 0.2 0.0 - 0.7 10e3/uL    Absolute Basophils 0.0 0.0 - 0.2 10e3/uL    Absolute Immature Granulocytes 0.0 <=0.4 10e3/uL    Absolute NRBCs 0.0 10e3/uL   CONDITIONAL Prepare red blood cells (unit)    Collection Time: 03/18/24  5:15 AM   Result Value Ref Range    Blood Component Type Red Blood Cells     Product Code J0314E82     Unit Status Transfused     Unit Number [id number]     CROSSMATCH Compatible     CODING SYSTEM [id number]     ISSUE DATE AND TIME [id number]     UNIT ABO/RH A+     UNIT TYPE ISBT 6200    Sodium    Collection Time: 03/18/24 11:08 AM   Result Value Ref Range    Sodium 127 (L) 135 - 145 mmol/L   INR    Collection Time: 03/18/24 11:08 AM   Result Value Ref Range    INR 2.22 (H) 0.85 - 1.15   Lactate Dehydrogenase    Collection Time: 03/18/24 11:08 AM   Result Value Ref Range    Lactate Dehydrogenase 264 (H) 0 - 250 U/L   Iron and iron binding capacity    Collection Time: 03/18/24 11:08 AM   Result Value Ref Range    Iron 160 (H) 61 - 157 ug/dL    Iron Binding Capacity      Iron Sat Index     Vitamin B12    Collection Time: 03/18/24 11:08 AM   Result Value Ref Range    Vitamin B12 1,201 232 - 1,245 pg/mL   Folate    Collection Time: 03/18/24 11:08 AM   Result Value Ref Range    Folic Acid 29.1 4.6 - 34.8 ng/mL   Haptoglobin    Collection Time: 03/18/24 11:08 AM   Result Value Ref Range    Haptoglobin <10 (L) 30 - 200 mg/dL   Bld morphology pathology review    Collection Time:  03/18/24 11:08 AM   Result Value Ref Range    Final Diagnosis       Peripheral blood, morphology:  - Marked anemia, normocytic and normochromic, with nonspecific anisopoikilocytosis.  - Moderate thrombocytopenia; no abnormal platelet clumping seen on the slide preparations.  - WBC quantitatively within normal limits and without diagnostic morphologic abnormality; mild lymphopenia present.  - Negative for schistocytes and definitive morphologic features of hemolysis.   - Negative for overt features of myelodysplasia and circulating blasts.    See comment.    COMMENT:  Normocytic anemia is nonspecific and in general may be attributable to multiple overlapping etiologies, including chronic disease, liver disease, renal and/or endocrine disease, blood loss, iron deficiency (in some patients), and/or bone marrow suppression (by underlying infection, various nutritional deficiencies, toxicities, alcohol use, or medications).    Thrombocytopenia is nonspecific and possible etiologies may include immune-associated platelet destruction (such as idiopathic thrombocytopenic purpura [ITP], heparin-induced thrombocytopenia [HIT], post-transfusion purpura, or drug-induced antibodies), non-immune-associated platelet destruction (such as recent thrombosis or thrombotic microangiopathies), decreased platelet production (such as vitamin B12 or folate deficiency, underlying infection, myelodysplasia and other hematologic malignancies, aplastic anemia, or bone marrow suppression [by alcohol, medications, chemotherapy, radiation, or marrow infiltrative processes]), medications (including immunosuppressive agents), splenic sequestration of platelets, or liver disease.  Clinical correlation is recommended.      Peripheral Smear       A microscopic examination is performed.       Peripheral Hematologic Data        Latest Reference Range & Units 03/18/24 11:08   WBC 4.0 - 11.0 10e3/uL 5.1   Hemoglobin 13.3 - 17.7 g/dL 7.9 (L)   Hematocrit  40.0 - 53.0 % 22.9 (L)   Platelet Count 150 - 450 10e3/uL 65 (L)   RBC Count 4.40 - 5.90 10e6/uL 2.45 (L)   MCV 78 - 100 fL 94   MCH 26.5 - 33.0 pg 32.2   MCHC 31.5 - 36.5 g/dL 34.5   RDW 10.0 - 15.0 % 23.6 (H)   % Neutrophils % 70   % Lymphocytes % 11   % Monocytes % 14   % Eosinophils % 3   % Basophils % 1   Absolute Basophils 0.0 - 0.2 10e3/uL 0.0   Absolute Eosinophils 0.0 - 0.7 10e3/uL 0.2   Absolute Immature Granulocytes <=0.4 10e3/uL 0.0   Absolute Lymphocytes 0.8 - 5.3 10e3/uL 0.6 (L)   Absolute Monocytes 0.0 - 1.3 10e3/uL 0.7   % Immature Granulocytes % 1   Absolute Neutrophils 1.6 - 8.3 10e3/uL 3.6   Absolute NRBCs 10e3/uL 0.0   NRBCs per 100 WBC <1 /100 0   % Retic 0.5 - 2.0 % 2.7 (H)   Absolute Retic 0.025 - 0.095 10e6/uL 0.066   (L): Data is abnormally low  (H): Data is abnormally high    For patients over 18 years old, anemia and quantitative abnormalities of WBC and platelets (if present) may be further stratified as follows:    WBC (10^9/L):    Greater than 50.0: Marked leukocytosis    18.0 - 50.0: Moderate leukocytosis    11.1 - 17.9: Mild leukocytosis    3.0 - 3.9: Mild leukopenia    2.0 - 2.9: Moderate leukopenia    Less than 2.0: Marked leukopenia    Hemoglobin (g/dL) in males:    11.3 - 13.2: Mild anemia    9.3 - 11.2: Moderate anemia    Less than 9.3: Marked anemia    Platelets (10^9/L):    Greater than 900: Marked thrombocytosis    601 - 900: Moderate thrombocytosis    451 - 600: Mild thrombocytosis    100 - 149: Mild thrombocytopenia    50 - 99: Moderate thrombocytopenia    Less than 50: Marked thrombocytopenia       Performing Labs       The technical component of this testing [facility]     CBC with platelets and differential    Collection Time: 03/18/24 11:08 AM   Result Value Ref Range    WBC Count 5.1 4.0 - 11.0 10e3/uL    RBC Count 2.45 (L) 4.40 -  5.90 10e6/uL    Hemoglobin 7.9 (L) 13.3 - 17.7 g/dL    Hematocrit 22.9 (L) 40.0 - 53.0 %    MCV 94 78 - 100 fL    MCH 32.2 26.5 - 33.0 pg    MCHC 34.5 31.5 - 36.5 g/dL    RDW 23.6 (H) 10.0 - 15.0 %    Platelet Count 65 (L) 150 - 450 10e3/uL    % Neutrophils 70 %    % Lymphocytes 11 %    % Monocytes 14 %    % Eosinophils 3 %    % Basophils 1 %    % Immature Granulocytes 1 %    NRBCs per 100 WBC 0 <1 /100    Absolute Neutrophils 3.6 1.6 - 8.3 10e3/uL    Absolute Lymphocytes 0.6 (L) 0.8 - 5.3 10e3/uL    Absolute Monocytes 0.7 0.0 - 1.3 10e3/uL    Absolute Eosinophils 0.2 0.0 - 0.7 10e3/uL    Absolute Basophils 0.0 0.0 - 0.2 10e3/uL    Absolute Immature Granulocytes 0.0 <=0.4 10e3/uL    Absolute NRBCs 0.0 10e3/uL   Reticulocyte count    Collection Time: 03/18/24 11:08 AM   Result Value Ref Range    % Reticulocyte 2.7 (H) 0.5 - 2.0 %    Absolute Reticulocyte 0.066 0.025 - 0.095 10e6/uL   Sodium    Collection Time: 03/18/24  2:03 PM   Result Value Ref Range    Sodium 125 (L) 135 - 145 mmol/L   Haptoglobin    Collection Time: 03/18/24  2:03 PM   Result Value Ref Range    Haptoglobin <10 (L) 30 - 200 mg/dL   Basic metabolic panel    Collection Time: 03/19/24  8:30 AM   Result Value Ref Range    Sodium 127 (L) 135 - 145 mmol/L    Potassium 4.6 3.4 - 5.3 mmol/L    Chloride 98 98 - 107 mmol/L    Carbon Dioxide (CO2) 18 (L) 22 - 29 mmol/L    Anion Gap 11 7 - 15 mmol/L    Urea Nitrogen 25.9 (H) 6.0 - 20.0 mg/dL    Creatinine 0.68 0.67 - 1.17 mg/dL    GFR Estimate >90 >60 mL/min/1.73m2    Calcium 9.0 8.6 - 10.0 mg/dL    Glucose 158 (H) 70 - 99 mg/dL   CBC with platelets and differential    Collection Time: 03/19/24  8:30 AM   Result Value Ref Range    WBC Count 5.5 4.0 - 11.0 10e3/uL    RBC Count 2.22 (L) 4.40 - 5.90 10e6/uL    Hemoglobin 7.1 (L) 13.3 - 17.7 g/dL    Hematocrit 20.8 (L) 40.0 - 53.0 %    MCV 94 78 - 100 fL    MCH 32.0 26.5 - 33.0 pg    MCHC 34.1 31.5 - 36.5 g/dL    RDW 23.7 (H) 10.0 - 15.0 %    Platelet Count 59  (L) 150 - 450 10e3/uL    % Neutrophils 71 %    % Lymphocytes 12 %    % Monocytes 13 %    % Eosinophils 3 %    % Basophils 1 %    % Immature Granulocytes 0 %    NRBCs per 100 WBC 0 <1 /100    Absolute Neutrophils 3.9 1.6 - 8.3 10e3/uL    Absolute Lymphocytes 0.6 (L) 0.8 - 5.3 10e3/uL    Absolute Monocytes 0.7 0.0 - 1.3 10e3/uL    Absolute Eosinophils 0.2 0.0 - 0.7 10e3/uL    Absolute Basophils 0.1 0.0 - 0.2 10e3/uL    Absolute Immature Granulocytes 0.0 <=0.4 10e3/uL    Absolute NRBCs 0.0 10e3/uL          Physical and Psychiatric Examination:     /50 (BP Location: Right arm)   Pulse 104   Temp 98.4  F (36.9  C) (Oral)   Resp 18   Ht 1.829 m (6')   Wt 103 kg (227 lb)   SpO2 96%   BMI 30.79 kg/m    Weight is 227 lbs 0 oz  Body mass index is 30.79 kg/m .    Physical Exam:  I have reviewed the physical exam as documented by by the medical team and agree with findings and assessment and have no additional findings to add at this time.    Mental Status Exam:    Appearance: awake, alert and jaundiced without scleral icterus  Attitude:  cooperative  Eye Contact:  fair  Mood:  better  Affect:  intensity is blunted  Speech:   Decreased rate  Language: Fluent in english   Psychomotor Behavior:  physical retardation and remittent tremor noted  Thought Process:  goal oriented and concrete  Associations:  no loose associations  Thought Content:  no evidence of suicidal ideation or homicidal ideation, no evidence of psychotic thought, no auditory hallucinations present, and no visual hallucinations present  Insight:  fair  Judgement:  fair  Oriented to:  time, person, and place  Attention Span and Concentration:  fair  Recent and Remote Memory:  limited  Fund of Knowledge: Appropriate   Gait and Station:                DSM-5 Diagnosis:     Hepatic encephalopathy    Toxic metabolic encephalopathy    Alcohol use disorder, severe, dependence    Numerous medical sequelae of use including severe alcoholic hepatitis, severe  hyperbilirubinemia, hyponatremia, hypokalemia, GATITO, acute on chronic anemia, coagulopathy of liver disease, severe malnutrition          Assessment:     This is a 38-year-old male with very severe alcohol use disorder.  He has multiple significant sequelae of his use.  He understands that he cannot drink anymore, and that it is killing him.  He stated that he had only been willing to do AA, but is now agreeing to outpatient treatment.  Due to insurance constraints, he is having a difficult time figuring out how to set it up.  Fortunately, the substance abuse  came to help him figure out where he could go.    The patient is still somewhat encephalopathic.  He is hyponatremic, and admits he simply does not feel well.  He will be here through the weekend.          Summary of Recommendations:     1.  As long as the patient is willing to go to outpatient treatment, and has something set up, I am not sure that we can petition currently.    2.  If the patient decides he wants to leave over the weekend and is not medically clear, please place him on a hold and I will petition for commitment on Monday.  If he is medically clear, then he is okay to go from our standpoint as long as he does have treatment set up.    3.  I will plan on following up again on Monday if he remains in the hospital.      Hannah Quezada MD  Consult/Liaison Psychiatry and Addiction Medicine  [facility]

## 2024-03-19 NOTE — PLAN OF CARE
Goal Outcome Evaluation:  Summary: Weakness, fall x2, Dizziness, Anemia, ETOH, hyponatremia, hyperkalemia, GATITO     DATE &TIME: 3/19/2024 [id number]   Cognitive Concerns/Orientation: A&O x4   BEHAVIOR & AGGRESSION TOOL COLOR: Green                      ABNL VS/O2: Soft BP, HR tachy at times in low 100's, other VSS on RA  MOBILITY: Independent w/ cane. Encouraged to ambulate in j[place] more frequently.  PAIN MANAGMENT: denied pain this shift   DIET:  2g Na, 1,200 FR  BOWEL/BLADDER: Continent of B/B   ABNL LAB/BG: Hgb 7.1 (blood transfusion threshold decreased to 6.0 today , orders in place), NA - 127 (page Neph if <123 and >130 per note); PLT 59,  DRAIN/DEVICES: L  & R PIV SL  TELEMETRY RHYTHM: Discontinued  SKIN: Dry/Jaundiced, 1-2+ edema BLE, 2+ to LLE, 1+ to RLE  TESTS/PROCEDURES: Abdominal Ultrasound to assess Gallbladder, patient declined colonoscopy per GI.  D/C DAY/GOALS/PLACE: pending  OTHER IMPORTANT INFO: GI, & Hem/Onc following. Psych consulted. Nephrology signed off. Restarted on oral lasix today.

## 2024-03-20 NOTE — PROGRESS NOTES
[facility]    Medicine Progress Note - Hospitalist Service    Date of Admission:  3/12/2024    Assessment & Plan   Crispin Ham is a 38 year old male with PMH of alcohol abuse, chronic liver cirrhosis, hx SBP, substance abuse, HTN, chronic HFpEF, hx pleural effusion s/p b/l thoracentesis 11/2023 who presented to the ED on 3/12/24 for evaluation of abnormal labs.   Patient saw his PCP the day prior to admission for LLE cramping, fatigue and lightheadedness x 1 month. His blood work came back with hgb 6.1 and he was referred to the ED. Hospital stay complicated by persistent hyperkalemia, hyponatremia and need for blood transfusions.     Acute on chronic anemia  Coagulopathy of liver disease  History of duodenitis/ulcer (09/2023)  *Hgb 5.6, down from 6.9 on 2/21/24 previously running around 7.4-8. Platelets stable at 132.  *Endorses nosebleeds every 3 days or so which can be somewhat prolonged with his hx coagulopathy. No current active bleeding.  *Denies hematemesis, melena, hematochezia. He does have some nausea.  *Symptomatic with complaints of positional dizziness, fatigue. Declined rectal examination in the ED.  *Last EGD 9/2/23 with esophageal mucosal tear with bleeding (complication of clip placement) hemostasis achieved with bipolar probe. Portal hypertensive gastropathy.  -So far has received 8 units of PRBC transfusion during this hospitalization with most recent transfusion-3/18  -Patient reports that he is having brown stool  -[facility] following, appreciate input.  -Status post EGD 3/16 which showed portal hypertensive gastropathy, grade 1 and small esophageal varices with no stigmata of bleeding.  Discussed with GI, likely [facility] outpatient but since patient reports having brown stool GI does not think his symptoms are related to GI blood loss  -Iron studies, B12, folate-does not suggest deficiency,  reticulocyte count elevated , minimally elevated LDH.  Haptoglobin  low but expected with multiple transfusion given hemolysis associated with transfusion  -CT chest abdomen and pelvis with no evidence of internal bleeding  -Discussed with Dr. Silva, given possibility of hemolyzing the transfused blood and clinical stability, Dr. Silva recommended changing transfusion threshold to less than 6  - Continue PTA Protonix 40 mg BID  - conditional transfusion orders in place for hgb<6  -Hemoglobin was 7.1 yesterday, this morning is 8.1 without transfusion and patient clinically looks stable, not actively bleeding, monitor hemoglobin intermittently    Possible multifocal pneumonia  Small to moderate right pleural effusion  Subacute anterior right lower leg fracture  Numerous thoracolumbar compression fracture  -CT chest abdomen pelvis to evaluate for internal bleeding as above suggested multifocal pneumonia and right pleural effusion.   -CT also shows multiple likely subacute anterior right lower rib fracture and numerous thoracolumbar compression fracture.  Patient with no active rib pain or back pain  -Patient already on ceftriaxone for SBP prophylaxis as below.    -Patient with no respiratory symptoms including no cough, shortness of breath, chest pain and on room air  -I have a very strong suspicion the infiltrates on CT likely from third spacing, procalcitonin is 0.36  -Continue PTA Lasix at low-dose as below    Distended thick-walled gallbladder  -Noted on CT scan to have distended thick-walled gallbladder  -Abdominal ultrasound suggest distended gallbladder with wall thickening and trace pericholecystic fluid, HIDA scan is recommended  -HIDA scan ordered    Left hip osteoarthritis  -Patient has a long history of left hip pain and apparently used to get injection on his left hip.  -Reports that his left hip has started bothering him last few days.  X-ray of the left hip demonstrate advanced left hip osteoarthritis  -Discussed with orthopedics, plan for intra-articular steroid  injection, likely tomorrow.  His most recent INR is 2.2 so will give him IV vitamin K to try to decrease his INR     Acute kidney injury  Hyponatremia  Hyperkalemia  *Na 121 on admission, was 130 on 2/21/24 but otherwise was running 124-129 in the fall. Felt to be somewhat volume depleted on examination, received fluids, but sodium has bounced between 117-122.  Creatinine 2.16 on admission, was 0.59 on 2/21/2024 potassium had increased up to 5.7  -Currently holding PTA Aldactone, potassium chloride, Lasix  -Treated initially with 2% NS then later 150 meq sodium bicarb solution and then normal saline  -Treated with Lokelma, low potassium, fluid restriction  -Creatinine and potassium has now normalized and sodium is 125 today  -Nephrology was following, now signed off  -Continue low-dose Lasix 20 mg twice daily  -Continue with fluid restriction 1200ml, low potassium diet     Severe Malnutrition   -In the context of acute illness or injury, chronic illness or disease  -Dietitian following  -Supplements    Severe alcoholic hepatitis  Severe hyperbilirubinemia from underlying liver disease  Alcohol dependence with ongoing alcohol use disorder  History of alcohol withdrawal  *Established with [facility]. Currently without significant ascites on examination.  Last drink 3/11 afternoon, no history of withdrawal seizure and due to no signs of withdrawal CIWA monitoring has been stopped  *Alk phos 223, ALT 56, , total bili 9.0 (stable compared to 2/21/24)  *3/13 Abd US: negative for ascites  - [facility] was following, appreciate input, now signed off 3/20  - Continue PTA PPI, Lactulose, Rifaximin  - switched PTA bactrim to IV ceftriaxone on 3/15 given association with hyperkalemia.  As per GI likely can change it to 500 mg daily  - HOLD PTA Spironolactone, continue PTA Lasix at a lower dose  -Continue multivitamin folic acid and thiamine supplementation, gabapentin taper  - Chem dep appreciated, patient only interested in AA at  this time  -I had a long discussion with patient's mother on 3/17 who was worried that alcohol Anonymous may not be enough and he might need inpatient treatment.  -Psychiatry consult pending, patient has been getting his medical evaluation and so psychiatry has not been able to evaluate the patient.  Per Dr. Quezada, patient might meet criteria for commitment.  -Patient to follow-up with liver clinic on 4/8/2024.     Chronic HFpEF  *Echo 11/16/23 with EF 65-70%, no RWMA or any significant valvular disease  -Lasix 20 mg p.o. twice daily initiated and IV fluid discontinued  -Pending electrolyte/blood pressure, adjust diuresis  -Monitor I&O     LLE cramping  *Venous US LLE on 2/21/24 was negative  *Xray left tibia and fibula 3/11/24 no acute fracture  -Supportive cares     Chronic hip and back pain  -Ice or heat as needed     Severe malnutrition  Appreciate nutrition, recommendation for tube feeding  -Patient not interested     Diet: Snacks/Supplements Adult: Ensure Enlive; Between Meals  Fluid restriction 1200 ML FLUID  2 Gram Sodium Diet    DVT Prophylaxis: Pneumatic Compression Devices  Bosch Catheter: Not present  Lines: None     Cardiac Monitoring: None  Code Status: No CPR- Do NOT Intubate      Clinically Significant Risk Factors         # Hyponatremia: Lowest Na = 125 mmol/L in last 2 days, will monitor as appropriate      # Hypoalbuminemia: Lowest albumin = 2.7 g/dL at 3/17/2024  5:36 AM, will monitor as appropriate    # Coagulation Defect: INR = 2.22 (Ref range: 0.85 - 1.15) and/or PTT = N/A, will monitor for bleeding  # Thrombocytopenia: Lowest platelets = 59 in last 2 days, will monitor for bleeding          # Obesity: Estimated body mass index is 30.84 kg/m  as calculated from the following:    Height as of this encounter: 1.829 m (6').    Weight as of this encounter: 103.1 kg (227 lb 6.4 oz).   # Severe Malnutrition: based on nutrition assessment      # Financial/Environmental Concerns: none          Disposition Plan     Expected Discharge Date: 03/20/2024      Destination: home with family              Jane Elmore MD  Hospitalist Service  [facility]  Securely message with [employer] (more info)  Text page via [employer] Paging/Directory   ______________________________________________________________________    Interval History   Patient reports that recently his left hip has started bothering him again.  Has received left hip injection in the past up to 3 times and last one was about 2 to 3 years ago.  Hoping to get it sorted out here.  Also discussed with Dr. Quezada, she has not been able to see the patient as patient has been out of the room for different tests but reviewing the chart she feels like patient will meet criteria for commitment.  Patient updated about lab results and plan of care.  No other nursing concerns.    Physical Exam   Vital Signs: Temp: 97.8  F (36.6  C) Temp src: Oral BP: 98/51 Pulse: 100   Resp: 18 SpO2: 96 % O2 Device: None (Room air)    Weight: 227 lbs 6.4 oz    Exam:  Constitutional: Awake, alert and no distress.  Appears jaundiced.  Appears comfortable  Head: Normocephalic. No masses, lesions, tenderness or abnormalities  ENMT: ENT exam normal, no neck nodes or sinus tenderness  Cardiovascular: RRR.  No murmurs, no rubs or JVD  Respiratory: Normal WOB,b/l equal air entry, no wheezes or crackles   Gastrointestinal: Abdomen soft, non-tender. BS normal. No masses, organomegaly  : Deferred      Medical Decision Making       43 MINUTES SPENT BY ME on the date of service doing chart review, history, exam, documentation & further activities per the note.      Data     I have personally reviewed the following data over the past 24 hrs:    6.0  \   8.1 (L)   / 86 (L)     N/A N/A N/A /  N/A   N/A N/A N/A \     Procal: N/A CRP: N/A Lactic Acid: N/A         Imaging results reviewed over the past 24 hrs:   Recent Results (from the past 24 hour(s))   US Abdomen Limited     Narrative    US ABDOMEN LIMITED 3/19/2024 3:43 PM    CLINICAL HISTORY: RUQ tenderness on exam, Elevated Bili most likely  ETOH Hepatitis, recent CT showed GB wall thickness concern for  cholecystitis.  TECHNIQUE: Limited abdominal ultrasound.    COMPARISON: CT 3/18/2024.    FINDINGS:    GALLBLADDER: Gallbladder is distended measuring up to 15.8 cm.  Gallbladder wall thickening is present. There is trace pericholecystic  fluid. Negative sonographic Alexis's sign.    BILE DUCTS: There is no biliary dilatation. The common duct measures 3  mm.    LIVER: Increased echogenicity of the hepatic parenchyma with poor  acoustic penetration. Heterogeneous echotexture compatible with  intrinsic liver disease.    RIGHT KIDNEY: No hydronephrosis.    PANCREAS: The visualized portions of the pancreas are normal.  Partially obscured by bowel gas.    Trace upper abdominal ascites. Small right pleural effusion.  Varicosities are seen in the upper abdomen.      Impression    IMPRESSION:  1.  Distended gallbladder with wall thickening and trace  pericholecystic fluid. No cholelithiasis. Findings may represent  acalculous cholecystitis in the correct clinical context. Differential  includes reactive changes from hepatitis. A HIDA examination could be  performed for further evaluation.  2.  Increased echogenicity of the hepatic parenchyma with poor  acoustic penetration and heterogeneous echotexture compatible with  hepatic steatosis/fibrosis.  3.  Trace upper abdominal ascites. Small right pleural effusion.    RENETTA AGUILERA MD         SYSTEM ID:  [id number]     Recent Labs   Lab 03/20/24  0945 03/19/24  0830 03/18/24  1403 03/18/24  1108 03/18/24  0449 03/17/24  1133 03/17/24  0536 03/16/24  1513 03/16/24  1044 03/14/24  0947 03/14/24  0738   WBC 6.0 5.5  --  5.1 4.9  --  4.9  --  8.6   < > 7.7   HGB 8.1* 7.1*  --  7.9* 7.0*   < > 7.5*   < > 7.7*   < > 7.3*   MCV 93 94  --  94 93  --  93  --  96   < > 96   PLT 86* 59*  --  65* 69*   --  70*  --  79*   < > 85*   INR  --   --   --  2.22*  --   --   --   --   --   --  2.24*   NA  --  127* 125* 127* 124*  124*   < > 123*   < > 123*   < > 119*   POTASSIUM  --  4.6  --   --  4.5  --  4.3  --  5.0   < > 6.1*   CHLORIDE  --  98  --   --  97*  --  97*  --  97*   < > 95*   CO2  --  18*  --   --  19*  --  19*  --  16*   < > 16*   BUN  --  25.9*  --   --  33.2*  --  43.8*  --  55.9*   < > 46.4*   CR  --  0.68  --   --  0.80  --  1.03  --  1.52*   < > 1.44*   ANIONGAP  --  11  --   --  8  --  7  --  10   < > 8   MIGUELINA  --  9.0  --   --  8.6  --  9.0  --  9.7   < > 9.5   GLC  --  158*  --   --  107*  --  106*  --  108*   < > 95   ALBUMIN  --   --   --   --   --   --  2.7*  --  3.1*  --  3.0*   PROTTOTAL  --   --   --   --   --   --  6.1*  --  7.0  --  6.9   BILITOTAL  --   --   --   --   --   --  7.1*  --  7.6*  --  8.7*   ALKPHOS  --   --   --   --   --   --  182*  --  225*  --  222*   ALT  --   --   --   --   --   --  45  --  48  --  45   AST  --   --   --   --   --   --  91*  --  97*  --  88*    < > = values in this interval not displayed.

## 2024-03-20 NOTE — PROGRESS NOTES
Subjective:  Mr. Ham is a 38-year-old gentleman with multiple medical problem including alcohol abuse, liver cirrhosis and hypertension. Patient was seen in clinic on 03/11/2024 and had labs done.  CBC revealed severe anemia with hemoglobin of 6.1.  Patient was advised to go to emergency room. Patient presented to emergency room on 03/12/2024 and had multiple investigations done.  -WBC of 9.2, hemoglobin of 5.6 and platelet of 132.  MCV of 110.  -CMP revealed multiple abnormalities including hyponatremia, hyperkalemia, elevated creatinine and elevated LFT.     Multiple investigations done for work-up of anemia.  -Peripheral blood smear reveals normocytic, normochromic anemia and moderate thrombocytopenia.  No schistocytes.  No morphologic features of hemolysis.  -Reticulocyte of 2.7%.  -Elevated iron of 160. (Patient had already received multiple units of blood transfusion).  -Normal vitamin B12 and folate.  -Mildly elevated LDH of 264.  -Stool for occult blood is positive.  -EGD reveals grade 1 and small esophageal varices.  No stigmata of bleeding.  There is portal hypertensive gastropathy.  No endoscopic evidence of active or recent upper GI bleed.    Patient has received multiple units of transfusion.  With transfusion, there is transient increase in hemoglobin and then again it decreases to around 7.  For evaluation, other investigations done.  -Haptoglobin less than 10.  Patient already had received multiple units of transfusion before this haptoglobin was drawn.  There could be some hemolysis from transfusion itself causing low haptoglobin level.  -CT chest, abdomen and pelvis does not reveal any hematoma.  There is moderate right pleural effusion.  There is distended thick-walled gallbladder.  There is liver cirrhosis with ascites.  There is splenomegaly of 16.7 cm.  There are right lower rib fractures and also numerous thoracolumbar compression fracture.  Patient says that he had fallen down few days  ago.  There is also groundglass opacities in right lung suspicious for pneumonia.    Patient's overall condition is stable.  He has generalized weakness.  There has been no worsening of his weakness.  He denies bleeding from any site.  No black stool.     Exam:  He is alert and oriented x 3.  Not in any respiratory distress or pain.  Vitals: Reviewed.  Rest of the system is not examined.     Labs: Reviewed.     Assessment:  1.  A 38-year-old gentleman with acute on chronic macrocytic anemia.  Chronic anemia is secondary to liver cirrhosis, splenomegaly, mild hemolysis and also bone marrow suppression by alcohol abuse.  Acute worsening was likely from GI bleed.  2.  Alcoholic liver cirrhosis.     Plan:  -Transfuse for hemoglobin below 6.0.  Between 6 and 7, he should be transfused if symptomatic.  -Video capsule endoscopy as outpatient.  -Stop alcohol.     Discussion:  1.  Discussed with the patient regarding anemia.  Explained to him that his chronic anemia is due to liver cirrhosis, splenomegaly, bone marrow suppression by alcohol and also mild hemolysis.  Acute worsening likely from GI bleed.  I am not suspecting any overt hemolysis.    I would recommend that he get outpatient video capsule endoscopy to rule out any small intestine bleed.    2.  Discussed regarding transfusion.  Patient's hemoglobin increases transiently with transfusion.  It then again comes down around 7.  Symptomatically he does not feel any different.    I would recommend that we transfuse him for hemoglobin below 6.0.  Between 6 and 7, he should be transfused if he is symptomatic.     3.  Patient advised to quit alcohol.     4. Hematology/oncology will continue to follow.  Case discussed with Dr. Elmore.     Total time spent 25 minutes.  Time spent in today's visit, review of chart/investigations today, communicating with other providers and documentation today.

## 2024-03-20 NOTE — PROGRESS NOTES
Attempted to see the patient again 2 times.  He was not in his room and away had a procedure.  Discussed with Dr. Elmore.  Will try again tomorrow.

## 2024-03-20 NOTE — PLAN OF CARE
Goal Outcome Evaluation:    Summary: Weakness, fall x2, Dizziness, Anemia, ETOH, hyponatremia, hyperkalemia, GATITO     DATE &TIME: 3/20/2024 [id number]    Cognitive Concerns/Orientation: A&O x4   BEHAVIOR & AGGRESSION TOOL COLOR: Green                      ABNL VS/O2: Tachy and soft BP on RA  MOBILITY: Independent w/ cane. Encouraged to ambulate in jordan more frequently.  PAIN MANAGMENT: L leg tenderness, declined interventions.  DIET:  2g Na, 1,200 FR  BOWEL/BLADDER: Continent of B/B   ABNL LAB/BG: Hgb 8.1 (blood transfusion threshold <6.0 ), NA - 125 (page Neph if <123 and >130 per note); PLT 86  DRAIN/DEVICES: L  & R PIV-SL  TELEMETRY RHYTHM: NA  SKIN: Dry/Jaundiced, 1-2+ edema BLE, L>R  TESTS/PROCEDURES: Intra-articular steroid injection tomorrow-3/21/24  D/C DAY/GOALS/PLACE: pending  OTHER IMPORTANT INFO: GI, Ortho & Hem/Onc following. Psych consulted. Received Vitamin K infusion x1 this shift. Had HIDA scan and Xray of left hip done this shift.

## 2024-03-20 NOTE — PROGRESS NOTES
[facility] - [facility] Progress Note     IMPRESSION:  38-year-old gentleman with a history of chronic cirrhosis/hepatitis, remote history of spontaneous bacterial peritonitis.    Normocytic anemia which is multifactorial related to portal hypertensive gastropathy, frequent epistaxis, bone marrow suppression.  Further endoscopic evaluation is not indicated.    RECOMMENDATIONS:  Long discussion with the patient and family today regarding treatment for alcohol use disorder.  He would not be a candidate for liver transplantation without pursuing this sincerely.    Okay to transition back to SBP prophylaxis, Cipro 500mg daily    Low Na diet.     Liver clinic follow up 24.    Little more to add presently.  Anticipate discharge is nearing.  Please call with questions.  Will sign off.     Total time spent in chart review, direct medical discussion, examination, and documentation was 30 minutes    Hiren Vogt DO   [facility]  Office [phone]    ________________________________________________________________________      SUBJECTIVE:  without abd pain.  Parents at the bedside.  Long discussion regarding need to pursue etoh abstinence.      OBJECTIVE:  BP 98/51 (BP Location: Right arm)   Pulse 100   Temp 97.8  F (36.6  C) (Oral)   Resp 18   Ht 1.829 m (6')   Wt 103.1 kg (227 lb 6.4 oz)   SpO2 96%   BMI 30.84 kg/m    Temp (24hrs), Av.9  F (36.6  C), Min:97.8  F (36.6  C), Max:97.9  F (36.6  C)    Patient Vitals for the past 72 hrs:   Weight   24 0213 103.1 kg (227 lb 6.4 oz)   24 0629 103 kg (227 lb)       Intake/Output Summary (Last 24 hours) at 3/20/2024 1142  Last data filed at 3/20/2024 1017  Gross per 24 hour   Intake 640 ml   Output 800 ml   Net -160 ml        PHYSICAL EXAM  GEN: Alert, oriented x3, communicative and in NAD.    ABD: ND, +BS, no guarding or pain to palpation, no rebound, no HSM.    Additional Data:  I have reviewed the patient's new clinical lab results:      Recent Labs   Lab Test 03/20/24  0945 03/19/24  0830 03/18/24  1108 03/14/24  1812 03/14/24  0738 03/13/24  1111 03/13/24  0726   WBC 6.0 5.5 5.1   < > 7.7   < >  --    HGB 8.1* 7.1* 7.9*   < > 7.3*   < >  --    MCV 93 94 94   < > 96   < >  --    PLT 86* 59* 65*   < > 85*   < >  --    INR  --   --  2.22*  --  2.24*  --  2.13*    < > = values in this interval not displayed.     Recent Labs   Lab Test 03/20/24  0945 03/19/24  0830 03/18/24  0449   POTASSIUM 4.5 4.6 4.5   CHLORIDE 96* 98 97*   CO2 18* 18* 19*   BUN 20.7* 25.9* 33.2*   ANIONGAP 11 11 8     Recent Labs   Lab Test 03/20/24  0945 03/17/24  0536 03/16/24  1044 11/17/23  0536 11/16/23  1514 09/26/23  0724 09/25/23  1646 09/25/23  1537 09/25/23  1424 09/09/23  1119 09/08/23  1224 09/08/23  1147   ALBUMIN 3.1* 2.7* 3.1*   < > 3.1*   < >  --   --  2.4*   < >  --  2.3*   BILITOTAL 8.8* 7.1* 7.6*   < > 8.4*   < >  --   --  21.7*   < >  --  10.4*   ALT 54 45 48   < > 49   < >  --   --  69   < >  --  82*   * 91* 97*   < > 222*   < >  --    < >  --    < >  --  331*   PROTEIN  --   --   --   --  100*  --  30*  --   --   --  50*  --    LIPASE  --   --   --   --   --   --   --   --  237*  --   --  272*    < > = values in this interval not displayed.

## 2024-03-20 NOTE — CONSULTS
[facility]    Orthopedic Consultation    Crispin Ham MRN# [id number]   Age: 38 year old YOB: 1985     Date of Admission:  3/12/2024    Reason for consult: Left hip osteoarthritis       Requesting provider: TALITA Elmore       Level of consult: Consult, follow and place orders           Assessment and Plan:   Assessment:   Left hip osteoarthritis  Alcohol abuse with hepatitis    Malnutrition      Plan:   Repeat left hip x-rays ordered which demonstrate advanced left hip OA  Order for a intra-articular steroid injection placed to be completed tomorrow.  Patient's INR is chronically high at baseline, Vit K will be given prior per hospitalist to prevent hemarthrosis.   Patient and family instructed that eventually he will likely need to undergo a left JEFFERSON for pain relief.   WBAT Left LE   Follow-up with total hip surgeon once medically stable to discuss further treatment planning.            Chief Complaint:   Left hip pain          History of Present Illness:   Crispin Ham is a 38 year old male with past medical history of alcohol abuse, chronic liver cirrhosis, substance abuse, HTN, chronic HFpEF, hx pleural effusion s/p b/l thoracentesis 11/2023 who presented to the ED on 3/12/24 for evaluation of abnormal labs.     Patient saw his PCP the day prior to admission (03/12/24) for Left LE cramping, fatigue and lightheadedness x 1 month. His blood work came back with hgb 6.1 and he was referred to the ED. Hospital stay complicated by persistent hyperkalemia, hyponatremia and need for blood transfusions.    Orthopedics consulted to evaluate patient's left hip pain.  He states he has had hip pain for years. Denies trauma to the hip.  He has had injections in the past which he reports have been beneficial but it is challenging for him to get to the appointments.            Past Medical History:     Past Medical History:   Diagnosis Date    Alcohol abuse     Hypertension      Substance abuse (H)              Past Surgical History:     Past Surgical History:   Procedure Laterality Date    COLONOSCOPY      EGD    ESOPHAGOSCOPY, GASTROSCOPY, DUODENOSCOPY (EGD), COMBINED N/A 3/16/2024    Procedure: ESOPHAGOGASTRODUODENOSCOPY;  Surgeon: Chato Juan MD;  Location:  OR    ORTHOPEDIC SURGERY      wrist  surgery             Social History:     Social History     Tobacco Use    Smoking status: Never    Smokeless tobacco: Not on file   Substance Use Topics    Alcohol use: Yes     Comment: 1.75L per day             Family History:   History reviewed. No pertinent family history.          Immunizations:     VACCINE/DOSE   Diptheria   DPT   DTAP   HBIG   Hepatitis A   Hepatitis B   HIB   Influenza   Measles   Meningococcal   MMR   Mumps   Pneumococcal   Polio   Rubella   Small Pox   TDAP   Varicella   Zoster             Allergies:     Allergies   Allergen Reactions    Excedrin Extra Strength [Aspirin-Acetaminophen-Caffeine]      puffy eyes and dry throat a few years ago. Tolerates ibuprofen and acetaminophen, but avoids aspirin    Nsaids Angioedema     Patient reports having a reaction of puffy eyes and dry throat a few years ago, but he has taken Advil in 2023 and did not react.             Medications:     Current Facility-Administered Medications   Medication    calcium carbonate (TUMS) chewable tablet 1,000 mg    cefTRIAXone (ROCEPHIN) 2 g vial to attach to  ml bag for ADULTS or NS 50 ml bag for PEDS    flumazenil (ROMAZICON) injection 0.2 mg    folic acid (FOLVITE) tablet 1 mg    furosemide (LASIX) tablet 20 mg    OLANZapine zydis (zyPREXA) ODT tab 5-10 mg    Or    haloperidol lactate (HALDOL) injection 2.5-5 mg    lactulose (CHRONULAC) solution 20 g    lidocaine (LMX4) cream    lidocaine 1 % 0.1-1 mL    magnesium oxide (MAG-OX) tablet 400 mg    melatonin tablet 5 mg    methocarbamol (ROBAXIN) tablet 500 mg    multivitamin w/minerals (THERA-VIT-M) tablet 1 tablet    naloxone (NARCAN)  injection 0.1 mg    ondansetron (ZOFRAN ODT) ODT tab 4 mg    Or    ondansetron (ZOFRAN) injection 4 mg    pantoprazole (PROTONIX) EC tablet 40 mg    [Held by provider] potassium chloride (KLOR-CON) Packet 40 mEq    prochlorperazine (COMPAZINE) injection 5 mg    rifaximin (XIFAXAN) tablet 550 mg    senna-docusate (SENOKOT-S/PERICOLACE) 8.6-50 MG per tablet 1 tablet    Or    senna-docusate (SENOKOT-S/PERICOLACE) 8.6-50 MG per tablet 2 tablet    sodium chloride (PF) 0.9% PF flush 3 mL    sodium chloride (PF) 0.9% PF flush 3 mL    [Held by provider] sodium chloride 0.9 % infusion    sodium zirconium cyclosilicate (LOKELMA) packet 10 g    [Held by provider] spironolactone (ALDACTONE) tablet 200 mg    [Held by provider] sulfamethoxazole-trimethoprim (BACTRIM DS) 800-160 MG per tablet 1 tablet             Review of Systems:   ROS:  10 point ROS neg other than the symptoms noted above in the HPI.            Physical Exam:   All vitals have been reviewed  Patient Vitals for the past 24 hrs:   BP Temp Temp src Pulse Resp SpO2 Weight   03/20/24 0830 98/51 97.8  F (36.6  C) Oral 100 18 96 % --   03/20/24 0213 -- -- -- -- -- -- 103.1 kg (227 lb 6.4 oz)   03/19/24 2138 105/68 97.9  F (36.6  C) Oral 101 18 96 % --   03/19/24 1538 110/72 97.9  F (36.6  C) Oral 95 18 99 % --       Intake/Output Summary (Last 24 hours) at 3/20/2024 1202  Last data filed at 3/20/2024 1017  Gross per 24 hour   Intake 640 ml   Output 800 ml   Net -160 ml         Physical Exam   Temp: 97.8  F (36.6  C) Temp src: Oral BP: 98/51 Pulse: 100   Resp: 18 SpO2: 96 % O2 Device: None (Room air)    Vital Signs with Ranges  Temp:  [97.8  F (36.6  C)-97.9  F (36.6  C)] 97.8  F (36.6  C)  Pulse:  [] 100  Resp:  [18] 18  BP: ()/(51-72) 98/51  SpO2:  [96 %-99 %] 96 %  227 lbs 6.4 oz    Constitutional: Pleasant, alert, appropriate, following commands.  HEENT: Head atraumatic normocephalic. Pupils equal round and reactive to light.  Respiratory: Unlabored  breathing no audible wheeze  Cardiovascular: Regular rate and rhythm per pulses  Lymph/Hematologic: No lymphadenopathy in areas examined  Genitourinary:  No godoy  Skin: No rashes, no cyanosis, mild edema bilateral LE   Musculoskeletal: On exam of the left LE: skin intact.  No ecchymosis or erythema.  Mild pain with passive internal and external rotation of the left hip.  Decreased ROM with passive internal rotation. Patient is able to actively flex the left hip while lying in bed to 90 degrees.  Equal strength with dorsi and plantar flexion.  Sensation and pulses are intact and equal.  Neurologic: normal without focal findings, mental status, speech normal, alert and oriented x iii            Data:   All laboratory data reviewed  Results for orders placed or performed during the hospital encounter of 03/12/24   UPPER GI ENDOSCOPY     Status: None   Result Value Ref Range    Upper GI Endoscopy       [place] [address] [place]  [address]  _______________________________________________________________________________  Patient Name: Crispin Ham         Procedure Date: 3/16/2024 12:58 PM  MRN: [id number]                       Account Number: [id number]  YOB: 1985               Admit Type: Inpatient  Age: 38                               Room: [place] [address]  Note Status: Finalized                Attending MD: MARYCRUZ MCGUIRE MD,   Instrument Name: 509 GIF  Gastroscope   _______________________________________________________________________________     Procedure:                Upper GI endoscopy  Indications:              Iron deficiency anemia  Providers:                MARYCRUZ MCGUIRE MD, Mary Kate Mccabe RN  Referring MD:               Medicines:                Monitored Anesthesia Care  Complications:            No immediate complications. Estimated blood loss:                             None.  __________________________________________  _____________________________________  Procedure:                Pre-Anesthesia Assessment:                            - Prior to the procedure, a History and Physical                             was performed, and patient medications and                             allergies were reviewed. The patient's tolerance of                             previous anesthesia was also reviewed. The risks                             and benefits of the procedure and the sedation                             options and risks were discussed with the patient.                             All questions were answered, and informed consent                             was obtained. Prior Anticoagulants: The patient has                             taken no anticoagulant or antiplatelet agents. ASA                             Grade Assessment: IV - A patient with severe                             systemic disease that is a constant threat to life.                             After reviewing the risks and be nefits, the patient                             was deemed in satisfactory condition to undergo the                             procedure.                            After obtaining informed consent, the endoscope was                             passed under direct vision. Throughout the                             procedure, the patient's blood pressure, pulse, and                             oxygen saturations were monitored continuously. The                             endoscope 509 was introduced through the mouth, and                             advanced to the second part of duodenum. The upper                             GI endoscopy was accomplished without difficulty.                             The patient tolerated the procedure well.                                                                                   Findings:       Esophagogastric landmarks were identified: the Z-line was found at 39 cm        from the  incisors.       Grade I, small (< 5 mm) varices were foun d in the distal esophagus.       Moderate portal hypertensive gastropathy was found in the entire        examined stomach.       An endoclip was found in the cardia.       The examined duodenum was normal.                                                                                   Impression:               1. Grade I and small (< 5 mm) esophageal varices.                             No stigmata of bleding.                            2. Portal hypertensive gastropathy.                            3. An endoclip was found in the gastric cardia,                             from prior polypectomy in 09/2023. Not removed.                            4. Normal examined duodenum.                            5. No specimens collected.                            6. No endoscopic evidnce of active or recent upper                             GI blood loss. Suspect acute on chronic anemia                             secondary to now resolved epistaxis, slow-pace GI                             b lood loss from portal hypertensive gastropathy,                             and bone marrow suppression from heavy alcohol use.  Recommendation:           1. [facility] care.                            2. Advance diet as tolerated.                            3. Continue present medications including                             Pantoprazole.                            4. Monitor hemoglobin and transfuse as needed.                                                                                   Procedure Code(s):       --- Professional ---       49959, Esophagogastroduodenoscopy, flexible, transoral; diagnostic,        including collection of specimen(s) by brushing or washing, when        performed (separate procedure)  Diagnosis Code(s):       --- Professional ---       I85.00, Esophageal varices without bleeding       K76.6, Portal  hypertension       K31.89, Other diseases of stomach and duodenum       T18.2XXA, Foreign body in stomach, initial encoun ter       D50.9, Iron deficiency anemia, unspecified    CPT copyright 2022 [employer]. All rights reserved.    The codes documented in this report are preliminary and upon  review may   be revised to meet current compliance requirements.    Marycruz Juan MD  __________________  MARYCRUZ JUAN MD  3/16/2024 3:00:25 PM  I was physically present for the entire viewing portion of the exam.  MARYCRUZ JUAN MD  Number of Addenda: 0    Note Initiated On: 3/16/2024 12:58 PM  Total Procedure Duration: 0 hours 4 minutes 18 seconds   Estimated Blood Loss:       Estimated blood loss: none.  Scope In: 1:33:54 PM  Scope Out: 1:38:12 PM     US Abdomen Limited     Status: None    Narrative    US ABDOMEN LIMITED 3/13/2024 4:05 PM    CLINICAL HISTORY: assess for ascites  TECHNIQUE: Limited abdominal ultrasound.    COMPARISON: Paracentesis 11/17/2023      Impression    IMPRESSION:    Targeted ultrasound scanning of the 4 abdominal quadrants was  performed demonstrating no ascites.    SHARON DUFFY MD         SYSTEM ID:  [id number]   CT Chest Abdomen Pelvis w/o Contrast     Status: None    Narrative    EXAM: CT CHEST ABDOMEN PELVIS W/O CONTRAST  LOCATION: [facility]  DATE: 3/18/2024    INDICATION: Progressive anemia , Rule out internal bleeding  COMPARISON: 09/25/2023  TECHNIQUE: CT scan of the chest, abdomen, and pelvis was performed without IV contrast. Multiplanar reformats were obtained. Dose reduction techniques were used. Exam significantly limited by lack of intravenous contrast. Active hemorrhage cannot be   excluded in this setting.  CONTRAST: None.    FINDINGS:   LUNGS AND PLEURA: Heterogeneous groundglass opacity dependently within the right upper lobe and in the superior segment of the right lower lobe. Small to moderate right pleural effusion,  increased in interval.    MEDIASTINUM/AXILLAE: No significant mediastinal or hilar adenopathy.    CORONARY ARTERY CALCIFICATION: None.    HEPATOBILIARY: Distended gallbladder. Gallbladder wall thickening. No calcified stones. Morphologic changes of cirrhosis.    PANCREAS: Unremarkable    SPLEEN: Splenomegaly reaching 16.7 cm    ADRENAL GLANDS: Unremarkable    KIDNEYS/BLADDER: No nephroureterolithiasis or hydronephrosis. Decompressed bladder.    BOWEL: Normal caliber appendix. No bowel obstruction. Moderate stranding in the mesentery.    LYMPH NODES: Numerous subcentimeter retroperitoneal nodes.    VASCULATURE: No abdominal aortic aneurysm. Multiple prominent suspected portosystemic collaterals in the upper abdomen as well as recanalized periumbilical vein.    PELVIC ORGANS: Small volume of free fluid.    MUSCULOSKELETAL: Multiple likely subacute anterior right lower rib fractures. Numerous thoracolumbar compression fractures, new in the interval including T5, T7, T8, T9, T8, T11, T12, and L1-L5. Small fluid containing umbilical hernia.      Impression    IMPRESSION:  1.  Heterogeneous right-sided groundglass opacities suspicious for multifocal pneumonia.  2.  Small to moderate right pleural effusion.  3.  Distended thick-walled gallbladder. Further evaluation with gallbladder ultrasound recommended to exclude cholecystitis.  4.  Morphologic changes of cirrhosis with ascites.  5.  Associated splenomegaly.  6.  Additional findings as above.   US Abdomen Limited     Status: None    Narrative    US ABDOMEN LIMITED 3/19/2024 3:43 PM    CLINICAL HISTORY: RUQ tenderness on exam, Elevated Bili most likely  ETOH Hepatitis, recent CT showed GB wall thickness concern for  cholecystitis.  TECHNIQUE: Limited abdominal ultrasound.    COMPARISON: CT 3/18/2024.    FINDINGS:    GALLBLADDER: Gallbladder is distended measuring up to 15.8 cm.  Gallbladder wall thickening is present. There is trace pericholecystic  fluid. Negative  sonographic Alexis's sign.    BILE DUCTS: There is no biliary dilatation. The common duct measures 3  mm.    LIVER: Increased echogenicity of the hepatic parenchyma with poor  acoustic penetration. Heterogeneous echotexture compatible with  intrinsic liver disease.    RIGHT KIDNEY: No hydronephrosis.    PANCREAS: The visualized portions of the pancreas are normal.  Partially obscured by bowel gas.    Trace upper abdominal ascites. Small right pleural effusion.  Varicosities are seen in the upper abdomen.      Impression    IMPRESSION:  1.  Distended gallbladder with wall thickening and trace  pericholecystic fluid. No cholelithiasis. Findings may represent  acalculous cholecystitis in the correct clinical context. Differential  includes reactive changes from hepatitis. A HIDA examination could be  performed for further evaluation.  2.  Increased echogenicity of the hepatic parenchyma with poor  acoustic penetration and heterogeneous echotexture compatible with  hepatic steatosis/fibrosis.  3.  Trace upper abdominal ascites. Small right pleural effusion.    RENETTA AGUILERA MD         SYSTEM ID:  [id number]   XR Pelvis w Hip Left 1 View     Status: None    Narrative    XR PELVIS AND HIP LEFT 1 VIEW  3/20/2024 11:52 AM     HISTORY: Left hip OA, increasing pain, hx alcohol abuse  COMPARISON: None      Impression    IMPRESSION: No acute fracture or malalignment. Severe left hip joint  degenerative changes with bone-on-bone articulation, subchondral  sclerosis, and osteophytosis.  Mild right hip joint degenerative changes.     RKISTI BABIN MD         SYSTEM ID:  [id number]   Comprehensive metabolic panel     Status: Abnormal   Result Value Ref Range    Sodium 121 (L) 135 - 145 mmol/L    Potassium 5.5 (H) 3.4 - 5.3 mmol/L    Carbon Dioxide (CO2) 16 (L) 22 - 29 mmol/L    Anion Gap 13 7 - 15 mmol/L    Urea Nitrogen 41.0 (H) 6.0 - 20.0 mg/dL    Creatinine 2.16 (H) 0.67 - 1.17 mg/dL    GFR Estimate 40 (L) >60  mL/min/1.73m2    Calcium 9.7 8.6 - 10.0 mg/dL    Chloride 92 (L) 98 - 107 mmol/L    Glucose 109 (H) 70 - 99 mg/dL    Alkaline Phosphatase 223 (H) 40 - 150 U/L     (H) 0 - 45 U/L    ALT 56 0 - 70 U/L    Protein Total 7.8 6.4 - 8.3 g/dL    Albumin 3.3 (L) 3.5 - 5.2 g/dL    Bilirubin Total 9.0 (H) <=1.2 mg/dL   [place] Draw     Status: None    Narrative    The following orders were created for panel order [place] Draw.  Procedure                               Abnormality         Status                     ---------                               -----------         ------                     Extra Blue Top Tube[[id number]]                              Final result                 Please view results for these tests on the individual orders.   CBC with platelets and differential     Status: Abnormal   Result Value Ref Range    WBC Count 9.2 4.0 - 11.0 10e3/uL    RBC Count 1.43 (L) 4.40 - 5.90 10e6/uL    Hemoglobin 5.6 (LL) 13.3 - 17.7 g/dL    Hematocrit 15.7 (L) 40.0 - 53.0 %     (H) 78 - 100 fL    MCH 39.2 (H) 26.5 - 33.0 pg    MCHC 35.7 31.5 - 36.5 g/dL    RDW 14.9 10.0 - 15.0 %    Platelet Count 132 (L) 150 - 450 10e3/uL    % Neutrophils 77 %    % Lymphocytes 9 %    % Monocytes 11 %    % Eosinophils 1 %    % Basophils 0 %    % Immature Granulocytes 2 %    NRBCs per 100 WBC 0 <1 /100    Absolute Neutrophils 7.1 1.6 - 8.3 10e3/uL    Absolute Lymphocytes 0.8 0.8 - 5.3 10e3/uL    Absolute Monocytes 1.0 0.0 - 1.3 10e3/uL    Absolute Eosinophils 0.1 0.0 - 0.7 10e3/uL    Absolute Basophils 0.0 0.0 - 0.2 10e3/uL    Absolute Immature Granulocytes 0.2 <=0.4 10e3/uL    Absolute NRBCs 0.0 10e3/uL   Extra Blue Top Tube     Status: None   Result Value Ref Range    Hold Specimen [facility]    Occult blood stool     Status: Abnormal   Result Value Ref Range    Occult Blood Positive (A) Negative   Ethyl Alcohol Level     Status: Abnormal   Result Value Ref Range    Alcohol ethyl 0.07 (H) <=0.01 g/dL   Magnesium     Status:  Normal   Result Value Ref Range    Magnesium 1.8 1.7 - 2.3 mg/dL   INR     Status: Abnormal   Result Value Ref Range    INR 1.94 (H) 0.85 - 1.15   Fractional Excretion of Sodium     Status: None   Result Value Ref Range    Creatinine Urine mg/dL 25.4 mg/dL    Sodium Urine mmol/L 106 mmol/L    %FENA 7.4 %   Sodium     Status: Abnormal   Result Value Ref Range    Sodium 117 (LL) 135 - 145 mmol/L   Osmolality     Status: Abnormal   Result Value Ref Range    Osmolality Blood 274 (L) 275 - 295 mmol/kg    Narrative    Greater than 385 mmol/kg relates to stupor in hyperglycemia   Greater than 400 mmol/kg can relate to seizures   Greater than 420 mmol/kg can be lethal    Serum Osmalar Gap:   Normal <10   Larger suggest unmeasured substances present in serum (ethanol, methanol, isopropanol, mannitol, ethylene glycol).   Osmolality urine     Status: Normal   Result Value Ref Range    Osmolality Urine 325 100 - 1,200 mmol/kg    Narrative    Reference Ranges depend on patient's hydration status and renal function.   Neonates:  mmol/kg   2 years and older, random specimens: 100-1200 mmol/kg; Greater than 850 mmol/kg after 12 hour fluid restriction  Urine/serum osmolality ratio: 2 years and older: 1.0-3.0; 3.0-4.7 after 12 hour fluid restriction   Sodium random urine     Status: None   Result Value Ref Range    Sodium Urine mmol/L 63 mmol/L   Sodium     Status: Abnormal   Result Value Ref Range    Sodium 122 (L) 135 - 145 mmol/L   Hemoglobin     Status: Abnormal   Result Value Ref Range    Hemoglobin 6.2 (LL) 13.3 - 17.7 g/dL   Comprehensive metabolic panel     Status: Abnormal   Result Value Ref Range    Sodium 117 (LL) 135 - 145 mmol/L    Potassium 5.7 (H) 3.4 - 5.3 mmol/L    Carbon Dioxide (CO2) 14 (L) 22 - 29 mmol/L    Anion Gap 9 7 - 15 mmol/L    Urea Nitrogen 34.9 (H) 6.0 - 20.0 mg/dL    Creatinine 1.21 (H) 0.67 - 1.17 mg/dL    GFR Estimate 80 >60 mL/min/1.73m2    Calcium 9.5 8.6 - 10.0 mg/dL    Chloride 94 (L) 98 -  107 mmol/L    Glucose 92 70 - 99 mg/dL    Alkaline Phosphatase 174 (H) 40 - 150 U/L     (H) 0 - 45 U/L    ALT 44 0 - 70 U/L    Protein Total 7.0 6.4 - 8.3 g/dL    Albumin 3.0 (L) 3.5 - 5.2 g/dL    Bilirubin Total 11.8 (H) <=1.2 mg/dL   INR     Status: Abnormal   Result Value Ref Range    INR 2.13 (H) 0.85 - 1.15   Sodium     Status: Abnormal   Result Value Ref Range    Sodium 118 (LL) 135 - 145 mmol/L   Sodium     Status: Abnormal   Result Value Ref Range    Sodium 122 (L) 135 - 145 mmol/L   CBC with platelets and differential     Status: Abnormal   Result Value Ref Range    WBC Count 7.0 4.0 - 11.0 10e3/uL    RBC Count 1.96 (L) 4.40 - 5.90 10e6/uL    Hemoglobin 6.8 (LL) 13.3 - 17.7 g/dL    Hematocrit 19.3 (L) 40.0 - 53.0 %    MCV 99 78 - 100 fL    MCH 34.7 (H) 26.5 - 33.0 pg    MCHC 35.2 31.5 - 36.5 g/dL    RDW      Platelet Count 88 (L) 150 - 450 10e3/uL    % Neutrophils 75 %    % Lymphocytes 10 %    % Monocytes 12 %    % Eosinophils 2 %    % Basophils 0 %    % Immature Granulocytes 1 %    NRBCs per 100 WBC 0 <1 /100    Absolute Neutrophils 5.2 1.6 - 8.3 10e3/uL    Absolute Lymphocytes 0.7 (L) 0.8 - 5.3 10e3/uL    Absolute Monocytes 0.8 0.0 - 1.3 10e3/uL    Absolute Eosinophils 0.2 0.0 - 0.7 10e3/uL    Absolute Basophils 0.0 0.0 - 0.2 10e3/uL    Absolute Immature Granulocytes 0.1 <=0.4 10e3/uL    Absolute NRBCs 0.0 10e3/uL   Sodium     Status: Abnormal   Result Value Ref Range    Sodium 118 (LL) 135 - 145 mmol/L   Hemoglobin     Status: Abnormal   Result Value Ref Range    Hemoglobin 7.4 (L) 13.3 - 17.7 g/dL   Creatinine     Status: Abnormal   Result Value Ref Range    Creatinine 1.29 (H) 0.67 - 1.17 mg/dL    GFR Estimate 74 >60 mL/min/1.73m2   Sodium     Status: Abnormal   Result Value Ref Range    Sodium 118 (LL) 135 - 145 mmol/L   CBC with platelets     Status: Abnormal   Result Value Ref Range    WBC Count 7.7 4.0 - 11.0 10e3/uL    RBC Count 2.16 (L) 4.40 - 5.90 10e6/uL    Hemoglobin 7.3 (L) 13.3 - 17.7  g/dL    Hematocrit 20.7 (L) 40.0 - 53.0 %    MCV 96 78 - 100 fL    MCH 33.8 (H) 26.5 - 33.0 pg    MCHC 35.3 31.5 - 36.5 g/dL    RDW      Platelet Count 85 (L) 150 - 450 10e3/uL   Comprehensive metabolic panel     Status: Abnormal   Result Value Ref Range    Sodium 119 (LL) 135 - 145 mmol/L    Potassium 6.1 (HH) 3.4 - 5.3 mmol/L    Carbon Dioxide (CO2) 16 (L) 22 - 29 mmol/L    Anion Gap 8 7 - 15 mmol/L    Urea Nitrogen 46.4 (H) 6.0 - 20.0 mg/dL    Creatinine 1.44 (H) 0.67 - 1.17 mg/dL    GFR Estimate 65 >60 mL/min/1.73m2    Calcium 9.5 8.6 - 10.0 mg/dL    Chloride 95 (L) 98 - 107 mmol/L    Glucose 95 70 - 99 mg/dL    Alkaline Phosphatase 222 (H) 40 - 150 U/L    AST 88 (H) 0 - 45 U/L    ALT 45 0 - 70 U/L    Protein Total 6.9 6.4 - 8.3 g/dL    Albumin 3.0 (L) 3.5 - 5.2 g/dL    Bilirubin Total 8.7 (H) <=1.2 mg/dL   INR     Status: Abnormal   Result Value Ref Range    INR 2.24 (H) 0.85 - 1.15   Magnesium     Status: Normal   Result Value Ref Range    Magnesium 1.8 1.7 - 2.3 mg/dL   Phosphorus     Status: Normal   Result Value Ref Range    Phosphorus 3.0 2.5 - 4.5 mg/dL   Sodium     Status: Abnormal   Result Value Ref Range    Sodium 119 (LL) 135 - 145 mmol/L   Potassium     Status: Normal   Result Value Ref Range    Potassium 5.3 3.4 - 5.3 mmol/L   Potassium     Status: Abnormal   Result Value Ref Range    Potassium 5.4 (H) 3.4 - 5.3 mmol/L   Fractional Excretion of Sodium     Status: None   Result Value Ref Range    Creatinine Urine mg/dL 94.2 mg/dL    Sodium Urine mmol/L 32 mmol/L    %FENA 0.4 %   Glucose by meter     Status: Abnormal   Result Value Ref Range    GLUCOSE BY METER POCT 158 (H) 70 - 99 mg/dL   Sodium     Status: Abnormal   Result Value Ref Range    Sodium 118 (LL) 135 - 145 mmol/L   Hemoglobin     Status: Abnormal   Result Value Ref Range    Hemoglobin 7.2 (L) 13.3 - 17.7 g/dL   Sodium     Status: Abnormal   Result Value Ref Range    Sodium 122 (L) 135 - 145 mmol/L   Potassium     Status: Abnormal    Result Value Ref Range    Potassium 6.0 (H) 3.4 - 5.3 mmol/L   Creatinine     Status: Abnormal   Result Value Ref Range    Creatinine 1.54 (H) 0.67 - 1.17 mg/dL    GFR Estimate 60 (L) >60 mL/min/1.73m2   Potassium     Status: Normal   Result Value Ref Range    Potassium 5.1 3.4 - 5.3 mmol/L   Glucose by meter     Status: Abnormal   Result Value Ref Range    GLUCOSE BY METER POCT 125 (H) 70 - 99 mg/dL   Glucose by meter     Status: Abnormal   Result Value Ref Range    GLUCOSE BY METER POCT 208 (H) 70 - 99 mg/dL   Glucose by meter     Status: Normal   Result Value Ref Range    GLUCOSE BY METER POCT 78 70 - 99 mg/dL   Glucose by meter     Status: Abnormal   Result Value Ref Range    GLUCOSE BY METER POCT 130 (H) 70 - 99 mg/dL   Basic metabolic panel     Status: Abnormal   Result Value Ref Range    Sodium 117 (LL) 135 - 145 mmol/L    Potassium 6.1 (HH) 3.4 - 5.3 mmol/L    Chloride 94 (L) 98 - 107 mmol/L    Carbon Dioxide (CO2) 15 (L) 22 - 29 mmol/L    Anion Gap 8 7 - 15 mmol/L    Urea Nitrogen 47.7 (H) 6.0 - 20.0 mg/dL    Creatinine 1.47 (H) 0.67 - 1.17 mg/dL    GFR Estimate 63 >60 mL/min/1.73m2    Calcium 9.5 8.6 - 10.0 mg/dL    Glucose 106 (H) 70 - 99 mg/dL   CBC with platelets     Status: Abnormal   Result Value Ref Range    WBC Count 7.9 4.0 - 11.0 10e3/uL    RBC Count 1.99 (L) 4.40 - 5.90 10e6/uL    Hemoglobin 6.8 (LL) 13.3 - 17.7 g/dL    Hematocrit 19.3 (L) 40.0 - 53.0 %    MCV 97 78 - 100 fL    MCH 34.2 (H) 26.5 - 33.0 pg    MCHC 35.2 31.5 - 36.5 g/dL    RDW      Platelet Count 78 (L) 150 - 450 10e3/uL   Magnesium     Status: Normal   Result Value Ref Range    Magnesium 1.8 1.7 - 2.3 mg/dL   Phosphorus     Status: Normal   Result Value Ref Range    Phosphorus 3.6 2.5 - 4.5 mg/dL   Glucose by meter     Status: Abnormal   Result Value Ref Range    GLUCOSE BY METER POCT 145 (H) 70 - 99 mg/dL   Glucose by meter     Status: Abnormal   Result Value Ref Range    GLUCOSE BY METER POCT 130 (H) 70 - 99 mg/dL   Glucose by  meter     Status: Normal   Result Value Ref Range    GLUCOSE BY METER POCT 95 70 - 99 mg/dL   Potassium     Status: Abnormal   Result Value Ref Range    Potassium 5.5 (H) 3.4 - 5.3 mmol/L   Potassium     Status: Abnormal   Result Value Ref Range    Potassium 5.5 (H) 3.4 - 5.3 mmol/L   Glucose     Status: Abnormal   Result Value Ref Range    Glucose 108 (H) 70 - 99 mg/dL   Glucose by meter     Status: Abnormal   Result Value Ref Range    GLUCOSE BY METER POCT 176 (H) 70 - 99 mg/dL   Osmolality urine     Status: Normal   Result Value Ref Range    Osmolality Urine 389 100 - 1,200 mmol/kg    Narrative    Reference Ranges depend on patient's hydration status and renal function.   Neonates:  mmol/kg   2 years and older, random specimens: 100-1200 mmol/kg; Greater than 850 mmol/kg after 12 hour fluid restriction  Urine/serum osmolality ratio: 2 years and older: 1.0-3.0; 3.0-4.7 after 12 hour fluid restriction   Sodium     Status: Abnormal   Result Value Ref Range    Sodium 118 (LL) 135 - 145 mmol/L   Blood gas venous     Status: Abnormal   Result Value Ref Range    pH Venous 7.36 7.32 - 7.43    pCO2 Venous 28 (L) 40 - 50 mm Hg    pO2 Venous 61 (H) 25 - 47 mm Hg    Bicarbonate Venous 16 (L) 21 - 28 mmol/L    Base Excess/Deficit Venous -8.9 (L) -3.0 - 3.0 mmol/L    FIO2 0     Oxyhemoglobin Venous 91 (H) 70 - 75 %    O2 Sat, Venous 93.1 (H) 70.0 - 75.0 %    Narrative    In healthy individuals, oxyhemoglobin (O2Hb) and oxygen saturation (SO2) are approximately equal. In the presence of dyshemoglobins, oxyhemoglobin can be considerably lower than oxygen saturation.   Glucose by meter     Status: Abnormal   Result Value Ref Range    GLUCOSE BY METER POCT 101 (H) 70 - 99 mg/dL   Glucose by meter     Status: Normal   Result Value Ref Range    GLUCOSE BY METER POCT 92 70 - 99 mg/dL   Glucose by meter     Status: Abnormal   Result Value Ref Range    GLUCOSE BY METER POCT 113 (H) 70 - 99 mg/dL   Sodium     Status: Abnormal    Result Value Ref Range    Sodium 117 (LL) 135 - 145 mmol/L   Sodium     Status: Abnormal   Result Value Ref Range    Sodium 118 (LL) 135 - 145 mmol/L   Glucose by meter     Status: Abnormal   Result Value Ref Range    GLUCOSE BY METER POCT 117 (H) 70 - 99 mg/dL   Glucose by meter     Status: Abnormal   Result Value Ref Range    GLUCOSE BY METER POCT 127 (H) 70 - 99 mg/dL   Fractional Excretion of Sodium     Status: None   Result Value Ref Range    Creatinine Urine mg/dL 124.8 mg/dL    Sodium Urine mmol/L <20 mmol/L    %FENA     Creatinine, serum     Status: Abnormal   Result Value Ref Range    Creatinine 1.69 (H) 0.67 - 1.17 mg/dL   Glucose by meter     Status: Abnormal   Result Value Ref Range    GLUCOSE BY METER POCT 115 (H) 70 - 99 mg/dL   Sodium random urine     Status: None   Result Value Ref Range    Sodium Urine mmol/L <20 mmol/L   Sodium     Status: Abnormal   Result Value Ref Range    Sodium 118 (LL) 135 - 145 mmol/L   Hemoglobin     Status: Abnormal   Result Value Ref Range    Hemoglobin 6.7 (LL) 13.3 - 17.7 g/dL   Potassium     Status: Normal   Result Value Ref Range    Potassium 5.1 3.4 - 5.3 mmol/L   Potassium     Status: Abnormal   Result Value Ref Range    Potassium 5.7 (H) 3.4 - 5.3 mmol/L   Basic metabolic panel     Status: Abnormal   Result Value Ref Range    Sodium 123 (L) 135 - 145 mmol/L    Potassium 5.0 3.4 - 5.3 mmol/L    Chloride 97 (L) 98 - 107 mmol/L    Carbon Dioxide (CO2) 16 (L) 22 - 29 mmol/L    Anion Gap 10 7 - 15 mmol/L    Urea Nitrogen 55.9 (H) 6.0 - 20.0 mg/dL    Creatinine 1.52 (H) 0.67 - 1.17 mg/dL    GFR Estimate 61 >60 mL/min/1.73m2    Calcium 9.7 8.6 - 10.0 mg/dL    Glucose 108 (H) 70 - 99 mg/dL   CBC with platelets     Status: Abnormal   Result Value Ref Range    WBC Count 8.6 4.0 - 11.0 10e3/uL    RBC Count 2.27 (L) 4.40 - 5.90 10e6/uL    Hemoglobin 7.7 (L) 13.3 - 17.7 g/dL    Hematocrit 21.7 (L) 40.0 - 53.0 %    MCV 96 78 - 100 fL    MCH 33.9 (H) 26.5 - 33.0 pg    MCHC 35.5  31.5 - 36.5 g/dL    RDW 23.7 (H) 10.0 - 15.0 %    Platelet Count 79 (L) 150 - 450 10e3/uL   Magnesium     Status: Normal   Result Value Ref Range    Magnesium 1.9 1.7 - 2.3 mg/dL   Phosphorus     Status: Normal   Result Value Ref Range    Phosphorus 4.1 2.5 - 4.5 mg/dL   Hepatic panel     Status: Abnormal   Result Value Ref Range    Protein Total 7.0 6.4 - 8.3 g/dL    Albumin 3.1 (L) 3.5 - 5.2 g/dL    Bilirubin Total 7.6 (H) <=1.2 mg/dL    Alkaline Phosphatase 225 (H) 40 - 150 U/L    AST 97 (H) 0 - 45 U/L    ALT 48 0 - 70 U/L    Bilirubin Direct 3.59 (H) 0.00 - 0.30 mg/dL   Sodium     Status: Abnormal   Result Value Ref Range    Sodium 124 (L) 135 - 145 mmol/L   Sodium     Status: Abnormal   Result Value Ref Range    Sodium 124 (L) 135 - 145 mmol/L   Sodium     Status: Abnormal   Result Value Ref Range    Sodium 121 (L) 135 - 145 mmol/L   Sodium     Status: Abnormal   Result Value Ref Range    Sodium 122 (L) 135 - 145 mmol/L   Hemoglobin     Status: Abnormal   Result Value Ref Range    Hemoglobin 7.0 (L) 13.3 - 17.7 g/dL   Comprehensive metabolic panel     Status: Abnormal   Result Value Ref Range    Sodium 123 (L) 135 - 145 mmol/L    Potassium 4.3 3.4 - 5.3 mmol/L    Carbon Dioxide (CO2) 19 (L) 22 - 29 mmol/L    Anion Gap 7 7 - 15 mmol/L    Urea Nitrogen 43.8 (H) 6.0 - 20.0 mg/dL    Creatinine 1.03 0.67 - 1.17 mg/dL    GFR Estimate >90 >60 mL/min/1.73m2    Calcium 9.0 8.6 - 10.0 mg/dL    Chloride 97 (L) 98 - 107 mmol/L    Glucose 106 (H) 70 - 99 mg/dL    Alkaline Phosphatase 182 (H) 40 - 150 U/L    AST 91 (H) 0 - 45 U/L    ALT 45 0 - 70 U/L    Protein Total 6.1 (L) 6.4 - 8.3 g/dL    Albumin 2.7 (L) 3.5 - 5.2 g/dL    Bilirubin Total 7.1 (H) <=1.2 mg/dL   CBC with platelets and differential     Status: Abnormal   Result Value Ref Range    WBC Count 4.9 4.0 - 11.0 10e3/uL    RBC Count 2.26 (L) 4.40 - 5.90 10e6/uL    Hemoglobin 7.5 (L) 13.3 - 17.7 g/dL    Hematocrit 21.1 (L) 40.0 - 53.0 %    MCV 93 78 - 100 fL    MCH  33.2 (H) 26.5 - 33.0 pg    MCHC 35.5 31.5 - 36.5 g/dL    RDW 23.2 (H) 10.0 - 15.0 %    Platelet Count 70 (L) 150 - 450 10e3/uL    % Neutrophils 80 %    % Lymphocytes 10 %    % Monocytes 5 %    % Eosinophils 3 %    % Basophils 1 %    % Immature Granulocytes 1 %    NRBCs per 100 WBC 0 <1 /100    Absolute Neutrophils 3.9 1.6 - 8.3 10e3/uL    Absolute Lymphocytes 0.5 (L) 0.8 - 5.3 10e3/uL    Absolute Monocytes 0.3 0.0 - 1.3 10e3/uL    Absolute Eosinophils 0.1 0.0 - 0.7 10e3/uL    Absolute Basophils 0.1 0.0 - 0.2 10e3/uL    Absolute Immature Granulocytes 0.0 <=0.4 10e3/uL    Absolute NRBCs 0.0 10e3/uL   Hemoglobin     Status: Abnormal   Result Value Ref Range    Hemoglobin 8.3 (L) 13.3 - 17.7 g/dL   Sodium random urine     Status: None   Result Value Ref Range    Sodium Urine mmol/L <20 mmol/L   Hemoglobin     Status: Abnormal   Result Value Ref Range    Hemoglobin 7.4 (L) 13.3 - 17.7 g/dL   Sodium     Status: Abnormal   Result Value Ref Range    Sodium 124 (L) 135 - 145 mmol/L   Hemoglobin     Status: Abnormal   Result Value Ref Range    Hemoglobin 7.1 (L) 13.3 - 17.7 g/dL   Sodium     Status: Abnormal   Result Value Ref Range    Sodium 124 (L) 135 - 145 mmol/L   Sodium     Status: Abnormal   Result Value Ref Range    Sodium 124 (L) 135 - 145 mmol/L   Basic metabolic panel     Status: Abnormal   Result Value Ref Range    Sodium 124 (L) 135 - 145 mmol/L    Potassium 4.5 3.4 - 5.3 mmol/L    Chloride 97 (L) 98 - 107 mmol/L    Carbon Dioxide (CO2) 19 (L) 22 - 29 mmol/L    Anion Gap 8 7 - 15 mmol/L    Urea Nitrogen 33.2 (H) 6.0 - 20.0 mg/dL    Creatinine 0.80 0.67 - 1.17 mg/dL    GFR Estimate >90 >60 mL/min/1.73m2    Calcium 8.6 8.6 - 10.0 mg/dL    Glucose 107 (H) 70 - 99 mg/dL   Sodium     Status: Abnormal   Result Value Ref Range    Sodium 127 (L) 135 - 145 mmol/L   CBC with platelets and differential     Status: Abnormal   Result Value Ref Range    WBC Count 4.9 4.0 - 11.0 10e3/uL    RBC Count 2.10 (L) 4.40 - 5.90 10e6/uL     Hemoglobin 7.0 (L) 13.3 - 17.7 g/dL    Hematocrit 19.6 (L) 40.0 - 53.0 %    MCV 93 78 - 100 fL    MCH 33.3 (H) 26.5 - 33.0 pg    MCHC 35.7 31.5 - 36.5 g/dL    RDW 23.7 (H) 10.0 - 15.0 %    Platelet Count 69 (L) 150 - 450 10e3/uL    % Neutrophils 66 %    % Lymphocytes 13 %    % Monocytes 15 %    % Eosinophils 4 %    % Basophils 1 %    % Immature Granulocytes 1 %    NRBCs per 100 WBC 0 <1 /100    Absolute Neutrophils 3.3 1.6 - 8.3 10e3/uL    Absolute Lymphocytes 0.6 (L) 0.8 - 5.3 10e3/uL    Absolute Monocytes 0.7 0.0 - 1.3 10e3/uL    Absolute Eosinophils 0.2 0.0 - 0.7 10e3/uL    Absolute Basophils 0.0 0.0 - 0.2 10e3/uL    Absolute Immature Granulocytes 0.0 <=0.4 10e3/uL    Absolute NRBCs 0.0 10e3/uL   INR     Status: Abnormal   Result Value Ref Range    INR 2.22 (H) 0.85 - 1.15   Lactate Dehydrogenase     Status: Abnormal   Result Value Ref Range    Lactate Dehydrogenase 264 (H) 0 - 250 U/L   Iron and iron binding capacity     Status: Abnormal   Result Value Ref Range    Iron 160 (H) 61 - 157 ug/dL    Iron Binding Capacity      Iron Sat Index     Vitamin B12     Status: Normal   Result Value Ref Range    Vitamin B12 1,201 232 - 1,245 pg/mL   Folate     Status: Normal   Result Value Ref Range    Folic Acid 29.1 4.6 - 34.8 ng/mL   Haptoglobin     Status: Abnormal   Result Value Ref Range    Haptoglobin <10 (L) 30 - 200 mg/dL   CBC with platelets and differential     Status: Abnormal   Result Value Ref Range    WBC Count 5.1 4.0 - 11.0 10e3/uL    RBC Count 2.45 (L) 4.40 - 5.90 10e6/uL    Hemoglobin 7.9 (L) 13.3 - 17.7 g/dL    Hematocrit 22.9 (L) 40.0 - 53.0 %    MCV 94 78 - 100 fL    MCH 32.2 26.5 - 33.0 pg    MCHC 34.5 31.5 - 36.5 g/dL    RDW 23.6 (H) 10.0 - 15.0 %    Platelet Count 65 (L) 150 - 450 10e3/uL    % Neutrophils 70 %    % Lymphocytes 11 %    % Monocytes 14 %    % Eosinophils 3 %    % Basophils 1 %    % Immature Granulocytes 1 %    NRBCs per 100 WBC 0 <1 /100    Absolute Neutrophils 3.6 1.6 - 8.3 10e3/uL     Absolute Lymphocytes 0.6 (L) 0.8 - 5.3 10e3/uL    Absolute Monocytes 0.7 0.0 - 1.3 10e3/uL    Absolute Eosinophils 0.2 0.0 - 0.7 10e3/uL    Absolute Basophils 0.0 0.0 - 0.2 10e3/uL    Absolute Immature Granulocytes 0.0 <=0.4 10e3/uL    Absolute NRBCs 0.0 10e3/uL   Reticulocyte count     Status: Abnormal   Result Value Ref Range    % Reticulocyte 2.7 (H) 0.5 - 2.0 %    Absolute Reticulocyte 0.066 0.025 - 0.095 10e6/uL   Sodium     Status: Abnormal   Result Value Ref Range    Sodium 125 (L) 135 - 145 mmol/L   Haptoglobin     Status: Abnormal   Result Value Ref Range    Haptoglobin <10 (L) 30 - 200 mg/dL   Basic metabolic panel     Status: Abnormal   Result Value Ref Range    Sodium 127 (L) 135 - 145 mmol/L    Potassium 4.6 3.4 - 5.3 mmol/L    Chloride 98 98 - 107 mmol/L    Carbon Dioxide (CO2) 18 (L) 22 - 29 mmol/L    Anion Gap 11 7 - 15 mmol/L    Urea Nitrogen 25.9 (H) 6.0 - 20.0 mg/dL    Creatinine 0.68 0.67 - 1.17 mg/dL    GFR Estimate >90 >60 mL/min/1.73m2    Calcium 9.0 8.6 - 10.0 mg/dL    Glucose 158 (H) 70 - 99 mg/dL   Cortisol     Status: None   Result Value Ref Range    Cortisol 7.1   ug/dL   CBC with platelets and differential     Status: Abnormal   Result Value Ref Range    WBC Count 5.5 4.0 - 11.0 10e3/uL    RBC Count 2.22 (L) 4.40 - 5.90 10e6/uL    Hemoglobin 7.1 (L) 13.3 - 17.7 g/dL    Hematocrit 20.8 (L) 40.0 - 53.0 %    MCV 94 78 - 100 fL    MCH 32.0 26.5 - 33.0 pg    MCHC 34.1 31.5 - 36.5 g/dL    RDW 23.7 (H) 10.0 - 15.0 %    Platelet Count 59 (L) 150 - 450 10e3/uL    % Neutrophils 71 %    % Lymphocytes 12 %    % Monocytes 13 %    % Eosinophils 3 %    % Basophils 1 %    % Immature Granulocytes 0 %    NRBCs per 100 WBC 0 <1 /100    Absolute Neutrophils 3.9 1.6 - 8.3 10e3/uL    Absolute Lymphocytes 0.6 (L) 0.8 - 5.3 10e3/uL    Absolute Monocytes 0.7 0.0 - 1.3 10e3/uL    Absolute Eosinophils 0.2 0.0 - 0.7 10e3/uL    Absolute Basophils 0.1 0.0 - 0.2 10e3/uL    Absolute Immature Granulocytes 0.0 <=0.4  10e3/uL    Absolute NRBCs 0.0 10e3/uL   Procalcitonin     Status: Normal   Result Value Ref Range    Procalcitonin 0.36 <0.50 ng/mL   Comprehensive metabolic panel     Status: Abnormal   Result Value Ref Range    Sodium 125 (L) 135 - 145 mmol/L    Potassium 4.5 3.4 - 5.3 mmol/L    Carbon Dioxide (CO2) 18 (L) 22 - 29 mmol/L    Anion Gap 11 7 - 15 mmol/L    Urea Nitrogen 20.7 (H) 6.0 - 20.0 mg/dL    Creatinine 0.63 (L) 0.67 - 1.17 mg/dL    GFR Estimate >90 >60 mL/min/1.73m2    Calcium 9.4 8.6 - 10.0 mg/dL    Chloride 96 (L) 98 - 107 mmol/L    Glucose 136 (H) 70 - 99 mg/dL    Alkaline Phosphatase 163 (H) 40 - 150 U/L     (H) 0 - 45 U/L    ALT 54 0 - 70 U/L    Protein Total 6.7 6.4 - 8.3 g/dL    Albumin 3.1 (L) 3.5 - 5.2 g/dL    Bilirubin Total 8.8 (H) <=1.2 mg/dL   CBC with platelets and differential     Status: Abnormal   Result Value Ref Range    WBC Count 6.0 4.0 - 11.0 10e3/uL    RBC Count 2.53 (L) 4.40 - 5.90 10e6/uL    Hemoglobin 8.1 (L) 13.3 - 17.7 g/dL    Hematocrit 23.5 (L) 40.0 - 53.0 %    MCV 93 78 - 100 fL    MCH 32.0 26.5 - 33.0 pg    MCHC 34.5 31.5 - 36.5 g/dL    RDW 23.4 (H) 10.0 - 15.0 %    Platelet Count 86 (L) 150 - 450 10e3/uL    % Neutrophils 69 %    % Lymphocytes 15 %    % Monocytes 12 %    % Eosinophils 3 %    % Basophils 1 %    % Immature Granulocytes 0 %    NRBCs per 100 WBC 0 <1 /100    Absolute Neutrophils 4.1 1.6 - 8.3 10e3/uL    Absolute Lymphocytes 0.9 0.8 - 5.3 10e3/uL    Absolute Monocytes 0.7 0.0 - 1.3 10e3/uL    Absolute Eosinophils 0.2 0.0 - 0.7 10e3/uL    Absolute Basophils 0.1 0.0 - 0.2 10e3/uL    Absolute Immature Granulocytes 0.0 <=0.4 10e3/uL    Absolute NRBCs 0.0 10e3/uL   EKG 12-lead, tracing only     Status: None   Result Value Ref Range    Systolic Blood Pressure  mmHg    Diastolic Blood Pressure  mmHg    Ventricular Rate 84 BPM    Atrial Rate 84 BPM    VA Interval 142 ms    QRS Duration 98 ms     ms    QTc 451 ms    P Axis 40 degrees    R AXIS 26 degrees    T  Axis 40 degrees    Interpretation ECG       Sinus rhythm  Normal ECG  When compared with ECG of 25-SEP-2023 15:22,  Borderline criteria for Inferior infarct are no longer Present  T wave inversion no longer evident in Inferior leads  T wave inversion no longer evident in Lateral leads  Confirmed by GENERATED REPORT, COMPUTER ([id number]),  Ly Sinha ([id number]) on 3/12/2024 12:25:13 PM     Adult Type and Screen     Status: None   Result Value Ref Range    ABO/RH(D) A POS     Antibody Screen Negative Negative    SPECIMEN EXPIRATION DATE [id number]    Prepare red blood cells (unit)     Status: None   Result Value Ref Range    Blood Component Type Red Blood Cells     Product Code X2526I80     Unit Status Transfused     Unit Number [id number]     CROSSMATCH Compatible     CODING SYSTEM [id number]     ISSUE DATE AND TIME [id number]     UNIT ABO/RH A+     UNIT TYPE ISBT 6200    CONDITIONAL Prepare red blood cells (unit)     Status: None   Result Value Ref Range    Blood Component Type Red Blood Cells     Product Code R2233F19     Unit Status Transfused     Unit Number [id number]     CROSSMATCH Compatible     CODING SYSTEM [id number]     ISSUE DATE AND TIME [id number]     UNIT ABO/RH A+     UNIT TYPE ISBT 6200    CONDITIONAL Prepare red blood cells (unit)     Status: None   Result Value Ref Range    Blood Component Type Red Blood Cells     Product Code C9956B56     Unit Status Transfused     Unit Number [id number]     CROSSMATCH Compatible     CODING SYSTEM [id number]     ISSUE DATE AND TIME [id number]     UNIT ABO/RH A+     UNIT TYPE ISBT 6200    CONDITIONAL Prepare red blood cells (unit)     Status: None   Result Value Ref Range    Blood Component Type Red Blood Cells     Product Code O3174S01     Unit Status Transfused     Unit Number [id number]     CROSSMATCH Compatible     CODING SYSTEM [id number]     ISSUE DATE AND TIME [id number]     UNIT ABO/RH A+     UNIT TYPE ISBT 6200    Adult Type and Screen      Status: None   Result Value Ref Range    ABO/RH(D) A POS     Antibody Screen Negative Negative    SPECIMEN EXPIRATION DATE [id number]    CONDITIONAL Prepare red blood cells (unit)     Status: None   Result Value Ref Range    Blood Component Type Red Blood Cells     Product Code E0995L68     Unit Status Transfused     Unit Number [id number]     CROSSMATCH Compatible     CODING SYSTEM [id number]     ISSUE DATE AND TIME [id number]     UNIT ABO/RH A+     UNIT TYPE ISBT 6200    CONDITIONAL Prepare red blood cells (unit)     Status: None   Result Value Ref Range    Blood Component Type Red Blood Cells     Product Code Z7421F95     Unit Status Transfused     Unit Number [id number]     CROSSMATCH Compatible     CODING SYSTEM [id number]     ISSUE DATE AND TIME [id number]     UNIT ABO/RH A+     UNIT TYPE ISBT 6200    CONDITIONAL Prepare red blood cells (unit)     Status: None   Result Value Ref Range    Blood Component Type Red Blood Cells     Product Code Q3522T16     Unit Status Transfused     Unit Number [id number]     CROSSMATCH Compatible     CODING SYSTEM [id number]     ISSUE DATE AND TIME [id number]     UNIT ABO/RH A+     UNIT TYPE ISBT 6200    Bld morphology pathology review     Status: None   Result Value Ref Range    Final Diagnosis       Peripheral blood, morphology:  - Marked anemia, normocytic and normochromic, with nonspecific anisopoikilocytosis.  - Moderate thrombocytopenia; no abnormal platelet clumping seen on the slide preparations.  - WBC quantitatively within normal limits and without diagnostic morphologic abnormality; mild lymphopenia present.  - Negative for schistocytes and definitive morphologic features of hemolysis.   - Negative for overt features of myelodysplasia and circulating blasts.    See comment.    COMMENT:  Normocytic anemia is nonspecific and in general may be attributable to multiple overlapping etiologies, including chronic disease, liver disease, renal and/or endocrine  disease, blood loss, iron deficiency (in some patients), and/or bone marrow suppression (by underlying infection, various nutritional deficiencies, toxicities, alcohol use, or medications).    Thrombocytopenia is nonspecific and possible etiologies may include immune-associated platelet destruction (such as idiopathic thrombocytopenic purpura [ITP], heparin-induced thrombocytopenia [HIT], post-transfusion purpura, or drug-induced antibodies), non-immune-associated platelet destruction (such as recent thrombosis or thrombotic microangiopathies), decreased platelet production (such as vitamin B12 or folate deficiency, underlying infection, myelodysplasia and other hematologic malignancies, aplastic anemia, or bone marrow suppression [by alcohol, medications, chemotherapy, radiation, or marrow infiltrative processes]), medications (including immunosuppressive agents), splenic sequestration of platelets, or liver disease.  Clinical correlation is recommended.      Peripheral Smear       A microscopic examination is performed.       Peripheral Hematologic Data        Latest Reference Range & Units 03/18/24 11:08   WBC 4.0 - 11.0 10e3/uL 5.1   Hemoglobin 13.3 - 17.7 g/dL 7.9 (L)   Hematocrit 40.0 - 53.0 % 22.9 (L)   Platelet Count 150 - 450 10e3/uL 65 (L)   RBC Count 4.40 - 5.90 10e6/uL 2.45 (L)   MCV 78 - 100 fL 94   MCH 26.5 - 33.0 pg 32.2   MCHC 31.5 - 36.5 g/dL 34.5   RDW 10.0 - 15.0 % 23.6 (H)   % Neutrophils % 70   % Lymphocytes % 11   % Monocytes % 14   % Eosinophils % 3   % Basophils % 1   Absolute Basophils 0.0 - 0.2 10e3/uL 0.0   Absolute Eosinophils 0.0 - 0.7 10e3/uL 0.2   Absolute Immature Granulocytes <=0.4 10e3/uL 0.0   Absolute Lymphocytes 0.8 - 5.3 10e3/uL 0.6 (L)   Absolute Monocytes 0.0 - 1.3 10e3/uL 0.7   % Immature Granulocytes % 1   Absolute Neutrophils 1.6 - 8.3 10e3/uL 3.6   Absolute NRBCs 10e3/uL 0.0   NRBCs per 100 WBC <1 /100 0   % Retic 0.5 - 2.0 % 2.7 (H)   Absolute Retic 0.025 - 0.095 10e6/uL  0.066   (L): Data is abnormally low  (H): Data is abnormally high    For patients over 18 years old, anemia and quantitative abnormalities of WBC and platelets (if present) may be further stratified as follows:    WBC (10^9/L):    Greater than 50.0: Marked leukocytosis    18.0 - 50.0: Moderate leukocytosis    11.1 - 17.9: Mild leukocytosis    3.0 - 3.9: Mild leukopenia    2.0 - 2.9: Moderate leukopenia    Less than 2.0: Marked leukopenia    Hemoglobin (g/dL) in males:    11.3 - 13.2: Mild anemia    9.3 - 11.2: Moderate anemia    Less than 9.3: Marked anemia    Platelets (10^9/L):    Greater than 900: Marked thrombocytosis    601 - 900: Moderate thrombocytosis    451 - 600: Mild thrombocytosis    100 - 149: Mild thrombocytopenia    50 - 99: Moderate thrombocytopenia    Less than 50: Marked thrombocytopenia       Performing Labs       The technical component of this testing was completed at [facility], [facility] and [facility]     ABO/Rh type and screen     Status: None    Narrative    The following orders were created for panel order ABO/Rh type and screen.  Procedure                               Abnormality         Status                     ---------                               -----------         ------                     Adult Type and Screen[[id number]]                            Final result                 Please view results for these tests on the individual orders.   CBC with platelets + differential     Status: Abnormal    Narrative    The following orders were created for panel order CBC with platelets + differential.  Procedure                               Abnormality         Status                     ---------                               -----------         ------                     CBC with platelets and d...[[id number]]  Abnormal            Final result                 Please view results for these  tests on the individual orders.   Fractional excretion of sodium     Status: None    Narrative    The following orders were created for panel order Fractional excretion of sodium.  Procedure                               Abnormality         Status                     ---------                               -----------         ------                     Fractional Excretion of ...[[id number]]                      Final result                 Please view results for these tests on the individual orders.   CBC with platelets differential     Status: Abnormal    Narrative    The following orders were created for panel order CBC with platelets differential.  Procedure                               Abnormality         Status                     ---------                               -----------         ------                     CBC with platelets and d...[[id number]]  Abnormal            Final result                 Please view results for these tests on the individual orders.   Fractional excretion of sodium     Status: None    Narrative    The following orders were created for panel order Fractional excretion of sodium.  Procedure                               Abnormality         Status                     ---------                               -----------         ------                     Fractional Excretion of ...[[id number]]                      Final result                 Please view results for these tests on the individual orders.   Fractional excretion of sodium     Status: Abnormal    Narrative    The following orders were created for panel order Fractional excretion of sodium.  Procedure                               Abnormality         Status                     ---------                               -----------         ------                     Fractional Excretion of ...[[id number]]                      Final result               Creatinine, serum[[id number]]            Abnormal            Final result                  Please view results for these tests on the individual orders.   ABO/Rh type and screen     Status: None    Narrative    The following orders were created for panel order ABO/Rh type and screen.  Procedure                               Abnormality         Status                     ---------                               -----------         ------                     Adult Type and Screen[[id number]]                            Final result                 Please view results for these tests on the individual orders.   CBC with Platelets & Differential     Status: Abnormal    Narrative    The following orders were created for panel order CBC with Platelets & Differential.  Procedure                               Abnormality         Status                     ---------                               -----------         ------                     CBC with platelets and d...[[id number]]  Abnormal            Final result                 Please view results for these tests on the individual orders.   CBC with Platelets & Differential     Status: Abnormal    Narrative    The following orders were created for panel order CBC with Platelets & Differential.  Procedure                               Abnormality         Status                     ---------                               -----------         ------                     CBC with platelets and d...[[id number]]  Abnormal            Final result                 Please view results for these tests on the individual orders.   Lab Blood Morphology Pathologist Review     Status: Abnormal    Narrative    The following orders were created for panel order Lab Blood Morphology Pathologist Review.  Procedure                               Abnormality         Status                     ---------                               -----------         ------                     Bld morphology pathology...[[id number]]                      Final result               CBC with  platelets and d...[[id number]]  Abnormal            Final result               Reticulocyte count[[id number]]           Abnormal            Final result               Morphology Tracking[[id number]]                              Final result                 Please view results for these tests on the individual orders.   CBC with Platelets & Differential     Status: Abnormal    Narrative    The following orders were created for panel order CBC with Platelets & Differential.  Procedure                               Abnormality         Status                     ---------                               -----------         ------                     CBC with platelets and d...[[id number]]  Abnormal            Final result                 Please view results for these tests on the individual orders.   CBC with Platelets & Differential     Status: Abnormal    Narrative    The following orders were created for panel order CBC with Platelets & Differential.  Procedure                               Abnormality         Status                     ---------                               -----------         ------                     CBC with platelets and d...[[id number]]  Abnormal            Final result                 Please view results for these tests on the individual orders.          Attestation:  I have reviewed today's vital signs, notes, medications, labs and imaging with Dr. GUS Quezada.  Amount of time performed on this consult: 50 minutes.    Katie Moscoso PA-C

## 2024-03-20 NOTE — PROGRESS NOTES
Subjective:  Mr. Ham is a 38-year-old gentleman with multiple medical problem including alcohol abuse, liver cirrhosis and hypertension. Patient was seen in clinic on 03/11/2024. CBC revealed severe anemia with hemoglobin of 6.1.  Patient was advised to go to emergency room. Patient presented to emergency room on 03/12/2024 and had multiple investigations done.  -WBC of 9.2, hemoglobin of 5.6 and platelet of 132.  MCV of 110.  -CMP revealed multiple abnormalities including hyponatremia, hyperkalemia, elevated creatinine and elevated LFT.     Multiple investigations done for work-up of anemia.  -Peripheral blood smear reveals normocytic, normochromic anemia and moderate thrombocytopenia.  No schistocytes.  No morphologic features of hemolysis.  -Reticulocyte of 2.7%.  -Elevated iron of 160. (Patient had already received multiple units of blood transfusion).  -Normal vitamin B12 and folate.  -Mildly elevated LDH of 264.  -Stool for occult blood is positive.  -EGD reveals grade 1 and small esophageal varices.  No stigmata of bleeding.  There is portal hypertensive gastropathy.  No endoscopic evidence of active or recent upper GI bleed.     Patient has received multiple units of transfusion.  With transfusion, there is transient increase in hemoglobin and then again it decreases to around 7.  For further evaluation, other investigations done.  -Haptoglobin less than 10.  Patient already had received multiple units of transfusion before this haptoglobin was drawn.  There could be some hemolysis from transfusion itself causing low haptoglobin level.  -CT chest, abdomen and pelvis did not reveal any hematoma.  There is moderate right pleural effusion.  There is distended thick-walled gallbladder.  There is liver cirrhosis with ascites.  There is splenomegaly of 16.7 cm.  There are right lower rib fractures and also numerous thoracolumbar compression fracture.  Patient says that he had fallen down few days ago.  There  is also groundglass opacities in right lung suspicious for pneumonia.     Patient's overall condition is stable.  He has generalized weakness.  There has been no worsening of his weakness.  He denies bleeding from any site.  No black stool.  No headache.  No dizziness.  No chest pain.  No shortness of breath at rest.  No nausea or vomiting.  Some pain in the back and the legs.     Exam:  He is alert and oriented x 3.  Not in any respiratory distress or pain.  Vitals: Reviewed.  Rest of the system is not examined.     Labs: Reviewed.  Hemoglobin of 8.1 and platelet of 86.  Normal WBC.     Assessment:  1.  A 38-year-old gentleman with acute on chronic macrocytic anemia.  Chronic anemia is secondary to liver cirrhosis, splenomegaly, mild hemolysis and also bone marrow suppression by alcohol abuse.  Acute worsening was likely from GI bleed.  Previous MCV have been elevated.  Now MCV is normal because of multiple transfusion.  2.  Thrombocytopenia secondary to liver cirrhosis and splenomegaly.  3.  Alcoholic liver cirrhosis.     Plan:  -Transfuse for hemoglobin below 6.0.  Between 6 and 7, he should be transfused if symptomatic.  -Video capsule endoscopy as outpatient.  -Stop alcohol.     Discussion:  1.  Patient's was mom and dad were in the room.      Labs were reviewed with the patient.  Explained to him that his hemoglobin is better at 8.1.  WBC is normal.  Platelet is better at 86.    Explained to him that his chronic anemia is due to liver cirrhosis, splenomegaly, bone marrow suppression by alcohol and also mild hemolysis.  Acute worsening likely from GI bleed.  I am not suspecting any overt hemolysis.     I would recommend that he get outpatient video capsule endoscopy to rule out any small intestine bleed.    Patient has thrombocytopenia.  This is due to liver cirrhosis and splenomegaly.  Patient not having any bleeding or bruising complication.     2.  Patient will be continued on supportive treatment with  transfusion.  He should be transfused for hemoglobin below 6.0.  Between 6 and 7, he should be transfused if he is symptomatic.     3.  Patient advised to quit alcohol.     4.  His parents had few questions which were all answered.  Hematology/oncology will see him intermittently.  Please call us with any questions.

## 2024-03-20 NOTE — PLAN OF CARE
Summary: Weakness, fall x2, Dizziness, Anemia, ETOH, hyponatremia, hyperkalemia, GATITO     DATE &TIME: 3/19/2024 1900-3/20/24 0730   Cognitive Concerns/Orientation: A&O x4   BEHAVIOR & AGGRESSION TOOL COLOR: Green                      ABNL VS/O2: Tachy and soft BP on RA  MOBILITY: Independent w/ cane. Encouraged to ambulate in [place] more frequently.  PAIN MANAGMENT: L leg tenderness, declined interventions.  DIET:  2g Na, 1,200 FR  BOWEL/BLADDER: Continent of B/B   ABNL LAB/BG: Hgb 7.1 (blood transfusion threshold <6.0 ), NA - 127 (page Neph if <123 and >130 per note); PLT 59  DRAIN/DEVICES: L  & R PIV saline   TELEMETRY RHYTHM: NA  SKIN: Dry/Jaundiced, 1-2+ edema BLE, L>R  TESTS/PROCEDURES: None  D/C DAY/GOALS/PLACE: pending  OTHER IMPORTANT INFO: GI, & Hem/Onc following. Psych consulted.   Bloody tissue noticed on pt table; pt states his nose was bleeding but it stopped.  No more blood noted, will monitor.

## 2024-03-21 NOTE — PLAN OF CARE
Goal Outcome Evaluation:  Summary: Weakness, fall x2, Dizziness, Anemia, ETOH, hyponatremia, hyperkalemia, GATITO     DATE &TIME: 3/21/24 0700- 1530   Cognitive Concerns/Orientation: A&O x4   BEHAVIOR & AGGRESSION TOOL COLOR: Green                      ABNL VS/O2: HR Tachy in low 100's, other VSS on RA  MOBILITY: Independent w/ cane. WBAT to LLE d/t hip pain  PAIN MANAGMENT: denies  DIET:  2g Na, 1,200 FR, Good appetite.  BOWEL/BLADDER: Continent of B/B   ABNL LAB/BG: Hgb 8.1 (blood transfusion threshold <6.0 or if symptomatic between 6 and 7), NA - 126 (page Neph if <123 and >130 per note); PLT 86, INR 2.37  DRAIN/DEVICES: L  & R PIV SL  TELEMETRY RHYTHM: NA  SKIN: Dry, scattered bruises, Jaundiced, 2+ edema BLE, L>R  TESTS/PROCEDURES: L hip x-ray done 3/20 (advance OA) Intra-articular steroid injection given today  D/C DAY/GOALS/PLACE: pending psych consult.   OTHER IMPORTANT INFO: GI, & Hem/Onc following. Psych consulted.

## 2024-03-21 NOTE — PROCEDURES
[facility]    Procedure: Fluoroscopic-Guided Left Hip Joint Injection    Date/Time: 3/21/2024 12:04 PM    Performed by: Abraham Voss PA-C  Authorized by: Abraham Voss PA-C      UNIVERSAL PROTOCOL   Site Marked: Yes  Prior Images Obtained and Reviewed:  Yes  Required items: Required blood products, implants, devices and special equipment available    Patient identity confirmed:  Verbally with patient  NA - No sedation, light sedation, or local anesthesia  Confirmation Checklist:  Patient's identity using two indicators, relevant allergies, procedure was appropriate and matched the consent or emergent situation and correct equipment/implants were available  Time out: Immediately prior to the procedure a time out was called    Universal Protocol: the Joint Commission Universal Protocol was followed    Preparation: Patient was prepped and draped in usual sterile fashion    ESBL (mL):  0     ANESTHESIA    Anesthesia:  Local infiltration  Local Anesthetic:  Lidocaine 1% without epinephrine  Anesthetic Total (mL):  3      SEDATION    Patient Sedated: No    See dictated procedure note for full details.    PROCEDURE  Describe Procedure: Fluoroscopic-Guided Left Hip Joint Injection  Patient Tolerance:  Patient tolerated the procedure well with no immediate complications  Length of time physician/provider present for 1:1 monitoring during sedation: 0

## 2024-03-21 NOTE — PLAN OF CARE
Goal Outcome Evaluation:       DATE &TIME: 3/20/24-3/21/24 3175- 1022   Cognitive Concerns/Orientation: A&O x4   BEHAVIOR & AGGRESSION TOOL COLOR: Green                      ABNL VS/O2: HR Tachy in low 100's, other VSS on RA  MOBILITY: Independent w/ cane. WBAT to LLE d/t hip pain  PAIN MANAGMENT: denies  DIET:  2g Na, 1,200 FR  BOWEL/BLADDER: Continent of B/B   ABNL LAB/BG: Hgb 8.1 (blood transfusion threshold <6.0 or if symptomatic between 6 and 7), NA - 125 (page Neph if <123 and >130 per note); PLT 86, INR 2.10  DRAIN/DEVICES: L  & R PIV SL  TELEMETRY RHYTHM: NA  SKIN: Dry, scattered bruises, Jaundiced, 2+ edema BLE, L>R  TESTS/PROCEDURES: L hip x-ray done 3/20 (advance OA) Intra-articular steroid injection today  D/C DAY/GOALS/PLACE: pending  OTHER IMPORTANT INFO: GI, & Hem/Onc following. Psych consulted.

## 2024-03-21 NOTE — PROGRESS NOTES
[facility]    PROGRESS NOTE - Hospitalist Service    ASSESSMENT AND PLAN     Principal Problem:    Hyponatremia  Active Problems:    Anemia, unspecified type    Acute kidney injury (H24)    Crispin Ham is a 38 year old male with PMH of alcohol abuse, chronic liver cirrhosis, hx SBP, substance abuse, HTN, chronic HFpEF, hx pleural effusion s/p b/l thoracentesis 11/2023 who presented to the ED on 3/12/24 for evaluation of abnormal labs.   Patient saw his PCP the day prior to admission for LLE cramping, fatigue and lightheadedness x 1 month. His blood work came back with hgb 6.1 and he was referred to the ED. Hospital stay complicated by persistent hyperkalemia, hyponatremia and need for blood transfusions.     Acute on chronic anemia  Coagulopathy of liver disease  History of duodenitis/ulcer (09/2023)  *Hgb 5.6, down from 6.9 on 2/21/24 previously running around 7.4-8. Platelets stable at 132.  *Endorses nosebleeds every 3 days or so which can be somewhat prolonged with his hx coagulopathy. No current active bleeding.  *Denies hematemesis, melena, hematochezia. He does have some nausea.  *Symptomatic with complaints of positional dizziness, fatigue. Declined rectal examination in the ED.  *Last EGD 9/2/23 with esophageal mucosal tear with bleeding (complication of clip placement) hemostasis achieved with bipolar probe. Portal hypertensive gastropathy.  -So far has received 8 units of PRBC transfusion during this hospitalization with most recent transfusion-3/18  -Patient reports that he is having brown stool  -[facility] following, appreciate input.  -Status post EGD 3/16 which showed portal hypertensive gastropathy, grade 1 and small esophageal varices with no stigmata of bleeding.  Discussed with GI, likely [facility] outpatient but since patient reports having brown stool GI does not think his symptoms are related to GI blood loss  -Iron studies, B12, folate-does not suggest  deficiency,  reticulocyte count elevated , minimally elevated LDH.  Haptoglobin low but expected with multiple transfusion given hemolysis associated with transfusion  -CT chest abdomen and pelvis with no evidence of internal bleeding  -Dr. Silva recommended changing transfusion threshold to less than 6 due to risk of hemolyzation    - Continue PTA Protonix 40 mg BID  - conditional transfusion orders in place for hgb<6  -Hemoglobin now stable   -GI-no plan for [facility] as outpatient.     Possible multifocal pneumonia  Small to moderate right pleural effusion  Subacute anterior right lower leg fracture  Numerous thoracolumbar compression fracture  -CT chest abdomen pelvis to evaluate for internal bleeding as above suggested multifocal pneumonia and right pleural effusion.   -CT also shows multiple likely subacute anterior right lower rib fracture and numerous thoracolumbar compression fracture.  Patient with no active rib pain or back pain  -Patient already on ceftriaxone for SBP prophylaxis as below.    -Patient with no respiratory symptoms including no cough, shortness of breath, chest pain and on room air  -I have a very strong suspicion the infiltrates on CT likely from third spacing, procalcitonin is 0.36  -Continue PTA Lasix at low-dose as below     Distended thick-walled gallbladder  -Noted on CT scan to have distended thick-walled gallbladder  -Abdominal ultrasound suggest distended gallbladder with wall thickening and trace pericholecystic fluid, HIDA scan is recommended  -HIDA scan completed with no evidence of cholecystitis or biliary dyskinesia      Left hip osteoarthritis  -Patient has a long history of left hip pain and apparently used to get injection on his left hip.  -Reports that his left hip has started bothering him last few days.  X-ray of the left hip demonstrate advanced left hip osteoarthritis  -Discussed with orthopedics, plan for intra-articular steroid injection.  Completed 3/21.     Acute  kidney injury  Hyponatremia  Hyperkalemia  *Na 121 on admission, was 130 on 2/21/24 but otherwise was running 124-129 in the fall. Felt to be somewhat volume depleted on examination, received fluids, but sodium has bounced between 117-122.  Creatinine 2.16 on admission, was 0.59 on 2/21/2024 potassium had increased up to 5.7  -Currently holding PTA Aldactone, potassium chloride  -Treated initially with 2% NS then later 150 meq sodium bicarb solution and then normal saline  -Treated with Lokelma, low potassium, fluid restriction  -Creatinine and potassium has now normalized and sodium is stable at 126                     -Nephrology was following, now signed off  -Continue low-dose Lasix 20 mg twice daily  -Continue with fluid restriction 1200ml, low potassium diet     Severe Malnutrition   -In the context of acute illness or injury, chronic illness or disease  -Dietitian following  -Supplements     Severe alcoholic hepatitis  Severe hyperbilirubinemia from underlying liver disease  Alcohol dependence with ongoing alcohol use disorder  History of alcohol withdrawal  *Established with [facility]. Currently without significant ascites on examination.  Last drink 3/11 afternoon, no history of withdrawal seizure and due to no signs of withdrawal CIWA monitoring has been stopped  *Alk phos 223, ALT 56, , total bili 9.0 (stable compared to 2/21/24)  *3/13 Abd US: negative for ascites  - [facility] was following, appreciate input, now signed off 3/20.  Recommending liver clinic follow-up 4/8.  Treatment for alcohol use disorder.  Further discussions regarding liver transplantation.  - Continue PTA PPI, Lactulose, Rifaximin  - switched PTA bactrim to IV ceftriaxone on 3/15 given association with hyperkalemia.  As per GI likely changed to cipro 500 mg daily 3/21  - HOLD PTA Spironolactone, continue PTA Lasix at a lower dose  -Continue multivitamin folic acid and thiamine supplementation, gabapentin taper  - Chem dep appreciated,  patient only interested in AA at this time  -Physician had a long discussion with patient's mother on 3/17 who was worried that alcohol Anonymous may not be enough and he might need inpatient treatment.  -Psychiatry consult pending- Per Dr. Quezada, patient might meet criteria for commitment.  -Patient to follow-up with liver clinic on 4/8/2024.     Chronic HFpEF  *Echo 11/16/23 with EF 65-70%, no RWMA or any significant valvular disease  -Lasix 20 mg p.o. twice daily initiated and IV fluid discontinued  -Pending electrolyte/blood pressure, adjust diuresis  -Monitor I&O     LLE cramping  *Venous US LLE on 2/21/24 was negative  *Xray left tibia and fibula 3/11/24 no acute fracture  -Supportive cares     Chronic hip and back pain  -Ice or heat as needed     Severe malnutrition  Appreciate nutrition, recommendation for tube feeding  -Patient not interested    Barriers to discharge: Psych eval, safe discharge plan     Anticipated length of stay: 1-2 days       Present on Admission:   Hyponatremia   Acute kidney injury (H24)   Anemia, unspecified type      Subjective:  Patient resting comfortably in bed.  Is wanting to leave and is somewhat irritated that he needs to be evaluated by psychiatry.  Feeling otherwise well today.    PHYSICAL EXAM  Temp:  [97.6  F (36.4  C)-98.7  F (37.1  C)] 97.8  F (36.6  C)  Pulse:  [] 95  Resp:  [18] 18  BP: ()/(47-63) 103/63  SpO2:  [98 %-100 %] 98 %  Wt Readings from Last 1 Encounters:   03/20/24 103.1 kg (227 lb 6.4 oz)       Intake/Output Summary (Last 24 hours) at 3/21/2024 1226  Last data filed at 3/21/2024 0817  Gross per 24 hour   Intake 660 ml   Output 875 ml   Net -215 ml      Body mass index is 30.84 kg/m .    GENRL: Alert and answering questions appropriately. Not in acute distress. Lying in bed. Annoyed    CHEST: Breathing easily   EXTRM: + pedal edema  NEURO: Moving all extremities   PSYCH: annoyed affect and mood.   INTGM: No skin rash    Medical Decision Making        35 MINUTES SPENT BY ME on the date of service doing chart review, history, exam, documentation & further activities per the note.      PERTINENT LABS/IMAGING:  Results for orders placed or performed during the hospital encounter of 03/12/24   US Abdomen Limited    Impression    IMPRESSION:    Targeted ultrasound scanning of the 4 abdominal quadrants was  performed demonstrating no ascites.    SHARON DUFFY MD         SYSTEM ID:  [id number]   CT Chest Abdomen Pelvis w/o Contrast    Impression    IMPRESSION:  1.  Heterogeneous right-sided groundglass opacities suspicious for multifocal pneumonia.  2.  Small to moderate right pleural effusion.  3.  Distended thick-walled gallbladder. Further evaluation with gallbladder ultrasound recommended to exclude cholecystitis.  4.  Morphologic changes of cirrhosis with ascites.  5.  Associated splenomegaly.  6.  Additional findings as above.   US Abdomen Limited    Impression    IMPRESSION:  1.  Distended gallbladder with wall thickening and trace  pericholecystic fluid. No cholelithiasis. Findings may represent  acalculous cholecystitis in the correct clinical context. Differential  includes reactive changes from hepatitis. A HIDA examination could be  performed for further evaluation.  2.  Increased echogenicity of the hepatic parenchyma with poor  acoustic penetration and heterogeneous echotexture compatible with  hepatic steatosis/fibrosis.  3.  Trace upper abdominal ascites. Small right pleural effusion.    RENETTA AGUILERA MD         SYSTEM ID:  [id number]   XR Pelvis w Hip Left 1 View    Impression    IMPRESSION: No acute fracture or malalignment. Severe left hip joint  degenerative changes with bone-on-bone articulation, subchondral  sclerosis, and osteophytosis.  Mild right hip joint degenerative changes.     KRISTI BABIN MD         SYSTEM ID:  [id number]   NM Hepatobiliary Scan with GB EF and/or Pharm    Impression    IMPRESSION:     Negative for  "cholecystitis and biliary dyskinesia.    XR Joint Injection Major Left    Impression    IMPRESSION:   1.  Fluoroscopic-guided left hip joint injection.    CHIRAG HATCH PA-C         SYSTEM ID:  [id number]     Most Recent 3 CBC's:  Recent Labs   Lab Test 03/20/24  0945 03/19/24  0830 03/18/24  1108   WBC 6.0 5.5 5.1   HGB 8.1* 7.1* 7.9*   MCV 93 94 94   PLT 86* 59* 65*     Most Recent 3 BMP's:  Recent Labs   Lab Test 03/21/24  0958 03/20/24  0945 03/19/24  0830   * 125* 127*   POTASSIUM 4.4 4.5 4.6   CHLORIDE 98 96* 98   CO2 20* 18* 18*   BUN 18.7 20.7* 25.9*   CR 0.66* 0.63* 0.68   ANIONGAP 8 11 11   MIGUELINA 9.4 9.4 9.0   * 136* 158*     Most Recent 2 LFT's:  Recent Labs   Lab Test 03/20/24  0945 03/17/24  0536   * 91*   ALT 54 45   ALKPHOS 163* 182*   BILITOTAL 8.8* 7.1*       No results for input(s): \"CHOL\", \"HDL\", \"LDL\", \"TRIG\", \"CHOLHDLRATIO\" in the last 01737 hours.  No results for input(s): \"LDL\" in the last 32089 hours.  Recent Labs   Lab Test 03/21/24  0958   *   POTASSIUM 4.4   CHLORIDE 98   CO2 20*   *   BUN 18.7   CR 0.66*   GFRESTIMATED >90   MIGUELINA 9.4     No results for input(s): \"A1C\" in the last 78259 hours.  Recent Labs   Lab Test 03/20/24  0945 03/19/24  0830 03/18/24  1108   HGB 8.1* 7.1* 7.9*     No results for input(s): \"TROPONINI\" in the last 36406 hours.  Recent Labs   Lab Test 11/17/23  1231   NTBNPI 93     Recent Labs   Lab Test 09/08/23  1147   TSH 8.43*     Recent Labs   Lab Test 03/21/24  0958 03/20/24  1527 03/18/24  1108   INR 2.37* 2.10* 2.22*       Hansa Phoenix DO  Hospitalist Service  [facility]"

## 2024-03-22 NOTE — CONSULTS
Type Of Assessment: Inpatient Substance Use Comprehensive Assessment    Referral Source:  Self,   MRN: [id number]    DATE OF SERVICE: 2024  Date of previous JUSTYN Assessment: No  Patient confirmed identity through two factor verification: Full Legal Name, , and SSN    PATIENT'S NAME: Crispin Ham  Age: 38 year old  Last 4 SSN: [id number]  Sex: male   Gender Identity: male  Sexual Orientation: Heterosexual  Cultural Background:   YOB: 1985  Current Address:   [address] [place] [id number]  Patient Phone Number:  [phone]   Patient's E-Mail Contact:  [email or web address]  Funding: Eduar Otero  PMI: NA    Emergency Contact: UZIEL WILKERSON information was provided to patient and patient does not want a copy.     Telemedicine Visit: The patient's condition can be safely assessed and treated via synchronous audio and visual telemedicine encounter.    Reason for Telemedicine Visit: Patient unable to travel, Patient required immediate assessment / treatment , and Services only offered telehealth  Originating Site (Patient Location): [address] [address]  Distant Site (Provider Location): [facility]: [place]  Consent:  The patient/guardian has verbally consented to: the potential risks and benefits of telemedicine (video visit) versus in person care; bill my insurance or make self-payment for services provided; and responsibility for payment of non-covered services.   Mode of Communication:  Video Conference via Phone    START TIME: 1425  END TIME: 1500    As the provider I attest to compliance with applicable laws and regulations related to telemedicine.   Crispin Ham was seen for a substance use disorder consult on 3/22/2024 [facility]    Reason for Substance Use Disorder Consult:  Per H&P: 3/12/24:   38 year old male with PMH of alcohol abuse, chronic liver cirrhosis, hx SBP, substance  abuse, HTN, chronic HFpEF, hx pleural effusion s/p b/l thoracentesis 11/2023 who presented to the ED on 3/12/24 for evaluation of abnormal labs.     Patient saw his PCP yesterday for LLE cramping x 1 month. His blood work came back with hgb 6.1 and he was referred to the ED. He states he has been feeling fatigued, lightheaded, and nauseous. He stood too quickly yesterday getting out of bed and fell 2/2 lightheadedness. He did not hit his head or lose consciousness. He denies any chest pain, dyspnea, hematemesis, melena, hematochezia. He gets nosebleeds every few days, somewhat exacerbated by his own manipulation, and bleeding can at times be prolonged. He has no active bleeding at this time. No fevers, chills, abdominal pain. He states he drinks about 1/2 pint of vodka daily, last drink yesterday afternoon.     In the ED, afebrile with  and /28. CBC with WBC 9.2, hgb 5.6, platelets 132. CMP with sodium 121, potassium 5.5, mag 1.8, bicarb 16, BUN 21, creatinine 2.16, alk phos 223, ALT 56, , total bili 9.0. Alcohol ethyl level 0.07. Two units PRBC ordered to be transfused in the ED.    Are you currently having severe withdrawal symptoms that are putting yourself or others in danger? No  Are you currently having severe medical problems that require immediate attention? Yes, explain: Liver damage, cirrhosis,   Are you currently having severe emotional or behavioral problems that are putting yourself or others at risk of harm? None    Have you participated in prior substance use disorder evaluations? No   Have you ever been to detox, inpatient or outpatient treatment for substance related use? List previous treatment: No   Have you ever had a gambling problem or had treatment for compulsive gambling? No  Have you ever felt the need to bet more and more money? No  Have you ever had to lie to people important to you about how much you gambled? No    Patient does not appear to be in severe withdrawal, an  imminent safety risk to self or others, or requiring immediate medical attention and may proceed with the assessment interview.  Comprehensive Substance Use History   X X = Primary Drug Used Age of First Use    Pattern of Substance Use   (heaviest use in life and a use history within the past year if applicable) (DSM-5: Sx #3) Date /  Quantity of last use if within the past 30 days Withdrawal Potential?   Method of use  (Oral, smoked, snorted, IV, etc)   X Alcohol   22 2 days in the past month and a half, prior to that 3x a week.  3/4 liter per day 3/11/24 NA Oral    Marijuana/Hashish   No use        Cocaine/Crack No use        Meth/Amphetamines   No use        Heroin   No use        Other Opiates/Synthetics   No use        Inhalants  No use        Benzodiazepines   No use        Hallucinogens   No use        Barbiturates/Sedatives/Hypnotics   No use        Over-the-Counter Drugs   No use        Other   No use        Nicotine   No use         Withdrawal symptoms: Have you had any of the following withdrawal symptoms?  Stomach issues, photosensitivity, paranoia, anxiety    Have you experienced any cravings?  Yes    Have you had periods of abstinence? Yes following hospitalization  2 months 10-12/23   What was your longest period? following hospitalization  2 months 10-12/23     Any circumstances that lead to relapse? Anxiety over brothers death.     What activities have you engaged in when using alcohol/other drugs that could be hazardous to you or others?  The patient denied engaging in any of the above dangerous activities when using alcohol and/or drugs.    A description of any risk-taking behavior, including behavior that puts the client at risk of exposure to blood-borne or sexually transmitted diseases: Denies    Arrests and legal interventions related to substance use: Denies any current or history of legal charges.     A description of how the patient's use affected their ability to function appropriately in  a work setting: Missed a lot of days at work.     A description of how the patient's use affected their ability to function appropriately in an educational setting: no    Leisure time activities that are associated with substance use: concerts outdoor events. Sporting events.     Do you think your substance use has become a problem for you? yes    MEDICAL HISTORY  Physical or medical concerns or diagnoses:   Patient Active Problem List   Diagnosis    Septic shock (H)    Acute renal failure, unspecified acute renal failure type (H24)    Acute liver failure without hepatic coma    Anemia, unspecified type    Liver failure (H)    Liver disease    SBP (spontaneous bacterial peritonitis) (H)    Hyponatremia    Acute kidney injury (H24)       Do you have any current medical treatment needs not being addressed by inpatient treatment?  NA    Do you need a referral for a medical provider? [facility] Can[facility]    Current medications:   Current Outpatient Medications   Medication Instructions    folic acid (FOLVITE) 1 mg, Oral, DAILY    furosemide (LASIX) 40 mg, Oral, 2 TIMES DAILY (Diuretics and Nitrates), Hold for systolic blood pressure less then 110. Monitor renal function closely.    lactulose (CHRONULAC) 20 g, Oral, 2 TIMES DAILY, Goal 2 - 3 soft bowel movements per day.    magnesium oxide (MAG-OX) 400 mg, Oral, DAILY    menthol (ICY HOT) 5 % PTCH 1 patch, Topical, EVERY 8 HOURS PRN    multivitamin, therapeutic (THERA-VIT) TABS tablet 1 tablet, Oral, DAILY    pantoprazole (PROTONIX) 40 mg, Oral, 2 TIMES DAILY    rifaximin (XIFAXAN) 550 mg, Oral, 2 TIMES DAILY    spironolactone (ALDACTONE) 200 mg, Oral, DAILY, Hold for systolic blood pressure less then 110. Monitor renal function closely.    sulfamethoxazole-trimethoprim (BACTRIM DS) 800-160 MG tablet 1 tablet, Oral, DAILY    thiamine (B-1) 100 mg, Oral, DAILY    traZODone (DESYREL) 50 mg, Oral, AT BEDTIME PRN    Vitamin D, Cholecalciferol, 10 MCG (400 UNIT)  TABS 1 tablet, Oral, DAILY           Are you pregnant? NA, Male    Do you have any specific physical needs/accommodations? Yes, sitting long periods of time, due to muscle problems in one of his legs.     MENTAL HEALTH HISTORY:  Have you ever had  hospitalizations or treatment for mental health illness: No    Mental health history, including diagnosis and symptoms, and the effect on the client's ability to function: Depression and anxiety.     Current mental health treatment including psychotropic medication needed to maintain stability: (Note: The assessment must utilize screening tools approved by the commissioner pursuant to section 245.4863 to identify whether the client screens positive for co-occurring disorders): Hasn't recently been seeing a therapist or pyschiatrist.     GAIN-SS Tool:      3/22/2024     2:00 PM   When was the last time that you had significant problems...   with feeling very trapped, lonely, sad, blue, depressed or hopeless about the future? Past month   with sleep trouble, such as bad dreams, sleeping restlessly, or falling asleep during the day? Past Month   with feeling very anxious, nervous, tense, scared, panicked or like something bad was going to happen? Past month   with becoming very distressed & upset when something reminded you of the past? Past month   with thinking about ending your life or committing suicide? Past month         3/22/2024     2:00 PM   When was the last time that you did the following things 2 or more times?   Lied or conned to get things you wanted or to avoid having to do something? 2 to 12 months ago   Had a hard time paying attention at school, work or home? 1+ years ago   Had a hard time listening to instructions at school, work or home? 1+ years ago   Were a bully or threatened other people? 1+ years ago   Started physical fights with other people? 1+ years ago       Have you ever been verbally, emotionally, physically or sexually abused?   No    Family  history of substance use and misuse: Brother    The patient's desire for family involvement in the treatment program: No  Level of family support: supportive    Social network in relation to expected support for recovery: 0% of friends use. Doesn't attend sober support groups.     Are you currently in a significant relationship? No    Do you have any children (include living arrangements/custody/contact)?:  no    What is your current living situation? 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     SUMMARY:  Ability to understand written treatment materials: Yes  Ability to understand patient rules and patient rights: Yes  Does the patient recognize needs related to substance use and is willing to follow treatment recommendations: Yes  Does the patient have an opioid use disorder:  does not have a history of opiate use.    ASAM Dimension Scale Ratings:    Dimension 1 -  Acute Intoxication/Withdrawal: 0 - No Problem  Dimension 2 - Biomedical: 1 - Minor Problem  Dimension 3 - Emotional/Behavioral/Cognitive Conditions: 2 - Moderate Problem  Dimension 4 - Readiness to Change:  2 - Moderate Problem  Dimension 5 - Relapse/Continued Use/ Continued Problem Potential: 3 - Severe Problem  Dimension 6 - Recovery Environment:  2 - Moderate Problem    Category of Substance Severity (ICD-10 Code / DSM 5 Code)     Alcohol Use Disorder Severe  (10.20) (303.90)   Cannabis Use Disorder The patient does not meet the criteria for a Cannabis use disorder.   Hallucinogen Use Disorder The patient does not meet the criteria for a Hallucinogen use disorder.   Inhalant Use Disorder The patient does not meet the criteria for an Inhalant use disorder.   Opioid Use Disorder The patient does not meet the criteria for an Opioid use disorder.   Sedative, Hypnotic, or Anxiolytic Use Disorder The patient does not meet the criteria for a Sedative/Hypnotic use disorder.   Stimulant Related Disorder The  patient does not meet the criteria for a Stimulant use disorder.   Tobacco Use Disorder The patient does not meet the criteria for a Tobacco use disorder.   Other (or unknown) Substance Use Disorder The patient does not meet the criteria for a Other (or unknown) Substance use disorder.     A problematic pattern of alcohol/drug use leading to clinically significant impairment or distress, as manifested by at least two of the following, occurring within a 12-month period:    1.) Alcohol/drug is often taken in larger amounts or over a longer period than was intended.  2.) There is a persistent desire or unsuccessful efforts to cut down or control alcohol/drug use  3.) A great deal of time is spent in activities necessary to obtain alcohol, use alcohol, or recover from its effects.  4.) Craving, or a strong desire or urge to use alcohol/drug  5.) Recurrent alcohol/drug use resulting in a failure to fulfill major role obligations at work, school or home.  7.) Important social, occupational, or recreational activities are given up or reduced because of alcohol/drug use.  9.) Alcohol/drug use is continued despite knowledge of having a persistent or recurrent physical or psychological problem that is likely to have been caused or exacerbated by alcohol.  10.) Tolerance, as defined by either of the following: A need for markedly increased amounts of alcohol/drug to achieve intoxication or desired effect.  11.) Withdrawal, as manifested by either of the following: The characteristic withdrawal syndrome for alcohol/drug (refer to Criteria A and B of the criteria set for alcohol/drug withdrawal).    Specify if: In early remission:  After full criteria for alcohol/drug use disorder were previously met, none of the criteria for alcohol/drug use disorder have been met for at least 3 months but for less than 12 months (with the exception that Criterion A4,  Craving or a strong desire or urge to use alcohol/drug  may be met).     In  sustained remission:   After full criteria for alcohol use disorder were previously met, non of the criteria for alcohol/drug use disorder have been met at any time during a period of 12 months or longer (with the exception that Criterion A4,  Craving or strong desire or urge to use alcohol/drug  may be met).     Specify if:   This additional specifier is used if the individual is in an environment where access to alcohol is restricted.    Mild: Presence of 2-3 symptoms  Moderate: Presence of 4-5 symptoms  Severe: Presence of 6 or more symptoms    Collateral information: JUSTYN Collateral Info: Sufficient information is obtained from the patient to support diagnosis and recommendations. Contact with a collateral sources is not required.    Recommendations: Intensive Outpatient Treatment at [facility] and [facility]    Clinical Substantiation:  Patient is a 38 year old single male with no kids who is living with his parents.  Patient denies any current or history of legal involvement. Patient is currently in the hospital due to Cirrhosis of the liver and related issues/consequences.     Referrals/ Alternatives:  [place] [address]  [phone]    [facility] [address] [place] [address]  [phone]     JUSTYN consult completed by: Aroldo Bustos [facility].  Phone Number: NA  E-mail Address: [email or web address] Addiction Services Evaluation Department  [address] [place] [address]     *Due to regulation of Title 42 of the Code of Federal Regulations (CFR) Part 2: Confidentiality laws apply to this note and the information wherein.  Thus, this note cannot be copy and pasted into any other health care staff's note nor can it be included in general medical records sent to ANY outside agency without the patient's written  consent.

## 2024-03-22 NOTE — PLAN OF CARE
Goal Outcome Evaluation:  Summary: Weakness, fall x2, Dizziness, Anemia, ETOH, hyponatremia, hyperkalemia, GATITO     DATE &TIME: 3/21/24 Evening   Cognitive Concerns/Orientation: A&O x4   BEHAVIOR & AGGRESSION TOOL COLOR: Green                      ABNL VS/O2: VSS on RA, previous tachy.  MOBILITY: Independent, ambulated outside of room  PAIN MANAGMENT: denies  DIET:  2g Na, 1,200 FR  BOWEL/BLADDER: Continent of B/B   ABNL LAB/BG: none this shift  DRAIN/DEVICES: L  & R PIV SL  TELEMETRY RHYTHM: NA  SKIN: Dry, scattered bruises, Jaundiced, 2+ edema BLE, L>R  TESTS/PROCEDURES: L hip x-ray done 3/20 (advance OA) steroid injection given today  D/C DAY/GOALS/PLACE: pending psych consult.   OTHER IMPORTANT INFO: GI, & Hem/Onc following. Refused psych, now willing to see psych tomorrow.

## 2024-03-22 NOTE — CONSULTS
Met with patient to discuss possible treatment options and to possibly complete an assessment. Assessment has been completed at this time and patient is being recommended:    Recommendations: Intensive Outpatient Treatment at [facility] and [facility]    Referrals/ Alternatives:  [facility] [address]  [phone]     [facility] [address] [place] [address]  [phone]    [facility] on 3/22/2024 at 3:33 PM

## 2024-03-22 NOTE — PLAN OF CARE
Goal Outcome Evaluation:       DATE &TIME: 3/21/24-3/22/24 [id number] Cognitive Concerns/Orientation: A&O x4   BEHAVIOR & AGGRESSION TOOL COLOR: Green                      ABNL VS/O2: HR in 100's, other VSS on RA  MOBILITY: Independent, ambulating   PAIN MANAGMENT: endorsed moderate hip pain, declined intervention  DIET:  2g Na, 1,200 FR  BOWEL/BLADDER: Continent of B/B   ABNL LAB/BG: none this shift  DRAIN/DEVICES: L  & R PIV SL  TELEMETRY RHYTHM: NA  SKIN: Dry, scattered bruises, Jaundiced, 2+ edema BLE, L>R  TESTS/PROCEDURES: L hip x-ray done 3/20 (advance OA), steroid injection given 3/21  D/C DAY/GOALS/PLACE: pending psych consult and safe disposition.  OTHER IMPORTANT INFO: GI, & Hem/Onc following.  Willing for psych eval today

## 2024-03-22 NOTE — PROGRESS NOTES
[facility]    Hospitalist Progress Note    Interval History   Patient is awake and alert this morning.  Appears very tremulous.  Quite upset about still being in the hospital and is requesting to be discharged.  Appears very weak and is jaundiced.  Was tearful about not having adequate options for outpatient treatment for his alcohol dependence.    -Data reviewed today: I reviewed all new labs and imaging results over the last 24 hours. I personally reviewed no images or EKG's today.    Physical Exam   Temp: 98.4  F (36.9  C) Temp src: Oral BP: 106/62 Pulse: 91   Resp: 18 SpO2: 97 % O2 Device: None (Room air)    Vitals:    03/17/24 0608 03/19/24 0629 03/20/24 0213   Weight: 96.8 kg (213 lb 8 oz) 103 kg (227 lb) 103.1 kg (227 lb 6.4 oz)     Vital Signs with Ranges  Temp:  [97.9  F (36.6  C)-98.4  F (36.9  C)] 98.4  F (36.9  C)  Pulse:  [] 91  Resp:  [18] 18  BP: (106-125)/(62-77) 106/62  SpO2:  [97 %-100 %] 97 %  I/O last 3 completed shifts:  In: 400 [P.O.:400]  Out: 1000 [Urine:1000]    Physical Exam  Constitutional:       Appearance: He is obese. He is ill-appearing.   Cardiovascular:      Rate and Rhythm: Normal rate and regular rhythm.      Pulses: Normal pulses.      Heart sounds: Normal heart sounds.   Pulmonary:      Effort: Pulmonary effort is normal. No respiratory distress.      Breath sounds: Normal breath sounds.   Abdominal:      General: Abdomen is flat. Bowel sounds are normal. There is no distension.      Tenderness: There is no abdominal tenderness. There is no guarding.   Musculoskeletal:      Right lower leg: Edema present.      Left lower leg: Edema present.   Skin:     General: Skin is warm and dry.      Coloration: Skin is jaundiced.   Neurological:      General: No focal deficit present.           Medications      ciprofloxacin  500 mg Oral Q24H FROY    folic acid  1 mg Oral Daily    [Held by provider] furosemide  20 mg Oral BID    lactulose  20 g Oral BID     magnesium oxide  400 mg Oral Daily    multivitamin w/minerals  1 tablet Oral Daily    pantoprazole  40 mg Oral BID    rifaximin  550 mg Oral BID    sodium chloride (PF)  3 mL Intracatheter Q8H    sodium zirconium cyclosilicate  10 g Oral Daily    [Held by provider] spironolactone  200 mg Oral Daily       Data   Recent Labs   Lab 03/22/24  0926 03/21/24  0958 03/20/24  1527 03/20/24  0945 03/19/24  0830 03/18/24  1403 03/18/24  1108 03/17/24  1133 03/17/24  0536   WBC  --   --   --  6.0 5.5  --  5.1   < > 4.9   HGB 7.5*  --   --  8.1* 7.1*  --  7.9*   < > 7.5*   MCV  --   --   --  93 94  --  94   < > 93   PLT  --   --   --  86* 59*  --  65*   < > 70*   INR  --  2.37* 2.10*  --   --   --  2.22*  --   --    * 126*  --  125* 127*   < > 127*   < > 123*   POTASSIUM 4.5 4.4  --  4.5 4.6  --   --    < > 4.3   CHLORIDE 94* 98  --  96* 98  --   --    < > 97*   CO2 20* 20*  --  18* 18*  --   --    < > 19*   BUN 23.6* 18.7  --  20.7* 25.9*  --   --    < > 43.8*   CR 0.78 0.66*  --  0.63* 0.68  --   --    < > 1.03   ANIONGAP 9 8  --  11 11  --   --    < > 7   MIGUELINA 9.3 9.4  --  9.4 9.0  --   --    < > 9.0   * 157*  --  136* 158*  --   --    < > 106*   ALBUMIN  --   --   --  3.1*  --   --   --   --  2.7*   PROTTOTAL  --   --   --  6.7  --   --   --   --  6.1*   BILITOTAL  --   --   --  8.8*  --   --   --   --  7.1*   ALKPHOS  --   --   --  163*  --   --   --   --  182*   ALT  --   --   --  54  --   --   --   --  45   AST  --   --   --  104*  --   --   --   --  91*    < > = values in this interval not displayed.       No results found for this or any previous visit (from the past 24 hour(s)).      Assessment & Plan   Crispin Ham is a 38 year old male with PMH of alcohol abuse, chronic liver cirrhosis, hx SBP, substance abuse, HTN, chronic HFpEF, hx pleural effusion s/p b/l thoracentesis 11/2023 who presented to the ED on 3/12/24 for evaluation of abnormal labs.   Patient saw his PCP the day prior to admission for  LLE cramping, fatigue and lightheadedness x 1 month. His blood work came back with hgb 6.1 and he was referred to the ED. Hospital stay complicated by persistent hyperkalemia, hyponatremia and need for blood transfusions.     Acute on chronic anemia  Coagulopathy of liver disease  History of duodenitis/ulcer (09/2023)  *Hgb 5.6, down from 6.9 on 2/21/24 previously running around 7.4-8. Platelets stable at 132.  *Endorses nosebleeds every 3 days or so which can be somewhat prolonged with his hx coagulopathy. No current active bleeding.  *Denies hematemesis, melena, hematochezia. He does have some nausea.  *Symptomatic with complaints of positional dizziness, fatigue. Declined rectal examination in the ED.  *Last EGD 9/2/23 with esophageal mucosal tear with bleeding (complication of clip placement) hemostasis achieved with bipolar probe. Portal hypertensive gastropathy.  -So far has received 8 units of PRBC transfusion during this hospitalization with most recent transfusion-3/18  -Patient reports that he is having brown stool  -[facility] following, appreciate input.  -Status post EGD 3/16 which showed portal hypertensive gastropathy, grade 1 and small esophageal varices with no stigmata of bleeding.  Discussed with GI, likely [facility] outpatient but since patient reports having brown stool GI does not think his symptoms are related to GI blood loss  -Iron studies, B12, folate-does not suggest deficiency,  reticulocyte count elevated , minimally elevated LDH.  Haptoglobin low but expected with multiple transfusion given hemolysis associated with transfusion  -CT chest abdomen and pelvis with no evidence of internal bleeding  -Discussed with Dr. Silva, given possibility of hemolyzing the transfused blood and clinical stability, Dr. Silva recommended changing transfusion threshold to less than 6  - Continue PTA Protonix 40 mg BID  - conditional transfusion orders in place for hgb<6  -Hemoglobin at 7.5 on 3/22/2024  without transfusion and patient clinically looks stable, not actively bleeding, monitor hemoglobin intermittently    Possible multifocal pneumonia  Small to moderate right pleural effusion  Subacute anterior right lower leg fracture  Numerous thoracolumbar compression fracture  -CT chest abdomen pelvis to evaluate for internal bleeding as above suggested multifocal pneumonia and right pleural effusion.   -CT also shows multiple likely subacute anterior right lower rib fracture and numerous thoracolumbar compression fracture.  Patient with no active rib pain or back pain  -Patient already on ceftriaxone for SBP prophylaxis as below.    -Patient with no respiratory symptoms including no cough, shortness of breath, chest pain and on room air  -Procalcitonin at 0.36.  Unlikely for this to be pneumonia.  Likely fluid overload causing pulmonary congestion.  -On Lasix 20 mg twice daily.  Holding this for today in the setting of worsening hyponatremia.    Distended thick-walled gallbladder  -Noted on CT scan to have distended thick-walled gallbladder  -Abdominal ultrasound suggest distended gallbladder with wall thickening and trace pericholecystic fluid, HIDA scan is recommended  -HIDA scan ordered    Left hip osteoarthritis  -Patient has a long history of left hip pain and apparently used to get injection on his left hip.  -Reports that his left hip has started bothering him last few days.  X-ray of the left hip demonstrate advanced left hip osteoarthritis  -Discussed with orthopedics, plan for intra-articular steroid injection, likely tomorrow.  His most recent INR is 2.2 so will give him IV vitamin K to try to decrease his INR     Acute kidney injury  Hyponatremia  Hyperkalemia  *Na 121 on admission, was 130 on 2/21/24 but otherwise was running 124-129 in the fall. Felt to be somewhat volume depleted on examination, received fluids, but sodium has bounced between 117-122.    - Creatinine 2.16 on admission, was 0.59 on  2/21/2024 potassium had increased up to 5.7  -Currently holding PTA Aldactone, potassium chloride,   -Treated initially with 2% NS then later 150 meq sodium bicarb solution and then normal saline  -Treated with Lokelma, low potassium, fluid restriction  -Creatinine and potassium has now normalized and sodium is 123 today.  -Nephrology was following, now signed off  -Hold Lasix on 3/22/2024.  One-time normal saline bolus of 100 cc ordered.  Every 4 hours sodium checks ordered.  -Continue with fluid restriction 1200ml, low potassium diet     Severe Malnutrition   -In the context of acute illness or injury, chronic illness or disease  -Dietitian following  -Supplements    Severe alcoholic hepatitis  Severe hyperbilirubinemia from underlying liver disease  Alcohol dependence with ongoing alcohol use disorder  History of alcohol withdrawal  *Established with [facility]. Currently without significant ascites on examination.  Last drink 3/11 afternoon, no history of withdrawal seizure and due to no signs of withdrawal CIWA monitoring has been stopped  *Alk phos 223, ALT 56, , total bili 9.0 (stable compared to 2/21/24)  *3/13 Abd US: negative for ascites  - [facility] was following, appreciate input, now signed off 3/20  - Continue PTA PPI, Lactulose, Rifaximin  - switched PTA bactrim to IV ceftriaxone on 3/15 given association with hyperkalemia.  As per GI likely can change it to 500 mg daily  - HOLD PTA Spironolactone since admission.  Has been on oral Lasix 20 twice daily.  Hold this on 3/22/2024 in the setting of worsening hyponatremia  -Continue multivitamin folic acid and thiamine supplementation, gabapentin taper  -Chemical dependency was consulted and patient was initially interested only in AA.  However after discussion with psychiatry now he expresses interest in outpatient treatment options.  Dr. Quezada plans to reconsult chemical dependency for repeat discussion.  For now we will hold off on commitment since he is  willing to consider outpatient treatment.  Reevaluate on Monday and if by then he changes his mind then we will consider commitment.  -Previous provider had a long discussion with patient's mother who was quite concerned that he would not be able to manage by himself at home and will likely need  inpatient treatment.  -Patient to follow-up with liver clinic on 4/8/2024.     Chronic HFpEF  *Echo 11/16/23 with EF 65-70%, no RWMA or any significant valvular disease  -Lasix 20 mg p.o. twice daily initiated and IV fluid discontinued  -Pending electrolyte/blood pressure, adjust diuresis  -Monitor I&O     LLE cramping  *Venous US LLE on 2/21/24 was negative  *Xray left tibia and fibula 3/11/24 no acute fracture  -Supportive cares     Chronic hip and back pain  -Ice or heat as needed  -Received fluoroscopic guided left hip joint injection on 3/21/2024     Severe malnutrition  Appreciate nutrition, recommendation for tube feeding  -Patient not interested  -PT OT consulted.    Greater than 50 minutes spent on chart review, reviewing labs and imaging since admission and discussing care with Dr. Quezada from psychiatry.  I assumed care today.    Clinically Significant Risk Factors         # Hyponatremia: Lowest Na = 123 mmol/L in last 2 days, will monitor as appropriate      # Hypoalbuminemia: Lowest albumin = 2.7 g/dL at 3/17/2024  5:36 AM, will monitor as appropriate  # Coagulation Defect: INR = 2.37 (Ref range: 0.85 - 1.15) and/or PTT = N/A, will monitor for bleeding           # Obesity: Estimated body mass index is 30.84 kg/m  as calculated from the following:    Height as of this encounter: 1.829 m (6').    Weight as of this encounter: 103.1 kg (227 lb 6.4 oz).   # Severe Malnutrition: based on nutrition assessment    # Financial/Environmental Concerns: none          DVT Prophylaxis: Pneumatic Compression Devices  Code Status: No CPR- Do NOT Intubate  Disposition: Expected discharge in 1 to 2 days once he is medically  stable.      Leanna Underwood MD, MD  [phone](p)

## 2024-03-23 NOTE — PLAN OF CARE
Goal Outcome Evaluation:  Discharge    Patient discharged to home via car with family  Care plan note  Summary: Weakness, fall x2, Dizziness, Anemia, ETOH, hyponatremia, hyperkalemia, GATITO     DATE &TIME: 3/23/24 [id number]   Cognitive Concerns/Orientation: A&O x4   BEHAVIOR & AGGRESSION TOOL COLOR: Green                      ABNL VS/O2: VSS on RA,   MOBILITY: Independent w/ cane  PAIN MANAGMENT: denies  DIET:  2g Na, 1,200  fluid restriction  BOWEL/BLADDER: Continent of B/B   ABNL LAB/BG: Na 126  DRAIN/DEVICES: Left and right PIV taken out   TELEMETRY RHYTHM: NA  SKIN: Dry, scattered bruises, Jaundiced, 3+ edema  LLE and 2+ edema RLE   TESTS/PROCEDURES: None this shift.  D/C DAY/GOALS/PLACE:  Today 03/23/24  OTHER IMPORTANT INFO: Patient aware of outpatient appointments that need to be scheduled.       Listed belongings gathered and given to patient (including from security/pharmacy). Yes  Care Plan and Patient education resolved: Yes  Prescriptions if needed, hard copies sent with patient  NA  Medication Bin checked and emptied on discharge Yes  SW/care coordinator/charge RN aware of discharge: Yes

## 2024-03-23 NOTE — DISCHARGE SUMMARY
[facility]    Discharge Summary  Hospitalist    Date of Admission:  3/12/2024  Date of Discharge:  3/23/2024    Discharge Diagnoses      Acute kidney injury (H24)  Anemia, unspecified type  Hyponatremia  Alcoholic cirrhosis of liver without ascites (H)    History of Present Illness   Crispin Ham is an 38 year old male who presented with acute on chronic anemia with alcoholic cirrhosis    Hospital Course   Crispin Ham was admitted on 3/12/2024.  The following problems were addressed during his hospitalization:    Crispin Ham is a 38 year old male with PMH of alcohol abuse, chronic liver cirrhosis, hx SBP, substance abuse, HTN, chronic HFpEF, hx pleural effusion s/p b/l thoracentesis 11/2023 who presented to the ED on 3/12/24 for evaluation of abnormal labs.   Patient saw his PCP the day prior to admission for LLE cramping, fatigue and lightheadedness x 1 month. His blood work came back with hgb 6.1 and he was referred to the ED. Hospital stay complicated by persistent hyperkalemia, hyponatremia and need for blood transfusions.     Acute on chronic anemia  Coagulopathy of liver disease  History of duodenitis/ulcer (09/2023)  *Hgb 5.6, down from 6.9 on 2/21/24 previously running around 7.4-8. Platelets stable at 132.  *Endorses nosebleeds every 3 days or so which can be somewhat prolonged with his hx coagulopathy. No current active bleeding.  *Denies hematemesis, melena, hematochezia. He does have some nausea.  *Symptomatic with complaints of positional dizziness, fatigue. Declined rectal examination in the ED.  *Last EGD 9/2/23 with esophageal mucosal tear with bleeding (complication of clip placement) hemostasis achieved with bipolar probe. Portal hypertensive gastropathy.  -So far has received 8 units of PRBC transfusion during this hospitalization with most recent transfusion-3/18  -Patient reports that he is having brown stool  -[facility] following, appreciate  input.  -Status post EGD 3/16 which showed portal hypertensive gastropathy, grade 1 and small esophageal varices with no stigmata of bleeding.  Discussed with GI, likely [facility] outpatient but since patient reports having brown stool GI does not think his symptoms are related to GI blood loss  -Iron studies, B12, folate-does not suggest deficiency,  reticulocyte count elevated , minimally elevated LDH.  Haptoglobin low but expected with multiple transfusion given hemolysis associated with transfusion  -CT chest abdomen and pelvis with no evidence of internal bleeding  -Discussed with Dr. Silva, given possibility of hemolyzing the transfused blood and clinical stability, Dr. Silva recommended changing transfusion threshold to less than 6  - Continue PTA Protonix 40 mg BID  - conditional transfusion orders in place for hgb<6  -Hemoglobin at 7.5 on 3/22/2024 without transfusion and patient clinically looks stable, not actively bleeding, monitor hemoglobin intermittently    Possible multifocal pneumonia  Small to moderate right pleural effusion  Subacute anterior right lower leg fracture  Numerous thoracolumbar compression fracture  -CT chest abdomen pelvis to evaluate for internal bleeding as above suggested multifocal pneumonia and right pleural effusion.   -CT also shows multiple likely subacute anterior right lower rib fracture and numerous thoracolumbar compression fracture.  Patient with no active rib pain or back pain  -Patient already on ceftriaxone for SBP prophylaxis as below.    -Patient with no respiratory symptoms including no cough, shortness of breath, chest pain and on room air  -Procalcitonin at 0.36.  Unlikely for this to be pneumonia.  Likely fluid overload causing pulmonary congestion.  -On Lasix 20 mg twice daily.  Holding this for today in the setting of worsening hyponatremia.  Restarted at a lower dose of Lasix 20 mg once daily at the time of discharge once sodium improved.    Distended  thick-walled gallbladder  -Noted on CT scan to have distended thick-walled gallbladder  -Abdominal ultrasound suggest distended gallbladder with wall thickening and trace pericholecystic fluid, HIDA scan is recommended  -HIDA scan ordered.  Showed normal activity with liver or gallbladder, bile ducts.  Gallbladder EF at 100%.  It was negative for cholecystitis and biliary dyskinesia.    Left hip osteoarthritis  -Patient has a long history of left hip pain and apparently used to get injection on his left hip.  -Reports that his left hip has started bothering him last few days.  X-ray of the left hip demonstrate advanced left hip osteoarthritis  -Discussed with orthopedics, plan for intra-articular steroid injection, likely tomorrow.  His most recent INR is 2.2 so will give him IV vitamin K to try to decrease his INR     Acute kidney injury  Hyponatremia  Hyperkalemia  *Na 121 on admission, was 130 on 2/21/24 but otherwise was running 124-129 in the fall. Felt to be somewhat volume depleted on examination, received fluids, but sodium has bounced between 117-122.    - Creatinine 2.16 on admission, was 0.59 on 2/21/2024 potassium had increased up to 5.7  -Currently holding PTA Aldactone, potassium chloride,   -Treated initially with 2% NS then later 150 meq sodium bicarb solution and then normal saline  -Treated with Lokelma, low potassium, fluid restriction  -Creatinine and potassium has now normalized and sodium is 123 today.  -Nephrology was following, now signed off  -Hold Lasix on 3/22/2024.  One-time normal saline bolus of 100 cc ordered.  Every 4 hours sodium checks ordered.  Sodium improved to 127 prior to discharge.  Restarted Lasix at 20 mg daily and cleared him for discharge.  Patient is chronically going to have lower sodium due to his cirrhosis.  -Continue with fluid restriction 1200ml, low potassium diet     Severe Malnutrition   -In the context of acute illness or injury, chronic illness or  disease  -Dietitian following  -Supplements    Severe alcoholic hepatitis  Severe hyperbilirubinemia from underlying liver disease  Alcohol dependence with ongoing alcohol use disorder  History of alcohol withdrawal  *Established with [facility]. Currently without significant ascites on examination.  Last drink 3/11 afternoon, no history of withdrawal seizure and due to no signs of withdrawal CIWA monitoring has been stopped  *Alk phos 223, ALT 56, , total bili 9.0 (stable compared to 2/21/24)  *3/13 Abd US: negative for ascites  - [facility] was following, appreciate input, now signed off 3/20  - Continue PTA PPI, Lactulose, Rifaximin  - switched PTA bactrim to IV ceftriaxone on 3/15 given association with hyperkalemia.  As per GI likely can change it to 500 mg daily  - HOLD PTA Spironolactone since admission.  Has been on oral Lasix 20 twice daily.  Hold this on 3/22/2024 in the setting of worsening hyponatremia  -Continue multivitamin folic acid and thiamine supplementation, gabapentin taper  -Chemical dependency was consulted and patient was initially interested only in AA.  However after discussion with psychiatry now he expresses interest in outpatient treatment options.  Dr. Quezada plans to reconsult chemical dependency for repeat discussion.  For now we will hold off on commitment since he is willing to consider outpatient treatment.  Reevaluate on Monday and if by then he changes his mind then we will consider commitment.  -Chemical dependency was completed and recommended to outpatient facilities.  Patient will arrange to follow-up with these facilities.  It was clearly explained to him that if he fails to follow-up on this or ends up in the hospital again for alcohol abuse then we will have to pursue commitment.  He is going to discharge to his parents house.  -Patient to follow-up with liver clinic on 4/8/2024.     Chronic HFpEF  *Echo 11/16/23 with EF 65-70%, no RWMA or any significant valvular  disease  -Lasix 20 mg p.o. twice daily initiated and IV fluid discontinued  -Pending electrolyte/blood pressure, adjust diuresis  -Monitor I&O     LLE cramping  *Venous US LLE on 2/21/24 was negative  *Xray left tibia and fibula 3/11/24 no acute fracture  -Supportive cares     Chronic hip and back pain  -Ice or heat as needed  -Received fluoroscopic guided left hip joint injection on 3/21/2024     Severe malnutrition  Appreciate nutrition, recommendation for tube feeding  -Patient not interested  -PT OT consulted.    Clinically Significant Risk Factors              # Hypoalbuminemia: Lowest albumin = 2.7 g/dL at 3/17/2024  5:36 AM, will monitor as appropriate              # Obesity: Estimated body mass index is 30.84 kg/m  as calculated from the following:    Height as of this encounter: 1.829 m (6').    Weight as of this encounter: 103.1 kg (227 lb 6.4 oz).   # Severe Malnutrition: based on nutrition assessment      # Financial/Environmental Concerns: none          Leanna Underwood MD, MD    Pending Results   These results will be followed up by pcp  Unresulted Labs Ordered in the Past 30 Days of this Admission       No orders found from 2/11/2024 to 3/13/2024.          Code Status   Full Code       Primary Care Physician   Blake Canchola    Physical Exam                      Vitals:    03/17/24 0608 03/19/24 0629 03/20/24 0213   Weight: 96.8 kg (213 lb 8 oz) 103 kg (227 lb) 103.1 kg (227 lb 6.4 oz)     Vital Signs with Ranges     I/O last 3 completed shifts:  In: 575 [P.O.:575]  Out: 550 [Urine:550]    Physical Exam  Constitutional:       Appearance: He is ill-appearing.   Eyes:      General: Scleral icterus present.   Cardiovascular:      Rate and Rhythm: Normal rate and regular rhythm.      Pulses: Normal pulses.      Heart sounds: Normal heart sounds.   Pulmonary:      Effort: Pulmonary effort is normal. No respiratory distress.      Breath sounds: Normal breath sounds.   Abdominal:      General: Abdomen is flat.  Bowel sounds are normal. There is no distension.      Tenderness: There is no abdominal tenderness. There is no guarding.   Skin:     General: Skin is warm and dry.      Coloration: Skin is jaundiced.   Neurological:      General: No focal deficit present.           Discharge Disposition   Discharged to home  Condition at discharge: Stable    Consultations This Hospital Stay   GASTROENTEROLOGY IP CONSULT  CARE MANAGEMENT / SOCIAL WORK IP CONSULT  CHEMICAL DEPENDENCY IP CONSULT  VASCULAR ACCESS ADULT IP CONSULT  NEPHROLOGY IP CONSULT  VASCULAR ACCESS ADULT IP CONSULT  PSYCHIATRY IP CONSULT  HEMATOLOGY & ONCOLOGY IP CONSULT  ORTHOPEDIC SURGERY IP CONSULT  CHEMICAL DEPENDENCY IP CONSULT  PHYSICAL THERAPY ADULT IP CONSULT    Time Spent on this Encounter   I, Leanna Underwood MD, personally saw the patient today and spent greater than 30 minutes discharging this patient.    Discharge Orders      Reason for your hospital stay    Alcoholic cirrhosis     Follow-up and recommended labs and tests     Follow up with primary care provider, Blake Canchola, within 7 days for hospital follow- up.  The following labs/tests are recommended: cbc, bmp in 1 week .     Activity    Your activity upon discharge: activity as tolerated     Diet    Follow this diet upon discharge: Orders Placed This Encounter      Snacks/Supplements Adult: Ensure Enlive; Between Meals      Fluid restriction 1500 ML FLUID      2 Gram Sodium Diet     Discharge Medications   Discharge Medication List as of 3/23/2024 12:54 PM        START taking these medications    Details   ciprofloxacin (CIPRO) 500 MG tablet Take 1 tablet (500 mg) by mouth every 24 hours, Disp-60 tablet, R-1, E-Prescribe           CONTINUE these medications which have CHANGED    Details   furosemide (LASIX) 20 MG tablet Take 1 tablet (20 mg) by mouth daily, Disp-60 tablet, R-1, E-Prescribe           CONTINUE these medications which have NOT CHANGED    Details   folic acid (FOLVITE) 1 MG tablet Take  1 tablet (1 mg) by mouth daily, Disp-30 tablet, R-0, E-Prescribe      lactulose (CHRONULAC) 10 GM/15ML solution Take 30 mLs (20 g) by mouth 2 times daily Goal 2 - 3 soft bowel movements per day., Disp-473 mL, R-0, E-Prescribe      magnesium oxide (MAG-OX) 400 MG tablet Take 1 tablet (400 mg) by mouth daily, Disp-15 tablet, R-0, E-Prescribe      menthol (ICY HOT) 5 % PTCH Apply 1 patch topically every 8 hours as needed for muscle soreness, Historical      multivitamin, therapeutic (THERA-VIT) TABS tablet Take 1 tablet by mouth daily, Historical      pantoprazole (PROTONIX) 40 MG EC tablet Take 1 tablet (40 mg) by mouth 2 times daily, Disp-60 tablet, R-0, E-Prescribe      thiamine (B-1) 100 MG tablet Take 1 tablet (100 mg) by mouth daily, Disp-30 tablet, R-0, E-Prescribe      traZODone (DESYREL) 50 MG tablet Take 50 mg by mouth nightly as needed for sleep, Historical      Vitamin D, Cholecalciferol, 10 MCG (400 UNIT) TABS Take 1 tablet by mouth daily, Historical      rifaximin (XIFAXAN) 550 MG TABS tablet Take 1 tablet (550 mg) by mouth 2 times daily, Disp-60 tablet, R-0, E-Prescribe           STOP taking these medications       methocarbamol (ROBAXIN) 500 MG tablet Comments:   Reason for Stopping:         spironolactone (ALDACTONE) 100 MG tablet Comments:   Reason for Stopping:         sulfamethoxazole-trimethoprim (BACTRIM DS) 800-160 MG tablet Comments:   Reason for Stopping:             Allergies   Allergies   Allergen Reactions    Excedrin Extra Strength [Aspirin-Acetaminophen-Caffeine]      puffy eyes and dry throat a few years ago. Tolerates ibuprofen and acetaminophen, but avoids aspirin    Nsaids Angioedema     Patient reports having a reaction of puffy eyes and dry throat a few years ago, but he has taken Advil in 2023 and did not react.     Data   Recent Labs   Lab Test 03/22/24  0926 03/21/24  0958 03/20/24  1527 03/20/24  0945 03/19/24  0830 03/18/24  1108   WBC  --   --   --  6.0 5.5 5.1   HGB 7.5*  --    --  8.1* 7.1* 7.9*   MCV  --   --   --  93 94 94   PLT  --   --   --  86* 59* 65*   INR  --  2.37* 2.10*  --   --  2.22*      Recent Labs   Lab Test 03/23/24  1130 03/23/24  0934 03/23/24  0717 03/23/24  0657 03/23/24  0230 03/22/24  1440 03/22/24  0926 03/21/24  0958   NA  --  127*  126*  --  126* 123*   < > 123* 126*   POTASSIUM  --  4.3  --   --   --   --  4.5 4.4   CHLORIDE  --  96*  --   --   --   --  94* 98   CO2  --  20*  --   --   --   --  20* 20*   BUN  --  25.8*  --   --   --   --  23.6* 18.7   CR  --  0.78  --   --   --   --  0.78 0.66*   ANIONGAP  --  11  --   --   --   --  9 8   MIGUELINA  --  9.5  --   --   --   --  9.3 9.4   * 187* 97  --   --    < > 227* 157*    < > = values in this interval not displayed.         Results for orders placed or performed during the hospital encounter of 03/12/24   US Abdomen Limited    Narrative    US ABDOMEN LIMITED 3/13/2024 4:05 PM    CLINICAL HISTORY: assess for ascites  TECHNIQUE: Limited abdominal ultrasound.    COMPARISON: Paracentesis 11/17/2023      Impression    IMPRESSION:    Targeted ultrasound scanning of the 4 abdominal quadrants was  performed demonstrating no ascites.    SHARON DUFFY MD         SYSTEM ID:  [id number]   CT Chest Abdomen Pelvis w/o Contrast    Narrative    EXAM: CT CHEST ABDOMEN PELVIS W/O CONTRAST  LOCATION: [facility]  DATE: 3/18/2024    INDICATION: Progressive anemia , Rule out internal bleeding  COMPARISON: 09/25/2023  TECHNIQUE: CT scan of the chest, abdomen, and pelvis was performed without IV contrast. Multiplanar reformats were obtained. Dose reduction techniques were used. Exam significantly limited by lack of intravenous contrast. Active hemorrhage cannot be   excluded in this setting.  CONTRAST: None.    FINDINGS:   LUNGS AND PLEURA: Heterogeneous groundglass opacity dependently within the right upper lobe and in the superior segment of the right lower lobe. Small to moderate right pleural  effusion, increased in interval.    MEDIASTINUM/AXILLAE: No significant mediastinal or hilar adenopathy.    CORONARY ARTERY CALCIFICATION: None.    HEPATOBILIARY: Distended gallbladder. Gallbladder wall thickening. No calcified stones. Morphologic changes of cirrhosis.    PANCREAS: Unremarkable    SPLEEN: Splenomegaly reaching 16.7 cm    ADRENAL GLANDS: Unremarkable    KIDNEYS/BLADDER: No nephroureterolithiasis or hydronephrosis. Decompressed bladder.    BOWEL: Normal caliber appendix. No bowel obstruction. Moderate stranding in the mesentery.    LYMPH NODES: Numerous subcentimeter retroperitoneal nodes.    VASCULATURE: No abdominal aortic aneurysm. Multiple prominent suspected portosystemic collaterals in the upper abdomen as well as recanalized periumbilical vein.    PELVIC ORGANS: Small volume of free fluid.    MUSCULOSKELETAL: Multiple likely subacute anterior right lower rib fractures. Numerous thoracolumbar compression fractures, new in the interval including T5, T7, T8, T9, T8, T11, T12, and L1-L5. Small fluid containing umbilical hernia.      Impression    IMPRESSION:  1.  Heterogeneous right-sided groundglass opacities suspicious for multifocal pneumonia.  2.  Small to moderate right pleural effusion.  3.  Distended thick-walled gallbladder. Further evaluation with gallbladder ultrasound recommended to exclude cholecystitis.  4.  Morphologic changes of cirrhosis with ascites.  5.  Associated splenomegaly.  6.  Additional findings as above.   US Abdomen Limited    Narrative    US ABDOMEN LIMITED 3/19/2024 3:43 PM    CLINICAL HISTORY: RUQ tenderness on exam, Elevated Bili most likely  ETOH Hepatitis, recent CT showed GB wall thickness concern for  cholecystitis.  TECHNIQUE: Limited abdominal ultrasound.    COMPARISON: CT 3/18/2024.    FINDINGS:    GALLBLADDER: Gallbladder is distended measuring up to 15.8 cm.  Gallbladder wall thickening is present. There is trace pericholecystic  fluid. Negative sonographic  Alexis's sign.    BILE DUCTS: There is no biliary dilatation. The common duct measures 3  mm.    LIVER: Increased echogenicity of the hepatic parenchyma with poor  acoustic penetration. Heterogeneous echotexture compatible with  intrinsic liver disease.    RIGHT KIDNEY: No hydronephrosis.    PANCREAS: The visualized portions of the pancreas are normal.  Partially obscured by bowel gas.    Trace upper abdominal ascites. Small right pleural effusion.  Varicosities are seen in the upper abdomen.      Impression    IMPRESSION:  1.  Distended gallbladder with wall thickening and trace  pericholecystic fluid. No cholelithiasis. Findings may represent  acalculous cholecystitis in the correct clinical context. Differential  includes reactive changes from hepatitis. A HIDA examination could be  performed for further evaluation.  2.  Increased echogenicity of the hepatic parenchyma with poor  acoustic penetration and heterogeneous echotexture compatible with  hepatic steatosis/fibrosis.  3.  Trace upper abdominal ascites. Small right pleural effusion.    RENETTA AGUILERA MD         SYSTEM ID:  [id number]   XR Pelvis w Hip Left 1 View    Narrative    XR PELVIS AND HIP LEFT 1 VIEW  3/20/2024 11:52 AM     HISTORY: Left hip OA, increasing pain, hx alcohol abuse  COMPARISON: None      Impression    IMPRESSION: No acute fracture or malalignment. Severe left hip joint  degenerative changes with bone-on-bone articulation, subchondral  sclerosis, and osteophytosis.  Mild right hip joint degenerative changes.     KRISTI BABIN MD         SYSTEM ID:  [id number]   NM Hepatobiliary Scan with GB EF and/or Pharm    Narrative    EXAM: NM HEPATOBILIARY SCAN WITH GB EF AND/OR PHARM  LOCATION: [facility]  DATE: 3/20/2024    INDICATION: Abnormal abdominal US , R O cholecystitis  COMPARISON: Ultrasound 03/19/2024.  TECHNIQUE: 7 mCi of technetium-99m mebrofenin, IV. Anterior planar imaging of the abdomen. 2.1 mcg of  cholecystokinin analog, IV. Gallbladder imaging for 30-60 minutes.    FINDINGS: Normal radionuclide activity in liver, gallbladder, bile ducts, and small bowel. No evidence of cystic or common duct obstruction or intrinsic liver disease. Gallbladder ejection fraction measures 100% (Normal range = 35% or greater).      Impression    IMPRESSION:     Negative for cholecystitis and biliary dyskinesia.    XR Joint Injection Major Left    Narrative    EXAM: XR JOINT INJECTION MAJOR LEFT  LOCATION: [facility]  DATE/TIME: 3/21/2024 12:01 PM    INDICATION: Left hip osteoarthritis    HISTORY: 38 year-old male presenting today for a fluoroscopic-guided  left hip joint injection, as ordered by the referring provider. The  patient has a history of left hip osteoarthritis, currently inpatient  status with history of alcohol abuse, chronic liver cirrhosis, HTN.  Chronically elevated INR, received Vitamin K infusion 3/20/24.    PROCEDURE: Prior to the procedure, the patient's pain history and  appropriate radiographic reports were reviewed. The procedure and its  risks (including but not limited to infection, bleeding, and reactions  to contrast material and medications) were discussed with the patient  and informed consent was obtained.    The patient was placed in a supine position on the procedure table.  Next, the skin overlying the joint was prepped and draped in sterile  fashion.  A pre-procedural pause was performed. The left hip joint was  localized under fluoroscopy. The site was marked with a sterile  marker. A small amount of 1% lidocaine was injected into the local  soft tissues. Under fluoroscopic control, a 25-gauge needle was placed  into the left hip joint. Intra-articular placement of the needle tip  was confirmed by nonionic contrast injection. Next, 40 mg Kenalog and  3 mL 0.5% Bupivacaine were injected into the joint. The needle was  removed and a dressing was applied. The patient  tolerated the  procedure well without apparent complication.     ESTIMATED BLOOD LOSS: Minimal    DAP: 4.04 uGym2  FLUOROSCOPY TIME: < 0.1 minutes  IMAGES OBTAINED: 3    MEDICATIONS: 3 mL local lidocaine 1%,  3 mL Bupivacaine 0.5%,   2 mL  isovue m200, 40 mg Kenalog,      Impression    IMPRESSION:   1.  Fluoroscopic-guided left hip joint injection.    CHIRAG HATCH PA-C         SYSTEM ID:  [id number]

## 2024-03-23 NOTE — PROGRESS NOTES
[facility]   [address]  [place], [place] [address]  Hospitalist Service  Answering Service [phone]        3/23/2024          To whom it may concern,       Crispin Ham MRN# [id number]   YOB: 1985 Age: 38 year old       Date of Admission:  3/12/2024  Date of Discharge:  3/23/2024  Admitting Physician:  Mariana Chavira DO  Discharge Physician:  Leanna Underwood MD  Discharging Service:  Hospitalist  Primary Provider: Blake Canchola    Restrictions:  No    Leanna Underwood MD

## 2024-03-23 NOTE — PLAN OF CARE
Goal Outcome Evaluation:  Shift: [id number] 3/22/2024  Summary: Weakness, fall x2, Dizziness, Anemia, ETOH, hyponatremia, hyperkalemia, GATITO  Orientation: AO x4  BEHAVIOR & AGGRESSION TOOL COLOR: Green     Vitals/Tele: VSS RA  TELEMETRY RHYTHM: NA  IV Access/drains: 2x PIV SL  Diet: 2g Na, 1,200 FR (Met)  Mobility: Independent   GI/: Continent   Wound/Skin: Dry, scattered bruises, Jaundiced, 2+ edema BLE, L>R  Consults: ChemDep; SW  Discharge Plan: TBD      See Flow sheets for assessment

## 2024-03-23 NOTE — PROGRESS NOTES
MD Notification    Notified Person: MD Efren Hebert    Notified Person Name: Sara Low RN    Notification Date/Time: 03/23/24 0027    Notification Interaction:  Vocera Page    Purpose of Notification: Low sodium, Patient's Na  124 down from 125    Orders Received: Provider  advised to continue with plan of care    Comments:

## 2024-03-24 NOTE — TELEPHONE ENCOUNTER
Pt was discharge from hospital today. Pt have Medication question about potassium chloride. States he was taking it prior to hospitalization, was not mention today when discharging. Pt is not sure if he should restart taking it.    Per chart review, Rx discontinued. Pt will follow up with PCP.    Offer triage, pt declines.    Marciano Garner RN, BSN  3/23/2024 at 7:45 PM  [facility]      Reason for Disposition   Caller has medicine question only, adult not sick, AND triager answers question    Protocols used: Medication Question Call-A-

## 2024-04-09 NOTE — ED PROVIDER NOTES
History     Chief Complaint:  Abnormal Labs (/)       HPI   Crispin Ham is a 39 year old male with complex past medical history including alcoholic cirrhosis with ascites, renal failure, anemia, varices, CHF, among others who presents for evaluation of abnormal labs.  The patient states that he has been feeling more fatigued in the last couple of days.  He was recently admitted to the hospital from 3/12 through 3/23 for GATITO, anemia, hyponatremia, and liver cirrhosis.  He reports that visit with his doctor was a routine follow-up and he would not of come to the ER had they not called him and told him that his hemoglobin was 5.9.  He denies any abdominal pain chest pain or increased shortness of breath from baseline.  No fever or chills.  He does note that his legs have been gradually more swollen since he left the hospital and notes that his doctor just increased his spironolactone and Lasix yesterday.  He also notes that he has not been very good about following his diet or fluid restrictions at home and attributes some of this to that.  He denies any black or bloody stools or vomiting/hematemesis.  He has been sober for about a month.      Independent Historian:   None - Patient Only    Review of External Notes:   3/23/24 - Dr. Chavira's discharge hospital note note underwent EGD on 3/16 which showed no evidence of upper GI bleeding showed small varices with no stigmata of recent bleeding and per GI they did not feel that his anemia was due to blood loss.  Hemoglobin at discharge was 7.5.    Medications:    Cipro   Folvite   Lasix   Chronulac   Protonix   Xifaxan       Past Medical History:    Septic shock   Acute renal failure   Anemia   Hypertension   GATITO  Substance abuse   Hyponatremia   Spontaneous bacterial peritonitis   Ascites due to alcoholic hepatitis   Chronic heart failure with preserved ejection fraction   Transaminitis   Depressive disorder   TIKA   Ganglion cyst   Osteoarthritis   Obesity    Varicella     Past Surgical History:    ORIF x2 left wrist fracture        Physical Exam   Patient Vitals for the past 24 hrs:   BP Temp Temp src Pulse Resp SpO2 Height Weight   04/09/24 1842 -- -- -- 98 10 99 % -- --   04/09/24 1839 -- -- -- 96 16 99 % -- --   04/09/24 1803 -- 98  F (36.7  C) Oral -- -- -- -- --   04/09/24 1801 -- -- -- 98 12 100 % -- --   04/09/24 1800 101/67 -- -- 97 -- -- -- --   04/09/24 1759 -- -- -- 96 11 100 % -- --   04/09/24 1753 -- -- -- 96 17 99 % -- --   04/09/24 1739 -- -- -- 96 10 100 % -- --   04/09/24 1730 113/71 -- -- 93 -- -- -- --   04/09/24 1719 -- -- -- 98 10 100 % -- --   04/09/24 1706 -- -- -- 99 13 100 % -- --   04/09/24 1700 118/75 -- -- 96 -- -- -- --   04/09/24 1613 -- -- -- 98 21 97 % -- --   04/09/24 1606 108/59 97.7  F (36.5  C) -- 95 18 -- -- --   04/09/24 1554 -- -- -- 96 13 100 % -- --   04/09/24 1424 114/43 98  F (36.7  C) Oral 100 16 100 % 1.829 m (6') 122.5 kg (270 lb)        Physical Exam  General: Awake, alert, non-toxic. Chronically ill appearing.  Head:  Scalp is NC/AT  Eyes:  Conjunctiva jaundiced, PERRL  ENT:  The external nose and ears are normal.     Oropharynx clear, uvula midline.  Neck:  Normal range of motion without rigidity.  CV:  Regular rate and rhythm    No pathologic murmur, rubs, or gallops.  Resp:  Breath sounds are clear bilaterally    Non-labored, no retractions or accessory muscle use  Abdomen: Abdomen is soft, mild ascites, no tenderness, no masses. No CVA tenderness.  MS:  2+ symmetric BL LE pitting edema. Extremities without joint swelling or redness.  Skin:  Warm and dry, Jaundiced.  Neuro:  Alert and oriented.  GCS 15 Moves all extremities normal.  No facial asymmetry. Gait normal.  Psych: Awake. Alert. Normal affect. Appropriate interactions.      Emergency Department Course         Laboratory:  Labs Ordered and Resulted from Time of ED Arrival to Time of ED Departure   COMPREHENSIVE METABOLIC PANEL - Abnormal       Result Value     Sodium 133 (*)     Potassium 3.5      Carbon Dioxide (CO2) 22      Anion Gap 11      Urea Nitrogen 21.5 (*)     Creatinine 0.99      GFR Estimate >90      Calcium 9.1      Chloride 100      Glucose 110 (*)     Alkaline Phosphatase 177 (*)      (*)     ALT 55      Protein Total 6.4      Albumin 3.1 (*)     Bilirubin Total 8.5 (*)    CBC WITH PLATELETS AND DIFFERENTIAL - Abnormal    WBC Count 3.3 (*)     RBC Count 1.79 (*)     Hemoglobin 6.1 (*)     Hematocrit 18.0 (*)      (*)     MCH 34.1 (*)     MCHC 33.9      RDW        Platelet Count 118 (*)     % Neutrophils 59      % Lymphocytes 20      % Monocytes 18      % Eosinophils 1      % Basophils 1      % Immature Granulocytes 1      NRBCs per 100 WBC 0      Absolute Neutrophils 1.9      Absolute Lymphocytes 0.7 (*)     Absolute Monocytes 0.6      Absolute Eosinophils 0.0      Absolute Basophils 0.0      Absolute Immature Granulocytes 0.0      Absolute NRBCs 0.0     INR - Abnormal    INR 1.95 (*)    MAGNESIUM - Abnormal    Magnesium 1.4 (*)    TYPE AND SCREEN, ADULT    ABO/RH(D) A POS      Antibody Screen Negative      SPECIMEN EXPIRATION DATE 39282614119056     PREPARE RED BLOOD CELLS (UNIT)    Blood Component Type Red Blood Cells      Product Code U7345K45      Unit Status Transfused      Unit Number [id number]      CROSSMATCH Compatible      CODING SYSTEM [id number]      ISSUE DATE AND TIME [id number]      UNIT ABO/RH A+      UNIT TYPE ISBT 6200     PREPARE RED BLOOD CELLS (UNIT)   TRANSFUSE RED BLOOD CELLS (UNIT)   ABO/RH TYPE AND SCREEN        Emergency Department Course & Assessments:    Interventions:  Medications   furosemide (LASIX) injection 40 mg (40 mg Intravenous $Given 4/9/24 1555)   potassium chloride pedro luis ER (KLOR-CON M20) CR tablet 40 mEq (40 mEq Oral $Given 4/9/24 1641)   multivitamin, therapeutic (THERA-VIT) tablet 1 tablet (1 tablet Oral $Given 4/9/24 1641)   folic acid (FOLVITE) tablet 1 mg (1 mg Oral $Given 4/9/24 1641)    thiamine (B-1) tablet 100 mg (100 mg Oral $Given 24 1641)   magnesium oxide (MAG-OX) tablet 800 mg (800 mg Oral $Given 24 1641)        Assessments:  As above    Independent Interpretation (X-rays, CTs, rhythm strip):  None    Consultations/Discussion of Management or Tests:  ED Course as of 24   151 I obtained history and examined the patient as noted above.     I rechecked and updated the patient.        Social Determinants of Health affecting care:   Healthcare Access/Compliance    Disposition:  The patient was discharged.     Impression & Plan    CMS Diagnoses: None      MIPS (If applicable):  N/A    Medical Decision Makin-year-old male with a history of alcoholic cirrhosis of the liver, diastolic CHF, anemia who presents for evaluation of abnormal labs.  Patient actually feeling relatively on change from baseline however was informed by primary from yesterday that his hemoglobin came back at 5.9.  He deals with low hemoglobin at baseline baseline appears to be around 7-1/2 required several transfusions during recent hospitalization.  Hemoglobin is 6.1 today.  He denies any symptoms of GI bleed and it looks like during recent hospitalization this was not felt to be likely no evidence of other acute hemorrhage, bilirubin is baseline doubt acute hemolytic anemia.      I do suspect some of his decreased hemoglobin is delusional as he does appear to be somewhat fluid overloaded did give 1 unit of blood and will also simultaneously diuresis clearly at high risk for fluid overload.  Fortunately he is not having any respiratory symptoms hypoxia increased work of breathing or exam findings to suggest fluid overload in the lungs.  He does not have any abdominal pain or tenderness I strongly doubt SBP do not think emergent paracentesis is indicated.  I think DVT is unlikely and his edema is symmetric and likely due to his not following diet very well.  It sounds like his  doctor just increased his diuretics yesterday he will continue these and will try to adhere more closely to diet.  He also understands that he needs to have his electrolytes and hemoglobin/other labs rechecked closely.  He wants to go home at this time declines observation which I think is reasonable.  Close return precautions for shortness of breath fever abdominal pain black or bloody stools hematemesis shortness of breath chest pain weakness etc.    Diagnosis:    ICD-10-CM    1. Acute on chronic anemia  D64.9       2. Fluid overload  E87.70       3. Alcoholic cirrhosis of liver with ascites (H)  K70.31            Discharge Medications:  Discharge Medication List as of 4/9/2024  7:04 PM           4/9/2024   Hieu Lyles, *        Hieu Lyles, MYNOR  04/09/24 2129

## 2024-04-09 NOTE — DISCHARGE INSTRUCTIONS
It is very important that you adhere to your dietary recommendations and fluid restrictions.  Please make sure that you are taking all of your medications including your diuretics and electrolytes/vitamins as directed.  You should also call your primary care doctor tomorrow to arrange for recheck of blood work in the next 1 to 2 days.  You should return to the ER immediately if you develop increased shortness of breath fever abdominal pain bloody stools or vomiting blood weakness or other concerns.

## 2024-04-09 NOTE — ED TRIAGE NOTES
Pt was told he has a low hemoglobin at 5.9 yesterday and told to come to the ed   Pt has liver issues

## 2024-04-17 PROBLEM — K76.82 HEPATIC ENCEPHALOPATHY (H): Status: ACTIVE | Noted: 2024-01-01

## 2024-04-17 PROBLEM — D63.8 ANEMIA IN OTHER CHRONIC DISEASES CLASSIFIED ELSEWHERE: Status: ACTIVE | Noted: 2024-01-01

## 2024-04-17 NOTE — ED NOTES
Bed: ED25  Expected date:   Expected time:   Means of arrival:   Comments:  523   39 M intermittent confusion/poss liver failure  1438

## 2024-04-17 NOTE — ED TRIAGE NOTES
Pt works at the [employer] and was found to be confused, hx cirrhosis, pt is very jaundiced, bg 129

## 2024-04-17 NOTE — LETTER
[place] [address] [facility]  [address] [facility] [address]  Phone: [phone]    April 30, 2024        Crispin Ham  [address] [place] [id number]      To whom it may concern:    Crispin Ham was hospitalized at [facility] on from April 17 to April 30, 2024. Due to his ongoing medical illness, he is unable to return to work at this time. He will follow up with his doctors to determine when is able to return to work.      Please contact me for questions or concerns.      Sincerely,      Bernard Quiroz MD  Hospitalist

## 2024-04-17 NOTE — ED PROVIDER NOTES
History     Chief Complaint:  Altered Mental Status     The history is limited by the condition of the patient.      Crispin Ham is a 39 year old male with complex past medical history including alcoholic cirrhosis with ascites, renal failure, anemia, varices, CHF, among others who presents to the ED via EMS for evaluation of altered mental status. The patient was found by EMS today at the [facility] [employer], where the patient works, confused and juandice. The patient denies abdominal pain or recent fall.    Independent Historian:   None - Patient Only    Review of External Notes:   I reviewed the patient's the [facility] progress note from family medicine from yesterday regarding the patient's liver disease  and anemia.    US ABDOMEN LIMITED 4/16/2024 9:26 AM    CLINICAL HISTORY: Alcoholic cirrhosis of liver with ascites (H);  Anemia, unspecified  TECHNIQUE: Limited abdominal ultrasound.    COMPARISON: None.   Impression:     IMPRESSION:  1.  No ascites. Planned paracentesis therefore not performed.    AGUEDA BRUCE MD       Medications:    ciprofloxacin (CIPRO) 500 MG tablet  folic acid (FOLVITE) 1 MG tablet  furosemide (LASIX) 20 MG tablet  lactulose (CHRONULAC) 10 GM/15ML solution  magnesium oxide (MAG-OX) 400 MG tablet  menthol (ICY HOT) 5 % PTCH  multivitamin, therapeutic (THERA-VIT) TABS tablet  pantoprazole (PROTONIX) 40 MG EC tablet  rifaximin (XIFAXAN) 550 MG TABS tablet  thiamine (B-1) 100 MG tablet  traZODone (DESYREL) 50 MG tablet  Vitamin D, Cholecalciferol, 10 MCG (400 UNIT) TABS      Past Medical History:    Past Medical History:   Diagnosis Date    Alcohol abuse     Hypertension     Substance abuse (H)        Past Surgical History:    Past Surgical History:   Procedure Laterality Date    COLONOSCOPY      EGD    ESOPHAGOSCOPY, GASTROSCOPY, DUODENOSCOPY (EGD), COMBINED N/A 3/16/2024    Procedure: ESOPHAGOGASTRODUODENOSCOPY;  Surgeon: Chato Juan MD;  Location:  OR    ORTHOPEDIC SURGERY       wrist  surgery        Physical Exam   Patient Vitals for the past 24 hrs:   BP Temp Temp src Pulse Resp SpO2   04/17/24 1530 98/60 -- -- 96 -- 100 %   04/17/24 1515 96/61 98.3  F (36.8  C) Oral 101 -- 100 %   04/17/24 1450 106/57 -- -- 104 14 100 %        Physical Exam  General: Somnolent, confused  Head:  Scalp is atraumatic  Eyes:  The pupils are equal, round, and reactive to light    EOM's intact    Scleral icterus  ENT:      Nose:  The external nose is normal  Ears:  External ears are normal  Mouth/Throat: Mucus membranes are dry      Neck:  Normal range of motion.      There is no rigidity.    Trachea is in the midline         CV:  Tachycardic  Regular rhythm    No murmur   Resp:  Breath sounds are clear bilaterally    Non-labored, no retractions or accessory muscle use      GI:  Abdomen is soft, no distension. No reproducible tenderness.       MS:  Normal strength in all 4 extremities. 2+ edema to bilateral lower legs.  Skin:  Warm and dry, No rash or lesions noted. Juandice  Neuro:   Asterixis somnolent but arousable, largely non communicative.   Emergency Department Course   Imaging:  US Abdomen Limited   Final Result   IMPRESSION:      1.  No ascites.      XR Chest Port 1 View   Final Result   IMPRESSION:       Lung volumes are low. No focal airspace opacities, pleural effusions, or pneumothorax. Pulmonary vascularity is within normal limits. Stable cardiomediastinal silhouette.      CT Head w/o Contrast   Final Result   IMPRESSION:   1.  No acute intracranial process.   2.  Mild global parenchymal volume loss, advanced for age.         Laboratory:  Labs Ordered and Resulted from Time of ED Arrival to Time of ED Departure   COMPREHENSIVE METABOLIC PANEL - Abnormal       Result Value    Sodium 134 (*)     Potassium 3.8      Carbon Dioxide (CO2) 20 (*)     Anion Gap 13      Urea Nitrogen 10.8      Creatinine 0.90      GFR Estimate >90      Calcium 9.5      Chloride 101      Glucose 107 (*)     Alkaline  Phosphatase 140       (*)     ALT 49      Protein Total 6.4      Albumin 3.1 (*)     Bilirubin Total 11.4 (*)    AMMONIA - Abnormal    Ammonia 146 (*)    CBC WITH PLATELETS AND DIFFERENTIAL - Abnormal    WBC Count 3.9 (*)     RBC Count 1.85 (*)     Hemoglobin 6.7 (*)     Hematocrit 18.8 (*)      (*)     MCH 36.2 (*)     MCHC 35.6      RDW 22.6 (*)     Platelet Count 117 (*)     % Neutrophils 54      % Lymphocytes 21      % Monocytes 21      % Eosinophils 2      % Basophils 1      % Immature Granulocytes 1      NRBCs per 100 WBC 0      Absolute Neutrophils 2.1      Absolute Lymphocytes 0.8      Absolute Monocytes 0.8      Absolute Eosinophils 0.1      Absolute Basophils 0.0      Absolute Immature Granulocytes 0.0      Absolute NRBCs 0.0     RBC AND PLATELET MORPHOLOGY - Abnormal    Platelet Assessment        Value: Automated Count Confirmed. Platelet morphology is normal.    Acanthocytes Moderate (*)     Pretty Rods        Basophilic Stippling        Bite Cells        Blister Cells        [place] Cells Moderate (*)     Elliptocytes        Hgb C Crystals        Romo-Jolly Bodies        Hypersegmented Neutrophils        Polychromasia        RBC agglutination        RBC Fragments Slight (*)     Reactive Lymphocytes        Rouleaux        Sickle Cells        Smudge Cells        Spherocytes        Stomatocytes        Target Cells        Teardrop Cells        Toxic Neutrophils        RBC Morphology Confirmed RBC Indices     INR - Abnormal    INR 2.16 (*)    ETHYL ALCOHOL LEVEL - Normal    Alcohol ethyl <0.01     INFLUENZA A/B, RSV, & SARS-COV2 PCR - Normal    Influenza A PCR Negative      Influenza B PCR Negative      RSV PCR Negative      SARS CoV2 PCR Negative     ROUTINE UA WITH MICROSCOPIC REFLEX TO CULTURE   GLUCOSE MONITOR NURSING POCT   TYPE AND SCREEN, ADULT    ABO/RH(D) A POS      Antibody Screen Negative      SPECIMEN EXPIRATION DATE 20240420235900     PREPARE RED BLOOD CELLS (UNIT)    Blood Component  Type Red Blood Cells      Product Code [id number]      Unit Status Transfused      Unit Number [id number]      CROSSMATCH Compatible      CODING SYSTEM [id number]      ISSUE DATE AND TIME [id number]      UNIT ABO/RH A+      UNIT TYPE ISBT 6200     PREPARE RED BLOOD CELLS (UNIT)   BLOOD CULTURE   BLOOD CULTURE   TRANSFUSE RED BLOOD CELLS (UNIT)   ABO/RH TYPE AND SCREEN      Procedures   None    Emergency Department Course & Assessments:    Interventions:  Medications   lidocaine 1 % 0.1-1 mL (has no administration in time range)   lidocaine (LMX4) cream (has no administration in time range)   sodium chloride (PF) 0.9% PF flush 3 mL (3 mLs Intracatheter Not Given 4/17/24 1811)   sodium chloride (PF) 0.9% PF flush 3 mL (has no administration in time range)   cefTRIAXone (ROCEPHIN) 2 g vial to attach to  ml bag for ADULTS or NS 50 ml bag for PEDS (2 g Intravenous $New Bag 4/17/24 2023)   Daily 2 GRAM acetaminophen limit, unless fulminent liver failure or transaminases greater than or equal to 300 - 400, then none (has no administration in time range)   rifaximin (XIFAXAN) tablet 550 mg (550 mg Oral Not Given 4/17/24 2149)   thiamine (B-1) tablet 100 mg ( Oral See Alternative 4/17/24 2006)     Or   thiamine (B-1) injection 100 mg (100 mg Intravenous $Given 4/17/24 2006)   lidocaine 1 % 0.1-1 mL (has no administration in time range)   lidocaine (LMX4) cream (has no administration in time range)   sodium chloride (PF) 0.9% PF flush 3 mL (3 mLs Intracatheter $Given 4/17/24 2130)   sodium chloride (PF) 0.9% PF flush 3 mL (has no administration in time range)   lactulose (CHRONULAC) solution 20 g (20 g Oral Not Given 4/17/24 2148)   lactulose (CHRONULAC) 200 g in sterile water (bottle) rectal enema (has no administration in time range)   lactulose (CHRONULAC) solution 20 g (20 g Oral $Given 4/17/24 1640)   PRBCs: 1 unit IV    Assessments:  3782 Initial Examination    Independent Interpretation (X-rays, CTs, rhythm  strip):  Head CT was reviewed, no obvious intracranial hemorrhage appreciated    Consultations/Discussion of Management or Tests:  Dr. Crocker from the hospitalist service was consulted in agreement with admission       Social Determinants of Health affecting care:   None    Disposition:  The patient was admitted to the hospital under the care of Dr. Crocker.     Impression & Plan      Medical Decision Making:  Crispin Ham is a 39 year old male presenting with jaundice and confusion consistent with hepatic encephalopathy.  Patient has no ascites noted on ultrasound from yesterday.  I doubt SBP.  He received lactulose in the emergency department and I believe he benefit from hospitalization.  He is also anemic however this is a stable issue for the patient.  He received 1 unit of packed red blood cells here and remained hemodynamically stable.  He will be admitted to the care of the hospitalist team for further evaluation and treatment.    Diagnosis:    ICD-10-CM    1. Hepatic encephalopathy (H)  K76.82       2. Anemia in other chronic diseases classified elsewhere  D63.8            Discharge Medications:  New Prescriptions    No medications on file      Scribe Disclosure:  I, Jose Arellano, am serving as a scribe at 3:49 PM on 4/17/2024 to document services personally performed by Anirudh Peterson MD based on my observations and the provider's statements to me.     4/17/2024   Anirudh Peterson MD Trigger, Brandon Thomas, MD  04/17/24 7248

## 2024-04-17 NOTE — H&P
[facility]    History and Physical - Hospitalist Service       Date of Admission:  4/17/2024    Assessment & Plan      This is a 27-year-old male with history of alcohol use, chronic liver cirrhosis, history of SBP, substance abuse, hypertension, chronic heart failure with preserved ejection fraction, history of pleural effusion status thoracentesis who was recently admitted to [facility] from 3/12-3/23 where he was diagnosed with anemia of chronic disease and received multiple blood transfusion and underwent EGD which showed esophageal varices and during that hospital stay patient was also evaluated by the hematology team and was discharged and was again seen in the ED on 4/9 and hemoglobin was 6.1 and got 1 unit of blood and presented to the hospital with altered mental status and jaundice    Hepatic encephalopathy  Alcohol induced chronic cirrhosis  History of SBP  Grade one small esophageal varix  -On admission presented with altered mental status and jaundice  -He does have known history of cirrhosis and has seen GI in the past and history of SBP in the past also and EGD in March 2024 showed mild esophageal varices  -Patient denies any abdominal discomfort, no fever, no chills and denies any urinary complaints  -Ultrasound abdomen done on 4/16 as outpatient by his primary care physician did not show any evidence of ascites  -Ammonia is elevated at 146 with bilirubin of 11.4  -COVID-19, influenza A, influenza B and RSV negative  -On exam patient is sleepy and has flapping tremors and is AO x 1-2 and I could not appreciate any significant abdominal distention and abdomen is significantly soft and there is no guarding or rigidity and he is sleepy but will respond and has jaundice all over and is able to protect his airway  -CT scan of the head did not show any acute process  -UA has been ordered  -Chest x-ray on 4/17 shows low lung volume and no focal airspace opacities, pleural  effusion or pneumothorax  -Patient was given 20 g of lactulose in the ED  -BUN is also normal indicating against GI bleed and he has no melena, hematemesis or hematochezia  -Most likely etiology at this time is continued alcohol use and or noncompliance  -Of note if patient is not able to take oral then rectal lactulose enema will need to be ordered  -Ultrasound of the abdomen does not show any evidence of ascites  -Will continue with lactulose 20 g every 3-4 times a day to titrate for 2-3 bowel movements, rifaximin 500 twice daily,start him empirically on ceftriaxone   -Neurochecks every 4 hours  -Monitor closely in [facility]  -[facility] consult      Acute on chronic anemia  Hypertensive portal gastropathy  History of frequent epistaxis  History of duodenitis/ulcer and 9/2023  -Patient during last hospitalization received multiple units of blood transfusions  -He had extensive workup done very recently in March 2024  -Iron level was elevated at 130 but he received multiple units of blood  -B12 and folate levels not  -Mildly elevated LDH of 264  -Haptoglobin less than 10  -Reticulocyte count of 2.7 per  -Occult blood was positive  -Peripheral smear reveals normochromic normocytic anemia and moderate thrombocytopenia, no schistocytes and no morphological features of hemolysis  -EGD done on 3/24 showed grade 1, small esophageal varices without any evidence of bleeding and portal hypertensive gastropathy  -CT scan of the chest abdomen and pelvis did not show any hematoma and there was mild gallbladder wall thickening  -As per hematology team they thought that his chronic anemia is likely due to liver cirrhosis, splenomegaly, mild hemolysis and bone marrow suppression by chronic alcohol use  -Discharge hemoglobin on 3/22 was 7.5 and his repeat hemoglobin on 4/9 was 6.1 and he received 1 unit of blood on 4/9  - Hemoglobin on 4/17 is 6.7  -ER ordered 1 unit of blood  -There is no any active bleeding with no melena, hematemesis or  hematochezia  -Will check hemoglobin every 12 hours  -Plan for transfusion for hemoglobin less than 7  -[facility] again consulted    Chronic thrombocytopenia  -Baseline platelets fluctuate between 60s to 80s  -Platelet count is stable at 117  -Continue to monitor      Mild hyponatremia  -Sodium is 134 and this can be due to underlying cirrhosis and due to hypervolemia and continue to monitor      Severe alcoholic hepatitis  Severe hyperbilirubinemia from underlying liver disease  History of alcohol withdrawal  History of alcohol dependence  -His alcohol level is negative  -Patient denies drinking alcohol  -Will continue the patient with multivitamin, thiamine and folic acid and hold the further use of gabapentin again during this hospital stay due to encephalopathy  -Will order thiamine    History of chronic heart failure with preserved ejection fraction  -Echo 11/16/23 with EF 65-70%, no RWMA or any significant valvular disease   -Will hold diuretics for now      Goals of care conversation  CODE STATUS  Family communication  -I had detailed discussion with his mother and father and patient cannot make decisions for himself and they are aware that he is critically ill and his prognosis is guarded but they still want him to be full code.          Diet:  NPO for now   DVT Prophylaxis: Pneumatic Compression Devices  Bosch Catheter: Not present  Lines: None     Cardiac Monitoring: None  Code Status: Full code    Clinically Significant Risk Factors Present on Admission           # Hypercalcemia: corrected calcium is >10.1, will monitor as appropriate    # Hypoalbuminemia: Lowest albumin = 3.1 g/dL at 4/17/2024  3:03 PM, will monitor as appropriate  # Coagulation Defect: INR = 2.16 (Ref range: 0.85 - 1.15) and/or PTT = N/A, will monitor for bleeding  # Thrombocytopenia: Lowest platelets = 117 in last 2 days, will monitor for bleeding        # Obesity: Estimated body mass index is 36.62 kg/m  as calculated from the  following:    Height as of 4/9/24: 1.829 m (6').    Weight as of 4/9/24: 122.5 kg (270 lb).       # Financial/Environmental Concerns:           Disposition Plan     Medically Ready for Discharge: Anticipated in 2-4 Days           Francheska Crocker MD  Hospitalist Service  [facility]  Securely message with [employer] (more info)  Text page via [employer] [facility] Paging/Directory     Patient is critically ill and his prognosis is guarded    I am on admitting shift and his care in the morning will be taken over by hospital medicine team    Will also sign out to the night team  ______________________________________________________________________    Chief Complaint     Altered mental status    History is obtained from the patient    History of Present Illness     Crispin Ham is a 39 year old male who has medical history which includes cirrhosis, esophageal varices who has been recently admitted to [facility] from 3/12 and was discharged on 3/23 where he was admitted for acute on chronic anemia, cirrhosis due to severe alcohol use and underwent EGD on 3/16/2024 which showed grade 1 and small esophageal varices and portal hypertensive gastropathy and during that hospitalization patient received multiple blood transfusions and iron studies, B12 and folate did not suggest any deficiencies and his hemoglobin on day of discharge was 7.5 and was recently seen on 4/9 in the ED with weakness and hemoglobin was 6.9 and patient was given a unit of blood and he saw his primary care physician on 4/16 and ultrasound was done which did not show any evidence of bursitis and patient was brought in from [facility] today with more confusion and jaundiced.  As per discussion with the ED physician patient is in and out but denied any belly pain, no urinary complaints and was able to take lactulose.  He denies any melena, hematemesis, hematochezia.  He denies any abdominal distention.      Lab work done in the ED  showed sodium of 134, potassium of 3.8, chloride 101, bicarb of 20, BUN of 10.8, creatinine of 0.90 with GFR more than 90, albumin of 3.1, alkaline phosphatase of 140, ALT of 49, AST of 122 with ammonia level of 146 and total bilirubin of 11.4 with blood glucose of 107.      WBC count of 3.9, hemoglobin of 6.7 with platelet count of 117  INR is 2.16    CT scan of the head was done which does not show any acute intracranial process and mild global parenchymal volume loss    Last EGD done on 3/16/2021 showed grade 1 and small esophageal varices, portal hypertensive gastropathy      UA and COVID-19, influenza and RSV was ordered and ED physician also ordered 20 g of lactulose along with 1 unit of blood and he will be admitted to [facility]    I had detailed discussion with his parents including mother and father and he lives with his parents and father told me that patient has been slow and even this morning patient had difficult time getting into the car but was able to get out of fairly relatively easy while going to work and they did mention that they have found alcohol at his house and might have been drinking likely on Monday.  They did tell me that they have lost 1 son due to alcohol/cirrhosis issues about couple of months back.  They are aware that he is critically ill.  They are not sure if he is taking medications or not.      Past Medical History    Past Medical History:   Diagnosis Date    Alcohol abuse     Hypertension     Substance abuse (H)        Past Surgical History   Past Surgical History:   Procedure Laterality Date    COLONOSCOPY      EGD    ESOPHAGOSCOPY, GASTROSCOPY, DUODENOSCOPY (EGD), COMBINED N/A 3/16/2024    Procedure: ESOPHAGOGASTRODUODENOSCOPY;  Surgeon: Chato Juan MD;  Location:  OR    ORTHOPEDIC SURGERY      wrist  surgery       Prior to Admission Medications   Prior to Admission Medications   Prescriptions Last Dose Informant Patient Reported? Taking?   Vitamin D, Cholecalciferol, 10 MCG  (400 UNIT) TABS  Self Yes No   Sig: Take 1 tablet by mouth daily   ciprofloxacin (CIPRO) 500 MG tablet   No No   Sig: Take 1 tablet (500 mg) by mouth every 24 hours   folic acid (FOLVITE) 1 MG tablet  Self No No   Sig: Take 1 tablet (1 mg) by mouth daily   furosemide (LASIX) 20 MG tablet   No No   Sig: Take 1 tablet (20 mg) by mouth daily   lactulose (CHRONULAC) 10 GM/15ML solution  Self No No   Sig: Take 30 mLs (20 g) by mouth 2 times daily Goal 2 - 3 soft bowel movements per day.   magnesium oxide (MAG-OX) 400 MG tablet  Self No No   Sig: Take 1 tablet (400 mg) by mouth daily   Patient taking differently: Take 400 mg by mouth at bedtime   menthol (ICY HOT) 5 % PTCH  Self Yes No   Sig: Apply 1 patch topically every 8 hours as needed for muscle soreness   multivitamin, therapeutic (THERA-VIT) TABS tablet  Self Yes No   Sig: Take 1 tablet by mouth daily   pantoprazole (PROTONIX) 40 MG EC tablet  Self No No   Sig: Take 1 tablet (40 mg) by mouth 2 times daily   rifaximin (XIFAXAN) 550 MG TABS tablet  Self No No   Sig: Take 1 tablet (550 mg) by mouth 2 times daily   thiamine (B-1) 100 MG tablet  Self No No   Sig: Take 1 tablet (100 mg) by mouth daily   traZODone (DESYREL) 50 MG tablet  Self Yes No   Sig: Take 50 mg by mouth nightly as needed for sleep      Facility-Administered Medications: None           Physical Exam   Vital Signs: Temp: 98.3  F (36.8  C) Temp src: Oral BP: 98/60 Pulse: 96   Resp: 14 SpO2: 100 %      Weight: 0 lbs 0 oz        General: AO x 1, lethargic but follows commands  HEENT: Head is atraumatic, normocephalic.  Pupils are equal, round and reactive to light.  Dried blood around both nares  Neck: Neck is supple  Respiratory: Lungs are clear to auscultation bilaterally with no wheeze or crackles   Cardiovascular: Regular rate , S1 and S2 normal with no murmer or rubs or gallops and has bilateral edema over both lower extremity  Abdomen:   soft , non tender , non distended and bowel sound present    Skin: Significant jaundice is present  Neurologic: No facial droop  Musculoskeletal:   Psychiatric: cooperative      Medical Decision Making       Time spent in care of patient is 80 minutes and I reviewed labs including basic metabolic panel, hepatic panel, CBC, CT scan of the head and discussed plan of care in detail with the patient, nursing staff, family, Dr. Rodríguez from the ED patient will be admitted to [facility] in critically ill state with guarded prognosis      Data     I have personally reviewed the following data over the past 24 hrs:    3.9 (L)  \   6.7 (LL)   / 117 (L)     134 (L) 101 10.8 /  107 (H)   3.8 20 (L) 0.90 \     ALT: 49 AST: 102 (H) AP: 140 TBILI: 11.4 (H)   ALB: 3.1 (L) TOT PROTEIN: 6.4 LIPASE: N/A     INR:  2.16 (H) PTT:  N/A   D-dimer:  N/A Fibrinogen:  N/A       Imaging results reviewed over the past 24 hrs:   Recent Results (from the past 24 hour(s))   CT Head w/o Contrast    Narrative    EXAM: CT HEAD W/O CONTRAST  LOCATION: [facility]  DATE: 4/17/2024    INDICATION: Altered mental status  COMPARISON: None.  TECHNIQUE: Routine CT Head without IV contrast. Multiplanar reformats. Dose reduction techniques were used.    FINDINGS:  INTRACRANIAL CONTENTS: No intracranial hemorrhage, extraaxial collection, or mass effect.  No CT evidence of acute infarct. Normal parenchymal attenuation. Mild generalized volume loss. No hydrocephalus.     VISUALIZED ORBITS/SINUSES/MASTOIDS: No intraorbital abnormality. No significant paranasal sinus mucosal disease. No middle ear or mastoid effusion.    BONES/SOFT TISSUES: No acute abnormality.      Impression    IMPRESSION:  1.  No acute intracranial process.  2.  Mild global parenchymal volume loss, advanced for age.

## 2024-04-18 NOTE — PROVIDER NOTIFICATION
"Paged to MD [place]:    \"Pt ammonia 218 - overnight provider told overnight nurse to hold lactulose due to patient having 6+ BMs, do you still want me to hold lactulose with ammonia that high?\"    \"No give lactulose\"    \"Can we get an order for a rectal tube since he has had 6+ watery stools?\"    \"Yes\"  "

## 2024-04-18 NOTE — PROGRESS NOTES
[facility] RESPIRATORY NOTE        Date of Admission: 4/17/2024    Date of Intubation (most recent): 4/18/24    Reason for Mechanical Ventilation:Airway protection    Number of Days on Mechanical Ventilation: 1    Met Criteria for Spontaneous Breathing Trial: No    Reason for No Spontaneous Breathing Trial: Per MD    Significant Events Today: Pt was intubated earlier today for airway protection.    ABG Results:   Recent Labs   Lab 04/18/24  1522 04/18/24  1027   PH 7.51*  --    PCO2 17*  --    PO2 157*  --    HCO3 14*  --    O2PER 30 0         Current Vent Settings: Vent Mode: CMV/AC  (Continuous Mandatory Ventilation/ Assist Control)  FiO2 (%): 30 %  Resp Rate (Set): 16 breaths/min  Tidal Volume (Set, mL): 500 mL  PEEP (cm H2O): 5 cmH2O  Resp: 18      Skin Assessment: There are scabs on the lips which were present prior to intubation. Bedside RN aware.    Plan: Will cont full vent support for now and will assess for weaning readiness daily.    Christiana Williamson RT on 4/18/2024 at 5:37 PM

## 2024-04-18 NOTE — PROCEDURES
[facility]    Intubation    Date/Time: 4/18/2024 12:25 PM    Performed by: Aaron Rosen DO  Authorized by: Aaron Rosen DO  Indications: airway protection  Intubation method: video-assisted      UNIVERSAL PROTOCOL   Site Marked: NA  Prior Images Obtained and Reviewed:  Yes  Required items: Required blood products, implants, devices and special equipment available    Patient identity confirmed:  Arm band and hospital-assigned identification number  Patient was reevaluated immediately before administering moderate or deep sedation or anesthesia  Confirmation Checklist:  Patient's identity using two indicators, relevant allergies, procedure was appropriate and matched the consent or emergent situation and correct equipment/implants were available  Time out: Immediately prior to the procedure a time out was called    Universal Protocol: the Joint Commission Universal Protocol was followed      Patient status: paralyzed (RSI)  Preoxygenation: nonrebreather mask  Pretreatment medications: midazolam  Paralytic: rocuronium  Sedatives: etomidate  Laryngoscope size: Mac 3  Tube size: 8.0 mm  Tube type: cuffed  Number of attempts: 2  Ventilation between attempts: BVM  Cricoid pressure: no  Cords visualized: yes  Post-procedure assessment: chest rise and colorimetric ETCO2  Breath sounds: equal  Cuff inflated: yes  ETT to teeth: 25 cm  Chest x-ray interpreted by radiologist.  Chest x-ray findings: endotracheal tube in appropriate position  Tube secured with: ETT acevedo      PROCEDURE    Length of time physician/provider present for 1:1 monitoring during sedation: 10      Dr. Sandhu was present and available for the entirety of the above procedure.    Aaron Rosen DO  Surgical Critical Care Fellow

## 2024-04-18 NOTE — CONSULTS
[facility] - Digestive [facility] Consultation     Crispin Ham  [address]  [facility] [id number]  39 year old male     Admission Date/Time: 4/17/2024  Primary Care Provider: Blake Canchola  Referring / Attending Physician:  [place]     We were asked to see the patient in consultation by Dr. Garibay for evaluation of hepatic encephalopathy.    ASSESSMENT:    Decompensated etoh cirrhosis, complicated by h/o SBP, presumably multifactorial anemia, admitted for hepatic encephalopathy, stage 3-4.  Worsening encephalopathy most likely related to noncompliance with lactulose but could be related to GI bleed, though there is minimal evidence for acute GI blood loss.  No obvious source of infection, hypokalemia, renal failure to drive encephalopathy.  No ascites was seen on his ultrasound, thus paracentesis not likely helpful.    Carmen Discriminant Function:70.7  Severe disease ?32    [place] Alcoholic Hepatitis Score:8  Severe disease ?9    MELD: 23.8  Severe disease ?21    Child-Schwartz Grade(score):C11    RECOMMENDATIONS:  -Consider prophylactic intubation for airway protection given the severity of encephalopathy  -NG tube for additional lactulose   -Low threshold for upper endoscopy, currently contraindicated by the severity of his encephalopathy  -Agree with empiric ceftriaxone, continue rifaximin  -PRBCs as needed, goal hemoglobin greater than 7  -Given MDF, we will consider steroids if GI bleeding is ruled out with EGD in the next 24 hrs.   -PPI therapy added  -Consider enteric stool panel to rule out other infectious causes of diarrhea/failure to improve with lactulose  -Daily MELD labs    Discussed with Dr. Garibay    Total time spent in chart review, direct medical discussion, examination, and documentation was 30 minutes    Hiren Vogt DO   [facility] - Digestive Health  Office [phone]    ________________________________________________________________________        CC: Altered mental status     HPI:  Crispin KUMAR  Jitendra is a 39 year old male who we are asked to see for worsening encephalopathy in the setting of alcoholic liver disease.    The patient was just seen in our liver clinic on the eighth.  At that time he was apparently sober but at the time of this admission his parents raised concern for active drinking.  Patient did apparently have a number of stools overnight with lactulose however his mental status has declined further since admission.  He is not able to immediately participate in an interview today and on exam was difficult to arouse even with noxious stimuli.    From Liver clinic 4/8/24 note:  -Cipro 500mg daily for SBP prophylaxis (switched from Bactrim)  -Xifaxan 550mg BID and Lactulose 30mL BID for hepatic encephalopathy  -Lasix 20mg BID (Spironolactone discontinued at last hospitalization due to hyperK)  -Protonix 40mg BID  -Thiamine, Folic Acid, Magnesium and Multivitamin supplementation    Hospitalization 3/12/24-3/23/24:  -Initial 3/13/24 MELD-Na score 32 on admission  -3/16/24 EGD showed portal hypertensive gastropathy grade 1 and small varices with no stigmata of bleeding. Consultation note suggests pillcam as an outpatient. On chart review, no record of colonoscopy done.  -3/18/24 CT Chest Abdomen Pelvis showed distended gallbladder with wall thickening. Splenomegaly. Small pleural effusion and questionable pneumonia.  -3/18/24 US showed distended gallbladder with wall thickening. Heterogeneous echotexture of liver. No biliary duct dilation.  -3/20/24 HIDA negative for cholecystitis or biliary dyskinesia  -Last set of labs at [facility] 3/21-3/22 notable for HGB 7.5L, INR 2.37H, PLT 86L, Glucose 152H, [id number]    Hospitalization 11/17/23 - 11/24/23:  -Paracentesis 11/9/23 revealed SBP w/ culture showing Propionibacterium acne- questionable contaminant. Received IV Ceftriazone, IV albumin, IV diuretics.  -Subsequent paracentesis did not show SBP.    Hospitalization Sept 2023:  -Found to have alcoholic  hepatitis with MELD-NA of 33. Noted to have portal HTN, splenomegaly and ascites  -EGD showed esophageal polyp. This was removed and he developed subsequent mucosal tear with bleeding, which was cauterized and clipped.         ROS: A comprehensive ten point review of systems was negative aside from those in mentioned in the HPI.      PAST MEDICAL HISTORY:  Patient Active Problem List    Diagnosis Date Noted    Hepatic encephalopathy (H) 04/17/2024     Priority: Medium    Anemia in other chronic diseases classified elsewhere 04/17/2024     Priority: Medium    Hyponatremia 03/12/2024     Priority: Medium    Acute kidney injury (H24) 03/12/2024     Priority: Medium    Liver disease 11/16/2023     Priority: Medium    SBP (spontaneous bacterial peritonitis) (H) 11/16/2023     Priority: Medium    Liver failure (H) 09/25/2023     Priority: Medium    Septic shock (H) 09/08/2023     Priority: Medium    Acute renal failure, unspecified acute renal failure type (H24) 09/08/2023     Priority: Medium    Acute liver failure without hepatic coma 09/08/2023     Priority: Medium    Anemia, unspecified type 09/08/2023     Priority: Medium       SOCIAL HISTORY:  Social History     Tobacco Use    Smoking status: Never   Substance Use Topics    Alcohol use: Yes     Comment: 1.75L per day    Drug use: Not Currently       FAMILY HISTORY:  No family history on file.    ALLERGIES:   Allergies   Allergen Reactions    Excedrin Extra Strength [Aspirin-Acetaminophen-Caffeine]      puffy eyes and dry throat a few years ago. Tolerates ibuprofen and acetaminophen, but avoids aspirin    Nsaids Angioedema     Patient reports having a reaction of puffy eyes and dry throat a few years ago, but he has taken Advil in 2023 and did not react.     MEDICATIONS:   Current Facility-Administered Medications   Medication Dose Route Frequency Provider Last Rate Last Admin    cefTRIAXone (ROCEPHIN) 2 g vial to attach to  ml bag for ADULTS or NS 50 ml bag  for PEDS  2 g Intravenous Q24H Francheska Crocker MD   2 g at 04/17/24 2023    Daily 2 GRAM acetaminophen limit, unless fulminent liver failure or transaminases greater than or equal to 300 - 400, then none   Does not apply Continuous PRN Francheska Crocker MD        lactulose (CHRONULAC) 200 g in sterile water (bottle) rectal enema  200 g Rectal Q4H Francheska Crocker MD   200 g at 04/18/24 0319    lactulose (CHRONULAC) solution 20 g  20 g Oral TID Francheska Crocker MD        lidocaine (LMX4) cream   Topical Q1H PRN Francheska Crocker MD        lidocaine (LMX4) cream   Topical Q1H PRN Francheska Crocker MD        lidocaine 1 % 0.1-1 mL  0.1-1 mL Other Q1H PRN Francheska Crocker MD        lidocaine 1 % 0.1-1 mL  0.1-1 mL Other Q1H PRN Francheska Crocker MD        rifaximin (XIFAXAN) tablet 550 mg  550 mg Oral BID Francheska Crocker MD        sodium chloride (PF) 0.9% PF flush 3 mL  3 mL Intracatheter Q8H Francheska Crocker MD   3 mL at 04/18/24 0320    sodium chloride (PF) 0.9% PF flush 3 mL  3 mL Intracatheter q1 min prn Francheska Crocker MD        sodium chloride (PF) 0.9% PF flush 3 mL  3 mL Intracatheter Q8H Francheska Crocker MD   3 mL at 04/17/24 2130    sodium chloride (PF) 0.9% PF flush 3 mL  3 mL Intracatheter q1 min prn Francheska Crocker MD        thiamine (B-1) tablet 100 mg  100 mg Oral Daily Francheska Crocker MD        Or    thiamine (B-1) injection 100 mg  100 mg Intravenous Daily Francheska Crocker MD   100 mg at 04/17/24 2006       PHYSICAL EXAM:   BP 91/41 (BP Location: Left arm, Patient Position: Semi-Gibson's, Cuff Size: Adult Regular)   Pulse 118   Temp 97.3  F (36.3  C) (Axillary)   Resp 16   SpO2 99%    GEN: Supine, minimally responsive to noxious stim.  ABIDA: icteric, OP without erythema, exudate, or ulcers.    ABD: ND, +BS  NEURO: CN grossly intact     ADDITIONAL DATA:   I reviewed the patient's new clinical lab test results.   Recent Labs   Lab Test 04/18/24  0619 04/17/24  1503 04/09/24  1545 04/09/24  1439 03/22/24  0926 03/21/24  0958   WBC  5.1 3.9*  --  3.3*  --   --    HGB 7.2* 6.7*  --  6.1*   < >  --     102*  --  101*  --   --    * 117*  --  118*  --   --    INR  --  2.16* 1.95*  --   --  2.37*    < > = values in this interval not displayed.     Recent Labs   Lab Test 04/18/24  0619 04/17/24  1503 04/09/24  1439   POTASSIUM 3.6 3.8 3.5   CHLORIDE 105 101 100   CO2 21* 20* 22   BUN 12.1 10.8 21.5*   ANIONGAP 11 13 11     Recent Labs   Lab Test 04/18/24 0619 04/17/24  1503 04/09/24  1439 11/17/23  0536 11/16/23  1514 09/26/23  0724 09/25/23  1646 09/25/23  1537 09/25/23  1424 09/09/23  1119 09/08/23  1224 09/08/23  1147   ALBUMIN 2.8* 3.1* 3.1*   < > 3.1*   < >  --   --  2.4*   < >  --  2.3*   BILITOTAL 10.1* 11.4* 8.5*   < > 8.4*   < >  --   --  21.7*   < >  --  10.4*   ALT 43 49 55   < > 49   < >  --   --  69   < >  --  82*   AST 89* 102* 151*   < > 222*   < >  --    < >  --    < >  --  331*   PROTEIN  --   --   --   --  100*  --  30*  --   --   --  50*  --    LIPASE  --   --   --   --   --   --   --   --  237*  --   --  272*    < > = values in this interval not displayed.        I reviewed the patient's new imaging results.

## 2024-04-18 NOTE — PROGRESS NOTES
Patient admitted yesterday evening by my colleague, he was admitted for hepatic encephalopathy, overnight patient became less responsive, examined the patient today morning, he was barely responding to questions, RRT was initiated, discussed with GI, they were concerned about him able to managing his airway, with ongoing stage III hepatic encephalopathy plan was made to electively intubate the patient, I ordered transfer of care to ICU, his vitals were stable, ABG was ordered, discussed with intensivist, they will assume care for this patient.  This was discussed with patient's family (mother updated), nursing notified.  RRT team was present.total time spend 30 min.

## 2024-04-18 NOTE — PROGRESS NOTES
[facility] neuros. Pt obtunded, arousing to painful stimuli, glascow score of 8. NPO. Rectal tube placed with watery output. Incotinent of urine, bladder scanned for 83. PIV SL in L hand. Tele: ST with PVCs. Hypotensive (90s/40s) on RA - tachy up to 120s. Skin: jaundiced, groin rash, coccyx wound with mepilex, nostrils bloody, R upper lip laceration, and tongue lacerations. BLE lymphedema with +3 edema. Report called and given to ICU RN.

## 2024-04-18 NOTE — PROVIDER NOTIFICATION
"MD Notification    Notified Person: MD    Notified Person Name:Dr. Schwartz-Hospitalist    Notification Date/Time:4/17/2024 at 0026    Notification Interaction:Vocera page    Purpose of Notification:Pt has a STAT UA order, pt didn't urinated. PVR 310ml. Straight cath order was only when PVR >500ml. Do you want me to straight cath to get the UA or wait? Second, pt has a schedule lactulose enema q4h. Pt had 1x enema and had few large loose BM. Do you still want me to give it as per schedule or hold it?Thanks     Orders Received:Ok to hold ,but continue to give lactulose enema.    Comments: \"OK to hold straight cath for now. if not enough residual by 6 am would straight cath for the specimen then. I would proceed with the next lactulose dose to treat his encephalopathy\".  "

## 2024-04-18 NOTE — PLAN OF CARE
Goal Outcome Evaluation:      Plan of Care Reviewed With: patient    Overall Patient Progress: no changeOverall Patient Progress: no change    Outcome Evaluation: Pt remained stable through out the shift.    [id number]  Orientations: Oriented to self, lethargic(open eyes , confused and sitter at bedside  Vitals/Pain: VSS and on RA  Tele: SR  Lines/Drains: 2x PIV on R FA SL  Skin/Wounds: Jaundice skin, incontinent rash at groin, blanchable redness at coccyx, BLE Lymphedema  GI/: Pt received 2x lactulose enema and had multiple watery BM  Labs: Abnormal/Trends, Electrolyte Replacement- Hgb was 6.7 4/17 and received 1Unit of RBC,still needs UA  Ambulation/Assist: Bed bound  Sleep Quality: Intermittent restless  Plan: Continue to give Lactulose, manage AMS

## 2024-04-18 NOTE — OR NURSING
EGD in ICU.  Patient sedated and monitored by CARA Holman.  Procedure tolerated well.  No specimens collected.

## 2024-04-18 NOTE — PHARMACY-VANCOMYCIN DOSING SERVICE
"Pharmacy Vancomycin Initial Note  Date of Service 2024  Patient's  1985  39 year old, male    Indication: Sepsis    Current estimated CrCl = Estimated Creatinine Clearance: 132.8 mL/min (based on SCr of 1.03 mg/dL).    Creatinine for last 3 days  2024:  3:03 PM Creatinine 0.90 mg/dL  2024:  6:19 AM Creatinine 0.92 mg/dL; 10:27 AM Creatinine 1.03 mg/dL    Recent Vancomycin Level(s) for last 3 days  No results found for requested labs within last 3 days.      Vancomycin IV Administrations (past 72 hours)        No vancomycin orders with administrations in past 72 hours.                    Nephrotoxins and other renal medications (From now, onward)      Start     Dose/Rate Route Frequency Ordered Stop    24 0200  vancomycin (VANCOCIN) 1,500 mg in 0.9% NaCl 250 mL intermittent infusion         1,500 mg  over 90 Minutes Intravenous EVERY 12 HOURS 24 1248      24 1300  piperacillin-tazobactam (ZOSYN) 4.5 g vial to attach to  mL bag        Note to Pharmacy: For [facility], [facility] and [facility]: For Zosyn-naive patients, use the \"Zosyn initial dose + extended infusion\" order panel.    4.5 g  over 30 Minutes Intravenous EVERY 6 HOURS 24 1232      24 1300  vancomycin (VANCOCIN) 2,000 mg in 0.9% NaCl 500 mL intermittent infusion         2,000 mg  over 2 Hours Intravenous ONCE 24 1248              Contrast Orders - past 72 hours (72h ago, onward)      None            [employer] Prediction of Planned Initial Vancomycin Regimen  Loading dose: 2000 mg at 13:00 2024.  Regimen: 1500 mg IV every 12 hours.  Start time: 01:00 on 2024  Exposure target: AUC24 (range)400-600 mg/L.hr   AUC24,ss: 548 mg/L.hr  Probability of AUC24 > 400: 81 %  Ctrough,ss: 17.7 mg/L  Probability of Ctrough,ss > 20: 40 %  Probability of nephrotoxicity (Lodise DANAE ): 14 %          Plan:  Start vancomycin  2000 mg IV load and then 1500 mg q12h.   Vancomycin monitoring method: AUC  Vancomycin " therapeutic monitoring goal: 400-600 mg*h/L  Pharmacy will check vancomycin levels as appropriate in 1-3 Days.    Serum creatinine levels will be ordered daily for the first week of therapy and at least twice weekly for subsequent weeks.      Lizzie Walton RPH

## 2024-04-18 NOTE — PROGRESS NOTES
House NP note    I was asked by hospitalist attending MD to eval pt for adequacy of airway protection.  Hospitalist MD had been called by gastroenterologist concern for loss of protective airway reflexes in the setting of stage III-IV hepatic encephalopathy    HPI 39-year-old man with PMH of alcohol use disorder, chronic decompensated cirrhosis with history of SBP, HFpEF, hypertension, pleural effusions previously requiring paracentesis, small esophageal varices present on 3/16/2024.  He was admitted 4/17/24 w/ worsening encephalopathy.  CT head in ED neg.     Focused exam:   GCS:   Motor 4=Withdraws from pain   Verbal 2=Incomprehensible speech   Eye Opening 2=To pain   Total: 8     Constitutional: 113 HR, FF 20 SpO2 100% on RA, BP 91/41  General:  adult pt lying in bed without acute distress, jaundiced  Neuro: does not follows commands to wiggle toes  Eyes deer  Head, ENT & mouth: NC/AT,  dry oral mucosa  Neck: supple  CV: ST on tele  resp: defer  gi:obese  Ext: anasarca of bilat LE  Skin: no rashes on exposed skin, jaundice    Impression  Acute encephalopathy likely all related to hepatic encephalopathy, hyperammonemia  - Glucose 100 mg/dL  - GCS as above  -abg as per hospitalist MD  - Agree that transferring patient to ICU for elective intubation is appropriate w/ above gcs particularly if EGD is needed near future, per [facility] MD will occur 04/18/24 post intubation  -Hospitalist and intensivist attending physicians discussed case at bedside, patient will transfer to ICU  - Intensivist consult placed to assume primary mgmt of pt given anticipated need for intubation  - VAT for additional IV access  -I assisted transferring the patient along with ICU RN and second house NP to ICU on continuous cardiac and SpO2 monitoring    No charge    MARILYN White CNP  Hospitalist Service  [facility]  Securely message with [employer] (more info)  Text page via [employer] Paging/Directory

## 2024-04-18 NOTE — ED NOTES
Received this patient with altered mental sensorium,ongoing blood transfusion with no reaction noted,increased rate to 100 ml/hr,another iv inserted,additional iv meds given,portable ultrasound done.    Report given to [facility].

## 2024-04-19 NOTE — PROCEDURES
Procedure: Arterial line placement    Consent: From parents       Location: Arterial line placement into the left femoral  artery.    Universal Protocol: Patient Identification was verified, time out was performed, site marking N/A, Imaging data N/A. Full Barrier precaution done: Hands washed, mask, gloves, gown, cap and eye protection all used.    Diagnosis: shock  Indication: Monitoring of BP and frequent  For ABG blood draw in an hemodynamically unstable patient.    Narrative: Area prepped with chlorhexedine and draped in sterile fashion. 1% lidocaine injected subcutaneously for local anesthesia. Arterial catheter inserted using seldinger technique and sutured to the skin. Distal blood flow (Skin color and temperature) preserved and good arterial wave form documented.   Complications: No apparent complication    Procedure performed by Dr Suh on April 18, 2024

## 2024-04-19 NOTE — PROCEDURES
Procedure: Central Line Placement  Consent:   1. Obtained from parents after discussion of risk/benefit/alternatives) and all questions answered to the best of my knowledge.     Universal Protocol:   Patient Identification was verified, time out was performed, site marking N/A, Imaging data N/A. Full Barrier precaution done: Hands washed, mask, gloves, gown, cap and eye protection all used.    Diagnosis: shock  Indication: vasopressors    Narrative: The left femoral vein was identified with portable ultrasound device. Area prepped with chlorhexedine and Patient was head to toe draped. Sterile technique and full barrier precautions used. 1% lidocaine injected subcutaneously for local anesthesia. A triple lumen catheter was inserted using standard Seldinger technique under real time US guidance after careful evaluation of the best site to minimize risk of infection and complication related to insertion. The central line was sutured at 20 cm depth.        Complications: No apparent complication.    Procedure performed by Dr Suh on April 18, 2024

## 2024-04-19 NOTE — PROCEDURES
Procedure: Central Line Placement  Consent:   1. Obtained from parents after discussion of risk/benefit/alternatives) and all questions answered to the best of my knowledge.     Universal Protocol:   Patient Identification was verified, time out was performed, site marking N/A, Imaging data N/A. Full Barrier precaution done: Hands washed, mask, gloves, gown, cap and eye protection all used.    Diagnosis: shock  Indication: vasopressors    Narrative: The left femoral vein was identified with portable ultrasound device. Area prepped with chlorhexedine and Patient was head to toe draped. Sterile technique and full barrier precautions used. 1% lidocaine injected subcutaneously for local anesthesia. A triple lumen catheter was inserted using standard Seldinger technique under real time US guidance after careful evaluation of the best site to minimize risk of infection and complication related to insertion. The central line was sutured at 20 cm depth. Note this site was used after attempting twice in right internal jugular without success in passing wire.        Complications: No apparent complication.    Procedure performed by Dr Suh on April 18, 2024

## 2024-04-19 NOTE — PROGRESS NOTES
[facility] ICU RESPIRATORY NOTE        Date of Admission: 4/17/2024     Date of Intubation (most recent): 4/18/2024    Reason for Mechanical Ventilation: Airway protection     Number of Days on Mechanical Ventilation: 2    Met Criteria for Spontaneous Breathing Trial: No    Reason for No Spontaneous Breathing Trial: Per MD     Significant Events Today: None     ABG Results:   Recent Labs   Lab 04/19/24  0046 04/18/24  1522 04/18/24  1027   PH  --  7.51*  --    PCO2  --  17*  --    PO2  --  157*  --    HCO3  --  14*  --    O2PER 30 30 0         Current Vent Settings: Vent Mode: CMV/AC  (Continuous Mandatory Ventilation/ Assist Control)  FiO2 (%): 30 %  Resp Rate (Set): 14 breaths/min  Tidal Volume (Set, mL): 500 mL  PEEP (cm H2O): 5 cmH2O  Resp: 13      Skin Assessment: Scabs on lips prior to intubation     Plan: Continue full vent support and wean as tolerated.     Ryanne Mayer RT on 4/19/2024 at 1:34 PM

## 2024-04-19 NOTE — PROGRESS NOTES
Critical Care Progress Note      04/19/2024    Name: Crispin Ham MRN#: [id number]   Age: 39 year old YOB: 1985     Hsptl Day# 2  ICU DAY #    MV DAY #             Problem List:   Active Problems:    Hepatic encephalopathy (H)    Anemia in other chronic diseases classified elsewhere           Summary/Hospital Course:     HPI 39-year-old man with PMH of alcohol use disorder, chronic decompensated cirrhosis with history of SBP, HFpEF, hypertension, pleural effusions previously requiring paracentesis, small esophageal varices present on 3/16/2024.  He was admitted 4/17/24 w/ worsening encephalopathy.  CT head in ED neg.     - Transferred to ICU for worsening encephalopathy Intubated for airway protection and will get EGD.     #4/19:   - Follow closely for encephalopathy  -IF he doesn't wake up by Sunday then consider MRI and EEG       Assessment and plan :     Crispin Ham IS a 39 year old male admitted on 4/17/2024 for  Upper gi bleeding   I have personally reviewed the daily labs, imaging studies, cultures and discussed the case with referring physician and consulting physicians.     My assessment and plan by system for this patient is as follows:    Neurology/Psychiatry:   -Hepatic encephalopathy.  - Ammonia. Daily checks. Minmal sedation   -CT head is negative.     Cardiovascular:   - Minimal pressers.     Pulmonary/Ventilator Management:   - Stable vent.  Pleural effusion atelectasis     GI and Nutrition : MELD 3.0: 28 at 4/19/2024  4:58 AM  MELD-Na: 27 at 4/19/2024  4:58 AM  Calculated from:  Serum Creatinine: 1.14 mg/dL at 4/19/2024  4:58 AM  Serum Sodium: 139 mmol/L (Using max of 137 mmol/L) at 4/19/2024  4:58 AM  Total Bilirubin: 11.3 mg/dL at 4/19/2024  4:58 AM  Serum Albumin: 2.8 g/dL at 4/19/2024  4:58 AM  INR(ratio): 2.57 at 4/19/2024  4:58 AM  Age at listing (hypothetical): 39 years  Sex: Male at 4/19/2024  4:58 AM    - Alcoholic liver cirrhosis.  -  EGD did not show any active  bleeding. NO intervention for now.   - PPI  IV BID   Zosyn 4/19   -  Lactulose + Miralax   - Rifaximin.       Renal/Fluids/Electrolytes:   -Stable function for now.   - monitor function and electrolytes as needed with replacement per ICU protocols.  - generally avoid nephrotoxic agents such as NSAID, IV contrast unless specifically required  - adjust medications as needed for renal clearance  - follow I/O's as appropriate.    Infectious Disease:   - Procalcitonin is nefgative.   - Bloood culture poending   -Broad spectrum abx.  Zosyn on 4/18     Endocrine:   - ISS    Hematology/Oncology:   -  Anemia.  -Blood loss anemia.   -  2 units of RBC ordered. CBC Q6 h.   - UGI bleed     MSKL/Rheum:  -  NAd      IV/Access:   1. Venous access -  Left femoral.   2. Arterial access -  NAD      ICU Prophylaxis:   1. DVT: mechanical  2. Stress Ulcer: PPI  3. Restraints: Nonviolent soft two point restraints required and necessary for patient safety and continued cares and good effect as patient continues to pull at necessary lines, tubes despite education and distraction. Will readdress daily.   4. Feeding -  NPO for now.   5. Family Update: Parents at bedside  6. Disposition -  To stay in ICU              Key Medications:     Current Facility-Administered Medications   Medication Dose Route Frequency Provider Last Rate Last Admin    albumin human 5 % injection 25 g  25 g Intravenous Once Jaron Sandhu MD   Held at 04/18/24 1235    chlorhexidine (PERIDEX) 0.12 % solution 15 mL  15 mL Mouth/Throat Q12H Aaron Rosen, DO   15 mL at 04/19/24 0835    dextrose 5% in lactated ringers infusion  1,000 mL Intravenous Once Jaron Sandhu MD        insulin aspart (NovoLOG) injection (RAPID ACTING)  1-7 Units Subcutaneous Q4H Leann Suh MD   1 Units at 04/19/24 1229    lactulose (CHRONULAC) solution 20 g  20 g Oral or Feeding Tube TID Aaron Rosen DO   20 g at 04/19/24 0835    methylPREDNISolone sodium succinate (SOLU-MEDROL)  injection 40 mg  40 mg Intravenous Q24H Kristi Samano PA-C   40 mg at 04/19/24 1239    pantoprazole (PROTONIX) IV push injection 40 mg  40 mg Intravenous Q12H Hiren Vogt DO   40 mg at 04/19/24 1031    piperacillin-tazobactam (ZOSYN) 4.5 g vial to attach to  mL bag  4.5 g Intravenous Q6H Jaron Sandhu MD   4.5 g at 04/19/24 1031    polyethylene glycol (MIRALAX) Packet 34 g  34 g Oral BID Jaron Sandhu MD   34 g at 04/19/24 1031    rifaximin (XIFAXAN) tablet 550 mg  550 mg Oral or Feeding Tube BID Aaron Rosen DO   550 mg at 04/19/24 0835    sodium chloride (PF) 0.9% PF flush 3 mL  3 mL Intracatheter Q8H Francheska Crocker MD   3 mL at 04/19/24 1239    thiamine (B-1) tablet 100 mg  100 mg Oral Daily Francheska Crocker MD   100 mg at 04/19/24 0835    Or    thiamine (B-1) injection 100 mg  100 mg Intravenous Daily Francheska Crocker MD   100 mg at 04/18/24 0930     Current Facility-Administered Medications   Medication Dose Route Frequency Provider Last Rate Last Admin    Daily 2 GRAM acetaminophen limit, unless fulminent liver failure or transaminases greater than or equal to 300 - 400, then none   Does not apply Continuous PRN Francheska Crocker MD        dexmedeTOMIDine (PRECEDEX) 4 mcg/mL in sodium chloride 0.9 % 100 mL infusion  0.1-1.2 mcg/kg/hr Intravenous Continuous Jaron Sandhu MD   Held at 04/18/24 1300    fentaNYL (SUBLIMAZE) infusion   mcg/hr Intravenous Continuous Jaron Sandhu MD 0.5 mL/hr at 04/19/24 1044 25 mcg/hr at 04/19/24 1044    norepinephrine (LEVOPHED) 4 mg in  mL infusion PREMIX  0.01-0.6 mcg/kg/min Intravenous Continuous Leann Suh MD 13.8 mL/hr at 04/19/24 1303 0.03 mcg/kg/min at 04/19/24 1303               Physical Examination:   Temp:  [96.8  F (36  C)-98.6  F (37  C)] 96.8  F (36  C)  Pulse:  [] 92  Resp:  [12-36] 13  BP: ()/() 100/55  MAP:  [1 mmHg-77 mmHg] 1 mmHg  Arterial Line BP: (2-110)/(1-50) 2/1  FiO2 (%):  [30 %] 30 %  SpO2:  [94 %-100 %] 100  %    Intake/Output Summary (Last 24 hours) at 4/18/2024 0958  Last data filed at 4/18/2024 0514  Gross per 24 hour   Intake 103 ml   Output --   Net 103 ml     Wt Readings from Last 4 Encounters:   04/19/24 111.3 kg (245 lb 6 oz)   04/09/24 122.5 kg (270 lb)   03/20/24 103.1 kg (227 lb 6.4 oz)   02/21/24 108.9 kg (240 lb)     Arterial Line BP: (2-110)/(1-50) 2/1  MAP:  [1 mmHg-77 mmHg] 1 mmHg  BP - Mean:  [] 69  Vent Mode: CMV/AC  (Continuous Mandatory Ventilation/ Assist Control)  FiO2 (%): 30 %  Resp Rate (Set): 14 breaths/min  Tidal Volume (Set, mL): 500 mL  PEEP (cm H2O): 5 cmH2O  Resp: 13    Recent Labs   Lab 04/19/24  0046 04/18/24  1522 04/18/24  1027   PH  --  7.51*  --    PCO2  --  17*  --    PO2  --  157*  --    HCO3  --  14*  --    O2PER 30 30 0       GEN: Confused and altered.   HEENT: head ncat, sclera anicteric, OP patent, trachea midline   PULM: unlabored synchronous with vent, clear anteriorly    CV/COR: RRR S1S2 no gallop,  No rub, no murmur  ABD: soft nontender, hypoactive bowel sounds, no mass  EXT:  Edema   warm  NEURO: grossly intact  SKIN: no obvious rash  LINES: clean, dry intact         Data:   All data and imaging reviewed     ROUTINE ICU LABS (Last four results)  CMP  Recent Labs   Lab 04/19/24  1213 04/19/24  0834 04/19/24  0508 04/19/24  0458 04/18/24  2053 04/18/24  1722 04/18/24  1527 04/18/24  1516 04/18/24  1027 04/18/24  0935 04/18/24  0619 04/17/24  2135 04/17/24  1503   NA  --   --   --  139  --  136  --  136 137  --  137  --  134*   POTASSIUM  --   --   --  4.0  --  4.0  --  4.2 3.9  --  3.6  --  3.8   CHLORIDE  --   --   --  105  --  104  --  104 104  --  105  --  101   CO2  --   --   --  23  --  22  --  21* 22  --  21*  --  20*   ANIONGAP  --   --   --  11  --  10  --  11 11  --  11  --  13   * 137* 142* 169*   < > 155*   < > 149* 110*   < > 98   < > 107*   BUN  --   --   --  17.1  --  13.9  --  13.4 12.9  --  12.1  --  10.8   CR  --   --   --  1.14  --  0.98  --   "0.99 1.03  --  0.92  --  0.90   GFRESTIMATED  --   --   --  85  --  >90  --  >90 >90  --  >90  --  >90   MIGUELINA  --   --   --  8.9  --  9.0  --  9.3 9.4  --  9.3  --  9.5   MAG  --   --   --   --   --   --   --  1.4*  --   --   --   --   --    PHOS  --   --   --   --   --   --   --  4.2  --   --   --   --   --    PROTTOTAL  --   --   --  5.8*  --   --   --   --  6.0*  --  5.8*  --  6.4   ALBUMIN  --   --   --  2.8*  --   --   --   --  2.9*  --  2.8*  --  3.1*   BILITOTAL  --   --   --  11.3*  --   --   --   --  10.3*  --  10.1*  --  11.4*   ALKPHOS  --   --   --  118  --   --   --   --  127  --  127  --  140   AST  --   --   --  96*  --   --   --   --  93*  --  89*  --  102*   ALT  --   --   --  46  --   --   --   --  45  --  43  --  49    < > = values in this interval not displayed.     CBC  Recent Labs   Lab 04/19/24  0458 04/19/24  0046 04/18/24  1722 04/18/24  1516 04/18/24  1027 04/18/24  0619 04/17/24  1503   WBC 11.1* 13.0*  --  10.7 9.5   < > 3.9*   RBC 2.37* 2.45*  --  2.43* 1.56*   < > 1.85*   HGB 7.9* 8.0* 8.0* 7.9* 5.5*   < > 6.7*   HCT 22.3* 22.8*  --  23.3* 15.9*   < > 18.8*   MCV 94 93  --  96 102*   < > 102*   MCH 33.3* 32.7  --  32.5 35.3*   < > 36.2*   MCHC 35.4 35.1  --  33.9 34.6   < > 35.6   RDW  --   --   --   --   --   --  22.6*    146*  --  121* 125*   < > 117*    < > = values in this interval not displayed.     INR  Recent Labs   Lab 04/19/24  0458 04/18/24  1516 04/17/24  1503   INR 2.57* 2.46* 2.16*     Arterial Blood Gas  Recent Labs   Lab 04/19/24  0046 04/18/24  1522 04/18/24  1027   PH  --  7.51*  --    PCO2  --  17*  --    PO2  --  157*  --    HCO3  --  14*  --    O2PER 30 30 0       All cultures:  No results for input(s): \"CULT\" in the last 168 hours.  Recent Results (from the past 24 hour(s))   XR Chest Port 1 View    Narrative    XR CHEST PORT 1 VIEW 4/18/2024 3:58 PM    HISTORY: ETT placement    COMPARISON: 4/17/2024      Impression    IMPRESSION: The endotracheal tube tip is " 3.7 cm above the mya. The  enteric tube courses into the stomach. Slight increase in mild  bibasilar patchy opacities, either due to atelectasis or pneumonia. No  pleural effusion or pneumothorax.    ROXANA CAMARA MD         SYSTEM ID:  [id number]   XR Abdomen Port 1 View    Narrative    XR ABDOMEN PORT 1 VIEW   4/18/2024 3:58 PM     HISTORY: OG tube placement    COMPARISON: None.      Impression    IMPRESSION: The enteric tube tip and sidehole is in the distal  stomach, in good position.    ROXANA CAMARA MD         SYSTEM ID:  [id number]   CT Chest Abdomen Pelvis w/o Contrast    Narrative    EXAM: CT CHEST ABDOMEN PELVIS W/O CONTRAST  LOCATION: [facility]  DATE: 4/19/2024    INDICATION: Sepsis  COMPARISON: 03/18/2024  TECHNIQUE: CT scan of the chest, abdomen, and pelvis was performed without IV contrast. Multiplanar reformats were obtained. Dose reduction techniques were used.   CONTRAST: None.    FINDINGS:   LUNGS AND PLEURA: Small bilateral pleural effusions are present, increased on the right and new on the left. Small consolidations in the bilateral lower lobes may represent atelectasis. However, there are tiny patchy groundglass opacities in the superior   segment of the left lower lobe and left upper lobe on series 4 image 89, new from prior study. However, previous groundglass opacities in the right lung have resolved. No pneumothorax.    MEDIASTINUM/AXILLAE: No lymphadenopathy. Heart size is normal without significant pericardial effusion. Cardiac blood pool density is mildly decreased. An endotracheal tube terminates in the midthoracic trachea. Nasogastric tube terminates in the body of   the stomach.    CORONARY ARTERY CALCIFICATION: None.    HEPATOBILIARY: Equivocal surface nodularity of the liver, compatible with cirrhosis. Small volume free fluid in the abdomen and pelvis. No free air. No definite hepatic lesions. Gallbladder is severely distended without definite wall  thickening.    PANCREAS: Normal.    SPLEEN: Normal.    ADRENAL GLANDS: Normal.    KIDNEYS/BLADDER: No urinary stones or hydronephrosis. A 1.3 cm cystic lesion in the midpole the right kidney statistically represents a cyst, visually benign and does not require follow-up. Urinary bladder is decompressed by a Bosch catheter.    BOWEL: A surgical clip is seen in the gastric cardia. Mild wall thickening of the ascending and proximal transverse colon noted. No bowel obstruction. Appendix is normal. A rectal tube is in place.    LYMPH NODES: Multiple mildly prominent retroperitoneal lymph nodes again seen, likely reactive.    VASCULATURE: Aortic no abdominal aortic aneurysm. Intra-abdominal venous collateral vessels present, including recanalized umbilical vein. A left femoral central venous catheter is present. There is low density expansion of the left external iliac vein.   Nonspecific low density also seen in the adjacent left external iliac artery.    PELVIC ORGANS: Normal.    MUSCULOSKELETAL: High density blood is seen in the subcutaneous space of the left groin and extending into the left retroperitoneum. Extensive body wall edema present. Multilevel mild and moderate degenerative height loss seen in the thoracolumbar spine,   similar to prior study. No acute fracture or suspicious osseous lesions. A small hiatal hernia is also present containing fluid.        Impression    IMPRESSION:    1.  Small consolidations in the lower lobes are favored to represent atelectasis. There are tiny patchy round glass opacities in the superior segment of the left lower lobe and left upper lobe, which may be mild infectious in nature, however unlikely to   be cause of sepsis. Previous groundglass opacities in the right lung have resolved.    2.  Left femoral central venous catheter in place with expansion of the left external iliac vein by low density material, likely thrombus. There is also low density content in the adjacent left  external iliac artery, also suspicious for thrombus.   Recommend further evaluation with CT angiography in the arterial and portal venous phases per GI bleeding protocol. This was relayed to Dr. Tinoco at 4:08 AM on 04/19/2024.    3.  Blood in the subcutaneous space of the left groin and in the left retroperitoneum. These areas may also be further evaluated for presence of active hemorrhage on CT angiography.    4.  Mild anemia.    5.  Marked distention of the gallbladder, nonspecific. No definite CT signs of acute cholecystitis.    6.  Cirrhosis with stigmata of portal hypertension including intra-abdominal venous collateral vessels, recanalized umbilical vein, and small volume ascites. Mild wall thickening of the ascending and proximal transverse colon likely reflects portal   colopathy.    7.  Small bilateral pleural effusions and diffuse body wall edema.   US Aorta/Ivc/Iliac Duplex Complete    Narrative    ULTRASOUND AORTA/IVC/ILIAC DUPLEX COMPLETE  4/19/2024 9:23 AM     HISTORY:  Concerns for arterial and venous thrombus in the left  external iliac artery/vein.    COMPARISON: CT of the abdomen and pelvis without contrast dated  4/19/2024.    FINDINGS: Color Doppler and spectral waveform analysis performed.    The visualized abdominal aorta, left common iliac artery, left  external iliac artery and left common femoral artery are patent  without evidence for thrombus. Monophasic waveforms are noted  throughout.    The IVC, left common iliac vein, external iliac vein, and common  femoral vein are patent. There is a central venous catheter in the  left common femoral vein. No definite thrombus identified.      Impression    IMPRESSION: No definite thrombus in the left iliac veins and arteries  as above on ultrasound.          [facility]  CRITICAL CARE STAFF NOTE    I am managing these acute and ongoing critical issues resulting in critical condition that impairs one or more vital organ systems, incur a  high probability of imminent or life-threatening deterioration in the patient's condition and providing frequent personal assessment and manipulation of medications and life support equipment.     1.  Hepatic encephalopathy   2.  Upper gi bleeding   3. Severe alcoholic lvier cirrhosis.     ICU Interventions: ventilator management, hemorrhage control, major , management of bleeding diathesis including critical illness coagulopathy, anemia of critical illness, aplastic anemia, DIC, hemophilia, ITP, leukemia, and/or TTP, and interpretation of lab values, cardiac output, cxr, pulse oximetry, blood gases, and/or information/data stored in computers     We have discussed the patient in detail and I agree with the findings, assessment, and plan as documented when this note was cosigned on this day. The plan was formulated in conjunction with pharmacy, ICU nurses, and respiratory therapist. I have evaluated all laboratory values and imaging studies for the past 24 hours. I have reviewed all the consults that have been ordered and are active for this patient.      Critical Care Time: 40 min.  I spent this time (excluding procedures) personally providing and directing critical care services at the bedside and on the critical care unit.      Jaron JORDAN MPH   of Medicine  Pager: [phone]     Clinically Significant Risk Factors           # Hypercalcemia: corrected calcium is >10.1, will monitor as appropriate  # Hypomagnesemia: Lowest Mg = 1.4 mg/dL in last 2 days, will replace as needed   # Hypoalbuminemia: Lowest albumin = 2.8 g/dL at 4/19/2024  4:58 AM, will monitor as appropriate    # Coagulation Defect: INR = 2.57 (Ref range: 0.85 - 1.15) and/or PTT = 59 Seconds (Ref range: 22 - 38 Seconds), will monitor for bleeding  # Thrombocytopenia: Lowest platelets = 107 in last 2 days, will monitor for bleeding          # Obesity: Estimated body mass index is 33.28 kg/m  as calculated from the following:     Height as of 4/9/24: 1.829 m (6').    Weight as of this encounter: 111.3 kg (245 lb 6 oz)., PRESENT ON ADMISSION       # Financial/Environmental Concerns:

## 2024-04-19 NOTE — PLAN OF CARE
"  Problem: Adult Inpatient Plan of Care  Goal: Plan of Care Review  Description: The Plan of Care Review/Shift note should be completed every shift.  The Outcome Evaluation is a brief statement about your assessment that the patient is improving, declining, or no change.  This information will be displayed automatically on your shift  note.  Outcome: Progressing  Flowsheets (Taken 4/19/2024 0639)  Outcome Evaluation: Patient remains mechanically ventilated sedated with versed and fentanyl, versed stopped this morning. Continues on Levo and ocreotide gtts. IV antibiotics given. CT chest abdo pelvis done this morning ? thrombus noted, Provider notified by Radiology to do arterial US this morning and possible CTA later. Ongoing low urine output, 500mls LR fluid bolus given MAP parameters changed to MAPs>65, urine out put remains low renal markers rising this morning continue to monitor.  Plan of Care Reviewed With: patient  Overall Patient Progress: no change  Goal: Patient-Specific Goal (Individualized)  Description: You can add care plan individualizations to a care plan. Examples of Individualization might be:  \"Parent requests to be called daily at 9am for status\", \"I have a hard time hearing out of my right ear\", or \"Do not touch me to wake me up as it startles  me\".  Outcome: Progressing  Goal: Absence of Hospital-Acquired Illness or Injury  Outcome: Progressing  Intervention: Identify and Manage Fall Risk  Recent Flowsheet Documentation  Taken 4/18/2024 2000 by Deshaun Kebede, RN  Safety Promotion/Fall Prevention: room near nurse's station  Intervention: Prevent Skin Injury  Recent Flowsheet Documentation  Taken 4/19/2024 0400 by Deshaun Kebede, RN  Body Position: turned  Taken 4/19/2024 0000 by Deshaun Kebede, RN  Body Position: turned  Taken 4/18/2024 2000 by Deshaun Kebede, RN  Body Position:   left   turned  Intervention: Prevent and Manage VTE (Venous Thromboembolism) Risk  Recent Flowsheet Documentation  Taken " 4/19/2024 0400 by Deshaun Kebede RN  VTE Prevention/Management: SCDs (sequential compression devices) on  Taken 4/19/2024 0000 by Deshaun Kebede RN  VTE Prevention/Management: SCDs (sequential compression devices) on  Taken 4/18/2024 2000 by Deshaun Kebede RN  VTE Prevention/Management: SCDs (sequential compression devices) on  Intervention: Prevent Infection  Recent Flowsheet Documentation  Taken 4/19/2024 0400 by Deshaun Kebede RN  Infection Prevention: environmental surveillance performed  Taken 4/19/2024 0000 by Deshaun Kebede RN  Infection Prevention: environmental surveillance performed  Taken 4/18/2024 2000 by Deshaun Kebede RN  Infection Prevention: environmental surveillance performed  Goal: Optimal Comfort and Wellbeing  Outcome: Progressing  Intervention: Provide Person-Centered Care  Recent Flowsheet Documentation  Taken 4/19/2024 0400 by Deshaun Kebede RN  Trust Relationship/Rapport: care explained  Taken 4/19/2024 0000 by Deshaun Kebede RN  Trust Relationship/Rapport: care explained  Taken 4/18/2024 2000 by Deshaun Kebede RN  Trust Relationship/Rapport: care explained  Goal: Readiness for Transition of Care  Outcome: Progressing   Goal Outcome Evaluation:      Plan of Care Reviewed With: patient    Overall Patient Progress: no changeOverall Patient Progress: no change    Outcome Evaluation: Patient remains mechanically ventilated sedated with versed and fentanyl, versed stopped this morning. Continues on Levo and ocreotide gtts. IV antibiotics given. CT chest abdo pelvis done this morning ? thrombus noted, Provider notified by Radiology to do arterial US this morning and possible CTA later. Ongoing low urine output, 500mls LR fluid bolus given MAP parameters changed to MAPs>65, urine out put remains low renal markers rising this morning continue to monitor.

## 2024-04-19 NOTE — PROGRESS NOTES
[facility] ICU RESPIRATORY NOTE           Date of Admission: 4/17/2024     Date of Intubation (most recent): 4/18/24     Reason for Mechanical Ventilation:Airway protection     Number of Days on Mechanical Ventilation: 2     Met Criteria for Spontaneous Breathing Trial: No     Reason for No Spontaneous Breathing Trial: Per MD     Significant Events Today: None overnight     ABG Results:   Recent Labs   Lab 04/19/24  0046 04/18/24  1522 04/18/24  1027   PH  --  7.51*  --    PCO2  --  17*  --    PO2  --  157*  --    HCO3  --  14*  --    O2PER 30 30 0     Vent Mode: CMV/AC  (Continuous Mandatory Ventilation/ Assist Control)  FiO2 (%): 30 %  Resp Rate (Set): 16 breaths/min  Tidal Volume (Set, mL): 500 mL  PEEP (cm H2O): 5 cmH2O  Resp: 15    ELIJAH Ghosh, RRT

## 2024-04-19 NOTE — PLAN OF CARE
Problem: Adult Inpatient Plan of Care  Goal: Absence of Hospital-Acquired Illness or Injury  Intervention: Prevent Skin Injury  Recent Flowsheet Documentation  Taken 4/18/2024 1600 by River Espinoza RN  Body Position: turned  Taken 4/18/2024 1200 by River Espinoza RN  Body Position: supine  Taken 4/18/2024 1000 by River Espinoza RN  Body Position: turned  Intervention: Prevent and Manage VTE (Venous Thromboembolism) Risk  Recent Flowsheet Documentation  Taken 4/18/2024 1600 by River Espinoza RN  VTE Prevention/Management: SCDs (sequential compression devices) on  Taken 4/18/2024 1200 by River Espinoza RN  VTE Prevention/Management: SCDs (sequential compression devices) off  Goal: Optimal Comfort and Wellbeing  Intervention: Provide Person-Centered Care  Recent Flowsheet Documentation  Taken 4/18/2024 1600 by River Espinoza RN  Trust Relationship/Rapport: care explained     Problem: Mechanical Ventilation Invasive  Goal: Effective Communication  Intervention: Ensure Effective Communication  Recent Flowsheet Documentation  Taken 4/18/2024 1600 by River Espinoza RN  Trust Relationship/Rapport: care explained  Goal: Absence of Ventilator-Induced Lung Injury  Intervention: Prevent Ventilator-Associated Pneumonia  Recent Flowsheet Documentation  Taken 4/18/2024 1800 by River Espinoza RN  Head of Bed (HOB) Positioning: HOB at 20-30 degrees  Taken 4/18/2024 1600 by River Espinoza RN  Head of Bed (HOB) Positioning: HOB at 20-30 degrees  Taken 4/18/2024 1200 by River Espinoza RN  Head of Bed (HOB) Positioning: (PT intubated)   HOB lowered   other (see comments)  Taken 4/18/2024 1000 by River Espinoza RN  Head of Bed (HOB) Positioning: HOB at 20-30 degrees     Problem: Restraint, Nonviolent  Goal: Absence of Harm or Injury  Intervention: Protect Dignity, Rights and Personal Wellbeing  Recent Flowsheet Documentation  Taken 4/18/2024 1600 by River Espinoza RN  Trust Relationship/Rapport: care  explained  Intervention: Protect Skin and Joint Integrity  Recent Flowsheet Documentation  Taken 4/18/2024 1600 by River Espinoza RN  Body Position: turned  Taken 4/18/2024 1200 by River Espinoza RN  Body Position: supine  Taken 4/18/2024 1000 by River Espinoza RN  Body Position: turned   Goal Outcome Evaluation:       Pt arrived on Unit this morning around 1030 nearly unresponsive. Was able to squeeze hand to command after repeated commands given. Hgb of 5.5. 2 units PRBC given. Follow up Hgb 8.0. Critical aCO2 of 17 resulted in pt being intubated with GI being consulted EGD performed with no bleeding noted. Lines placed. Levo required to maintain MAP > 60.

## 2024-04-19 NOTE — PROGRESS NOTES
Tele-ICU progress note:    Radiology called. Patient had received a CT CAP without contrast overnight.   Difficult to confirm without contrast, but clear difference in density in left exertnal illiac vein and artery concerning for thrombus; high density blood is seen in the subcutaneous space of the left groin and extending into the left retroperitoneum (location of removed arterial line).  Due to low UOP, concern for renal function.  Provided LR boluses in prep for potential contrast as CTA ab/pelvis would be needed to best assess.     Pulses are detectable down left leg. Leg is warm and edematous and good cap refill.  Hemoglobin is stable this morning.    -To see if contrast could be avoided, will ordered US and doppler of left iliac vasculature  -If not assessable, then CTA would be reasonable (order pended)    CC time: 30 minutes    Wilfredo Tinoco MD  [facility],  of Medicine  Pulmonary/Critical Care Medicine  April 19, 2024

## 2024-04-19 NOTE — PLAN OF CARE
Problem: Mechanical Ventilation Invasive  Goal: Mechanical Ventilation Liberation  Intervention: Promote Extubation and Mechanical Ventilation Liberation  Recent Flowsheet Documentation  Taken 4/19/2024 1200 by Luzma Mariscal RN  Medication Review/Management:   medications reviewed   high-risk medications identified  Environmental Support:   calm environment promoted   distractions minimized   rest periods encouraged  Taken 4/19/2024 0800 by Luzma Mariscal RN  Medication Review/Management:   medications reviewed   high-risk medications identified  Environmental Support:   calm environment promoted   distractions minimized   rest periods encouraged     Problem: Mechanical Ventilation Invasive  Goal: Optimal Device Function  Intervention: Optimize Device Care and Function  Recent Flowsheet Documentation  Taken 4/19/2024 1400 by Luzma Mariscal RN  Oral Care: oral rinse provided  Taken 4/19/2024 1200 by Luzma Mariscal RN  Airway Safety Measures:   all equipment/monitors on and audible   suction at bedside  Oral Care:   oral rinse provided   teeth brushed   tongue brushed  Taken 4/19/2024 1000 by Luzma Mariscal RN  Oral Care: oral rinse provided  Taken 4/19/2024 0800 by Luzma Mariscal RN  Airway Safety Measures:   all equipment/monitors on and audible   suction at bedside  Oral Care:   oral rinse provided   teeth brushed   tongue brushed   Goal Outcome Evaluation:      Plan of Care Reviewed With: patient, family          Outcome Evaluation: Pt on full vent support and sedated. Levo stopped. Fentanyl titrated. Precedex started. Pt follows command intermittently and responds to painful stimuli. Has positive weak cough. Minimal oral secretions with inline sucitioning. Rectal tube and godoy catheter in place with small output.

## 2024-04-20 NOTE — CONSULTS
CLINICAL NUTRITION SERVICES  -  ASSESSMENT NOTE    Recommendations Ordered by Registered Dietitian (RD):   Nutrition Support Enteral:  Type of Feeding Tube: OG (IR 4/18)  Enteral Frequency:  Continuous  Enteral Regimen: Vital High Protein @ 20 ml/hr (480 ml/day) will provide: 480 kcals, 42 g PRO, 401 ml free H2O, 53 g CHO, and 0 g fiber daily.    - Initiate @ 10 ml/hr and advance by 10 ml after 12 hr  - Recommend 30-60 ml q4hr fluid flushes for tube patency. Additional fluids and/or adjustments per MD.    - Elevated HOB with gastric feeds    Future/Additional Recommendations: Monitor tolerance, medications, and reassess goal rate    Malnutrition: 4/20  % Weight Loss:  Weight loss does not meet criteria for malnutrition - fluid status has been fluctuating and fluid retention may be masking losses.   % Intake:  Decreased intake does not meet criteria for malnutrition  Subcutaneous Fat Loss:  Orbital region mild depletion  Muscle Loss:  Temporal region mild depletion and Clavicle bone region mild depletion  Fluid Retention:  Moderate 3+ BLE    Malnutrition Diagnosis: at least Moderate malnutrition  In Context of:  Acute illness or injury  Chronic illness or disease     REASON FOR ASSESSMENT  Crispin Ham is a 39 year old male seen by Registered Dietitian for Provider Order - Registered Dietitian to Assess and Order TF per Medical Nutrition Therapy Protocol    PMH of: alcohol use disorder, chronic decompensated cirrhosis with history of SBP, HFpEF, hypertension, pleural effusions previously requiring paracentesis, small esophageal varices present on 3/16/2024.  He was admitted 4/17/24 w/ worsening encephalopathy.   - Transferred to ICU for worsening encephalopathy Intubated for airway protection and will get EGD.      NUTRITION HISTORY  - Information obtained from chart review and pt parents in room  - Diet history - Parents reported Pranay was eating better than over the winter, prior to the previous admission.  Intakes around x2-3 meals per day up until Tuesday and Wednesday when meals may not have been finished. They also endorsed the fluctuating wt trends.     Previous RD assessment 3/13,    - Met severe malnutrition criteria for significant wt loss (?accuracy of wts) and reduced po intake, and mild muscle wasting.     CURRENT NUTRITION ORDERS  Diet Order:   NPO     Current Intake/Tolerance: NPO  - Messaged Intensivist via malena Mccord to start trickle feeds, advance very slowly and reassess tomorrow    NUTRITION FOCUSED PHYSICAL ASSESSMENT FOR DIAGNOSING MALNUTRITION)  Yes    Observed:    Muscle wasting (refer to documentation in Malnutrition section)    Obtained from Chart/Interdisciplinary Team:  Edema 3+ Moderate    ANTHROPOMETRICS  Height: 6'   Weight: 249 lbs 1.92 oz   Body mass index is 33.79 kg/m .  Weight Status:  Obesity Grade I BMI 30-34.9  IBW: 77.6 kg  % IBW: 146%  Weight History:  Wt Readings from Last 10 Encounters:   04/20/24 113 kg (249 lb 1.9 oz)   04/09/24 122.5 kg (270 lb)   03/20/24 103.1 kg (227 lb 6.4 oz)   02/21/24 108.9 kg (240 lb)   11/24/23 112.9 kg (248 lb 12.8 oz)   10/30/23 127 kg (280 lb)   10/04/23 126 kg (277 lb 12.8 oz)   09/11/23 121.9 kg (268 lb 11.9 oz)   03/11/22 98.4 kg (217 lb)   Previous RD note 3/13,  - reported UBW of 280 lbs  - per CE: 1/8/24 - 236 lbs, 2/13 - 227 lbs. In one month pt has lost 14% of weight which is clinically significant, some losses could be r/t fluid shifts. Wts appear to be down trending since October 2023. Since October pt has lost ~30% of his wt (5 months).     LABS  Electrolytes  Sodium (mmol/L)   Date Value   04/20/2024 138     Potassium (mmol/L)   Date Value   04/20/2024 4.3   03/11/2022 3.7     Magnesium (mg/dL)   Date Value   04/18/2024 1.4 (L)     Phosphorus (mg/dL)   Date Value   04/18/2024 4.2    Blood Glucose  Glucose (mg/dL)   Date Value   04/20/2024 164 (H)   03/11/2022 122 (H)     GLUCOSE BY METER POCT (mg/dL)   Date Value   04/20/2024 135 (H)      Hemoglobin A1C (%)   Date Value   03/22/2024 5.1    Renal  Urea Nitrogen (mg/dL)   Date Value   04/20/2024 21.8 (H)   03/11/2022 6 (L)     Creatinine (mg/dL)   Date Value   04/20/2024 1.41 (H)         MEDICATIONS  Current Facility-Administered Medications   Medication Dose Route Frequency Provider Last Rate Last Admin    albumin human 5 % injection 25 g  25 g Intravenous Once Jaron Sandhu MD   Held at 04/18/24 1235    chlorhexidine (PERIDEX) 0.12 % solution 15 mL  15 mL Mouth/Throat Q12H Aaron Rosen DO   15 mL at 04/20/24 0759    insulin aspart (NovoLOG) injection (RAPID ACTING)  1-7 Units Subcutaneous Q4H Leann Suh MD   1 Units at 04/20/24 0357    lactulose (CHRONULAC) solution 20 g  20 g Oral or Feeding Tube TID Aaron Rosen DO   20 g at 04/20/24 0801    methylPREDNISolone sodium succinate (SOLU-MEDROL) injection 40 mg  40 mg Intravenous Q24H Kristi Samano PA-C   40 mg at 04/19/24 1239    pantoprazole (PROTONIX) IV push injection 40 mg  40 mg Intravenous Q12H Hiren Vogt DO   40 mg at 04/20/24 0955    piperacillin-tazobactam (ZOSYN) 4.5 g vial to attach to  mL bag  4.5 g Intravenous Q6H Jaron Sandhu MD   4.5 g at 04/20/24 0956    polyethylene glycol (MIRALAX) Packet 34 g  34 g Oral BID Jaron Sandhu MD   34 g at 04/20/24 1015    rifaximin (XIFAXAN) tablet 550 mg  550 mg Oral or Feeding Tube BID Aaron Rosen DO   550 mg at 04/20/24 0759    sodium chloride (PF) 0.9% PF flush 3 mL  3 mL Intracatheter Q8H Francheska Crocker MD   3 mL at 04/20/24 0529    thiamine (B-1) tablet 100 mg  100 mg Oral Daily Francheska Crocker MD   100 mg at 04/19/24 0835    Or    thiamine (B-1) injection 100 mg  100 mg Intravenous Daily Francheska Crocker MD   100 mg at 04/20/24 0759      Current Facility-Administered Medications   Medication Dose Route Frequency Provider Last Rate Last Admin    Daily 2 GRAM acetaminophen limit, unless fulminent liver failure or transaminases greater than or equal to 300 - 400,  then none   Does not apply Continuous PRN Francheska Crocker MD        dexmedeTOMIDine (PRECEDEX) 4 mcg/mL in sodium chloride 0.9 % 100 mL infusion  0.1-1.2 mcg/kg/hr Intravenous Continuous Jaron Sandhu MD 6.1 mL/hr at 04/20/24 0842 0.2 mcg/kg/hr at 04/20/24 0842    fentaNYL (SUBLIMAZE) infusion   mcg/hr Intravenous Continuous Jaron Sandhu MD 0.5 mL/hr at 04/20/24 0739 25 mcg/hr at 04/20/24 0739    norepinephrine (LEVOPHED) 4 mg in  mL infusion PREMIX  0.01-0.6 mcg/kg/min Intravenous Continuous Leann Suh MD 4.6 mL/hr at 04/20/24 0738 0.01 mcg/kg/min at 04/20/24 0738    sodium chloride 0.9 % infusion   Intravenous Continuous Jaron Sadnhu MD 30 mL/hr at 04/20/24 0739 Rate Verify at 04/20/24 0739      ASSESSED NUTRITION NEEDS PER APPROVED PRACTICE GUIDELINES:  Dosing Weight: 113 kg (actual for energy), 77.6 kg (IBW for PRO)  Estimated Energy Needs: 7119-2960 kcals (14-17 Kcal/Kg)  Justification: obese, ICU  Estimated Protein Needs: 155 grams protein (>2 g pro/Kg)  Justification: obesity guidelines, ICU  Estimated Fluid Needs: 1 mL/Kcal  Justification: per provider pending fluid status    MALNUTRITION:   % Weight Loss:  Weight loss does not meet criteria for malnutrition - fluid status has been fluctuating and fluid retention may be masking losses.   % Intake:  Decreased intake does not meet criteria for malnutrition   Subcutaneous Fat Loss:  Orbital region mild depletion  Muscle Loss:  Temporal region mild depletion and Clavicle bone region mild depletion  Fluid Retention:  Moderate 3+ BLE    Malnutrition Diagnosis: at least Moderate malnutrition  In Context of:  Acute illness or injury  Chronic illness or disease    NUTRITION DIAGNOSIS:  Inadequate protein-energy intake related to NPO status as evidenced by reliance on enteral nutrition support    NUTRITION INTERVENTIONS  Recommendations / Nutrition Prescription    Nutrition Support Enteral:  Type of Feeding Tube: OG (IR 4/18: enteric tube tip and  sidehole is in the distal stomach, in good position)   Enteral Frequency:  Continuous  Enteral Regimen: Vital High Protein @ 20 ml/hr (480 ml/day) will provide: 480 kcals, 42 g PRO, 401 ml free H2O, 53 g CHO, and 0 g fiber daily.    - Initiate @ 10 ml/hr and advance by 10 ml after 12 hr  - Recommend 30-60 ml q4hr fluid flushes for tube patency. Additional fluids and/or adjustments per MD.    - Elevated HOB with gastric feeds     Implementation  Nutrition education: Per Provider order if indicated   EN Composition and EN Schedule    Nutrition Goals  Tolerate EN initiation     MONITORING AND EVALUATION:  Progress towards goals will be monitored and evaluated per protocol and Practice Guidelines    Mekhi Jang RD, LD

## 2024-04-20 NOTE — PROGRESS NOTES
Critical Care Progress Note      04/20/2024    Name: Crispin Ham MRN#: [id number]   Age: 39 year old YOB: 1985     Hsptl Day# 3  ICU DAY #    MV DAY #             Problem List:   Active Problems:    Hepatic encephalopathy (H)    Anemia in other chronic diseases classified elsewhere           Summary/Hospital Course:     HPI 39-year-old man with PMH of alcohol use disorder, chronic decompensated cirrhosis with history of SBP, HFpEF, hypertension, pleural effusions previously requiring paracentesis, small esophageal varices present on 3/16/2024.  He was admitted 4/17/24 w/ worsening encephalopathy.  CT head in ED neg.     - Transferred to ICU for worsening encephalopathy Intubated for airway protection and will get EGD.     #4/19:   - Follow closely for encephalopathy  -IF he doesn't wake up by Sunday then consider MRI and EEG       Assessment and plan :     Crispin Ham IS a 39 year old male admitted on 4/17/2024 for  Upper gi bleeding   I have personally reviewed the daily labs, imaging studies, cultures and discussed the case with referring physician and consulting physicians.     My assessment and plan by system for this patient is as follows:    Neurology/Psychiatry:   -Hepatic encephalopathy.  - Ammonia. Daily checks. Minmal sedation   -CT head is negative.     Cardiovascular:   - Minimal pressers.     Pulmonary/Ventilator Management:   - Stable vent.  Pleural effusion atelectasis     GI and Nutrition : MELD 3.0: 29 at 4/20/2024  3:48 AM  MELD-Na: 28 at 4/20/2024  3:48 AM  Calculated from:  Serum Creatinine: 1.41 mg/dL at 4/20/2024  3:48 AM  Serum Sodium: 138 mmol/L (Using max of 137 mmol/L) at 4/20/2024  3:48 AM  Total Bilirubin: 10.3 mg/dL at 4/20/2024  3:48 AM  Serum Albumin: 2.8 g/dL at 4/20/2024  3:48 AM  INR(ratio): 2.32 at 4/20/2024  3:48 AM  Age at listing (hypothetical): 39 years  Sex: Male at 4/20/2024  3:48 AM    - Alcoholic liver cirrhosis.  -  EGD did not show any active  bleeding. NO intervention for now.   - PPI  IV BID   Zosyn 4/19   -  Lactulose + Miralax   - Rifaximin.       Renal/Fluids/Electrolytes:   -Stable function for now.   - monitor function and electrolytes as needed with replacement per ICU protocols.  - generally avoid nephrotoxic agents such as NSAID, IV contrast unless specifically required  - adjust medications as needed for renal clearance  - follow I/O's as appropriate.    Infectious Disease:   - Procalcitonin is nefgative.   - Bloood culture poending   -Broad spectrum abx.  Zosyn on 4/18     Endocrine:   - ISS    Hematology/Oncology:   -  Anemia.  -Blood loss anemia.   -  2 units of RBC given. HB stable sicne then   - UGI bleed     MSKL/Rheum:  -  NAd    IV/Access:   1. Venous access -  Left femoral.   2. Arterial access -  NAD      ICU Prophylaxis:   1. DVT: mechanical  2. Stress Ulcer: PPI  3. Restraints: Nonviolent soft two point restraints required and necessary for patient safety and continued cares and good effect as patient continues to pull at necessary lines, tubes despite education and distraction. Will readdress daily.   4. Feeding -  Nutrition can be started now.   5. Family Update: Parents at bedside  6. Disposition -  To stay in ICU              Key Medications:     Current Facility-Administered Medications   Medication Dose Route Frequency Provider Last Rate Last Admin    albumin human 5 % injection 25 g  25 g Intravenous Once Jaron Sandhu MD   Held at 04/18/24 1235    chlorhexidine (PERIDEX) 0.12 % solution 15 mL  15 mL Mouth/Throat Q12H Aaron Rosen DO   15 mL at 04/20/24 0759    furosemide (LASIX) injection 40 mg  40 mg Intravenous Once Jaron Sandhu MD        insulin aspart (NovoLOG) injection (RAPID ACTING)  1-7 Units Subcutaneous Q4H Leann Suh MD   1 Units at 04/20/24 0357    lactulose (CHRONULAC) solution 20 g  20 g Oral or Feeding Tube TID Aaron Rosen DO   20 g at 04/20/24 0801    methylPREDNISolone sodium succinate  (SOLU-MEDROL) injection 40 mg  40 mg Intravenous Q24H Kristi Samano PA-C   40 mg at 04/19/24 1239    pantoprazole (PROTONIX) IV push injection 40 mg  40 mg Intravenous Q12H Hiren Vogt DO   40 mg at 04/19/24 2134    piperacillin-tazobactam (ZOSYN) 4.5 g vial to attach to  mL bag  4.5 g Intravenous Q6H Jaron Sandhu MD        polyethylene glycol (MIRALAX) Packet 34 g  34 g Oral BID Jaron Sandhu MD   34 g at 04/19/24 2134    rifaximin (XIFAXAN) tablet 550 mg  550 mg Oral or Feeding Tube BID Aaron Rosen DO   550 mg at 04/20/24 0759    sodium chloride (PF) 0.9% PF flush 3 mL  3 mL Intracatheter Q8H Francheska Crocker MD   3 mL at 04/20/24 0529    thiamine (B-1) tablet 100 mg  100 mg Oral Daily Francheska Crocker MD   100 mg at 04/19/24 0835    Or    thiamine (B-1) injection 100 mg  100 mg Intravenous Daily Francheska Crocker MD   100 mg at 04/20/24 0759     Current Facility-Administered Medications   Medication Dose Route Frequency Provider Last Rate Last Admin    Daily 2 GRAM acetaminophen limit, unless fulminent liver failure or transaminases greater than or equal to 300 - 400, then none   Does not apply Continuous PRN Francheska Crocker MD        dexmedeTOMIDine (PRECEDEX) 4 mcg/mL in sodium chloride 0.9 % 100 mL infusion  0.1-1.2 mcg/kg/hr Intravenous Continuous Jaron Sandhu MD 6.1 mL/hr at 04/20/24 0842 0.2 mcg/kg/hr at 04/20/24 0842    fentaNYL (SUBLIMAZE) infusion   mcg/hr Intravenous Continuous Jaron Sandhu MD 0.5 mL/hr at 04/20/24 0739 25 mcg/hr at 04/20/24 0739    norepinephrine (LEVOPHED) 4 mg in  mL infusion PREMIX  0.01-0.6 mcg/kg/min Intravenous Continuous Leann Suh MD 4.6 mL/hr at 04/20/24 0738 0.01 mcg/kg/min at 04/20/24 0738    sodium chloride 0.9 % infusion   Intravenous Continuous Jaron Sandhu MD 30 mL/hr at 04/20/24 0739 Rate Verify at 04/20/24 0739               Physical Examination:   Temp:  [96.8  F (36  C)-98.1  F (36.7  C)] 97  F (36.1  C)  Pulse:  [41-97] 64  Resp:   [9-18] 15  BP: ()/(45-89) 122/89  FiO2 (%):  [25 %-30 %] 25 %  SpO2:  [99 %-100 %] 100 %    Intake/Output Summary (Last 24 hours) at 4/18/2024 0958  Last data filed at 4/18/2024 0514  Gross per 24 hour   Intake 103 ml   Output --   Net 103 ml     Wt Readings from Last 4 Encounters:   04/20/24 113 kg (249 lb 1.9 oz)   04/09/24 122.5 kg (270 lb)   03/20/24 103.1 kg (227 lb 6.4 oz)   02/21/24 108.9 kg (240 lb)     BP - Mean:  [] 111  Vent Mode: CMV/AC  (Continuous Mandatory Ventilation/ Assist Control)  FiO2 (%): 25 %  Resp Rate (Set): 14 breaths/min  Tidal Volume (Set, mL): 500 mL  PEEP (cm H2O): 5 cmH2O  Resp: 15    Recent Labs   Lab 04/19/24  0046 04/18/24  1522 04/18/24  1027   PH  --  7.51*  --    PCO2  --  17*  --    PO2  --  157*  --    HCO3  --  14*  --    O2PER 30 30 0       GEN: Confused and altered.   HEENT: head ncat, sclera anicteric, OP patent, trachea midline   PULM: unlabored synchronous with vent, clear anteriorly    CV/COR: RRR S1S2 no gallop,  No rub, no murmur  ABD: soft nontender, hypoactive bowel sounds, no mass  EXT:  Edema   warm  NEURO: grossly intact  SKIN: no obvious rash  LINES: clean, dry intact         Data:   All data and imaging reviewed     ROUTINE ICU LABS (Last four results)  CMP  Recent Labs   Lab 04/20/24  0815 04/20/24  0355 04/20/24  0348 04/19/24  2328 04/19/24  2000 04/19/24  1802 04/19/24  0508 04/19/24  0458 04/18/24  2053 04/18/24  1722 04/18/24  1527 04/18/24  1516 04/18/24  1027   NA  --   --  138  --   --  139  --  139  --  136  --  136 137   POTASSIUM  --   --  4.3  --   --  4.1  --  4.0  --  4.0  --  4.2 3.9   CHLORIDE  --   --  107  --   --  107  --  105  --  104  --  104 104   CO2  --   --  23  --   --  23  --  23  --  22  --  21* 22   ANIONGAP  --   --  8  --   --  9  --  11  --  10  --  11 11   * 142* 164* 182*   < > 175*   < > 169*   < > 155*   < > 149* 110*   BUN  --   --  21.8*  --   --  18.7  --  17.1  --  13.9  --  13.4 12.9   CR  --   --   "1.41*  --   --  1.23*  --  1.14  --  0.98  --  0.99 1.03   GFRESTIMATED  --   --  66  --   --  78  --  85  --  >90  --  >90 >90   MIGUELINA  --   --  8.8  --   --  8.7  --  8.9  --  9.0  --  9.3 9.4   MAG  --   --   --   --   --   --   --   --   --   --   --  1.4*  --    PHOS  --   --   --   --   --   --   --   --   --   --   --  4.2  --    PROTTOTAL  --   --  6.0*  --   --  5.7*  --  5.8*  --   --   --   --  6.0*   ALBUMIN  --   --  2.8*  --   --  2.7*  --  2.8*  --   --   --   --  2.9*   BILITOTAL  --   --  10.3*  --   --  10.2*  --  11.3*  --   --   --   --  10.3*   ALKPHOS  --   --  126  --   --  117  --  118  --   --   --   --  127   AST  --   --  82*  --   --  84*  --  96*  --   --   --   --  93*   ALT  --   --  46  --   --  43  --  46  --   --   --   --  45    < > = values in this interval not displayed.     CBC  Recent Labs   Lab 04/20/24  0348 04/19/24  1802 04/19/24  0458 04/19/24  0046 04/18/24  0619 04/17/24  1503   WBC 3.3* 3.6* 11.1* 13.0*   < > 3.9*   RBC 2.28* 2.22* 2.37* 2.45*   < > 1.85*   HGB 7.7* 7.2* 7.9* 8.0*   < > 6.7*   HCT 22.1* 21.0* 22.3* 22.8*   < > 18.8*   MCV 97 95 94 93   < > 102*   MCH 33.8* 32.4 33.3* 32.7   < > 36.2*   MCHC 34.8 34.3 35.4 35.1   < > 35.6   RDW  --   --   --   --   --  22.6*   * 108* 154 146*   < > 117*    < > = values in this interval not displayed.     INR  Recent Labs   Lab 04/20/24  0348 04/19/24  0458 04/18/24  1516 04/17/24  1503   INR 2.32* 2.57* 2.46* 2.16*     Arterial Blood Gas  Recent Labs   Lab 04/19/24  0046 04/18/24  1522 04/18/24  1027   PH  --  7.51*  --    PCO2  --  17*  --    PO2  --  157*  --    HCO3  --  14*  --    O2PER 30 30 0       All cultures:  No results for input(s): \"CULT\" in the last 168 hours.  No results found for this or any previous visit (from the past 24 hour(s)).        [facility]  CRITICAL CARE STAFF NOTE    I am managing these acute and ongoing critical issues resulting in critical condition that impairs one or more " vital organ systems, incur a high probability of imminent or life-threatening deterioration in the patient's condition and providing frequent personal assessment and manipulation of medications and life support equipment.     1.  Hepatic encephalopathy   2.  Upper gi bleeding   3. Severe alcoholic lvier cirrhosis.     ICU Interventions: ventilator management, hemorrhage control, major , management of bleeding diathesis including critical illness coagulopathy, anemia of critical illness, aplastic anemia, DIC, hemophilia, ITP, leukemia, and/or TTP, and interpretation of lab values, cardiac output, cxr, pulse oximetry, blood gases, and/or information/data stored in computers     We have discussed the patient in detail and I agree with the findings, assessment, and plan as documented when this note was cosigned on this day. The plan was formulated in conjunction with pharmacy, ICU nurses, and respiratory therapist. I have evaluated all laboratory values and imaging studies for the past 24 hours. I have reviewed all the consults that have been ordered and are active for this patient.      Critical Care Time: 50 min.  I spent this time (excluding procedures) personally providing and directing critical care services at the bedside and on the critical care unit.      Jaron JORDAN MPH   of Medicine  Pager: [phone]     Clinically Significant Risk Factors           # Hypercalcemia: corrected calcium is >10.1, will monitor as appropriate  # Hypomagnesemia: Lowest Mg = 1.4 mg/dL in last 2 days, will replace as needed   # Hypoalbuminemia: Lowest albumin = 2.7 g/dL at 4/19/2024  6:02 PM, will monitor as appropriate    # Coagulation Defect: INR = 2.32 (Ref range: 0.85 - 1.15) and/or PTT = 59 Seconds (Ref range: 22 - 38 Seconds), will monitor for bleeding  # Thrombocytopenia: Lowest platelets = 108 in last 2 days, will monitor for bleeding          # Obesity: Estimated body mass index is 33.79 kg/m  as  calculated from the following:    Height as of 4/9/24: 1.829 m (6').    Weight as of this encounter: 113 kg (249 lb 1.9 oz)., PRESENT ON ADMISSION       # Financial/Environmental Concerns:

## 2024-04-20 NOTE — CONSULTS
RENAL CONSULTATION NOTE    REFERRING MD:  Jaron Sandhu MD     REASON FOR CONSULTATION:  GATITO, ? HEpatorenal symndrome     HPI:  39 y.o man with alcoholic liver cirrhosis, who was admitted on 4/17 for hepatic encephalopathy.  He was found by EMS at [facility] confused and jaundice.  Patient has severe anemia and presented with Hgb of 5.8.   EGD on 3/16 showed grade 1 esophageal varices.     Patient was transferred for worsening hepatic encephalopathy.  Head CT wo acute issue  Ammonia level improved from 218 to 42.  He was intubated for airway protection.     Admitted with a creatinine of 0.9 mg/dl and it is 1.2-1.4 mg/dl today.   CT CAP wo contrast showed unremarkable kidneys.     Pt is on NE at 0.01 mcg  FIO2 at 25%   Bosch in place with concentrated urine.  On Zosyn.     ROS:  A complete review of systems was performed and is negative except as noted above.    PMH:    Past Medical History:   Diagnosis Date    Alcohol abuse     Hypertension     Substance abuse (H)        PSH:    Past Surgical History:   Procedure Laterality Date    COLONOSCOPY      EGD    ESOPHAGOSCOPY, GASTROSCOPY, DUODENOSCOPY (EGD), COMBINED N/A 3/16/2024    Procedure: ESOPHAGOGASTRODUODENOSCOPY;  Surgeon: Chato Juan MD;  Location:  OR    ORTHOPEDIC SURGERY      wrist  surgery       MEDICATIONS:    Current Facility-Administered Medications   Medication Dose Route Frequency Provider Last Rate Last Admin    albumin human 5 % injection 25 g  25 g Intravenous Once Jaron Sandhu MD   Held at 04/18/24 1235    chlorhexidine (PERIDEX) 0.12 % solution 15 mL  15 mL Mouth/Throat Q12H Aaron Rosen DO   15 mL at 04/20/24 0759    insulin aspart (NovoLOG) injection (RAPID ACTING)  1-7 Units Subcutaneous Q4H Leann Suh MD   1 Units at 04/20/24 0357    lactulose (CHRONULAC) solution 20 g  20 g Oral or Feeding Tube TID Aaron Rosen DO   20 g at 04/20/24 0801    methylPREDNISolone sodium succinate (SOLU-MEDROL) injection 40 mg  40 mg Intravenous  Q24H Kristi Samano PA-C   40 mg at 04/19/24 1239    pantoprazole (PROTONIX) IV push injection 40 mg  40 mg Intravenous Q12H Hiren Vogt DO   40 mg at 04/20/24 0955    piperacillin-tazobactam (ZOSYN) 4.5 g vial to attach to  mL bag  4.5 g Intravenous Q6H Jaron Sandhu MD   4.5 g at 04/20/24 0956    polyethylene glycol (MIRALAX) Packet 34 g  34 g Oral BID Jaron Sandhu MD   34 g at 04/20/24 1015    rifaximin (XIFAXAN) tablet 550 mg  550 mg Oral or Feeding Tube BID Aaron Rosen DO   550 mg at 04/20/24 0759    sodium chloride (PF) 0.9% PF flush 3 mL  3 mL Intracatheter Q8H Francheska Crocker MD   3 mL at 04/20/24 0529    thiamine (B-1) tablet 100 mg  100 mg Oral Daily Francheska Crocker MD   100 mg at 04/19/24 0835    Or    thiamine (B-1) injection 100 mg  100 mg Intravenous Daily Francheska Crocker MD   100 mg at 04/20/24 0759       ALLERGIES:    Allergies as of 04/17/2024 - Unable to Assess 04/17/2024   Allergen Reaction Noted    Excedrin extra strength [aspirin-acetaminophen-caffeine]  11/16/2023    Nsaids Angioedema 03/19/2015       FH:  No family history on file.    SH:    Social History     Socioeconomic History    Marital status: Single     Spouse name: Not on file    Number of children: Not on file    Years of education: Not on file    Highest education level: Not on file   Occupational History    Not on file   Tobacco Use    Smoking status: Never    Smokeless tobacco: Not on file   Substance and Sexual Activity    Alcohol use: Yes     Comment: 1.75L per day    Drug use: Not Currently    Sexual activity: Not on file   Other Topics Concern    Not on file   Social History Narrative    Not on file     Social Determinants of Health     Financial Resource Strain: Low Risk  (9/25/2023)    Received from [facility], [facility]    Financial Resource Strain     Difficulty of Paying Living Expenses: 3     Difficulty of Paying Living Expenses: Not on  file   Food Insecurity: No Food Insecurity (9/25/2023)    Received from [facility], [facility]    Food Insecurity     Worried About Running Out of Food in the Last Year: 1   Transportation Needs: No Transportation Needs (9/25/2023)    Received from [facility], [facility]    Transportation Needs     Lack of Transportation (Medical): 1   Physical Activity: Not on file   Stress: Not on file   Social Connections: Socially Isolated (9/25/2023)    Received from [facility], [facility]    Social Connections     Frequency of Communication with Friends and Family: 4   Interpersonal Safety: Not on file   Housing Stability: Low Risk  (9/25/2023)    Received from [facility], [facility]    Housing Stability     Unable to Pay for Housing in the Last Year: 1       PHYSICAL EXAM:    /89   Pulse 64   Temp 97  F (36.1  C) (Axillary)   Resp 15   Wt 113 kg (249 lb 1.9 oz)   SpO2 100%   BMI 33.79 kg/m    GENERAL: Critically ill.   HEENT:  Normocephalic. Eyes closed. Intubated.  CV: RRR,   RESP: Clear bilaterally with good efforts  GI: Abdomen Soft.  MUSCULOSKELETAL: extremities LE are edematous.  SKIN: Juandice  NEURO:  Intubated  PSYCH: Unable  LYMPH: No palpable ant/post cervical    LABS:      CBC RESULTS:     Recent Labs   Lab 04/20/24  0348 04/19/24  1802 04/19/24  0458 04/19/24  0046 04/18/24  1722 04/18/24  1516 04/18/24  1027   WBC 3.3* 3.6* 11.1* 13.0*  --  10.7 9.5   RBC 2.28* 2.22* 2.37* 2.45*  --  2.43* 1.56*   HGB 7.7* 7.2* 7.9* 8.0* 8.0* 7.9* 5.5*   HCT 22.1* 21.0* 22.3* 22.8*  --  23.3* 15.9*   * 108* 154 146*  --  121* 125*       BMP RESULTS:  Recent Labs   Lab 04/20/24  0815 04/20/24  0355 04/20/24  0348 04/19/24  2328 04/19/24  2000  04/19/24  1802 04/19/24  0508 04/19/24  0458 04/18/24  2053 04/18/24  1722 04/18/24  1527 04/18/24  1516 04/18/24  1027   NA  --   --  138  --   --  139  --  139  --  136  --  136 137   POTASSIUM  --   --  4.3  --   --  4.1  --  4.0  --  4.0  --  4.2 3.9   CHLORIDE  --   --  107  --   --  107  --  105  --  104  --  104 104   CO2  --   --  23  --   --  23  --  23  --  22  --  21* 22   BUN  --   --  21.8*  --   --  18.7  --  17.1  --  13.9  --  13.4 12.9   CR  --   --  1.41*  --   --  1.23*  --  1.14  --  0.98  --  0.99 1.03   * 142* 164* 182* 173* 175*   < > 169*   < > 155*   < > 149* 110*   MIGUELINA  --   --  8.8  --   --  8.7  --  8.9  --  9.0  --  9.3 9.4    < > = values in this interval not displayed.       INR  Recent Labs   Lab 04/20/24  0348 04/19/24  0458 04/18/24  1516 04/17/24  1503   INR 2.32* 2.57* 2.46* 2.16*        DIAGNOSTICS:  Reviewed    CT CAP wo contrast:  1.  Small consolidations in the lower lobes are favored to represent atelectasis. There are tiny patchy round glass opacities in the superior segment of the left lower lobe and left upper lobe, which may be mild infectious in nature, however unlikely to   be cause of sepsis. Previous groundglass opacities in the right lung have resolved.     2.  Left femoral central venous catheter in place with expansion of the left external iliac vein by low density material, likely thrombus. There is also low density content in the adjacent left external iliac artery, also suspicious for thrombus.   Recommend further evaluation with CT angiography in the arterial and portal venous phases per GI bleeding protocol. This was relayed to Dr. Tinoco at 4:08 AM on 04/19/2024.     3.  Blood in the subcutaneous space of the left groin and in the left retroperitoneum. These areas may also be further evaluated for presence of active hemorrhage on CT angiography.     4.  Mild anemia.     5.  Marked distention of the gallbladder, nonspecific. No definite CT signs of acute  cholecystitis.     6.  Cirrhosis with stigmata of portal hypertension including intra-abdominal venous collateral vessels, recanalized umbilical vein, and small volume ascites. Mild wall thickening of the ascending and proximal transverse colon likely reflects portal   colopathy.     7.  Small bilateral pleural effusions and diffuse body wall edema.    A/P:  39 y.o man with alcoholic liver cirrhosis, admitted for hepatic encephalopathy, consulted for acute kidney injury.    # Baseline Scr ~ 0.9 mg/dl    # Acute kidney injury: Non-oliguric. Suspect GATITO is from renal hypoperfusion post intubation and severe anemia.    -UA with hyaline casts, no RBC   -no obstruction on CT    # Hepatic encephalopathy   -intubated.   -ammonia level normalized    # Severe anemia: Pancytopenia   -per primary team    # ID: on Zosyn    Plan:  # Can titrate up NE for SBP ~ 120  # Can do 25 grams of 25% albumin   # Avoid contrast  # Avoid NSAIDs  # Dose all medications to appropriate eGFR    I discussed the plan with Dr. Stinson in person.    Emir Degroot MD  [facility] - Nephrology  Office Phone: [phone]  Pager: [phone]

## 2024-04-20 NOTE — PROGRESS NOTES
[facility] ICU RESPIRATORY NOTE        Date of Admission: 4/17/2024    Date of Intubation (most recent): 4/18/24    Reason for Mechanical Ventilation: airway protectionn    Number of Days on Mechanical Ventilation: 3    Met Criteria for Spontaneous Breathing Trial: No    Reason for No Spontaneous Breathing Trial: Per MD    Significant Events Today: none over night    ABG Results:   Recent Labs   Lab 04/19/24  0046 04/18/24  1522 04/18/24  1027   PH  --  7.51*  --    PCO2  --  17*  --    PO2  --  157*  --    HCO3  --  14*  --    O2PER 30 30 0         Current Vent Settings: Vent Mode: CMV/AC  (Continuous Mandatory Ventilation/ Assist Control)  FiO2 (%): 25 %  Resp Rate (Set): 14 breaths/min  Tidal Volume (Set, mL): 500 mL  PEEP (cm H2O): 5 cmH2O  Resp: 15      Skin Assessment: scabs on lip RN aware- prior to intubation    Plan: follow care plan    Surendra Rodriguez, RT on 4/20/2024 at 4:35 AM

## 2024-04-20 NOTE — PROGRESS NOTES
[facility] ICU RESPIRATORY NOTE        Date of Admission: 4/17/2024    Date of Intubation (most recent): 4/18/24    Reason for Mechanical Ventilation: airway protection    Number of Days on Mechanical Ventilation: 3    Met Criteria for Spontaneous Breathing Trial: Yes -         Significant Events Today: CPAP/PS for 1 hour today    ABG Results:   Recent Labs   Lab 04/19/24  0046 04/18/24  1522 04/18/24  1027   PH  --  7.51*  --    PCO2  --  17*  --    PO2  --  157*  --    HCO3  --  14*  --    O2PER 30 30 0         Current Vent Settings: Vent Mode: CMV/AC  (Continuous Mandatory Ventilation/ Assist Control)  FiO2 (%): 25 %  Resp Rate (Set): 14 breaths/min  Tidal Volume (Set, mL): 500 mL  PEEP (cm H2O): 5 cmH2O  Pressure Support (cm H2O): 7 cmH2O  Resp: 14        Plan: follow care plan    Dago Lopes, RT on 4/20/2024 at 5:32 PM

## 2024-04-20 NOTE — PLAN OF CARE
"Goal Outcome Evaluation:    1900-0730    Patient was drowsy, but wakeful enough to follow commands like \"wiggle your toes\" or \"squeeze my hand\" when asked.  Patient still very somnolent.  Levophed was restart at the start of my shift for low MAP, (60-63).  Requiring low dose currently.  Patient stool through tube is slowing despite dosing of lactalose and miralax.  FiO2 decreased from 30% to 25%, patient saturation remains %.    Patient in sinus bradycardia.  Discussed with intensivist, ordered to continue monitoring in light of stable electrolytes, pressure tolerating, and  continued P waves present with regular rates.      Problem: Adult Inpatient Plan of Care  Goal: Plan of Care Review  Description: The Plan of Care Review/Shift note should be completed every shift.  The Outcome Evaluation is a brief statement about your assessment that the patient is improving, declining, or no change.  This information will be displayed automatically on your shift  note.  Outcome: Progressing  Goal: Patient-Specific Goal (Individualized)  Description: You can add care plan individualizations to a care plan. Examples of Individualization might be:  \"Parent requests to be called daily at 9am for status\", \"I have a hard time hearing out of my right ear\", or \"Do not touch me to wake me up as it startles  me\".  Outcome: Progressing  Goal: Absence of Hospital-Acquired Illness or Injury  Outcome: Progressing  Intervention: Prevent Skin Injury  Recent Flowsheet Documentation  Taken 4/19/2024 2000 by Kraig Roberts, RN  Body Position:   right   turned   heels elevated  Goal: Optimal Comfort and Wellbeing  Outcome: Progressing  Goal: Readiness for Transition of Care  Outcome: Progressing     "

## 2024-04-20 NOTE — PROGRESS NOTES
GASTROENTEROLOGY PROGRESS NOTE    SUBJECTIVE:  intubated and sedated    OBJECTIVE:    /72   Pulse 54   Temp (!) 96.5  F (35.8  C) (Temporal)   Resp 14   Wt 113 kg (249 lb 1.9 oz)   SpO2 99%   BMI 33.79 kg/m    Temp (24hrs), Av.2  F (36.2  C), Min:96.5  F (35.8  C), Max:98.1  F (36.7  C)    Patient Vitals for the past 72 hrs:   Weight   24 0529 113 kg (249 lb 1.9 oz)   24 0444 111.3 kg (245 lb 6 oz)   24 1045 108.2 kg (238 lb 8.6 oz)       Intake/Output Summary (Last 24 hours) at 2024 1456  Last data filed at 2024 1333  Gross per 24 hour   Intake 1978.52 ml   Output 1323 ml   Net 655.52 ml         PHYSICAL EXAM    Constitutional: intubated and sedated  Cardiovascular: RRR  Respiratory: CTAB  Abdomen: soft, non-tender  Skin- jaundiced        Additional Comments:  ROS, FH, SH: See initial GI consult for details.    I have reviewed the patient's new clinical lab results:    Recent Labs   Lab Test 24  1516   WBC 3.3* 3.6* 11.1*   < > 10.7   HGB 7.7* 7.2* 7.9*   < > 7.9*   MCV 97 95 94   < > 96   * 108* 154   < > 121*   INR 2.32*  --  2.57*  --  2.46*    < > = values in this interval not displayed.     Recent Labs   Lab Test 24    139 139   POTASSIUM 4.3 4.1 4.0   CHLORIDE 107 107 105   CO2 23 23 23   BUN 21.8* 18.7 17.1   CR 1.41* 1.23* 1.14   ANIONGAP 8 9 11   MIGUELINA 8.8 8.7 8.9     Recent Labs   Lab Test 24 24  1230 23  0536 23  1514 23  0724 23  1646 23  1537 23  1424 23  1224 23  1147   ALBUMIN 2.8* 2.7* 2.8*  --    < > 3.1*   < >  --   --  2.4*   < > 2.3*   BILITOTAL 10.3* 10.2* 11.3*  --    < > 8.4*   < >  --   --  21.7*   < > 10.4*   ALT 46 43 46  --    < > 49   < >  --   --  69   < > 82*   AST 82* 84* 96*  --    < > 222*   < >  --    < >  --    < > 331*    ALKPHOS 126 117 118  --    < > 330*   < >  --   --  182*   < > 200*   PROTEIN  --   --   --  30*  --  100*  --  30*  --   --    < >  --    LIPASE  --   --  56  --   --   --   --   --   --  237*  --  272*    < > = values in this interval not displayed.         A/P  Alcoholic liver disease was admitted with worsening encephalopathy requiring intubation, and alcoholic hepatitis with DF of 82.8, methylprednisolone 40 mg daily started 4/19/24, LFTs and INR today are unchanged but not worse.  Hgb stable at 7 range.  Lactulose 20 TID, on ceftriaxone, rifaximin, PPI BID.  GATITO being followed by renal.    Khari gonzales MD  [facility]

## 2024-04-21 NOTE — PROGRESS NOTES
Renal Medicine Progress Note            Assessment/Plan:     39 y.o man with alcoholic liver cirrhosis, admitted for hepatic encephalopathy, consulted for acute kidney injury.     # Baseline Scr ~ 0.9 mg/dl     # Acute kidney injury: Non-oliguric. Suspect GATITO is from renal hypoperfusion post intubation and severe anemia. Also hyperbilirubinemia.                -UA with hyaline casts, no RBC               -no obstruction on CT     # Hepatic encephalopathy               -intubated.               -ammonia level normalized     # Severe anemia: Pancytopenia               -per primary team     # ID: on Zosyn     Plan:  # Can titrate up NE for SBP ~ 120  # 25 grams of 25% albumin bid x 4 doses  # Renal panel in AM  # Cont to hold diuretic          Interval History:      Afebrile. VSS.  Decent urine output.  Extubated.  Edematous.  On low dose NE            Medications and Allergies:     Current Facility-Administered Medications   Medication Dose Route Frequency Provider Last Rate Last Admin    albumin human 5 % injection 25 g  25 g Intravenous Once Jaron Sandhu MD   Held at 04/18/24 1235    chlorhexidine (PERIDEX) 0.12 % solution 15 mL  15 mL Mouth/Throat Q12H Aaron Rosen, DO   15 mL at 04/21/24 0859    insulin aspart (NovoLOG) injection (RAPID ACTING)  1-7 Units Subcutaneous Q4H Leann Suh MD   1 Units at 04/21/24 0935    lactulose (CHRONULAC) solution 20 g  20 g Oral or Feeding Tube TID Aaron Rosen DO   20 g at 04/20/24 2012    methylPREDNISolone sodium succinate (SOLU-MEDROL) injection 40 mg  40 mg Intravenous Q24H Kristi Samano PA-C   40 mg at 04/20/24 1155    pantoprazole (PROTONIX) IV push injection 40 mg  40 mg Intravenous Q12H Hiren Vogt DO   40 mg at 04/21/24 0958    piperacillin-tazobactam (ZOSYN) 4.5 g vial to attach to  mL bag  4.5 g Intravenous Q6H Jaron Sandhu MD   4.5 g at 04/21/24 0938    polyethylene glycol (MIRALAX) Packet 34 g  34 g Oral BID Jaron Sandhu MD   34 g at  04/20/24 2012    rifaximin (XIFAXAN) tablet 550 mg  550 mg Oral or Feeding Tube BID Aaron Rosen,    550 mg at 04/20/24 2012    sodium chloride (PF) 0.9% PF flush 3 mL  3 mL Intracatheter Q8H Francheska Crocker MD   3 mL at 04/21/24 0528    thiamine (B-1) tablet 100 mg  100 mg Oral Daily Francheska Crocker MD   100 mg at 04/19/24 0835    Or    thiamine (B-1) injection 100 mg  100 mg Intravenous Daily Francheska Crocker MD   100 mg at 04/21/24 0859        Allergies   Allergen Reactions    Excedrin Extra Strength [Aspirin-Acetaminophen-Caffeine]      puffy eyes and dry throat a few years ago. Tolerates ibuprofen and acetaminophen, but avoids aspirin    Nsaids Angioedema     Patient reports having a reaction of puffy eyes and dry throat a few years ago, but he has taken Advil in 2023 and did not react.            Physical Exam:   Vitals were reviewed   , Blood pressure 111/62, pulse 74, temperature 98.1  F (36.7  C), temperature source Axillary, resp. rate 16, weight 115 kg (253 lb 8.5 oz), SpO2 95%.    Wt Readings from Last 3 Encounters:   04/21/24 115 kg (253 lb 8.5 oz)   04/09/24 122.5 kg (270 lb)   03/20/24 103.1 kg (227 lb 6.4 oz)       Intake/Output Summary (Last 24 hours) at 4/21/2024 1130  Last data filed at 4/21/2024 1000  Gross per 24 hour   Intake 2641.66 ml   Output 785 ml   Net 1856.66 ml       GENERAL APPEARANCE: NAD  HEENT:  Sleeping  RESP: lungs cta b c good efforts, no crackles, rhonchi or wheezes  CV: RRR, nl S1/S2  ABDOMEN: obese. Soft, NT  EXTREMITIES/SKIN: Jaundice, 2+edema  +godoy in place         Data:     CBC RESULTS:     Recent Labs   Lab 04/21/24  0437 04/20/24  1833 04/20/24  0348 04/19/24  1802 04/19/24  0458 04/19/24  0046   WBC 11.5* 5.5 3.3* 3.6* 11.1* 13.0*   RBC 2.68* 2.29* 2.28* 2.22* 2.37* 2.45*   HGB 8.8* 7.6* 7.7* 7.2* 7.9* 8.0*   HCT 25.8* 22.1* 22.1* 21.0* 22.3* 22.8*    127* 112* 108* 154 146*       Basic Metabolic Panel:  Recent Labs   Lab 04/21/24  0857 04/21/24  0437  04/20/24  2352 04/20/24 2024 04/20/24  1611 04/20/24  0355 04/20/24  0348 04/19/24 2000 04/19/24  1802 04/19/24  0508 04/19/24  0458 04/18/24 2053 04/18/24  1722 04/18/24  1527 04/18/24  1516   NA  --  139  --   --   --   --  138  --  139  --  139  --  136  --  136   POTASSIUM  --  4.8  --   --   --   --  4.3  --  4.1  --  4.0  --  4.0  --  4.2   CHLORIDE  --  106  --   --   --   --  107  --  107  --  105  --  104  --  104   CO2  --  22  --   --   --   --  23  --  23  --  23  --  22  --  21*   BUN  --  32.9*  --   --   --   --  21.8*  --  18.7  --  17.1  --  13.9  --  13.4   CR  --  1.85*  --   --   --   --  1.41*  --  1.23*  --  1.14  --  0.98  --  0.99   * 149*  159* 148* 128* 139*   < > 164*   < > 175*   < > 169*   < > 155*   < > 149*   MIGUELINA  --  9.0  --   --   --   --  8.8  --  8.7  --  8.9  --  9.0  --  9.3    < > = values in this interval not displayed.       INR  Recent Labs   Lab 04/21/24  0437 04/20/24 0348 04/19/24  0458 04/18/24  1516   INR 2.19* 2.32* 2.57* 2.46*      Attestation:   I have reviewed today's relevant vital signs, notes, medications, labs and imaging.    Emir Degroot MD  [facility] - Nephrology  Office phone :[phone]  Pager: [phone]

## 2024-04-21 NOTE — PROGRESS NOTES
[facility] ICU RESPIRATORY NOTE        Date of Admission: 4/17/2024    Date of Intubation (most recent): 4/18    Reason for Mechanical Ventilation: Airway protection    Number of Days on Mechanical Ventilation: 4    Met Criteria for Spontaneous Breathing Trial: Yes     Reason for No Spontaneous Breathing Trial: Patient PS all night no issue     Significant Events Today: None    ABG Results:   Recent Labs   Lab 04/19/24  0046 04/18/24  1522 04/18/24  1027   PH  --  7.51*  --    PCO2  --  17*  --    PO2  --  157*  --    HCO3  --  14*  --    O2PER 30 30 0         Current Vent Settings: Vent Mode: CPAP/PS  (Continuous positive airway pressure with Pressure Support)  FiO2 (%): 25 %  Resp Rate (Set): 14 breaths/min  Tidal Volume (Set, mL): 500 mL  PEEP (cm H2O): 5 cmH2O  Pressure Support (cm H2O): 7 cmH2O  Resp: 14      Skin Assessment: Intact    Plan: We will continue to wean as tolerated     RT Mar on 4/21/2024 at 5:23 AM

## 2024-04-21 NOTE — PROGRESS NOTES
1730: Pt more alert this afternoon, communicating with RN appropriately.   Placed pt back on PS/Cpap, 7/4. RT off unit at the time, updated. RT to come assess settings.   At 1900, Pt remains on wean, tolerating well.

## 2024-04-21 NOTE — PLAN OF CARE
Goal Outcome Evaluation:  restrains discontinued       Plan of Care Reviewed With: patient    Overall Patient Progress: improvingOverall Patient Progress: improving

## 2024-04-21 NOTE — PLAN OF CARE
Problem: Adult Inpatient Plan of Care  Goal: Absence of Hospital-Acquired Illness or Injury  Intervention: Identify and Manage Fall Risk  Recent Flowsheet Documentation  Taken 4/20/2024 1630 by Rafael Bo RN  Safety Promotion/Fall Prevention: clutter free environment maintained  Taken 4/20/2024 1200 by Rafael Bo RN  Safety Promotion/Fall Prevention: clutter free environment maintained     Problem: Adult Inpatient Plan of Care  Goal: Absence of Hospital-Acquired Illness or Injury  Intervention: Prevent Skin Injury  Recent Flowsheet Documentation  Taken 4/20/2024 1800 by Rafael Bo RN  Body Position:   turned   side-lying   heels elevated  Taken 4/20/2024 1630 by Rafael Bo RN  Body Position:   turned   side-lying   heels elevated  Skin Protection:   adhesive use limited   incontinence pads utilized  Device Skin Pressure Protection:   adhesive use limited   positioning supports utilized  Taken 4/20/2024 1400 by Rafael Bo RN  Body Position:   turned   side-lying   heels elevated  Taken 4/20/2024 1200 by Rafael Bo RN  Body Position:   turned   side-lying   heels elevated  Skin Protection:   adhesive use limited   incontinence pads utilized  Device Skin Pressure Protection:   adhesive use limited   positioning supports utilized  Taken 4/20/2024 1000 by Rafael Bo RN  Body Position:   turned   left   heels elevated     Problem: Adult Inpatient Plan of Care  Goal: Absence of Hospital-Acquired Illness or Injury  Intervention: Prevent and Manage VTE (Venous Thromboembolism) Risk  Recent Flowsheet Documentation  Taken 4/20/2024 1630 by Rafael Bo RN  VTE Prevention/Management: SCDs (sequential compression devices) on  Taken 4/20/2024 1200 by Rafael Bo RN  VTE Prevention/Management: SCDs (sequential compression devices) on     Problem: Adult Inpatient Plan of Care  Goal: Absence of Hospital-Acquired Illness or Injury  Intervention: Prevent Infection  Recent Flowsheet  Documentation  Taken 4/20/2024 1630 by Rafael Bo RN  Infection Prevention: rest/sleep promoted  Taken 4/20/2024 1200 by Rafael Bo RN  Infection Prevention: rest/sleep promoted     Problem: Adult Inpatient Plan of Care  Goal: Optimal Comfort and Wellbeing  Intervention: Provide Person-Centered Care  Recent Flowsheet Documentation  Taken 4/20/2024 1630 by Rafael Bo RN  Trust Relationship/Rapport:   care explained   choices provided   reassurance provided  Taken 4/20/2024 1200 by Rafael Bo RN  Trust Relationship/Rapport:   care explained   choices provided   reassurance provided     Problem: Mechanical Ventilation Invasive  Goal: Effective Communication  Intervention: Ensure Effective Communication  Recent Flowsheet Documentation  Taken 4/20/2024 1630 by Rafael Bo RN  Communication Enhancement Strategies:   one-step directions provided   extra time allowed for response  Trust Relationship/Rapport:   care explained   choices provided   reassurance provided  Taken 4/20/2024 1200 by Rafael Bo RN  Communication Enhancement Strategies:   one-step directions provided   extra time allowed for response  Family/Support System Care:   caregiver stress acknowledged   presence promoted   support provided  Trust Relationship/Rapport:   care explained   choices provided   reassurance provided     Problem: Mechanical Ventilation Invasive  Goal: Optimal Device Function  Intervention: Optimize Device Care and Function  Recent Flowsheet Documentation  Taken 4/20/2024 1800 by Rafael Bo RN  Oral Care:   swabbed with antiseptic solution   oral rinse provided  Taken 4/20/2024 1630 by Rafael Bo RN  Airway Safety Measures: all equipment/monitors on and audible  Airway/Ventilation Management: airway patency maintained  Oral Care:   oral rinse provided   swabbed with antiseptic solution  Taken 4/20/2024 1400 by Rafael Bo RN  Oral Care:   swabbed with antiseptic solution   oral rinse provided  Taken  4/20/2024 1200 by Rafael Bo, RN  Airway Safety Measures: all equipment/monitors on and audible  Airway/Ventilation Management: airway patency maintained  Oral Care: oral rinse provided     Problem: Mechanical Ventilation Invasive  Goal: Mechanical Ventilation Liberation  Intervention: Promote Extubation and Mechanical Ventilation Liberation  Recent Flowsheet Documentation  Taken 4/20/2024 1630 by Rafael Bo, RN  Medication Review/Management: medications reviewed  Environmental Support: calm environment promoted  Taken 4/20/2024 1200 by Rafael Bo, RN  Medication Review/Management: medications reviewed  Environmental Support: calm environment promoted     Problem: Mechanical Ventilation Invasive  Goal: Absence of Device-Related Skin and Tissue Injury  Intervention: Maintain Skin and Tissue Health  Recent Flowsheet Documentation  Taken 4/20/2024 1630 by Rafael Bo RN  Device Skin Pressure Protection:   adhesive use limited   positioning supports utilized  Taken 4/20/2024 1200 by Rafael Bo RN  Device Skin Pressure Protection:   adhesive use limited   positioning supports utilized     Problem: Mechanical Ventilation Invasive  Goal: Absence of Ventilator-Induced Lung Injury  Intervention: Prevent Ventilator-Associated Pneumonia  Recent Flowsheet Documentation  Taken 4/20/2024 1800 by Rafael Bo, RN  Oral Care:   swabbed with antiseptic solution   oral rinse provided  Head of Bed (HOB) Positioning: HOB at 30 degrees  Taken 4/20/2024 1630 by Rafael Bo RN  VAP Prevention Bundle:   HOB elevation maintained   oral care regularly provided   readiness to extubate assessed   sedation interruption performed   spontaneous breathing trial performed   stress ulcer prophylaxis provided   vent circuit breaks minimized   VTE prophylaxis provided  Oral Care:   oral rinse provided   swabbed with antiseptic solution  Head of Bed (HOB) Positioning: HOB at 30 degrees  VAP Prevention Measures: completed  Taken  4/20/2024 1400 by Rafael Bo, RN  Oral Care:   swabbed with antiseptic solution   oral rinse provided  Head of Bed (HOB) Positioning: HOB at 30 degrees  Taken 4/20/2024 1200 by Rafael Bo RN  VAP Prevention Bundle:   HOB elevation maintained   oral care regularly provided   readiness to extubate assessed   sedation interruption performed   spontaneous breathing trial performed   stress ulcer prophylaxis provided   vent circuit breaks minimized   VTE prophylaxis provided  Oral Care: oral rinse provided  Head of Bed (HOB) Positioning: HOB at 30 degrees  VAP Prevention Measures: completed  Taken 4/20/2024 1000 by Rafael Bo RN  Head of Bed (HOB) Positioning: HOB at 30 degrees     Problem: Restraint, Nonviolent  Goal: Absence of Harm or Injury  Intervention: Implement Least Restrictive Safety Strategies  Recent Flowsheet Documentation  Taken 4/20/2024 1630 by Rafael Bo RN  Medical Device Protection: IV pole/bag removed from visual field  Taken 4/20/2024 1200 by Rafael Bo RN  Medical Device Protection: IV pole/bag removed from visual field     Problem: Delirium  Goal: Improved Behavioral Control  Intervention: Minimize Safety Risk  Recent Flowsheet Documentation  Taken 4/20/2024 1630 by Rafael Bo, RN  Communication Enhancement Strategies:   one-step directions provided   extra time allowed for response  Enhanced Safety Measures:   pain management   review medications for side effects with activity  Trust Relationship/Rapport:   care explained   choices provided   reassurance provided  Taken 4/20/2024 1200 by Rafael Bo, RN  Communication Enhancement Strategies:   one-step directions provided   extra time allowed for response  Enhanced Safety Measures:   pain management   review medications for side effects with activity  Trust Relationship/Rapport:   care explained   choices provided   reassurance provided     Problem: Delirium  Goal: Improved Attention and Thought Clarity  Intervention:  Maximize Cognitive Function  Recent Flowsheet Documentation  Taken 4/20/2024 1630 by Rafael Bo, RN  Reorientation Measures:   calendar in view   clock in view   reorientation provided  Taken 4/20/2024 1200 by Rafael Bo, RN  Reorientation Measures:   calendar in view   clock in view   reorientation provided   Goal Outcome Evaluation: Patient follows commands, maintained RASS score -1 and still intubated. CV SR. Drips levo is running. Feeding tube is running 10 and the goal is 20.           Plan of Care Reviewed With: patient    Overall Patient Progress: improvingOverall Patient Progress: improving

## 2024-04-21 NOTE — PROGRESS NOTES
Patient extubated @ 0930 to 3L NC sating at 96%. Breath sounds clear. Respiratory will continue to monitor.    BP (!) 142/86   Pulse 80   Temp 98.7  F (37.1  C) (Temporal)   Resp 11   Wt 115 kg (253 lb 8.5 oz)   SpO2 98%   BMI 34.38 kg/m

## 2024-04-21 NOTE — PROGRESS NOTES
Critical Care Progress Note      04/21/2024    Name: Crispin Ham MRN#: [id number]   Age: 39 year old YOB: 1985     Hsptl Day# 4  ICU DAY #    MV DAY #             Problem List:   Active Problems:    Hepatic encephalopathy (H)    Anemia in other chronic diseases classified elsewhere           Summary/Hospital Course:     HPI 39-year-old man with PMH of alcohol use disorder, chronic decompensated cirrhosis with history of SBP, HFpEF, hypertension, pleural effusions previously requiring paracentesis, small esophageal varices present on 3/16/2024.  He was admitted 4/17/24 w/ worsening encephalopathy.  CT head in ED neg.     - Transferred to ICU for worsening encephalopathy Intubated for airway protection and will get EGD.     #4/19:   - Follow closely for encephalopathy  -IF he doesn't wake up by Sunday then consider MRI and EEG     4/20   Unable to extubate [place]    4/21;  -Extubated. [place] Giving albumin.   -Nasal crusting Ent consulted. EGD negative for active bleed   -Prednisone for alcoholic hepatitis.     Assessment and plan :     Crispin Ham IS a 39 year old male admitted on 4/17/2024 for  Upper gi bleeding   I have personally reviewed the daily labs, imaging studies, cultures and discussed the case with referring physician and consulting physicians.     My assessment and plan by system for this patient is as follows:    Neurology/Psychiatry:   -Hepatic encephalopathy.  - Ammonia. Daily checks.  Normalized. Extubated omn 4/21   -CT head is negative.     Cardiovascular:   - Minimal pressers.     Pulmonary/Ventilator Management:   - Stable vent.  Pleural effusion atelectasis     GI and Nutrition : MELD 3.0: 31 at 4/21/2024  4:37 AM  MELD-Na: 30 at 4/21/2024  4:37 AM  Calculated from:  Serum Creatinine: 1.85 mg/dL at 4/21/2024  4:37 AM  Serum Sodium: 139 mmol/L (Using max of 137 mmol/L) at 4/21/2024  4:37 AM  Total Bilirubin: 10.8 mg/dL at 4/21/2024  4:37 AM  Serum Albumin: 3.0 g/dL at  4/21/2024  4:37 AM  INR(ratio): 2.19 at 4/21/2024  4:37 AM  Age at listing (hypothetical): 39 years  Sex: Male at 4/21/2024  4:37 AM    - Alcoholic liver cirrhosis.  -  EGD did not show any active bleeding. NO intervention for now.   - PPI  IV BID   Zosyn 4/18  -  Lactulose + Miralax   - Rifaximin.     -Steroids for possile alcoolic hepatitis as per Gi       Renal/Fluids/Electrolytes:   -GATITO.  -Nephro on board.  -Giving albumin.     Infectious Disease:   - Procalcitonin is nefgative.   - Bloood culture negatie so far.   -Broad spectrum abx.  Zosyn on 4/18  Will finish 7 days for empiric infection.    Endocrine:   - ISS    Hematology/Oncology:   -  Anemia.  -Blood loss anemia.   -  2 units of RBC given. HB stable sicne then   -  Leucocytosis post steroids.     -? Nasal bleeding. Needs to be eval by Ent  -Consult placed.     MSKL/Rheum:  -   Post femoral line placement hematomoa no majpor intervention needed.     IV/Access:   1. Venous access -  Left femoral.  Can be removed one off pressers   2. Arterial access -  NAD      ICU Prophylaxis:   1. DVT: Heparin subcutaneous started. Watch for nasal bleeding   2. Stress Ulcer: PPI  3. Restraints: Nonviolent soft two point restraints required and necessary for patient safety and continued cares and good effect as patient continues to pull at necessary lines, tubes despite education and distraction. Will readdress daily.   4. Feeding -  Nutrition tubee feeding plus oral diet  5. Family Update: Parents at bedside  6. Disposition -  To stay in ICU  for today              Key Medications:     Current Facility-Administered Medications   Medication Dose Route Frequency Provider Last Rate Last Admin    albumin human 25 % injection 25 g  25 g Intravenous BID Emir Degroot MD   25 g at 04/21/24 1255    albumin human 5 % injection 25 g  25 g Intravenous Once Jaron Sandhu MD   Held at 04/18/24 1235    chlorhexidine (PERIDEX) 0.12 % solution 15 mL  15 mL Mouth/Throat Q12H Silas  Aaron DO   15 mL at 04/21/24 0859    insulin aspart (NovoLOG) injection (RAPID ACTING)  1-7 Units Subcutaneous Q4H Leann Suh MD   1 Units at 04/21/24 1652    lactulose (CHRONULAC) solution 20 g  20 g Oral or Feeding Tube TID Aaron Rosen DO   20 g at 04/21/24 1506    methylPREDNISolone sodium succinate (SOLU-MEDROL) injection 40 mg  40 mg Intravenous Q24H Kristi Samano PA-C   40 mg at 04/21/24 1302    pantoprazole (PROTONIX) IV push injection 40 mg  40 mg Intravenous Q12H Hiren Vogt DO   40 mg at 04/21/24 0958    piperacillin-tazobactam (ZOSYN) 4.5 g vial to attach to  mL bag  4.5 g Intravenous Q6H Jaron Sandhu MD   4.5 g at 04/21/24 1552    polyethylene glycol (MIRALAX) Packet 34 g  34 g Oral BID Jaron Sandhu MD   34 g at 04/20/24 2012    rifaximin (XIFAXAN) tablet 550 mg  550 mg Oral or Feeding Tube BID Aaron Rosen DO   550 mg at 04/20/24 2012    sodium chloride (PF) 0.9% PF flush 3 mL  3 mL Intracatheter Q8H Francheska Crocker MD   3 mL at 04/21/24 1305    thiamine (B-1) tablet 100 mg  100 mg Oral Daily Francheska Crocker MD   100 mg at 04/19/24 0835    Or    thiamine (B-1) injection 100 mg  100 mg Intravenous Daily Francheska Crocker MD   100 mg at 04/21/24 0859     Current Facility-Administered Medications   Medication Dose Route Frequency Provider Last Rate Last Admin    Daily 2 GRAM acetaminophen limit, unless fulminent liver failure or transaminases greater than or equal to 300 - 400, then none   Does not apply Continuous PRN Francheska Crocker MD        dexmedeTOMIDine (PRECEDEX) 4 mcg/mL in sodium chloride 0.9 % 100 mL infusion  0.1-1.2 mcg/kg/hr Intravenous Continuous Jaron Sandhu MD   Stopped at 04/21/24 0900    dextrose 10% infusion   Intravenous Continuous PRN Jaron Sandhu MD        fentaNYL (SUBLIMAZE) infusion   mcg/hr Intravenous Continuous Sandhu, Hem, MD   Stopped at 04/21/24 0929    norepinephrine (LEVOPHED) 4 mg in  mL infusion PREMIX  0.01-0.6 mcg/kg/min  Intravenous Continuous Leann Suh MD 27.6 mL/hr at 04/21/24 1115 0.06 mcg/kg/min at 04/21/24 1115    sodium chloride 0.9 % infusion   Intravenous Continuous Jaron Sandhu MD 30 mL/hr at 04/20/24 0739 Rate Verify at 04/20/24 0739               Physical Examination:   Temp:  [98  F (36.7  C)-98.7  F (37.1  C)] 98  F (36.7  C)  Pulse:  [57-91] 65  Resp:  [10-19] 15  BP: ()/(48-98) 115/56  FiO2 (%):  [25 %] 25 %  SpO2:  [89 %-100 %] 96 %    Intake/Output Summary (Last 24 hours) at 4/18/2024 0958  Last data filed at 4/18/2024 0514  Gross per 24 hour   Intake 103 ml   Output --   Net 103 ml     Wt Readings from Last 4 Encounters:   04/21/24 115 kg (253 lb 8.5 oz)   04/09/24 122.5 kg (270 lb)   03/20/24 103.1 kg (227 lb 6.4 oz)   02/21/24 108.9 kg (240 lb)     BP - Mean:  [] 72  Vent Mode: PS  (Pressure Support)  FiO2 (%): 25 %  Resp Rate (Set): 14 breaths/min  Tidal Volume (Set, mL): 500 mL  PEEP (cm H2O): 5 cmH2O  Pressure Support (cm H2O): 5 cmH2O  Resp: 15    Recent Labs   Lab 04/19/24  0046 04/18/24  1522 04/18/24  1027   PH  --  7.51*  --    PCO2  --  17*  --    PO2  --  157*  --    HCO3  --  14*  --    O2PER 30 30 0       GEN: Confused and altered.   HEENT: head ncat, sclera anicteric, OP patent, trachea midline   PULM: unlabored synchronous with vent, clear anteriorly    CV/COR: RRR S1S2 no gallop,  No rub, no murmur  ABD: soft nontender, hypoactive bowel sounds, no mass  EXT:  Edema   warm  NEURO: grossly intact  SKIN: no obvious rash  LINES: clean, dry intact         Data:   All data and imaging reviewed     ROUTINE ICU LABS (Last four results)  CMP  Recent Labs   Lab 04/21/24  1558 04/21/24  1252 04/21/24  0857 04/21/24  0437 04/20/24  0355 04/20/24  0348 04/19/24 2000 04/19/24  1802 04/19/24  0508 04/19/24  0458 04/18/24  1527 04/18/24  1516   NA  --   --   --  139  --  138  --  139  --  139   < > 136   POTASSIUM  --   --   --  4.8  --  4.3  --  4.1  --  4.0   < > 4.2   CHLORIDE  --   --    --  106  --  107  --  107  --  105   < > 104   CO2  --   --   --  22  --  23  --  23  --  23   < > 21*   ANIONGAP  --   --   --  11  --  8  --  9  --  11   < > 11   * 158* 155* 149*  159*   < > 164*   < > 175*   < > 169*   < > 149*   BUN  --   --   --  32.9*  --  21.8*  --  18.7  --  17.1   < > 13.4   CR  --   --   --  1.85*  --  1.41*  --  1.23*  --  1.14   < > 0.99   GFRESTIMATED  --   --   --  48*  --  66  --  78  --  85   < > >90   MIGUELINA  --   --   --  9.0  --  8.8  --  8.7  --  8.9   < > 9.3   MAG  --   --   --  1.9  --  1.7  --   --   --   --   --  1.4*   PHOS  --   --   --  5.7*  --  5.2*  --   --   --   --   --  4.2   PROTTOTAL  --   --   --  6.8  --  6.0*  --  5.7*  --  5.8*  --   --    ALBUMIN  --   --   --  3.0*  --  2.8*  --  2.7*  --  2.8*  --   --    BILITOTAL  --   --   --  10.8*  --  10.3*  --  10.2*  --  11.3*  --   --    ALKPHOS  --   --   --  126  --  126  --  117  --  118  --   --    AST  --   --   --  76*  --  82*  --  84*  --  96*  --   --    ALT  --   --   --  48  --  46  --  43  --  46  --   --     < > = values in this interval not displayed.     CBC  Recent Labs   Lab 04/21/24  0437 04/20/24  1833 04/20/24  0348 04/19/24  1802 04/18/24  0619 04/17/24  1503   WBC 11.5* 5.5 3.3* 3.6*   < > 3.9*   RBC 2.68* 2.29* 2.28* 2.22*   < > 1.85*   HGB 8.8* 7.6* 7.7* 7.2*   < > 6.7*   HCT 25.8* 22.1* 22.1* 21.0*   < > 18.8*   MCV 96 97 97 95   < > 102*   MCH 32.8 33.2* 33.8* 32.4   < > 36.2*   MCHC 34.1 34.4 34.8 34.3   < > 35.6   RDW  --   --   --   --   --  22.6*    127* 112* 108*   < > 117*    < > = values in this interval not displayed.     INR  Recent Labs   Lab 04/21/24  0437 04/20/24  0348 04/19/24  0458 04/18/24  1516   INR 2.19* 2.32* 2.57* 2.46*     Arterial Blood Gas  Recent Labs   Lab 04/19/24  0046 04/18/24  1522 04/18/24  1027   PH  --  7.51*  --    PCO2  --  17*  --    PO2  --  157*  --    HCO3  --  14*  --    O2PER 30 30 0       All cultures:  No results for input(s):  "\"CULT\" in the last 168 hours.  No results found for this or any previous visit (from the past 24 hour(s)).        [facility]  CRITICAL CARE STAFF NOTE    I am managing these acute and ongoing critical issues resulting in critical condition that impairs one or more vital organ systems, incur a high probability of imminent or life-threatening deterioration in the patient's condition and providing frequent personal assessment and manipulation of medications and life support equipment.     1.  Hepatic encephalopathy   2.  Upper gi bleeding   3. Severe alcoholic lvier cirrhosis.     ICU Interventions: ventilator management, hemorrhage control, major , management of bleeding diathesis including critical illness coagulopathy, anemia of critical illness, aplastic anemia, DIC, hemophilia, ITP, leukemia, and/or TTP, and interpretation of lab values, cardiac output, cxr, pulse oximetry, blood gases, and/or information/data stored in computers     We have discussed the patient in detail and I agree with the findings, assessment, and plan as documented when this note was cosigned on this day. The plan was formulated in conjunction with pharmacy, ICU nurses, and respiratory therapist. I have evaluated all laboratory values and imaging studies for the past 24 hours. I have reviewed all the consults that have been ordered and are active for this patient.      Critical Care Time: 40 min.  I spent this time (excluding procedures) personally providing and directing critical care services at the bedside and on the critical care unit.      Jaron JORDAN MPH   of Medicine  Pager: [phone]     Clinically Significant Risk Factors              # Hypoalbuminemia: Lowest albumin = 2.7 g/dL at 4/19/2024  6:02 PM, will monitor as appropriate    # Coagulation Defect: INR = 2.19 (Ref range: 0.85 - 1.15) and/or PTT = 59 Seconds (Ref range: 22 - 38 Seconds), will monitor for bleeding  # Thrombocytopenia: Lowest " platelets = 108 in last 2 days, will monitor for bleeding  # Acute Kidney Injury, unspecified: based on a >150% or 0.3 mg/dL increase in last creatinine compared to past 90 day average, will monitor renal function         # Obesity: Estimated body mass index is 34.38 kg/m  as calculated from the following:    Height as of 4/9/24: 1.829 m (6').    Weight as of this encounter: 115 kg (253 lb 8.5 oz)., PRESENT ON ADMISSION  # Moderate Malnutrition: based on nutrition assessment, PRESENT ON ADMISSION     # Financial/Environmental Concerns:

## 2024-04-21 NOTE — PLAN OF CARE
"Goal Outcome Evaluation:    ICU End of Shift Summary. See flowsheets for vital signs and detailed assessment.    Changes this shift: Patient on increased levophed for SBP goal of 120 per recommendation of neph.  Patient Alert and following. Pressure supported throughout the night with volumes 500-700mL.    Plan: Consider extubation, wean pressors, continue to monitor renal function.            Problem: Adult Inpatient Plan of Care  Goal: Plan of Care Review  Description: The Plan of Care Review/Shift note should be completed every shift.  The Outcome Evaluation is a brief statement about your assessment that the patient is improving, declining, or no change.  This information will be displayed automatically on your shift  note.  Outcome: Progressing  Goal: Patient-Specific Goal (Individualized)  Description: You can add care plan individualizations to a care plan. Examples of Individualization might be:  \"Parent requests to be called daily at 9am for status\", \"I have a hard time hearing out of my right ear\", or \"Do not touch me to wake me up as it startles  me\".  Outcome: Progressing  Goal: Absence of Hospital-Acquired Illness or Injury  Outcome: Progressing  Intervention: Identify and Manage Fall Risk  Recent Flowsheet Documentation  Taken 4/21/2024 0000 by Kraig Roberts, RN  Safety Promotion/Fall Prevention:   clutter free environment maintained   lighting adjusted   room near nurse's station   safety round/check completed  Taken 4/20/2024 2000 by Kraig Roberts RN  Safety Promotion/Fall Prevention:   clutter free environment maintained   lighting adjusted   room near nurse's station   safety round/check completed  Intervention: Prevent Skin Injury  Recent Flowsheet Documentation  Taken 4/21/2024 0000 by Kraig Roberts RN  Body Position:   right   turned   heels elevated  Taken 4/20/2024 2200 by Kraig Roberts RN  Body Position:   turned   left  Taken 4/20/2024 2000 by Kraig Roberts, " RN  Body Position:   right   turned   heels elevated  Intervention: Prevent and Manage VTE (Venous Thromboembolism) Risk  Recent Flowsheet Documentation  Taken 4/21/2024 0000 by Kraig Roberts RN  VTE Prevention/Management: SCDs (sequential compression devices) on  Taken 4/20/2024 2000 by Kraig Roberts RN  VTE Prevention/Management: SCDs (sequential compression devices) on  Intervention: Prevent Infection  Recent Flowsheet Documentation  Taken 4/21/2024 0000 by Kraig Roberts RN  Infection Prevention:   environmental surveillance performed   equipment surfaces disinfected   hand hygiene promoted   rest/sleep promoted  Taken 4/20/2024 2000 by Kraig Roberts RN  Infection Prevention:   environmental surveillance performed   equipment surfaces disinfected   hand hygiene promoted   rest/sleep promoted  Goal: Optimal Comfort and Wellbeing  Outcome: Progressing  Intervention: Monitor Pain and Promote Comfort  Recent Flowsheet Documentation  Taken 4/21/2024 0000 by Kraig Roberts RN  Pain Management Interventions: declines  Taken 4/20/2024 2000 by Kraig Roberts RN  Pain Management Interventions: declines  Intervention: Provide Person-Centered Care  Recent Flowsheet Documentation  Taken 4/21/2024 0000 by Kraig Roberts RN  Trust Relationship/Rapport:   care explained   questions answered   reassurance provided   thoughts/feelings acknowledged  Taken 4/20/2024 2000 by Kraig Roberts RN  Trust Relationship/Rapport:   care explained   questions answered   reassurance provided   thoughts/feelings acknowledged  Goal: Readiness for Transition of Care  Outcome: Progressing

## 2024-04-21 NOTE — PROGRESS NOTES
GI chart check:  Bili remains stably elevated at 10 range.  Lille score will be calculated on day 7 of starting steroids.  We will see patient tomorrow.    Khari Cole MD  [facility]

## 2024-04-21 NOTE — PROGRESS NOTES
04/21/24 1500   Appointment Info   Signing Clinician's Name / Credentials (OT) LOR Avalos   Rehab Comments (OT) Femoral IV lines (passed the 90 degree-90 degree test in bed and seated EOB without occluding IVs on 4/21)   Living Environment   People in Home parent(s)   Current Living Arrangements house   Home Accessibility stairs to enter home;stairs within home   Number of Stairs, Main Entrance 2   Stair Railings, Main Entrance none   Number of Stairs, Within Home, Primary greater than 10 stairs   Stair Railings, Within Home, Primary railings safe and in good condition   Living Environment Comments Pt is a questionable historian, will need to clarify with family about home set up. Pt lives in a home with his parents. Reports two stairs to enter, all pt needs met on main level. Pt has a walk in shower and tub shower and standard toilets   Self-Care   Usual Activity Tolerance moderate   Current Activity Tolerance poor   Regular Exercise No   Equipment Currently Used at Home cane, straight   Fall history within last six months yes   Number of times patient has fallen within last six months 2  (at least 2 via chart review, pt unable to state)   Activity/Exercise/Self-Care Comment Pt reports independence with ADLs and ambulation with a cane at baseline. Endorses recent difficulty with LE dressing due to edema   Instrumental Activities of Daily Living (IADL)   Previous Responsibilities meal prep;housekeeping;laundry;shopping;medication management;finances   IADL Comments Pt reports he does not drive, family able to assist as needed with IADLS. Reports he manages his own meds   General Information   Onset of Illness/Injury or Date of Surgery 04/18/24   Referring Physician Francheska Crocker MD   Patient/Family Therapy Goal Statement (OT) Pt would like to return home   Additional Occupational Profile Info/Pertinent History of Current Problem 39-year-old man with PMH of alcohol use disorder, chronic decompensated  cirrhosis with history of SBP, HFpEF, hypertension, pleural effusions previously requiring paracentesis, small esophageal varices present on 3/16/2024.  He was admitted 4/17/24 w/ worsening encephalopathy.  CT head in ED neg.      - Transferred to ICU for worsening encephalopathy Intubated for airway protection and will get EGD.   Existing Precautions/Restrictions fall   Limitations/Impairments safety/cognitive   Cognitive Status Examination   Orientation Status person;place  (able to report he is in [facility], reports the year is 1983. Able to report month. Requires logical cuing to recall year and date)   Affect/Mental Status (Cognitive) flat/blunted affect;low arousal/lethargic;confused   Follows Commands follows one-step commands;over 90% accuracy;delayed response/completion;repetition of directions required   Memory Deficit severe deficit   Attention Deficit severe deficit   Executive Function Deficit severe deficit   Cognitive Status Comments Pt is positive for delirium according to the [facility]. Appears lethargic this date requiring cues to attend to session   Cognitive Screens/Assessments   Cognitive Assessments Completed Other Cognitive Screen/Assessment   Cognitive Assessment Test [facility] ICU   Cognitive Assessment Test Score Positive for delirium   Visual Perception   Visual Impairment/Limitations corrective lenses full-time   Sensory   Sensory Comments Pt reports tingling in BLE   Pain Assessment   Patient Currently in Pain Yes, see Vital Sign flowsheet   Posture   Posture forward head position;protracted shoulders   Range of Motion Comprehensive   Comment, General Range of Motion LE ROM limited due to edema, BUE delayed ROM (appears due to cognition)   Strength Comprehensive (MMT)   Comment, General Manual Muscle Testing (MMT) Assessment Global weakness, poor tolerance for activity in standing   Coordination   Coordination Comments Delayed   Bed Mobility   Comment (Bed Mobility) Min assist    Transfers   Transfer Comments Min assist   Balance   Balance Comments Mild impairment in standing   Activities of Daily Living   BADL Assessment/Intervention bathing;lower body dressing;grooming;toileting   Bathing Assessment/Intervention   Comment, (Bathing) Max assist for LE bathing per clinical reasoning   Lower Body Dressing Assessment/Training   Comment, (Lower Body Dressing) Max assist   Grooming Assessment/Training   Comment, (Grooming) Poor tolerance for activity in standing   Toileting   Comment, (Toileting) Mod assist per clinical reasoning   Clinical Impression   Criteria for Skilled Therapeutic Interventions Met (OT) Yes, treatment indicated   OT Diagnosis Decline in ADL independence and safety   Influenced by the following impairments encephalopathy, upper GI bleed, cirrohsis   OT Problem List-Impairments impacting ADL problems related to;activity tolerance impaired;balance;cognition;coordination;mobility;range of motion (ROM);sensation;strength;pain   Assessment of Occupational Performance 3-5 Performance Deficits   Identified Performance Deficits Impaired dressing bathing toileting and activity tolerance for grooming with cognitive decline   Planned Therapy Interventions (OT) ADL retraining;cognition;progressive activity/exercise   Clinical Decision Making Complexity (OT) problem focused assessment/low complexity   Risk & Benefits of therapy have been explained evaluation/treatment results reviewed;risks/benefits reviewed;care plan/treatment goals reviewed;current/potential barriers reviewed;participants voiced agreement with care plan;participants included;patient   OT Total Evaluation Time   OT Eval, Low Complexity Minutes (93375) 9   OT Goals   Therapy Frequency (OT) Daily   OT Predicted Duration/Target Date for Goal Attainment 04/29/24   OT Goals Hygiene/Grooming;Lower Body Dressing;Lower Body Bathing;Toilet Transfer/Toileting;Cognition   OT: Hygiene/Grooming modified independent;using adaptive  equipment;while standing   OT: Lower Body Dressing Modified independent;using adaptive equipment;including set-up/clothing retrieval   OT: Lower Body Bathing Modified independent;using adaptive equipment   OT: Toilet Transfer/Toileting Modified independent;toilet transfer;cleaning and garment management;using adaptive equipment   OT: Cognitive Patient/caregiver will verbalize understanding of cognitive assessment results/recommendations as needed for safe discharge planning   Therapeutic Activities   Therapeutic Activity Minutes (27331) 40   Symptoms noted during/after treatment fatigue;increased pain;shortness of breath   Treatment Detail/Skilled Intervention OT; Pt agreeable to therapy. Pt presents with significant decline in attention requiring repeated physical and verbal cues to attend to therapist.  Pt has L femoral IV, pt hip and knee placed in 90 degrees of flexion in bed, IV continued to run without occlusion, RN oked progressing with session. Supine pt BP 120s systolic. Pt completed supine to sit with cues for technique, min assist for LE and min assist for trunk support. EOB pt initially retropulsive, cues to weight shift forward for seated balance needed for LE dressing independence. EOB BP assessed, 110s with pt asymptomatic, femoral IV continued to flow without occlusion EOB. Pt cued for LE dressing, pt unable to lean forward to don socks. PT educated on figure four technique, pt unable to compelte EOB with assist due to LE edema and pain when attempting position, max assist to don socks. PT compelted sit to stand with cues for hand placement on bed/walker and min assist at the gait belt. In standing BP stable in 100s with pt asymptomatic. PT pivoted to chair with cues for walker placement, CGA, and cues for technique/alignment. Pt required min assist during stand to sit to control descent. Pt educated on results of the [facility] ICU, pt offered delirium prevention tools including window shade open,  orientation to date/situation, etc. Pt appeared more alert in chair but continued to be fatigued. Pt in chair with alarm on and call light upon OT departure   OT Discharge Planning   OT Plan Toilet transfer, monitor cognition., LE dressing in chair or with AE, clarify home set up   OT Discharge Recommendation (DC Rec) home with assist;home with home care occupational therapy   OT Rationale for DC Rec Anticipate with continued medical management and inpatient therapy that pt will progress and be able to discharge home with assist for higher level IADLs and HHOT to progress ADL tolerance and safety. OT will continue to follow   OT Brief overview of current status Min assist sit to stand   OT Equipment Needed at Discharge   (TBD)

## 2024-04-22 NOTE — PROGRESS NOTES
04/22/24 1530   Appointment Info   Signing Clinician's Name / Credentials (PT) Leann Kingsley DPT   Living Environment   People in Home parent(s)   Current Living Arrangements house   Home Accessibility stairs to enter home;stairs within home   Number of Stairs, Main Entrance 2   Stair Railings, Main Entrance none   Number of Stairs, Within Home, Primary greater than 10 stairs   Stair Railings, Within Home, Primary railings safe and in good condition   Transportation Anticipated family or friend will provide   Living Environment Comments Pt is a questionable historian, will need to clarify with family about home set up. Pt lives in a home with his parents. Reports two stairs to enter, all pt needs met on main level. Pt has a walk in shower and tub shower and standard toilets   Self-Care   Usual Activity Tolerance moderate   Current Activity Tolerance poor   Regular Exercise No   Equipment Currently Used at Home cane, straight   Activity/Exercise/Self-Care Comment Pt reports independence with ADLs and ambulation with a cane at baseline. Endorses recent difficulty with LE dressing due to edema. Works full-time   General Information   Onset of Illness/Injury or Date of Surgery 04/18/24   Referring Physician Francheska Crocker MD   Patient/Family Therapy Goals Statement (PT) return home, get back to work   Pertinent History of Current Problem (include personal factors and/or comorbidities that impact the POC) 9 year old male hx alcohol use disorder, chronic decompensated cirrhosis with hx of SBP, HFpEF, HTN, pleural effusions previously requiring drainage, and small esophageal varices; admitted on 4/17/2024 with worsening encephalopathy and intubated 4/18 for intubation for airway protection and EGD.   Cognition   Affect/Mental Status (Cognition) low arousal/lethargic   Orientation Status (Cognition) oriented x 4   Follows Commands (Cognition) WFL   Pain Assessment   Patient Currently in Pain Yes, see Vital Sign  flowsheet  (sensitive LEs d/t swelling)   Integumentary/Edema   Integumentary/Edema Comments 3-4+ pitting B LEs   Range of Motion (ROM)   Range of Motion ROM deficits secondary to swelling   Strength (Manual Muscle Testing)   Strength (Manual Muscle Testing) Deficits observed during functional mobility   Transfers   Comment, (Transfers) Mod A x 2 sit <> stand with FWW   Gait/Stairs (Locomotion)   [place] Level (Gait) minimum assist (75% patient effort)   Assistive Device (Gait) walker, front-wheeled   Distance in Feet (Gait) 10'   Balance   Balance Comments requires A x1, FWW and close chair follow d/t balance impairement related to LE weakness   Sensory Examination   Sensory Perception patient reports no sensory changes   Clinical Impression   Criteria for Skilled Therapeutic Intervention Yes, treatment indicated   PT Diagnosis (PT) impaired mobility   Influenced by the following impairments weakness, impaired balance, pain   Functional limitations due to impairments fall risk   Clinical Presentation (PT Evaluation Complexity) evolving   Clinical Presentation Rationale clinical judgement   Clinical Decision Making (Complexity) moderate complexity   Planned Therapy Interventions (PT) balance training;bed mobility training;gait training;neuromuscular re-education;patient/family education;stair training;strengthening;transfer training;progressive activity/exercise   Risk & Benefits of therapy have been explained evaluation/treatment results reviewed;risks/benefits reviewed;care plan/treatment goals reviewed;current/potential barriers reviewed;participants voiced agreement with care plan;participants included;patient   PT Total Evaluation Time   PT Eval, Low Complexity Minutes (03623) 15   Physical Therapy Goals   PT Frequency Daily   PT Predicted Duration/Target Date for Goal Attainment 04/29/24   PT Goals Bed Mobility;Transfers;Gait;Stairs   PT: Bed Mobility Supervision/stand-by assist;Supine to/from sit   PT:  Transfers Supervision/stand-by assist;Sit to/from stand;Bed to/from chair;Assistive device   PT: Gait Supervision/stand-by assist;Rolling walker;100 feet   PT: Stairs Supervision/stand-by assist;2 stairs;Rail on right   Interventions   Interventions Quick Adds Gait Training;Therapeutic Activity   Therapeutic Activity   Therapeutic Activities: dynamic activities to improve functional performance Minutes (40157) 15   Symptoms Noted During/After Treatment Fatigue   Treatment Detail/Skilled Intervention Performed 5 reps sit <> stands from recliner, transport chair and commode needing Mod A x 2 at times d/t poor forward trunk progression despite cues. Pt leaning backwards needing forward assist to maintain balance upon standing. Following ambulation pt needing to quickly use urinal and then commode, Min A x 2 for all chair <> commode transfers d/t poor safety awareness and B LE weakness. Pt up in recliner chair at end of session with vital signs stable on room air and /88 mmHg.   Gait Training   Gait Training Minutes (91324) 10   Symptoms Noted During/After Treatment (Gait Training) fatigue   Treatment Detail/Skilled Intervention gait training with FWW and close chair follow, space lab for vitals monitoring. Needing cues for improved B LE clearance d/t weakness and fatigue, LE edema   Distance in Feet 40' + 70'   [place] Level (Gait Training) minimum assist (75% patient effort)   Assistive Device (Gait Training) rolling walker   PT Discharge Planning   PT Discharge Recommendation (DC Rec) Transitional Care Facility   PT Rationale for DC Rec Pt currently needing A x 1-2 and FWW which if far from his mostly independent baseline. Pt would likely benefit from TCU to address strength, balance and activity tolerance limitations to reduce fall risk prior to return home.   PT Brief overview of current status Mod A x 2 with FWW, ambulating in hallway   PT Equipment Needed at Discharge walker, rolling   Total Session  Time   Timed Code Treatment Minutes 25   Total Session Time (sum of timed and untimed services) 40

## 2024-04-22 NOTE — PLAN OF CARE
"Goal Outcome Evaluation:    ICU End of Shift Summary. See flowsheets for vital signs and detailed assessment.    Changes this shift: Patient with copious amounts of liquid stools, held miralax in light of 3 watery/loose stools in first 4 hours of shift.  Patient up to commode once, bed pan twice for those stools, all continent.  No PRNS given.  Denies pain.  Alert to self, place, and time.  Disoriented to situation, called ANS  line to clarify reason for admissions.  Writer gave information based on past notes of being found [place] and high [place].  Patient needs frequent reorientation to situation due to this forgetfulness.      Levophed on to maintain SBP goal set by Nephrology of 110-120mmHg. Currently meeting and not requiring frequent titration on 0.07mcg/kg/min of levo.        Plan: Continue to promote kidney function with continued perfusion.  Reorient to situation. Monitor for signs of bleeding.       Problem: Adult Inpatient Plan of Care  Goal: Plan of Care Review  Description: The Plan of Care Review/Shift note should be completed every shift.  The Outcome Evaluation is a brief statement about your assessment that the patient is improving, declining, or no change.  This information will be displayed automatically on your shift  note.  Outcome: Progressing  Goal: Patient-Specific Goal (Individualized)  Description: You can add care plan individualizations to a care plan. Examples of Individualization might be:  \"Parent requests to be called daily at 9am for status\", \"I have a hard time hearing out of my right ear\", or \"Do not touch me to wake me up as it startles  me\".  Outcome: Progressing  Goal: Absence of Hospital-Acquired Illness or Injury  Outcome: Progressing  Intervention: Identify and Manage Fall Risk  Recent Flowsheet Documentation  Taken 4/22/2024 0000 by Kraig Roberts, RN  Safety Promotion/Fall Prevention:   clutter free environment maintained   lighting " adjusted   room near nurse's station   safety round/check completed  Taken 4/21/2024 2000 by Kraig Roberts RN  Safety Promotion/Fall Prevention:   clutter free environment maintained   lighting adjusted   room near nurse's station   safety round/check completed  Intervention: Prevent Skin Injury  Recent Flowsheet Documentation  Taken 4/22/2024 0000 by Kraig Roberts RN  Body Position:   right   turned   heels elevated  Taken 4/21/2024 2200 by Kraig Roberts RN  Body Position:   turned   left  Taken 4/21/2024 2000 by Kraig Roberts RN  Body Position:   right   turned   heels elevated  Intervention: Prevent and Manage VTE (Venous Thromboembolism) Risk  Recent Flowsheet Documentation  Taken 4/22/2024 0000 by Kraig Roberts RN  VTE Prevention/Management: SCDs (sequential compression devices) on  Taken 4/21/2024 2000 by Kraig Roberts RN  VTE Prevention/Management: SCDs (sequential compression devices) on  Intervention: Prevent Infection  Recent Flowsheet Documentation  Taken 4/22/2024 0000 by Kraig Roberts RN  Infection Prevention:   environmental surveillance performed   equipment surfaces disinfected   hand hygiene promoted   rest/sleep promoted  Taken 4/21/2024 2000 by Kraig Roberts RN  Infection Prevention:   environmental surveillance performed   equipment surfaces disinfected   hand hygiene promoted   rest/sleep promoted  Goal: Optimal Comfort and Wellbeing  Outcome: Progressing  Intervention: Provide Person-Centered Care  Recent Flowsheet Documentation  Taken 4/22/2024 0000 by Kraig Roberts RN  Trust Relationship/Rapport:   care explained   questions answered   reassurance provided   thoughts/feelings acknowledged  Taken 4/21/2024 2000 by Kraig Roberts RN  Trust Relationship/Rapport:   care explained   questions answered   reassurance provided   thoughts/feelings acknowledged  Goal: Readiness for Transition of Care  Outcome: Progressing

## 2024-04-22 NOTE — PROGRESS NOTES
Critical Care Intensivist Note    Remains on low-dose norepinephrine  More lucid today per mom and nursing     Exam kind of blank facies  Encephalopathic, but responds appropriately and answers many questions right  Able to participate well with PT/OT   Dobhoff in situ; some crusting around nare  Godoy decent urine  Abdomen more soft - no christiano tenderness  Femoral CVC still in situ  Breathing easily without oxygen    Labs all look pretty good with Cr improved from yesterday  Bilirbuin still ~10, but transaminases are stable  Blood glucose mostly well controlled  CBC no significant change, other than Hgb down about 1pt from yesterday morning, but all counts on the yesterday am lab were up, so I wonder if some of that was spurrious.  His Hgb is about where it was on 4/20.  INR 2.57    No new imaging    Crispin Ham is a 39 year old male hx alcohol use disorder, chronic decompensated cirrhosis with hx of SBP, HFpEF, HTN, pleural effusions previously requiring drainage, and small esophageal varices; admitted on 4/17/2024 with worsening encephalopathy and intubated 4/18 for intubation for airway protection and EGD.     Today will discontinue zosyn as there is no evidence of active infection; I don't even think he needs SBP ppx at this point in time, but will continue ceftriaxone a bit longer.  We can remove his godoy and Dobhoff since he is doing well eating.  Will increase midodrine dosing and start an MVI and folate for housekeeping reasons.  He otherwise looks great.  Hopefully with midodine he will be off norepi tomorrow and his femoral CVC can be removed.    There was radiographic concern for IVC thrombus on recent CT; once he is more hemodynamically stable and creatinine has stabilized, will likely get a CTA to assess need for long-term therapeutic anticoagulation.    Neurology/Psychiatry:   METABOLIC ENCEPHALOPATHY / DELIRIUM:  -due to hyperammonemia / liver disease, less likely a concurrent active infection;  monitoring with clinical exam  -prn melatonin for sleep can maybe be scheduled starting tomorrow if he continues to impove (had previously been prescribed trazodone); Nothing for sedation   -using non-pharmacologic methods as much as possilble    HEPATIC ENCEPHALOPATHY:  -rifaxamin (had not been on this at home due to cost/insurance) + lactulose, currenly dosed 20g tid  -probably tomorrow, will look to reduce lactulose back towards his home dose of 15ml bid    Pulmonary/Ventilator Management:   No acute issues.  Currently off oxygen    Cardiovascular:   HYPOTENSION:  -no current signs of active sepsis or ongoing bleeding.  Presumed due to liver disease (high output, low SVR state)  -midodrine 20tid to start to help come off norepi, with a plan to wean down aggressively as able    GI and Nutrition :   SEVERE PROTEIN-CALORIE MALNUTRITION:  -patient demonstrates obvious muscle wasting, especially notable in the triceps with measurably reduced hand .  Family reports significant weight loss in the weeks prior to admission.  -may be at risk for refeeding syndrome; watch Phos+ & Mg++  -taking enough by mouth that will remove NG and just allow a general diet    ALCOHOLIC HEPATITIS:  -methylpred 40 every day.  Lille score will next be calculated 4/26  -ceftriaxone recommended by hepatology  -lactulose, rifaxamin, pantoprazole as described elsewhere  -will likely restart home diuretics for ascites and LE edema in a day or so    Renal/Fluids/Electrolytes:   ACUTE KIDNEY INJURY:  -resolving  -if creatinine continues to improve tomorrow, will likely restart home lasix / spironolactone  -currently getting bid albumin replacement for hepatorenal  -monitoring with strict I/Os and serial Cr checks  -godoy can come out    Infectious Disease:   POSSIBLE SEPSIS:  -no evidence of SBP or other active infection.  Discontinue zosyn; transition back to ppx ceftriaxone until hepatology service is comfortable with this    Endocrine:    STRESS INDUCED HYPERGLYCEMIA:  - ICU insulin protocol, goal sugar <180.  No current issues, even with steroids    Hematology/Oncology:   NASAL BLEEDING:  -resolved.  ENT was following  -muprocian ointment x3 weeks recommended for crusting and soreness    ACUTE BLOOD LOSS ANEMIA:  -No evidence of ongoing active hemorrhage, so will use a transfusion trigger of Hgb <7 going forward.  Daily Hgb ok    POSSIBLE IVC THROMBUS:  -once creatinine has normalized, may get a CT-angiogram to further evaluate possiblity of thrombus.  No role for therapeutic anticoagulation at this point     MSKL/Rheum:  No issues other than ongoing BLE edema, potentially lreatled to IVC thrombus    IV/Access:   -central access necessary for pressors.  Has femoral CVC in the context of possible IVC thrombus     ICU Prophylaxis:   1. DVT: Hep subcutaneous in the context of recent GI bleed and renal dysfunctioyn.  May need hematology consult to help with long-term anticoagulant choice/ dosing if he is indeed found to have IVC thrombus  2. VAP: not currently indiciated  3. Stress Ulcer: PPIbid in the context of high dose steroids and recent GI bleed  4. Restraints: Not currently necessary   5. Wound care - per unit routine   6. Feeding - general diet  7. Family Update:mother updated at bedside  8. Disposition - ICU; likely discharge to TCU in <1 week    Billing: This patient is critically ill: Yes. Total critical care time today 49 min, spent in an initial review of imaging, labs, physical exam, and discussion of the patient with my own team and the extended care team including the primary service; Based on this patient's presentation / recent intervention and my bedside assessment, I felt there was or is a reasonably high probability of imminent or life-threatening deterioration today or tonight for hemodynamic or hemorrhagic reasons.   My overall critical care time, as described in detail above, includes such things as coordination of care,  arrhythmia and hemodynamics management with infusions of medicines, respiratory management, fluid therapy including fluid boluses, and pain and sedation therapy. This time excludes time I spent personally performing procedures for this patient.    VANESSA Milan MD  Clinical   Anesthesia / Critical Care  *[phone]

## 2024-04-22 NOTE — PROGRESS NOTES
Antimicrobial Stewardship Team Note    Antimicrobial Stewardship Program - A joint venture between [facility] and [facility] to optimize antibiotic management.     Patient: Crispin Ham  MRN: [id number]  Allergies: Excedrin extra strength [aspirin-acetaminophen-caffeine] and Nsaids    Brief Summary: Crispin Ham is a 39 year old male admitted on 4/17/24 with worsening encephalopathy. PMH significant for alcohol use disorder, chronic decompensated cirrhosis with hx of SBP, HFpEF, HTN, pleural effusions previously requiring paracentesis, and small esophageal varices. He required ICU admission 4/18 for intubation for airway protect and EGD.     EGD with no active bleeding. He was started on ceftriaxone 4/17-4/18 for possible SBP, broadened to Zosyn on 4/18 for sepsis. WBC count normal, afebrile, now extubated. CT c/a/p with small consolidations in lower lobes favored to represent atelectasis, cirrhosis with stigmata of portal hypertension. Blood cultures negative.         Active Anti-infective Medications   (From admission, onward)                 Start     Stop    04/19/24 0200  vancomycin  1,500 mg,   Intravenous,   EVERY 12 HOURS        Sepsis       --    04/18/24 2000  rifaximin  550 mg,   Oral or Feeding Tube,   2 TIMES DAILY        Intestinal bacteria reduction in the treament of Hepatic Encephalopathy.       --    04/18/24 1300  piperacillin-tazobactam  4.5 g,   Intravenous,   EVERY 6 HOURS        Sepsis       --    04/18/24 1300  vancomycin  2,000 mg,   Intravenous,   ONCE        Sepsis       --                  Assessment: Hepatic encephalopathy  Patient presented with worsening encephalopathy requiring ICU admission for intubation, now extubated. He is currently on day 5 of Zosyn for empiric sepsis treatment, imaging and cultures negative. Recommend stopping Zosyn as patient has completed adequate empiric course, suspect presentation due to alcoholic cirrhosis  rather than infection.    Recommendations:  Stop Zosyn.    Discussed with ID Staff MD Racquel Luciano, PharmD, BCIDP    Vital Signs/Clinical Features:  Vitals         04/20 0700  04/21 0659 04/21 0700  04/22 0659 04/22 0700  04/22 1415   Most Recent      Temp ( F) 96.5 -  98.7    97.8 -  98.2      97.7     97.7 (36.5) 04/22 0800    Pulse 41 -  91    56 -  105    74 -  112     112 04/22 1400    Resp 10 -  16    7 -  28    10 -  23     23 04/22 1400    BP 91/60 -  143/98    99/54 -  142/86    101/68 -  135/117     109/66 04/22 1400    SpO2 (%) 98 -  100    89 -  100    92 -  99     92 04/22 1400            Labs  Estimated Creatinine Clearance: 102.3 mL/min (A) (based on SCr of 1.3 mg/dL (H)).  Recent Labs   Lab Test 04/18/24  1722 04/19/24  0458 04/19/24  1802 04/20/24  0348 04/21/24  0437 04/22/24  0410   CR 0.98 1.14 1.23* 1.41* 1.85* 1.30*       Recent Labs   Lab Test 04/19/24  0458 04/19/24  1802 04/20/24  0348 04/20/24  1833 04/21/24  0437 04/22/24  0410   WBC 11.1* 3.6* 3.3* 5.5 11.5* 7.3   HGB 7.9* 7.2* 7.7* 7.6* 8.8* 7.8*   HCT 22.3* 21.0* 22.1* 22.1* 25.8* 23.4*   MCV 94 95 97 97 96 100    108* 112* 127* 221 139*       Recent Labs   Lab Test 04/18/24  1027 04/19/24  0458 04/19/24  1802 04/20/24  0348 04/21/24  0437 04/22/24  0410   BILITOTAL 10.3* 11.3* 10.2* 10.3* 10.8* 10.2*   ALKPHOS 127 118 117 126 126 101   ALBUMIN 2.9* 2.8* 2.7* 2.8* 3.0* 3.2*   AST 93* 96* 84* 82* 76* 57*   ALT 45 46 43 46 48 39       Recent Labs   Lab Test 09/08/23  1147 09/08/23  1154 11/17/23  0536 11/17/23  1231 03/19/24  0830 04/18/24  1027 04/18/24  1516 04/19/24  0046 04/19/24  0458   PCAL 0.71*  --   --   --  0.36  --   --   --   --    LACT  --    < > 3.0* 2.4*  --  1.9 1.8 1.5 1.4    < > = values in this interval not displayed.             Culture Results:  7-Day Micro Results       Procedure Component Value Units Date/Time    Blood Culture Peripheral Blood [[id number]]  (Normal) Collected: 04/17/24  1854    Order Status: Completed Lab Status: Preliminary result Updated: 04/21/24 2246    Specimen: Peripheral Blood      Culture No growth after 4 days    Blood Culture Peripheral Blood [[id number]]  (Normal) Collected: 04/17/24 1854    Order Status: Completed Lab Status: Preliminary result Updated: 04/21/24 2246    Specimen: Peripheral Blood      Culture No growth after 4 days            Recent Labs   Lab Test 09/08/23  1224 09/25/23  1646 11/16/23  1514 04/18/24  1230   URINEPH 5.0 5.0 6.0 6.0   NITRITE Negative Negative Negative Negative   LEUKEST Trace* Trace* Negative Negative   WBCU 50* 11* 8* 3                   Recent Labs   Lab Test 09/26/23  0425   CDBPCT Positive*       Imaging: XR Abdomen Port 1 View    Result Date: 4/21/2024  EXAM: XR ABDOMEN PORT 1 VIEW LOCATION: [facility] DATE/TIME: 4/21/2024 1:44 PM CDT INDICATION: Enteric tube placement COMPARISON: 04/18/2024     IMPRESSION: Feeding tube terminates at the level of the gastric body. The visualized bowel is not pathologically dilated. Left femoral approach central line terminates over the left sacroiliac joint.     US Aorta/Ivc/Iliac Duplex Complete    Result Date: 4/19/2024  ULTRASOUND AORTA/IVC/ILIAC DUPLEX COMPLETE  4/19/2024 9:23 AM HISTORY:  Concerns for arterial and venous thrombus in the left external iliac artery/vein. COMPARISON: CT of the abdomen and pelvis without contrast dated 4/19/2024. FINDINGS: Color Doppler and spectral waveform analysis performed. The visualized abdominal aorta, left common iliac artery, left external iliac artery and left common femoral artery are patent without evidence for thrombus. Monophasic waveforms are noted throughout. The IVC, left common iliac vein, external iliac vein, and common femoral vein are patent. There is a central venous catheter in the left common femoral vein. No definite thrombus identified.     IMPRESSION: No definite thrombus in the left iliac veins and arteries  as above on ultrasound. OBI PARSON MD   SYSTEM ID:  [id number]    CT Chest Abdomen Pelvis w/o Contrast    Result Date: 4/19/2024  EXAM: CT CHEST ABDOMEN PELVIS W/O CONTRAST LOCATION: [facility] DATE: 4/19/2024 INDICATION: Sepsis COMPARISON: 03/18/2024 TECHNIQUE: CT scan of the chest, abdomen, and pelvis was performed without IV contrast. Multiplanar reformats were obtained. Dose reduction techniques were used. CONTRAST: None. FINDINGS: LUNGS AND PLEURA: Small bilateral pleural effusions are present, increased on the right and new on the left. Small consolidations in the bilateral lower lobes may represent atelectasis. However, there are tiny patchy groundglass opacities in the superior  segment of the left lower lobe and left upper lobe on series 4 image 89, new from prior study. However, previous groundglass opacities in the right lung have resolved. No pneumothorax. MEDIASTINUM/AXILLAE: No lymphadenopathy. Heart size is normal without significant pericardial effusion. Cardiac blood pool density is mildly decreased. An endotracheal tube terminates in the midthoracic trachea. Nasogastric tube terminates in the body of  the stomach. CORONARY ARTERY CALCIFICATION: None. HEPATOBILIARY: Equivocal surface nodularity of the liver, compatible with cirrhosis. Small volume free fluid in the abdomen and pelvis. No free air. No definite hepatic lesions. Gallbladder is severely distended without definite wall thickening. PANCREAS: Normal. SPLEEN: Normal. ADRENAL GLANDS: Normal. KIDNEYS/BLADDER: No urinary stones or hydronephrosis. A 1.3 cm cystic lesion in the midpole the right kidney statistically represents a cyst, visually benign and does not require follow-up. Urinary bladder is decompressed by a Bosch catheter. BOWEL: A surgical clip is seen in the gastric cardia. Mild wall thickening of the ascending and proximal transverse colon noted. No bowel obstruction. Appendix is normal. A rectal tube  is in place. LYMPH NODES: Multiple mildly prominent retroperitoneal lymph nodes again seen, likely reactive. VASCULATURE: Aortic no abdominal aortic aneurysm. Intra-abdominal venous collateral vessels present, including recanalized umbilical vein. A left femoral central venous catheter is present. There is low density expansion of the left external iliac vein. Nonspecific low density also seen in the adjacent left external iliac artery. PELVIC ORGANS: Normal. MUSCULOSKELETAL: High density blood is seen in the subcutaneous space of the left groin and extending into the left retroperitoneum. Extensive body wall edema present. Multilevel mild and moderate degenerative height loss seen in the thoracolumbar spine,  similar to prior study. No acute fracture or suspicious osseous lesions. A small hiatal hernia is also present containing fluid.     IMPRESSION: 1.  Small consolidations in the lower lobes are favored to represent atelectasis. There are tiny patchy round glass opacities in the superior segment of the left lower lobe and left upper lobe, which may be mild infectious in nature, however unlikely to be cause of sepsis. Previous groundglass opacities in the right lung have resolved. 2.  Left femoral central venous catheter in place with expansion of the left external iliac vein by low density material, likely thrombus. There is also low density content in the adjacent left external iliac artery, also suspicious for thrombus. Recommend further evaluation with CT angiography in the arterial and portal venous phases per GI bleeding protocol. This was relayed to Dr. Tinoco at 4:08 AM on 04/19/2024. 3.  Blood in the subcutaneous space of the left groin and in the left retroperitoneum. These areas may also be further evaluated for presence of active hemorrhage on CT angiography. 4.  Mild anemia. 5.  Marked distention of the gallbladder, nonspecific. No definite CT signs of acute cholecystitis. 6.  Cirrhosis with stigmata  of portal hypertension including intra-abdominal venous collateral vessels, recanalized umbilical vein, and small volume ascites. Mild wall thickening of the ascending and proximal transverse colon likely reflects portal colopathy. 7.  Small bilateral pleural effusions and diffuse body wall edema.    XR Abdomen Port 1 View    Result Date: 4/18/2024  XR ABDOMEN PORT 1 VIEW   4/18/2024 3:58 PM HISTORY: OG tube placement COMPARISON: None.     IMPRESSION: The enteric tube tip and sidehole is in the distal stomach, in good position. ROXANA CAMARA MD   SYSTEM ID:  [id number]    XR Chest Port 1 View    Result Date: 4/18/2024  XR CHEST PORT 1 VIEW 4/18/2024 3:58 PM HISTORY: ETT placement COMPARISON: 4/17/2024     IMPRESSION: The endotracheal tube tip is 3.7 cm above the mya. The enteric tube courses into the stomach. Slight increase in mild bibasilar patchy opacities, either due to atelectasis or pneumonia. No pleural effusion or pneumothorax. ROXANA CAMARA MD   SYSTEM ID:  [id number]    US Abdomen Limited    Result Date: 4/17/2024  EXAM: US ABDOMEN LIMITED LOCATION: [facility] DATE: 4/17/2024 INDICATION: Evaluate for ascites. COMPARISON: Abdominal ultrasound 03/19/2024. TECHNIQUE: Ultrasound was performed in all 4 quadrants of the abdomen to evaluate for ascites. FINDINGS: No ascites is visualized in the right upper, right lower, left upper, or left lower quadrants.     IMPRESSION: 1.  No ascites.    XR Chest Port 1 View    Result Date: 4/17/2024  EXAM: XR CHEST PORT 1 VIEW LOCATION: [facility] DATE: 4/17/2024 INDICATION: encephalopathy COMPARISON: Chest radiograph 11/19/2023.     IMPRESSION: Lung volumes are low. No focal airspace opacities, pleural effusions, or pneumothorax. Pulmonary vascularity is within normal limits. Stable cardiomediastinal silhouette.    CT Head w/o Contrast    Result Date: 4/17/2024  EXAM: CT HEAD W/O CONTRAST LOCATION: [facility]  [facility] DATE: 4/17/2024 INDICATION: Altered mental status COMPARISON: None. TECHNIQUE: Routine CT Head without IV contrast. Multiplanar reformats. Dose reduction techniques were used. FINDINGS: INTRACRANIAL CONTENTS: No intracranial hemorrhage, extraaxial collection, or mass effect.  No CT evidence of acute infarct. Normal parenchymal attenuation. Mild generalized volume loss. No hydrocephalus. VISUALIZED ORBITS/SINUSES/MASTOIDS: No intraorbital abnormality. No significant paranasal sinus mucosal disease. No middle ear or mastoid effusion. BONES/SOFT TISSUES: No acute abnormality.     IMPRESSION: 1.  No acute intracranial process. 2.  Mild global parenchymal volume loss, advanced for age.    US Abdomen Limited    Result Date: 4/16/2024  US ABDOMEN LIMITED 4/16/2024 9:26 AM CLINICAL HISTORY: Alcoholic cirrhosis of liver with ascites (H); Anemia, unspecified TECHNIQUE: Limited abdominal ultrasound. COMPARISON: None.     IMPRESSION: 1.  No ascites. Planned paracentesis therefore not performed. AGUEDA BRUCE MD   SYSTEM ID:  [id number]

## 2024-04-22 NOTE — CONSULTS
"[facility] Consultation by [facility]    Crispin Ham MRN# [id number]   Age: 39 year old YOB: 1985     Date of Admission:  4/17/2024  Date of Consult:    04/22/24    Reason for consult: Nasal crusting and soreness       Requesting physician: Francheska Crocker MD                           Chief Complaint:   Altered Mental Status              HPI:      HPI 39-year-old man with PMH of alcohol use disorder, chronic decompensated cirrhosis with history of SBP, HFpEF, hypertension, pleural effusions previously requiring paracentesis, small esophageal varices present on 3/16/2024.  He was admitted 4/17/24 w/ worsening encephalopathy.  CT head in ED neg.      - Transferred to ICU for worsening encephalopathy Intubated for airway protection and will get EGD.      #4/19:   - Follow closely for encephalopathy  -IF he doesn't wake up by Sunday then consider MRI and EEG      4/20   Unable to extubate [place]     4/21;  -Extubated. [place] Giving albumin.   -Nasal crusting Ent consulted. EGD negative for active bleed   -Prednisone for alcoholic hepatitis.     Patient notes nasal irritation and crusting due to NG tube placement.  Not having epistaxis.  No purulent appearing discharge noted by the patient.    Previous tests and diagnostic procedures: see \"Tests and Procedures\" and \"PFSH\".               Past Medical History:     Past Medical History:   Diagnosis Date    Alcohol abuse     Hypertension     Substance abuse (H)                Past Surgical History:     Past Surgical History:   Procedure Laterality Date    COLONOSCOPY      EGD    ESOPHAGOSCOPY, GASTROSCOPY, DUODENOSCOPY (EGD), COMBINED N/A 3/16/2024    Procedure: ESOPHAGOGASTRODUODENOSCOPY;  Surgeon: Chato Juan MD;  Location:  OR    ESOPHAGOSCOPY, GASTROSCOPY, DUODENOSCOPY (EGD), COMBINED N/A 4/18/2024    Procedure: Esophagoscopy, gastroscopy, duodenoscopy (EGD), combined;  Surgeon: Hiren Vogt DO;  Location:  GI    ORTHOPEDIC " SURGERY      wrist  surgery               Social History:     Social History     Socioeconomic History    Marital status: Single     Spouse name: Not on file    Number of children: Not on file    Years of education: Not on file    Highest education level: Not on file   Occupational History    Not on file   Tobacco Use    Smoking status: Never    Smokeless tobacco: Not on file   Substance and Sexual Activity    Alcohol use: Yes     Comment: 1.75L per day    Drug use: Not Currently    Sexual activity: Not on file   Other Topics Concern    Not on file   Social History Narrative    Not on file     Social Determinants of Health     Financial Resource Strain: Low Risk  (9/25/2023)    Received from [facility] [employer] [facility], [facility]    Financial Resource Strain     Difficulty of Paying Living Expenses: 3     Difficulty of Paying Living Expenses: Not on file   Food Insecurity: No Food Insecurity (9/25/2023)    Received from [facility] [employer] [facility], [facility]    Food Insecurity     Worried About Running Out of Food in the Last Year: 1   Transportation Needs: No Transportation Needs (9/25/2023)    Received from [facility] [employer] [facility], [facility]    Transportation Needs     Lack of Transportation (Medical): 1   Physical Activity: Not on file   Stress: Not on file   Social Connections: Socially Isolated (9/25/2023)    Received [facility]    Social Connections     Frequency of Communication with Friends and Family: 4   Interpersonal Safety: Not on file   Housing Stability: Low Risk  (9/25/2023)    Received from [facility] [employer] [facility], [facility]    Housing Stability     Unable to Pay for Housing in the Last Year: 1                Family History:   No family history on file.            Immunizations:     Immunization History   Administered Date(s) Administered    COVID-19 12+ (2023-24) (MODERNA) 10/18/2023    COVID-19 Bivalent 12+ (Pfizer) 10/05/2022    COVID-19 MONOVALENT 12+ (Pfizer) 04/16/2021, 05/07/2021, 11/24/2021    Influenza Vaccine >6 months,quad, PF 10/18/2023               Allergies:   Excedrin extra strength [aspirin-acetaminophen-caffeine] and Nsaids          Medications:     Current Facility-Administered Medications:     albumin human 25 % injection 25 g, 25 g, Intravenous, BID, DegrootEmir MD, 25 g at 04/22/24 1054    Daily 2 GRAM acetaminophen limit, unless fulminent liver failure or transaminases greater than or equal to 300 - 400, then none, , Does not apply, Continuous PRN, Francheska Crocker MD    dexmedeTOMIDine (PRECEDEX) 4 mcg/mL in sodium chloride 0.9 % 100 mL infusion, 0.1-1.2 mcg/kg/hr, Intravenous, Continuous, Jaron Sandhu MD, Stopped at 04/21/24 0900    dextrose 10% infusion, , Intravenous, Continuous PRN, Jaron Sandhu MD    glucose gel 15-30 g, 15-30 g, Oral, Q15 Min PRN **OR** dextrose 50 % injection 25-50 mL, 25-50 mL, Intravenous, Q15 Min PRN **OR** glucagon injection 1 mg, 1 mg, Subcutaneous, Q15 Min PRN, Leann Suh MD    fentaNYL (SUBLIMAZE) 50 mcg/mL bolus from pump, 50 mcg, Intravenous, Q1H PRN, Jaron Sandhu MD, 50 mcg at 04/20/24 0803    heparin ANTICOAGULANT injection 5,000 Units, 5,000 Units, Subcutaneous, Q12H, Jaron Sandhu MD, 5,000 Units at 04/22/24 1114    insulin aspart (NovoLOG) injection (RAPID ACTING), 1-7 Units, Subcutaneous, Q4H, Leann Suh MD, 2 Units at 04/22/24 1125    lactulose (CHRONULAC) solution 20 g, 20 g, Oral or Feeding Tube, TID, Aaron Rosen DO, 20 g at 04/22/24 1100    lidocaine (LMX4) cream, , Topical, Q1H PRN, Francheska Crocker MD    lidocaine 1 % 0.1-1 mL, 0.1-1 mL, Other, Q1H PRN, Francheska Crocker MD    melatonin tablet 3 mg, 3 mg, Oral, At Bedtime PRN,  Leann Suh MD    methylPREDNISolone sodium succinate (SOLU-MEDROL) injection 40 mg, 40 mg, Intravenous, Q24H, Kristi Samano PA-C, 40 mg at 04/22/24 1118    midazolam (VERSED) 1 mg/mL bolus from syringe/bag pump ADULT, 1 mg, Intravenous, Q1H PRN, Aaron Rosen, , 2 mg at 04/18/24 1330    naloxone (NARCAN) injection 0.2 mg, 0.2 mg, Intravenous, Q2 Min PRN **OR** naloxone (NARCAN) injection 0.4 mg, 0.4 mg, Intravenous, Q2 Min PRN **OR** naloxone (NARCAN) injection 0.2 mg, 0.2 mg, Intramuscular, Q2 Min PRN **OR** naloxone (NARCAN) injection 0.4 mg, 0.4 mg, Intramuscular, Q2 Min PRN, Jaron Sandhu MD    norepinephrine (LEVOPHED) 4 mg in  mL infusion PREMIX, 0.01-0.6 mcg/kg/min, Intravenous, Continuous, Leann Suh MD, Last Rate: 23 mL/hr at 04/22/24 1127, 0.05 mcg/kg/min at 04/22/24 1127    oxyCODONE (ROXICODONE) tablet 5 mg, 5 mg, Oral, Q4H PRN, Leann Suh MD    pantoprazole (PROTONIX) 2 mg/mL suspension 40 mg, 40 mg, Oral or Feeding Tube, BID **OR** pantoprazole (PROTONIX) EC tablet 40 mg, 40 mg, Oral, BID, Jose Meredith MD    piperacillin-tazobactam (ZOSYN) 4.5 g vial to attach to  mL bag, 4.5 g, Intravenous, Q6H, Jaron Sandhu MD, 4.5 g at 04/22/24 1120    polyethylene glycol (MIRALAX) Packet 34 g, 34 g, Oral, BID, Jaron Sandhu MD, 34 g at 04/20/24 2012    prochlorperazine (COMPAZINE) injection 5 mg, 5 mg, Intravenous, Q6H PRN **OR** prochlorperazine (COMPAZINE) tablet 5 mg, 5 mg, Oral, Q6H PRN **OR** prochlorperazine (COMPAZINE) suppository 25 mg, 25 mg, Rectal, Q12H PRN, Leann Suh MD    rifaximin (XIFAXAN) tablet 550 mg, 550 mg, Oral or Feeding Tube, BID, Aaron Rosen, , 550 mg at 04/22/24 1100    sodium chloride (PF) 0.9% PF flush 3 mL, 3 mL, Intracatheter, Q8H, Francheska Crocker MD, 3 mL at 04/22/24 0422    sodium chloride (PF) 0.9% PF flush 3 mL, 3 mL, Intracatheter, q1 min prn, Francheska Crocker MD    sodium chloride 0.9 % infusion, ,  Intravenous, Continuous, Jaron Sandhu MD, Last Rate: 20 mL/hr at 04/21/24 2000, Rate Change at 04/21/24 2000    thiamine (B-1) tablet 100 mg, 100 mg, Oral, Daily, 100 mg at 04/22/24 1101 **OR** thiamine (B-1) injection 100 mg, 100 mg, Intravenous, Daily, Francheska Crocker MD, 100 mg at 04/21/24 0859          Review of Systems:   Review Of Systems  Reviewed          Physical exam:   CONSTITUTION:    BP (!) 135/117   Pulse 99   Temp 97.7  F (36.5  C) (Oral)   Resp 16   Wt 116.2 kg (256 lb 2.8 oz)   SpO2 98%   BMI 34.74 kg/m       General appearance: Chronically ill-appearing and jaundice   Ability to communicate: normal.       HEAD, FACE, SALIVARY GLANDS AND TMJ:    Inspection of Head and Face: Normal contour and symmetry. No masses, lesions, or significant scars observed.    Palpation/Percussion of the Face: No tenderness, deformity or instability.    Palpation of Parotid and Submaxillary glands: Normal.    Facial Mobility: Normal.       EYES: Ocular Motility: gaze appears conjugate in all positions; no evident nystagmus.       EAR, NOSE, MOUTH AND THROAT:    Pinnas and External Nose: Normal.    Hearing: Conversational speech rarely or never misunderstands words.    Nasal Interior: NG tube through left naris.  No fissuring or abnormal crusting visible.  Mucosa mildly congested otherwise normal.  Lips, Teeth and Gums: Normal.    Oral Cavity and Oropharynx: Normal.       NECK AND THYROID:    Neck: normal symmetry; trachea midline; normal laryngeal crepitation; no adenopathy; no neck masses; no skin lesions; no scars.    Thyroid: normal size; no masses or tenderness.       LYMPH NODES: Neck Nodes: normal, no adenopathy.       NEUROLOGIC:    Level of orientation: normal to time, place, person and situation.    Cranial nerves: Cranial nerves II-XII grossly intact and symmetrical.       PSYCHIATRIC:    Level of consciousness: awake and alert.    Judgment and insight: normal.    Mood and affect: normal and appropriate to  the situation.             Data:     Lab Results   Component Value Date    WBC 7.3 04/22/2024    WBC 11.5 (H) 04/21/2024    WBC 5.5 04/20/2024    HGB 7.8 (L) 04/22/2024    HGB 8.8 (L) 04/21/2024    HGB 7.6 (L) 04/20/2024    HCT 23.4 (L) 04/22/2024    HCT 25.8 (L) 04/21/2024    HCT 22.1 (L) 04/20/2024     (L) 04/22/2024     04/21/2024     (L) 04/20/2024     04/22/2024     04/21/2024     04/20/2024    POTASSIUM 4.1 04/22/2024    POTASSIUM 4.8 04/21/2024    POTASSIUM 4.3 04/20/2024    CHLORIDE 109 (H) 04/22/2024    CHLORIDE 106 04/21/2024    CHLORIDE 107 04/20/2024    CO2 22 04/22/2024    CO2 22 04/21/2024    CO2 23 04/20/2024    BUN 37.1 (H) 04/22/2024    BUN 32.9 (H) 04/21/2024    BUN 21.8 (H) 04/20/2024    CR 1.30 (H) 04/22/2024    CR 1.85 (H) 04/21/2024    CR 1.41 (H) 04/20/2024     (H) 04/22/2024     (H) 04/22/2024     (H) 04/22/2024    NTBNPI 93 11/17/2023    AST 57 (H) 04/22/2024    AST 76 (H) 04/21/2024    AST 82 (H) 04/20/2024    ALT 39 04/22/2024    ALT 48 04/21/2024    ALT 46 04/20/2024    ALKPHOS 101 04/22/2024    ALKPHOS 126 04/21/2024    ALKPHOS 126 04/20/2024    BILITOTAL 10.2 (H) 04/22/2024    BILITOTAL 10.8 (H) 04/21/2024    BILITOTAL 10.3 (H) 04/20/2024    JAM 47 04/22/2024    JAM 60 04/21/2024    JAM 42 04/20/2024    INR 2.57 (H) 04/22/2024    INR 2.19 (H) 04/21/2024    INR 2.32 (H) 04/20/2024           Assessment and Plan:   Patient with nasal irritation and has had some crusting associated with the anterior nasal vestibule.  He has an NG tube through the left naris.  There is no fissuring or scabbing visible at this time.  Recommend application of mupirocin 2% ointment 3 times daily for 2 weeks.  Please call with any questions or concerns.    Attestation:  I have reviewed today's vital signs, notes, medications, medication allergies, and PFSH/ROSlabs and imaging.    Anup Monsivais MD

## 2024-04-22 NOTE — PHARMACY-ADMISSION MEDICATION HISTORY
Pharmacy Intern Admission Medication History    Admission medication history is complete. The information provided in this note is only as accurate as the sources available at the time of the update.    Information Source(s): Patient, Clinic records, [facility] records, and [facility] via in-person    Pertinent Information: Doses of furosemide/spironolactone appear to have been changed. Patient recalled that furosemide had been increased to 40 mg. However, was not able to recall current dose of spironolactone.    Changes made to PTA medication list:  Added: None  Deleted: Rifaximin (took for a short time but was not able to obtain further fills due to cost/insurance), trazodone (reported was not effective for sleep)  Changed: lactulose 30 mL --> 15 mL BID; updated diuretic doses per chart review     Allergies reviewed with patient and updates made in EHR: no    Medication History Completed By: Mariaa Zee 4/22/2024 12:04 PM    PTA Med List   Medication Sig Last Dose    ciprofloxacin (CIPRO) 500 MG tablet Take 1 tablet (500 mg) by mouth every 24 hours 4/17/2024 at AM    folic acid (FOLVITE) 1 MG tablet Take 1 tablet (1 mg) by mouth daily 4/17/2024 at AM    furosemide (LASIX) 20 MG tablet Take 20 mg by mouth 2 times daily 4/17/2024 at AM    lactulose (CHRONULAC) 10 GM/15ML solution Take 10 g by mouth 2 times daily 4/17/2024 at AM    magnesium oxide 400 MG tablet Take 400 mg by mouth daily 4/17/2024 at AM    multivitamin, therapeutic (THERA-VIT) TABS tablet Take 1 tablet by mouth daily 4/17/2024 at AM    naltrexone (DEPADE/REVIA) 50 MG tablet Take 50 mg by mouth daily 4/17/2024 at AM    pantoprazole (PROTONIX) 40 MG EC tablet Take 1 tablet (40 mg) by mouth 2 times daily 4/17/2024    spironolactone (ALDACTONE) 50 MG tablet Take 50 mg by mouth daily 4/17/2024 at AM    thiamine (B-1) 100 MG tablet Take 1 tablet (100 mg) by mouth daily 4/17/2024 at AM    Vitamin D, Cholecalciferol, 10 MCG (400 UNIT)  TABS Take 1 tablet by mouth daily 4/17/2024 at AM

## 2024-04-22 NOTE — PLAN OF CARE
Problem: Restraint, Nonviolent  Goal: Absence of Harm or Injury  Outcome: Met  Intervention: Implement Least Restrictive Safety Strategies  Recent Flowsheet Documentation  Taken 4/21/2024 1600 by Rafael Bo RN  Medical Device Protection: IV pole/bag removed from visual field  Taken 4/21/2024 1200 by Rafael Bo RN  Medical Device Protection: IV pole/bag removed from visual field  Taken 4/21/2024 0800 by Rafael Bo RN  Medical Device Protection: IV pole/bag removed from visual field  Intervention: Protect Dignity, Rights and Personal Wellbeing  Recent Flowsheet Documentation  Taken 4/21/2024 1600 by Rafael Bo RN  Trust Relationship/Rapport:   care explained   questions answered   reassurance provided   thoughts/feelings acknowledged  Taken 4/21/2024 1200 by Rafael Bo RN  Trust Relationship/Rapport:   care explained   questions answered   reassurance provided   thoughts/feelings acknowledged  Taken 4/21/2024 0800 by Rafael Bo RN  Trust Relationship/Rapport:   care explained   questions answered   reassurance provided   thoughts/feelings acknowledged  Intervention: Protect Skin and Joint Integrity  Recent Flowsheet Documentation  Taken 4/21/2024 1800 by Rafael Bo RN  Body Position:   turned   side-lying   heels elevated  Taken 4/21/2024 1600 by Rafael Bo RN  Skin Protection:   adhesive use limited   incontinence pads utilized  Range of Motion: (weak) ROM (range of motion) performed  Taken 4/21/2024 1400 by Rafael Bo RN  Body Position:   turned   side-lying   heels elevated  Taken 4/21/2024 1200 by Rafael Bo RN  Body Position:   turned   side-lying   heels elevated  Skin Protection:   adhesive use limited   incontinence pads utilized  Range of Motion: (weak) ROM (range of motion) performed  Taken 4/21/2024 1000 by Rafael Bo RN  Body Position:   turned   side-lying   heels elevated  Taken 4/21/2024 0800 by Rafael Bo RN  Body Position:   turned   side-lying   heels  elevated  Skin Protection:   adhesive use limited   incontinence pads utilized  Range of Motion: (weak) ROM (range of motion) performed     Problem: Restraint, Nonviolent  Goal: Absence of Harm or Injury  Outcome: Met  Intervention: Implement Least Restrictive Safety Strategies  Recent Flowsheet Documentation  Taken 4/21/2024 1600 by Rafael Bo RN  Medical Device Protection: IV pole/bag removed from visual field  Taken 4/21/2024 1200 by Rafael Bo RN  Medical Device Protection: IV pole/bag removed from visual field  Taken 4/21/2024 0800 by Rafael Bo RN  Medical Device Protection: IV pole/bag removed from visual field  Intervention: Protect Dignity, Rights and Personal Wellbeing  Recent Flowsheet Documentation  Taken 4/21/2024 1600 by Rafael Bo RN  Trust Relationship/Rapport:   care explained   questions answered   reassurance provided   thoughts/feelings acknowledged  Taken 4/21/2024 1200 by Rafael Bo RN  Trust Relationship/Rapport:   care explained   questions answered   reassurance provided   thoughts/feelings acknowledged  Taken 4/21/2024 0800 by Rafael Bo RN  Trust Relationship/Rapport:   care explained   questions answered   reassurance provided   thoughts/feelings acknowledged  Intervention: Protect Skin and Joint Integrity  Recent Flowsheet Documentation  Taken 4/21/2024 1800 by Rafael Bo RN  Body Position:   turned   side-lying   heels elevated  Taken 4/21/2024 1600 by Rafael Bo RN  Skin Protection:   adhesive use limited   incontinence pads utilized  Range of Motion: (weak) ROM (range of motion) performed  Taken 4/21/2024 1400 by Rafael Bo RN  Body Position:   turned   side-lying   heels elevated  Taken 4/21/2024 1200 by Rafael Bo RN  Body Position:   turned   side-lying   heels elevated  Skin Protection:   adhesive use limited   incontinence pads utilized  Range of Motion: (weak) ROM (range of motion) performed  Taken 4/21/2024 1000 by Rafael Bo RN  Body  Position:   turned   side-lying   heels elevated  Taken 4/21/2024 0800 by Rafael Bo RN  Body Position:   turned   side-lying   heels elevated  Skin Protection:   adhesive use limited   incontinence pads utilized  Range of Motion: (weak) ROM (range of motion) performed     Problem: Adult Inpatient Plan of Care  Goal: Absence of Hospital-Acquired Illness or Injury  Intervention: Prevent and Manage VTE (Venous Thromboembolism) Risk  Recent Flowsheet Documentation  Taken 4/21/2024 1600 by Rafael Bo RN  VTE Prevention/Management: SCDs (sequential compression devices) on  Taken 4/21/2024 1200 by Rafael Bo RN  VTE Prevention/Management: SCDs (sequential compression devices) on  Taken 4/21/2024 0800 by Rafael Bo RN  VTE Prevention/Management: SCDs (sequential compression devices) on     Problem: Adult Inpatient Plan of Care  Goal: Absence of Hospital-Acquired Illness or Injury  Intervention: Prevent Infection  Recent Flowsheet Documentation  Taken 4/21/2024 1600 by Rafael Bo RN  Infection Prevention:   environmental surveillance performed   equipment surfaces disinfected   hand hygiene promoted   rest/sleep promoted  Taken 4/21/2024 1200 by Rafael Bo RN  Infection Prevention:   environmental surveillance performed   equipment surfaces disinfected   hand hygiene promoted   rest/sleep promoted  Taken 4/21/2024 0800 by Rafael Bo RN  Infection Prevention:   environmental surveillance performed   equipment surfaces disinfected   hand hygiene promoted   rest/sleep promoted     Problem: Adult Inpatient Plan of Care  Goal: Optimal Comfort and Wellbeing  Intervention: Provide Person-Centered Care  Recent Flowsheet Documentation  Taken 4/21/2024 1600 by Rafael Bo RN  Trust Relationship/Rapport:   care explained   questions answered   reassurance provided   thoughts/feelings acknowledged  Taken 4/21/2024 1200 by Rafael Bo RN  Trust Relationship/Rapport:   care explained   questions  answered   reassurance provided   thoughts/feelings acknowledged  Taken 4/21/2024 0800 by Rafael Bo RN  Trust Relationship/Rapport:   care explained   questions answered   reassurance provided   thoughts/feelings acknowledged     Problem: Mechanical Ventilation Invasive  Goal: Optimal Device Function  Intervention: Optimize Device Care and Function  Recent Flowsheet Documentation  Taken 4/21/2024 1800 by Rafael Bo, RN  Oral Care:   swabbed with antiseptic solution   oral rinse provided  Taken 4/21/2024 1600 by Rafael Bo RN  Airway Safety Measures:   all equipment/monitors on and audible   suction at bedside  Oral Care:   swabbed with antiseptic solution   oral rinse provided  Taken 4/21/2024 1400 by Rafael Bo RN  Oral Care:   swabbed with antiseptic solution   oral rinse provided  Taken 4/21/2024 1200 by Rafael Bo RN  Airway Safety Measures:   all equipment/monitors on and audible   suction at bedside  Oral Care:   swabbed with antiseptic solution   oral rinse provided  Taken 4/21/2024 0800 by Rafael Bo RN  Airway Safety Measures:   all equipment/monitors on and audible   suction at bedside  Airway/Ventilation Management: airway patency maintained  Oral Care:   swabbed with antiseptic solution   oral rinse provided     Problem: Mechanical Ventilation Invasive  Goal: Mechanical Ventilation Liberation  Intervention: Promote Extubation and Mechanical Ventilation Liberation  Recent Flowsheet Documentation  Taken 4/21/2024 1600 by Rafael Bo RN  Medication Review/Management:   medications reviewed   high-risk medications identified  Environmental Support: calm environment promoted  Taken 4/21/2024 1200 by Rafael Bo RN  Medication Review/Management:   medications reviewed   high-risk medications identified  Environmental Support: calm environment promoted  Taken 4/21/2024 0800 by Rafael Bo RN  Medication Review/Management:   medications reviewed   high-risk medications  identified  Environmental Support: calm environment promoted     Problem: Mechanical Ventilation Invasive  Goal: Absence of Ventilator-Induced Lung Injury  Intervention: Prevent Ventilator-Associated Pneumonia  Recent Flowsheet Documentation  Taken 4/21/2024 1800 by Rafael Bo RN  Oral Care:   swabbed with antiseptic solution   oral rinse provided  Head of Bed (HOB) Positioning: HOB at 30 degrees  Taken 4/21/2024 1600 by Rafael Bo, RN  VAP Prevention Bundle:   HOB elevation maintained   oral care regularly provided  Oral Care:   swabbed with antiseptic solution   oral rinse provided  VAP Prevention Measures: completed  Taken 4/21/2024 1400 by Rafael Bo, CARA  Oral Care:   swabbed with antiseptic solution   oral rinse provided  Head of Bed (HOB) Positioning: HOB at 30 degrees  Taken 4/21/2024 1200 by Rafael Bo RN  VAP Prevention Bundle:   HOB elevation maintained   oral care regularly provided  Oral Care:   swabbed with antiseptic solution   oral rinse provided  Head of Bed (HOB) Positioning: HOB at 30 degrees  VAP Prevention Measures: completed  Taken 4/21/2024 1000 by Rafael Bo RN  Head of Bed (HOB) Positioning: HOB at 30 degrees  Taken 4/21/2024 0800 by Rafael Bo RN  VAP Prevention Bundle:   HOB elevation maintained   oral care regularly provided   readiness to extubate assessed   sedation interruption performed   spontaneous breathing trial performed   stress ulcer prophylaxis provided   vent circuit breaks minimized   VTE prophylaxis provided  Oral Care:   swabbed with antiseptic solution   oral rinse provided  Head of Bed (HOB) Positioning: HOB at 30 degrees  VAP Prevention Measures: completed     Problem: Delirium  Goal: Improved Behavioral Control  Intervention: Minimize Safety Risk  Recent Flowsheet Documentation  Taken 4/21/2024 1600 by Rafael oB, RN  Communication Enhancement Strategies:   communication board used   extra time allowed for response  Enhanced Safety Measures:    review medications for side effects with activity   room near unit station  Trust Relationship/Rapport:   care explained   questions answered   reassurance provided   thoughts/feelings acknowledged  Taken 4/21/2024 1200 by Rafael Bo, RN  Communication Enhancement Strategies:   communication board used   extra time allowed for response  Enhanced Safety Measures:   review medications for side effects with activity   room near unit station  Trust Relationship/Rapport:   care explained   questions answered   reassurance provided   thoughts/feelings acknowledged  Taken 4/21/2024 0800 by Rafael Bo, RN  Communication Enhancement Strategies:   communication board used   extra time allowed for response  Enhanced Safety Measures:   review medications for side effects with activity   room near unit station  Trust Relationship/Rapport:   care explained   questions answered   reassurance provided   thoughts/feelings acknowledged   Goal Outcome Evaluation: Patient is alert and oriented, makes his needs known. Vital signs were stable. Rectal tube is out. Extubated this morning. Worked with OT this afternoon. Levo is still running, Regular diet is ordered.       Plan of Care Reviewed With: patient    Overall Patient Progress: improvingOverall Patient Progress: improving

## 2024-04-22 NOTE — PLAN OF CARE
Goal Outcome Evaluation:      Plan of Care Reviewed With:  (Interdisciplinary bedside rounds)    Overall Patient Progress: improvingOverall Patient Progress: improving    Outcome Evaluation: Patient eating well on regular diet, added Ensure with meals. Plan to pull FT today.    Terese Huber RD, LD, CNSC   Clinical Dietitian - [facility]

## 2024-04-22 NOTE — PROGRESS NOTES
CLINICAL NUTRITION SERVICES - REASSESSMENT NOTE      Recommendations Ordered by Registered Dietitian (RD): Ensure TID with meals     Discussed with MD -- OK to pull FT as patient eating well and agreeable to Ensure TID    Malnutrition: 4/20  % Weight Loss:  Weight loss does not meet criteria for malnutrition - fluid status has been fluctuating and fluid retention may be masking losses.   % Intake:  Decreased intake does not meet criteria for malnutrition  Subcutaneous Fat Loss:  Orbital region mild depletion  Muscle Loss:  Temporal region mild depletion and Clavicle bone region mild depletion  Fluid Retention:  Moderate 3+ BLE     Malnutrition Diagnosis: at least Moderate malnutrition  In Context of:  Acute illness or injury  Chronic illness or disease       EVALUATION OF PROGRESS TOWARD GOALS   Diet:  Regular diet     Nutrition Support:  TF was started on 4/21 and continues as follows ~    Nutrition Support Enteral:  Type of Feeding Tube: NG  Enteral Frequency:  Continuous  Enteral Regimen: Vital HP at 20 mL/hr  Total Enteral Provisions: 480 kcal, 42 g protein, 401 mL H2O, 53 g CHO, 0 g fiber  Free Water Flush: 60 mL every 4 hours     Intake/Tolerance:    Visited with patient this afternoon  He is up in the chair and has just ordered a late lunch --> Tomato soup, RK bar, cran juice x 2  He ate 100% of two trays earlier:  Ensure, fresh fruit cup x 2, Froot Loops, OJ, muffin, cran juice     He tells me that he is willing to do Ensure with every tray       ASSESSED NUTRITION NEEDS:  Dosing Weight::  88 kg (adjusted)  Estimated Energy Needs: 2628-1941 kcals (25-30 Kcal/Kg)  Justification: overweight   Estimated Protein Needs: 105-135 grams protein (1.2-1.5 g pro/Kg)  Justification: overweight   Estimated Fluid Needs: 1 mL/Kcal  Justification: per provider pending fluid status      NEW FINDINGS:   4/21: Extubated   4/21: FT = NG     K/Mg/Phos NL  -193  I/O 2431/1400  Norepi drip  Lactulose with 75 mL out  yesterday     Previous Goals (4/20):   Tolerate EN initiation   Evaluation: Met    Previous Nutrition Diagnosis (4/20):   Inadequate protein-energy intake related to NPO status as evidenced by reliance on enteral nutrition support   Evaluation: Improving      CURRENT NUTRITION DIAGNOSIS  Malnutrition related to fat and muscle loss as evidenced by coding for moderate malnutrition, need for oral nutritional supplements     INTERVENTIONS  Recommendations / Nutrition Prescription  Regular diet as tolerated  Ensure TID with meals     Discussed with MD -- OK to pull FT as patient eating well and agreeable to Ensure TID     Implementation  Medical Food Supplement: Ordered as above   Collaboration and Referral of Nutrition care: Patient discussed today during interdisciplinary bedside rounds + discussed discontinue TF with MD     Goals  Patient will consume at least 75% meals and supplements     MONITORING AND EVALUATION:  Progress towards goals will be monitored and evaluated per protocol and Practice Guidelines    Terese Huber RD, LD, CNSC   Clinical Dietitian - [facility]

## 2024-04-22 NOTE — PROGRESS NOTES
GATITO is quickly improving.  This suggests prerenal factors.  Would hold diuretic until he is back at baseline.      Please call us with questions.        Jose Nelson MD

## 2024-04-22 NOTE — PROGRESS NOTES
[facility]  [facility]  Gastroenterology Progress note    Interval History:      Pt awake and oriented.  Eating breakfast.  Note copious liquid stools yesterday.  No pain.      Vital Signs:      /79   Pulse 81   Temp 97.8  F (36.6  C) (Temporal)   Resp (!) 7   Wt 116.2 kg (256 lb 2.8 oz)   SpO2 95%   BMI 34.74 kg/m    Temp (24hrs), Av  F (36.7  C), Min:97.8  F (36.6  C), Max:98.2  F (36.8  C)    Patient Vitals for the past 72 hrs:   Weight   24 0600 116.2 kg (256 lb 2.8 oz)   24 0400 115 kg (253 lb 8.5 oz)   24 0529 113 kg (249 lb 1.9 oz)       Intake/Output Summary (Last 24 hours) at 2024 0759  Last data filed at 2024 0600  Gross per 24 hour   Intake 2431.5 ml   Output 1400 ml   Net 1031.5 ml         Constitutional: NAD, comfortable  Cardiovascular: RRR, normal S1, S2   Respiratory: CTAB  Abdomen: soft, non-tender, distended    Additional Comments:  ROS, FH, SH: See initial GI consult for details.    Laboratory Data:  Recent Labs   Lab Test 24  1833 24  0348   WBC 7.3 11.5* 5.5 3.3*   HGB 7.8* 8.8* 7.6* 7.7*    96 97 97   * 221 127* 112*   INR 2.57* 2.19*  --  2.32*     Recent Labs   Lab Test 24  0410 24  0437 24  0348    139 138   POTASSIUM 4.1 4.8 4.3   CHLORIDE 109* 106 107   CO2    BUN 37.1* 32.9* 21.8*   CR 1.30* 1.85* 1.41*   ANIONGAP 9 11 8   MIGUELINA 9.2 9.0 8.8     Recent Labs   Lab Test 24  0410 24  0437 24  0348 24  1802 24  0458 24  1230 24  0536 24  1044 23  0538 23  0535 23  0549 23  0536 23  1514 23  0724 23  1646 23  1537 23  1424 23  1224 23  1147   ALBUMIN 3.2* 3.0* 2.8*   < > 2.8*  --    < > 3.1*   < > 2.9* 2.6*   < > 3.1*   < >  --   --  2.4*   < > 2.3*   BILITOTAL 10.2* 10.8* 10.3*   < > 11.3*  --    < > 7.6*   < > 7.1* 7.1*    < > 8.4*   < >  --   --  21.7*   < > 10.4*   DBIL  --   --   --   --   --   --   --  3.59*  --  3.19* 3.34*   < >  --   --   --   --   --   --  7.66*   ALT 39 48 46   < > 46  --    < > 48   < > 36 36   < > 49   < >  --   --  69   < > 82*   AST 57* 76* 82*   < > 96*  --    < > 97*   < > 141* 146*   < > 222*   < >  --    < >  --    < > 331*   ALKPHOS 101 126 126   < > 118  --    < > 225*   < > 269* 263*   < > 330*   < >  --   --  182*   < > 200*   PROTEIN  --   --   --   --   --  30*  --   --   --   --   --   --  100*  --  30*  --   --    < >  --    LIPASE  --   --   --   --  56  --   --   --   --   --   --   --   --   --   --   --  237*  --  272*    < > = values in this interval not displayed.         Assessment:  40 yo male with alcoholic liver disease admitted with worsening encephalopathy requiring intubation and alcoholic hepatitis with DF of 82.8.  Methylprednisolone 40 mg daily started 4/19/24, Lille to be calculated 4/26/24.  AST downtrending.  Bili stable.  INR increased today.    Hemoglobin stable.  Plan:  -  Continue methylprednisolone 40 mg daily.  Calculate Lille score on 4/26.  -  Trend MELD labs.  -  Lactulose 20 g TID, titrate to 3-4 loose BM per day.  -  Continue ceftriaxone, rifaximin, and pantoprazole BID.  -  Appreciate nephrology input.  Receiving albumin.  Creatinine improved today.  -  ICU cares.    Total Time Spent: 20 minutes      GENET Hamlin  [facility]  Office:  [phone] call if needed after 5PM  Cell:  [phone], not available after 5PM at this number

## 2024-04-23 NOTE — PLAN OF CARE
"Goal Outcome Evaluation:           Overall Patient Progress: improvingOverall Patient Progress: improving       A&O x4, follows commands. Afebrile. 3/4+ edema. LS clear, on RA. Loose stool x2. Voiding using urinal. Left femoral line pulled this morning, site oozy but intact. 1unit PRBC given for Hgb 6.6. Recheck in AM. Worked with PT/OT. CTA ordered for today. Report given to Hanna on [address]. Ready for transfer.     Problem: Adult Inpatient Plan of Care  Goal: Plan of Care Review  Description: The Plan of Care Review/Shift note should be completed every shift.  The Outcome Evaluation is a brief statement about your assessment that the patient is improving, declining, or no change.  This information will be displayed automatically on your shift  note.  Outcome: Progressing  Flowsheets (Taken 4/23/2024 1027)  Overall Patient Progress: improving  Goal: Patient-Specific Goal (Individualized)  Description: You can add care plan individualizations to a care plan. Examples of Individualization might be:  \"Parent requests to be called daily at 9am for status\", \"I have a hard time hearing out of my right ear\", or \"Do not touch me to wake me up as it startles  me\".  Outcome: Progressing  Goal: Absence of Hospital-Acquired Illness or Injury  Outcome: Progressing  Intervention: Identify and Manage Fall Risk  Recent Flowsheet Documentation  Taken 4/23/2024 0800 by Sherwin Sheffield, RN  Safety Promotion/Fall Prevention:   clutter free environment maintained   lighting adjusted   safety round/check completed   room organization consistent   room door open   patient and family education   nonskid shoes/slippers when out of bed  Intervention: Prevent Skin Injury  Recent Flowsheet Documentation  Taken 4/23/2024 0800 by Sherwin Sheffield RN  Body Position:   position changed independently   upper extremity elevated   heels elevated   legs elevated  Intervention: Prevent and Manage VTE (Venous Thromboembolism) Risk  Recent " Flowsheet Documentation  Taken 4/23/2024 0800 by Sherwin Sheffield RN  VTE Prevention/Management: SCDs (sequential compression devices) on  Goal: Optimal Comfort and Wellbeing  Outcome: Progressing  Intervention: Provide Person-Centered Care  Recent Flowsheet Documentation  Taken 4/23/2024 0800 by Sherwin Sheffield RN  Trust Relationship/Rapport:   care explained   choices provided   questions encouraged   thoughts/feelings acknowledged  Goal: Readiness for Transition of Care  Outcome: Progressing     Problem: Anemia  Goal: Anemia Symptom Improvement  Outcome: Progressing  Intervention: Monitor and Manage Anemia  Recent Flowsheet Documentation  Taken 4/23/2024 0800 by Sherwin Sheffield RN  Safety Promotion/Fall Prevention:   clutter free environment maintained   lighting adjusted   safety round/check completed   room organization consistent   room door open   patient and family education   nonskid shoes/slippers when out of bed     Problem: Delirium  Goal: Optimal Coping  Outcome: Progressing  Goal: Improved Behavioral Control  Outcome: Progressing  Intervention: Minimize Safety Risk  Recent Flowsheet Documentation  Taken 4/23/2024 0800 by Sherwin Sheffield RN  Enhanced Safety Measures: review medications for side effects with activity  Trust Relationship/Rapport:   care explained   choices provided   questions encouraged   thoughts/feelings acknowledged  Goal: Improved Attention and Thought Clarity  Outcome: Progressing  Goal: Improved Sleep  Outcome: Progressing

## 2024-04-23 NOTE — DISCHARGE INSTRUCTIONS
CHEMICAL HEALTH RESOURCES:  [facility] program offers access to chemical health services (individual counseling, group counseling) and peer recovery support services virtually.  For more information and to schedule, call: [phone].    To find recovery support meetings near you or online:  [place]: https://[place].[email or web address]/meetings/ or call [phone]  NA: [email or web address]://[email or web address]/find-[email or web address]-meeting/ or call 1-340.727.1398  SMART Recovery: https://meetings.smartrecovery.org/meetings/  [facility]: https://refugerecoverymeetings.[email or web address]/meetings  [facility]: https://www.TickPick/what-we-offer/find-a-cr-meeting  [facility] Connection: https://[email or web address]/ or call [phone] (All Recovery Meetings & Telephone Recovery Support)  [facility]: [email or web address].[email or web address]/[email or web address]    [place] [facility] will call you for appointment

## 2024-04-23 NOTE — PROGRESS NOTES
GASTROENTEROLOGY PROGRESS NOTE     IMPRESSION:  1.  Patient with likely acute alcoholic hepatitis on chronic alcoholic liver disease with severe hepatic encephalopathy requiring intubation.  - Methylprednisolone 40 mg daily started 2024, Lille score to be calculated 2024.  - Mental status improving today  - Plans for CTA to evaluate portal vein thrombosis, agree with plans for hematology consult if thrombosis is present.    RECOMMENDATIONS:  -Continue methylprednisolone 40 mg daily.  - Monitor MELD labs  - Continue lactulose 20 g 3 times daily, titrate to 3-4 loose bowel movements daily.  - Continue rifaximin and pantoprazole twice a day  - Okay to stop ceftriaxone  - Albumin per nephrology    Rory Baldwin MD  [facility] -   [phone]      ________________________________________________________________________      SUBJECTIVE: Patient is sitting up in a chair, ready to go on a walk.       OBJECTIVE:  /71   Pulse 94   Temp 97.6  F (36.4  C) (Oral)   Resp 15   Wt 116.2 kg (256 lb 2.8 oz)   SpO2 99%   BMI 34.74 kg/m    Temp (24hrs), Av.9  F (36.6  C), Min:97.6  F (36.4  C), Max:98.3  F (36.8  C)    Patient Vitals for the past 72 hrs:   Weight   24 0600 116.2 kg (256 lb 2.8 oz)   24 0400 115 kg (253 lb 8.5 oz)        PHYSICAL EXAM  GEN: Alert, NAD.    HRT: reg  LUNGS: CTA  ABD: +BS, non-tender, non-distended, no rebound or guarding.  EXT: Significant bilateral lower extremity edema.    Additional Data:  I have reviewed the patient's new clinical lab results:     Recent Labs   Lab Test 24  0550 24  0430 24  0410 24  0437   WBC 4.2 4.1 7.3 11.5*   HGB 6.6* 6.6* 7.8* 8.8*   MCV 99 101* 100 96   PLT 87* 87* 139* 221   INR  --  2.74* 2.57* 2.19*     Recent Labs   Lab Test 24  0726 24  0430 24  0429 24  1123 24  0410 24  0857 24  0437   NA  --  139  --   --  140  --  139   POTASSIUM  --  4.2  --    --  4.1  --  4.8   CHLORIDE  --  110*  --   --  109*  --  106   CO2  --  23  --   --  22  --  22   BUN  --  26.4*  --   --  37.1*  --  32.9*   CR  --  0.79  --   --  1.30*  --  1.85*   ANIONGAP  --  6*  --   --  9  --  11   MIGUELINA  --  9.4  --   --  9.2  --  9.0   * 132* 115*   < > 164*   < > 149*  159*    < > = values in this interval not displayed.     Recent Labs   Lab Test 04/23/24  0430 04/22/24  0410 04/21/24  0437 04/19/24  1802 04/19/24  0458 04/18/24  1230 11/17/23  0536 11/16/23  1514 09/26/23  0724 09/25/23  1646 09/25/23  1537 09/25/23  1424 09/08/23  1224 09/08/23  1147   ALBUMIN 3.1* 3.2* 3.0*   < > 2.8*  --    < > 3.1*   < >  --   --  2.4*   < > 2.3*   BILITOTAL 7.9* 10.2* 10.8*   < > 11.3*  --    < > 8.4*   < >  --   --  21.7*   < > 10.4*   ALT 44 39 48   < > 46  --    < > 49   < >  --   --  69   < > 82*   AST 67* 57* 76*   < > 96*  --    < > 222*   < >  --    < >  --    < > 331*   PROTEIN  --   --   --   --   --  30*  --  100*  --  30*  --   --    < >  --    LIPASE  --   --   --   --  56  --   --   --   --   --   --  237*  --  272*    < > = values in this interval not displayed.

## 2024-04-23 NOTE — PLAN OF CARE
Problem: Anemia  Goal: Anemia Symptom Improvement  Intervention: Monitor and Manage Anemia  Flowsheets  Taken 4/22/2024 1936  Safety Promotion/Fall Prevention:   clutter free environment maintained   room organization consistent   safety round/check completed  Fatigue Management:   activity schedule adjusted   activity assistance provided  Taken 4/22/2024 1600  Safety Promotion/Fall Prevention:   clutter free environment maintained   lighting adjusted   room near nurse's station   safety round/check completed  Taken 4/22/2024 1200  Safety Promotion/Fall Prevention:   clutter free environment maintained   lighting adjusted   room near nurse's station   safety round/check completed  Taken 4/22/2024 0800  Safety Promotion/Fall Prevention:   clutter free environment maintained   lighting adjusted   room near nurse's station   safety round/check completed   Goal Outcome Evaluation:         Neuro: Clearing.  Using call light appropriately.  Movement improved throughout the day.  Up in chair all day.  Walked in hallway with therapy.  CV: SR.  Levo titrated off around 4pm.  Goal SBP >100. Groin line site WDL.  Resp: RA.  Denies SOB.  Good cough, occasionally productive.  GI: Frequent stools Q2hr with lactulose.  Eating % of meals.  TF stopped and keofeed discontinued.  : Good UOP.  Bosch removed at 2pm.  Voiding into urinal.    Plan: Pull CVC tomorrow if remains off pressors overnight.  Likely transfer tomorrow.

## 2024-04-23 NOTE — PLAN OF CARE
Goal Outcome Evaluation:       Summary: AMS/Encephalopathy/Anemia/ Ammonia level 146 on admission    DATE & TIME: 4/23/24 [id number]    Cognitive Concerns/ Orientation : A&Ox4, flat affect, forgetful at times    BEHAVIOR & AGGRESSION TOOL COLOR: Green  CIWA SCORE: N/a   ABNL VS/O2: VSS, RA  MOBILITY: Up with 1 assist with gait belt/walker, ambulated to bathroom   PAIN MANAGMENT: Denies  DIET: Regular- good appetite  BOWEL/BLADDER: continent B & B, can be incontinent at times, pt had 3 loose stools today- on scheduled Lactulose   ABNL LAB/BG: Albumin 3.1, AST 67, Bili total 7.9, , INR 2.74, Hemoglobin 6.6- 1 unit blood transfused today recheck level in am   DRAIN/DEVICES: IV SL   TELEMETRY RHYTHM: NSR  SKIN: Jaundiced, redness/blanchable on coccyx   TESTS/PROCEDURES:CT Abdomen scheduled   D/C DAY/GOALS/PLACE: pending improvement   OTHER IMPORTANT INFO: GI following, Severe edema of legs bilaterally-elevated on pillows, oral antibiotics

## 2024-04-23 NOTE — CONSULTS
Care Management Initial Consult    General Information  Assessment completed with: Patient, Parents,  (Pranay , Quan)  Type of CM/SW Visit: Initial Assessment    Primary Care Provider verified and updated as needed: Yes   Readmission within the last 30 days: no previous admission in last 30 days      Reason for Consult: discharge planning  Advance Care Planning:            Communication Assessment  Patient's communication style: spoken language (English or Bilingual)             Cognitive  Cognitive/Neuro/Behavioral: WDL  Level of Consciousness: alert  Arousal Level: opens eyes spontaneously  Orientation: oriented x 4  Mood/Behavior: withdrawn  Best Language: 0 - No aphasia  Speech: clear    Living Environment:   People in home: parent(s)   (Juli Glass, Pranay)  Current living Arrangements: house      Able to return to prior arrangements: other (see comments)  Living Arrangement Comments:  (TBD)    Family/Social Support:  Care provided by: self, parent(s)  Provides care for: no one  Marital Status: Single  Parent(s)          Description of Support System: Supportive, Involved    Support Assessment: Adequate family and caregiver support    Current Resources:   Patient receiving home care services: No     Community Resources:    Equipment currently used at home: shower chair, grab bar, tub/shower, cane, straight, walker, standard  Supplies currently used at home:      Employment/Financial:  Employment Status: employed full-time     Employment/ Comments:  ( [employer])  Financial Concerns:     Referral to Financial Worker: No       Does the patient's insurance plan have a 3 day qualifying hospital stay waiver?  No    Lifestyle & Psychosocial Needs:  Social Determinants of Health     Food Insecurity: No Food Insecurity (9/25/2023)    Received from [facility], [facility]    Food Insecurity     Worried About Running Out of Food in the  Last Year: 1   Depression: Not at risk (1/8/2024)    [facility]    PHQ-2     PHQ-2 TOTAL SCORE: 2   Housing Stability: Low Risk  (9/25/2023)    [facility], [facility] [employer] [facility]    Housing Stability     Unable to Pay for Housing in the Last Year: 1   Tobacco Use: Unknown (3/16/2024)    Patient History     Smoking Tobacco Use: Never     Smokeless Tobacco Use: Unknown     Passive Exposure: Not on file   Financial Resource Strain: Low Risk  (9/25/2023)    [facility]    Financial Resource Strain     Difficulty of Paying Living Expenses: 3     Difficulty of Paying Living Expenses: Not on file   Alcohol Use: Not on file   Transportation Needs: No Transportation Needs (9/25/2023)    [facility]    Transportation Needs     Lack of Transportation (Medical): 1   Physical Activity: Not on file   Interpersonal Safety: Not on file   Stress: Not on file   Social Connections: Socially Isolated (9/25/2023)    [facility], [facility]    Social Connections     Frequency of Communication with Friends and Family: 4   Health Literacy: Not on file       Functional Status:  Prior to admission patient needed assistance:   Dependent ADLs:: Ambulation-walker, Ambulation-cane  Dependent IADLs:: Transportation       Mental Health Status:  Mental Health Status: Current Concern  Mental Health Management: Individual Therapy, Medication    Chemical Dependency Status:  Chemical Dependency Status: Current Concern  Chemical Dependency Management: Previous treatment, Other (see comment) (tried some medications and group  "therapy (\"to time consuming\"))          Values/Beliefs:  Spiritual, Cultural Beliefs, [employer]al Practices, Values that affect care:                 Additional Information:  Consult for discharge planning.     39 year-old male patient with history of alcohol use, chronic liver cirrhosis, history of SBP, substance abuse, hypertension, chronic heart failure with preserved ejection fraction, history of pleural effusion status thoracentesis who was recently admitted to [facility] from 3/12-3/23 where he was diagnosed with anemia of chronic disease and received multiple blood transfusion and underwent EGD which showed esophageal varices and during that hospital stay patient was also evaluated by the hematology team and was discharged and was again seen in the ED on 4/9 and hemoglobin was 6.1 and got 1 unit of blood and presented to the hospital with altered mental status and jaundice     Writer met with patient and his father Quan at bedside in the ICU and explained role.  Quan tends to answer questions for patient. Patient had a difficult time answering some questions, writer asked about CD and mental health and answers provided were confusing/conflicting. Patient may be starting a medication like antabuse and talked about seeing a counselor, writer unclear if this is for MH or CD. Writer knew patient was transferring to another floor and explained the CC/SW will follow up with discharge recommendations and that CD may be in to speak with him. Patient uses a walker when inside the home and a cane outside of the home, he is employed and takes buses for transportation.       THERON Mcmillan    "

## 2024-04-23 NOTE — PLAN OF CARE
Problem: Adult Inpatient Plan of Care  Goal: Plan of Care Review  Description: The Plan of Care Review/Shift note should be completed every shift.  The Outcome Evaluation is a brief statement about your assessment that the patient is improving, declining, or no change.  This information will be displayed automatically on your shift  note.  Outcome: Progressing  Flowsheets (Taken 4/23/2024 0623)  Outcome Evaluation: on room air. normotensive. a/ox4.  Plan of Care Reviewed With: patient  Overall Patient Progress: improving     Problem: Anemia  Goal: Anemia Symptom Improvement  Outcome: Not Progressing  Intervention: Monitor and Manage Anemia  Recent Flowsheet Documentation  Taken 4/23/2024 0000 by Dary Olson, RN  Safety Promotion/Fall Prevention:   clutter free environment maintained   lighting adjusted   safety round/check completed   room organization consistent   room door open   patient and family education   nonskid shoes/slippers when out of bed   Critical hg 6.6 reported to nurse. Discussed with dr. Dickerson. Ordered type and screen and 1 unit(s) prbc. Ordered to first recheck hg. Pt VSS.  Problem: Delirium  Goal: Improved Sleep  Outcome: Not Progressing

## 2024-04-23 NOTE — PROGRESS NOTES
Critical Care Intensivist Note    Off pressors overnight  Ambulating around unit  Hgb  down to 6.6, but no dark stools or other signs of active bleed     Exam more interactive this morning  Encephalopathic, CAM-ICU+, but follows commands well  Able to participate well with PT/OT   crusting around nare persists even with Dobhoff out  Bosch out  Femoral line still there  Abdomen soft if slightly distended    Labs notable for a Cr that has normalizedrest of lytes look good with stable transaminases  As before, Hgb down to 6.6 &Platelet cioubnt also <100k (repeat CBC to double-check was almost exactly the same)    No new imaging    Crispin Ham is a 39 year old male hx alcohol use disorder, chronic decompensated cirrhosis with hx of SBP, HFpEF, HTN, pleural effusions previously requiring drainage, and small esophageal varices; admitted on 4/17/2024 with worsening encephalopathy and intubated 4/18 for intubation for airway protection and EGD.     Today will discuss stopping ceftriaxone and albumin replacement with gastroenterology and nephrology.  Will decreasae his dose of midodrine, schedule melatonin for sleep, and decrease his lactulose dosing.  his femoral CVC can be removed and he can transfer to the floor.    There was radiographic concern for IVC thrombus on recent CT; although ultrasound was negative, radiology strongly recommended ordering a CTA to assess need for long-term therapeutic anticoagulation.  Will get that today.      Neurology/Psychiatry:   METABOLIC ENCEPHALOPATHY / DELIRIUM:  -due to hyperammonemia / liver disease, less likely a concurrent active infection; monitoring with clinical exam  -schedule  melatonin for sleep   -using non-pharmacologic methods as much as possilble    HEPATIC ENCEPHALOPATHY:  -rifaxamin (had not been on this at home due to cost/insurance) + lactulose, decrease dose to 20bid (home dose was 15ml bid)    Pulmonary/Ventilator Management:   No acute issues.  Currently off  oxygen    Cardiovascular:   HYPOTENSION:  -no current signs of active sepsis or ongoing bleeding.  Presumed due to liver disease (high output, low SVR state)  -midodrine can be decreased to 15tid tooday    GI and Nutrition :   SEVERE PROTEIN-CALORIE MALNUTRITION:  -tolerating general diet with good intake.  Off supplemental feeds    ALCOHOLIC HEPATITIS:  -methylpred 40 every day.  Lille score will next be calculated 4/26  -ceftriaxone recommended by hepatology; will likely discontinnue today  -lactulose, rifaxamin, pantoprazole as described elsewhere  -will likely restart home diuretics for ascites and LE edema tomorrow (wait today to see how renal function does after CTA)    Renal/Fluids/Electrolytes:   ACUTE KIDNEY INJURY:  -resolving  -if creatinine continues to improve tomorrow, will likely restart home lasix / spironolactone  -can discontinue albumin replacement    Infectious Disease:   POSSIBLE SEPSIS:  -no evidence of SBP or other active infection.  Would like to stop ceftriaxone today    Endocrine:   STRESS INDUCED HYPERGLYCEMIA:  - ICU insulin protocol, goal sugar <180.  No current issues, even with steroids    Hematology/Oncology:   NASAL BLEEDING:  -resolved.  ENT was following  -muprocian ointment x3 weeks recommended for crusting and soreness    ACUTE BLOOD LOSS ANEMIA:  -No evidence of ongoing active hemorrhage, so will use a transfusion trigger of Hgb <7 going forward.  I suspect today's apparent drop was hemodilutional (no bloody Bms or new hypotension).  Transfuse, but no need for endoscopy or reason to delay transfer to floor    POSSIBLE IVC THROMBUS:  -CT-angiogram today to further evaluate possiblity of thrombus.  No role for therapeutic anticoagulation at this point     MSKL/Rheum:  No issues other than ongoing BLE edema, potentially rleatled to IVC thrombus    IV/Access:   -discontinue femoral CVC    ICU Prophylaxis:   1. DVT: Hep subcutaneous in the context of recent GI bleed and renal  dysfunctioyn cdan continue even with platelets <100k.  May need hematology consult to help with long-term anticoagulant choice/ dosing if he is indeed found to have IVC thrombus  2. VAP: not currently indiciated  3. Stress Ulcer: PPIbid in the context of high dose steroids and recent GI bleed  4. Restraints: Not currently necessary   5. Wound care - per unit routine   6. Feeding - general diet  7. Family Update:mother updated at bedside  8. Disposition - to floor today; likely discharge to TCU in <1 week    Billing: This patient is critically ill: no. E&M time 35min.    VANESSA Milan MD  Clinical   Anesthesia / Critical Care  *[phone]

## 2024-04-23 NOTE — PROGRESS NOTES
[facility]    Medicine Progress Note - Hospitalist Service    Date of Admission:  4/17/2024    Assessment & Plan   Crispin aHm is a 39 year old male hx alcohol use disorder, chronic decompensated cirrhosis with hx of SBP, HFpEF, HTN, pleural effusions previously requiring drainage, and small esophageal varices; admitted on 4/17/2024 with worsening encephalopathy and intubated 4/18 for intubation for airway protection and EGD.     Alcoholic cirrhosis/alcoholic hepatitis, improving   Hepatic encephalopathy/hyperammonemia, resolving   Coagulopathy of liver disease   Pt admitted on 4/18 with altered mental status and jaundice. He does have known history of cirrhosis and has seen GI in the past and history of SBP in the past also and EGD in March 2024 showed mild esophageal varices   * On admission he was found to have elevated ammonia to 146, with bilirubin of 11.4  * Shortly after admission (4/19), pt became more unresponsive and ultimately required intubation for airway protection in the setting of severe hepatic encephalopathy.   * He was treated with lactulose, rifaximin and ceftriaxone (empiric SBP coverage), as well as methylprednisolone for alcoholic hepatitis. Ultimately ammonia levels normalized and metal status improved. He was successfully extubated 4/21.   * 4/23 pt stable for transfer out of the ICU. VSS. Ammonia 63, ALT 44, AST 67 and T bili 7.9.   * Of note Pt was treated with empirically for SBP with ceftriaxone, however abdominal US negative for ascites.   - GI following, appreciate recommendations  - Continue lactulose 20g BID  - Continue rifaximin 550mg BID   - Continue Methylprednisolone 40mg daily (duration per GI)   - Holding PTA diuretics for now given recent GATITO, hypotension and need for contrast imaging study today (see below).   - Discontinue ceftriaxone, will resume PTA prophylactic ciprofloxacin  - Follow daily MELD labs       Bilateral lower extremity  edema  Marked lower extremity edema 2/2 liver disease, third spacing   - Resume PTA diuretics after assuring normal renal function following contrast enhanced imaging study today.   - encouraged leg elevation  - lymphedema consult     Chronic hypotension   2/2 liver disease (high output, low SVR state). Required vasopressors while intubated, now off. BPs stable with systolics in the 110s.   - Continue Midodrine 15mg TID     Acute on chronic anemia   Hx of portal hypertensive gastropathy and esophageal varices   Hx of duodenal ulcers (2023)   No evidence of GI bleeding. EGD on 4/18 showed moderate portal hypertensive gastropathy, but no varices or ulcers, no e/o of acute GI bleed. CT CAP on 4/19 did show evidence of blood in the subcutaneous space of the left groin and the left retroperitoneum (related to L femoral CVC placement)   * Hemoglobin continues to be low at 6.6.   - CT angiogram today planned for follow-up   - Transfuse for hbg <7  - Serial hemoglobin     Epistaxis  ENT consulted for epistaxis. Recommended mupirocin ointment intranasally.   - Continue mupirocin     Possible Left external iliac vein thrombus   Non-con CT CAP on 4/19 showed Left femoral central venous catheter in place with expansion of the left external iliac vein by low density material, likely thrombus.   -CT-angiogram today to further evaluate possiblity of thrombus.  No role for therapeutic anticoagulation at this point, pt on prophylactic heparin     Alcohol use disorder, severe   Hx of alcohol withdrawals   Discussed at length. Pt reports only drinking one beer prior to admission, but family noted finding a half empty bottle of whisky I his apartment. Pt has declined group or inpatient treatment in the past. Pt unfortunately just lost his brother to complications of alcohol use. Discussed high risk of mortality if he continues to drink. Strongly recommended intensive inpatient treatment.   - Pt agreeable to chem dep consultation. Order  placed.   - Continue thiamine, folate       Stress induced hyperglycemia  - Insulin sliding scale       Diet: Regular Diet Adult  Snacks/Supplements Adult: Ensure Enlive; With Meals    DVT Prophylaxis: Heparin SQ  Bosch Catheter: Not present  Lines: None     Cardiac Monitoring: ACTIVE order. Indication: encephalopathy  Code Status: Full Code      Clinically Significant Risk Factors           # Hypercalcemia: corrected calcium is >10.1, will monitor as appropriate    # Hypoalbuminemia: Lowest albumin = 2.7 g/dL at 4/19/2024  6:02 PM, will monitor as appropriate  # Coagulation Defect: INR = 2.74 (Ref range: 0.85 - 1.15) and/or PTT = 59 Seconds (Ref range: 22 - 38 Seconds), will monitor for bleeding  # Thrombocytopenia: Lowest platelets = 87 in last 2 days, will monitor for bleeding          # Obesity: Estimated body mass index is 34.74 kg/m  as calculated from the following:    Height as of 4/9/24: 1.829 m (6').    Weight as of this encounter: 116.2 kg (256 lb 2.8 oz).   # Moderate Malnutrition: based on nutrition assessment    # Financial/Environmental Concerns:           Disposition Plan     Medically Ready for Discharge: Anticipated in 2-4 Days        Maggy Rangel MD  Hospitalist Service  [facility]  Securely message with [employer] (more info)  Text page via [facility] Paging/Directory   ______________________________________________________________________    Interval History   NAEO. Pt up walking. Eating. Denies pain or shortness of breath. Nose bleeds stopped.     Physical Exam   Vital Signs: Temp: 97.6  F (36.4  C) Temp src: Oral BP: 114/69 Pulse: 92   Resp: 14 SpO2: 98 % O2 Device: None (Room air)    Weight: 256 lbs 2.79 oz    Constitutional: Awake, alert, cooperative, no apparent distress.  Eyes: Conjunctiva and pupils examined and normal. icteric  HEENT:dry mucous membranes, normal dentition.  Respiratory: Clear to auscultation bilaterally, no crackles or wheezing.  Cardiovascular:  Regular rate and rhythm, normal S1 and S2, and III/VI systolic murmur noted.  GI: Soft, non-distended, non-tender, normal bowel sounds.  Skin: No rashes, no cyanosis, marked 3+ bilateral lower extremity edema.  Musculoskeletal: No joint swelling, erythema or tenderness.  Neurologic: Cranial nerves 2-12 intact, normal strength and sensation. Slow responses   Psychiatric: Alert, oriented to person, place and time, no obvious anxiety or depression.    Medical Decision Making       75 MINUTES SPENT BY ME on the date of service doing chart review, history, exam, documentation & further activities per the note.      Data     I have personally reviewed the following data over the past 24 hrs:    4.2  \   6.6 (LL)   / 87 (L)     139 110 (H) 26.4 (H) /  182 (H)   4.2 23 0.79 \     ALT: 44 AST: 67 (H) AP: 111 TBILI: 7.9 (H)   ALB: 3.1 (L) TOT PROTEIN: 5.9 (L) LIPASE: N/A     INR:  2.74 (H) PTT:  N/A   D-dimer:  N/A Fibrinogen:  N/A       Imaging results reviewed over the past 24 hrs:   No results found for this or any previous visit (from the past 24 hour(s)).

## 2024-04-23 NOTE — CONSULTS
Met with pt for CD Consult and introduced self and role in pt's care. Pt was seen for CD Consult and JUSTYN Assessment 3/22/24 and was referred to [facility] for IOP.  Pt reports that the program was too time-intensive for him and his work schedule and he did not continue the program (pt could not recall which program he was attending).  Pt reports he has been seeing a therapist in the interim, but his current therapist retired so he has been set up with a new provider.  Pt reports he has a therapy appt scheduled with this new provider on Monday 4/29 at 11am.  Pt declined to complete JUSTYN Assessment Update for referrals to [place].    Pt was open to receiving CD Resources, discussed some options in the community that would be available to pt.  Sending pt CD Resources via secure e-mail to [email or web address] and adding to pt's AVS.    Relayed to pt that if they change their mind while admitted and would like to complete a CD Assessment for referrals to treatment or need any additional CD Resources they may alert unit staff who will assist in having CD Consult re-ordered.  If pt has active insurance they may also schedule an assessment on an outpatient basis by calling [facility] at [phone].    RANDA Marie, [facility]  Substance Use Disorder Evaluation Counselor  Email: [email or web address]

## 2024-04-24 NOTE — PLAN OF CARE
Summary: AMS/Encephalopathy/Anemia/ Ammonia level 146 on admission    DATE & TIME: 4/23/24, 1930 - 9830   Cognitive Concerns/ Orientation : A&Ox4, flat affect, forgetful at times    BEHAVIOR & AGGRESSION TOOL COLOR: Green  CIWA SCORE: N/a   ABNL VS/O2: VSS on room air  MOBILITY: Up with 1 assist with gait belt/walker  PAIN MANAGMENT: Denied  DIET: Regular  BOWEL/BLADDER: Continent B & B, can be incontinent at times. Patient had 3 loose stools yesterday  ABNL LAB/BG: AM labs pending  DRAIN/DEVICES: PIV SL   TELEMETRY RHYTHM: NSR  SKIN: Jaundiced, redness/blanchable on coccyx. +2/+4/edema to BLE   TESTS/PROCEDURES: CT Abdomen was done. No evidence for thrombus within the left external iliac artery or vein  D/C DAY/GOALS/PLACE: Pending improvement   OTHER IMPORTANT INFO: GI following. Severe edema to legs bilaterally-elevated on pillows,         Goal Outcome Evaluation:      Plan of Care Reviewed With: patient    Overall Patient Progress: no changeOverall Patient Progress: no change

## 2024-04-24 NOTE — PROGRESS NOTES
[facility]    Hospitalist Progress Note    Assessment & Plan   Crispin Ham is a 39 year old male hx alcohol use disorder, chronic decompensated cirrhosis with hx of SBP, HFpEF, HTN, pleural effusions previously requiring drainage, and small esophageal varices; admitted on 4/17/2024 with worsening encephalopathy and intubated 4/18 for intubation for airway protection and EGD.      Alcoholic cirrhosis/alcoholic hepatitis  Hepatic encephalopathy/hyperammonemia, resolved  Coagulopathy of liver disease   Pt admitted on 4/18 with altered mental status and jaundice. He does have known history of cirrhosis and has seen GI in the past and history of SBP in the past also and EGD in March 2024 showed mild esophageal varices   * On admission he was found to have elevated ammonia to 146, with bilirubin of 11.4  * Shortly after admission (4/19), pt became more unresponsive and ultimately required intubation for airway protection in the setting of severe hepatic encephalopathy.   * He was treated with lactulose, rifaximin and ceftriaxone (empiric SBP coverage), as well as methylprednisolone for alcoholic hepatitis. Ultimately ammonia levels normalized and metal status improved. He was successfully extubated 4/21.   * 4/23 pt stable for transfer out of the ICU. VSS. Ammonia 63, ALT 44, AST 67 and T bili 7.9.   * Of note Pt was treated with empirically for SBP with ceftriaxone, however abdominal US negative for ascites.   - GI following, appreciate recommendations  - Continue lactulose 20g BID  - Continue rifaximin 550mg BID   - Continue Methylprednisolone 40mg daily (duration per GI)   - Holding PTA diuretics for now given recent GATITO, hypotension and need for contrast imaging study today (see below).   - Discontinue ceftriaxone, will resume PTA prophylactic ciprofloxacin  - Follow daily MELD labs      Bilateral lower extremity edema  Marked lower extremity edema 2/2 liver disease, third spacing   -  creatinine stable , will restart diuretics.4/24  - encouraged leg elevation  - lymphedema consult      Chronic hypotension   2/2 liver disease (high output, low SVR state). Required vasopressors while intubated, now off. BPs stable with systolics in the 110s.   - Continue Midodrine 15mg TID      Acute on chronic anemia   Hx of portal hypertensive gastropathy and esophageal varices   Hx of duodenal ulcers (2023)   Retroperitoneal bleed   No evidence of GI bleeding. EGD on 4/18 showed moderate portal hypertensive gastropathy, but no varices or ulcers, no e/o of acute GI bleed. CT CAP on 4/19 did show evidence of blood in the subcutaneous space of the left groin and the left retroperitoneum (related to L femoral CVC placement)   * Hemoglobin was low at 6.6 4/23, resolved packed redblood cells 1 unit.  - CT angiogram 4/23 showed No evidence for active contrast extravasation/active bleeding. Specifically no evidence for active contrast extravasation or bleeding into the left lower retroperitoneum adjacent to to the left lower retroperitoneal high-density blood products, which   remain unchanged since 04/19/2024. The fluid collection representing a retroperitoneal hematoma adjacent to the iliacus and iliac musculature is likely related to low-grade bleeding from the patient's retroperitoneal varices.  - Transfuse for hbg <7  -  hemoglobin stable at 8.5 on 4/24     Epistaxis  ENT consulted for epistaxis. Recommended mupirocin ointment intranasally.   - Continue mupirocin      Possible Left external iliac vein thrombus   Non-con CT CAP on 4/19 showed Left femoral central venous catheter in place with expansion of the left external iliac vein by low density material, likely thrombus.   -CT-angiogram 4/23 showed No evidence for thrombus within the left iliac, arterial or venous systems. The findings on the CTA related to pseudo thrombotic defect from adjacent high-density blood products .  No role for therapeutic  anticoagulation at this point, pt on prophylactic heparin .     Alcohol use disorder, severe   Hx of alcohol withdrawals   Discussed at length. Pt reports only drinking one beer prior to admission, but family noted finding a half empty bottle of whisky I his apartment. Pt has declined group or inpatient treatment in the past. Pt unfortunately just lost his brother to complications of alcohol use. Discussed high risk of mortality if he continues to drink. Strongly recommended intensive inpatient treatment.   - seen by chem dep .  - Continue thiamine, folate      Stress induced hyperglycemia  - Insulin sliding scale        Diet :Regular Diet Adult  Snacks/Supplements Adult: Ensure Enlive; With Meals  DVT Prophylaxis: Pneumatic Compression Devices, heparin   Code Status: Full Code     51 MINUTES SPENT BY ME on the date of service doing chart review, history, exam, documentation & further activities per the note.  Medically Ready for Discharge: Anticipated Tomorrow    Clinically Significant Risk Factors           # Hypercalcemia: corrected calcium is >10.1, will monitor as appropriate    # Hypoalbuminemia: Lowest albumin = 2.7 g/dL at 4/19/2024  6:02 PM, will monitor as appropriate  # Coagulation Defect: INR = 2.48 (Ref range: 0.85 - 1.15) and/or PTT = 59 Seconds (Ref range: 22 - 38 Seconds), will monitor for bleeding  # Thrombocytopenia: Lowest platelets = 87 in last 2 days, will monitor for bleeding          # Obesity: Estimated body mass index is 35.26 kg/m  as calculated from the following:    Height as of 4/9/24: 1.829 m (6').    Weight as of this encounter: 117.9 kg (260 lb).   # Moderate Malnutrition: based on nutrition assessment    # Financial/Environmental Concerns:           Diana Garibay MD  Text Page   (7am to 6pm)    Interval History   Alert, oriented x 3 , tolerating diet well, hemoglobin stable today .    -Data reviewed today: I reviewed all new labs and imaging results over the last 24  hours.  Physical Exam     Vital Signs with Ranges  Temp:  [97.3  F (36.3  C)-97.8  F (36.6  C)] 97.3  F (36.3  C)  Pulse:  [69-99] 89  Resp:  [12-18] 18  BP: (108-124)/(66-80) 108/75  SpO2:  [91 %-100 %] 97 %  I/O last 3 completed shifts:  In: 913 [P.O.:580]  Out: 475 [Urine:475]    Constitutional: Awake, alert, cooperative, no apparent distress  Respiratory:breath sounds reduced bases .  Cardiovascular: Regular rate and rhythm, normal S1 and S2, and no murmur noted  GI: Normal bowel sounds, soft, non-distended, non-tender  Skin/Integumen: No rashes, 2+ lower extremity edema noted   Neuro : moving all 4 extremities, no focal deficit noted     Medications   Current Facility-Administered Medications   Medication Dose Route Frequency Provider Last Rate Last Admin    Daily 2 GRAM acetaminophen limit, unless fulminent liver failure or transaminases greater than or equal to 300 - 400, then none   Does not apply Continuous PRN Maggy Rangel MD        dextrose 10% infusion   Intravenous Continuous PRN Maggy Rangel MD        sodium chloride 0.9 % infusion   Intravenous Continuous Maggy Rangel MD   Stopped at 04/22/24 1630     Current Facility-Administered Medications   Medication Dose Route Frequency Provider Last Rate Last Admin    ciprofloxacin (CIPRO) tablet 500 mg  500 mg Oral Q24H Maggy Rangel MD   500 mg at 04/23/24 1728    folic acid (FOLVITE) tablet 1 mg  1 mg Oral Daily Maggy Rangel MD   1 mg at 04/24/24 0948    heparin ANTICOAGULANT injection 5,000 Units  5,000 Units Subcutaneous Q12H Maggy Rangel MD   5,000 Units at 04/24/24 0949    insulin aspart (NovoLOG) injection (RAPID ACTING)  1-7 Units Subcutaneous Q4H Maggy Rangel MD   1 Units at 04/24/24 0026    lactulose (CHRONULAC) solution 20 g  20 g Oral or Feeding Tube BID Maggy Rangel MD   20 g at 04/24/24 0949    melatonin tablet 3 mg  3 mg Oral QPM Maggy Rangel MD        methylPREDNISolone sodium  succinate (SOLU-MEDROL) injection 40 mg  40 mg Intravenous Q24H Maggy Rangel MD   40 mg at 04/23/24 1105    midodrine (PROAMATINE) tablet 15 mg  15 mg Oral TID w/meals Maggy Rangel MD   15 mg at 04/24/24 0948    multivitamin w/minerals (THERA-VIT-M) tablet 1 tablet  1 tablet Oral Daily Maggy Rangel MD   1 tablet at 04/24/24 0948    mupirocin (BACTROBAN) 2 % ointment   Topical TID Maggy Rangel MD   Given at 04/24/24 0949    pantoprazole (PROTONIX) 2 mg/mL suspension 40 mg  40 mg Oral or Feeding Tube BID Maggy Rangel MD        Or    pantoprazole (PROTONIX) EC tablet 40 mg  40 mg Oral BID Maggy Rangel MD   40 mg at 04/24/24 0949    rifaximin (XIFAXAN) tablet 550 mg  550 mg Oral or Feeding Tube BID Maggy Rangel MD   550 mg at 04/24/24 0948    sodium chloride (PF) 0.9% PF flush 3 mL  3 mL Intracatheter Q8H Maggy Rangel MD   3 mL at 04/24/24 0449    thiamine (B-1) tablet 100 mg  100 mg Oral Daily Maggy Rangel MD   100 mg at 04/24/24 0949    Or    thiamine (B-1) injection 100 mg  100 mg Intravenous Daily Maggy Rangel MD   100 mg at 04/21/24 0859       Data   Recent Labs   Lab 04/24/24  0850 04/24/24  0757 04/24/24  0447 04/23/24  0726 04/23/24  0550 04/23/24  0430 04/22/24  1123 04/22/24  0410 04/19/24  0508 04/19/24  0458   WBC 6.8  --   --   --  4.2 4.1  --  7.3   < > 11.1*   HGB 8.5*  --   --   --  6.6* 6.6*  --  7.8*   < > 7.9*     --   --   --  99 101*  --  100   < > 94   *  --   --   --  87* 87*  --  139*   < > 154   INR 2.48*  --   --   --   --  2.74*  --  2.57*   < > 2.57*     --   --   --   --  139  --  140   < > 139   POTASSIUM 4.6  --   --   --   --  4.2  --  4.1   < > 4.0   CHLORIDE 108*  --   --   --   --  110*  --  109*   < > 105   CO2 25  --   --   --   --  23  --  22   < > 23   BUN 20.8*  --   --   --   --  26.4*  --  37.1*   < > 17.1   CR 0.60*  --   --   --   --  0.79  --  1.30*   < > 1.14   ANIONGAP 5*  --   --   --    --  6*  --  9   < > 11   MIGUELINA 9.9  --   --   --   --  9.4  --  9.2   < > 8.9   * 114* 127*   < >  --  132*   < > 164*   < > 169*   ALBUMIN 3.1*  --   --   --   --  3.1*  --  3.2*   < > 2.8*   PROTTOTAL 6.4  --   --   --   --  5.9*  --  6.5   < > 5.8*   BILITOTAL 8.5*  --   --   --   --  7.9*  --  10.2*   < > 11.3*   ALKPHOS 123  --   --   --   --  111  --  101   < > 118   ALT 51  --   --   --   --  44  --  39   < > 46   AST 67*  --   --   --   --  67*  --  57*   < > 96*   LIPASE  --   --   --   --   --   --   --   --   --  56    < > = values in this interval not displayed.     Recent Labs   Lab 04/24/24  0850 04/24/24  0757 04/24/24  0447 04/23/24  2359 04/23/24 2028   * 114* 127* 146* 198*       Imaging:   Recent Results (from the past 24 hour(s))   CTA Abdomen Pelvis with Contrast    Narrative    EXAM: CTA ABDOMEN PELVIS WITH CONTRAST  LOCATION: [facility]  DATE: 4/23/2024    INDICATION: CT scan on 4/19 was read as concerning for thrombus in left external iliac artery and vein.  Ultrasound was negative, but radiology recommended followup with CTA.  COMPARISON: CT 04/19/2024, ultrasound 04/19/2024.    TECHNIQUE: CT angiogram abdomen pelvis during arterial phase of injection of IV contrast. 2D and 3D MIP reconstructions were performed by the CT technologist. Dose reduction techniques were used.  CONTRAST: 135mL isovue 370    FINDINGS:  ANGIOGRAM ABDOMEN/PELVIS: No evidence for thrombus within the left external iliac artery or vein. Findings on the prior CT represent attenuation artifact from the surrounding high-density blood products. Normal caliber lower thoracic and abdominal aorta.   Patent celiac and splenic artery. Patent common hepatic. Patent SMA and UNIQUE. Patent bilateral renal arteries. Patent bilateral common, external and internal iliac arteries. Patent bilateral common femoral, superficial femoral and deep femoral arteries.   Femoral and iliac venous structures  remain widely patent. Widely patent IVC. Patent bilateral renal veins. Patent portal vein and SMV. Patent splenic vein. Multiple upper abdominal collaterals related to portal hypertension. Recannulization of the   umbilical vein. No evidence for active contrast extravasation or active bleeding. Noncontrast imaging demonstrates persistent high-density fluid in the inferior aspect the left retroperitoneum adjacent to the iliac veins (the pseudo thrombus artifact is   present on noncontrast imaging of series 7, image 372). This is most compatible with retroperitoneal blood products along the iliac venous system and arterial system adjacent to the iliacus musculature and extending near the obturator internus. Small   amount of high-density blood products in the subcutaneous tissues adjacent. No evidence for significant active arterial contrast extravasation, therefore this is likely a venous bleed from a retroperitoneal collateral. This overall appears similar to the   prior study.    LOWER CHEST: Moderate right basilar and minimal left basilar pleural fluid. Atelectasis both lung bases. Hazy infiltrate left upper lobe posteriorly compatible with pneumonia. Mild hazy infiltrate in the aerated portions of the left lower lobe which can   be seen with pneumonia. Mild cardiac enlargement.    HEPATOBILIARY: Hepatic cirrhosis. No evidence for focal arterial enhancing mass. Multiple collaterals in the kavin hepatis. Distended gallbladder. No calcified gallstones. No biliary dilatation.    PANCREAS: Normal.    SPLEEN: Enlarged at 14.6 x 9.6 x 16.0 cm.    ADRENAL GLANDS: Unremarkable.    KIDNEYS/BLADDER: Symmetric renal enhancement. Right renal cyst, no follow-up. No urinary collecting system dilatation. Bladder unremarkable.    BOWEL: No evidence for active contrast extravasation/active bleeding into the lumen of the the small bowel or colon. No bowel obstruction or inflammatory change. Appendix unremarkable. Duodenal  diverticulum.    LYMPH NODES: No lymphadenopathy.    ADDITIONAL ABDOMINAL/PELVIC FINDINGS: Minimal ascites. Mesenteric edema.    MUSCULOSKELETAL: Old healed right rib fractures. Degenerative changes throughout the spine. Subcutaneous edema diffusely. Multiple chronic appearing compression fractures lower thoracic and lumbar spine.      Impression    IMPRESSION:  1.  No evidence for active contrast extravasation/active bleeding. Specifically no evidence for active contrast extravasation or bleeding into the left lower retroperitoneum adjacent to to the left lower retroperitoneal high-density blood products, which   remain unchanged since 04/19/2024. No evidence for active contrast extravasation or active bleeding in the high-density subcutaneous blood products in the left inguinal region. No evidence for active contrast extravasation or active bleeding into the   lumen of the colon or small bowel.    2.  The fluid collection representing a retroperitoneal hematoma adjacent to the iliacus and iliac musculature is likely related to low-grade bleeding from the patient's retroperitoneal varices.    3.  No evidence for thrombus within the left iliac, arterial or venous systems. The findings on the CTA related to pseudo thrombotic defect from adjacent high-density blood products and are present on the noncontrast portion of this exam as detailed   above.    4.  Bilateral pleural fluid collections with consolidation in the bases and hazy ill-defined infiltrate in the lingula and aerated portions left lower lobe which can be seen with pneumonia.    5.  Hepatic cirrhosis with portal hypertension and ascites as detailed above.    6.  Marked subcutaneous edema.

## 2024-04-24 NOTE — PROGRESS NOTES
[facility] note    Patient was not in his room. We will follow-up with patient tomorrow.    Rory Baldwin MD   [facility]  [phone]

## 2024-04-24 NOTE — PLAN OF CARE
Goal Outcome Evaluation:       Summary: AMS/Encephalopathy/Anemia/ Ammonia level 146 on admission    DATE & TIME: 4/24/24 2323-5203   Cognitive Concerns/ Orientation : A&Ox4, flat affect, forgetful at times    BEHAVIOR & AGGRESSION TOOL COLOR: Green  CIWA SCORE: N/a   ABNL VS/O2: VSS on room air  MOBILITY: Up with 1 assist with gait belt/walker, up in chair, ambulates to bathroom, ambulated in halls  PAIN MANAGMENT: Denied  DIET: Regular- good appetite  BOWEL/BLADDER: Incontinent at times, uses bathroom, 3 loose stools this shift (on scheduled Lactulose)   ABNL LAB/BG: , 157, 186, hemoglobin 8.5, AST 67, Bili 8.5, INR 2.48  DRAIN/DEVICES: PIV SL   TELEMETRY RHYTHM: NSR  SKIN: Jaundiced, redness/blanchable on coccyx. +4/edema to BLE- Lymphedema wraps on    TESTS/PROCEDURES: None  D/C DAY/GOALS/PLACE: Pending improvement TCU versus home   OTHER IMPORTANT INFO: GI following. Severe edema to legs bilaterally-elevated on pillows, lungs clear

## 2024-04-25 NOTE — PROGRESS NOTES
Summary: AMS/Hepatic Encephalopathy/Anemia  DATE & TIME: 04/24/24 1438-9437    Cognitive Concerns/ Orientation : Alert & Oriented x 4   BEHAVIOR & AGGRESSION TOOL COLOR:  Green  CIWA SCORE:  N/A   ABNL VS/O2: VSS on RA  MOBILITY:  SBA/ A1 with gait belt and walker  PAIN MANAGMENT:  Denied having any pain this shift  DIET: Regular  BOWEL/BLADDER:  Occasional urine incontinence  ABNL LAB/BG :  & 142  DRAIN/DEVICES:  PIV SL  TELEMETRY RHYTHM:  NSR  SKIN:  Scattered bruises, redness and blanchable spots. Pt has lymphedema wraps on BLE for 4+  edema.  TESTS/PROCEDURES:  None  D/C DATE: Pending placement  OTHER IMPORTANT INFO:  SW, PT, OT & GI following

## 2024-04-25 NOTE — PROGRESS NOTES
[facility]    Hospitalist Progress Note    Assessment & Plan   Crispin Ham is a 39 year old male hx alcohol use disorder, chronic decompensated cirrhosis with hx of SBP, HFpEF, HTN, pleural effusions previously requiring drainage, and small esophageal varices; admitted on 4/17/2024 with worsening encephalopathy and intubated 4/18 for intubation for airway protection and EGD.      Alcoholic cirrhosis/alcoholic hepatitis  Hepatic encephalopathy/hyperammonemia, resolved  Coagulopathy of liver disease   Pt admitted on 4/18 with altered mental status and jaundice. He does have known history of cirrhosis and has seen GI in the past and history of SBP in the past also and EGD in March 2024 showed mild esophageal varices   * On admission he was found to have elevated ammonia to 146, with bilirubin of 11.4  * Shortly after admission (4/19), pt became more unresponsive and ultimately required intubation for airway protection in the setting of severe hepatic encephalopathy.   * He was treated with lactulose, rifaximin and ceftriaxone (empiric SBP coverage), as well as methylprednisolone for alcoholic hepatitis. Ultimately ammonia levels normalized and metal status improved. He was successfully extubated 4/21.   * 4/23 pt stable for transfer out of the ICU. VSS. Ammonia 63, ALT 44, AST 67 and T bili 7.9.   * Of note Pt was treated with empirically for SBP with ceftriaxone, however abdominal US negative for ascites.   - GI following, appreciate recommendations  - Continue lactulose 20g BID  - Continue rifaximin 550mg BID   - Continue Methylprednisolone 40mg daily (duration per GI)   -Restarted Lasix 4/24  - Discontinue ceftriaxone, will resume PTA prophylactic ciprofloxacin  - Follow daily MELD labs      Bilateral lower extremity edema  Marked lower extremity edema 2/2 liver disease, third spacing   - creatinine stable , restarted diuretics.4/24  - encouraged leg elevation  - lymphedema consult       Chronic hypotension   2/2 liver disease (high output, low SVR state). Required vasopressors while intubated, now off. BPs stable with systolics in the 110s.   - Continue Midodrine 15mg TID, can cut it down once blood pressures improved     Acute on chronic anemia   Hx of portal hypertensive gastropathy and esophageal varices   Hx of duodenal ulcers (2023)   Retroperitoneal bleed   No evidence of GI bleeding. EGD on 4/18 showed moderate portal hypertensive gastropathy, but no varices or ulcers, no e/o of acute GI bleed. CT CAP on 4/19 did show evidence of blood in the subcutaneous space of the left groin and the left retroperitoneum (related to L femoral CVC placement)   * Hemoglobin was low at 6.6 4/23, resolved packed redblood cells 1 unit.  - CT angiogram 4/23 showed No evidence for active contrast extravasation/active bleeding. Specifically no evidence for active contrast extravasation or bleeding into the left lower retroperitoneum adjacent to to the left lower retroperitoneal high-density blood products, which   remain unchanged since 04/19/2024. The fluid collection representing a retroperitoneal hematoma adjacent to the iliacus and iliac musculature is likely related to low-grade bleeding from the patient's retroperitoneal varices.  - Transfuse for hbg <7  -  hemoglobin stable at 8 on 4/25     Epistaxis  ENT consulted for epistaxis. Recommended mupirocin ointment intranasally.   - Continue mupirocin      Possible Left external iliac vein thrombus   Non-con CT CAP on 4/19 showed Left femoral central venous catheter in place with expansion of the left external iliac vein by low density material, likely thrombus.   -CT-angiogram 4/23 showed No evidence for thrombus within the left iliac, arterial or venous systems. The findings on the CTA related to pseudo thrombotic defect from adjacent high-density blood products .  No role for therapeutic anticoagulation at this point, pt on prophylactic heparin .      Alcohol use disorder, severe   Hx of alcohol withdrawals   Discussed at length. Pt reports only drinking one beer prior to admission, but family noted finding a half empty bottle of whisky I his apartment. Pt has declined group or inpatient treatment in the past. Pt unfortunately just lost his brother to complications of alcohol use. Discussed high risk of mortality if he continues to drink. Strongly recommended intensive inpatient treatment.   - seen by chem dep .  - Continue thiamine, folate      Stress induced hyperglycemia  - Insulin sliding scale        Diet :Regular Diet Adult  Snacks/Supplements Adult: Ensure Enlive; With Meals  DVT Prophylaxis: Pneumatic Compression Devices, heparin   Code Status: Full Code     52 MINUTES SPENT BY ME on the date of service doing chart review, history, exam, documentation & further activities per the note.  Medically Ready for Discharge: Ready Now to TCU when bed available.    Clinically Significant Risk Factors           # Hypercalcemia: corrected calcium is >10.1, will monitor as appropriate    # Hypoalbuminemia: Lowest albumin = 2.7 g/dL at 4/19/2024  6:02 PM, will monitor as appropriate    # Coagulation Defect: INR = 2.48 (Ref range: 0.85 - 1.15) and/or PTT = 59 Seconds (Ref range: 22 - 38 Seconds), will monitor for bleeding  # Thrombocytopenia: Lowest platelets = 84 in last 2 days, will monitor for bleeding          # Obesity: Estimated body mass index is 36 kg/m  as calculated from the following:    Height as of 4/9/24: 1.829 m (6').    Weight as of this encounter: 120.4 kg (265 lb 6.9 oz).   # Moderate Malnutrition: based on nutrition assessment      # Financial/Environmental Concerns:           Diana Garibay MD  Text Page   (7am to 6pm)    Interval History   Patient is resting comfortably, denies any nausea, lower extremity edema to 3+ discussed about discharge planning, he is open to the idea of TCU placement.    -Data reviewed today: I reviewed all new labs and  imaging results over the last 24 hours.  Physical Exam     Vital Signs with Ranges  Temp:  [97.4  F (36.3  C)-98  F (36.7  C)] 97.4  F (36.3  C)  Pulse:  [79-87] 86  Resp:  [17-18] 18  BP: (115-119)/(67-74) 119/67  SpO2:  [97 %-100 %] 98 %  I/O last 3 completed shifts:  In: 1360 [P.O.:1360]  Out: 200 [Urine:200]    Constitutional: Awake, alert, cooperative, no apparent distress  Respiratory:breath sounds reduced bases .  Cardiovascular: Regular rate and rhythm, normal S1 and S2, and no murmur noted  GI: Normal bowel sounds, soft, non-distended, non-tender  Skin/Integumen: No rashes, 2+ lower extremity edema noted   Neuro : moving all 4 extremities, no focal deficit noted     Medications   Current Facility-Administered Medications   Medication Dose Route Frequency Provider Last Rate Last Admin    Daily 2 GRAM acetaminophen limit, unless fulminent liver failure or transaminases greater than or equal to 300 - 400, then none   Does not apply Continuous PRN Maggy Rangel MD        dextrose 10% infusion   Intravenous Continuous PRN Maggy Rangel MD        sodium chloride 0.9 % infusion   Intravenous Continuous Maggy Rangel MD   Stopped at 04/22/24 1630     Current Facility-Administered Medications   Medication Dose Route Frequency Provider Last Rate Last Admin    ciprofloxacin (CIPRO) tablet 500 mg  500 mg Oral Q24H Maggy Rangel MD   500 mg at 04/24/24 1600    folic acid (FOLVITE) tablet 1 mg  1 mg Oral Daily Maggy Rangel MD   1 mg at 04/25/24 0948    furosemide (LASIX) tablet 20 mg  20 mg Oral Daily Diana Garibay MD   20 mg at 04/25/24 0948    heparin ANTICOAGULANT injection 5,000 Units  5,000 Units Subcutaneous Q12H Maggy Rangel MD   5,000 Units at 04/25/24 0948    insulin aspart (NovoLOG) injection (RAPID ACTING)  1-7 Units Subcutaneous Q4H Maggy Rangel MD   1 Units at 04/25/24 1230    lactulose (CHRONULAC) solution 20 g  20 g Oral or Feeding Tube BID Maggy Rangel  MD GONZALEZ   20 g at 04/25/24 0948    melatonin tablet 3 mg  3 mg Oral QPM Maggy Rangel MD        methylPREDNISolone sodium succinate (SOLU-MEDROL) injection 40 mg  40 mg Intravenous Q24H Maggy Rangel MD   40 mg at 04/25/24 1227    midodrine (PROAMATINE) tablet 15 mg  15 mg Oral TID w/meals Maggy Rangel MD   15 mg at 04/25/24 1227    multivitamin w/minerals (THERA-VIT-M) tablet 1 tablet  1 tablet Oral Daily Maggy Rangel MD   1 tablet at 04/25/24 0948    mupirocin (BACTROBAN) 2 % ointment   Topical TID Maggy Rangel MD   Given at 04/25/24 0951    pantoprazole (PROTONIX) 2 mg/mL suspension 40 mg  40 mg Oral or Feeding Tube BID Maggy Rangel MD        Or    pantoprazole (PROTONIX) EC tablet 40 mg  40 mg Oral BID Maggy Rangel MD   40 mg at 04/25/24 0949    rifaximin (XIFAXAN) tablet 550 mg  550 mg Oral or Feeding Tube BID Maggy Rangel MD   550 mg at 04/25/24 0948    sodium chloride (PF) 0.9% PF flush 3 mL  3 mL Intracatheter Q8H Maggy Rangel MD   3 mL at 04/25/24 1228    thiamine (B-1) tablet 100 mg  100 mg Oral Daily Maggy Rangel MD   100 mg at 04/25/24 0948    Or    thiamine (B-1) injection 100 mg  100 mg Intravenous Daily Maggy Rangel MD   100 mg at 04/21/24 0859       Data   Recent Labs   Lab 04/25/24  0811 04/25/24  0717 04/25/24  0641 04/24/24  1204 04/24/24  0850 04/23/24  0726 04/23/24  0550 04/23/24  0430 04/22/24  1123 04/22/24  0410 04/19/24  0508 04/19/24  0458   WBC  --   --  6.1  --  6.8  --  4.2 4.1  --  7.3   < > 11.1*   HGB  --   --  8.0*  --  8.5*  --  6.6* 6.6*  --  7.8*   < > 7.9*   MCV  --   --  98  --  100  --  99 101*  --  100   < > 94   PLT  --   --  84*  --  103*  --  87* 87*  --  139*   < > 154   INR  --   --   --   --  2.48*  --   --  2.74*  --  2.57*   < > 2.57*   NA  --   --  135  --  138  --   --  139  --  140   < > 139   POTASSIUM  --   --  4.7  --  4.6  --   --  4.2  --  4.1   < > 4.0   CHLORIDE  --   --  106  --  108*   --   --  110*  --  109*   < > 105   CO2  --   --  25  --  25  --   --  23  --  22   < > 23   BUN  --   --  18.3  --  20.8*  --   --  26.4*  --  37.1*   < > 17.1   CR  --   --  0.51*  --  0.60*  --   --  0.79  --  1.30*   < > 1.14   ANIONGAP  --   --  4*  --  5*  --   --  6*  --  9   < > 11   MIGUELINA  --   --  9.7  --  9.9  --   --  9.4  --  9.2   < > 8.9   * 125* 141*   < > 143*   < >  --  132*   < > 164*   < > 169*   ALBUMIN  --   --  3.0*  --  3.1*  --   --  3.1*  --  3.2*   < > 2.8*   PROTTOTAL  --   --  5.9*  --  6.4  --   --  5.9*  --  6.5   < > 5.8*   BILITOTAL  --   --  8.3*  --  8.5*  --   --  7.9*  --  10.2*   < > 11.3*   ALKPHOS  --   --  134  --  123  --   --  111  --  101   < > 118   ALT  --   --  48  --  51  --   --  44  --  39   < > 46   AST  --   --  58*  --  67*  --   --  67*  --  57*   < > 96*   LIPASE  --   --   --   --   --   --   --   --   --   --   --  56    < > = values in this interval not displayed.     Recent Labs   Lab 04/25/24  0811 04/25/24  0717 04/25/24  0641 04/25/24  0355 04/25/24  0026   * 125* 141* 142* 167*       Imaging:   No results found for this or any previous visit (from the past 24 hour(s)).

## 2024-04-25 NOTE — PROGRESS NOTES
Care Management Follow Up    Length of Stay (days): 8    Expected Discharge Date: 04/26/2024     Concerns to be Addressed: denies needs/concerns at this time, mental health, substance/tobacco abuse/use     Patient plan of care discussed at interdisciplinary rounds: Yes    Anticipated Discharge Disposition:  TCU     Anticipated Discharge Services:    Anticipated Discharge DME:      Patient/family educated on Medicare website which has current facility and service quality ratings:    Education Provided on the Discharge Plan:    Patient/Family in Agreement with the Plan:      Referrals Placed by CM/SW:    Private pay costs discussed:     Additional Information:  Per Dr Garibay, patient is medically stable for discharge today.   Spoke with therapies who state patient is making good progress and recommend home with home therapy if patient can have help as needed with ADL's and IADL's.  If he cannot then they recommend TCU.  Met with patient, introduced role in assisting with TCU placement. He shares his parents would like to be involved in his discharge plan and planned to meet here tomorrow.  He shares he doesn't want to be dependent for assistance from his parents.  His mother works outside of the home.   Explained patient is to be discharged today.  So patient called his mother over the phone and we three spoke together. Writer explained the services which are provided in a TCU for short term rehab.    Mother's major concern is patient's ongoing alcohol use and feeling he needs more support then his weekly therapy sessions.  She was seeking guidance. However she did say she knows it is up to patient and that he needs to want to stop alcohol use.   Following mother expressing her concern, writer asked patient for his thoughts.  He stated he feels he first needs to focus on his health and gaining strength before he returns home and returns to work.  He is asking to transfer to a TCU.  In relation to alcohol cessation and  treatment.  He wants to continue his weekly therapy and is open to going to AA groups which his mother suggested as a compromise to Out Patient Therapy.  Writer shared her understanding that in the past he decline Out Patient programming thinking it will interfer with his job. Patient shares he is usually done with his work day at 5:00pm.  Writer reviewed most Out Patient programs have evening sessions several day a week.    Plan:  Writer is making is making referrals to TCU's.  Need to locate a TCU with a vacancy and one which is in his [facility].          Adriana Vidales, [facility] CenterSW

## 2024-04-25 NOTE — PROGRESS NOTES
[facility] chart check note    Patient's chart was reviewed, no acute events. We will follow-up with patient tomorrow.     Rory Baldwin MD   [facility] - [facility]  [phone]

## 2024-04-25 NOTE — PLAN OF CARE
Goal Outcome Evaluation:       Summary: AMS/Hepatic Encephalopathy/Anemia  DATE & TIME: 04/25/24 0310-8926    Cognitive Concerns/ Orientation : Alert & Oriented x 4, flat affect   BEHAVIOR & AGGRESSION TOOL COLOR:  Green  CIWA SCORE:  N/A   ABNL VS/O2: VSS on RA  MOBILITY:  SBA/ A1 with gait belt and walker, ambulated in halls x2, up in chair, ambulates to bathroom   PAIN MANAGMENT:  Denied having any pain this shift  DIET: Regular- appetite good  BOWEL/BLADDER: urine incontinence at times, using urinal, had 2 loose BM's today (on scheduled Lactulose)   ABNL LAB/BG : , 176, 212, Hemoglobin 8.0, AST 58, Bili total 8.3  DRAIN/DEVICES:  PIV SL  TELEMETRY RHYTHM:  NSR  SKIN:  Scattered bruises, redness and blanchable spots. Pt has lymphedema wraps on BLE for 4+  edema.  TESTS/PROCEDURES:  None  D/C DATE: Pending home with home health versus TCU  OTHER IMPORTANT INFO:  SW, PT, OT & GI following, Iv Lasix given x1 this shift.

## 2024-04-25 NOTE — PROGRESS NOTES
Summary: AMS/Encephalopathy/Anemia/ Ammonia level 146 on admission    DATE & TIME: 4/24/24 2428-1826   Cognitive Concerns/ Orientation : A&Ox4, flat affect, forgetful at times    BEHAVIOR & AGGRESSION TOOL COLOR: Green  CIWA SCORE: N/a   ABNL VS/O2: VSS on room air  MOBILITY: Up with 1 assist with gait belt/walker, up in chair, ambulates to bathroom.  PAIN MANAGMENT: Denied  DIET: Regular- good appetite  BOWEL/BLADDER: Incontinent at times, uses bathroom.  ABNL LAB/BG: , 157, hemoglobin 8.5, AST 67, Bili 8.5, INR 2.48  DRAIN/DEVICES: PIV SL   TELEMETRY RHYTHM: NSR  SKIN: Jaundiced, redness/blanchable on coccyx. +4/edema to BLE- Lymphedema wraps on, remove in the am.    TESTS/PROCEDURES: None  D/C DAY/GOALS/PLACE: Pending improvement TCU versus home   OTHER IMPORTANT INFO: GI following. Lung sounds clear.

## 2024-04-26 NOTE — PROGRESS NOTES
Care Management Follow Up    Length of Stay (days): 9    Expected Discharge Date: 04/30/2024     Concerns to be Addressed: denies needs/concerns at this time, mental health, substance/tobacco abuse/use     Patient plan of care discussed at interdisciplinary rounds: Yes    Anticipated Discharge Disposition:       Anticipated Discharge Services:    Anticipated Discharge DME:      Patient/family educated on Medicare website which has current facility and service quality ratings:    Education Provided on the Discharge Plan:    Patient/Family in Agreement with the Plan:      Referrals Placed by CM/SW:    Private pay costs discussed: Not applicable    Additional Information:  SYLVESTER spoke with hospitalist regarding need for prior auth I order to go to TCU and unlikelihood of getting this as patent is mobilizing so well. Hospitalist would like to plan for a discharge home with home care tomorrow 04/27. SW spoke with patient and explained issue regarding TCU and need for auth and patent in agreement with plan for home with home care tomorrow. SYLVESTER updated RN CC on discharge plan tomorrow.    Addendum: SYLVESTER informed [facility] can offer patient a bed and will initiate auth. SYLVESTER unsure whether insurance with auth a TCU stay for patient and patent is anticipating a discharge home tomorrow 04/27    THERON Olivier    [facility]

## 2024-04-26 NOTE — PLAN OF CARE
Summary: AMS/Hepatic Encephalopathy/Anemia  DATE & TIME: 04/25/24, 1930 - 0730    Cognitive Concerns/ Orientation : Alert & Oriented x 4, flat affect   BEHAVIOR & AGGRESSION TOOL COLOR:  Green  CIWA SCORE:  N/A   ABNL VS/O2: VSS on room air  MOBILITY:  Assist x 1 with gait belt and walker, ambulated to bathroom   PAIN MANAGMENT:  Denied   DIET: Regular  BOWEL/BLADDER:  Occasional urine incontinence, using urinal, had 2 loose BM's this shift (on scheduled Lactulose)   ABNL LAB/BG : /187  DRAIN/DEVICES:  PIV SL  TELEMETRY RHYTHM:  NSR  SKIN: Scattered bruises, redness and blanchable spots. Pt has lymphedema wraps on BLE for 4+  edema.  TESTS/PROCEDURES:  None  D/C DATE: Pending home with home health versus TCU  OTHER IMPORTANT INFO: SW, PT, OT & GI following    Goal Outcome Evaluation:      Plan of Care Reviewed With: patient    Overall Patient Progress: no changeOverall Patient Progress: no change

## 2024-04-26 NOTE — PROGRESS NOTES
[facility]  [facility]  Gastroenterology Progress note    Interval History:      Patient denies confusion.  No signs of asterixis on exam.  Feels as though abdomen is becoming more distended.  Continued significant lower extremity edema.  Lille score shows patient is non-responder to steroids.      Vital Signs:      BP 90/52 (BP Location: Left arm)   Pulse 97   Temp 98.5  F (36.9  C) (Oral)   Resp 18   Wt 120.4 kg (265 lb 6.9 oz)   SpO2 94%   BMI 36.00 kg/m    Temp (24hrs), Av.1  F (36.7  C), Min:97.8  F (36.6  C), Max:98.5  F (36.9  C)    Patient Vitals for the past 72 hrs:   Weight   24 0641 120.4 kg (265 lb 6.9 oz)   24 0500 117.9 kg (260 lb)       Intake/Output Summary (Last 24 hours) at 2024 0829  Last data filed at 2024 1630  Gross per 24 hour   Intake 1100 ml   Output 850 ml   Net 250 ml         Constitutional: NAD, comfortable  Cardiovascular: RRR, normal S1, S2   Respiratory: CTAB  Abdomen: soft, non-tender, distended    Additional Comments:  ROS, FH, SH: See initial GI consult for details.    Laboratory Data:  Recent Labs   Lab Test 24  0641 24  0850 24  0550 24  0430 24  0410   WBC 6.1 6.8 4.2 4.1 7.3   HGB 8.0* 8.5* 6.6* 6.6* 7.8*   MCV 98 100 99 101* 100   PLT 84* 103* 87* 87* 139*   INR  --  2.48*  --  2.74* 2.57*     Recent Labs   Lab Test 24  0641 24  0850 24  0430    138 139   POTASSIUM 4.7 4.6 4.2   CHLORIDE 106 108* 110*   CO2    BUN 18.3 20.8* 26.4*   CR 0.51* 0.60* 0.79   ANIONGAP 4* 5* 6*   MIGUELINA 9.7 9.9 9.4     Recent Labs   Lab Test 24  0641 24  0850 24  0430 24  1802 24  0458 24  1230 24  0536 24  1044 23  0538 23  0535 23  0549 23  0536 23  1514 23  0724 23  1646 23  1537 23  1424 23  1224 23  1147   ALBUMIN 3.0* 3.1* 3.1*   < > 2.8*  --    < > 3.1*   < >  2.9* 2.6*   < > 3.1*   < >  --   --  2.4*   < > 2.3*   BILITOTAL 8.3* 8.5* 7.9*   < > 11.3*  --    < > 7.6*   < > 7.1* 7.1*   < > 8.4*   < >  --   --  21.7*   < > 10.4*   DBIL  --   --   --   --   --   --   --  3.59*  --  3.19* 3.34*   < >  --   --   --   --   --   --  7.66*   ALT 48 51 44   < > 46  --    < > 48   < > 36 36   < > 49   < >  --   --  69   < > 82*   AST 58* 67* 67*   < > 96*  --    < > 97*   < > 141* 146*   < > 222*   < >  --    < >  --    < > 331*   ALKPHOS 134 123 111   < > 118  --    < > 225*   < > 269* 263*   < > 330*   < >  --   --  182*   < > 200*   PROTEIN  --   --   --   --   --  30*  --   --   --   --   --   --  100*  --  30*  --   --    < >  --    LIPASE  --   --   --   --  56  --   --   --   --   --   --   --   --   --   --   --  237*  --  272*    < > = values in this interval not displayed.         Assessment:  38 yo male with acute alcoholic hepatitis on chronic alcoholic liver disease who presents with severe hepatic encephalopathy requiring intubation.  Methylprednisolone 40 mg daily started on 4/19.   Lille 0.6 showing patient is a null responder.  Will wean off steroids.  Continued improvement in mental status.    CTA 4/23 with no evidence of active contrast extravasation/active bleeding.  Specifically no evidence of bleeding into the left lower retroperitoneum adjacent to the left lower retroperitoneal blood products which remain unchanged from 4/19/24.  No evidence for active contrast extravasation or active bleeding in the high-density subcutaneous blood products in the left inguinal region.  No evidence for thrombus within the left iliac, arterial or venous systems.  Bilateral pleural fluid collections with consolidation in the bases and hazy ill-defined infiltrate in the lingula and left lower lobe.  Hepatic cirrhosis with portal hypertension and ascites.  Plan:  -  Wean steroid therapy.  Recommend methylprednisolone 20 mg x 3 days then stop.  -  Monitor MELD labs.  -  Continue  lactulose 20 g TID, titrate to 3-4 loose BM daily.  -  Continue rifaximin and pantoprazole BID.  -  OK with restarting diuretics at low dose:  furosemide 20 mg daily and spironolactone 50 mg daily.  -  Ultrasound with paracentesis prior to discharge if enough fluid present.  -  Outpatient follow up in liver clinic.  [facility] will call to arrange.    ADDENDUM:  Will stop steroids.  [facility] will not follow over the weekend.  Please call with questions.  Dr. Pratt will be covering.    Total Time Spent: 25 minutes    GENET Hamlin  [facility]  Office:  [phone] call if needed after 5PM  Cell:  [phone], not available after 5PM at this number

## 2024-04-26 NOTE — PLAN OF CARE
Goal Outcome Evaluation:    Summary: AMS/Hepatic Encephalopathy/Anemia  DATE & TIME: 04/26/24, 5176-3815    Cognitive Concerns/ Orientation : A&Ox4; forgetful at times, flat affect  BEHAVIOR & AGGRESSION TOOL COLOR:  Green  CIWA SCORE: N/A   ABNL VS/O2: VSS on Rm Air, BP soft at times  MOBILITY:  Asstx1 GB/walker, ambulates to BR  PAIN MANAGMENT:  Denied   DIET: Regular, BG checks AC & HS  BOWEL/BLADDER: occasional incontinence, used urinal appropriately during shift, no BM, scheduled lactulose  ABNL LAB/BG: hgb 7.7,  & 156  DRAIN/DEVICES:  PIV SL  TELEMETRY RHYTHM:  NSR  SKIN: Scattered bruises to abd from heparin injections/insulin, redness and blanchable spots. Lymphedema wraps to BLE for +3 edema.   TESTS/PROCEDURES: Abd US to determine if paracentesis necessary; per radiologist not enough fluid to drain no paracentesis completed today.  D/C DATE: Plan to discharge home w/ 24/7 home care  OTHER IMPORTANT INFO: SW, PT, OT & GI following. Lactulose 3x daily. Lymphedema wraps need to be removed at 10 am tomorrow (4/27/2024); 1 hour rest period prior to OT re-wrapping.

## 2024-04-26 NOTE — PLAN OF CARE
Problem: Adult Inpatient Plan of Care  Goal: Plan of Care Review  Outcome: Progressing   Goal Outcome Evaluation:    Regular diet, receiving TID Ensure supplements. Appetite continues to improve. Continue current diet, supplements, MVI/M.    Eli Travis RD, LD  Clinical Dietitian - [facility]

## 2024-04-26 NOTE — PROGRESS NOTES
[facility]    Hospitalist Progress Note    Assessment & Plan   Crispin Ham is a 39 year old male hx alcohol use disorder, chronic decompensated cirrhosis with hx of SBP, HFpEF, HTN, pleural effusions previously requiring drainage, and small esophageal varices; admitted on 4/17/2024 with worsening encephalopathy and intubated 4/18 for intubation for airway protection and EGD.      Alcoholic cirrhosis/alcoholic hepatitis  Hepatic encephalopathy/hyperammonemia, resolved  Coagulopathy of liver disease   Pt admitted on 4/18 with altered mental status and jaundice. He does have known history of cirrhosis and has seen GI in the past and history of SBP in the past also and EGD in March 2024 showed mild esophageal varices   * On admission he was found to have elevated ammonia to 146, with bilirubin of 11.4  * Shortly after admission (4/19), pt became more unresponsive and ultimately required intubation for airway protection in the setting of severe hepatic encephalopathy.   * He was treated with lactulose, rifaximin and ceftriaxone (empiric SBP coverage), as well as methylprednisolone for alcoholic hepatitis. Ultimately ammonia levels normalized and metal status improved. He was successfully extubated 4/21.   * 4/23 pt transferred out of the ICU.   * Of note Pt was treated with empirically for SBP with ceftriaxone, however abdominal US negative for ascites.  She was noted to be nonresponder to steroids, plan is to taper steroids and stop, currently on methylprednisone 20 mg x 3 days and stop, can change to prednisone if patient can go home.  Patient was followed by [facility].  They will arrange outpatient follow-up.  - GI following, appreciate recommendations  - Continue lactulose 20g BID  - Continue rifaximin 550mg BID   -Restarted Lasix 4/24  - Discontinued ceftriaxone,  resumed PTA prophylactic ciprofloxacin.  -PT and OT recommended patient need TCU but patient is doing better with PT ,  if patient progressively improving tentatively 4/27 patient can go  home with home care.     Bilateral lower extremity edema  Marked lower extremity edema 2/2 liver disease, third spacing   - creatinine stable , restarted diuretics.4/24  - encouraged leg elevation  - lymphedema team following, he is going home with home care might need lymphedema follow-up.     Chronic hypotension   2/2 liver disease (high output, low SVR state). Required vasopressors while intubated, now off. BPs stable with systolics in the 110s with midodrine.   - Continue Midodrine 15mg TID, can cut it down once blood pressures improved     Acute on chronic anemia   Hx of portal hypertensive gastropathy and esophageal varices   Hx of duodenal ulcers (2023)   Retroperitoneal bleed   No evidence of GI bleeding. EGD on 4/18 showed moderate portal hypertensive gastropathy, but no varices or ulcers, no e/o of acute GI bleed. CT CAP on 4/19 did show evidence of blood in the subcutaneous space of the left groin and the left retroperitoneum (related to L femoral CVC placement)   * Hemoglobin was low at 6.6 4/23, resolved packed redblood cells 1 unit.  - CT angiogram 4/23 showed No evidence for active contrast extravasation/active bleeding. Specifically no evidence for active contrast extravasation or bleeding into the left lower retroperitoneum adjacent to to the left lower retroperitoneal high-density blood products, which   remain unchanged since 04/19/2024. The fluid collection representing a retroperitoneal hematoma adjacent to the iliacus and iliac musculature is likely related to low-grade bleeding from the patient's retroperitoneal varices.  - Transfuse for hbg <7  -  hemoglobin stable at 8 on 4/25     Epistaxis  ENT consulted for epistaxis. Recommended mupirocin ointment intranasally.   - Continue mupirocin      Possible Left external iliac vein thrombus   Non-con CT CAP on 4/19 showed Left femoral central venous catheter in place with expansion  of the left external iliac vein by low density material, likely thrombus.   -CT-angiogram 4/23 showed No evidence for thrombus within the left iliac, arterial or venous systems. The findings on the CTA related to pseudo thrombotic defect from adjacent high-density blood products .  No role for therapeutic anticoagulation at this point, pt on prophylactic heparin .     Alcohol use disorder, severe   Hx of alcohol withdrawals   Discussed at length. Pt reports only drinking one beer prior to admission, but family noted finding a half empty bottle of whisky I his apartment. Pt has declined group or inpatient treatment in the past. Pt unfortunately just lost his brother to complications of alcohol use. Discussed high risk of mortality if he continues to drink. Strongly recommended intensive inpatient treatment.   - seen by chem dep .  - Continue thiamine, folate      Stress induced hyperglycemia  - Insulin sliding scale        Diet :Regular Diet Adult  Snacks/Supplements Adult: Ensure Enlive; With Meals  DVT Prophylaxis: Pneumatic Compression Devices, heparin   Code Status: Full Code     38 MINUTES SPENT BY ME on the date of service doing chart review, history, exam, documentation & further activities per the note.  Medically Ready for Discharge: Ready Now TCU versus home with home care, patient did much better with PT today, not sure he would be ready to go home today itself, if he is progressively improving and TCU is not an option due to insurance issues patient could tentatively go home with home care on 4/27.    Clinically Significant Risk Factors           # Hypercalcemia: corrected calcium is >10.1, will monitor as appropriate    # Hypoalbuminemia: Lowest albumin = 2.7 g/dL at 4/26/2024  8:19 AM, will monitor as appropriate    # Coagulation Defect: INR = 2.48 (Ref range: 0.85 - 1.15) and/or PTT = 59 Seconds (Ref range: 22 - 38 Seconds), will monitor for bleeding  # Thrombocytopenia: Lowest platelets = 79 in last 2  days, will monitor for bleeding          # Obesity: Estimated body mass index is 36 kg/m  as calculated from the following:    Height as of 4/9/24: 1.829 m (6').    Weight as of this encounter: 120.4 kg (265 lb 6.9 oz).   # Moderate Malnutrition: based on nutrition assessment      # Financial/Environmental Concerns:           Diana Garibay MD  Text Page   (7am to 6pm)    Interval History   Patient is resting, denies any new concern, lower extremity edema present, lymphedema therapy ordered.  Patient's insurance will have a tough time with rising discharge to TCU as patient cannot walk 20 feet and his insurance is a denial.  Current recommendation from PT is TCU, if patient do better can do home with home care.  -Data reviewed today: I reviewed all new labs and imaging results over the last 24 hours.  Physical Exam     Vital Signs with Ranges  Temp:  [97.8  F (36.6  C)-98.5  F (36.9  C)] 98.5  F (36.9  C)  Pulse:  [95-97] 97  Resp:  [16-18] 18  BP: ()/(52-77) 90/52  SpO2:  [94 %-97 %] 94 %  I/O last 3 completed shifts:  In: 1100 [P.O.:1100]  Out: 1050 [Urine:1050]    Constitutional: Awake, alert, cooperative, no apparent distress  Respiratory:breath sounds reduced bases .  Cardiovascular: Regular rate and rhythm, normal S1 and S2, and no murmur noted  GI: Normal bowel sounds, soft, non-distended, non-tender  Skin/Integumen: No rashes, 2+ lower extremity edema noted   Neuro : moving all 4 extremities, no focal deficit noted     Medications   Current Facility-Administered Medications   Medication Dose Route Frequency Provider Last Rate Last Admin    Daily 2 GRAM acetaminophen limit, unless fulminent liver failure or transaminases greater than or equal to 300 - 400, then none   Does not apply Continuous PRN Maggy Rangel MD        dextrose 10% infusion   Intravenous Continuous PRN Maggy Rangel MD        sodium chloride 0.9 % infusion   Intravenous Continuous Maggy Rangel MD   Stopped at  04/22/24 1630     Current Facility-Administered Medications   Medication Dose Route Frequency Provider Last Rate Last Admin    ciprofloxacin (CIPRO) tablet 500 mg  500 mg Oral Q24H Maggy Rangel MD   500 mg at 04/25/24 1508    folic acid (FOLVITE) tablet 1 mg  1 mg Oral Daily Maggy Rangel MD   1 mg at 04/26/24 0919    furosemide (LASIX) tablet 20 mg  20 mg Oral Daily Kristi Samano PA-C   20 mg at 04/25/24 0948    heparin ANTICOAGULANT injection 5,000 Units  5,000 Units Subcutaneous Q12H Maggy Rangel MD   5,000 Units at 04/26/24 0907    insulin aspart (NovoLOG) injection (RAPID ACTING)  1-7 Units Subcutaneous Q4H Maggy Rangel MD   1 Units at 04/26/24 0033    lactulose (CHRONULAC) solution 20 g  20 g Oral or Feeding Tube BID Maggy Rangel MD   20 g at 04/26/24 0905    melatonin tablet 3 mg  3 mg Oral QPM Maggy Rangel MD        methylPREDNISolone sodium succinate (SOLU-MEDROL) injection 20 mg  20 mg Intravenous Q24H Kristi Samano PA-C        midodrine (PROAMATINE) tablet 15 mg  15 mg Oral TID w/meals Maggy Rangel MD   15 mg at 04/26/24 0907    multivitamin w/minerals (THERA-VIT-M) tablet 1 tablet  1 tablet Oral Daily Maggy Rangel MD   1 tablet at 04/26/24 0917    mupirocin (BACTROBAN) 2 % ointment   Topical TID Maggy Rangel MD   Given at 04/26/24 0925    pantoprazole (PROTONIX) 2 mg/mL suspension 40 mg  40 mg Oral or Feeding Tube BID Maggy Rangel MD        Or    pantoprazole (PROTONIX) EC tablet 40 mg  40 mg Oral BID Maggy Rangel MD   40 mg at 04/26/24 0907    rifaximin (XIFAXAN) tablet 550 mg  550 mg Oral or Feeding Tube BID Maggy Rangel MD   550 mg at 04/26/24 0907    sodium chloride (PF) 0.9% PF flush 3 mL  3 mL Intracatheter Q8H Maggy Rangel MD   3 mL at 04/26/24 0442    spironolactone (ALDACTONE) tablet 50 mg  50 mg Oral Daily Kristi Samano PA-C   50 mg at 04/26/24 0924    thiamine (B-1) tablet 100 mg  100 mg Oral  Daily Maggy Rangel MD   100 mg at 04/26/24 0907    Or    thiamine (B-1) injection 100 mg  100 mg Intravenous Daily Maggy Rangel MD   100 mg at 04/21/24 0859       Data   Recent Labs   Lab 04/26/24  1205 04/26/24  0838 04/26/24  0819 04/25/24  0717 04/25/24  0641 04/24/24  1204 04/24/24  0850 04/23/24  0550 04/23/24  0430 04/22/24  1123 04/22/24  0410   WBC  --   --  6.0  --  6.1  --  6.8   < > 4.1  --  7.3   HGB  --   --  7.7*  --  8.0*  --  8.5*   < > 6.6*  --  7.8*   MCV  --   --  100  --  98  --  100   < > 101*  --  100   PLT  --   --  79*  --  84*  --  103*   < > 87*  --  139*   INR  --   --   --   --   --   --  2.48*  --  2.74*  --  2.57*   NA  --   --  133*  --  135  --  138  --  139  --  140   POTASSIUM  --   --  4.7  --  4.7  --  4.6  --  4.2  --  4.1   CHLORIDE  --   --  103  --  106  --  108*  --  110*  --  109*   CO2  --   --  24  --  25  --  25  --  23  --  22   BUN  --   --  17.0  --  18.3  --  20.8*  --  26.4*  --  37.1*   CR  --   --  0.49*  --  0.51*  --  0.60*  --  0.79  --  1.30*   ANIONGAP  --   --  6*  --  4*  --  5*  --  6*  --  9   MIGUELINA  --   --  9.6  --  9.7  --  9.9  --  9.4  --  9.2   * 127* 130*   < > 141*   < > 143*   < > 132*   < > 164*   ALBUMIN  --   --  2.7*  --  3.0*  --  3.1*  --  3.1*  --  3.2*   PROTTOTAL  --   --  5.6*  --  5.9*  --  6.4  --  5.9*  --  6.5   BILITOTAL  --   --  8.1*  --  8.3*  --  8.5*  --  7.9*  --  10.2*   ALKPHOS  --   --  156*  --  134  --  123  --  111  --  101   ALT  --   --  50  --  48  --  51  --  44  --  39   AST  --   --  61*  --  58*  --  67*  --  67*  --  57*    < > = values in this interval not displayed.     Recent Labs   Lab 04/26/24  1205 04/26/24  0838 04/26/24  0819 04/26/24  0440 04/26/24  0015   * 127* 130* 139* 187*       Imaging:   No results found for this or any previous visit (from the past 24 hour(s)).

## 2024-04-26 NOTE — PROGRESS NOTES
CLINICAL NUTRITION SERVICES - REASSESSMENT NOTE    Malnutrition: 4/20  % Weight Loss:  Weight loss does not meet criteria for malnutrition - fluid status has been fluctuating and fluid retention may be masking losses.   % Intake:  Decreased intake does not meet criteria for malnutrition  Subcutaneous Fat Loss:  Orbital region mild depletion  Muscle Loss:  Temporal region mild depletion and Clavicle bone region mild depletion  Fluid Retention:  Moderate 3+ BLE     Malnutrition Diagnosis: at least Moderate malnutrition  In Context of:  Acute illness or injury  Chronic illness or disease     EVALUATION OF PROGRESS TOWARD GOALS   Diet:  Regular Diet  Supplements: Ensure Enlive w/ meals    Intake/Tolerance:    - 100% intakes documented per nursing flowsheets  - Ordering TID meals per health touch  - Spoke with patient at bedside. Per patient, his appetite continues to improve. He said he is tolerating regular diet, trying to order a protein food at each meal. He also said he drinks 2-3 Ensure/day. Affirmed patient's good intakes at mealtimes and supplements. Encouraged pt to continue ordering protein food at each meal and consuming Ensure.    ASSESSED NUTRITION NEEDS:  Dosing Weight: 88 kg (adjusted)  Estimated Energy Needs: 4598-6070 kcals (25-30 Kcal/Kg)  Justification: overweight   Estimated Protein Needs: 105-135 grams protein (1.2-1.5 g pro/Kg)  Justification: overweight   Estimated Fluid Needs: 1 mL/Kcal  Justification: per provider pending fluid status    NEW FINDINGS:   Labs: Na 133 (L), Cr 0.49 (L), alk phos 156 (H), AST 61 (H), ammonia 71 (H), bilirubin total 8.1 (H), -241 (H)    Meds: folic acid, lasix restarted 4/24, medium sliding scale insulin, lactulose, MVI/M, thiamine    Dispo: medically ready for discharge, probable TCU    Previous Goals:   Patient will consume at least 75% meals and supplements   Evaluation: Met    Previous Nutrition Diagnosis:   Malnutrition related to fat and muscle loss as  evidenced by coding for moderate malnutrition, need for oral nutritional supplements   Evaluation: No change    CURRENT NUTRITION DIAGNOSIS  Malnutrition related to fat and muscle loss as evidenced by coding for moderate malnutrition, need for oral nutritional supplements     INTERVENTIONS  Recommendations / Nutrition Prescription  Continue current diet and supplements    Implementation  Medical Food Supplement - continue  Multivitamin/Mineral - continue    Goals  Patient to consume % of TID meals and supplements    MONITORING AND EVALUATION:  Progress towards goals will be monitored and evaluated per protocol and Practice Guidelines    Eli Travis RD, LD  Clinical Dietitian - [facility]

## 2024-04-27 NOTE — PLAN OF CARE
Goal Outcome Evaluation:    Summary: AMS/Hepatic Encephalopathy/Anemia  DATE & TIME: 04/27/24, [id number]               Cognitive Concerns/ Orientation : A&Ox4; forgetful at times, flat affect  BEHAVIOR & AGGRESSION TOOL COLOR:  Green  CIWA SCORE: N/A       ABNL VS/O2: VSS on Rm Air, BP soft at times  MOBILITY:  Asstx1 GB/walker, ambulates to BR  PAIN MANAGMENT:  Denied   DIET: Regular, BG checks AC & HS  BOWEL/BLADDER: frequent urination d/t diuretics, small amt of bloody stool, intermittent incontinence  ABNL LAB/BG: hgb 8.4,  & 154, bili 9.8  DRAIN/DEVICES:  PIV SL  TELEMETRY RHYTHM:  NSR  SKIN: Scattered bruises to abd from heparin injections/insulin, redness and blanchable spots. Lymphedema wraps to BLE for +4 edema. Slightly more jaundice today.  TESTS/PROCEDURES: n/a  D/C DATE: potential discharge to TCU when medically stable & insurance authorization complete vs home w/ 24/7 care. See SW note.   OTHER IMPORTANT INFO: SW, PT, OT & GI following. Scheduled lactulose; effective. Lymphedema wraps removed at 10 am, awaiting PT/OT to reapply. Abd girth appearing to have increased this a.m. d/t ascites, provider notified. Patient denies SOB, abd discomfort. Will continue to monitor at this time.

## 2024-04-27 NOTE — PROGRESS NOTES
Goal Outcome Evaluation:     Summary: AMS/Hepatic Encephalopathy/Anemia  DATE & TIME: 04/26/24, 1725-2826                Cognitive Concerns/ Orientation : A&Ox4; forgetful at times, flat affect  BEHAVIOR & AGGRESSION TOOL COLOR:  Green  CIWA SCORE: N/A       ABNL VS/O2: VSS on RA,   MOBILITY:  Asstx1 GB/walker,   PAIN MANAGMENT:  Denied   DIET: Regular, BG checks Q4  BOWEL/BLADDER: occasional urine  incontinence, used urinal appropriately during shift, no BM, scheduled lactulose  ABNL LAB/BG:  , 140 & 118  DRAIN/DEVICES:  PIV SL  TELEMETRY RHYTHM:  NSR  SKIN: Scattered bruises to abd, redness and blanchable spots on hand. Lymphedema wraps to BLE for +3 edema.   TESTS/PROCEDURES: None this shift  D/C DATE: Plan to discharge home w/ 24/7 home care or TCU  OTHER IMPORTANT INFO: SW, PT, OT & GI following. Lactulose 3x daily. Lymphedema wraps need to be removed at 10 am  (4/27/2024); 1 hour rest period prior to OT re-wrapping.

## 2024-04-27 NOTE — PROGRESS NOTES
[facility]    Hospitalist Progress Note    Assessment & Plan     Crsipin Ham is a 39 year old male hx alcohol use disorder, chronic decompensated cirrhosis with hx of SBP, HFpEF, HTN, pleural effusions previously requiring drainage, and small esophageal varices; admitted on 4/17/2024 with worsening encephalopathy and intubated 4/18 for intubation for airway protection and EGD.      Alcoholic cirrhosis/alcoholic hepatitis  Hepatic encephalopathy/hyperammonemia, resolved  Coagulopathy of liver disease   Pt admitted on 4/18 with altered mental status and jaundice. He does have known history of cirrhosis and has seen GI in the past and history of SBP in the past also and EGD in March 2024 showed mild esophageal varices   * On admission he was found to have elevated ammonia to 146, with bilirubin of 11.4  * Shortly after admission (4/19), pt became more unresponsive and ultimately required intubation for airway protection in the setting of severe hepatic encephalopathy.   * He was treated with lactulose, rifaximin and ceftriaxone (empiric SBP coverage), as well as methylprednisolone for alcoholic hepatitis. Ultimately ammonia levels normalized and metal status improved. He was successfully extubated 4/21.   * 4/23 pt transferred out of the ICU.   * Of note Pt was treated with empirically for SBP with ceftriaxone, however abdominal US negative for ascites.  He was noted to be nonresponder to steroids which are now stopped. Patient was followed by [facility].  They will arrange outpatient follow-up.  - GI consulted and now signed off  - Continue lactulose 20g BID  - Continue rifaximin 550mg BID   - Restarted Lasix 4/24  - Discontinued ceftriaxone,  resumed PTA prophylactic ciprofloxacin.  - PT and OT following, PT currently recommending TCU      Bilateral lower extremity edema  Marked lower extremity edema 2/2 liver disease, third spacing   - creatinine stable , restarted diuretics.4/24  -  encouraged leg elevation  - lymphedema team following, if he is going home with home care might need lymphedema follow-up.     Chronic hypotension   2/2 liver disease (high output, low SVR state). Required vasopressors while intubated, now off. BPs stable with systolics in the 110s with midodrine.   - Continue Midodrine 15mg TID, can cut it down once blood pressures improved     Acute on chronic anemia   Hx of portal hypertensive gastropathy and esophageal varices   Hx of duodenal ulcers (2023)   Retroperitoneal bleed   No evidence of GI bleeding. EGD on 4/18 showed moderate portal hypertensive gastropathy, but no varices or ulcers, no e/o of acute GI bleed. CT CAP on 4/19 did show evidence of blood in the subcutaneous space of the left groin and the left retroperitoneum (related to L femoral CVC placement)   * Hemoglobin was low at 6.6 4/23, resolved packed redblood cells 1 unit.  - CT angiogram 4/23 showed No evidence for active contrast extravasation/active bleeding. Specifically no evidence for active contrast extravasation or bleeding into the left lower retroperitoneum adjacent to to the left lower retroperitoneal high-density blood products, which remain unchanged since 04/19/2024. The fluid collection representing a retroperitoneal hematoma adjacent to the iliacus and iliac musculature is likely related to low-grade bleeding from the patient's retroperitoneal varices.  - Transfuse for hbg <7  -  hemoglobin stable at 8.2 on 4/27     Epistaxis  ENT consulted for epistaxis. Recommended mupirocin ointment intranasally.   - Continue mupirocin      Possible Left external iliac vein thrombus   Non-con CT CAP on 4/19 showed Left femoral central venous catheter in place with expansion of the left external iliac vein by low density material, likely thrombus.   -CT-angiogram 4/23 showed No evidence for thrombus within the left iliac, arterial or venous systems. The findings on the CTA related to pseudo thrombotic  defect from adjacent high-density blood products .  No role for therapeutic anticoagulation at this point, pt on prophylactic heparin .     Alcohol use disorder, severe   Hx of alcohol withdrawals   Discussed at length. Pt reports only drinking one beer prior to admission, but family noted finding a half empty bottle of whisky in his apartment. Pt has declined group or inpatient treatment in the past. Pt unfortunately just lost his brother to complications of alcohol use. Discussed high risk of mortality if he continues to drink. Strongly recommended intensive inpatient treatment.   - seen by chem dep .  - Continue thiamine, folate      Stress induced hyperglycemia  - Insulin sliding scale        Diet :Regular Diet Adult  Snacks/Supplements Adult: Ensure Enlive; With Meals  DVT Prophylaxis: Pneumatic Compression Devices, heparin   Code Status: Full Code     40 MINUTES SPENT BY ME on the date of service doing chart review, history, exam, documentation & further activities per the note.  Medically Ready for Discharge: Ready Now TCU versus home with home care, patient did much better with PT on 4/26, although they are still recommending TCU. TCU is currently not an option due to insurance issues patient could tentatively go home with home care in 1-2 days    Clinically Significant Risk Factors           # Hypercalcemia: corrected calcium is >10.1, will monitor as appropriate    # Hypoalbuminemia: Lowest albumin = 2.7 g/dL at 4/26/2024  8:19 AM, will monitor as appropriate     # Thrombocytopenia: Lowest platelets = 79 in last 2 days, will monitor for bleeding          # Obesity: Estimated body mass index is 35.83 kg/m  as calculated from the following:    Height as of 4/9/24: 1.829 m (6').    Weight as of this encounter: 119.8 kg (264 lb 3.2 oz).   # Moderate Malnutrition: based on nutrition assessment      # Financial/Environmental Concerns:           Kraig Sherwood, DO  Text Page   (7am to 6pm)    Interval History      - No acute events overnight  - Discharge pending progression with PT   - Patient endorses having some blood in his stool  - Had a bloody nose this morning  - States he lives with his parents   - No other acute concerns at this time     -Data reviewed today: I reviewed all new labs and imaging results over the last 24 hours.  Physical Exam     Vital Signs with Ranges  Temp:  [97.8  F (36.6  C)-98.9  F (37.2  C)] 98.8  F (37.1  C)  Pulse:  [] 94  Resp:  [16-18] 18  BP: ()/(57-73) 129/73  SpO2:  [94 %-100 %] 99 %  I/O last 3 completed shifts:  In: 240 [P.O.:240]  Out: 825 [Urine:825]    Constitutional: Awake, alert, cooperative, no apparent distress  Respiratory:breath sounds reduced bases .  Cardiovascular: Regular rate and rhythm, normal S1 and S2, and no murmur noted  GI: Normal bowel sounds, soft, distended non-tender  Skin/Integumen: No rashes, 2-3+ lower extremity edema noted   Neuro : moving all 4 extremities, no focal deficit noted     Medications   Current Facility-Administered Medications   Medication Dose Route Frequency Provider Last Rate Last Admin    Daily 2 GRAM acetaminophen limit, unless fulminent liver failure or transaminases greater than or equal to 300 - 400, then none   Does not apply Continuous PRN Maggy Rangel MD        dextrose 10% infusion   Intravenous Continuous PRN Maggy Rangel MD        sodium chloride 0.9 % infusion   Intravenous Continuous Maggy Rangel MD   Stopped at 04/22/24 1630     Current Facility-Administered Medications   Medication Dose Route Frequency Provider Last Rate Last Admin    ciprofloxacin (CIPRO) tablet 500 mg  500 mg Oral Q24H Maggy Rangel MD   500 mg at 04/26/24 1810    folic acid (FOLVITE) tablet 1 mg  1 mg Oral Daily Maggy Rangel MD   1 mg at 04/27/24 0939    furosemide (LASIX) tablet 20 mg  20 mg Oral Daily Kristi Samano PA-C   20 mg at 04/27/24 0938    heparin ANTICOAGULANT injection 5,000 Units  5,000 Units  Subcutaneous Q12H Maggy Rangel MD   5,000 Units at 04/27/24 0947    insulin aspart (NovoLOG) injection (RAPID ACTING)  1-7 Units Subcutaneous Q4H Maggy Rangel MD   1 Units at 04/27/24 0020    lactulose (CHRONULAC) solution 20 g  20 g Oral or Feeding Tube BID Maggy Rangel MD   20 g at 04/27/24 0947    melatonin tablet 3 mg  3 mg Oral QPM Maggy Rangel MD        midodrine (PROAMATINE) tablet 15 mg  15 mg Oral TID w/meals Maggy Rangel MD   15 mg at 04/27/24 0947    multivitamin w/minerals (THERA-VIT-M) tablet 1 tablet  1 tablet Oral Daily Maggy Rangel MD   1 tablet at 04/27/24 0938    mupirocin (BACTROBAN) 2 % ointment   Topical TID Maggy Rangel MD   Given at 04/26/24 2200    pantoprazole (PROTONIX) 2 mg/mL suspension 40 mg  40 mg Oral or Feeding Tube BID Maggy Rangel MD        Or    pantoprazole (PROTONIX) EC tablet 40 mg  40 mg Oral BID Maggy Rangel MD   40 mg at 04/27/24 0939    rifaximin (XIFAXAN) tablet 550 mg  550 mg Oral or Feeding Tube BID Maggy Rangel MD   550 mg at 04/27/24 0939    sodium chloride (PF) 0.9% PF flush 3 mL  3 mL Intracatheter Q8H Maggy Rangel MD   3 mL at 04/27/24 0514    spironolactone (ALDACTONE) tablet 50 mg  50 mg Oral Daily Kristi Samano PA-C   50 mg at 04/27/24 0938    thiamine (B-1) tablet 100 mg  100 mg Oral Daily Maggy Rangel MD   100 mg at 04/27/24 0939    Or    thiamine (B-1) injection 100 mg  100 mg Intravenous Daily Maggy Rangel MD   100 mg at 04/21/24 0859       Data   Recent Labs   Lab 04/27/24  1228 04/27/24  1111 04/27/24  0835 04/26/24  0838 04/26/24  0819 04/25/24  0717 04/25/24  0641 04/24/24  1204 04/24/24  0850 04/23/24  0550 04/23/24  0430 04/22/24  1123 04/22/24  0410   WBC  --  10.0  --   --  6.0  --  6.1  --  6.8   < > 4.1  --  7.3   HGB  --  8.2*  --   --  7.7*  --  8.0*  --  8.5*   < > 6.6*  --  7.8*   MCV  --  101*  --   --  100  --  98  --  100   < > 101*  --  100   PLT   --  85*  --   --  79*  --  84*  --  103*   < > 87*  --  139*   INR  --   --   --   --   --   --   --   --  2.48*  --  2.74*  --  2.57*   NA  --  132*  --   --  133*  --  135  --  138  --  139  --  140   POTASSIUM  --  4.9  --   --  4.7  --  4.7  --  4.6  --  4.2  --  4.1   CHLORIDE  --  102  --   --  103  --  106  --  108*  --  110*  --  109*   CO2  --  22  --   --  24  --  25  --  25  --  23  --  22   BUN  --  14.2  --   --  17.0  --  18.3  --  20.8*  --  26.4*  --  37.1*   CR  --  0.47*  --   --  0.49*  --  0.51*  --  0.60*  --  0.79  --  1.30*   ANIONGAP  --  8  --   --  6*  --  4*  --  5*  --  6*  --  9   MIGUELINA  --  9.9  --   --  9.6  --  9.7  --  9.9  --  9.4  --  9.2   * 146* 108*   < > 130*   < > 141*   < > 143*   < > 132*   < > 164*   ALBUMIN  --  3.0*  --   --  2.7*  --  3.0*  --  3.1*  --  3.1*  --  3.2*   PROTTOTAL  --  5.9*  --   --  5.6*  --  5.9*  --  6.4  --  5.9*  --  6.5   BILITOTAL  --  9.8*  --   --  8.1*  --  8.3*  --  8.5*  --  7.9*  --  10.2*   ALKPHOS  --  151*  --   --  156*  --  134  --  123  --  111  --  101   ALT  --  61  --   --  50  --  48  --  51  --  44  --  39   AST  --  79*  --   --  61*  --  58*  --  67*  --  67*  --  57*    < > = values in this interval not displayed.     Recent Labs   Lab 04/27/24  1228 04/27/24  1111 04/27/24  0835 04/27/24  0350 04/26/24  2355   * 146* 108* 118* 140*       Imaging:   Recent Results (from the past 24 hour(s))   US Abdomen Limited    Narrative    US ABDOMEN LIMITED 4/26/2024 2:18 PM    CLINICAL HISTORY: ascites  TECHNIQUE: Limited abdominal ultrasound.    COMPARISON: None..      Impression    IMPRESSION:  1.  Scant ascites. There is not enough fluid to safely perform a  paracentesis.    AGUEDA BRUCE MD         SYSTEM ID:  [id number]

## 2024-04-28 NOTE — PLAN OF CARE
Goal Outcome Evaluation:  Goal Outcome Evaluation:     Summary: AMS/Hepatic Encephalopathy/Anemia  DATE & TIME: 04/27/24, [id number]                Cognitive Concerns/ Orientation : A&Ox4; forgetful at times, flat affect  BEHAVIOR & AGGRESSION TOOL COLOR:  Green  CIWA SCORE: N/A       ABNL VS/O2: VSS on RA,   MOBILITY:  SBA GB/walker, Ambulated in the hallway and in room as well.   PAIN MANAGMENT:  Denied   DIET: Regular, BG checks Q4  BOWEL/BLADDER: occasional urine  incontinence, used urinal appropriately during shift, BM once per pt.   ABNL LAB/BG:  , 151  DRAIN/DEVICES:  PIV SL  TELEMETRY RHYTHM:  NSR  SKIN: Scattered bruises to abd, redness and blanchable spots on hand. Lymphedema wraps to BLE for +3 edema.   TESTS/PROCEDURES: None this shift  D/C DATE: Plan to discharge home w/ 24/7 home care or TCU  OTHER IMPORTANT INFO: SW, PT, OT & GI following. Lactulose 3x daily. Lymphedema wraps need to be removed at 10 am  (4/27/2024); 1 hour rest period prior to OT re-wrapping.

## 2024-04-28 NOTE — PROGRESS NOTES
Care Management Follow Up    Length of Stay (days): 11    Expected Discharge Date: 04/30/2024     Concerns to be Addressed: appropriate discharge plan. See Additional Information comments  Patient plan of care discussed at interdisciplinary rounds: Yes    Anticipated Discharge Disposition:       Anticipated Discharge Services:    Anticipated Discharge DME:      Patient/family educated on Medicare website which has current facility and service quality ratings:    Education Provided on the Discharge Plan:    Patient/Family in Agreement with the Plan:      Referrals Placed by CM/SW:    Private pay costs discussed: Not applicable    Additional Information:  Discharge to home is not a safe discharge plan until it is established that a home care agency has accepted patient for RN visits and therapy visits for lymphedema therapy of legs and to determined if parents can provide the lymphedema wrap care on the days when the home care staff do not come.  Generally three times a week for the lymphedema therapy wraps is the maximum that insurance will cover and the maximum availability of staff.   Ideally admission to a TCU short term is optimal but will not know till Monday if [employer] will authorize the stay.    Plan: Will speak with patient and parents    Update at 1630 Met with patient regarding his discharge options.  Patient has done research and is not comfortable with discharge to [facility].  He prefers to discharge home.    Writer shared we are waiting to hear if a home care agency can accept patient. Explained the home care staff do not come daily and at most a therapist will make visits 3 times a week for lymphedema therapy.  Patient stated in this case he will use the sleeves on the off days which he can put on.     Plan:  Will check with home care referrals tomorrow.   Pt does have two steps to enter the home, unsure if stairs have been tried here.      Adriana Vidales, Southern [facility] Health CareSW

## 2024-04-28 NOTE — PROGRESS NOTES
[facility]    Hospitalist Progress Note    Assessment & Plan     Crispin Ham is a 39 year old male hx alcohol use disorder, chronic decompensated cirrhosis with hx of SBP, HFpEF, HTN, pleural effusions previously requiring drainage, and small esophageal varices; admitted on 4/17/2024 with worsening encephalopathy and intubated 4/18 for intubation for airway protection and EGD.     Acute on chronic anemia   Hx of portal hypertensive gastropathy and esophageal varices   Hx of duodenal ulcers (2023)   Retroperitoneal bleed   No evidence of GI bleeding. EGD on 4/18 showed moderate portal hypertensive gastropathy, but no varices or ulcers, no h/o of acute GI bleed. CT CAP on 4/19 did show evidence of blood in the subcutaneous space of the left groin and the left retroperitoneum (related to L femoral CVC placement)   * Hemoglobin was low at 6.6 4/23, resolved packed redblood cells 1 unit.  - CT angiogram 4/23 showed. No evidence for active contrast extravasation/active bleeding. Specifically no evidence for active contrast extravasation or bleeding into the left lower retroperitoneum adjacent to to the left lower retroperitoneal high-density blood products, which remain unchanged since 04/19/2024. The fluid collection representing a retroperitoneal hematoma adjacent to the iliacus and iliac musculature is likely related to low-grade bleeding from the patient's retroperitoneal varices.  - Nursing noted red streaks of blood in stool on 4/27, patient also had nose bleed that spontaneously resolved, hemoglobin dropped from 8.2 to 7 on 4/28  - Transfuse 1 unit RBC (2nd unit this admission) on 4/28  - Repeat hemoglobin and INR tomorrow AM, if hemoglobin continues to drop 4/29, recommend re-consulting GI and repeating CT abdomen pelvis.     Epistaxis, resolved  ENT consulted for epistaxis. Recommended mupirocin ointment intranasally.   - Continue mupirocin      Alcoholic cirrhosis/alcoholic  hepatitis  Hepatic encephalopathy/hyperammonemia, resolved  Coagulopathy of liver disease   Pt admitted on 4/18 with altered mental status and jaundice. He does have known history of cirrhosis and has seen GI in the past and history of SBP in the past also and EGD in March 2024 showed mild esophageal varices   * On admission he was found to have elevated ammonia to 146, with bilirubin of 11.4  * Shortly after admission (4/19), pt became more unresponsive and ultimately required intubation for airway protection in the setting of severe hepatic encephalopathy.   * He was treated with lactulose, rifaximin and ceftriaxone (empiric SBP coverage), as well as methylprednisolone for alcoholic hepatitis. Ultimately ammonia levels normalized and mental status improved. He was successfully extubated 4/21.   * 4/23 pt transferred out of the ICU.   * Of note Pt was treated with empirically for SBP with ceftriaxone, however abdominal US negative for ascites.  He was noted to be nonresponder to steroids which are now stopped. Patient was followed by [facility].  They will arrange outpatient follow-up.  - GI consulted and now signed off  - Continue lactulose 20g BID  - Continue rifaximin 550mg BID   - Restarted Lasix 4/24  - Discontinued ceftriaxone,  resumed PTA prophylactic ciprofloxacin.  - PT and OT following      Edit: Had discussion with patient's mom Juli on 4/28/24. Updated on blood transfusion plan. She is concerned with him coming home in his current state. We talked about monitoring his hemoglobin over the next 1-2 days to ensure it is stable after blood transfusion. If hemoglobin continues to trend down or he becomes hemodynamically unstable, we will re-consult GI and repeat imaging of his abdomen. In regards to discharge we discussed the importance of consistent diet and fluid intake in addition to medication compliance and alcohol cessation. She is also worried about the amount of swelling in his body. Briefly  discussed ruling out his heart for reason for increased swelling. Patient had normal echo in November 2023. We will repeat a BNP and limited echo to ensure normal EF and LV function. Juli requested update from oncoming hospitalist for additional updates when they are available.    Bilateral lower extremity edema  Anasarca  Marked lower extremity edema 2/2 liver disease, third spacing   - creatinine stable, restarted diuretics.4/24  - encouraged leg elevation  - lymphedema team following, if he is going home with home care might need lymphedema follow-up.     Chronic hypotension   2/2 liver disease (high output, low SVR state). Required vasopressors while intubated, now off. BPs stable with systolics in the 110s with midodrine.   - Continue Midodrine 15mg TID, can lower dose when blood pressure remains stable     Possible Left external iliac vein thrombus   Non-con CT CAP on 4/19 showed Left femoral central venous catheter in place with expansion of the left external iliac vein by low density material, likely thrombus.   -CT-angiogram 4/23 showed No evidence for thrombus within the left iliac, arterial or venous systems. The findings on the CTA related to pseudo thrombotic defect from adjacent high-density blood products .  No role for therapeutic anticoagulation at this point. Was on prophylactic heparin, held 4/28 due to drop in hemoglobin.      Alcohol use disorder, severe   Hx of alcohol withdrawals   Discussed at length. Pt reports only drinking one beer prior to admission, but family noted finding a half empty bottle of whisky in his apartment. Pt has declined group or inpatient treatment in the past. Pt unfortunately just lost his brother to complications of alcohol use. Discussed high risk of mortality if he continues to drink. Strongly recommended intensive inpatient treatment.   - seen by chem dep .  - Continue thiamine, folate      Stress induced hyperglycemia  - Insulin sliding scale        Diet  :Regular Diet Adult  Snacks/Supplements Adult: Ensure Enlive; With Meals  DVT Prophylaxis: Pneumatic Compression Devices, heparin   Code Status: Full Code     42 MINUTES SPENT BY ME on the date of service doing chart review, history, exam, documentation & further activities per the note.  Medically Ready for Discharge: 1-2 days pending stable hemoglobin and no further blood in stool    Clinically Significant Risk Factors           # Hypercalcemia: corrected calcium is >10.1, will monitor as appropriate    # Hypoalbuminemia: Lowest albumin = 2.7 g/dL at 4/26/2024  8:19 AM, will monitor as appropriate     # Thrombocytopenia: Lowest platelets = 67 in last 2 days, will monitor for bleeding          # Obesity: Estimated body mass index is 35.83 kg/m  as calculated from the following:    Height as of 4/9/24: 1.829 m (6').    Weight as of this encounter: 119.8 kg (264 lb 3.2 oz).   # Moderate Malnutrition: based on nutrition assessment      # Financial/Environmental Concerns:           Kraig Sherwood, DO  Text Page   (7am to 6pm)    Interval History     - No acute events overnight  - Patient has not had  a bowel movement yet today  - Denies abdominal pain  - Denies recurrent blood nose  - Denies worsening swelling or shortness of breath  - No other acute concerns at this time     -Data reviewed today: I reviewed all new labs and imaging results over the last 24 hours.  Physical Exam     Vital Signs with Ranges  Temp:  [98  F (36.7  C)-98.8  F (37.1  C)] 98.2  F (36.8  C)  Pulse:  [74-94] 94  Resp:  [16-18] 18  BP: ()/(58-73) 94/58  SpO2:  [94 %-99 %] 95 %  I/O last 3 completed shifts:  In: 200 [P.O.:200]  Out: 350 [Urine:350]    Constitutional: Awake, alert, cooperative, no apparent distress  Respiratory:breath sounds reduced bases .  Cardiovascular: Regular rate and rhythm, normal S1 and S2, and no murmur noted  GI: Normal bowel sounds, soft, distended non-tender  Skin/Integumen: No rashes, 2-3+ lower extremity  edema noted   Neuro : moving all 4 extremities, no focal deficit noted     Medications   Current Facility-Administered Medications   Medication Dose Route Frequency Provider Last Rate Last Admin    Daily 2 GRAM acetaminophen limit, unless fulminent liver failure or transaminases greater than or equal to 300 - 400, then none   Does not apply Continuous PRN Maggy Rangel MD        dextrose 10% infusion   Intravenous Continuous PRN Maggy Rangel MD        sodium chloride 0.9 % infusion   Intravenous Continuous Maggy Rangel MD   Stopped at 04/22/24 1630     Current Facility-Administered Medications   Medication Dose Route Frequency Provider Last Rate Last Admin    ciprofloxacin (CIPRO) tablet 500 mg  500 mg Oral Q24H Maggy Rangel MD   500 mg at 04/27/24 1658    folic acid (FOLVITE) tablet 1 mg  1 mg Oral Daily Maggy Rangel MD   1 mg at 04/28/24 0928    furosemide (LASIX) tablet 20 mg  20 mg Oral Daily Kristi Samano PA-C   20 mg at 04/28/24 0928    heparin ANTICOAGULANT injection 5,000 Units  5,000 Units Subcutaneous Q12H Maggy Rangel MD   5,000 Units at 04/28/24 0951    insulin aspart (NovoLOG) injection (RAPID ACTING)  1-7 Units Subcutaneous Q4H Maggy Rangel MD   1 Units at 04/28/24 0023    lactulose (CHRONULAC) solution 20 g  20 g Oral or Feeding Tube BID Maggy Rangel MD   20 g at 04/28/24 0951    melatonin tablet 3 mg  3 mg Oral QPM Maggy Rangel MD   3 mg at 04/27/24 2158    midodrine (PROAMATINE) tablet 15 mg  15 mg Oral TID w/meals Maggy Rangel MD   15 mg at 04/28/24 0928    multivitamin w/minerals (THERA-VIT-M) tablet 1 tablet  1 tablet Oral Daily Maggy Rangel MD   1 tablet at 04/28/24 0928    mupirocin (BACTROBAN) 2 % ointment   Topical TID Maggy aRngel MD   Given at 04/27/24 2200    pantoprazole (PROTONIX) 2 mg/mL suspension 40 mg  40 mg Oral or Feeding Tube BID Maggy Rangel MD        Or    pantoprazole (PROTONIX) EC  tablet 40 mg  40 mg Oral BID Maggy Rangel MD   40 mg at 04/28/24 0928    rifaximin (XIFAXAN) tablet 550 mg  550 mg Oral or Feeding Tube BID Maggy Rangel MD   550 mg at 04/28/24 0928    sodium chloride (PF) 0.9% PF flush 3 mL  3 mL Intracatheter Q8H Maggy Rangel MD   3 mL at 04/28/24 0405    spironolactone (ALDACTONE) tablet 50 mg  50 mg Oral Daily Kristi Samano PA-C   50 mg at 04/28/24 0928    thiamine (B-1) tablet 100 mg  100 mg Oral Daily Maggy Rangel MD   100 mg at 04/28/24 0928    Or    thiamine (B-1) injection 100 mg  100 mg Intravenous Daily Maggy Rangel MD   100 mg at 04/21/24 0859       Data   Recent Labs   Lab 04/28/24  0916 04/28/24  0806 04/28/24  0350 04/27/24  1228 04/27/24  1111 04/26/24  0838 04/26/24  0819 04/24/24  1204 04/24/24  0850 04/23/24  0550 04/23/24  0430 04/22/24  1123 04/22/24  0410   WBC  --  6.7  --   --  10.0  --  6.0   < > 6.8   < > 4.1  --  7.3   HGB  --  7.0*  --   --  8.2*  --  7.7*   < > 8.5*   < > 6.6*  --  7.8*   MCV  --  103*  --   --  101*  --  100   < > 100   < > 101*  --  100   PLT  --  67*  --   --  85*  --  79*   < > 103*   < > 87*  --  139*   INR  --   --   --   --   --   --   --   --  2.48*  --  2.74*  --  2.57*   NA  --  132*  --   --  132*  --  133*   < > 138  --  139  --  140   POTASSIUM  --  4.6  --   --  4.9  --  4.7   < > 4.6  --  4.2  --  4.1   CHLORIDE  --  101  --   --  102  --  103   < > 108*  --  110*  --  109*   CO2  --  22  --   --  22  --  24   < > 25  --  23  --  22   BUN  --  10.5  --   --  14.2  --  17.0   < > 20.8*  --  26.4*  --  37.1*   CR  --  0.43*  --   --  0.47*  --  0.49*   < > 0.60*  --  0.79  --  1.30*   ANIONGAP  --  9  --   --  8  --  6*   < > 5*  --  6*  --  9   MIGUELINA  --  9.7  --   --  9.9  --  9.6   < > 9.9  --  9.4  --  9.2   * 127* 127*   < > 146*   < > 130*   < > 143*   < > 132*   < > 164*   ALBUMIN  --  2.8*  --   --  3.0*  --  2.7*   < > 3.1*  --  3.1*  --  3.2*   PROTTOTAL  --  5.4*  --    --  5.9*  --  5.6*   < > 6.4  --  5.9*  --  6.5   BILITOTAL  --  9.5*  --   --  9.8*  --  8.1*   < > 8.5*  --  7.9*  --  10.2*   ALKPHOS  --  131  --   --  151*  --  156*   < > 123  --  111  --  101   ALT  --  56  --   --  61  --  50   < > 51  --  44  --  39   AST  --  71*  --   --  79*  --  61*   < > 67*  --  67*  --  57*    < > = values in this interval not displayed.     Recent Labs   Lab 04/28/24  0916 04/28/24  0806 04/28/24  0350 04/28/24  0022 04/27/24  2045   * 127* 127* 145* 151*       Imaging:   No results found for this or any previous visit (from the past 24 hour(s)).

## 2024-04-29 NOTE — PLAN OF CARE
Summary: AMS/Hepatic Encephalopathy/Anemia  DATE & TIME: 4- AM shift  Cognitive Concerns/ Orientation : A&Ox3-4; forgetful at times, flat affect  BEHAVIOR & AGGRESSION TOOL COLOR:  Green  CIWA SCORE: N/A       ABNL VS/O2: VSS on RA, on midodrine for hypotension  MOBILITY:  SBA GB/walker, walked jordan x 1. Sleeps between cares. Calls appropriately.  PAIN MANAGMENT:  Denies  DIET: Regular, good appetite  BOWEL/BLADDER: continent this shift, dribbles at times. On diuretics. BMx1, pt takes lactulose.   ABNL LAB/BG:  /180; Na 129. AST 73. Bili total 10.9. Bili direct 3.5. Hg 8.2. Platelets 70. Pro . Echo completed.   DRAIN/DEVICES:  PIV saline locked  TELEMETRY RHYTHM:  n/a  SKIN: Scattered bruises to abd, redness and blanchable spots on hands. Lymphedema wraps to BLE for 3+ edema, removed at 2 pm per therapy request. Jaundiced  TESTS/PROCEDURES: echo completed  D/C DATE: pending stable bili, Gi is following. Plan to go home with home care.   OTHER IMPORTANT INFO:

## 2024-04-29 NOTE — PROGRESS NOTES
Care Management Follow Up    Length of Stay (days): 12    Expected Discharge Date: 04/30/2024     Concerns to be Addressed:  (appropriate discharge plan. See Additional Information comments)     Patient plan of care discussed at interdisciplinary rounds: Yes    Anticipated Discharge Disposition:       Anticipated Discharge Services:    Anticipated Discharge DME:      Patient/family educated on Medicare website which has current facility and service quality ratings:    Education Provided on the Discharge Plan:    Patient/Family in Agreement with the Plan:      Referrals Placed by CM/SW:    Private pay costs discussed: Not applicable    Additional Information:  Writer met with patient to update him that [facility] has also offered patiet admission.  Patient reports at this time his plan remains to go home.  PT note of 4/28 does support home discharge with home therapy.   [facility] has accepted the home care referral.  Will notify both [facility] and [facility] liaisons that patient is discharging home.   It should also be noted that if patient had wanted to pursue TCU, his [employer] insurance is requesting a Peer to Peer for rationale.      MORAGN KeithSW

## 2024-04-29 NOTE — PLAN OF CARE
Summary: AMS/Hepatic Encephalopathy/Anemia  DATE & TIME: 4/28/24 1900-2300          Cognitive Concerns/ Orientation : A&Ox4; forgetful at times, flat affect  BEHAVIOR & AGGRESSION TOOL COLOR:  Green  CIWA SCORE: N/A       ABNL VS/O2: VSS on RA  MOBILITY:  SBA GB/walker, ambulated in the hallway this shift.  Calls appropriately.  PAIN MANAGMENT:  Denies  DIET: Regular  BOWEL/BLADDER: occasional urine  incontinence, used urinal appropriately during shift, No BM this shift.  ABNL LAB/BG:    DRAIN/DEVICES:  PIV saline locked  TELEMETRY RHYTHM:  n/a  SKIN: Scattered bruises to abd, redness and blanchable spots on hands. Lymphedema wraps to BLE for 3+ edema; jaundiced  TESTS/PROCEDURES: None this shift  D/C DATE: Plan to discharge home w/ 24/7 home care or TCU  OTHER IMPORTANT INFO: SW, PT, following. Lactulose 3x daily. Lymphedema wraps need to be removed at 1400 on 4/29; 1 hour rest period prior to OT re-wrapping.

## 2024-04-29 NOTE — PLAN OF CARE
Goal Outcome Evaluation:       Goal Outcome Evaluation:     Summary: AMS/Hepatic Encephalopathy/Anemia  DATE & TIME: 4/28/24 4461-8205         Cognitive Concerns/ Orientation : A&Ox4; forgetful at times, flat affect  BEHAVIOR & AGGRESSION TOOL COLOR:  Green  CIWA SCORE: N/A       ABNL VS/O2: VSS on RA  MOBILITY:  SBA GB/walker, ambulated in the hallway this shift.  Calls appropriately.  PAIN MANAGMENT:  Denies  DIET: Regular  BOWEL/BLADDER: occasional urine  incontinence, used urinal appropriately during shift, No BM this shift.  ABNL LAB/BG:    DRAIN/DEVICES:  PIV saline locked  TELEMETRY RHYTHM:  n/a  SKIN: Scattered bruises to abd, redness and blanchable spots on hands. Lymphedema wraps to BLE for 3+ edema; jaundiced  TESTS/PROCEDURES: None this shift  D/C DATE: Plan to discharge home w/ 24/7 home care or TCU  OTHER IMPORTANT INFO: SW, PT, following. Lactulose 3x daily. Lymphedema wraps need to be removed at 1400 on 4/29; 1 hour rest period prior to OT re-wrapping.

## 2024-04-29 NOTE — PROGRESS NOTES
[facility]  [facility]  Gastroenterology Progress note    Interval History:      No BM overnight.  No acute events.      Vital Signs:      /64 (BP Location: Right arm, Patient Position: Semi-Gibson's, Cuff Size: Adult Regular)   Pulse 94   Temp 98.3  F (36.8  C) (Oral)   Resp 14   Wt 119.8 kg (264 lb 3.2 oz)   SpO2 98%   BMI 35.83 kg/m    Temp (24hrs), Av.1  F (36.7  C), Min:97.9  F (36.6  C), Max:98.3  F (36.8  C)    Patient Vitals for the past 72 hrs:   Weight   24 0244 119.8 kg (264 lb 3.2 oz)       Intake/Output Summary (Last 24 hours) at 2024 0910  Last data filed at 2024 0000  Gross per 24 hour   Intake 1080 ml   Output 600 ml   Net 480 ml       Additional Comments:  ROS, FH, SH: See initial GI consult for details.    Laboratory Data:  Recent Labs   Lab Test 24  0806 24  1111 24  0819 24  0641 24  0850 24  0550 24  0430 24  0410   WBC 6.7 10.0 6.0   < > 6.8   < > 4.1 7.3   HGB 7.0* 8.2* 7.7*   < > 8.5*   < > 6.6* 7.8*   * 101* 100   < > 100   < > 101* 100   PLT 67* 85* 79*   < > 103*   < > 87* 139*   INR  --   --   --   --  2.48*  --  2.74* 2.57*    < > = values in this interval not displayed.     Recent Labs   Lab Test 24  0806 24  1111 24  08   * 132* 133*   POTASSIUM 4.6 4.9 4.7   CHLORIDE 101 102 103   CO2    BUN 10.5 14.2 17.0   CR 0.43* 0.47* 0.49*   ANIONGAP 9 8 6*   MIGUELINA 9.7 9.9 9.6     Recent Labs   Lab Test 24  0806 24  1111 24  0819 24  1802 24  0458 24  1230 24  0536 24  1044 23  0538 23  0535 23  0536 23  1514 23  0724 23  1646 23  1537 23  1424 23  1224 23  1147   ALBUMIN 2.8* 3.0* 2.7*   < > 2.8*  --    < > 3.1*   < > 2.9*   < > 3.1*   < >  --   --  2.4*   < > 2.3*   BILITOTAL 9.5* 9.8* 8.1*   < > 11.3*  --    < > 7.6*   < > 7.1*   < >  8.4*   < >  --   --  21.7*   < > 10.4*   DBIL 3.00*  --   --   --   --   --   --  3.59*  --  3.19*   < >  --   --   --   --   --   --  7.66*   ALT 56 61 50   < > 46  --    < > 48   < > 36   < > 49   < >  --   --  69   < > 82*   AST 71* 79* 61*   < > 96*  --    < > 97*   < > 141*   < > 222*   < >  --    < >  --    < > 331*   ALKPHOS 131 151* 156*   < > 118  --    < > 225*   < > 269*   < > 330*   < >  --   --  182*   < > 200*   PROTEIN  --   --   --   --   --  30*  --   --   --   --   --  100*  --  30*  --   --    < >  --    LIPASE  --   --   --   --  56  --   --   --   --   --   --   --   --   --   --  237*  --  272*    < > = values in this interval not displayed.         Assessment:  40 yo male with acute alcoholic hepatitis on chronic alcoholic liver disease who presents with severe hepatic encephalopathy requiring intubation.  Methylprednisolone 40 mg daily started on 4/19.   [place] 0.6 showing patient is a null responder.  Steroids stopped.  Continued improvement in mental status.    CTA 4/23 with no evidence of active contrast extravasation/active bleeding.  Specifically no evidence of bleeding into the left lower retroperitoneum adjacent to the left lower retroperitoneal blood products which remain unchanged from 4/19/24.  No evidence for active contrast extravasation or active bleeding in the high-density subcutaneous blood products in the left inguinal region.  No evidence for thrombus within the left iliac, arterial or venous systems.  Bilateral pleural fluid collections with consolidation in the bases and hazy ill-defined infiltrate in the lingula and left lower lobe.  Hepatic cirrhosis with portal hypertension and ascites.  Patient with macrocytic anemia without evidence of GI bleeding.  Suspect secondary to marrow suppression from alcohol.  EGD 4/18 with moderate portal hypertensive gastropathy but no varices or ulcers.  CT C/A/P 4/19 with evidence of blood in the subcutaneous space of the left groin and  the left retroperitoneum.  Patient with episode of epistaxis on 4/27.  Hemoglobin 7.0 on 4/28.  Plan:  -  Monitor MELD labs.  -  Continue lactulose 20 g TID, titrate to 3-4 loose BM daily.  -  Continue rifaximin and pantoprazole BID.  -  Furosemide 20 mg daily and spironolactone 50 mg daily.  -  Ultrasound with paracentesis prior to discharge if enough fluid present.  -  Monitor H/H; transfuse prn.  Watch for signs of active bleeding.  -  Outpatient follow up in liver clinic.  [facility] will call to arrange.       Total Time Spent: 25 minutes    GENET Hamlin  [facility]  Office:  [phone] call if needed after 5PM  Cell:  [phone], not available after 5PM at this number

## 2024-04-29 NOTE — PROGRESS NOTES
"[facility]    Hospitalist Progress Note    Interval History   - No acute issues, denies bleeding. States he is feeling well, walking around. Has ongoing significant swelling UE and LE.  - Hemoglobin stable but bilirubin uptrending, continue to monitor inpatient  - Mother Juli called, not answered Addendum: Called and updated    Assessment & Plan   Summary: Crispin Ham is a 39 year old male with PMH alcohol use disorder, chronic decompensated cirrhosis with hx of SBP, HFpEF, HTN, pleural effusions previously requiring drainage, and small esophageal varices who was admitted on 4/17/2024 with acute hepatic encephalopathy and jaundice. He does have known history of cirrhosis and has seen GI in the past and history of SBP in the past also and EGD in March 2024 showed mild esophageal varices  On admission he was found to have elevated ammonia to 146, with bilirubin of 11.4   Intubated 4/18 for intubation for airway protection due to severe hepatic encephalopathy. EGD noted no acute bleed, suspect possibly epistaxis as cause of worsening encephalopathy and anemia. He was treated with lactulose, rifaximin and ceftriaxone (empiric SBP coverage), as well as methylprednisolone for alcoholic hepatitis. Ultimately ammonia levels normalized and mental status improved. He was successfully extubated 4/21, transferred out of ICU on 4/23.    Acute on chronic anemia   Hx of portal hypertensive gastropathy and esophageal varices   Hx of duodenal ulcers (2023)   Retroperitoneal bleed   No evidence of GI bleeding. EGD on 4/18 showed moderate portal hypertensive gastropathy, but no varices or ulcers, no evidence of acute GI bleed. CT CAP on 4/19 did show evidence of blood in the subcutaneous space of the left groin and the left retroperitoneum (related to L femoral CVC placement) .  Hemoglobin was low at 6.6 4/23, improved with 1u pRBC. CT angiogram 4/23 showed \"no evidence for active contrast " "extravasation or bleeding into the left lower retroperitoneum adjacent to to the left lower retroperitoneal high-density blood products, which remain unchanged since 04/19/2024.\"  Nursing noted red streaks of blood in stool on 4/27, patient also had nose bleed that spontaneously resolved, hemoglobin dropped from 8.2 to 7 on 4/28, received 1u pRBC (total 2 units this admission). Hemoglobin stable 8.2 on 4/29.  - Monitor Hgb  - Consider CT vs CTA abdomen if evidence of acute bleeding or Hgb dropping or hemodynamic instability    Epistaxis, resolved  ENT consulted for epistaxis. Recommended mupirocin ointment intranasally. Epistaxis could be contributing to anemia, hepatic encephalopathy.  - Continue mupirocin      Alcoholic cirrhosis/alcoholic hepatitis  Hepatic encephalopathy/hyperammonemia, improved  Coagulopathy of liver disease--anemia, thrombocytopenia  Of note Pt was treated with empirically for SBP with ceftriaxone, however abdominal US negative for ascites. He was noted to be nonresponder to steroids which are now stopped. Patient was followed by [facility].  They will arrange outpatient follow-up.  - GI consulted and now signed off  - Continue lactulose 20g BID  - Continue rifaximin 550mg BID   - Lasix 20mg oral daily  - PTA prophylactic ciprofloxacin.  - PT and OT following     Bilateral lower extremity edema  Anasarca  Hyponatremia due to above  Marked lower extremity edema 2/2 liver disease, third spacing. Echo 4/28 with EF 59%, dilated IVC consistent with clinical status.  - Give additional lasix 20mg IV and reassess tomorrow. Could increased daily lasix dose.  - Treatment as above  - Encouraged leg elevation  - Lymphedema team following, if he is going home with home care might need lymphedema follow-up     Chronic hypotension   2/2 liver disease (high output, low SVR state). Required vasopressors while intubated, now off. BPs stable with systolics in the 110s with midodrine.   - Continue Midodrine " 15mg TID, can lower dose when blood pressure remains stable     Possible Left external iliac vein thrombus   Non-con CT CAP on 4/19 showed Left femoral central venous catheter in place with expansion of the left external iliac vein by low density material, likely thrombus.  CT-angiogram 4/23 showed No evidence for thrombus within the left iliac, arterial or venous systems. The findings on the CTA related to pseudo thrombotic defect from adjacent high-density blood products.  - No role for therapeutic anticoagulation at this point.  - Was on prophylactic heparin, held 4/28 due to drop in hemoglobin     Alcohol use disorder, severe   Hx of alcohol withdrawals   Discussed at length. Pt reports only drinking one beer prior to admission, but family noted finding a half empty bottle of whisky in his apartment. Pt has declined group or inpatient treatment in the past. Pt unfortunately just lost his brother to complications of alcohol use. Discussed high risk of mortality if he continues to drink. Strongly recommended intensive inpatient treatment.   - Seen by chem dep  - Continue thiamine, folate   - Psychiatry consult; patient recently loss a brother due to alcohol    Goals of care  Hospitalist had discussion with patient's mom Juli on 4/28/24. She is concerned with him coming home in his current state. We talked about monitoring his hemoglobin over the next 1-2 days to ensure it is stable after blood transfusion. If hemoglobin continues to trend down or he becomes hemodynamically unstable, we will re-consult GI and repeat imaging of his abdomen. In regards to discharge we discussed the importance of consistent diet and fluid intake in addition to medication compliance and alcohol cessation.    Clinically Significant Risk Factors         # Hyponatremia: Lowest Na = 129 mmol/L in last 2 days, will monitor as appropriate   # Hypercalcemia: corrected calcium is >10.1, will monitor as appropriate    # Hypoalbuminemia:  Lowest albumin = 2.7 g/dL at 4/26/2024  8:19 AM, will monitor as appropriate   # Thrombocytopenia: Lowest platelets = 67 in last 2 days, will monitor for bleeding          # Obesity: Estimated body mass index is 36.08 kg/m  as calculated from the following:    Height as of 4/9/24: 1.829 m (6').    Weight as of this encounter: 120.7 kg (266 lb).   # Moderate Malnutrition: based on nutrition assessment    # Financial/Environmental Concerns:            PT/OT: ordered  Diet: Regular Diet Adult  Snacks/Supplements Adult: Ensure Enlive; With Meals    DVT Prophylaxis: Pneumatic Compression Devices  Bosch Catheter: Not present  Lines: None     Cardiac Monitoring: None  Code Status: Full Code    Disposition: Anticipated discharge 1-2 days pending improvement in cirrhosis, alcoholic hepatitis    Bernard Quiroz MD  Hospitalist Service  [facility]  Securely message with [place] (more info)  Text page via [employer] Paging/Directory     - Total time spent on encounter is 55 minutes, which includes counseling, coordination of care, time spent discussing with family, and/or time spent discussing with consultants.    Data reviewed today: I reviewed all new labs and imaging results over the last 24 hours.    Physical Exam   Temp: 98.3  F (36.8  C) Temp src: Oral BP: 99/59 Pulse: 94   Resp: 14 SpO2: 98 % O2 Device: None (Room air)    Vitals:    04/25/24 0641 04/27/24 0244 04/29/24 0936   Weight: 120.4 kg (265 lb 6.9 oz) 119.8 kg (264 lb 3.2 oz) 120.7 kg (266 lb)     Vital Signs with Ranges  Temp:  [97.9  F (36.6  C)-98.3  F (36.8  C)] 98.3  F (36.8  C)  Pulse:  [77-94] 94  Resp:  [14-16] 14  BP: ()/(59-65) 99/59  SpO2:  [96 %-99 %] 98 %  I/O last 3 completed shifts:  In: 1080 [P.O.:480]  Out: 900 [Urine:900]  O2 requirements: none    Constitutional: Jaundiced male appears chronically ill  HEENT: Eyes jaundiced, oral mucosa moist  Cardiovascular: RRR, normal S1/2, systolic murmur noted  Respiratory:  CTAB, no wheezing or crackles  Vascular: UE and LE anasarca  GI: Normoactive bowel sounds, nontender  Skin/Integumen: No rashes  Neuro/Psych: Slight psychomotor delay, otherwise A&Ox3, moves all extremities    Medications   Current Facility-Administered Medications   Medication Dose Route Frequency Provider Last Rate Last Admin    Daily 2 GRAM acetaminophen limit, unless fulminent liver failure or transaminases greater than or equal to 300 - 400, then none   Does not apply Continuous PRN Maggy Rangel MD        dextrose 10% infusion   Intravenous Continuous PRN Maggy Rangel MD         Current Facility-Administered Medications   Medication Dose Route Frequency Provider Last Rate Last Admin    ciprofloxacin (CIPRO) tablet 500 mg  500 mg Oral Q24H Maggy Rangel MD   500 mg at 04/28/24 1802    folic acid (FOLVITE) tablet 1 mg  1 mg Oral Daily Maggy Rangel MD   1 mg at 04/29/24 0800    furosemide (LASIX) injection 20 mg  20 mg Intravenous Once Bernard Quiroz MD        furosemide (LASIX) tablet 20 mg  20 mg Oral Daily Kristi Samano PA-C   20 mg at 04/29/24 0800    [Held by provider] heparin ANTICOAGULANT injection 5,000 Units  5,000 Units Subcutaneous Q12H Maggy Ragnel MD   5,000 Units at 04/28/24 0951    insulin aspart (NovoLOG) injection (RAPID ACTING)  1-7 Units Subcutaneous Q4H Maggy Rangel MD   1 Units at 04/29/24 1144    lactulose (CHRONULAC) solution 20 g  20 g Oral or Feeding Tube BID Maggy Rangel MD   20 g at 04/29/24 0801    melatonin tablet 3 mg  3 mg Oral QPM Maggy Rangel MD   3 mg at 04/28/24 2022    miconazole (MICATIN) 2 % powder   Topical BID Bernard Quiroz MD   Given at 04/29/24 1142    midodrine (PROAMATINE) tablet 15 mg  15 mg Oral TID w/meals Maggy Rangel MD   15 mg at 04/29/24 1139    multivitamin w/minerals (THERA-VIT-M) tablet 1 tablet  1 tablet Oral Daily Maggy Rangel MD   1 tablet at 04/29/24 0800    mupirocin  (BACTROBAN) 2 % ointment   Topical TID Maggy Rangel MD   Given at 04/27/24 2200    pantoprazole (PROTONIX) 2 mg/mL suspension 40 mg  40 mg Oral or Feeding Tube BID Maggy Rangel MD        Or    pantoprazole (PROTONIX) EC tablet 40 mg  40 mg Oral BID Maggy Rangel MD   40 mg at 04/29/24 0800    rifaximin (XIFAXAN) tablet 550 mg  550 mg Oral or Feeding Tube BID Maggy Rangel MD   550 mg at 04/29/24 0756    sodium chloride (PF) 0.9% PF flush 3 mL  3 mL Intracatheter Q8H Maggy Rangel MD   3 mL at 04/28/24 2023    spironolactone (ALDACTONE) tablet 50 mg  50 mg Oral Daily Kristi Samano PA-C   50 mg at 04/29/24 0800    thiamine (B-1) tablet 100 mg  100 mg Oral Daily Maggy Rangel MD   100 mg at 04/28/24 0928    Or    thiamine (B-1) injection 100 mg  100 mg Intravenous Daily Maggy Rangel MD   100 mg at 04/29/24 0754       Data   Recent Labs   Lab 04/29/24  1111 04/29/24  0837 04/29/24  0731 04/28/24  0916 04/28/24  0806 04/27/24  1228 04/27/24  1111 04/24/24  1204 04/24/24  0850 04/23/24  0550 04/23/24  0430   WBC  --  7.7  --   --  6.7  --  10.0   < > 6.8   < > 4.1   HGB  --  8.2*  --   --  7.0*  --  8.2*   < > 8.5*   < > 6.6*   MCV  --  102*  --   --  103*  --  101*   < > 100   < > 101*   PLT  --  70*  --   --  67*  --  85*   < > 103*   < > 87*   INR  --   --   --   --   --   --   --   --  2.48*  --  2.74*   NA  --  129*  --   --  132*  --  132*   < > 138  --  139   POTASSIUM  --  4.5  --   --  4.6  --  4.9   < > 4.6  --  4.2   CHLORIDE  --  98  --   --  101  --  102   < > 108*  --  110*   CO2  --  22  --   --  22  --  22   < > 25  --  23   BUN  --  8.9  --   --  10.5  --  14.2   < > 20.8*  --  26.4*   CR  --  0.48*  --   --  0.43*  --  0.47*   < > 0.60*  --  0.79   ANIONGAP  --  9  --   --  9  --  8   < > 5*  --  6*   MIGUELINA  --  9.6  --   --  9.7  --  9.9   < > 9.9  --  9.4   * 142* 111*   < > 127*   < > 146*   < > 143*   < > 132*   ALBUMIN  --  3.0*  --   --  2.8*   --  3.0*   < > 3.1*  --  3.1*   PROTTOTAL  --  6.0*  --   --  5.4*  --  5.9*   < > 6.4  --  5.9*   BILITOTAL  --  10.9*  --   --  9.5*  --  9.8*   < > 8.5*  --  7.9*   ALKPHOS  --  145  --   --  131  --  151*   < > 123  --  111   ALT  --  58  --   --  56  --  61   < > 51  --  44   AST  --  73*  --   --  71*  --  79*   < > 67*  --  67*    < > = values in this interval not displayed.       Imaging:   Recent Results (from the past 24 hour(s))   Echocardiogram Limited   Result Value    Biplane LVEF 59%    Narrative    [id number]  [id number]  [id number]  [id number]^MARIE^KHLOE^GUS     [facility]  Echocardiography Laboratory  [address] [place] [address]     Name: JIMMY COLLADO  MRN: [id number]  : 1985  Study Date: 2024 08:25 AM  Age: 39 yrs  Gender: Male  Patient Location: [facility]  Reason For Study: Edema  Ordering Physician: KHLOE SALAZAR  Referring Physician: NICA MENDOSA  Performed By: Shahrzad Knight     BSA: 2.4 m2  Height: 72 in  Weight: 264 lb  HR: 97  BP: 111/65 mmHg  ______________________________________________________________________________  Procedure  Limited Echo Adult.  ______________________________________________________________________________  Interpretation Summary     This is a limited echo--limited images obtained . See full echo 23  The left ventricle is mildly dilated.  There is borderline concentric left ventricular hypertrophy.  Biplane LVEF is 59%.  LVdiastolic function not measured  Right ventricular systolic pressure could not be approximated due to  inadequate tricuspid regurgitation.  IVC diameter >2.1 cm collapsing <50% with sniff suggests a high RA pressure  estimated at 15 mmHg or greater.  No TR so cannot assess RVSP. The echo suggests elevated CVP/RA pressure. The  pulm acceleration time is normal which suggests normal pulm vascular  resistance  ______________________________________________________________________________  Left  Ventricle  The left ventricle is mildly dilated. There is borderline concentric left  ventricular hypertrophy. Biplane LVEF is 59%. LVdiastolic function not  measured. Normal left ventricular wall motion.     Right Ventricle  The right ventricle is normal in size and function.     Atria  The left atrium is borderline dilated. Right atrial size is normal.     Mitral Valve  The mitral valve leaflets appear normal. There is no evidence of stenosis,  fluttering, or prolapse. There is physiologic mitral regurgitation.     Tricuspid Valve  There is physiologic tricuspid regurgitation. Right ventricular systolic  pressure could not be approximated due to inadequate tricuspid regurgitation.  IVC diameter >2.1 cm collapsing <50% with sniff suggests a high RA pressure  estimated at 15 mmHg or greater. No TR so cannot assess RVSP. The echo  suggests elevated CVP/RA pressure. The pulm acceleration time is normal which  suggests normal pulm vascular resistance.     Aortic Valve  Normal tricuspid aortic valve.     Pulmonic Valve  The pulmonic valve is not well seen, but is grossly normal.     Vessels  The aortic root is normal size.     Pericardium  The pericardium appears normal.     Rhythm  Sinus rhythm was noted.  ______________________________________________________________________________  MMode/2D Measurements & Calculations  IVSd: 0.80 cm     LVIDd: 6.3 cm  LVIDs: 4.3 cm  LVPWd: 1.2 cm  FS: 32.3 %  LV mass(C)d: 268.3 grams  LV mass(C)dI: 111.9 grams/m2  asc Aorta Diam: 2.9 cm  Asc Ao diam index BSA (cm/m2): 1.2  Asc Ao diam index Ht(cm/m): 1.6  EF Biplane: 59.1 %  LA Volume (BP): 36.8 ml  LA Volume Index (BP): 15.3 ml/m2  RWT: 0.38     TAPSE: 3.7 cm     Doppler Measurements & Calculations  PA V2 max: 172.5 cm/sec  PA max P.9 mmHg  PA acc time: 0.11 sec     ______________________________________________________________________________  Report approved by: Teri Robles 2024 09:36 AM

## 2024-04-29 NOTE — PLAN OF CARE
Goal Outcome Evaluation:     Summary: AMS/Hepatic Encephalopathy/Anemia  DATE & TIME: 04/28/24, 9901-1644                Cognitive Concerns/ Orientation : A&Ox4; forgetful at times, flat affect  BEHAVIOR & AGGRESSION TOOL COLOR:  Green  CIWA SCORE: N/A       ABNL VS/O2: VSS on Rm Air, BP soft at times  MOBILITY:  Asstx1 GB/walker, ambulates to BR, unsteady d/t +3-4 BLE edema  PAIN MANAGMENT:  Denied   DIET: Regular, BG checks AC & HS  BOWEL/BLADDER: occasional incontinence, scheduled diurectics given, minimal urine OP, Bmx1; patient denies blood present in stool today  ABNL LAB/BG: hgb 7.0; 1 PRBC given per orders  DRAIN/DEVICES:  PIV SL  TELEMETRY RHYTHM:  discontinued  SKIN: Scattered bruises to abd from heparin injections/insulin, redness and blanchable spots. Lymphedema wraps to BLE for +4 edema, also continues w/ abd ascites, mild abrasions to abd folds, powders & interdry applied as needed.  TESTS/PROCEDURES: ECHO ordered for this evening, seen by PT/OT  D/C DATE: Discharge plan pending; [facility] see updated SW note  OTHER IMPORTANT INFO: SW, PT, OT & GI following. Scheduled heparin injections on hold at this time d/t hgb of 7.0. Received 1 unit PRBC's. Recheck hgb & INR tomorrow 4/29/2024; if hgb continues to trend down, re-consult to GI & possible imaging again. ECHO also ordered for increase in edema. See hospitalist note from today 4/28/2024.

## 2024-04-30 NOTE — PLAN OF CARE
Summary: AMS/Hepatic Encephalopathy/Anemia  DATE & TIME: 4- 3990-6263  Cognitive Concerns/ Orientation : A&Ox3-4; forgetful at times, flat affect  BEHAVIOR & AGGRESSION TOOL COLOR:  Green  CIWA SCORE: N/A       ABNL VS/O2: BP hypotensive/soft 98/62- MD aware and gave OK for 1 time lasix dose.  Other VSS on RA, on midodrine for hypotension  MOBILITY:  SBA GB/walker, walked jordan x 1. Sleeps between cares. Calls appropriately.  PAIN MANAGMENT:  Denies  DIET: Regular, good appetite  BOWEL/BLADDER: continent this shift, dribbles at times. On diuretics.  pt takes lactulose.   ABNL LAB/BG:  ; Na 129. AST 73. Bili total 10.9. Bili direct 3.5. Hg 8.2. Platelets 70. Pro .  DRAIN/DEVICES:  PIV saline locked ,flushed  TELEMETRY RHYTHM:  n/a  SKIN: Scattered bruises to abd, redness and blanchable spots on hands. Lymphedema wraps to BLE for 3+ edema, re- applied by therapy. Jaundiced  TESTS/PROCEDURES: echo completed  D/C DATE: pending stable bili, Gi is following. Plan to go home with home care.   OTHER IMPORTANT INFO:

## 2024-04-30 NOTE — PLAN OF CARE
Physical Therapy Discharge Summary    Reason for therapy discharge:    Discharged to home with home therapy.    Progress towards therapy goal(s). See goals on Care Plan in [facility] electronic health record for goal details.  Goals partially met.  Barriers to achieving goals:   discharge from facility.    Therapy recommendation(s):    Continued therapy is recommended.  Rationale/Recommendations: Pt tolerated session well. Pt below baseline functional independence limited by decreased activity tolerance, strength, edema. Pt at baseline uses a cane for ambulation, lives with his parents, stairs within home. Pt currently requiring CGA to close SBA for transfers, use of walker for ambulation. Anticipate with continued mobilization with nursing staff and IP PT/OT, pt will progress to be able to discharge to home with assist as needed, recommend pt use a walker for ambulation. Recommend pt continue with HHPT to address edema and strength. Will continue to update as appropriate.  PT Brief overview of current status: CGA to close SBA w/ SEC, CGA on stairs  PT Equipment Needed at Discharge: walker, rolling      Recommendation above provided by last treating therapist.

## 2024-04-30 NOTE — CONSULTS
[facility] Nurse Inpatient Assessment     Consulted for: josé luis, groin area      Patient History (according to provider note(s):      Summary: Crispin Ham is a 39 year old male with PMH alcohol use disorder, chronic decompensated cirrhosis with hx of SBP, HFpEF, HTN, pleural effusions previously requiring drainage, and small esophageal varices who was admitted on 4/17/2024 with acute hepatic encephalopathy and jaundice. He does have known history of cirrhosis and has seen GI in the past and history of SBP in the past also and EGD in March 2024 showed mild esophageal varices  On admission he was found to have elevated ammonia to 146, with bilirubin of 11.4                Intubated 4/18 for intubation for airway protection due to severe hepatic encephalopathy. EGD noted no acute bleed, suspect possibly epistaxis as cause of worsening encephalopathy and anemia. He was treated with lactulose, rifaximin and ceftriaxone (empiric SBP coverage), as well as methylprednisolone for alcoholic hepatitis. Ultimately ammonia levels normalized and mental status improved. He was successfully extubated 4/21, transferred out of ICU on 4/23.   Acute on chronic anemia   Hx of portal hypertensive gastropathy and esophageal varices   Hx of duodenal ulcers (2023)   Retroperitoneal bleed   Alcoholic cirrhosis/alcoholic hepatitis  Hepatic encephalopathy/hyperammonemia, improved  Coagulopathy of liver disease--anemia, thrombocytopenia  Bilateral lower extremity edema  Anasarca  Hyponatremia due to above  Marked lower extremity edema 2/2 liver disease, third spacing. Echo 4/28 with EF 59%, dilated IVC consistent with clinical status.  - Give additional lasix 20mg IV and reassess tomorrow. Could increased daily lasix dose.  - Treatment as above  - Encouraged leg elevation  - Lymphedema team following, if he is going home with home care might need lymphedema follow-up  Possible Left external iliac vein thrombus    Non-con CT CAP on 4/19 showed Left femoral central venous catheter in place with expansion of the left external iliac vein by low density material, likely thrombus.  CT-angiogram 4/23 showed No evidence for thrombus within the left iliac, arterial or venous systems. The findings on the CTA related to pseudo thrombotic defect from adjacent high-density blood products.  - No role for therapeutic anticoagulation at this point.  - Was on prophylactic heparin, held 4/28 due to drop in hemoglobin  Alcohol use disorder, severe   Hx of alcohol withdrawals     Assessment:      Areas visualized during today's visit: Focused:, Skin folds , and groin    Skin Injury Location: bilateral groins,scrotum, periarea  Last photo: patient declined photo at this time  Skin injury due to: Fungal rash , Incontinence associated dermatitis (IAD), and Moisture associated skin damage (MASD)  Skin history and plan of care:   patchy erythema and flat rash appearance with scattered satelite lesions to bilateral groins and underneath scrotum to perineal areas, skin is intact, Miconazole powder in use. Moisture associated skin breakdown related to incontinence and moisture trapping. Patient wearing brief while up to chair at time of assessment. Bilateral leg swelling with lymph wraps in place. Legs elevated on pillow and chair leg rest in up position  Affected area:      Skin assessment: Erythema, Lesions-satellite, and Rash     Measurements (length x width x depth, in cm) not measured     Color: red     Temperature  warm     Drainage: none .      Color: none      Odor: mild  Pain: denies , none  Pain interventions prior to dressing change: patient tolerated well, no significant pain present , and slow and gentle cares   Treatment goal: Heal , Drainage control, Infection control/prevention, Decrease moisture, Maintain (prevention of deterioration), and Protection  STATUS: initial assessment  Supplies ordered: gathered, at bedside, supplies stored  on unit, and discussed with patient      Wound location: Left thigh      Last photo: 4/30/24  Wound due to: Surgical Wound and Puncture  Wound history/plan of care: Puncture wound related to placement of CVC lines  with mild to moderate serous drainage, erythema and raised red lesions surrounding, unable to determine if fungal rash is present but suspect yeast rash. Drainage may be increased due to lower leg edema and lymph wrap use.   Wound base: 100 % erythema and unable to visualize wound base, puncture , 100 %  rash     Palpation of the wound bed: normal      Drainage: moderate     Description of drainage: serous     Measurements (length x width x depth, in cm): 0.2  x 0.2  x  0.1 cm      Tunneling: N/A     Undermining: N/A  Periwound skin: Intact and Rash      Color: red      Temperature: normal   Odor: none  Pain: mild, tender  Pain interventions prior to dressing change: patient tolerated well, no significant pain present , and slow and gentle cares   Treatment goal: Heal , Drainage control, Infection control/prevention, Maintain (prevention of deterioration), and Protection  STATUS: initial assessment  Supplies ordered: gathered, at bedside, supplies stored on unit, and discussed with patient      Treatment Plan:     Groins wound(s):   Continue with Antifungal powder until rash is resolved.  If skin continues to trap moisture within the skin fold consider use of Intradry moisture wicking fabric as follows. Do not use in combination with antifungal powder as this may interfere with Intradry product efficacy    Interdry(order#[id number]):   1.  Wash skin gently. Pat, do not rub.  2.  With clean scissors, cut enough fabric to cover the affected area, allowing for a minimum of 2 inches to extend beyond the skin fold for moisture evaporation.  3.  Lay a single layer of fabric in the skin fold, placing one edge into the base of the fold. Gently smooth the rest of the fabric over the skin, keeping it flat.  4.  Leave  "at least 2 inches of fabric exposed outside the skin fold.  5.  Secure the fabric in one of several ways: with the skin fold, with a small amount of skin-friendly tape, or tucked under clothing.  6.  Remove the fabric before bathing and reuse it when finished. When removing, gently separate the skin fold and lift away.  Helpful Hints  1. InterDry  can be written on with a ballpoint pen. It may be helpful to label each piece of InterDry  with the date you started using it.  2.  Each piece of InterDry  may be used up to 5 days, depending on fabric soiling, odor, amount of moisture and general skin condition. Replace InterDry  if it becomes soiled with blood, urine or stool.  3.  Do not use creams, ointments, or powders with InterDry  as it may interfere with product efficacy.      Left thigh: Every other day  Cleanse the area with Microcleanse wound cleanser and pat dry.  Apply No sting film barrier to periwound skin.  Apply Adaptic guaze that has been cut to fit just over the size of the wound.  Cover wound with  Mepilex 4x4 foam dressing (# 475911).  Turn and reposition Q 2hrs side to side only.    Pressure Injury Prevention (PIP) Plan:  If patient is declining pressure injury prevention interventions: Explore reason why and address patient's concerns, Educate on pressure injury risk and prevention intervention(s), If patient is still declining, document \"informed refusal\" , and Ensure Care team is aware ( provider, charge nurse, etc)  Mattress: Follow bed algorithm, reassess daily and order specialty mattress, if indicated.  HOB: Maintain at or below 30 degrees, unless contraindicated  Repositioning in bed: Every 1-2 hours , Left/right positioning; avoid supine, Raise foot of bed prior to raising head of bed, to reduce patient sliding down (shear), and Frequent microturns using TAPS wedges, as patient tolerates  Heels: Keep elevated off mattress, Pillows under calves, and Heel lift boots  Protective Dressing: " Sacral Mepilex for prevention (#026540),  especially for the agitated patient   Positioning Equipment: TAPS wedges (#511708) to help maintain 30 degree side lying position   Chair positioning: Chair cushion (#376674) , Assist patient to reposition hourly, and Do NOT use a donut for sitting (this increases pressure to smaller area and creates a higher potential for injury)    If patient has a buttock pressure injury, or high risk for PI use chair cushion or SPS.  Moisture Management: Perineal cleansing /protection: Follow Incontinence Protocol, Avoid brief in bed, Clean and dry skin folds with bathing , Consider InterDry (#229779) between folds, and Moisturize dry skin  Under Devices: Inspect skin under all medical devices during skin inspection , Ensure tubes are stabilized without tension, and Ensure patient is not lying on medical devices or equipment when repositioned  Ask provider to discontinue device when no longer needed.        Orders: Written    RECOMMEND PRIMARY TEAM ORDER: None, at this time  Education provided: importance of repositioning, plan of care, wound progress, Infection prevention , Moisture management, Hygiene, Nail care, and Off-loading pressure  Discussed plan of care with: Patient  WOC nurse follow-up plan: weekly  Notify WOC if wound(s) deteriorate. Or if dressing in unable to maintain drainage  Nursing to notify the Provider(s) and re-consult the WOC Nurse if new skin concern.    DATA:     Current support surface: Standard  Standard gel/foam mattress (IsoFlex, Atmos air, etc)  Containment of urine/stool: Incontinence Protocol, Brief, and Incontinent pad in bed  BMI: Body mass index is 36.08 kg/m .   Active diet order: Orders Placed This Encounter      Regular Diet Adult     Output: I/O last 3 completed shifts:  In: 180 [P.O.:180]  Out: 1400 [Urine:1400]     Labs:   Recent Labs   Lab 04/30/24  0832 04/25/24  0641 04/24/24  0850   ALBUMIN 2.8*   < > 3.1*   HGB 7.6*   < > 8.5*   INR  --   --   2.48*   WBC 7.3   < > 6.8    < > = values in this interval not displayed.     Pressure injury risk assessment:   Sensory Perception: 4-->no impairment  Moisture: 3-->occasionally moist  Activity: 3-->walks occasionally  Mobility: 3-->slightly limited  Nutrition: 3-->adequate  Friction and Shear: 3-->no apparent problem  Marin Score: 19    Ashlee Mcmanus RN, BSN, CWON   Pager no longer is use, please contact through [employer]: [facility] Nurse [place]  Dept. Office Number: [phone]

## 2024-04-30 NOTE — PROGRESS NOTES
Discharge    Patient discharged to home via private transport with parents.  Care plan note: AVS medication list and home medications reviewed with patient. Patient was alert and oriented at discharge. Brought down in wheelchair.    Listed belongings gathered and given to patient (including from security/pharmacy). Yes  Care Plan and Patient education resolved: Yes  Prescriptions if needed, hard copies sent with patient  NA  Medication Bin checked and emptied on discharge Yes  SW/care coordinator/charge RN aware of discharge: Yes

## 2024-04-30 NOTE — PROGRESS NOTES
[facility]  [facility]  Gastroenterology Progress note    Interval History:      Patient still with significant upper and lower extremity swelling.      Vital Signs:      BP (!) 81/49   Pulse 96   Temp 97.5  F (36.4  C) (Oral)   Resp 16   Wt 120.7 kg (266 lb 0.1 oz)   SpO2 100%   BMI 36.08 kg/m    Temp (24hrs), Av.7  F (36.5  C), Min:97.5  F (36.4  C), Max:97.8  F (36.6  C)    Patient Vitals for the past 72 hrs:   Weight   24 0932 120.7 kg (266 lb 0.1 oz)   24 0936 120.7 kg (266 lb)       Intake/Output Summary (Last 24 hours) at 2024 1017  Last data filed at 2024 0933  Gross per 24 hour   Intake --   Output 1500 ml   Net -1500 ml         Constitutional: NAD, comfortable  Cardiovascular: RRR, normal S1, S2   Respiratory: CTAB  Abdomen: soft, non-tender, nondistended    Additional Comments:  ROS, FH, SH: See initial GI consult for details.    Laboratory Data:  Recent Labs   Lab Test 24  0832 24  0837 24  0806 24  0641 24  0850 24  0550 24  0430 24  0410   WBC 7.3 7.7 6.7   < > 6.8   < > 4.1 7.3   HGB 7.6* 8.2* 7.0*   < > 8.5*   < > 6.6* 7.8*   * 102* 103*   < > 100   < > 101* 100   PLT 69* 70* 67*   < > 103*   < > 87* 139*   INR  --   --   --   --  2.48*  --  2.74* 2.57*    < > = values in this interval not displayed.     Recent Labs   Lab Test 24  0832 24  0837 24  0806   * 129* 132*   POTASSIUM 4.8 4.5 4.6   CHLORIDE 99 98 101   CO2    BUN 9.7 8.9 10.5   CR 0.50* 0.48* 0.43*   ANIONGAP 10 9 9   MIGUELINA 9.2 9.6 9.7     Recent Labs   Lab Test 24  0832 24  0837 24  0806 24  1802 24  0458 24  1230 24  0536 24  1044 23  0536 23  1514 23  0724 23  1646 23  1537 23  1424 23  1224 23  1147   ALBUMIN 2.8* 3.0* 2.8*   < > 2.8*  --    < > 3.1*   < > 3.1*   < >  --   --  2.4*   < > 2.3*    BILITOTAL 10.3* 10.9* 9.5*   < > 11.3*  --    < > 7.6*   < > 8.4*   < >  --   --  21.7*   < > 10.4*   DBIL  --  3.50* 3.00*  --   --   --   --  3.59*   < >  --   --   --   --   --   --  7.66*   ALT 52 58 56   < > 46  --    < > 48   < > 49   < >  --   --  69   < > 82*   AST 63* 73* 71*   < > 96*  --    < > 97*   < > 222*   < >  --    < >  --    < > 331*   ALKPHOS 140 145 131   < > 118  --    < > 225*   < > 330*   < >  --   --  182*   < > 200*   PROTEIN  --   --   --   --   --  30*  --   --   --  100*  --  30*  --   --    < >  --    LIPASE  --   --   --   --  56  --   --   --   --   --   --   --   --  237*  --  272*    < > = values in this interval not displayed.         Assessment:  40 yo male with acute alcoholic hepatitis on chronic alcoholic liver disease who presents with severe hepatic encephalopathy requiring intubation.  Methylprednisolone 40 mg daily started on 4/19.   Lille 0.6 showing patient is a null responder.  Steroids stopped.  Continued improvement in mental status.    CTA 4/23 with no evidence of active contrast extravasation/active bleeding.  Specifically no evidence of bleeding into the left lower retroperitoneum adjacent to the left lower retroperitoneal blood products which remain unchanged from 4/19/24.  No evidence for active contrast extravasation or active bleeding in the high-density subcutaneous blood products in the left inguinal region.  No evidence for thrombus within the left iliac, arterial or venous systems.  Bilateral pleural fluid collections with consolidation in the bases and hazy ill-defined infiltrate in the lingula and left lower lobe.  Hepatic cirrhosis with portal hypertension and ascites.  Patient with macrocytic anemia without evidence of GI bleeding.  Suspect secondary to marrow suppression from alcohol.  EGD 4/18 with moderate portal hypertensive gastropathy but no varices or ulcers.  CT C/A/P 4/19 with evidence of blood in the subcutaneous space of the left groin and  the left retroperitoneum.  Patient with episode of epistaxis on 4/27.  Hemoglobin 7.6 today.  Plan:  -  Monitor MELD labs.  -  Continue lactulose 20 g TID, titrate to 3-4 loose BM daily.  -  Continue rifaximin and pantoprazole BID.  -  Furosemide 20 mg daily and spironolactone 50 mg daily.  -  Ultrasound with paracentesis prior to discharge if enough fluid present.  -  Monitor H/H; transfuse prn.  Watch for signs of active bleeding.  -  Outpatient follow up in liver clinic.  [facility] will call to arrange.        Total Time Spent: 25 minutes    GENET Hamlin  [facility]  Office:  [phone] call if needed after 5PM  Cell:  [phone], not available after 5PM at this number

## 2024-04-30 NOTE — PLAN OF CARE
Summary: AMS/Hepatic Encephalopathy/Anemia  DATE & TIME: 4- AM shift  Cognitive Concerns/ Orientation : A&Ox3-4; forgetful at times, flat affect  BEHAVIOR & AGGRESSION TOOL COLOR:  Green  CIWA SCORE: N/A       ABNL VS/O2: VSS on RA, on midodrine for hypotension. Spironolactone and lasix held this morning due to hypotension. BP improved in afternoon, pt asymptomatic  MOBILITY:  SBA GB/walker, walked jordan x 1. Calls appropriately. Up to the chair for meals  PAIN MANAGMENT:  Denies  DIET: Regular, good appetite  BOWEL/BLADDER: continent this shift, dribbles at times. BMx1, pt takes lactulose.   ABNL LAB/BG:  /162; Na 131. AST 63. Bili total 10.3. Bili direct 3.5. Hg 7.6. Platelets 69.   DRAIN/DEVICES:  PIV saline locked  TELEMETRY RHYTHM:  n/a  SKIN: Scattered bruises to abd, redness and blanchable spots on hands. Lymphedema wraps to BLE for 3+ edema, removed at 2 pm per therapy request. Jaundiced. Nancy area excoriation, powder applied. WOC RN to assess today  TESTS/PROCEDURES: n/a  D/C DATE:plan to discharge later today, family is picking up. Awaiting meds from pharmacy and OT to rewrap his legs  OTHER IMPORTANT INFO:

## 2024-04-30 NOTE — CONSULTS
Initial Psychiatric Consult   Consult date: April 30, 2024         Reason for Consult, requesting source:      Alcohol use disorder.  Recently lost her brother due to alcohol, address grief and depression    Requesting source:     Labs and imaging reviewed. Patient seen and evaluated by Hannah Quezada MD          HPI:     This is a 39-year-old male with a history of very severe alcohol use disorder, decompensated cirrhosis.  I had seen the patient in March, under similar circumstances related to his drinking.  The patient had indicated he was going to go to outpatient treatment at that point, and then, I believe ended up discharging when I was off service.  Because he was willing to get treatment, there really was not enough to petition for commitment.    For this hospitalization, the patient was admitted on 4/17/2024 with acute hepatic encephalopathy and jaundice.  On admission, the patient was found to have an elevated ammonia of 146 with a bilirubin 11.4.  He was intubated on 418 for airway protection due to severe hepatic encephalopathy.  EGD showed no acute bleed.  Patient was treated with lactulose, rifaximin, and ceftriaxone for empiric SBP coverage as well as steroids for alcoholic hepatitis.  Patient improved, was extubated on 4/21, and transferred out of the ICU on 4/23.  In regards to patient's alcoholism, he had minimized his drinking.  Family found a half empty bottle of whiskey in his apartment.    I did see the patient today, and also discussed with social work services.  I reviewed the chart again.  Patient is scheduled for discharge home today with home health services.  He actually was on the phone with his therapist when I went in to see him the first time, and came back after the session ended at noon.    Patient again minimized his alcohol use, and stated that he really did not have any cravings at home.  He stated that right now what he is really trying to do is get his body back in  balance.  He appears weak, and also is not tracking all that well.        Past Psychiatric History:     Patient does have a history of psychiatric admission in 2004, when he had suicidal ideation. Is admitted to [facility] after telling police at [place] that he was suicidal. He was apparently walking on the edge of the seventh floor ramp at the time when he was brought in. Patient was diagnosed with dysthymia with social anxiety disorder. 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         Substance Use and History:     Patient has very severe alcohol use disorder with life-threatening medical sequelae.  He has been actively drinking prior to coming in for this hospitalization        Past Medical History:   PAST MEDICAL HISTORY:   Past Medical History:   Diagnosis Date    Alcohol abuse     Hypertension     Substance abuse (H)        PAST SURGICAL HISTORY:   Past Surgical History:   Procedure Laterality Date    COLONOSCOPY      EGD    ESOPHAGOSCOPY, GASTROSCOPY, DUODENOSCOPY (EGD), COMBINED N/A 3/16/2024    Procedure: ESOPHAGOGASTRODUODENOSCOPY;  Surgeon: Cahto Juan MD;  Location:  OR    ESOPHAGOSCOPY, GASTROSCOPY, DUODENOSCOPY (EGD), COMBINED N/A 4/18/2024    Procedure: Esophagoscopy, gastroscopy, duodenoscopy (EGD), combined;  Surgeon: Hiren Vogt DO;  Location:  GI    ORTHOPEDIC SURGERY      wrist  surgery             Family History:   FAMILY HISTORY: No family history on file.    Family Psychiatric History:         Social History:   SOCIAL HISTORY:   Social History     Tobacco Use    Smoking status: Never    Smokeless tobacco: Not on file   Substance Use Topics    Alcohol use: Yes     Comment: 1.75L per day            Physical ROS:   The 10 point Review of Systems is negative other than noted in the HPI or here.           Medications:     Current Facility-Administered Medications   Medication Dose Route Frequency Provider Last Rate Last Admin     ciprofloxacin (CIPRO) tablet 500 mg  500 mg Oral Q24H Maggy Rangel MD   500 mg at 04/29/24 1653    folic acid (FOLVITE) tablet 1 mg  1 mg Oral Daily Maggy Rangel MD   1 mg at 04/30/24 0856    furosemide (LASIX) tablet 20 mg  20 mg Oral Daily Kristi Samano PA-C   20 mg at 04/29/24 0800    [Held by provider] heparin ANTICOAGULANT injection 5,000 Units  5,000 Units Subcutaneous Q12H Maggy Rangel MD   5,000 Units at 04/28/24 0951    insulin aspart (NovoLOG) injection (RAPID ACTING)  1-7 Units Subcutaneous TID AC Bernard Quiroz MD        insulin aspart (NovoLOG) injection (RAPID ACTING)  1-5 Units Subcutaneous At Bedtime Bernard Quiroz MD        lactulose (CHRONULAC) solution 20 g  20 g Oral or Feeding Tube BID Maggy Rangel MD   20 g at 04/30/24 0857    melatonin tablet 3 mg  3 mg Oral QPM Maggy Rangel MD   3 mg at 04/29/24 2104    miconazole (MICATIN) 2 % powder   Topical BID Bernard Quiroz MD   Given at 04/30/24 0858    midodrine (PROAMATINE) tablet 15 mg  15 mg Oral TID w/meals Maggy Rangel MD   15 mg at 04/30/24 0856    multivitamin w/minerals (THERA-VIT-M) tablet 1 tablet  1 tablet Oral Daily Maggy Rangel MD   1 tablet at 04/30/24 0856    mupirocin (BACTROBAN) 2 % ointment   Topical TID Maggy Rangel MD   Given at 04/27/24 2200    pantoprazole (PROTONIX) 2 mg/mL suspension 40 mg  40 mg Oral or Feeding Tube BID Maggy Rangel MD        Or    pantoprazole (PROTONIX) EC tablet 40 mg  40 mg Oral BID Maggy Rangel MD   40 mg at 04/30/24 0856    rifaximin (XIFAXAN) tablet 550 mg  550 mg Oral or Feeding Tube BID Maggy Rangel MD   550 mg at 04/30/24 0856    sodium chloride (PF) 0.9% PF flush 3 mL  3 mL Intracatheter Q8H Maggy Rangel MD   3 mL at 04/28/24 2023    spironolactone (ALDACTONE) tablet 50 mg  50 mg Oral Daily Kristi Samano PA-C   50 mg at 04/29/24 0800    thiamine (B-1) tablet 100 mg  100 mg Oral Daily  Maggy Rangel MD   100 mg at 04/30/24 0856    Or    thiamine (B-1) injection 100 mg  100 mg Intravenous Daily Maggy Rangel MD   100 mg at 04/29/24 0754              Allergies:     Allergies   Allergen Reactions    Excedrin Extra Strength [Aspirin-Acetaminophen-Caffeine]      puffy eyes and dry throat a few years ago. Tolerates ibuprofen and acetaminophen, but avoids aspirin    Nsaids Angioedema     Patient reports having a reaction of puffy eyes and dry throat a few years ago, but he has taken Advil in 2023 and did not react.          Labs and Imaging:     Recent Results (from the past 48 hour(s))   Glucose by meter    Collection Time: 04/28/24 11:44 AM   Result Value Ref Range    GLUCOSE BY METER POCT 151 (H) 70 - 99 mg/dL   Glucose by meter    Collection Time: 04/28/24  5:32 PM   Result Value Ref Range    GLUCOSE BY METER POCT 131 (H) 70 - 99 mg/dL   Glucose by meter    Collection Time: 04/28/24  8:19 PM   Result Value Ref Range    GLUCOSE BY METER POCT 172 (H) 70 - 99 mg/dL   Glucose by meter    Collection Time: 04/28/24 11:53 PM   Result Value Ref Range    GLUCOSE BY METER POCT 157 (H) 70 - 99 mg/dL   Glucose by meter    Collection Time: 04/29/24  3:50 AM   Result Value Ref Range    GLUCOSE BY METER POCT 118 (H) 70 - 99 mg/dL   Glucose by meter    Collection Time: 04/29/24  7:31 AM   Result Value Ref Range    GLUCOSE BY METER POCT 111 (H) 70 - 99 mg/dL   Basic metabolic panel    Collection Time: 04/29/24  8:37 AM   Result Value Ref Range    Sodium 129 (L) 135 - 145 mmol/L    Potassium 4.5 3.4 - 5.3 mmol/L    Chloride 98 98 - 107 mmol/L    Carbon Dioxide (CO2) 22 22 - 29 mmol/L    Anion Gap 9 7 - 15 mmol/L    Urea Nitrogen 8.9 6.0 - 20.0 mg/dL    Creatinine 0.48 (L) 0.67 - 1.17 mg/dL    GFR Estimate >90 >60 mL/min/1.73m2    Calcium 9.6 8.6 - 10.0 mg/dL    Glucose 142 (H) 70 - 99 mg/dL   CBC with platelets    Collection Time: 04/29/24  8:37 AM   Result Value Ref Range    WBC Count 7.7 4.0 - 11.0  10e3/uL    RBC Count 2.33 (L) 4.40 - 5.90 10e6/uL    Hemoglobin 8.2 (L) 13.3 - 17.7 g/dL    Hematocrit 23.7 (L) 40.0 - 53.0 %     (H) 78 - 100 fL    MCH 35.2 (H) 26.5 - 33.0 pg    MCHC 34.6 31.5 - 36.5 g/dL    RDW      Platelet Count 70 (L) 150 - 450 10e3/uL   Hepatic panel    Collection Time: 04/29/24  8:37 AM   Result Value Ref Range    Protein Total 6.0 (L) 6.4 - 8.3 g/dL    Albumin 3.0 (L) 3.5 - 5.2 g/dL    Bilirubin Total 10.9 (H) <=1.2 mg/dL    Alkaline Phosphatase 145 40 - 150 U/L    AST 73 (H) 0 - 45 U/L    ALT 58 0 - 70 U/L    Bilirubin Direct 3.50 (H) 0.00 - 0.30 mg/dL   Nt probnp inpatient    Collection Time: 04/29/24  8:37 AM   Result Value Ref Range    N terminal Pro BNP Inpatient 622 (H) 0 - 450 pg/mL   Echocardiogram Limited    Collection Time: 04/29/24  8:55 AM   Result Value Ref Range    Biplane LVEF 59%    Glucose by meter    Collection Time: 04/29/24 11:11 AM   Result Value Ref Range    GLUCOSE BY METER POCT 180 (H) 70 - 99 mg/dL   Glucose by meter    Collection Time: 04/29/24  5:09 PM   Result Value Ref Range    GLUCOSE BY METER POCT 125 (H) 70 - 99 mg/dL   Glucose by meter    Collection Time: 04/29/24  9:11 PM   Result Value Ref Range    GLUCOSE BY METER POCT 150 (H) 70 - 99 mg/dL   Glucose by meter    Collection Time: 04/30/24  2:20 AM   Result Value Ref Range    GLUCOSE BY METER POCT 110 (H) 70 - 99 mg/dL   Glucose by meter    Collection Time: 04/30/24  8:01 AM   Result Value Ref Range    GLUCOSE BY METER POCT 101 (H) 70 - 99 mg/dL   Comprehensive metabolic panel    Collection Time: 04/30/24  8:32 AM   Result Value Ref Range    Sodium 131 (L) 135 - 145 mmol/L    Potassium 4.8 3.4 - 5.3 mmol/L    Carbon Dioxide (CO2) 22 22 - 29 mmol/L    Anion Gap 10 7 - 15 mmol/L    Urea Nitrogen 9.7 6.0 - 20.0 mg/dL    Creatinine 0.50 (L) 0.67 - 1.17 mg/dL    GFR Estimate >90 >60 mL/min/1.73m2    Calcium 9.2 8.6 - 10.0 mg/dL    Chloride 99 98 - 107 mmol/L    Glucose 142 (H) 70 - 99 mg/dL    Alkaline  Phosphatase 140 40 - 150 U/L    AST 63 (H) 0 - 45 U/L    ALT 52 0 - 70 U/L    Protein Total 5.6 (L) 6.4 - 8.3 g/dL    Albumin 2.8 (L) 3.5 - 5.2 g/dL    Bilirubin Total 10.3 (H) <=1.2 mg/dL   CBC with platelets    Collection Time: 04/30/24  8:32 AM   Result Value Ref Range    WBC Count 7.3 4.0 - 11.0 10e3/uL    RBC Count 2.17 (L) 4.40 - 5.90 10e6/uL    Hemoglobin 7.6 (L) 13.3 - 17.7 g/dL    Hematocrit 22.2 (L) 40.0 - 53.0 %     (H) 78 - 100 fL    MCH 35.0 (H) 26.5 - 33.0 pg    MCHC 34.2 31.5 - 36.5 g/dL    RDW      Platelet Count 69 (L) 150 - 450 10e3/uL          Physical and Psychiatric Examination:     BP (!) 81/49   Pulse 96   Temp 97.5  F (36.4  C) (Oral)   Resp 16   Wt 120.7 kg (266 lb 0.1 oz)   SpO2 100%   BMI 36.08 kg/m    Weight is 266 lbs .11 oz  Body mass index is 36.08 kg/m .    Physical Exam:  I have reviewed the physical exam as documented by by the medical team and agree with findings and assessment and have no additional findings to add at this time.    Mental Status Exam:    Appearance:  Patient with significant lymphedema both lower extremities, pale appearing, and fatigued  Attitude:  evasive and slightly uncooperative  Eye Contact:  poor   Mood:   Fine, I just do not feel good  Affect:  mood congruent  Speech:  increased speech latency  Language: Fluent in english   Psychomotor Behavior:  physical retardation  Thought Process:   [place]  Associations:  no loose associations  Thought Content:  no evidence of suicidal ideation or homicidal ideation, no evidence of psychotic thought, no auditory hallucinations present, and no visual hallucinations present  Insight:  fair  Judgement:  fair  Oriented to:  time, person, and place  Attention Span and Concentration:  fair  Recent and Remote Memory:  intact  Fund of Knowledge: Appropriate   Gait and Station:                DSM-5 Diagnosis:     Alcohol use disorder, severe, dependence    Alcoholic cirrhosis    Epistaxis    Hepatic  encephalopathy          Assessment:     This is a 39-year-old male with very severe alcohol use disorder.  He has significant medical sequelae of his use.  I saw him during his last hospitalization, and deferred on commitment, because he was planning on's outpatient substance use treatment with a substance use counselor at that point.  He completed a substance use evaluation with a counselor during previous hospitalization, and had planned on following up with recommendations to go to [facility].    Patient is downplaying his use of alcohol now, stating that he does not really have cravings, and heavy drinking is not why he came into the hospital.  He was engaging with a therapist when I came into the room, and completed his session with her.    I did discuss with Dr. Garibay.  Also with the patient.  I am not sure what a petition for commitment would accomplish for this patient at this point in time.  He wants to be sober, and is engaging in therapy.  He could not go to substance abuse treatment right now if he wanted to.  Petitioning for commitment does not mean that we can prevent him from actually drinking.  He lives with his parents, and that is probably the best determined that there is.  Despite this, he finds a way to continue to use.          Summary of Recommendations:     1.  I will not go forward with the petition for chemical dependency commitment at this point in time.  I do not know what it would accomplish, since the patient could not be forced to go to treatment since he cannot physically tolerate it.  I also do not think he would be able to get much out of it because he is still encephalopathic.  Even after getting out of treatment, there is nothing to prevent him from drinking if he does not want to quit.  He is engaged in therapy, which is what he is willing and able to do right now.    2.  Patient is okay to discharge from a psychiatric standpoint when medically clear.  I will sign  off.      Hannah Quezada MD  Consult/Liaison Psychiatry and Addiction Medicine  [facility]

## 2024-04-30 NOTE — PLAN OF CARE
DATE & TIME: 4/29/24 1900-0730       Cognitive Concerns/ Orientation : A&O x4; forgetful at times, flat affect  BEHAVIOR & AGGRESSION TOOL COLOR:  Green  ABNL VS/O2: VSS on RA  MOBILITY:  SBA GB/W. Walked in halls x1  PAIN MANAGMENT:  Denies  DIET: Regular  BOWEL/BLADDER: Dribble incontinence  ABNL LAB/BG: Hgb 8.2, Plt 70,  & 110  DRAIN/DEVICES: R PIV SL  SKIN: Scattered bruises to abd. Redness and blanchable spots on hands. Coccyx blanchable. Lymphedema wraps to BLE for 3-4+ edema. Jaundiced  TESTS/PROCEDURES: None  D/C DATE: Plan to discharge home w/ 24/7 home care. 1-2 days  OTHER IMPORTANT INFO: SNOWDEN improving

## 2024-04-30 NOTE — DISCHARGE SUMMARY
[facility]    Hospitalist Discharge Summary       Date of Admission:  4/17/2024  Date of Discharge:  4/30/2024  Discharging Provider: Bernard Quiroz MD      Discharge Diagnoses   Acute on chronic anemia, probably blood loss  Retroperitoneal hematoma  Acute alcoholic hepatitis  Acute hepatic encephalopathy  Severe anasarca, BLE lymphedema  Shock on 4/18, unclear etiology    Follow-ups Needed After Discharge   Follow-up Appointments     Follow-up and recommended labs and tests       Follow up with primary care provider, Blake Canchola, within 7 days for   hospital follow- up. Check CMP and CBC in one week  - Follow up with GI as scheduled          Hospital Course   Crispin Ham is a 39 year old male with PMH alcohol use disorder, chronic decompensated cirrhosis with hx of SBP, HFpEF, HTN, pleural effusions previously requiring drainage, and small esophageal varices who was admitted on 4/17/2024 with acute hepatic encephalopathy and jaundice. He does have known history of cirrhosis and has seen GI in the past and history of SBP in the past also and EGD in March 2024 showed mild esophageal varices  On admission he was found to have elevated ammonia to 146, with bilirubin of 11.4   Intubated 4/18 for intubation for airway protection due to severe hepatic encephalopathy. EGD noted no acute bleed, suspect possibly epistaxis as cause of worsening encephalopathy and anemia. He was treated with lactulose, rifaximin and ceftriaxone (empiric SBP coverage), as well as methylprednisolone for alcoholic hepatitis. Ultimately ammonia levels normalized and mental status improved. He was successfully extubated 4/21, transferred out of ICU on 4/23.   Hemoglobin remained stable, and LFTs stabilized 4/29 to 4/30. Mental status improved and near baseline at discharge. Patient stable to discharge home versus TCU. He was declined from all home care agencies contacted, he was offered TCU however he declined  "TCU and will be discharging home with outpatient therapy. See below for further assessment and plan.    Acute on chronic anemia, possibly due to epistaxis or retroperitoneal bleed   Patient has history of portal hypertensive gastropathy, esophageal varices, and duodenal ulcers (2023).  No evidence of GI bleeding this admission. EGD on 4/18 showed moderate portal hypertensive gastropathy, but no varices or ulcers, no evidence of acute GI bleed. CT CAP on 4/19 did show evidence of blood in the subcutaneous space of the left groin and the left retroperitoneum (related to L femoral CVC placement while in ICU) .  Hemoglobin dropped to 6.6 on 4/23, improved with 1u pRBC. CT angiogram 4/23 showed \"no evidence for active contrast extravasation or bleeding into the left lower retroperitoneum adjacent to to the left lower retroperitoneal high-density blood products, which remain unchanged since 04/19/2024.\"  Nursing noted red streaks of blood in stool on 4/27, patient also had nose bleed that spontaneously resolved, hemoglobin dropped from 8.2 to 7 on 4/28, received 1u pRBC (total 2 units this admission). Hemoglobin stable 8.2 on 4/29.  - Recheck CBC in one week  - If Hgb downtrends consider repeat CT abdomen to evaluate retroperitoneal bleed  - Check CMP and CBC in one week    Epistaxis, resolved  ENT consulted for epistaxis. Recommended mupirocin ointment intranasally. Epistaxis could be contributing to anemia, hepatic encephalopathy, elevated bilirubin.  - Continue mupirocin BID to affected nose for 1-2 more weeks     Alcoholic cirrhosis/alcoholic hepatitis  Hepatic encephalopathy/hyperammonemia, improved  Coagulopathy of liver disease--anemia, thrombocytopenia  Of note Pt was treated with empirically for SBP with ceftriaxone, however abdominal US negative for ascites. He was noted to be nonresponder to steroids which are now stopped. Patient was followed by [facility].  They will arrange outpatient follow-up.  - " Continue lactulose 20g BID  - Continue rifaximin 550mg BID   - Lasix 20mg oral daily  - PTA prophylactic ciprofloxacin.  - Outpatient PT/OT  - Check CMP and CBC in one week    Alcohol use disorder, severe   Hx of alcohol withdrawals   Discussed at length. Pt reports only drinking one beer prior to admission, but family noted finding a half empty bottle of whisky in his apartment. Pt has declined group or inpatient treatment in the past. Pt unfortunately just lost his brother to complications of alcohol use. Discussed high risk of mortality if he continues to drink. Strongly recommended intensive inpatient treatment.   - Encourage ongoing cessation  - Appreciate Psychiatry consult 4/30, unfortunately patient continues to have poor insight and is not physically fit to undergo chemical dependency treatment at this time    Bilateral lower extremity edema  Anasarca  Hyponatremia due to above  Marked lower extremity edema 2/2 liver disease, third spacing. Echo 4/28 with EF 59%, dilated IVC consistent with physical exam.  - Continue lasix 20mg daily--note low normal blood pressures so this dose could not be increased  - Encouraged leg elevation  - Outpatient occupational therapy/lymphedema management     Chronic hypotension secondary to liver disease  - Continue Midodrine 15mg TID     Negative testing for left external iliac vein thrombus   Non-con CT CAP on 4/19 showed Left femoral central venous catheter in place with expansion of the left external iliac vein by low density material, likely thrombus.  CT-angiogram 4/23 showed No evidence for thrombus within the left iliac, arterial or venous systems. The findings on the CTA related to pseudo thrombotic defect from adjacent high-density blood products.  - No role for therapeutic anticoagulation at this point.      Clinically Significant Risk Factors         # Hyponatremia: Lowest Na = 129 mmol/L in last 2 days, will monitor as appropriate   # Hypercalcemia: corrected  calcium is >10.1, will monitor as appropriate    # Hypoalbuminemia: Lowest albumin = 2.7 g/dL at 4/26/2024  8:19 AM, will monitor as appropriate   # Thrombocytopenia: Lowest platelets = 69 in last 2 days, will monitor for bleeding          # Obesity: Estimated body mass index is 36.08 kg/m  as calculated from the following:    Height as of 4/9/24: 1.829 m (6').    Weight as of this encounter: 120.7 kg (266 lb 0.1 oz).   # Moderate Malnutrition: based on nutrition assessment      # Financial/Environmental Concerns:             Consultations This Hospital Stay   GASTROENTEROLOGY IP CONSULT  PHYSICAL THERAPY ADULT IP CONSULT  OCCUPATIONAL THERAPY ADULT IP CONSULT  CARE MANAGEMENT / SOCIAL WORK IP CONSULT  INTENSIVIST IP CONSULT  VASCULAR ACCESS ADULT IP CONSULT  PHARMACY TO DOSE VANCO  NEUROSURGERY IP CONSULT  VASCULAR SURGERY IP CONSULT  VASCULAR SURGERY IP CONSULT  NEPHROLOGY IP CONSULT  NUTRITION SERVICES ADULT IP CONSULT  PHARMACY IP CONSULT  ENT IP CONSULT  CHEMICAL DEPENDENCY IP CONSULT  LYMPHEDEMA THERAPY IP CONSULT  SOCIAL WORK IP CONSULT  SOCIAL WORK IP CONSULT  PSYCHIATRY IP CONSULT  WOUND OSTOMY CONTINENCE NURSE  IP CONSULT    Code Status   Full Code    Time Spent on this Encounter   I, Bernard Quiroz, personally saw the patient today and spent approximately 50 minutes discharging this patient.       Bernard Quiroz MD  [facility]  ______________________________________________________________________    Physical Exam   Vital Signs: Temp: 97.5  F (36.4  C) Temp src: Oral BP: 108/64 Pulse: 96   Resp: 16 SpO2: 100 % O2 Device: None (Room air)    Weight: 266 lbs .11 oz    Constitutional: Jaundiced male appears chronically ill  HEENT: Scleral icterus  Cardiovascular: RRR, normal S1/2, systolic murmur noted  Respiratory: CTAB, no wheezing or crackles  Vascular: UE and LE anasarca, severe BLE lymphedema  GI: Normoactive bowel sounds, nontender--no significant ascites noted. Note  slightly draining paracentesis site LLQ--nurse to glue/dress  Skin/Integumen: No rashes, jaundiced  Neuro/Psych: Slightly odd affect, A&Ox3, moves all extremities       Primary Care Physician   Blake Canchola    Discharge Disposition   Discharged to home  Condition at discharge: Stable    Significant Results and Procedures   Most Recent 3 CBC's:  Recent Labs   Lab Test 04/30/24  0832 04/29/24  0837 04/28/24  0806   WBC 7.3 7.7 6.7   HGB 7.6* 8.2* 7.0*   * 102* 103*   PLT 69* 70* 67*     Most Recent 3 BMP's:  Recent Labs   Lab Test 04/30/24  1124 04/30/24  0832 04/30/24  0801 04/29/24  1111 04/29/24  0837 04/28/24  0916 04/28/24  0806   NA  --  131*  --   --  129*  --  132*   POTASSIUM  --  4.8  --   --  4.5  --  4.6   CHLORIDE  --  99  --   --  98  --  101   CO2  --  22  --   --  22  --  22   BUN  --  9.7  --   --  8.9  --  10.5   CR  --  0.50*  --   --  0.48*  --  0.43*   ANIONGAP  --  10  --   --  9  --  9   MIGUELNIA  --  9.2  --   --  9.6  --  9.7   * 142* 101*   < > 142*   < > 127*    < > = values in this interval not displayed.     Most Recent 2 LFT's:  Recent Labs   Lab Test 04/30/24  0832 04/29/24  0837   AST 63* 73*   ALT 52 58   ALKPHOS 140 145   BILITOTAL 10.3* 10.9*     Most Recent 3 INR's:  Recent Labs   Lab Test 04/24/24  0850 04/23/24  0430 04/22/24  0410   INR 2.48* 2.74* 2.57*     Most Recent 3 Troponin's:No lab results found.  Most Recent 3 BNP's:  Recent Labs   Lab Test 04/29/24  0837 11/17/23  1231   NTBNPI 622* 93     Most Recent D-dimer:No lab results found.  Most Recent Cholesterol Panel:No lab results found.    Results for orders placed or performed during the hospital encounter of 04/17/24   CT Head w/o Contrast    Narrative    EXAM: CT HEAD W/O CONTRAST  LOCATION: [facility]  DATE: 4/17/2024    INDICATION: Altered mental status  COMPARISON: None.  TECHNIQUE: Routine CT Head without IV contrast. Multiplanar reformats. Dose reduction techniques were  used.    FINDINGS:  INTRACRANIAL CONTENTS: No intracranial hemorrhage, extraaxial collection, or mass effect.  No CT evidence of acute infarct. Normal parenchymal attenuation. Mild generalized volume loss. No hydrocephalus.     VISUALIZED ORBITS/SINUSES/MASTOIDS: No intraorbital abnormality. No significant paranasal sinus mucosal disease. No middle ear or mastoid effusion.    BONES/SOFT TISSUES: No acute abnormality.      Impression    IMPRESSION:  1.  No acute intracranial process.  2.  Mild global parenchymal volume loss, advanced for age.   XR Chest Port 1 View    Narrative    EXAM: XR CHEST PORT 1 VIEW  LOCATION: [facility]  DATE: 4/17/2024    INDICATION: encephalopathy  COMPARISON: Chest radiograph 11/19/2023.      Impression    IMPRESSION:     Lung volumes are low. No focal airspace opacities, pleural effusions, or pneumothorax. Pulmonary vascularity is within normal limits. Stable cardiomediastinal silhouette.   US Abdomen Limited    Narrative    EXAM: US ABDOMEN LIMITED  LOCATION: [facility]  DATE: 4/17/2024    INDICATION: Evaluate for ascites.  COMPARISON: Abdominal ultrasound 03/19/2024.  TECHNIQUE: Ultrasound was performed in all 4 quadrants of the abdomen to evaluate for ascites.    FINDINGS:    No ascites is visualized in the right upper, right lower, left upper, or left lower quadrants.       Impression    IMPRESSION:    1.  No ascites.   CT Chest Abdomen Pelvis w/o Contrast    Narrative    EXAM: CT CHEST ABDOMEN PELVIS W/O CONTRAST  LOCATION: [facility]  DATE: 4/19/2024    INDICATION: Sepsis  COMPARISON: 03/18/2024  TECHNIQUE: CT scan of the chest, abdomen, and pelvis was performed without IV contrast. Multiplanar reformats were obtained. Dose reduction techniques were used.   CONTRAST: None.    FINDINGS:   LUNGS AND PLEURA: Small bilateral pleural effusions are present, increased on the right and new on the left. Small  consolidations in the bilateral lower lobes may represent atelectasis. However, there are tiny patchy groundglass opacities in the superior   segment of the left lower lobe and left upper lobe on series 4 image 89, new from prior study. However, previous groundglass opacities in the right lung have resolved. No pneumothorax.    MEDIASTINUM/AXILLAE: No lymphadenopathy. Heart size is normal without significant pericardial effusion. Cardiac blood pool density is mildly decreased. An endotracheal tube terminates in the midthoracic trachea. Nasogastric tube terminates in the body of   the stomach.    CORONARY ARTERY CALCIFICATION: None.    HEPATOBILIARY: Equivocal surface nodularity of the liver, compatible with cirrhosis. Small volume free fluid in the abdomen and pelvis. No free air. No definite hepatic lesions. Gallbladder is severely distended without definite wall thickening.    PANCREAS: Normal.    SPLEEN: Normal.    ADRENAL GLANDS: Normal.    KIDNEYS/BLADDER: No urinary stones or hydronephrosis. A 1.3 cm cystic lesion in the midpole the right kidney statistically represents a cyst, visually benign and does not require follow-up. Urinary bladder is decompressed by a Bosch catheter.    BOWEL: A surgical clip is seen in the gastric cardia. Mild wall thickening of the ascending and proximal transverse colon noted. No bowel obstruction. Appendix is normal. A rectal tube is in place.    LYMPH NODES: Multiple mildly prominent retroperitoneal lymph nodes again seen, likely reactive.    VASCULATURE: Aortic no abdominal aortic aneurysm. Intra-abdominal venous collateral vessels present, including recanalized umbilical vein. A left femoral central venous catheter is present. There is low density expansion of the left external iliac vein.   Nonspecific low density also seen in the adjacent left external iliac artery.    PELVIC ORGANS: Normal.    MUSCULOSKELETAL: High density blood is seen in the subcutaneous space of the left  groin and extending into the left retroperitoneum. Extensive body wall edema present. Multilevel mild and moderate degenerative height loss seen in the thoracolumbar spine,   similar to prior study. No acute fracture or suspicious osseous lesions. A small hiatal hernia is also present containing fluid.        Impression    IMPRESSION:    1.  Small consolidations in the lower lobes are favored to represent atelectasis. There are tiny patchy round glass opacities in the superior segment of the left lower lobe and left upper lobe, which may be mild infectious in nature, however unlikely to   be cause of sepsis. Previous groundglass opacities in the right lung have resolved.    2.  Left femoral central venous catheter in place with expansion of the left external iliac vein by low density material, likely thrombus. There is also low density content in the adjacent left external iliac artery, also suspicious for thrombus.   Recommend further evaluation with CT angiography in the arterial and portal venous phases per GI bleeding protocol. This was relayed to Dr. Tinoco at 4:08 AM on 04/19/2024.    3.  Blood in the subcutaneous space of the left groin and in the left retroperitoneum. These areas may also be further evaluated for presence of active hemorrhage on CT angiography.    4.  Mild anemia.    5.  Marked distention of the gallbladder, nonspecific. No definite CT signs of acute cholecystitis.    6.  Cirrhosis with stigmata of portal hypertension including intra-abdominal venous collateral vessels, recanalized umbilical vein, and small volume ascites. Mild wall thickening of the ascending and proximal transverse colon likely reflects portal   colopathy.    7.  Small bilateral pleural effusions and diffuse body wall edema.   XR Chest Port 1 View    Narrative    XR CHEST PORT 1 VIEW 4/18/2024 3:58 PM    HISTORY: ETT placement    COMPARISON: 4/17/2024      Impression    IMPRESSION: The endotracheal tube tip is 3.7 cm above  the mya. The  enteric tube courses into the stomach. Slight increase in mild  bibasilar patchy opacities, either due to atelectasis or pneumonia. No  pleural effusion or pneumothorax.    ROXANA CAMARA MD         SYSTEM ID:  [id number]   XR Abdomen Port 1 View    Narrative    XR ABDOMEN PORT 1 VIEW   4/18/2024 3:58 PM     HISTORY: OG tube placement    COMPARISON: None.      Impression    IMPRESSION: The enteric tube tip and sidehole is in the distal  stomach, in good position.    ROXANA CAMARA MD         SYSTEM ID:  [id number]   US Aorta/Ivc/Iliac Duplex Complete    Narrative    ULTRASOUND AORTA/IVC/ILIAC DUPLEX COMPLETE  4/19/2024 9:23 AM     HISTORY:  Concerns for arterial and venous thrombus in the left  external iliac artery/vein.    COMPARISON: CT of the abdomen and pelvis without contrast dated  4/19/2024.    FINDINGS: Color Doppler and spectral waveform analysis performed.    The visualized abdominal aorta, left common iliac artery, left  external iliac artery and left common femoral artery are patent  without evidence for thrombus. Monophasic waveforms are noted  throughout.    The IVC, left common iliac vein, external iliac vein, and common  femoral vein are patent. There is a central venous catheter in the  left common femoral vein. No definite thrombus identified.      Impression    IMPRESSION: No definite thrombus in the left iliac veins and arteries  as above on ultrasound.     OBI PARSON MD         SYSTEM ID:  [id number]   XR Abdomen Port 1 View    Narrative    EXAM: XR ABDOMEN PORT 1 VIEW  LOCATION: [facility]  DATE/TIME: 4/21/2024 1:44 PM CDT    INDICATION: Enteric tube placement  COMPARISON: 04/18/2024      Impression    IMPRESSION:   Feeding tube terminates at the level of the gastric body. The visualized bowel is not pathologically dilated. Left femoral approach central line terminates over the left sacroiliac joint.    CTA Abdomen Pelvis with Contrast    Narrative     EXAM: CTA ABDOMEN PELVIS WITH CONTRAST  LOCATION: [facility]  DATE: 4/23/2024    INDICATION: CT scan on 4/19 was read as concerning for thrombus in left external iliac artery and vein.  Ultrasound was negative, but radiology recommended followup with CTA.  COMPARISON: CT 04/19/2024, ultrasound 04/19/2024.    TECHNIQUE: CT angiogram abdomen pelvis during arterial phase of injection of IV contrast. 2D and 3D MIP reconstructions were performed by the CT technologist. Dose reduction techniques were used.  CONTRAST: 135mL isovue 370    FINDINGS:  ANGIOGRAM ABDOMEN/PELVIS: No evidence for thrombus within the left external iliac artery or vein. Findings on the prior CT represent attenuation artifact from the surrounding high-density blood products. Normal caliber lower thoracic and abdominal aorta.   Patent celiac and splenic artery. Patent common hepatic. Patent SMA and UNIQUE. Patent bilateral renal arteries. Patent bilateral common, external and internal iliac arteries. Patent bilateral common femoral, superficial femoral and deep femoral arteries.   Femoral and iliac venous structures remain widely patent. Widely patent IVC. Patent bilateral renal veins. Patent portal vein and SMV. Patent splenic vein. Multiple upper abdominal collaterals related to portal hypertension. Recannulization of the   umbilical vein. No evidence for active contrast extravasation or active bleeding. Noncontrast imaging demonstrates persistent high-density fluid in the inferior aspect the left retroperitoneum adjacent to the iliac veins (the pseudo thrombus artifact is   present on noncontrast imaging of series 7, image 372). This is most compatible with retroperitoneal blood products along the iliac venous system and arterial system adjacent to the iliacus musculature and extending near the obturator internus. Small   amount of high-density blood products in the subcutaneous tissues adjacent. No evidence for significant  active arterial contrast extravasation, therefore this is likely a venous bleed from a retroperitoneal collateral. This overall appears similar to the   prior study.    LOWER CHEST: Moderate right basilar and minimal left basilar pleural fluid. Atelectasis both lung bases. Hazy infiltrate left upper lobe posteriorly compatible with pneumonia. Mild hazy infiltrate in the aerated portions of the left lower lobe which can   be seen with pneumonia. Mild cardiac enlargement.    HEPATOBILIARY: Hepatic cirrhosis. No evidence for focal arterial enhancing mass. Multiple collaterals in the kavin hepatis. Distended gallbladder. No calcified gallstones. No biliary dilatation.    PANCREAS: Normal.    SPLEEN: Enlarged at 14.6 x 9.6 x 16.0 cm.    ADRENAL GLANDS: Unremarkable.    KIDNEYS/BLADDER: Symmetric renal enhancement. Right renal cyst, no follow-up. No urinary collecting system dilatation. Bladder unremarkable.    BOWEL: No evidence for active contrast extravasation/active bleeding into the lumen of the the small bowel or colon. No bowel obstruction or inflammatory change. Appendix unremarkable. Duodenal diverticulum.    LYMPH NODES: No lymphadenopathy.    ADDITIONAL ABDOMINAL/PELVIC FINDINGS: Minimal ascites. Mesenteric edema.    MUSCULOSKELETAL: Old healed right rib fractures. Degenerative changes throughout the spine. Subcutaneous edema diffusely. Multiple chronic appearing compression fractures lower thoracic and lumbar spine.      Impression    IMPRESSION:  1.  No evidence for active contrast extravasation/active bleeding. Specifically no evidence for active contrast extravasation or bleeding into the left lower retroperitoneum adjacent to to the left lower retroperitoneal high-density blood products, which   remain unchanged since 04/19/2024. No evidence for active contrast extravasation or active bleeding in the high-density subcutaneous blood products in the left inguinal region. No evidence for active contrast  extravasation or active bleeding into the   lumen of the colon or small bowel.    2.  The fluid collection representing a retroperitoneal hematoma adjacent to the iliacus and iliac musculature is likely related to low-grade bleeding from the patient's retroperitoneal varices.    3.  No evidence for thrombus within the left iliac, arterial or venous systems. The findings on the CTA related to pseudo thrombotic defect from adjacent high-density blood products and are present on the noncontrast portion of this exam as detailed   above.    4.  Bilateral pleural fluid collections with consolidation in the bases and hazy ill-defined infiltrate in the lingula and aerated portions left lower lobe which can be seen with pneumonia.    5.  Hepatic cirrhosis with portal hypertension and ascites as detailed above.    6.  Marked subcutaneous edema.   US Abdomen Limited    Narrative    US ABDOMEN LIMITED 2024 2:18 PM    CLINICAL HISTORY: ascites  TECHNIQUE: Limited abdominal ultrasound.    COMPARISON: None..      Impression    IMPRESSION:  1.  Scant ascites. There is not enough fluid to safely perform a  paracentesis.    AGUEDA BRUCE MD         SYSTEM ID:  [id number]   Echocardiogram Limited     Value    Biplane LVEF 59%    Narrative    [id number]  [id number]  [id number]  [id number]^KHLOE^GUS     [facility]  Echocardiography Laboratory  [address] [place] [address]     Name: JIMMY COLLADO  MRN: [id number]  : 1985  Study Date: 2024 08:25 AM  Age: 39 yrs  Gender: Male  Patient Location: [facility]  Reason For Study: Edema  Ordering Physician: KHLOE SALAZAR  Referring Physician: NICA MENDOSA  Performed By: Shahrzad Knight     BSA: 2.4 m2  Height: 72 in  Weight: 264 lb  HR: 97  BP: 111/65 mmHg  ______________________________________________________________________________  Procedure  Limited Echo  Adult.  ______________________________________________________________________________  Interpretation Summary     This is a limited echo--limited images obtained . See full echo 11/17/23  The left ventricle is mildly dilated.  There is borderline concentric left ventricular hypertrophy.  Biplane LVEF is 59%.  LVdiastolic function not measured  Right ventricular systolic pressure could not be approximated due to  inadequate tricuspid regurgitation.  IVC diameter >2.1 cm collapsing <50% with sniff suggests a high RA pressure  estimated at 15 mmHg or greater.  No TR so cannot assess RVSP. The echo suggests elevated CVP/RA pressure. The  pulm acceleration time is normal which suggests normal pulm vascular  resistance  ______________________________________________________________________________  Left Ventricle  The left ventricle is mildly dilated. There is borderline concentric left  ventricular hypertrophy. Biplane LVEF is 59%. LVdiastolic function not  measured. Normal left ventricular wall motion.     Right Ventricle  The right ventricle is normal in size and function.     Atria  The left atrium is borderline dilated. Right atrial size is normal.     Mitral Valve  The mitral valve leaflets appear normal. There is no evidence of stenosis,  fluttering, or prolapse. There is physiologic mitral regurgitation.     Tricuspid Valve  There is physiologic tricuspid regurgitation. Right ventricular systolic  pressure could not be approximated due to inadequate tricuspid regurgitation.  IVC diameter >2.1 cm collapsing <50% with sniff suggests a high RA pressure  estimated at 15 mmHg or greater. No TR so cannot assess RVSP. The echo  suggests elevated CVP/RA pressure. The pulm acceleration time is normal which  suggests normal pulm vascular resistance.     Aortic Valve  Normal tricuspid aortic valve.     Pulmonic Valve  The pulmonic valve is not well seen, but is grossly normal.     Vessels  The aortic root is normal size.      Pericardium  The pericardium appears normal.     Rhythm  Sinus rhythm was noted.  ______________________________________________________________________________  MMode/2D Measurements & Calculations  IVSd: 0.80 cm     LVIDd: 6.3 cm  LVIDs: 4.3 cm  LVPWd: 1.2 cm  FS: 32.3 %  LV mass(C)d: 268.3 grams  LV mass(C)dI: 111.9 grams/m2  asc Aorta Diam: 2.9 cm  Asc Ao diam index BSA (cm/m2): 1.2  Asc Ao diam index Ht(cm/m): 1.6  EF Biplane: 59.1 %  LA Volume (BP): 36.8 ml  LA Volume Index (BP): 15.3 ml/m2  RWT: 0.38     TAPSE: 3.7 cm     Doppler Measurements & Calculations  PA V2 max: 172.5 cm/sec  PA max P.9 mmHg  PA acc time: 0.11 sec     ______________________________________________________________________________  Report approved by: Teri Robles 2024 09:36 AM             Discharge Orders      Comprehensive metabolic panel     CBC with platelets     Home Care Referral      Physical Therapy  Referral      Occupational Therapy  Referral      Reason for your hospital stay    You were hospitalized for decompensated cirrhosis, ongoing alcohol use.     Follow-up and recommended labs and tests     Follow up with primary care provider, Blake Canchola, within 7 days for hospital follow- up. Check CMP and CBC in one week  - Follow up with GI as scheduled     Activity    Your activity upon discharge: activity as tolerated     Diet    Follow this diet upon discharge:        Snacks/Supplements Adult: Ensure Enlive; With Meals      Regular Diet Adult     Discharge Medications   Current Discharge Medication List        START taking these medications    Details   miconazole (MICATIN) 2 % external powder Apply topically 2 times daily  Qty: 85 g, Refills: 0    Associated Diagnoses: Hepatic encephalopathy (H); Acute liver failure without hepatic coma      midodrine (PROAMATINE) 5 MG tablet Take 3 tablets (15 mg) by mouth 3 times daily (with meals)  Qty: 270 tablet, Refills: 0    Associated Diagnoses:  Hepatic encephalopathy (H); Acute liver failure without hepatic coma      mupirocin (BACTROBAN) 2 % external ointment Apply topically 2 times daily To affected nose  Qty: 30 g, Refills: 0    Associated Diagnoses: Epistaxis      rifaximin (XIFAXAN) 550 MG TABS tablet 1 tablet (550 mg) by Oral or Feeding Tube route 2 times daily  Qty: 120 tablet, Refills: 0    Associated Diagnoses: Hepatic encephalopathy (H); Acute liver failure without hepatic coma           CONTINUE these medications which have CHANGED    Details   furosemide (LASIX) 20 MG tablet Take 1 tablet (20 mg) by mouth daily  Qty: 30 tablet, Refills: 0    Associated Diagnoses: Hepatic encephalopathy (H); Acute liver failure without hepatic coma      lactulose (CHRONULAC) 10 GM/15ML solution 30 mLs (20 g) by Oral or Feeding Tube route 2 times daily  Qty: 1800 mL, Refills: 0    Associated Diagnoses: Hepatic encephalopathy (H); Acute liver failure without hepatic coma      pantoprazole (PROTONIX) 40 MG EC tablet Take 1 tablet (40 mg) by mouth 2 times daily  Qty: 60 tablet, Refills: 0    Associated Diagnoses: Hepatic encephalopathy (H); Acute liver failure without hepatic coma           CONTINUE these medications which have NOT CHANGED    Details   ciprofloxacin (CIPRO) 500 MG tablet Take 1 tablet (500 mg) by mouth every 24 hours  Qty: 60 tablet, Refills: 1    Associated Diagnoses: Alcoholic cirrhosis of liver without ascites (H)      folic acid (FOLVITE) 1 MG tablet Take 1 tablet (1 mg) by mouth daily  Qty: 30 tablet, Refills: 0    Associated Diagnoses: Alcohol abuse      magnesium oxide 400 MG tablet Take 400 mg by mouth daily      multivitamin, therapeutic (THERA-VIT) TABS tablet Take 1 tablet by mouth daily      naltrexone (DEPADE/REVIA) 50 MG tablet Take 50 mg by mouth daily      spironolactone (ALDACTONE) 50 MG tablet Take 50 mg by mouth daily      thiamine (B-1) 100 MG tablet Take 1 tablet (100 mg) by mouth daily  Qty: 30 tablet, Refills: 0    Associated  Diagnoses: Alcohol abuse      Vitamin D, Cholecalciferol, 10 MCG (400 UNIT) TABS Take 1 tablet by mouth daily           Allergies   Allergies   Allergen Reactions    Excedrin Extra Strength [Aspirin-Acetaminophen-Caffeine]      puffy eyes and dry throat a few years ago. Tolerates ibuprofen and acetaminophen, but avoids aspirin    Nsaids Angioedema     Patient reports having a reaction of puffy eyes and dry throat a few years ago, but he has taken Advil in 2023 and did not react.

## 2024-05-01 NOTE — PROGRESS NOTES
Occupational Therapy Discharge Summary    Reason for therapy discharge:    Discharged to home.    Progress towards therapy goal(s). See goals on Care Plan in [facility] electronic health record for goal details.  Goals partially met.  Barriers to achieving goals:   discharge from facility.    Therapy recommendation(s):    Continued therapy is recommended.  Rationale/Recommendations:  Pt below baseline, limited due to activity tolerance, balance, safety, STM and executive functioning. If pt has 24 hr S and A then could return home with A with IADL's and initiall A with tub/shower transfer, LE dressing as needed, etc and home RN for med mgmt, OT for home safety with ADL/IADL's and cognition..

## 2024-05-02 NOTE — PROGRESS NOTES
Connected Care Resource Center:   [facility] Contact  [facility]/Voicemail     Clinical Data: Post-Discharge Outreach     Outreach attempted x 2.  Left message on patient's voicemail, providing [facility]'s central phone number of [phone] ([phone]) for questions/concerns and/or to schedule an appt with an [facility] provider, if they do not have a PCP.      Plan:  [facility] will do no further outreaches at this time.       RACHEL Parada  Connected Care Resource [place], [facility]    *Connected Care Resource Team does NOT follow patient ongoing. Referrals are identified based on internal discharge reports and the outreach is to ensure patient has an understanding of their discharge instructions.

## 2024-05-20 NOTE — TELEPHONE ENCOUNTER
Prior Authorization Approval    Medication: XIFAXAN 550 MG PO TABS  Authorization Effective Date: 5/6/2024  Authorization Expiration Date: 5/5/2025  Approved Dose/Quantity: 60 for 30  Reference #: [id number]   Insurance Company: [employer] - Phone [phone] Fax [phone]  Expected CoPay: $    CoPay Card Available:      Financial Assistance Needed:   Which Pharmacy is filling the prescription: [facility] [place] - [address] [address]  Pharmacy Notified:   Patient Notified:

## 2024-05-28 NOTE — CONSULTS
[facility]    Gastroenterology Consultation    Date of Admission:  9/8/2023    History of Present Illness   Crispin Ham is a 38 year old male who presents with generalized unwellness. History of alcohol use disorder, generalized anxiety disorder, history of hypertension who initially presented to urgent care due to feeling lightheaded and dizzy, decreased energy, light sensitivity and was noted to have blood pressure in the 60s at urgent care and so brought to the ED.     The patient reports that he used to drink alcohol about half liter of vodka every day until about 10 days ago when he decided to quit. He started having tremors, light sensitivity, generalized weakness. Tremors stopped after about 4 or 5 days but generalized weakness, dizziness and light sensitivity persisted.     During the hospital admission he was found to have acute electrolytes disturbance and elevated LFT. CT showing duodenitis/ulcer with contained perforation vs duodenal diverticulum. GI consulted for the same.    Pt denies any abdominal pain when seen and otherwise is feeling well. He never had EGD done before. Denies any melena or hematochezia.    Assessment & Plan   Crispin Ham is a 38 year old male who was admitted on 9/8/2023. I was asked to see the patient for 38 year old male with history of alcohol use disorder, generalized anxiety disorder, history of hypertension who initially presented to urgent care due to feeling lightheaded and dizzy, decreased energy, light sensitivity and was noted to have blood pressure in the 60s at urgent care and so brought to the ED. CT showing duodenitis/ulcer with contained perforation vs duodenal diverticulum.  -will his current electrolytes disturbance and possible perforation (unlikely), Hgb stable, will do conservative management  -XR upper GI series to ensure perforation contanied  -IV PPI BID, diet depends upper GI finding  -Child-Schwartz class B, MELD Na score  Soft, easy to chew diet   of 32 currently but platelet >150, can still be al from Etoh hepatitis  -daily LFT with INR and Hgb  -only small amount of fluid collection in RUQ which could also be transient hence will not start diuretics yet  -outpatient follow up, no plan for EGD at this time unless Hgb continues to trend down    [address] level 2 consult    Principal Problem:    Acute renal failure, unspecified acute renal failure type (H)  Active Problems:    Septic shock (H)    Acute liver failure without hepatic coma    Anemia, unspecified type      Hanane Moore MD  ([place])  [facility]  Office: [phone]  Cell: [phone], please feel free to call the cell    Code Status    Full Code      Primary Care Physician   [facility]      Hanane Moore MD  ([place])  [facility]  Office: [phone]  Cell: [phone], please feel free to call the cell      Past Medical History   I have reviewed this patient's medical history and updated it with pertinent information if needed.   Past Medical History:   Diagnosis Date    Alcohol abuse     Hypertension     Substance abuse (H)        Past Surgical History   I have reviewed this patient's surgical history and updated it with pertinent information if needed.  Past Surgical History:   Procedure Laterality Date    ORTHOPEDIC SURGERY         Prior to Admission Medications   Prior to Admission Medications   Prescriptions Last Dose Informant Patient Reported? Taking?   acetaminophen (TYLENOL) 500 MG tablet 9/8/2023 at 1000  Yes Yes   Sig: Take 500-1,000 mg by mouth every 6 hours as needed for mild pain   losartan (COZAAR) 50 MG tablet 9/6/2023 at 2000  Yes Yes   Sig: Take 50 mg by mouth daily   multivitamin, therapeutic (THERA-VIT) TABS tablet 9/7/2023  Yes Yes   Sig: Take 1 tablet by mouth daily   sertraline (ZOLOFT) 50 MG tablet Not Taking Self Yes No   Sig: Take 50 mg by mouth daily   Patient not taking: Reported on 9/8/2023       Facility-Administered Medications: None     Allergies   Allergies   Allergen Reactions    Nsaids      Patient reports having a reaction of puffy eyes and dry throat a few years ago, but he has taken Advil in 2023 and did not react.       Social History   I have reviewed this patient's social history and updated it with pertinent information if needed. Crispin Ham  reports that he has never smoked. He does not have any smokeless tobacco history on file. He reports current alcohol use. He reports that he does not currently use drugs.    Family History   I have reviewed this patient's family history and updated it with pertinent information if needed.   No family history on file.    Review of Systems   The 10 point Review of Systems is negative other than noted in the HPI or here.     Physical Exam   Temp: 98.9  F (37.2  C) Temp src: Oral BP: 106/54 Pulse: 92   Resp: 18 SpO2: 98 % O2 Device: None (Room air)    Vital Signs with Ranges  Temp:  [97.8  F (36.6  C)-98.9  F (37.2  C)] 98.9  F (37.2  C)  Pulse:  [79-97] 92  Resp:  [11-25] 18  BP: ()/(42-65) 106/54  SpO2:  [91 %-99 %] 98 %  265 lbs 14 oz    Exam:  Constitutional: healthy, alert, and no distress  Head: Normocephalic. No masses, lesions, tenderness or abnormalities  Neck: Neck supple. No adenopathy. Thyroid symmetric, normal size,, Carotids without bruits.  ENT: ENT exam normal, no neck nodes or sinus tenderness  Cardiovascular: negative, PMI normal. No lifts, heaves, or thrills. RRR. No murmurs, clicks gallops or rub  Respiratory: negative, Percussion normal. Good diaphragmatic excursion. Lungs clear  Gastrointestinal: Abdomen soft, non-tender. BS normal. No masses, organomegaly  : Deferred  Musculoskeletal: extremities normal- no gross deformities noted, gait normal, and normal muscle tone  Skin: no suspicious lesions or rashes, grossly jaundice  Neurologic: Gait normal. Reflexes normal and symmetric. Sensation grossly WNL.  Psychiatric:  mentation appears normal and affect normal/bright  Hematologic/Lymphatic/Immunologic: Normal cervical lymph nodes     Data   Results for orders placed or performed during the hospital encounter of 09/08/23 (from the past 24 hour(s))   Lactic acid whole blood   Result Value Ref Range    Lactic Acid 2.4 (H) 0.7 - 2.0 mmol/L   Basic metabolic panel   Result Value Ref Range    Sodium 130 (L) 136 - 145 mmol/L    Potassium 2.8 (L) 3.4 - 5.3 mmol/L    Chloride 90 (L) 98 - 107 mmol/L    Carbon Dioxide (CO2) 23 22 - 29 mmol/L    Anion Gap 17 (H) 7 - 15 mmol/L    Urea Nitrogen 34.0 (H) 6.0 - 20.0 mg/dL    Creatinine 2.23 (H) 0.67 - 1.17 mg/dL    Calcium 7.6 (L) 8.6 - 10.0 mg/dL    Glucose 103 (H) 70 - 99 mg/dL    GFR Estimate 38 (L) >60 mL/min/1.73m2   Hemoglobin   Result Value Ref Range    Hemoglobin 10.9 (L) 13.3 - 17.7 g/dL   Folate   Result Value Ref Range    Folic Acid 5.3 4.6 - 34.8 ng/mL   Ionized Calcium   Result Value Ref Range    Calcium Ionized Whole Blood 3.8 (L) 4.4 - 5.2 mg/dL   Fractional excretion of sodium    Narrative    The following orders were created for panel order Fractional excretion of sodium.  Procedure                               Abnormality         Status                     ---------                               -----------         ------                     Fractional Excretion of ...[[id number]]                      Final result                 Please view results for these tests on the individual orders.   MRSA MSSA PCR, Nasal Swab    Specimen: Nare, Left; Swab   Result Value Ref Range    MRSA Target DNA Negative Negative    SA Target DNA Positive     Narrative    The [employer]  Xpert SA Nasal Complete assay performed in the [employer]x System is a qualitative in vitro diagnostic test designed for rapid detection of Staphylococcus aureus (SA) and methicillin-resistant Staphylococcus aureus (MRSA) from nasal swabs in patients at risk for nasal colonization. The test utilizes automated  real-time polymerase chain reaction (PCR) to detect MRSA/SA DNA. The Xpert SA Nasal Complete assay is intended to aid in the prevention and control of MRSA/SA infections in healthcare settings. The assay is not intended to diagnose, guide or monitor treatment for MRSA/SA infections, or provide results of susceptibility to methicillin. A negative result does not preclude MRSA/SA nasal colonization.    Fractional Excretion of Sodium   Result Value Ref Range    Creatinine Urine mg/dL 163.3 mg/dL    Sodium Urine mmol/L 20 mmol/L    %FENA 0.2 %   Occult blood stool   Result Value Ref Range    Occult Blood Negative Negative   Magnesium   Result Value Ref Range    Magnesium 1.7 1.7 - 2.3 mg/dL   Potassium   Result Value Ref Range    Potassium 2.8 (L) 3.4 - 5.3 mmol/L   Lactic acid whole blood   Result Value Ref Range    Lactic Acid 1.8 0.7 - 2.0 mmol/L   Comprehensive metabolic panel   Result Value Ref Range    Sodium 133 (L) 136 - 145 mmol/L    Potassium 3.1 (L) 3.4 - 5.3 mmol/L    Chloride 96 (L) 98 - 107 mmol/L    Carbon Dioxide (CO2) 26 22 - 29 mmol/L    Anion Gap 11 7 - 15 mmol/L    Urea Nitrogen 28.8 (H) 6.0 - 20.0 mg/dL    Creatinine 1.38 (H) 0.67 - 1.17 mg/dL    Calcium 8.0 (L) 8.6 - 10.0 mg/dL    Glucose 106 (H) 70 - 99 mg/dL    Alkaline Phosphatase 178 (H) 40 - 129 U/L     (H) 0 - 45 U/L    ALT 80 (H) 0 - 70 U/L    Protein Total 6.3 (L) 6.4 - 8.3 g/dL    Albumin 2.4 (L) 3.5 - 5.2 g/dL    Bilirubin Total 12.1 (H) <=1.2 mg/dL    GFR Estimate 68 >60 mL/min/1.73m2   CBC with platelets differential    Narrative    The following orders were created for panel order CBC with platelets differential.  Procedure                               Abnormality         Status                     ---------                               -----------         ------                     CBC with platelets and d...[[id number]]  Abnormal            Final result                 Please view results for these tests on the individual  orders.   CBC with platelets and differential   Result Value Ref Range    WBC Count 7.9 4.0 - 11.0 10e3/uL    RBC Count 2.79 (L) 4.40 - 5.90 10e6/uL    Hemoglobin 10.6 (L) 13.3 - 17.7 g/dL    Hematocrit 29.6 (L) 40.0 - 53.0 %     (H) 78 - 100 fL    MCH 38.0 (H) 26.5 - 33.0 pg    MCHC 35.8 31.5 - 36.5 g/dL    RDW 16.5 (H) 10.0 - 15.0 %    Platelet Count 157 150 - 450 10e3/uL    % Neutrophils 73 %    % Lymphocytes 10 %    % Monocytes 15 %    % Eosinophils 1 %    % Basophils 1 %    % Immature Granulocytes 0 %    NRBCs per 100 WBC 0 <1 /100    Absolute Neutrophils 5.8 1.6 - 8.3 10e3/uL    Absolute Lymphocytes 0.8 0.8 - 5.3 10e3/uL    Absolute Monocytes 1.2 0.0 - 1.3 10e3/uL    Absolute Eosinophils 0.1 0.0 - 0.7 10e3/uL    Absolute Basophils 0.1 0.0 - 0.2 10e3/uL    Absolute Immature Granulocytes 0.0 <=0.4 10e3/uL    Absolute NRBCs 0.0 10e3/uL

## 2024-07-10 PROBLEM — E87.5 HYPERKALEMIA: Status: ACTIVE | Noted: 2024-01-01

## 2024-07-10 NOTE — ED PROVIDER NOTES
Emergency Department Note      History of Present Illness     Chief Complaint   Abnormal Labs and Fatigue    HPI   Crispin Ham is a 39 year old male with a history of substance abuse, alcoholic hepatitis, TIKA, OA, CHF, GATITO, and hypertension, who presents to the ED for abnormal labs and fatigue. The patient recently had an appointment at [facility] for his kidneys, and his PCP called today stating that he had a low hemoglobin and referred him to the ED. He also endorses having a recent increase in nosebleeds and has been feeling more fatigued. The patient states he feels okay laying down, but gets more winded with ambulation. He also has been having difficulty getting the fluid down in his legs.  His last successful paracentesis was a month ago. The patient denies any blood in his stool. He endorses taking his medications consistently, and confirms that he took his medications yesterday and today.      Independent Historian   Mother present at bedside and corroborates the above.     Review of External Notes   I reviewed the Family Medicine note from 5/23/24, where they discussed his alcoholic hepatitis     Past Medical History     Medical History and Problem List   Septic shock   Acute renal failure   Anemia   Hypertension   GATITO  Substance abuse   Hyponatremia   Spontaneous bacterial peritonitis   Ascites due to alcoholic hepatitis   Chronic heart failure with preserved ejection fraction   Transaminitis   Depressive disorder   TIKA   Ganglion cyst   Osteoarthritis   Obesity   Varicella    Medications   Cipro   Folvite   Lasix   Chronulac   Protonix   Xifaxan    Surgical History   ORIF x2 left wrist fracture   Colonoscopy   EGD    Physical Exam     Patient Vitals for the past 24 hrs:   BP Temp Pulse Resp SpO2 Weight   07/10/24 0030 (!) 87/39 -- 85 11 100 % --   07/10/24 0015 (!) 93/37 -- 84 10 100 % --   07/10/24 0000 (!) 92/37 -- 84 -- 100 % --   07/09/24 2353 (!) 92/36 -- 84 -- 100 % --   07/09/24 2320 (!) 93/38 --  91 -- 100 % --   07/09/24 2309 -- 98  F (36.7  C) 92 18 100 % 108.9 kg (240 lb)     Physical Exam  General: Appears well-developed and well-nourished.   Head: No signs of trauma.   CV: Normal rate and regular rhythm.    Resp: Effort normal and breath sounds normal. No respiratory distress.   GI: Soft. There is no tenderness.  No rebound or guarding.  Normal bowel sounds.  MSK: Normal range of motion. +bilateral lower extremity edema. No Calf tenderness.  Neuro: The patient is alert and oriented. Speech normal.  Skin: Skin is warm and dry. No rash noted.   Psych: normal mood and affect. behavior is normal.       Diagnostics     Lab Results   Labs Ordered and Resulted from Time of ED Arrival to Time of ED Departure   COMPREHENSIVE METABOLIC PANEL - Abnormal       Result Value    Sodium 122 (*)     Potassium 5.6 (*)     Carbon Dioxide (CO2) 17 (*)     Anion Gap 10      Urea Nitrogen 51.5 (*)     Creatinine 3.14 (*)     GFR Estimate 25 (*)     Calcium 8.9      Chloride 95 (*)     Glucose 112 (*)     Alkaline Phosphatase 106      AST 52 (*)     ALT 23      Protein Total 6.2 (*)     Albumin 3.3 (*)     Bilirubin Total 8.6 (*)    CBC WITH PLATELETS AND DIFFERENTIAL - Abnormal    WBC Count 6.4      RBC Count 1.10 (*)     Hemoglobin 4.4 (*)     Hematocrit 12.8 (*)      (*)     MCH 40.0 (*)     MCHC 34.4      RDW 15.2 (*)     Platelet Count 75 (*)     % Neutrophils 70      % Lymphocytes 12      % Monocytes 14      % Eosinophils 3      % Basophils 0      % Immature Granulocytes 1      NRBCs per 100 WBC 0      Absolute Neutrophils 4.5      Absolute Lymphocytes 0.7 (*)     Absolute Monocytes 0.9      Absolute Eosinophils 0.2      Absolute Basophils 0.0      Absolute Immature Granulocytes 0.1      Absolute NRBCs 0.0     INR - Abnormal    INR 2.26 (*)    AMMONIA - Abnormal    Ammonia 62 (*)    MAGNESIUM - Normal    Magnesium 1.7     GLUCOSE MONITOR NURSING POCT   GLUCOSE MONITOR NURSING POCT   GLUCOSE MONITOR NURSING POCT    TYPE AND SCREEN, ADULT    ABO/RH(D) A POS      Antibody Screen Negative      SPECIMEN EXPIRATION DATE [id number]     PREPARE RED BLOOD CELLS (UNIT)    Blood Component Type Red Blood Cells      Product Code T6142R93      Unit Status Ready for issue      Unit Number [id number]      CROSSMATCH Compatible      CODING SYSTEM [id number]     PREPARE RED BLOOD CELLS (UNIT)    Blood Component Type Red Blood Cells      Product Code [id number]      Unit Status Issued      Unit Number [id number]      CROSSMATCH Compatible      CODING SYSTEM [id number]      ISSUE DATE AND TIME [id number]      UNIT ABO/RH A+      UNIT TYPE ISBT 6200     PREPARE RED BLOOD CELLS (UNIT)   TRANSFUSE RED BLOOD CELLS (UNIT)   ABO/RH TYPE AND SCREEN       Imaging   No orders to display       EKG   ECG results from 07/09/24   EKG 12-lead, tracing only     Value    Systolic Blood Pressure     Diastolic Blood Pressure     Ventricular Rate 85    Atrial Rate 85    CT Interval 170    QRS Duration 104        QTc 454    P Axis 51    R AXIS 13    T Axis 37    Interpretation ECG      Sinus rhythm  Nonspecific T wave abnormality  Abnormal ECG  Interpreted by me at 2350.        Independent Interpretation   None    ED Course      Medications Administered   Medications   glucose gel 15-30 g (has no administration in time range)     Or   dextrose 50 % injection 25-50 mL (has no administration in time range)     Or   glucagon injection 1 mg (has no administration in time range)   dextrose 10% BOLUS 300 mL (300 mLs Intravenous $New Bag 7/10/24 0045)   dextrose 50 % injection 25 g (has no administration in time range)   insulin regular 1 unit/mL injection 10 Units (has no administration in time range)   sodium chloride 0.9% BOLUS 1,000 mL (has no administration in time range)       Procedures   Procedures     Discussion of Management   Admitting Hospitalist, Efren    ED Course   ED Course as of 07/10/24 0338   Tue Jul 09, 2024   7840 I obtained the  history and examined the patient as above.    Wed Jul 10, 2024   0015 I rechecked and updated the patient as above        Optional/Additional Documentation  None    Medical Decision Making / Diagnosis     CMS Diagnoses: None    MIPS       None    MDM   Crispin Ham is a 39 year old male presents due to anemia.  He has a history of alcoholic hepatitis and hepatorenal syndrome in the past.  He states he has been feeling more fatigued recently and he called his doctor who did blood work.  He had been told that he was anemic and directed to the hospital.  On my evaluation, patient did have edema to the lower extremities which has been an ongoing issue.  Blood work was repeated which continue to show anemia.  He also had acute kidney injury and hyperkalemia and hyponatremia.  Given this, he was given IV fluids along with a blood transfusion.  He has received blood transfusions in the past.  Given the hyperkalemia he was also given insulin.  Patient was admitted to the hospitalist service for continued management.    Disposition   The patient was admitted to the hospital.     Diagnosis     ICD-10-CM    1. Anemia, unspecified type  D64.9       2. Hyponatremia  E87.1       3. Hyperkalemia  E87.5       4. Acute kidney injury (H24)  N17.9                Scribe Disclosure:  I, Gem Roberts, am serving as a scribe at 11:38 PM on 7/9/2024 to document services personally performed by Tarik Ortiz MD based on my observations and the provider's statements to me.        Tarik Ortiz MD  07/10/24 4544

## 2024-07-10 NOTE — ED NOTES
"DR. Schwartz returned call. Status given.  Infusing the Normal Saline 500 bolus @ 250cc.hr x 2 hr\" Per Dr. Schwartz.  "

## 2024-07-10 NOTE — PLAN OF CARE
Pt arrived to floor at ~1 730  A&O X4.VSS on RA except hypotensive.TELE SR.Lung Sounds diminished.Bowel Sounds active, - BM.Voiding freely in urinal. Bruise to R forearm, blanchable redness to coccyx BLE edema, edema wear in place. Up assist 1 gaitbelt. Pt denies pain. Tolerating clear liquid diet. NS infusing into R arm at 50ml/hr. Blooding infusing into L arm.

## 2024-07-10 NOTE — ED TRIAGE NOTES
Pt presents to ED after being called by his provider and being told he had a Hgb of 4.  Pt reports increased fatigue over the last several days.   Triage Assessment (Adult)       Row Name 07/09/24 0040          Triage Assessment    Airway WDL WDL        Skin Circulation/Temperature WDL    Skin Circulation/Temperature WDL WDL        Cardiac WDL    Cardiac WDL WDL        Peripheral/Neurovascular WDL    Peripheral Neurovascular WDL WDL        Cognitive/Neuro/Behavioral WDL    Cognitive/Neuro/Behavioral WDL WDL

## 2024-07-10 NOTE — SIGNIFICANT EVENT
Significant Event Note    Time of event: 5:43 AM July 10, 2024    Description of event:  Paged for repeat K 5.6.    Plan:  Further IV insulin and dextrose ordered. Lokelma ordered. Nephrology consultation pending.    Discussed with: bedside nurse    Anup Schwartz MD

## 2024-07-10 NOTE — PHARMACY-ADMISSION MEDICATION HISTORY
Pharmacist Admission Medication History    Admission medication history is complete. The information provided in this note is only as accurate as the sources available at the time of the update.    Information Source(s): Patient and CareEverywhere/SureScripts via in-person    Pertinent Information: Patient is out of thiamine.    Changes made to PTA medication list:  Added: None  Deleted: None  Changed: Furosemide 20 mg daily to 40 mg BID    Allergies reviewed with patient and updates made in EHR: yes    Medication History Completed By: Krysta South [facility] 7/10/2024 8:25 AM    PTA Med List   Medication Sig Last Dose    ciprofloxacin (CIPRO) 500 MG tablet Take 1 tablet (500 mg) by mouth every 24 hours 7/9/2024    folic acid (FOLVITE) 1 MG tablet Take 1 tablet (1 mg) by mouth daily 7/9/2024    furosemide (LASIX) 40 MG tablet Take 40 mg by mouth 2 times daily 7/9/2024    lactulose (CHRONULAC) 10 GM/15ML solution 30 mLs (20 g) by Oral or Feeding Tube route 2 times daily 7/9/2024    magnesium oxide 400 MG tablet Take 400 mg by mouth daily 7/9/2024    midodrine (PROAMATINE) 5 MG tablet Take 5 mg by mouth 3 times daily (with meals) 7/9/2024    multivitamin, therapeutic (THERA-VIT) TABS tablet Take 1 tablet by mouth daily 7/9/2024    naltrexone (DEPADE/REVIA) 50 MG tablet Take 50 mg by mouth daily 7/9/2024    pantoprazole (PROTONIX) 40 MG EC tablet Take 1 tablet (40 mg) by mouth 2 times daily 7/9/2024    rifaximin (XIFAXAN) 550 MG TABS tablet 1 tablet (550 mg) by Oral or Feeding Tube route 2 times daily 7/9/2024    spironolactone (ALDACTONE) 50 MG tablet Take 50 mg by mouth daily 7/9/2024    thiamine (B-1) 100 MG tablet Take 1 tablet (100 mg) by mouth daily Past Month    Vitamin D, Cholecalciferol, 10 MCG (400 UNIT) TABS Take 1 tablet by mouth daily 7/9/2024        Ultrasound Used Text: Ultrasound was utilized to place radiation therapy fields.

## 2024-07-10 NOTE — CONSULTS
Gastroenterology Consult Note    Crispin Ham MRN#  [id number]   Age:  39 year old YOB: 1985    Date of Admission:  7/9/2024     REASON FOR CONSULT   Acute on chronic anemia     HISTORY OF PRESENT ILLNESS   39 year old CM with CM with alcoholic liver hepatitis/cirrhosis is seen in GI consultation today for management of acute on chronic anemia at the request of Diana Garibay MD.    Patient notes progressively worsening dyspnea on exertion over the past week.  Denies any ongoing abdominal pain, nausea, emesis, worsening heartburn, dysphagia, or odynophagia.  Bowel habits with frequency 2 or 3 times a day with normal appearing stools.  No overt GI bleeding.  Denies any chest pain or dizziness.  RoS notable for mild nonproductive cough.  Also notes ongoing epistaxis with increased frequency to now daily occurrences.      On arrival to emergency room, patient noted to have stable vitals.  Blood work notable for hemoglobin at 4.4 g/dL.  He received 3 units of pRBC with subsequent hemoglobin at 6.1 g/dL.  Thrombocytopenia at 63K.  Other electrolyte imbalance with hyponatremia, hyperkalemia, and acute on chronically elevated serum Cr.  Liver chemistry with elevated AST 50 along with severe bilirubinemia at 8.2.  INR at 2.26.    Patient with active alcohol abuse - though reports intake has decreased and recently joined online [employer] sessions.  Prior history of acute on chronic anemia with at least 4 EGD evaluations in the past year without any evidence of active bleeding.  History of encephalopathy managed with Rifaximin and Lactulose.  Previously on lose dose Furosemide that was discontinued due to hyperkalemia.  Remains on Ciprofloxacin daily for SBP prophylaxis.  CT imaging notable for cirrhotic appearing liver with portal hypertensive gastropathy but no evidence of retroperitoneal hematoma or other evidence of intra-abdominal bleeding.       PAST HISTORY      Past Medical History:   Diagnosis Date     (HFpEF) heart failure with preserved ejection fraction (H)     Alcoholic cirrhosis of liver with ascites (H)     Alcoholic hepatitis with ascites (H28)     Esophageal varices (H)     Hepatic encephalopathy (H)     Hypertension     Hyponatremia     Multiple duodenal ulcers     SBP (spontaneous bacterial peritonitis) (H)     Severe alcohol use disorder (H)       Past Surgical History:   Procedure Laterality Date    COLONOSCOPY      EGD    ESOPHAGOSCOPY, GASTROSCOPY, DUODENOSCOPY (EGD), COMBINED N/A 03/16/2024    Procedure: ESOPHAGOGASTRODUODENOSCOPY;  Surgeon: Chato Juan MD;  Location:  OR    ESOPHAGOSCOPY, GASTROSCOPY, DUODENOSCOPY (EGD), COMBINED N/A 04/18/2024    Procedure: Esophagoscopy, gastroscopy, duodenoscopy (EGD), combined;  Surgeon: Hiren Vogt DO;  Location:  GI    ORTHOPEDIC SURGERY      wrist  surgery    WRIST SURGERY Left 2023      Family History Social History   No family history on file. Social History     Socioeconomic History    Marital status: Single     Spouse name: Not on file    Number of children: Not on file    Years of education: Not on file    Highest education level: Not on file   Occupational History    Not on file   Tobacco Use    Smoking status: Never    Smokeless tobacco: Not on file   Substance and Sexual Activity    Alcohol use: Yes     Comment: last drinbk 2 weeks ago    Drug use: Not Currently    Sexual activity: Not on file   Other Topics Concern    Not on file   Social History Narrative    Not on file     Social Determinants of Health     Financial Resource Strain: Low Risk  (9/25/2023)    [facility]    Financial Resource Strain     Difficulty of Paying Living Expenses: 3     Difficulty of Paying Living Expenses: Not on file   Food Insecurity: No Food Insecurity (9/25/2023)    Received from [facility], [email or web address] &  [facility]    Food Insecurity     Worried About Running Out of Food in the Last Year: 1   Transportation Needs: No Transportation Needs (9/25/2023)    Received from [facility], [facility]    Transportation Needs     Lack of Transportation (Medical): 1   Physical Activity: Not on file   Stress: Not on file   Social Connections: Socially Isolated (9/25/2023)    Received from [facility], [facility]    Social Connections     Frequency of Communication with Friends and Family: 4   Interpersonal Safety: Not on file   Housing Stability: Low Risk  (9/25/2023)    Received from [facility], [facility]    Housing Stability     Unable to Pay for Housing in the Last Year: 1          MEDICATIONS & ALLERGIES   Medications Prior to Admission   Medication Sig Dispense Refill Last Dose    ciprofloxacin (CIPRO) 500 MG tablet Take 1 tablet (500 mg) by mouth every 24 hours 60 tablet 1 7/9/2024    folic acid (FOLVITE) 1 MG tablet Take 1 tablet (1 mg) by mouth daily 30 tablet 0 7/9/2024    furosemide (LASIX) 40 MG tablet Take 40 mg by mouth 2 times daily   7/9/2024    lactulose (CHRONULAC) 10 GM/15ML solution 30 mLs (20 g) by Oral or Feeding Tube route 2 times daily 1800 mL 0 7/9/2024    magnesium oxide 400 MG tablet Take 400 mg by mouth daily   7/9/2024    midodrine (PROAMATINE) 5 MG tablet Take 5 mg by mouth 3 times daily (with meals)   7/9/2024    multivitamin, therapeutic (THERA-VIT) TABS tablet Take 1 tablet by mouth daily   7/9/2024    naltrexone (DEPADE/REVIA) 50 MG tablet Take 50 mg by mouth daily   7/9/2024    pantoprazole (PROTONIX) 40 MG EC tablet Take 1 tablet (40 mg) by mouth 2 times daily 60 tablet 0 7/9/2024    rifaximin (XIFAXAN) 550 MG TABS tablet 1 tablet (550 mg) by Oral or Feeding Tube route 2 times daily 120  tablet 0 7/9/2024    spironolactone (ALDACTONE) 50 MG tablet Take 50 mg by mouth daily   7/9/2024    thiamine (B-1) 100 MG tablet Take 1 tablet (100 mg) by mouth daily 30 tablet 0 Past Month    Vitamin D, Cholecalciferol, 10 MCG (400 UNIT) TABS Take 1 tablet by mouth daily   7/9/2024      Allergies   Allergen Reactions    Excedrin Extra Strength [Aspirin-Acetaminophen-Caffeine]      puffy eyes and dry throat a few years ago. Tolerates ibuprofen and acetaminophen, but avoids aspirin    Nsaids Angioedema     Patient reports having a reaction of puffy eyes and dry throat a few years ago, but he has taken Advil in 2023 and did not react.        REVIEW OF SYSTEMS   Comprehensive 10+ points review of systems performed with pertinent as per HPI above.    OBJECTIVE   Vitals BP 99/54 (BP Location: Left arm)   Pulse 80   Temp 97.5  F (36.4  C) (Oral)   Resp 16   Wt 108.9 kg (240 lb)   SpO2 100%   BMI 32.55 kg/m          General:   Middle aged CM in NAD.   HEENT:   NC/AT. MMM. Icteric sclerae.   Lungs:  No respiratory distress.   Heart:  RRR. +S1, S2.    Abdomen:   Soft. NT/ND. BS active.    Ext/MS:   No cyanosis or erythema.    Neuro:   No focal deficits noted. No asterixis.   Skin:  No obvious rashes or lesions noted.         LABORATORY AND IMAGING    ELECTROLYTE PANEL   Recent Labs   Lab Test 07/11/24  1821 07/11/24  1248 07/11/24  0954 07/10/24  2225 07/10/24  1004 07/10/24  0636 07/10/24  0405 07/10/24  0258   * 124* 123*   < >  --  122*  --  122*   POTASSIUM  --   --  5.3  --  5.5* 5.4*   < > 5.5*   BUN  --   --  44.9*  --   --  50.8*  --  51.4*   CR  --   --  2.48*  --   --  3.11*  --  3.10*   MIGUELINA  --   --  9.1  --   --  8.6  --  8.7    < > = values in this interval not displayed.      HEMATOLOGY PANEL   Recent Labs   Lab Test 07/11/24  0954 07/11/24  0422 07/10/24  2225 07/10/24  1311 07/10/24  0636 07/09/24  2344 07/09/24  2342 04/25/24  0641 04/24/24  0850 04/23/24  0550 04/23/24  0430   WBC 4.8  --   --    --  4.9  --  6.4   < > 6.8   < > 4.1   HGB 6.8* 7.5* 6.6*   < > 4.6*  --  4.4*   < > 8.5*   < > 6.6*   MCV 98  --   --   --  103*  --  116*   < > 100   < > 101*   PLT 61*  --   --   --  63*  --  75*   < > 103*   < > 87*   INR  --   --   --   --   --  2.26*  --   --  2.48*  --  2.74*    < > = values in this interval not displayed.      LIVER AND PANCREAS PANEL   Recent Labs   Lab Test 07/11/24  0954 07/10/24  0636 07/10/24  0423 07/09/24  2342 04/30/24  0832 04/19/24  1802 04/19/24  0458 04/18/24  1230 11/17/23  0536 11/16/23  1514 09/25/23  1537 09/25/23  1424 09/08/23  1224 09/08/23  1147   ALBUMIN  --  3.1*  --  3.3* 2.8*   < > 2.8*  --    < > 3.1*   < > 2.4*   < > 2.3*   BILITOTAL 10.7* 8.2*  --  8.6* 10.3*   < > 11.3*  --    < > 8.4*   < > 21.7*   < > 10.4*   ALT  --  22  --  23 52   < > 46  --    < > 49   < > 69   < > 82*   AST  --  50*  --  52* 63*   < > 96*  --    < > 222*   < >  --    < > 331*   PROTEIN  --   --  20*  --   --   --   --  30*  --  100*   < >  --    < >  --    LIPASE  --   --   --   --   --   --  56  --   --   --   --  237*  --  272*    < > = values in this interval not displayed.      I have reviewed the current diagnostic and laboratory tests.     Assessment / Recommendations:     Assessment:  Alcoholic liver disease with underlying cirrhosis.  MELD-Na: 36.  Discriminant function also high, but not a candidate for steroids due to severe anemia.  Acute on chronic anemia.  Hemoglobin 4.4 g/dL on admission that partially improved to 6.1 g/dL after 3 units of pRBC.  No evidence of overt GI bleeding but persistent and daily episodes of epistaxis noted.  At least 4 EGD evaluations within the past year without any evidence of active or recent GI blood loss.  Last EGD on 03/16 with portal hypertensive gastropathy and small non-bleeding esophageal varices.  Suspect anemia secondary to multifactorial etiology with possible contributing etiologies of epistaxis, slow-pace GI blood loss from portal  hypertensive gastropathy in setting of severe coagulopathy, and bone marrow suppression from heavy alcohol use.    History of ascites and SBP.  Maintained on Furosemide 80 mg, Spironolactone 50 mg, and prophylaaxis Bactrim daily prior to admission.  On hold due to electrolyte imbalance.    History of encephalopathy.  On Lactulose and Rifaximin.  No evidence of confusion or asterixis on exam today.  Active alcohol abuse, ongoing.    Recommendations:  Low suspicion for acute GI blood loss.  No plan for repeat endoscopic evaluation in the absence of any overt GI bleeding.  Monitor hemoglobin and transfuse pRBC as needed.  Correction of coagulopathy as needed.  Continue Pantoprazole 40 mg once daily.  Ok to resume clear liquid diet as tolerated.  Management of diuretics as per renal team.  Continue Lactulose and Rifaximin.  Complete abstinence from alcohol on discharge encouraged.       Total time spent in chart review, direct medical discussion, examination, and documentation was 40 minutes.    Chato Juan MD  [facility]  [phone]  I appreciate the opportunity to participate in the care of this patient.   Please feel free to call me with any questions or concerns.

## 2024-07-10 NOTE — PROGRESS NOTES
Visited with the patient today, he is awake alert oriented x 3, his hemoglobin is 6.1 there is conditional order for transfusion, no active bleeding noted, pending GI input, requested IMC bed for the patient.  Patient has been in ER since admission, will recently released all the admission orders, he is on room air, denies any dizziness, blood pressures are borderline.  Will Transfuse to keep hemoglobin above 7.  Discussed with nursing, replace electrolytes, creatinine slowly improving.  Reduced IV fluids to 50 mL/h, patient still n.p.o. pending GI consult, consult was released.  Time spent 25 minutes

## 2024-07-10 NOTE — ED NOTES
Below text message sent to the admitting provider       Benjamin PICKERING in ED room 9, Hgb 4.6 after the 2 Units of PRBCs    [facility]  [phone]

## 2024-07-10 NOTE — ED NOTES
Below text message sent to the Admitting provider.    Below  Pt  DEVEN in ED room 9 BG q 1 hr completed.BG @ 0635 = 110. Dextrose 10% 300ml bolus infusing @ 75ml/hr.     [facility]    [phone]

## 2024-07-10 NOTE — ED NOTES
Pt Alert and oriented x 4  falling asleep I between conversation. Low Bps. Below message sent to the admitting provider.     Pt RAHEEL in ED room 9 BP  is 82/59  after 2 Units of PRBC. Dextrose 10% bolus of 300ml infusing @ 75ml/hr. O2 Sats 98% RA Please advise.    [facility]    [phone]

## 2024-07-10 NOTE — SIGNIFICANT EVENT
Significant Event Note    Time of event: 6:51 AM July 10, 2024    Description of event:  AM HGB 4.6. per nursing staff his clinical status is unchanged, he is without pain, clear signs of acute bleeding, or delirium. Blood pressure reamins soft at 84/51.    Plan:  Transfusion 2 further units packed red cells ordered. GI consultation pending.    Discussed with: bedside nurse    Anup Schwartz MD

## 2024-07-10 NOTE — SIGNIFICANT EVENT
Significant Event Note    Time of event: 2:52 AM July 10, 2024    Description of event:  Page and informed blood transfusion has infiltrated.    Plan:  We will demarcate the site and monitor closely    Discussed with: bedside nurse    Anup Schwartz MD

## 2024-07-10 NOTE — PROVIDER NOTIFICATION
MD Notification    Notified Person: MD    Notified Person Name: Dr. Juan    Notification Date/Time: 1730    Notification Interaction: vocera    Purpose of Notification: Pt wondering about diet order    Orders Received:    Comments:     No response        1801 paged : ok to order clears

## 2024-07-10 NOTE — ED NOTES
[facility]  ED Nurse Handoff Report    ED Chief complaint: Abnormal Labs and Fatigue      ED Diagnosis:   Final diagnoses:   Anemia, unspecified type   Hyponatremia   Hyperkalemia   Acute kidney injury (H24)       Code Status: Full Code    Allergies:   Allergies   Allergen Reactions    Excedrin Extra Strength [Aspirin-Acetaminophen-Caffeine]      puffy eyes and dry throat a few years ago. Tolerates ibuprofen and acetaminophen, but avoids aspirin    Nsaids Angioedema     Patient reports having a reaction of puffy eyes and dry throat a few years ago, but he has taken Advil in 2023 and did not react.       Patient Story:   Pt presents to ED after being called by his provider and being told he had a Hgb of 4. Pt reports increased fatigue over the last several days. History of alcoholic cirrhosis.     Focused Assessment:    Neuro: Alert, oriented x 4  Respiratory:Clear lung sounds, on room air   Cardiology:  NSR 80 BPM   Gastrointestinal: soft, non tender, non distended   Genitourinary/Renal:    Musculoskeletal: moves all extremities   Skin: Intact skin   Lines: 20G Bilateral AC    Labs Ordered and Resulted from Time of ED Arrival to Time of ED Departure   COMPREHENSIVE METABOLIC PANEL - Abnormal       Result Value    Sodium 122 (*)     Potassium 5.6 (*)     Carbon Dioxide (CO2) 17 (*)     Anion Gap 10      Urea Nitrogen 51.5 (*)     Creatinine 3.14 (*)     GFR Estimate 25 (*)     Calcium 8.9      Chloride 95 (*)     Glucose 112 (*)     Alkaline Phosphatase 106      AST 52 (*)     ALT 23      Protein Total 6.2 (*)     Albumin 3.3 (*)     Bilirubin Total 8.6 (*)    CBC WITH PLATELETS AND DIFFERENTIAL - Abnormal    WBC Count 6.4      RBC Count 1.10 (*)     Hemoglobin 4.4 (*)     Hematocrit 12.8 (*)      (*)     MCH 40.0 (*)     MCHC 34.4      RDW 15.2 (*)     Platelet Count 75 (*)     % Neutrophils 70      % Lymphocytes 12      % Monocytes 14      % Eosinophils 3      % Basophils 0      % Immature  Granulocytes 1      NRBCs per 100 WBC 0      Absolute Neutrophils 4.5      Absolute Lymphocytes 0.7 (*)     Absolute Monocytes 0.9      Absolute Eosinophils 0.2      Absolute Basophils 0.0      Absolute Immature Granulocytes 0.1      Absolute NRBCs 0.0     INR - Abnormal    INR 2.26 (*)    AMMONIA - Abnormal    Ammonia 62 (*)    MAGNESIUM - Normal    Magnesium 1.7     GLUCOSE MONITOR NURSING POCT   GLUCOSE MONITOR NURSING POCT   GLUCOSE MONITOR NURSING POCT   TYPE AND SCREEN, ADULT    ABO/RH(D) A POS      Antibody Screen Negative      SPECIMEN EXPIRATION DATE [id number]     PREPARE RED BLOOD CELLS (UNIT)    Blood Component Type Red Blood Cells      Product Code Z0576B81      Unit Status Ready for issue      Unit Number [id number]      CROSSMATCH Compatible      CODING SYSTEM LPLX274     PREPARE RED BLOOD CELLS (UNIT)    Blood Component Type Red Blood Cells      Product Code X7987R46      Unit Status Transfused      Unit Number [id number]      CROSSMATCH Compatible      CODING SYSTEM LIQG828      ISSUE DATE AND TIME [id number]      UNIT ABO/RH A+      UNIT TYPE ISBT 6200     PREPARE RED BLOOD CELLS (UNIT)   TRANSFUSE RED BLOOD CELLS (UNIT)   ABO/RH TYPE AND SCREEN        No orders to display         Treatments and/or interventions provided:      Medications   glucose gel 15-30 g (has no administration in time range)     Or   dextrose 50 % injection 25-50 mL (has no administration in time range)     Or   glucagon injection 1 mg (has no administration in time range)   dextrose 10% BOLUS 300 mL (300 mLs Intravenous $New Bag 7/10/24 0045)   sodium chloride 0.9% BOLUS 1,000 mL (has no administration in time range)   dextrose 50 % injection 25 g (25 g Intravenous $Given 7/10/24 0046)   insulin regular 1 unit/mL injection 10 Units (10 Units Intravenous $Given 7/10/24 0046)        Patient's response to treatments and/or interventions:   Resting comfortably    To be done/followed up on inpatient unit:    See any  in-patient orders    Does this patient have any cognitive concerns?:  None    Activity level - Baseline/Home:    Independent    Activity Level - Current:     Stand with Assist    Patient's Preferred language: English     Needed?: No    Isolation: None  Infection: Not Applicable  Patient tested for COVID 19 prior to admission: NO    Bariatric?: No    Vital Signs:   Vitals:    07/10/24 0000 07/10/24 0015 07/10/24 0030 07/10/24 0050   BP: (!) 92/37 (!) 93/37 (!) 87/39 (!) 87/39   BP Location: Right arm Right arm     Patient Position: Semi-Gibson's Semi-Gibson's     Cuff Size: Adult Regular Adult Regular     Pulse: 84 84 85 83   Resp:  10 11 14   Temp:    98  F (36.7  C)   TempSrc:    Temporal   SpO2: 100% 100% 100% 100%   Weight:           Cardiac Rhythm:     Was the PSS-3 completed:   Yes    Family Comments: No family present at this time.     For the majority of the shift this patient's behavior was Green.   Behavioral interventions performed were     ED NURSE PHONE NUMBER: *[phone]

## 2024-07-10 NOTE — H&P
[facility]    History and Physical  Hospitalist       Date of Admission:  7/9/2024  Date of Service (when I saw the patient): 07/10/24    ASSESSMENT  Crispin Ham is a markedly pleasant 39 year old gentleman with past medical history that is most notable for severe alcohol use disorder, alcoholic cirrhosis, hepatitis, and hepatic encephalopathy, among multiple others; who presents with progressive fatigue and exertional dyspnea, and is found to have acute on chronic anemia as well as acute hyponatremia and GATITO.    PLAN     Acute on chronic anemia: Of note, Mr. Ham has severe alcohol use disorder that has led to cirrhosis, with portal hypertension, ascites, and hepatic encephalopathy. He is followed by [facility]. Reportedly he has had prior duodenal ulcers and prior gastric polypectomy as well as prior SBP. EGD in 3/2024 showed grade 1 non-bleeding esophageal varices. He was admitted here in 4/2024 for jaundice and confusion, and required intubation for severe hepatic encephalopathy and acute blood loss anemia. EGD was repeated and no acute bleeding from the intestinal tract was noted. Epistaxis was suspected as the cause of anemia, vs possible retroperitoneal hematoma seen on CTA. The cause of that hematoma was felt related to a left femoral CVC placement while critically ill. No intervention was needed for it and he was stabilized with transfusions and lactulose and discharged. HGB was 7.6 at time of discharge.    Now, he returns for evaluation of progressive fatigue. He endorses ongoing intermittent epistaxis. In the ED, he has hypotension and tachycardia. He is afebrile, without hypoxia. WBC is normal. HGB is down to 4.4. he has acute hyponatremia to 122 and GATITO as discussed below. CO2 is 17. K is 5.6.     Overall, his symptoms seem most consistent with symptomatic acute blood loss anemia. By history this could be due to ongoing epistaxis though we will also assess for PUD, variceal  bleeding, or recurrent RP bleeding. However, in the absence of abdominal pain or worsening distension, and no report of melena or hematochezia, we hope these conditions may be less likely.       -- Inpatient. NPO. [facility] consulted. Empiric BID Protonix IV ordered. Low threshold to add empiric Octreotide.    -- Given 2 units packed red cell transfusion in the ED; q 6 HGB checks ordered for further transfusions as needed for HGB 7 or less    -- NS  ml/hour ordered with a stop date in 10 hours for clinical reassessment    -- Non-contrast abdominal CT ordered.    -- Pending results of [facility] assessment and clinical status reassessment, would consider ENT consultation    -- Resume Spironolactone and Lasix when able clinically    -- Repeat CBC and CMP in AM. SW consulted for disposition planning.    Recent Labs   Lab Test 07/09/24  2342 04/30/24  0832 04/29/24  0837   HGB 4.4* 7.6* 8.2*     GATITO and acute metabolic acidosis: Suspect due to hypovolemia. Hepatorenal syndrome also on the differential.       -- Nephrology consulted. IVF ordered as above. UA and Shan, UOsm ordered. Repeat BMP in AM.    Recent Labs   Lab Test 07/09/24  2342 04/30/24  0832 04/29/24  0837   CR 3.14* 0.50* 0.48*     Acute hyperkalemia: Reversed in the ED. EKG shows sinus tachycardia without peaked T waves.      -- Repeat K ordered in the ED; will consider adding Lokelma pending results    Acute hypochloremic hyponatremia: Noted to have acute milder hypotension in 4/2024 to 129. It was 131 by 4/20/2024. 122 tonight; suspect hypovolemia due to blood loss.      -- Q 6 Na checks ordered    Ongoing alcoholic hepatitis: AST 52, Tbili 8.6. Tbili had been 10.3 in 4/2024. He admits to ongoing ETOH use intermittently.        -- Resume Naltrexone when verified    History of hepatic encephalopathy: Ammonia is 62. Currently A and O times 4.      -- Resume home Lactulose and Rifaximin when verified.    Chronic thrombocytopenia: Monitor carefully while  hospitalized    Platelet Count   Date Value Ref Range Status   07/09/2024 75 (L) 150 - 450 10e3/uL Final      History of chronic diastolic CHF and pleural effusions: TTE 4/2024 showed preserved LVEF with LVH. Caution with IV fluid.    Chronic orthostatic hypotension: On Midodrine as an outpatient; resume when verified    I have spent 75 minutes on the date of service doing chart review, history, examination, documentation, and further activities per the note.    Chief Complaint   Fatigue    History is obtained from the patient and the ED physician whom I have spoken with    History of Present Illness   Crispin Ham is a markedly pleasant 39 year old gentleman who presents with 1-2 weeks of progressively severe fatigue, associated with exertional dyspnea, sore throat, and intermittent epistaxis that has occurred when he wakes up in the mornings. He was seen recently in evaluation of these symptoms by his PCP who modesto some blood and found worsening anemia; he was called and advised to come to the ED for further evaluation. He adds that he has chronic edema in his legs which is unchanged recently. He has noticed a non productive cough and sore throat. He has felt nausea for the past few days without vomiting. He has not noted any melena or hematochezia; his bowels have been moving regularly due to lactulose use. He has not felt increased abdominal distension: he says at his last GI visit a couple of weeks ago they assessed for ascites but dit not find enough fluid to drain at that time. He reports occasional recent lapses in ETOH use. He denies any NSAID use, and he otherwise denies any abdominal or chest pain, fever, chills, sweats, dizziness, light headedness, or any other acute complaints.    In the ED,   07/09 2309 87/39 98  F (36.7  C) 92 18 100 %     BP improved prior to intervention to 94/43.    CBC and CMP were notable for HGB 4.4, PLT 75, Na 122, K 5.6, Cl 95, CO2 17, BUN 51.5, Cr 3.14, Alb 3.3, Tprot  6.2, AST 52, Tbili 8.6, Glucose 112, otherwise were within the normal reference range. Mag was 1.7. WBC was 6.4. Ammonia was 62. EKG showed NSR without ST segment elevations. INR was 2.26.    He was given IV insulin 10 units with dextrose. Repeat K level has been drawn.    IV fluid and transfusion 2 units packed red blood cells have been ordered in the ED.    PHYSICAL EXAM  Blood pressure (!) 87/39, pulse 85, temperature 98  F (36.7  C), resp. rate 11, weight 108.9 kg (240 lb), SpO2 100%.  Constitutional: Alert and oriented to person, place and time; no apparent distress  Respiratory: lungs diminished to auscultation bilaterally  Cardiovascular: regular S1 S2, 3/6 brandie at upper sternum  GI: abdomen soft non tender, mildly distended, bowel sounds positive  Skin: jaundiced  Musculoskeletal: severe bilateral LE edema  Neurologic: extra-ocular muscles intact; moves all four extremities  Psychiatric: appropriate affect, insight and judgment     DVT Prophylaxis: Pneumatic Compression Devices  Code Status: Full Code    Disposition: Expected discharge in several days    Anup Schwartz MD, MD    Past Medical History    I have reviewed this patient's medical history and updated it with pertinent information if needed.   Past Medical History:   Diagnosis Date    (HFpEF) heart failure with preserved ejection fraction (H)     Alcoholic cirrhosis of liver with ascites (H)     Alcoholic hepatitis with ascites (H28)     Esophageal varices (H)     Hepatic encephalopathy (H)     Hypertension     Hyponatremia     Multiple duodenal ulcers     SBP (spontaneous bacterial peritonitis) (H)     Severe alcohol use disorder (H)        Past Surgical History   I have reviewed this patient's surgical history and updated it with pertinent information if needed.  Past Surgical History:   Procedure Laterality Date    COLONOSCOPY      EGD    ESOPHAGOSCOPY, GASTROSCOPY, DUODENOSCOPY (EGD), COMBINED N/A 03/16/2024    Procedure:  ESOPHAGOGASTRODUODENOSCOPY;  Surgeon: Chato Juan MD;  Location:  OR    ESOPHAGOSCOPY, GASTROSCOPY, DUODENOSCOPY (EGD), COMBINED N/A 2024    Procedure: Esophagoscopy, gastroscopy, duodenoscopy (EGD), combined;  Surgeon: Hiren Vogt DO;  Location:  GI    ORTHOPEDIC SURGERY      wrist  surgery    WRIST SURGERY Left        Prior to Admission Medications   Prior to Admission Medications   Prescriptions Last Dose Informant Patient Reported? Taking?   Vitamin D, Cholecalciferol, 10 MCG (400 UNIT) TABS  Self Yes No   Sig: Take 1 tablet by mouth daily   ciprofloxacin (CIPRO) 500 MG tablet   No No   Sig: Take 1 tablet (500 mg) by mouth every 24 hours   folic acid (FOLVITE) 1 MG tablet  Self No No   Sig: Take 1 tablet (1 mg) by mouth daily   furosemide (LASIX) 20 MG tablet   No No   Sig: Take 1 tablet (20 mg) by mouth daily   lactulose (CHRONULAC) 10 GM/15ML solution   No No   Si mLs (20 g) by Oral or Feeding Tube route 2 times daily   magnesium oxide 400 MG tablet   Yes No   Sig: Take 400 mg by mouth daily   miconazole (MICATIN) 2 % external powder   No No   Sig: Apply topically 2 times daily   midodrine (PROAMATINE) 5 MG tablet   No No   Sig: Take 3 tablets (15 mg) by mouth 3 times daily (with meals)   multivitamin, therapeutic (THERA-VIT) TABS tablet  Self Yes No   Sig: Take 1 tablet by mouth daily   mupirocin (BACTROBAN) 2 % external ointment   No No   Sig: Apply topically 2 times daily To affected nose   naltrexone (DEPADE/REVIA) 50 MG tablet   Yes No   Sig: Take 50 mg by mouth daily   pantoprazole (PROTONIX) 40 MG EC tablet   No No   Sig: Take 1 tablet (40 mg) by mouth 2 times daily   rifaximin (XIFAXAN) 550 MG TABS tablet   No No   Si tablet (550 mg) by Oral or Feeding Tube route 2 times daily   spironolactone (ALDACTONE) 50 MG tablet   Yes No   Sig: Take 50 mg by mouth daily   thiamine (B-1) 100 MG tablet  Self No No   Sig: Take 1 tablet (100 mg) by mouth daily       Facility-Administered Medications: None     Allergies   Allergies   Allergen Reactions    Excedrin Extra Strength [Aspirin-Acetaminophen-Caffeine]      puffy eyes and dry throat a few years ago. Tolerates ibuprofen and acetaminophen, but avoids aspirin    Nsaids Angioedema     Patient reports having a reaction of puffy eyes and dry throat a few years ago, but he has taken Advil in 2023 and did not react.       Social History   I have reviewed this patient's social history and updated it with pertinent information if needed. Crispin Ham  reports that he has never smoked. He does not have any smokeless tobacco history on file. He reports current alcohol use. He reports that he does not currently use drugs.    Family History   Family history assessed and, except as above, is non-contributory.    Review of Systems   The 10 point Review of Systems is negative other than noted in the HPI or here.     Primary Care Physician   Blake Canchola    Data   Labs Ordered and Resulted from Time of ED Arrival to Time of ED Departure   COMPREHENSIVE METABOLIC PANEL - Abnormal       Result Value    Sodium 122 (*)     Potassium 5.6 (*)     Carbon Dioxide (CO2) 17 (*)     Anion Gap 10      Urea Nitrogen 51.5 (*)     Creatinine 3.14 (*)     GFR Estimate 25 (*)     Calcium 8.9      Chloride 95 (*)     Glucose 112 (*)     Alkaline Phosphatase 106      AST 52 (*)     ALT 23      Protein Total 6.2 (*)     Albumin 3.3 (*)     Bilirubin Total 8.6 (*)    CBC WITH PLATELETS AND DIFFERENTIAL - Abnormal    WBC Count 6.4      RBC Count 1.10 (*)     Hemoglobin 4.4 (*)     Hematocrit 12.8 (*)      (*)     MCH 40.0 (*)     MCHC 34.4      RDW 15.2 (*)     Platelet Count 75 (*)     % Neutrophils 70      % Lymphocytes 12      % Monocytes 14      % Eosinophils 3      % Basophils 0      % Immature Granulocytes 1      NRBCs per 100 WBC 0      Absolute Neutrophils 4.5      Absolute Lymphocytes 0.7 (*)     Absolute Monocytes 0.9      Absolute  Eosinophils 0.2      Absolute Basophils 0.0      Absolute Immature Granulocytes 0.1      Absolute NRBCs 0.0     INR - Abnormal    INR 2.26 (*)    AMMONIA - Abnormal    Ammonia 62 (*)    MAGNESIUM - Normal    Magnesium 1.7     GLUCOSE MONITOR NURSING POCT   GLUCOSE MONITOR NURSING POCT   GLUCOSE MONITOR NURSING POCT   TYPE AND SCREEN, ADULT    ABO/RH(D) A POS      Antibody Screen Negative      SPECIMEN EXPIRATION DATE [id number]     PREPARE RED BLOOD CELLS (UNIT)    Blood Component Type Red Blood Cells      Product Code K1533Q46      Unit Status Ready for issue      Unit Number [id number]      CROSSMATCH Compatible      CODING SYSTEM LVLX846     PREPARE RED BLOOD CELLS (UNIT)    Blood Component Type Red Blood Cells      Product Code E6823A65      Unit Status Issued      Unit Number [id number]      CROSSMATCH Compatible      CODING SYSTEM GIVP343      ISSUE DATE AND TIME [id number]      UNIT ABO/RH A+      UNIT TYPE ISBT 6200     PREPARE RED BLOOD CELLS (UNIT)   TRANSFUSE RED BLOOD CELLS (UNIT)   ABO/RH TYPE AND SCREEN       Data reviewed today:  I personally reviewed the EKG tracing showing NSR .

## 2024-07-10 NOTE — ED NOTES
Dr. Schwartz returned call  see order for 2 Units of PRBCs. Additionally and considering pt has x 2 peripheral  IV's, Dr. Schwartz wants the Dextrose 10% 300 ml bolus infusing @ 75ml  to be stopped when starting the 2 units of RBCs and to check blood sugar prior to to starting the blood.

## 2024-07-10 NOTE — CONSULTS
Nephrology Initial Consult  July 10, 2024      Crispin Ham MRN:[id number] YOB: 1985  Date of Admission:7/9/2024  Primary care provider: Blake Canchola  Requesting physician: Diana Garibay MD    ASSESSMENT AND RECOMMENDATIONS:   1 alcoholic liver cirrhosis with decompensated liver failure/portal hypertension/ascites/esophageal varices.  Prior history of hepatic encephalopathy    2 severe anemia-acute on chronic.  History of nonbleeding esophageal varices and duodenal ulcer but no hematochezia or melena.  Has been having epistaxis.  Hemoglobin of 4.4 on presentation.  Status post multiple units of PRBC.  GI input pending.    3 hyponatremia-multifactorial.  Likely hypovolemic on presentation with severe anemia, hypotension.  Poor clearance with severe renal failure contributing as well as end-stage liver disease, poor solute intake.    4 severe GATITO-possibly ischemic tubular injury in setting of severe anemia, hypotension, prolonged prerenal state .  Patient was on spironolactone and Lasix.  -Urinalysis with hyaline casts, otherwise bland    5 Hyperkalemia-with acute renal failure, spironolactone     Recommendation-  -check urine sodium  -PRBC as needed.    -Volume expansion with albumin 75 g daily  -Continue midodrine  - Hold spironolactone/ lasix   -Continue medical management of hyperkalemia.  Agree with Lokelma.  Shift as necessary.    Critically patient at high risk of decompensation.  Will continue follow closely.    Thank you for the consult. Will continue to follow along with you .        Esau Cash MD  [facility] - Nephrology   [phone]        REASON FOR CONSULT: Acute renal failure.    HISTORY OF PRESENT ILLNESS:  Crispin Ham is a 39 year old gentleman with previously normal creatinine, alcoholic cirrhosis with decompensated liver failure/portal hypertension, history of duodenal ulcer and esophageal varices who was admitted on 7/9 with severe anemia with hemoglobin  "of 4.4 without obvious signs of bleeding other than epistaxis.   Significantly abnormal labs notable for GATITO with creatinine of 3.1, potassium 5.5, bicarb 17, albumin 3.1, sodium 122  Hypotensive on presentation.  Received multiple units of PRBC.  Last hemoglobin 6.1  Volume expanded with IV saline.  Creatinine stable at 3.1.  Sodium remains at 122.  Chronically low around 129-133    Reports lower urine outpt for couple days  Drinking lots of \" soda\"  Acitve alcohol use - last drink 1.5 wks ago   Has not needed paracentesis recently   Has chronic leg edema b/l       PAST MEDICAL HISTORY:  Reviewed with patient on 07/10/2024  and is as listed in HPI.       MEDICATIONS:  PTA Meds  Prior to Admission medications    Medication Sig Last Dose Taking? Auth Provider Long Term End Date   ciprofloxacin (CIPRO) 500 MG tablet Take 1 tablet (500 mg) by mouth every 24 hours 7/9/2024 Yes Leanna Underwood MD     folic acid (FOLVITE) 1 MG tablet Take 1 tablet (1 mg) by mouth daily 7/9/2024 Yes Roque Hector MD     furosemide (LASIX) 40 MG tablet Take 40 mg by mouth 2 times daily 7/9/2024 Yes Unknown, Entered By History No    lactulose (CHRONULAC) 10 GM/15ML solution 30 mLs (20 g) by Oral or Feeding Tube route 2 times daily 7/9/2024 Yes Bernard Quiroz MD No    magnesium oxide 400 MG tablet Take 400 mg by mouth daily 7/9/2024 Yes Unknown, Entered By History     midodrine (PROAMATINE) 5 MG tablet Take 5 mg by mouth 3 times daily (with meals) 7/9/2024 Yes Unknown, Entered By History     multivitamin, therapeutic (THERA-VIT) TABS tablet Take 1 tablet by mouth daily 7/9/2024 Yes Unknown, Entered By History     naltrexone (DEPADE/REVIA) 50 MG tablet Take 50 mg by mouth daily 7/9/2024 Yes Unknown, Entered By History Yes    pantoprazole (PROTONIX) 40 MG EC tablet Take 1 tablet (40 mg) by mouth 2 times daily 7/9/2024 Yes Bernard Quiroz MD     rifaximin (XIFAXAN) 550 MG TABS tablet 1 tablet (550 mg) by Oral or Feeding Tube " route 2 times daily 7/9/2024 Yes Bernard Quiroz MD     spironolactone (ALDACTONE) 50 MG tablet Take 50 mg by mouth daily 7/9/2024 Yes Unknown, Entered By History No    thiamine (B-1) 100 MG tablet Take 1 tablet (100 mg) by mouth daily Past Month Yes Roque Hector MD     Vitamin D, Cholecalciferol, 10 MCG (400 UNIT) TABS Take 1 tablet by mouth daily 7/9/2024 Yes Unknown, Entered By History        Current Meds  Current Facility-Administered Medications   Medication Dose Route Frequency Provider Last Rate Last Admin    pantoprazole (PROTONIX) IV push injection 40 mg  40 mg Intravenous Q12H Anup Schwartz MD   40 mg at 07/10/24 0224    sodium chloride (PF) 0.9% PF flush 3 mL  3 mL Intracatheter Q8H Anup Schwartz MD        sodium chloride (PF) 0.9% PF flush 3 mL  3 mL Intracatheter Q8H Diana Garibay MD        sodium zirconium cyclosilicate (LOKELMA) packet 10 g  10 g Oral TID Anup Schwartz MD   10 g at 07/10/24 0804    Followed by    [START ON 7/12/2024] sodium zirconium cyclosilicate (LOKELMA) packet 10 g  10 g Oral Daily Anup Schwartz MD         Infusion Meds  Current Facility-Administered Medications   Medication Dose Route Frequency Provider Last Rate Last Admin    sodium chloride 0.9 % infusion   Intravenous Continuous Diana Garibay MD           ALLERGIES:    Allergies   Allergen Reactions    Excedrin Extra Strength [Aspirin-Acetaminophen-Caffeine]      puffy eyes and dry throat a few years ago. Tolerates ibuprofen and acetaminophen, but avoids aspirin    Nsaids Angioedema     Patient reports having a reaction of puffy eyes and dry throat a few years ago, but he has taken Advil in 2023 and did not react.       REVIEW OF SYSTEMS:  A comprehensive of systems was negative except as noted above.    SOCIAL HISTORY:   Reviewed with patient on 07/10/2024     FAMILY MEDICAL HISTORY:   No family history on file.  Reviewed with patient on 07/10/2024     PHYSICAL EXAM:    Temp  Av.1  F (36.7  C)  Min: 97.7  F (36.5  C)  Max: 98.8  F (37.1  C)      Pulse  Av.8  Min: 79  Max: 92 Resp  Avg: 15.9  Min: 10  Max: 23  SpO2  Av.4 %  Min: 90 %  Max: 100 %       BP (!) 89/45   Pulse 83   Temp 97.7  F (36.5  C)   Resp 13   Wt 108.9 kg (240 lb)   SpO2 100%   BMI 32.55 kg/m     Date 07/10/24 07 - 24 0659   Shift 6070-8819 2040-0058 0457-7645 24 Hour Total   INTAKE   Blood Components 300   300   Shift Total(mL/kg) 300(2.76)   300(2.76)   OUTPUT   Urine 125   125   Shift Total(mL/kg) 125(1.15)   125(1.15)   Weight (kg) 108.86 108.86 108.86 108.86      Admit Weight: 108.9 kg (240 lb)     GENERAL APPEARANCE: no distress,  awake  HENT: NC/AT,  mouth  without ulcers or lesions  Lymphatics: no cervical or supraclavicular LAD  Endo: no moon facies, no goiter  Pulmonary: lungs clear to auscultation with equal breath sounds bilaterally, no clubbing  CV: regular rhythm, normal rate, no rub   - JVP -   - Edema ++, pitting   GI: soft, nontender,   MS: no evidence of inflammation in joints  : no godoy  SKIN: spider angioma in upper chest   NEURO: face symmetric, nonfocal       LABS:   CMP  Recent Labs   Lab 07/10/24  1311 07/10/24  1004 07/10/24  0952 07/10/24  0759 07/10/24  0636 07/10/24  0447 07/10/24  0405 07/10/24  0335 07/10/24  0258 07/10/24  0104 24  2344 24  2342   NA  --   --   --   --  122*  --   --   --  122*  --   --  122*   POTASSIUM  --  5.5*  --   --  5.4*  --  5.6*  --  5.5*  --   --  5.6*   CHLORIDE  --   --   --   --  97*  --   --   --  97*  --   --  95*   CO2  --   --   --   --  17*  --   --   --  17*  --   --  17*   ANIONGAP  --   --   --   --  8  --   --   --  8  --   --  10   GLC 94  --  94 75 114*  113*   < >  --    < > 96   < >  --  112*   BUN  --   --   --   --  50.8*  --   --   --  51.4*  --   --  51.5*   CR  --   --   --   --  3.11*  --   --   --  3.10*  --   --  3.14*   GFRESTIMATED  --   --   --   --  25*  --   --   --  26*  --   --   25*   MIGUELINA  --   --   --   --  8.6  --   --   --  8.7  --   --  8.9   MAG  --   --   --   --   --   --   --   --   --   --  1.7  --    PROTTOTAL  --   --   --   --  5.9*  --   --   --   --   --   --  6.2*   ALBUMIN  --   --   --   --  3.1*  --   --   --   --   --   --  3.3*   BILITOTAL  --   --   --   --  8.2*  --   --   --   --   --   --  8.6*   ALKPHOS  --   --   --   --  98  --   --   --   --   --   --  106   AST  --   --   --   --  50*  --   --   --   --   --   --  52*   ALT  --   --   --   --  22  --   --   --   --   --   --  23    < > = values in this interval not displayed.     CBC  Recent Labs   Lab 07/10/24  1311 07/10/24  0636 07/09/24  2342   HGB 6.1* 4.6* 4.4*   WBC  --  4.9 6.4   RBC  --  1.33* 1.10*   HCT  --  13.7* 12.8*   MCV  --  103* 116*   MCH  --  34.6* 40.0*   MCHC  --  33.6 34.4   RDW  --  21.7* 15.2*   PLT  --  63* 75*     INR  Recent Labs   Lab 07/09/24  2344   INR 2.26*     ABGNo lab results found in last 7 days.   URINE STUDIES  Recent Labs   Lab Test 07/10/24  0423 04/18/24  1230 11/16/23  1514 09/25/23  1646   COLOR Yellow Dark Yellow* Orange* Orange*   APPEARANCE Clear Slightly Cloudy* Slightly Cloudy* Cloudy*   URINEGLC Negative Negative 50* 50*   URINEBILI Negative Small* Moderate* Large*   URINEKETONE Negative Trace* Negative Negative   SG 1.013 1.033 1.028 1.021   UBLD Negative Negative Trace* Trace*   URINEPH 5.5 6.0 6.0 5.0   PROTEIN 20* 30* 100* 30*   NITRITE Negative Negative Negative Negative   LEUKEST Negative Negative Negative Trace*   RBCU 1 0 6* 1   WBCU 1 3 8* 11*     No lab results found.  PTH  No lab results found.  IRON STUDIES  Recent Labs   Lab Test 03/18/24  1108 10/30/23  1346 09/08/23  1147   IRON 160* 96 96   FEB  --   --  113*   IRONSAT  --   --  85*   VIANCA  --  3,593*  --        IMAGING:  Personally reviewed the images and findings are as listed in HPI     Esau Cash MD

## 2024-07-10 NOTE — ED NOTES
"Patient on the call light asking to go to the bathroom after using urinal in the room. Patient was informed that since he has had blood transfusions, RN would still continue to use the urinal in the room and can get a commode for the patient if needed. Patient declined, stated, \"ill wait\".   "

## 2024-07-11 NOTE — PROGRESS NOTES
Gastroenterology Follow Up Note    Crispin Ham MRN#  [id number]   Age:  39 year old YOB: 1985    Date of Admission:  7/9/2024     Subjective   Patient with ongoing epistaxis.  Was seen by ENT with evaluation noting bilateral nasal septal abrasions treated with cautery and silver nitrate.  Patient notes epistaxis stopped after the procedure, but slow pace nose bleeding has resumed.  Clinically patient reports feeling well and denies any major complaints.  No abdominal pain, nausea, or emesis.  Dyspnea on exertion improved.  No chest pain or dizziness.       MEDICATIONS & ALLERGIES   Current medication list reviewed.      Allergies   Allergen Reactions    Excedrin Extra Strength [Aspirin-Acetaminophen-Caffeine]      puffy eyes and dry throat a few years ago. Tolerates ibuprofen and acetaminophen, but avoids aspirin    Nsaids Angioedema     Patient reports having a reaction of puffy eyes and dry throat a few years ago, but he has taken Advil in 2023 and did not react.          OBJECTIVE   Vitals BP 99/54 (BP Location: Left arm)   Pulse 80   Temp 97.5  F (36.4  C) (Oral)   Resp 16   Wt 108.9 kg (240 lb)   SpO2 100%   BMI 32.55 kg/m          General:   Middle aged CM in NAD.   HEENT:   NC/AT. MMM. Icteric sclerae.   Lungs:  No respiratory distress.   Heart:  RRR. +S1, S2.    Abdomen:   Soft. NT/ND. BS active.    Ext/MS:   No cyanosis or erythema.    Neuro:   No focal deficits noted.    Skin:  No obvious rashes or lesions noted.         LABORATORY AND IMAGING    ELECTROLYTE PANEL   Recent Labs   Lab Test 07/11/24  1821 07/11/24  1248 07/11/24  0954 07/10/24  2225 07/10/24  1004 07/10/24  0636 07/10/24  0405 07/10/24  0258   * 124* 123*   < >  --  122*  --  122*   POTASSIUM  --   --  5.3  --  5.5* 5.4*   < > 5.5*   BUN  --   --  44.9*  --   --  50.8*  --  51.4*   CR  --   --  2.48*  --   --  3.11*  --  3.10*   MIGUELINA  --   --  9.1  --   --  8.6  --  8.7    < > = values in this interval not  displayed.      HEMATOLOGY PANEL   Recent Labs   Lab Test 07/11/24  0954 07/11/24  0422 07/10/24  2225 07/10/24  1311 07/10/24  0636 07/09/24  2344 07/09/24  2342 04/25/24  0641 04/24/24  0850 04/23/24  0550 04/23/24  0430   WBC 4.8  --   --   --  4.9  --  6.4   < > 6.8   < > 4.1   HGB 6.8* 7.5* 6.6*   < > 4.6*  --  4.4*   < > 8.5*   < > 6.6*   MCV 98  --   --   --  103*  --  116*   < > 100   < > 101*   PLT 61*  --   --   --  63*  --  75*   < > 103*   < > 87*   INR  --   --   --   --   --  2.26*  --   --  2.48*  --  2.74*    < > = values in this interval not displayed.      LIVER AND PANCREAS PANEL   Recent Labs   Lab Test 07/11/24  0954 07/10/24  0636 07/10/24  0423 07/09/24  2342 04/30/24  0832 04/19/24  1802 04/19/24  0458 04/18/24  1230 11/17/23  0536 11/16/23  1514 09/25/23  1537 09/25/23  1424 09/08/23  1224 09/08/23  1147   ALBUMIN  --  3.1*  --  3.3* 2.8*   < > 2.8*  --    < > 3.1*   < > 2.4*   < > 2.3*   BILITOTAL 10.7* 8.2*  --  8.6* 10.3*   < > 11.3*  --    < > 8.4*   < > 21.7*   < > 10.4*   ALT  --  22  --  23 52   < > 46  --    < > 49   < > 69   < > 82*   AST  --  50*  --  52* 63*   < > 96*  --    < > 222*   < >  --    < > 331*   PROTEIN  --   --  20*  --   --   --   --  30*  --  100*   < >  --    < >  --    LIPASE  --   --   --   --   --   --  56  --   --   --   --  237*  --  272*    < > = values in this interval not displayed.      I have reviewed the current diagnostic and laboratory tests.     Assessment / Recommendations:     Assessment:  Alcoholic liver disease with underlying cirrhosis.  MELD-Na: 36.  Discriminant function also high, but not a candidate for steroids due to severe anemia.  Acute on chronic anemia.  Hemoglobin 4.4 g/dL on admission that partially improved after multiple units of pRBC.  No evidence of overt GI bleeding but persistent epistaxis despite of ENT interventions.  Suspect anemia secondary to multifactorial etiology with possible contributing etiologies of epistaxis,  slow-pace GI blood loss from portal hypertensive gastropathy in setting of severe coagulopathy, and bone marrow suppression from heavy alcohol use.    History of ascites and SBP.  Maintained on Furosemide 80 mg, Spironolactone 50 mg, and prophylaaxis Bactrim daily prior to admission.  On hold due to electrolyte imbalance.    History of encephalopathy.  On Lactulose and Rifaximin.  No evidence of confusion or asterixis on exam today.  Active alcohol abuse, ongoing.    Recommendations:  Low suspicion for acute GI blood loss.  No plan for repeat endoscopic evaluation in the absence of any overt GI bleeding.  Monitor hemoglobin and transfuse pRBC as needed.  Correction of coagulopathy as needed.  Agree with hematology consult for management of severe coagulopathy and evaluation for possible hemolysis.  Continue Pantoprazole 40 mg once daily.  Advance diet as tolerated.  Management of diuretics as per renal team.  Continue Lactulose and Rifaximin.  Complete abstinence from alcohol on discharge encouraged.        Total time spent in chart review, direct medical discussion, examination, and documentation was 20 minutes.    Chato Juan MD  [facility]  [phone]  I appreciate the opportunity to participate in the care of this patient.   Please feel free to call me with any questions or concerns.

## 2024-07-11 NOTE — CONSULTS
Care Management Initial Consult    General Information  Assessment completed with: Patient, VM-chart review,    Type of CM/SW Visit: Initial Assessment    Primary Care Provider verified and updated as needed: Yes (Dr. Blake Canchola)   Readmission within the last 30 days: no previous admission in last 30 days      Reason for Consult: discharge planning  Advance Care Planning: Advance Care Planning Reviewed: no concerns identified          Communication Assessment  Patient's communication style: spoken language (English or Bilingual)             Cognitive  Cognitive/Neuro/Behavioral: WDL  Level of Consciousness: alert  Arousal Level: opens eyes spontaneously     Mood/Behavior: calm, cooperative          Living Environment:   People in home: parent(s)     Current living Arrangements: house      Able to return to prior arrangements: yes       Family/Social Support:  Care provided by: self, parent(s)  Provides care for: no one  Marital Status: Single  Parent(s)          Description of Support System:           Current Resources:   Patient receiving home care services: No     Community Resources: None  Equipment currently used at home: cane, straight, walker, standard, shower chair, grab bar, tub/shower (states does not use the shower chair but is available)  Supplies currently used at home:      Employment/Financial:  Employment Status: other (see comments) (currently on medical leave)        Financial Concerns: none           Does the patient's insurance plan have a 3 day qualifying hospital stay waiver?  No    Lifestyle & Psychosocial Needs:  Social Determinants of Health     Food Insecurity: No Food Insecurity (9/25/2023)    [facility] [employer] [facility]    Food Insecurity     Worried About Running Out of Food in the Last Year: 1   Depression: Not at risk (1/8/2024)    Received from [employer] [facility], [employer] [facility] [employer] [facility]    PHQ-2     PHQ-2 TOTAL SCORE: 2   Housing Stability: Low Risk  (9/25/2023)    Received from [facility], [facility]    Housing Stability     Unable to Pay for Housing in the Last Year: 1   Tobacco Use: Low Risk  (5/2/2024)    Received from [facility]    Patient History     Smoking Tobacco Use: Never     Smokeless Tobacco Use: Never     Passive Exposure: Not on file   Financial Resource Strain: Low Risk  (9/25/2023)    Received from [facility], [facility]    Financial Resource Strain     Difficulty of Paying Living Expenses: 3     Difficulty of Paying Living Expenses: Not on file   Alcohol Use: Not on file   Transportation Needs: No Transportation Needs (9/25/2023)    Received from [facility], [facility]    Transportation Needs     Lack of Transportation (Medical): 1   Physical Activity: Not on file   Interpersonal Safety: Not on file   Stress: Not on file   Social Connections: Socially Isolated (9/25/2023)    Received from [facility], [facility]    Social Connections     Frequency of Communication with Friends and Family: 4   Health Literacy: Not on file       Functional Status:  Prior to admission patient needed assistance:   Dependent ADLs:: Ambulation-walker, Independent  Dependent IADLs:: Transportation (uses the bus system mostly; difficult to get in/out of a car)       Mental Health Status:          Chemical Dependency Status:                Values/Beliefs:  Spiritual, Cultural Beliefs, Spiritism Practices, Values that affect care: no               Additional Information:  Met with patient at bedside; explained role in discharge planning.      Patient admitted with anemia,  weakness, acute kidney injury; he is currently being transfused and sitting at the side of the bed.    Patient states he is currently on a medical leave from working.  He lives with his parents; is independent in his ADL's; walks with a walker; he has a shower chair available but does not use it.  He uses the bus system for transportation; states it is difficult to get in and out of a car due to his leg swelling.      He was discharged in April with home care. He states he never started it as they kept cancelling.  He does not think he needs home care.    PT and OT have not been consulted.  Per charting, he is independent in hygiene and assist of 1/GB for ambulation.      Post hospital PCP appointment made for Monday July 15 at 10:20 am with Dr. Blake Canchola at the [facility].    No other needs identified but will continue to follow should any needs arise.      Kirti Figueroa RN  Inpatient Float Care Coordinator  [facility]

## 2024-07-11 NOTE — CONSULTS
ENT CONSULTATION     Admission Date/Time: 7/9/2024 11:04 PM  Primary Care Provider:  Blake Canchola  Attending Physician: Dr. Diana Garibay      CHIEF COMPLAINT:  We were asked by Dr. Diana Garibay to see Crispin Ham in consultation for epistaxis.    HPI: Crispin Ham is a 39 year old male with PMH of severe alcohol use disorder, alcoholic cirrhosis, hepatitis, hepatic encephalopathy, recurrent epistaxis who presented to [facility] at the request of his PCP for significant anemia, hyponatremia and GATITO noted on labwork. Hgb 4.4, Cr 3.14, sodium 122. He has had no melena or hematochezia, no vomiting or hematemesis. His last EGD was 4/18/24 and showed normal esophagus without evidence o varices, no blood in stomach, no gastric varices, moderate portal hypertensive gastropathy in the cardia and the gastric fundus. He has received 4 transfusions since admission.    ENT consulted due to patient having an active slow oozing nosebleed this AM, which has since stopped on it's own without intervention. HGB 7.5 early this AM, repeat at 10AM was 6.8. He reports a long history of intermittent nosebleeds that are self-limiting. Sometimes his nosebleeds will occur daily, sometimes he'll go weeks at a time without one. They occur on both sides of his nose and usually happen in the mornings when he gets up, he attributes this to his BP increasing, or if he picks/rubs his nose. He occasionally uses nasal saline spray, vaseline or other ointments to help keep it moist, but he does not do this very regularly. He denies any history of nasal trauma or surgery. He has never been packed or had cauterization performed on his nose. He does not use any medicated nasal sprays.      REVIEW OF SYSTEMS:    12 point review of symptoms negative except for what is reported in HPI and below.     Past Medical History:   Diagnosis Date    (HFpEF) heart failure with preserved ejection fraction (H)     Alcoholic cirrhosis of liver with  ascites (H)     Alcoholic hepatitis with ascites (H28)     Esophageal varices (H)     Hepatic encephalopathy (H)     Hypertension     Hyponatremia     Multiple duodenal ulcers     SBP (spontaneous bacterial peritonitis) (H)     Severe alcohol use disorder (H)        Past Surgical History:   Procedure Laterality Date    COLONOSCOPY      EGD    ESOPHAGOSCOPY, GASTROSCOPY, DUODENOSCOPY (EGD), COMBINED N/A 03/16/2024    Procedure: ESOPHAGOGASTRODUODENOSCOPY;  Surgeon: Chato Juan MD;  Location:  OR    ESOPHAGOSCOPY, GASTROSCOPY, DUODENOSCOPY (EGD), COMBINED N/A 04/18/2024    Procedure: Esophagoscopy, gastroscopy, duodenoscopy (EGD), combined;  Surgeon: Hiren Vogt DO;  Location:  GI    ORTHOPEDIC SURGERY      wrist  surgery    WRIST SURGERY Left 2023       No family history on file.    Social History     Tobacco Use    Smoking status: Never   Substance Use Topics    Alcohol use: Yes     Comment: last drinbk 2 weeks ago    Drug use: Not Currently      Medications Prior to Admission   Medication Sig Dispense Refill Last Dose    ciprofloxacin (CIPRO) 500 MG tablet Take 1 tablet (500 mg) by mouth every 24 hours 60 tablet 1 7/9/2024    folic acid (FOLVITE) 1 MG tablet Take 1 tablet (1 mg) by mouth daily 30 tablet 0 7/9/2024    furosemide (LASIX) 40 MG tablet Take 40 mg by mouth 2 times daily   7/9/2024    lactulose (CHRONULAC) 10 GM/15ML solution 30 mLs (20 g) by Oral or Feeding Tube route 2 times daily 1800 mL 0 7/9/2024    magnesium oxide 400 MG tablet Take 400 mg by mouth daily   7/9/2024    midodrine (PROAMATINE) 5 MG tablet Take 5 mg by mouth 3 times daily (with meals)   7/9/2024    multivitamin, therapeutic (THERA-VIT) TABS tablet Take 1 tablet by mouth daily   7/9/2024    naltrexone (DEPADE/REVIA) 50 MG tablet Take 50 mg by mouth daily   7/9/2024    pantoprazole (PROTONIX) 40 MG EC tablet Take 1 tablet (40 mg) by mouth 2 times daily 60 tablet 0 7/9/2024    rifaximin (XIFAXAN) 550 MG TABS tablet 1  tablet (550 mg) by Oral or Feeding Tube route 2 times daily 120 tablet 0 7/9/2024    spironolactone (ALDACTONE) 50 MG tablet Take 50 mg by mouth daily   7/9/2024    thiamine (B-1) 100 MG tablet Take 1 tablet (100 mg) by mouth daily 30 tablet 0 Past Month    Vitamin D, Cholecalciferol, 10 MCG (400 UNIT) TABS Take 1 tablet by mouth daily   7/9/2024     ALLERGIES/SENSITIVITIES:   Allergies   Allergen Reactions    Excedrin Extra Strength [Aspirin-Acetaminophen-Caffeine]      puffy eyes and dry throat a few years ago. Tolerates ibuprofen and acetaminophen, but avoids aspirin    Nsaids Angioedema     Patient reports having a reaction of puffy eyes and dry throat a few years ago, but he has taken Advil in 2023 and did not react.       PHYSICAL EXAM:   Visit Vitals  BP 94/49   Pulse 90   Temp 97.8  F (36.6  C) (Oral)   Resp 18     Body mass index is 32.55 kg/m .  General Appearance - Appears stated age, no acute distress. Hygiene and grooming are appropriate.  Head/Face - Normocephalic, atraumatic, facial nerve function intact bilaterally. No frontal or maxillary sinus tenderness upon palpation.  Eye Exam - Pupils equally round, reactive to light, extraocular motility intact  Ears - Normal bilateral pinna, no pain with manipulation of pinna or tragus.   Nose -  Midline septum.  Nasal mucosa dry with old blood crusting present on bilateral septum, more anterior on the left and more in the mid-portion of his septum on the right side.  Oral cavity/oropharynx - Oral mucosa pink and dry without lesions.  Dentition good. Posterior pharynx moist and pink. Uvula and tongue midline. No blood noted in the posterior oropharynx and no blood noted down the posterior pharyngeal wall.  Neck Exam - No palpable adenopathy, thyromegaly or salivary gland enlargement.  Respiratory Exam - Non-labored breathing, no audible wheezing or stridor.  Neuro - Alert and oriented times 3, CN 2-12 grossly intact.  Skin - Warm and moist without rashes or  lesions visible.    PROCEDURE  After obtaining informed consent, the patient's bilateral nasal cavities were packed with cotton balls soaked in Afrin/4% lidocaine. After allowing this to sit for several minutes the cotton was removed and the remaining nasal drainage/crusting was suctioned clear bilaterally. A nasal speculum was used to evaluate his bilateral nasal cavities. There was a slow oozing abrasion along the mid-portion of his RIGHT septum, and another abrasion along the anterior portion of his LEFT nasal septum. No pulsatile bleeding, no bleeding noted in the posterior nasal cavity, no bleeding noted going down the nasopharynx. These abraded areas along his septum were successfully cauterized with silver nitrate, no further bleeding was noted. Fibrillar covered in bacitracin was placed along the mid-portion of the RIGHT septum over the cauterized area, and bacitracin was placed along the anterior LEFT nasal septum over the cauterized area. Again there was no further bleeding noted in either nasal cavity and no blood noted going down the posterior pharyngeal wall. He tolerated the procedure well and there were no complications.       ASSESSMENT  Epistaxis      PLAN  Patient is a medically complex 39 year old male with PMH of severe alcohol use disorder, alcoholic cirrhosis, hepatitis, hepatic encephalopathy, recurrent epistaxis who presented to [facility] at the request of his PCP for significant anemia (hgb 4.4), hyponatremia (Na 122) and GATITO (Cr 3.14) noted on labwork. He has received 4u pRBCs since admission, morning HGB 7.5. Patient with self-limiting epistaxis this morning and given reports of frequent epistaxis episodes outpatient with profound anemia on admission, ENT was consulted. Platelets 61k this AM and repeat Hgb was 6.8. He has never had nasal packing or been cauterized before. He reports intermittent epistaxis for the past several months that is always self-limiting but sometimes occurs  daily.    On evaluation, patient has bilateral nasal septum abrasions, one along the anterior portion of the left septum and more along the mid-portion of the right septum. Both these locations were successfuly cauterized with silver nitrate (see procedure note above). Fibrillar coated in bacitracin was placed along the RIGHT nasal septum and bacitracin ointment alone was placed along the left nasal septum.    -Recommend bacitracin ointment BID to bilateral nares (order placed), use a cotton-tipped applicator to apply to the lateral nare, then patient can gently squeeze his nose to distribute the ointment onto his septum. Do NOT rub the cotton-tipped applicator directly on his nasal septum as this can irritate the healing area and causing bleeding. This was discussed in detail with the patient.  -Nasal saline spray (2-3 sprays) to each nare, 3 times daily starting this evening (order placed)  -DO NOT use nasal cannula in this patient. Only use oxygen mask and ALWAYS use with humidification to help minimize risk of nosebleeds.    Patient instructed on the following for the next 2 weeks:  -Continue with bacitracin ointment BID and nasal saline spray TID  -No nose blowing  -Sneeze with mouth open  -Avoid nasal picking or aggressive nose rubbing    -If a bleed occurs: immediately spray Afrin (oxymetazoline) nasal spray (this was left at the patient's bedside) into the side that is bleeding, lean forward, and pinch the front of your nose for a MINIMUM of 10 minutes, continuously. DO NOT release pressure for 10 minutes to allow a good clot to form.    This patient was discussed with Dr. Kelton Ng. Thank you for allowing us to participate in the care of Crispin Ham. ENT signing off, please re-consult as needed. Patient can follow-up in clinic as needed if epistaxis continues after discharge, our clinic information was placed in his discharge orders.    Dottie Escalante PA-C  [facility]   Pager:  [phone]  Available by pager or Secure Chat Monday-Friday 8am-5pm    **Otherwise call the office number for after hours and follow prompts to have on call physician paged**  Office: [phone]

## 2024-07-11 NOTE — PROGRESS NOTES
Nephrology Progress Note  07/11/2024         Assessment & Recommendations:    1 alcoholic liver cirrhosis with decompensated liver failure/portal hypertension/ascites/esophageal varices.  Prior history of hepatic encephalopathy     2 severe anemia-acute on chronic.  History of nonbleeding esophageal varices and duodenal ulcer but no hematochezia or melena.  Has been having epistaxis.  Hemoglobin of 4.4 on presentation.  Status post multiple units of PRBC.  ENT input pending for epistaxis.     3 hyponatremia-multifactorial.  Likely hypovolemic on presentation with severe anemia, hypotension.  Poor clearance with severe renal failure contributing as well as end-stage liver disease, poor solute intake.  -Slightly better.     4 severe GATITO-possibly ischemic tubular injury in setting of severe anemia, hypotension, prolonged prerenal state .  Patient was on spironolactone and Lasix.  Urinary sodium of 22  -Urinalysis with hyaline casts, otherwise bland     5 Hyperkalemia-with acute renal failure, spironolactone   -Improving on Lokelma     Recommendation-  PRBC as needed.    -Volume expansion with albumin 75 g daily  -Continue midodrine  - Hold spironolactone/ lasix   -Continue medical management of hyperkalemia.  Okay with Lokelma today.    Recommendations were communicated to primary team     Esau Cash MD  [facility] - Nephrology   [phone]      Interval History :   Seen / examined.   No acute issues overnight.  Blood pressure generally better.  Noted repeat episode of epistaxis today.  ENT consult pending.  Hemoglobin down to 6.8    Physical Exam:   I/O last 3 completed shifts:  In: 1354.17 [I.V.:364.17]  Out: 525 [Urine:525]  BP 94/49   Pulse 90   Temp 97.8  F (36.6  C) (Oral)   Resp 18   Wt 108.9 kg (240 lb)   SpO2 100%   BMI 32.55 kg/m      GENERAL APPEARANCE: no distress,  awake  Pulmonary: lungs clear to auscultation with equal breath sounds bilaterally, no clubbing  CV: regular rhythm,  normal rate, no rub   - JVP -   - Edema ++, pitting   GI: soft, nontender,   MS: no evidence of inflammation in joints  : no godoy  SKIN: spider angioma in upper chest   NEURO: face symmetric, nonfocal     Labs:   All labs reviewed by me  Electrolytes/Renal -   Recent Labs   Lab Test 07/11/24  0954 07/11/24  0422 07/11/24  0103 07/10/24  2225 07/10/24  1311 07/10/24  1004 07/10/24  0759 07/10/24  0636 07/10/24  0335 07/10/24  0258 07/10/24  0104 07/09/24  2344 04/22/24  1123 04/22/24  0410 04/21/24  0857 04/21/24  0437 04/20/24  0355 04/20/24  0348   * 122*  --  122*  --   --   --  122*  --  122*  --   --    < > 140  --  139  --  138   POTASSIUM 5.3  --   --   --   --  5.5*  --  5.4*   < > 5.5*  --   --    < > 4.1  --  4.8  --  4.3   CHLORIDE 98  --   --   --   --   --   --  97*  --  97*  --   --    < > 109*  --  106  --  107   CO2 14*  --   --   --   --   --   --  17*  --  17*  --   --    < > 22  --  22  --  23   BUN 44.9*  --   --   --   --   --   --  50.8*  --  51.4*  --   --    < > 37.1*  --  32.9*  --  21.8*   CR 2.48*  --   --   --   --   --   --  3.11*  --  3.10*  --   --    < > 1.30*  --  1.85*  --  1.41*   *  --  112*  --  94  --    < > 114*  113*   < > 96   < >  --    < > 164*   < > 149*  159*   < > 164*   MIGUELINA 9.1  --   --   --   --   --   --  8.6  --  8.7  --   --    < > 9.2  --  9.0  --  8.8   MAG  --   --   --   --   --   --   --   --   --   --   --  1.7  --  1.9  --  1.9  --  1.7   PHOS  --   --   --   --   --   --   --   --   --   --   --   --   --  3.1  --  5.7*  --  5.2*    < > = values in this interval not displayed.       CBC -   Recent Labs   Lab Test 07/11/24  0954 07/11/24  0422 07/10/24  2225 07/10/24  1311 07/10/24  0636 07/09/24  2342   WBC 4.8  --   --   --  4.9 6.4   HGB 6.8* 7.5* 6.6*   < > 4.6* 4.4*   PLT 61*  --   --   --  63* 75*    < > = values in this interval not displayed.       LFTs -   Recent Labs   Lab Test 07/10/24  0636 07/09/24  2342 04/30/24  0832   ALKPHOS  98 106 140   BILITOTAL 8.2* 8.6* 10.3*   ALT 22 23 52   AST 50* 52* 63*   PROTTOTAL 5.9* 6.2* 5.6*   ALBUMIN 3.1* 3.3* 2.8*       Iron Panel -   Recent Labs   Lab Test 03/18/24  1108 10/30/23  1346 09/08/23  1147   IRON 160* 96 96   IRONSAT  --   --  85*   VIANCA  --  3,593*  --          Current Medications:  Current Facility-Administered Medications   Medication Dose Route Frequency Provider Last Rate Last Admin    albumin human 25 % injection 25 g  25 g Intravenous Q8H Esau Cash  mL/hr at 07/10/24 1627 25 g at 07/11/24 0901    midodrine (PROAMATINE) tablet 5 mg  5 mg Oral TID w/meals Esau Cash MD   5 mg at 07/11/24 0612    oxymetazoline (AFRIN) 0.05 % spray 2 spray  2 spray Both Nostrils BID Diana Garibay MD        pantoprazole (PROTONIX) IV push injection 40 mg  40 mg Intravenous Q12H Anup Schwartz MD   40 mg at 07/11/24 0106    sodium chloride (PF) 0.9% PF flush 3 mL  3 mL Intracatheter Q8H Anup Schwartz MD   3 mL at 07/10/24 1443    sodium chloride (PF) 0.9% PF flush 3 mL  3 mL Intracatheter Q8H Diana Garibay MD        sodium zirconium cyclosilicate (LOKELMA) packet 10 g  10 g Oral TID Anup Schwartz MD   10 g at 07/11/24 0907    Followed by    [START ON 7/12/2024] sodium zirconium cyclosilicate (LOKELMA) packet 10 g  10 g Oral Daily Anup Schwartz MD         Current Facility-Administered Medications   Medication Dose Route Frequency Provider Last Rate Last Admin     Esau Cash MD

## 2024-07-11 NOTE — PLAN OF CARE
Goal Outcome Evaluation:                 Outcome Evaluation: Patient remains A/o x4. Continues to have soft BPs, improved with scheduled midorine and albumin. Hemgolobin 6.6 overnight, additional unit of PRBCs given, improved to 7.5. Patient tolerated infusion well. Patient slept intermittently between cares. Continues to have +3/+4 edema to BLE. +3 edema to RUE overnight, elevated and ice packs applied to infiltration site, some improvement. Plan to continue to trend hemoglobin

## 2024-07-11 NOTE — PROGRESS NOTES
Patient has active bleeding (epistaxis); I applied pressure, paged Dr. Diana Garibay; ENT consult was placed.

## 2024-07-11 NOTE — PROGRESS NOTES
[facility]    Hospitalist Progress Note    Assessment & Plan   Crispin Ham is a markedly pleasant 39 year old gentleman with past medical history that is most notable for severe alcohol use disorder, alcoholic cirrhosis, hepatitis, and hepatic encephalopathy, among multiple others; who presents with progressive fatigue and exertional dyspnea, and is found to have acute on chronic anemia as well as acute hyponatremia and GATITO.     PLAN      Acute on chronic anemia: Of note, Mr. Ham has severe alcohol use disorder that has led to cirrhosis, with portal hypertension, ascites, and hepatic encephalopathy. He is followed by [facility]. Reportedly he has had prior duodenal ulcers and prior gastric polypectomy as well as prior SBP. EGD in 3/2024 showed grade 1 non-bleeding esophageal varices. He was admitted here in 4/2024 for jaundice and confusion, and required intubation for severe hepatic encephalopathy and acute blood loss anemia. EGD was repeated and no acute bleeding from the intestinal tract was noted. Epistaxis was suspected as the cause of anemia, vs possible retroperitoneal hematoma seen on CTA. The cause of that hematoma was felt related to a left femoral CVC placement while critically ill. No intervention was needed for it and he was stabilized with transfusions and lactulose and discharged. HGB was 7.6 at time of discharge.     Now, he returns for evaluation of progressive fatigue. He endorses ongoing intermittent epistaxis. In the ED, he has hypotension and tachycardia. He is afebrile, without hypoxia. WBC is normal. HGB is down to 4.4. he has acute hyponatremia to 122 and GATITO as discussed below. CO2 is 17. K is 5.6.      Overall, his symptoms seem most consistent with symptomatic acute blood loss anemia. By history this could be due to ongoing epistaxis though we will also assess for PUD, variceal bleeding, or recurrent RP bleeding.  No symptoms of melena or hematochezia or  variceal bleed noted.  -Started on clear liquid, will advance diet, continue twice daily PPI now, appreciate GI input.  No active GI bleeding noted so far, received 3 units PRBC since admission, another unit ordered for 7/11 due to conditional orders to keep hemoglobin above 7.  --CT abdomen results noted, no retroperitoneal bleed seen, there is concern about multifocal pneumonia, procalcitonin is negative and patient did not have any cough or other symptoms, will hold off on starting any antibiotics.  --[facility] consulted ENT consult is currently pending.    -- Repeat CBC and CMP in AM. SW consulted for disposition planning.  --Bilirubin levels are elevated but he does have liver failure, will add reticulocyte count, direct and indirect bilirubin, Donal test and haptoglobin, will request hematology consult, peripheral smear ordered.      GATITO and acute metabolic acidosis: Suspect due to hypovolemia. Hepatorenal syndrome also on the differential.   [facility] nephrology managing fluids, currently Lasix and spironolactone is on hold, sodium remains 123 today, urine sodium is 21, creatinine slowly improving, 2.48 today.        Acute hyperkalemia: Reversed in the ED. EKG shows sinus tachycardia without peaked T waves.    --Repeat labs pending, started on Lokelma     Acute hypochloremic hyponatremia: Noted to have acute milder hypotension in 4/2024 to 129. It was 131 by 4/20/2024. 122 tonight; suspect hypovolemia due to blood loss.  -Appreciate input from nephrology     Ongoing alcoholic hepatitis: AST 52, Tbili 8.6. Tbili had been 10.3 in 4/2024. He admits to ongoing ETOH use intermittently.      Continue naltrexone     History of hepatic encephalopathy: Ammonia is 62. Currently A and O times 4.  -Continue lactulose and rifaximin     Chronic thrombocytopenia: Monitor carefully while hospitalized      History of chronic diastolic CHF and pleural effusions: TTE 4/2024 showed preserved LVEF with LVH. Caution with IV  fluid.     Chronic orthostatic hypotension: On Midodrine as an outpatient; restarted that here .    Diet :Clear Liquid Diet  DVT Prophylaxis: Pneumatic Compression Devices  Code Status: Full Code     52 MINUTES SPENT BY ME on the date of service doing chart review, history, exam, documentation & further activities per the note.  Medically Ready for Discharge: Anticipated Tomorrow    Clinically Significant Risk Factors        # Hyperkalemia: Highest K = 5.6 mmol/L in last 2 days, will monitor as appropriate  # Hyponatremia: Lowest Na = 122 mmol/L in last 2 days, will monitor as appropriate      # Hypoalbuminemia: Lowest albumin = 3.1 g/dL at 7/10/2024  6:36 AM, will monitor as appropriate  # Coagulation Defect: INR = 2.26 (Ref range: 0.85 - 1.15) and/or PTT = 59 Seconds (Ref range: 22 - 38 Seconds), will monitor for bleeding  # Thrombocytopenia: Lowest platelets = 61 in last 2 days, will monitor for bleeding  # Acute Kidney Injury, unspecified: based on a >150% or 0.3 mg/dL increase in last creatinine compared to past 90 day average, will monitor renal function            #Precipitous drop in Hgb/Hct: Lowest Hgb this hospitalization: 4.4 g/dL. Will continue to monitor and treat/transfuse as appropriate.         # Financial/Environmental Concerns:           Diana Garibay MD  Text Page   (7am to 6pm)    Interval History   His hemoglobin did improve today with 3 unit PRBC transfusion, It did go down further to 6.8, he want to home today, had an episode of epistaxis today morning, which improved by putting pressure.  ENT consult pending.    -Data reviewed today: I reviewed all new labs and imaging results over the last 24 hours.  Physical Exam     Vital Signs with Ranges  Temp:  [97.2  F (36.2  C)-98.6  F (37  C)] 97.8  F (36.6  C)  Pulse:  [81-96] 90  Resp:  [10-34] 18  BP: ()/(40-57) 94/49  SpO2:  [94 %-100 %] 100 %  I/O last 3 completed shifts:  In: 1354.17 [I.V.:364.17]  Out: 525  [Urine:525]    Constitutional: Awake, alert, cooperative, no apparent distress ,jaundice noted  Respiratory: Clear to auscultation bilaterally, no crackles or wheezing  Cardiovascular: Regular rate and rhythm, normal S1 and S2, and no murmur noted  GI: Normal bowel sounds, abdomen is quite distended, ascites present  Skin/Integumen: Plus pitting edema bilateral upper and lower extremities  Neuro : moving all 4 extremities, no focal deficit noted     Medications   Current Facility-Administered Medications   Medication Dose Route Frequency Provider Last Rate Last Admin     Current Facility-Administered Medications   Medication Dose Route Frequency Provider Last Rate Last Admin    albumin human 25 % injection 25 g  25 g Intravenous Q8H Esau Cash  mL/hr at 07/10/24 1627 25 g at 07/11/24 0901    midodrine (PROAMATINE) tablet 5 mg  5 mg Oral TID w/meals Esau Cash MD   5 mg at 07/11/24 0612    oxymetazoline (AFRIN) 0.05 % spray 2 spray  2 spray Both Nostrils BID Diana Garibay MD        pantoprazole (PROTONIX) IV push injection 40 mg  40 mg Intravenous BID Diana Garibay MD        sodium chloride (PF) 0.9% PF flush 3 mL  3 mL Intracatheter Q8H Anup Schwartz MD   3 mL at 07/10/24 1443    sodium chloride (PF) 0.9% PF flush 3 mL  3 mL Intracatheter Q8H Diana Garibay MD        sodium zirconium cyclosilicate (LOKELMA) packet 10 g  10 g Oral TID Anup Schwartz MD   10 g at 07/11/24 0907    Followed by    [START ON 7/12/2024] sodium zirconium cyclosilicate (LOKELMA) packet 10 g  10 g Oral Daily Anup Schwartz MD           Data   Recent Labs   Lab 07/11/24  0954 07/11/24  0422 07/11/24  0103 07/10/24  2225 07/10/24  1311 07/10/24  1004 07/10/24  0759 07/10/24  0636 07/10/24  0335 07/10/24  0258 07/10/24  0104 07/09/24  2344 07/09/24  2342   WBC 4.8  --   --   --   --   --   --  4.9  --   --   --   --  6.4   HGB 6.8* 7.5*  --  6.6* 6.1*  --   --  4.6*  --   --   --   --  4.4*   MCV 98   --   --   --   --   --   --  103*  --   --   --   --  116*   PLT 61*  --   --   --   --   --   --  63*  --   --   --   --  75*   INR  --   --   --   --   --   --   --   --   --   --   --  2.26*  --    * 122*  --  122*  --   --   --  122*  --  122*  --   --  122*   POTASSIUM 5.3  --   --   --   --  5.5*  --  5.4*   < > 5.5*  --   --  5.6*   CHLORIDE 98  --   --   --   --   --   --  97*  --  97*  --   --  95*   CO2 14*  --   --   --   --   --   --  17*  --  17*  --   --  17*   BUN 44.9*  --   --   --   --   --   --  50.8*  --  51.4*  --   --  51.5*   CR 2.48*  --   --   --   --   --   --  3.11*  --  3.10*  --   --  3.14*   ANIONGAP 11  --   --   --   --   --   --  8  --  8  --   --  10   MIGUELINA 9.1  --   --   --   --   --   --  8.6  --  8.7  --   --  8.9   *  --  112*  --  94  --    < > 114*  113*   < > 96   < >  --  112*   ALBUMIN  --   --   --   --   --   --   --  3.1*  --   --   --   --  3.3*   PROTTOTAL  --   --   --   --   --   --   --  5.9*  --   --   --   --  6.2*   BILITOTAL  --   --   --   --   --   --   --  8.2*  --   --   --   --  8.6*   ALKPHOS  --   --   --   --   --   --   --  98  --   --   --   --  106   ALT  --   --   --   --   --   --   --  22  --   --   --   --  23   AST  --   --   --   --   --   --   --  50*  --   --   --   --  52*    < > = values in this interval not displayed.     Recent Labs   Lab 07/11/24  0954 07/11/24  0103 07/10/24  1311 07/10/24  0952 07/10/24  0759   * 112* 94 94 75       Imaging:   No results found for this or any previous visit (from the past 24 hour(s)).

## 2024-07-12 NOTE — DISCHARGE SUMMARY
[facility]    Discharge Summary  Hospitalist    Date of Admission:  7/9/2024  Date of Discharge:  7/12/2024  Discharging Provider: Diana Garibay MD    Discharge Diagnoses   Anemia multifactorial  Epistaxis  Cirrhosis of the liver with ongoing alcohol use  History of Present Illness   Please review admission history and physical    Hospital Course   Crispin Ham was admitted on 7/9/2024.  The following problems were addressed during his hospitalization:    Active Problems:    Anemia, unspecified type    Hyponatremia    Acute kidney injury (H24)    Hyperkalemia  Crispin Ham is a markedly pleasant 39 year old gentleman with past medical history that is most notable for severe alcohol use disorder, alcoholic cirrhosis, hepatitis, and hepatic encephalopathy, among multiple others; who presents with progressive fatigue and exertional dyspnea, and is found to have acute on chronic anemia as well as acute hyponatremia and GATITO.     Acute on chronic anemia: Of note, Mr. Ham has severe alcohol use disorder that has led to cirrhosis, with portal hypertension, ascites, and hepatic encephalopathy. He is followed by [facility]. Reportedly he has had prior duodenal ulcers and prior gastric polypectomy as well as prior SBP. EGD in 3/2024 showed grade 1 non-bleeding esophageal varices. He was admitted here in 4/2024 for jaundice and confusion, and required intubation for severe hepatic encephalopathy and acute blood loss anemia. EGD was repeated and no acute bleeding from the intestinal tract was noted. Epistaxis was suspected as the cause of anemia, vs possible retroperitoneal hematoma seen on CTA. The cause of that hematoma was felt related to a left femoral CVC placement while critically ill. No intervention was needed for it and he was stabilized with transfusions and lactulose and discharged. HGB was 7.6 at time of discharge.     Now, he returns for evaluation of progressive fatigue. He  endorses ongoing intermittent epistaxis. In the ED, he has hypotension and tachycardia. He is afebrile, without hypoxia. WBC is normal. HGB is down to 4.4. he has acute hyponatremia to 122 and GATITO as discussed below. CO2 is 17. K is 5.6.      Overall, his symptoms seem most consistent with symptomatic acute blood loss anemia. By history this could be due to ongoing epistaxis though we will also assess for PUD, variceal bleeding, or recurrent RP bleeding.  No symptoms of melena or hematochezia or variceal bleed noted.  -Started on clear liquid and diet advanced, seen by GI, no plan for procedure noted, patient had received up to 5 units of PRBC this admission, hemoglobin remained stable above 7.  He did have 1-2 episodes of epistaxis here, none since seen by ENT.  --CT abdomen results noted, no retroperitoneal bleed seen, there is concern about multifocal pneumonia, procalcitonin is negative and patient did not have any cough or other symptoms, will hold off on starting any antibiotics.  --[facility] consulted , they recommended bacitracin ointment twice daily to bilateral naris, nasal saline spray to each nare 3 times daily, patient was instructed to continue the nasal saline spray 3 times daily, no nose blowing, sneeze with mouth open and avoid nasal picking or aggressive nostril being.  If the bleeding occurs immediately spray Afrin nasal spray into the side that is bleeding lean forward and pinch front of your nose or a minimum 10 minutes continuously do not release pressure for 10 minutes to allow good clot to form.  -Hematology was consulted, haptoglobin levels were mildly low, reticulocytosis noted, his anemia is noted to be multifactorial.  Patient wanted to leave the hospital as soon as possible on 7/12, even in a.m. of 7/12 his hemoglobin was 6.8, I requested him to stay overnight and repeat the labs again in a.m., he insisted on leaving today, will arrange CBC and BMP check on Monday, his Lasix  and spironolactone is on hold, this need to be addressed on he is visit with PCP.      GATITO and acute metabolic acidosis: Suspect due to hypovolemia. Hepatorenal syndrome also on the differential.   -- nephrology managing fluids, currently Lasix and spironolactone is on hold, sodium improved to 125, creatinine also improved to 1.9.  Repeat BMP ordered for Monday.      Acute hyperkalemia: Reversed in the ED. EKG shows sinus tachycardia without peaked T waves.  She was on Lokelma here, labs improved.     Acute hypochloremic hyponatremia: Noted to have acute milder hypotension in 4/2024 to 129. It was 131 by 4/20/2024. 122 tonight; suspect hypovolemia due to blood loss.  -Appreciate input from nephrology     Ongoing alcoholic hepatitis: AST 52, Tbili 8.6. Tbili had been 10.3 in 4/2024. He admits to ongoing ETOH use intermittently.      Continue naltrexone     History of hepatic encephalopathy: Ammonia is 62. Currently A and O times 4.  -Continue lactulose and rifaximin     Chronic thrombocytopenia: Monitor carefully while hospitalized      History of chronic diastolic CHF and pleural effusions: TTE 4/2024 showed preserved LVEF with LVH. Caution with IV fluid.     Chronic orthostatic hypotension: On Midodrine as an outpatient; restarted that here .    Diana Garibay MD    Significant Results and Procedures       Pending Results     Unresulted Labs Ordered in the Past 30 Days of this Admission       Date and Time Order Name Status Description    7/11/2024  1:22 PM Other Laboratory; [facility]; Poly EDISON (Laboratory Miscellaneous Order) In process             Code Status   Full Code       Primary Care Physician   Blake Canchola    Physical Exam   Temp: 97.6  F (36.4  C) Temp src: Oral BP: 107/69 Pulse: 93   Resp: 18 SpO2: 100 % O2 Device: None (Room air)    Vitals:    07/09/24 2309   Weight: 108.9 kg (240 lb)     Vital Signs with Ranges  Temp:  [97.2  F (36.2  C)-97.6  F (36.4  C)] 97.6  F (36.4  C)  Pulse:  [79-93]  93  Resp:  [14-18] 18  BP: ()/(47-71) 107/69  SpO2:  [94 %-100 %] 100 %  I/O last 3 completed shifts:  In: 360 [P.O.:360]  Out: 550 [Urine:550]    The patient was examined on the day of discharge.    Discharge Disposition   Discharged to home  Condition at discharge: Stable    Consultations This Hospital Stay   CARE MANAGEMENT / SOCIAL WORK IP CONSULT  GASTROENTEROLOGY IP CONSULT  NEPHROLOGY IP CONSULT  ENT IP CONSULT  HEMATOLOGY & ONCOLOGY IP CONSULT    Time Spent on this Encounter   IDiana MD, personally saw the patient today and spent greater than 30 minutes discharging this patient.    Discharge Orders      CBC with platelets     Basic metabolic panel     Patient care order    Nasal Care:  -For the next 2 weeks: no nose blowing, sneeze with mouth open, avoid nasal picking/rubbing  -Recommend bacitracin ointment twice a day to both nostrils for 2 weeks. Then you can use AYR nasal saline gel 1-2 times a day to help keep your nose moist  -Recommend nasal saline spray (2-3 sprays) to both nostrils, three times a day for 2 weeks. After 2 weeks, I would still recommend using this 2-3 times a day or more if needed to help keep your nose moist and minimize the risk of nosebleeds  -If a bleed occurs: immediately spray Afrin (oxymetazoline) nasal spray into the side that is bleeding, lean forward, and pinch the front of your nose for a MINIMUM of 10 minutes, continuously. DO NOT release pressure for 10 minutes to allow a good clot to form. After this, the bleeding should stop and you should again use the ointment and nasal saline spray to keep your nose moist for the next several days, as well as not blowing your nose or picking at it.     Follow Up (Transitional Care Management)    Follow-up as needed with [facility] if nosebleeds continue to occur.     [address]  [address] [place] [id number]    Call [phone] to schedule an appointment.     Follow-up and recommended labs and tests      Follow up with primary care provider, Blake Canchola, on Monday July 15 at 10:20 am, for hospital follow- up at the [facility].     Reason for your hospital stay    ANEMIA     Follow-up and recommended labs and tests     Follow up with primary care provider, Blake Canchola, within 7 days for hospital follow- up.  The following labs/tests are recommended: cbc by 7/15.     Activity    Your activity upon discharge: activity as tolerated, abstain from alcohol use     Discharge Instructions     Diet    Follow this diet upon discharge: Orders Placed This Encounter      Clear Liquid Diet     Discharge Medications   Current Discharge Medication List        START taking these medications    Details   oxymetazoline (AFRIN) 0.05 % nasal spray Spray 0.1 mLs (1 spray) in nostril 3 times daily as needed for other (bleeding, APPLY SPRAY IF BLEEDING STARTS AND PUT PRESSURE)  Qty: 15 mL, Refills: 0    Associated Diagnoses: Anemia in other chronic diseases classified elsewhere           CONTINUE these medications which have CHANGED    Details   furosemide (LASIX) 40 MG tablet Take 1 tablet (40 mg) by mouth 2 times daily    Comments: HOLD      spironolactone (ALDACTONE) 50 MG tablet Take 1 tablet (50 mg) by mouth daily    Comments: HOLD           CONTINUE these medications which have NOT CHANGED    Details   ciprofloxacin (CIPRO) 500 MG tablet Take 1 tablet (500 mg) by mouth every 24 hours  Qty: 60 tablet, Refills: 1    Associated Diagnoses: Alcoholic cirrhosis of liver without ascites (H)      folic acid (FOLVITE) 1 MG tablet Take 1 tablet (1 mg) by mouth daily  Qty: 30 tablet, Refills: 0    Associated Diagnoses: Alcohol abuse      lactulose (CHRONULAC) 10 GM/15ML solution 30 mLs (20 g) by Oral or Feeding Tube route 2 times daily  Qty: 1800 mL, Refills: 0    Associated Diagnoses: Hepatic encephalopathy (H); Acute liver failure without hepatic coma      magnesium oxide 400 MG tablet Take 400 mg by mouth daily      midodrine  (PROAMATINE) 5 MG tablet Take 5 mg by mouth 3 times daily (with meals)      multivitamin, therapeutic (THERA-VIT) TABS tablet Take 1 tablet by mouth daily      naltrexone (DEPADE/REVIA) 50 MG tablet Take 50 mg by mouth daily      pantoprazole (PROTONIX) 40 MG EC tablet Take 1 tablet (40 mg) by mouth 2 times daily  Qty: 60 tablet, Refills: 0    Associated Diagnoses: Hepatic encephalopathy (H); Acute liver failure without hepatic coma      rifaximin (XIFAXAN) 550 MG TABS tablet 1 tablet (550 mg) by Oral or Feeding Tube route 2 times daily  Qty: 120 tablet, Refills: 0    Associated Diagnoses: Hepatic encephalopathy (H); Acute liver failure without hepatic coma      thiamine (B-1) 100 MG tablet Take 1 tablet (100 mg) by mouth daily  Qty: 30 tablet, Refills: 0    Associated Diagnoses: Alcohol abuse      Vitamin D, Cholecalciferol, 10 MCG (400 UNIT) TABS Take 1 tablet by mouth daily           Allergies   Allergies   Allergen Reactions    Excedrin Extra Strength [Aspirin-Acetaminophen-Caffeine]      puffy eyes and dry throat a few years ago. Tolerates ibuprofen and acetaminophen, but avoids aspirin    Nsaids Angioedema     Patient reports having a reaction of puffy eyes and dry throat a few years ago, but he has taken Advil in 2023 and did not react.     Data   Most Recent 3 CBC's:  Recent Labs   Lab Test 07/12/24  0539 07/12/24  0002 07/11/24  0954 07/10/24  1311 07/10/24  0636   WBC 3.6*  --  4.8  --  4.9   HGB 6.8* 7.5* 6.8*   < > 4.6*     --  98  --  103*   PLT 55*  --  61*  --  63*    < > = values in this interval not displayed.      Most Recent 3 BMP's:  Recent Labs   Lab Test 07/12/24  0539 07/12/24  0002 07/11/24  1821 07/11/24  1248 07/11/24  0954 07/11/24  0422 07/11/24  0103 07/10/24  1311 07/10/24  1004 07/10/24  0759 07/10/24  0636   * 125* 124*   < > 123*   < >  --    < >  --   --  122*   POTASSIUM 4.9  --   --   --  5.3  --   --   --  5.5*  --  5.4*   CHLORIDE 99  --   --   --  98  --   --   --    --   --  97*   CO2 14*  --   --   --  14*  --   --   --   --   --  17*   BUN 40.6*  --   --   --  44.9*  --   --   --   --   --  50.8*   CR 1.90*  --   --   --  2.48*  --   --   --   --   --  3.11*   ANIONGAP 12  --   --   --  11  --   --   --   --   --  8   MIGUELINA 9.5  --   --   --  9.1  --   --   --   --   --  8.6   *  --   --   --  120*  --  112*   < >  --    < > 114*  113*    < > = values in this interval not displayed.     Most Recent 2 LFT's:  Recent Labs   Lab Test 07/11/24  0954 07/10/24  0636 07/09/24  2342   AST  --  50* 52*   ALT  --  22 23   ALKPHOS  --  98 106   BILITOTAL 10.7* 8.2* 8.6*     Most Recent INR's and Anticoagulation Dosing History:  Anticoagulation Dose History  More data exists         Latest Ref Rng & Units 4/19/2024 4/20/2024 4/21/2024 4/22/2024 4/23/2024 4/24/2024 7/9/2024   Recent Dosing and Labs   INR 0.85 - 1.15 2.57  2.32  2.19  2.57  2.74  2.48  2.26       Details                 Most Recent 3 Troponin's:No lab results found.  Most Recent Cholesterol Panel:No lab results found.  Most Recent 6 Bacteria Isolates From Any Culture (See EPIC Reports for Culture Details):No lab results found.  Most Recent TSH, T4 and A1c Labs:  Recent Labs   Lab Test 03/22/24  0926 09/08/23  1147   TSH  --  8.43*   T4  --  1.39   A1C 5.1  --      Results for orders placed or performed during the hospital encounter of 07/09/24   CT Abdomen Pelvis w/o Contrast    Narrative    CT ABDOMEN PELVIS W/O CONTRAST 7/10/2024 10:47 AM    CLINICAL HISTORY: acute anemia, recent RP bleeding  TECHNIQUE: CT scan of the abdomen and pelvis was performed without IV  contrast. Multiplanar reformats were obtained. Dose reduction  techniques were used.  CONTRAST: None.    COMPARISON: 4/23/2024    FINDINGS: Evaluation limited by noncontrast CT.  LOWER CHEST: Small right pleural effusion. Multifocal patchy and  nodular opacities in the visualized lung bases, suspicious for  pneumonia, new since the most recent available  comparison in April 2024. Hypodensity of blood in the ventricles relative to the  myocardium, consistent with known anemia.    HEPATOBILIARY: Shrunken liver, consistent with known cirrhosis.  Distended gallbladder. No calcified gallstones.    PANCREAS: No gross abnormalities on limited noncontrast CT.    SPLEEN: Stable splenomegaly with the spleen measuring 15.6 cm in  length.    ADRENAL GLANDS: Normal.    KIDNEYS/BLADDER: Stable prominent right extrarenal pelvis, unchanged  since 4/23/2024. No hydronephrosis.    BOWEL: No small bowel or colonic obstruction or inflammatory changes.  Normal appendix.     LYMPH NODES: No lymphadenopathy.    VASCULATURE: Multiple tubular soft tissue densities in the right upper  quadrant below the liver, consistent with known portosystemic  collaterals.     PELVIC ORGANS: Hysterectomy.    OTHER: Trace pelvic ascites. No retroperitoneal hemorrhage or  hemoperitoneum. Previously seen small left groin subcutaneous hematoma  has resolved. Diffuse anasarca. No fluid collections or free air    MUSCULOSKELETAL: Unremarkable.      Impression    IMPRESSION:   1.  No retroperitoneal hematoma or evidence for intra-abdominal  hemorrhage.  2.  Small right pleural effusion. New multifocal patchy and nodular  opacities in the lung bases, likely pneumonia.  3.  Cirrhosis and evidence of portal hypertension.  4.  Distended gallbladder.    ROXANA CAMARA MD         SYSTEM ID:  [id number]

## 2024-07-12 NOTE — PLAN OF CARE
Goal Outcome Evaluation:    Orientations: A&O x4.  Vitals/Pain:Vitals stable  on RA. Denies pain.  Lungs: diminished.   Tele: SR  Diet: clears.   Lines/Drains: PIV x2, SL.  Skin/Wounds: L arm bruised and swollen from iv infiltration, scattered bruising. BLE edema +3/4.  GI/: Continent. active BS, had BM this shift. Adequately voiding using urinal and into toilet.   Labs: Abnormal/Trends,  Hgb(6.8),WBC(3.6), Electrolyte Replacement-  Ambulation/Assist: Up with SBA.   Sleep Quality: Sleep/rest promoted.  Plan: monitor active nasal bleeding, and Hgb.   1 unit of blood started at 0640 for Hgb 6.8.

## 2024-07-12 NOTE — PROGRESS NOTES
Patient is AO X 4; ambulatory; has severe peripheral edema; continent.  Potassium is WDL; hyponatremia has improved.   HgB in the a.m was 6.8: 1 unit of PRBC was transfused; completed at 6 p.m. No adverse reaction were noted.  VSS; on RA; in no acute distress.  ENT was consulted for epistaxis - please read the note. Afrin is in the room.  Albumin q 8 hours; right arm is swollen, bruised from previous PIV infiltration - advised to hold it elevated above the level of the heart.  Jaundiced; SL.

## 2024-07-12 NOTE — PROVIDER NOTIFICATION
"Dr. Garibay paged by writer to clarify discharge orders. Under the \"continue taking these medications\" list, provider comment states hold for furosemide and spironolactone. Per Dr. Garibay pt is to hold these until he sees his PCP on Monday. On AVS it states to continue clear liquid diet after clarifying with provider patient can be on \"low salt diet\". Writer updated AVS w/ this information.      "

## 2024-07-12 NOTE — CONSULTS
This consult has been requested by Dr. Diana Garibay for anemia.    Mr. Ham is a 39-year-old gentleman with multiple medical problems including alcoholic liver cirrhosis and hypertension.  Patient sent to the emergency room on 07/10/2024 for weakness and anemia.  In the emergency room he was found to be also hypotensive.  Multiple investigations done.  -WBC 4.9, hemoglobin of 4.6 and platelet of 63.  MCV of 103.  -CMP revealed multiple abnormalities including hyponatremia and elevated creatinine.  Bilirubin elevated.  -CT abdomen and pelvis did not reveal any evidence of bleeding.  There is liver cirrhosis with portal hypertension.  There is new multifocal patchy and nodular opacities in the lung bases.    Patient received multiple units of blood transfusion.  His hemoglobin is 6.8 today.    Patient has some epistaxis.  Denies coughing or vomiting blood.  Denies any blood in the urine or stool.  Denies any black stool.    Patient evaluated by ENT.  Cauterization done.  Epistaxis has stopped.    Patient has alcoholic liver cirrhosis.  He has chronic anemia and thrombocytopenia.    Last EGD done on 04/18/2024 did not reveal any evidence of acute GI bleed.  There was no varices.  There was moderate portal hypertensive gastropathy.    Review of system:  He feels weak.  He has been more sleepy.  He would doze on and off when I was talking to him.  No headache.  Some lightheadedness.  No chest pain.  No shortness of breath at rest.  Gets short of breath with minimal exertion.  Some nausea.  No recurrent vomiting.    Allergies: Reviewed.     Medications: Reviewed.     Past medical history:  -Alcohol abuse.  -Liver cirrhosis.  -Chronic anemia.  -Thrombocytopenia  -Hypertension.  -Spontaneous bacterial peritonitis  -Generalized anxiety disorder/depression.  -Left wrist ORIF.     Social history:  -No smoking.  -Alcohol abuse.  He continues to drink.     Exam:  He is alert and oriented x 3.  He looked very weak.  Not in  any respiratory distress or pain.  Vitals: Reviewed.  Rest of the system is not examined.    Labs: Reviewed.    Assessment:  1.  A 39-year-old gentleman with acute on chronic anemia.  Chronic anemia secondary to liver cirrhosis and splenomegaly.  Acute worsening due to epistaxis and possible upper GI bleed.  2.  Chronic thrombocytopenia secondary to liver cirrhosis and splenomegaly.  3.  Epistaxis.  Resolved after cauterization.  4.  Alcoholic liver cirrhosis.    Recommendation:  -Quit alcohol.  -Transfuse for hemoglobin below 7.0.  -Continue folic acid  -Will get labs.    Discussion:  1.  Labs were reviewed with the patient.  Explained to him that his anemia is due to liver cirrhosis, splenomegaly and epistaxis.  Patient denies GI bleed or black stool.  Haptoglobin is low.  This was done after blood transfusion.  This low haptoglobin is secondary to some hemolysis that happens with transfusion.    For workup, we will get some labs including iron studies and B12. GI is going to see him.  They may plan to do EGD.    2.  Patient advised to quit alcohol.  Explained to him that if he continues to drink alcohol, his cirrhosis will get worse.  Also his cytopenia will get worse.    He should be transfused for hemoglobin below 7.    3.  Patient has chronic thrombocytopenia.  His platelet was around 60.  With this, he should not have any bleeding problem.  If he has any bleeding, he should be transfused platelets.    4.  His INR is elevated from liver cirrhosis.  Will give him vitamin K.    5.  Patient had epistaxis.  That stopped with cauterization.  Will monitor him for any bleeding.    6.  Hematology/oncology will continue to follow.  Will check CBC in a.m.    Thanks for the consult.    Total time spent 65 minutes.  Time spent in today's visit, review of chart/investigations today and documentation today.

## 2024-07-12 NOTE — PROGRESS NOTES
Nephrology Progress Note  07/12/2024         Assessment & Recommendations:    1 alcoholic liver cirrhosis with decompensated liver failure/portal hypertension/ascites/esophageal varices.  Prior history of hepatic encephalopathy     2 severe anemia-acute on chronic.  History of nonbleeding esophageal varices and duodenal ulcer but no hematochezia or melena.  Has been having epistaxis.  Hemoglobin of 4.4 on presentation.  Status post multiple units of PRBC.    Epistaxis improved with ENT intervention      3 hyponatremia-multifactorial.  Likely hypovolemic on presentation with severe anemia, hypotension.  Poor clearance with severe renal failure contributing as well as end-stage liver disease, poor solute intake.  -Slightly better.     4 severe GATITO-possibly ischemic tubular injury in setting of severe anemia, hypotension, prolonged prerenal state .  Patient was on spironolactone and Lasix.  Urinary sodium of 22  -Urinalysis with hyaline casts, otherwise bland  - Improving with volume expansion ( PRBC/ ALbumin)      5 Hyperkalemia-with acute renal failure, spironolactone   -Improved    6 Low bicarb - likely combination of Resp alkalosis with ESLD and metabolic acidosis with GATITO - Check VBG      Recommendation-  PRBC as needed.    -Continue midodrine  - Hold spironolactone/ lasix     Very poor prognosis , deloris with active alcohol use and not a txp candidate.     Recommendations were communicated to primary team     Esau Cash MD  [facility] - Nephrology   [phone]      Interval History :   Seen / examined.   Epistaxis resolved.   Hgb 6.8. Cr improving. Sodium upto 125     Physical Exam:   I/O last 3 completed shifts:  In: 360 [P.O.:360]  Out: 550 [Urine:550]  /69   Pulse 93   Temp 97.6  F (36.4  C) (Oral)   Resp 18   Wt 108.9 kg (240 lb)   SpO2 100%   BMI 32.55 kg/m      GENERAL APPEARANCE: no distress,  awake  Pulmonary: lungs clear to auscultation with equal breath sounds bilaterally, no  clubbing  CV: regular rhythm, normal rate, no rub   - JVP -   - Edema ++, pitting   GI: soft, nontender,   MS: no evidence of inflammation in joints  : no godoy  SKIN: spider angioma in upper chest   NEURO: face symmetric, nonfocal     Labs:   All labs reviewed by me  Electrolytes/Renal -   Recent Labs   Lab Test 07/12/24  0539 07/12/24  0002 07/11/24  1821 07/11/24  1248 07/11/24  0954 07/11/24  0422 07/11/24  0103 07/10/24  1311 07/10/24  1004 07/10/24  0759 07/10/24  0636 07/10/24  0104 07/09/24  2344 04/22/24  1123 04/22/24  0410 04/21/24  0857 04/21/24  0437 04/20/24  0355 04/20/24  0348   * 125* 124*   < > 123*   < >  --    < >  --   --  122*   < >  --    < > 140  --  139  --  138   POTASSIUM 4.9  --   --   --  5.3  --   --   --  5.5*  --  5.4*   < >  --    < > 4.1  --  4.8  --  4.3   CHLORIDE 99  --   --   --  98  --   --   --   --   --  97*   < >  --    < > 109*  --  106  --  107   CO2 14*  --   --   --  14*  --   --   --   --   --  17*   < >  --    < > 22  --  22  --  23   BUN 40.6*  --   --   --  44.9*  --   --   --   --   --  50.8*   < >  --    < > 37.1*  --  32.9*  --  21.8*   CR 1.90*  --   --   --  2.48*  --   --   --   --   --  3.11*   < >  --    < > 1.30*  --  1.85*  --  1.41*   *  --   --   --  120*  --  112*   < >  --    < > 114*  113*   < >  --    < > 164*   < > 149*  159*   < > 164*   MIGUELINA 9.5  --   --   --  9.1  --   --   --   --   --  8.6   < >  --    < > 9.2  --  9.0  --  8.8   MAG  --   --   --   --   --   --   --   --   --   --   --   --  1.7  --  1.9  --  1.9  --  1.7   PHOS  --   --   --   --   --   --   --   --   --   --   --   --   --   --  3.1  --  5.7*  --  5.2*    < > = values in this interval not displayed.       CBC -   Recent Labs   Lab Test 07/12/24  0539 07/12/24  0002 07/11/24  0954 07/10/24  1311 07/10/24  0636   WBC 3.6*  --  4.8  --  4.9   HGB 6.8* 7.5* 6.8*   < > 4.6*   PLT 55*  --  61*  --  63*    < > = values in this interval not displayed.       LFTs -    Recent Labs   Lab Test 07/11/24  0954 07/10/24  0636 07/09/24  2342 04/30/24  0832   ALKPHOS  --  98 106 140   BILITOTAL 10.7* 8.2* 8.6* 10.3*   ALT  --  22 23 52   AST  --  50* 52* 63*   PROTTOTAL  --  5.9* 6.2* 5.6*   ALBUMIN  --  3.1* 3.3* 2.8*       Iron Panel -   Recent Labs   Lab Test 07/11/24  1821 03/18/24  1108 10/30/23  1346 09/08/23  1147 09/08/23  1147   IRON 201* 160* 96  --  96   IRONSAT  --   --   --   --  85*   VIANCA 1,982*  --  3,593*   < >  --     < > = values in this interval not displayed.         Current Medications:  Current Facility-Administered Medications   Medication Dose Route Frequency Provider Last Rate Last Admin    bacitracin ointment   Topical BID Dottie Escalante PA-C   Given at 07/12/24 1109    ciprofloxacin (CIPRO) tablet 500 mg  500 mg Oral Q24H Diana Garibay MD   500 mg at 07/11/24 1411    folic acid (FOLVITE) tablet 1 mg  1 mg Oral Daily Diana Garibay MD   1 mg at 07/12/24 1106    lactulose (CHRONULAC) solution 20 g  20 g Oral or Feeding Tube BID Diana Garibay MD   20 g at 07/12/24 1107    magnesium oxide (MAG-OX) tablet 400 mg  400 mg Oral Daily Diana Garibay MD   400 mg at 07/11/24 2110    midodrine (PROAMATINE) tablet 5 mg  5 mg Oral TID w/meals Esau Cash MD   5 mg at 07/12/24 1106    naltrexone (DEPADE/REVIA) tablet 50 mg  50 mg Oral Daily Diana Garibay MD   50 mg at 07/12/24 1112    pantoprazole (PROTONIX) IV push injection 40 mg  40 mg Intravenous BID Diana Garibay MD   40 mg at 07/12/24 1108    rifaximin (XIFAXAN) tablet 550 mg  550 mg Oral or Feeding Tube BID Diana Garibay MD   550 mg at 07/12/24 1106    silver nitrate (ARZOL) Misc   Topical Once Escalante, Dottie, PA-C        sodium chloride (OCEAN) 0.65 % nasal spray 2 spray  2 spray Both Nostrils TID Dottie Escalante PA-C   2 spray at 07/12/24 0909    sodium chloride (PF) 0.9% PF flush 3 mL  3 mL Intracatheter Q8H Anup Schwartz MD   3 mL at 07/11/24 2109    sodium chloride (PF) 0.9% PF flush  3 mL  3 mL Intracatheter Q8H Diana Garibay MD        sodium zirconium cyclosilicate (LOKELMA) packet 10 g  10 g Oral Daily Anup Schwartz MD   10 g at 07/12/24 0900    thiamine (B-1) tablet 100 mg  100 mg Oral Daily Diana Garibay MD   100 mg at 07/12/24 1106     Current Facility-Administered Medications   Medication Dose Route Frequency Provider Last Rate Last Admin     Esau Cash MD

## 2024-07-12 NOTE — PROGRESS NOTES
Hematology/Oncology Follow-up Note  [facility]    Today's Date: 07/12/24   Date of Admission:  7/9/2024   Reason for Consult: Anemia      ASSESSMENT/ PLAN : Crispin Ham is a 39 year old year old male here for weakness and anemia. Patient has alcoholic cirrhosis with active alcohol consumption, prior to hospitalization. He has required multiple blood transfusions during this hospitalization. There was a question if the anemia is secondary to alcoholism and cirrhosis or if further workup revealed another explanation.     Anemia  Hemoglobin: 6.8  WBC: 3.6  Platelet 55    Ferritin is high. Iron is high. Vitamin B12 is normal.   Peripheral blood demonstrating normochromic normocytic anemia with thrombocytopenia   GI consulted and no EGD is indicated. Last EGD 4/18/2024- no varices.  Anemia work-up revealing     PLAN:  Anemia work-up revealing this is all related to liver cirrhosis. If he does not quit alcohol. The cytopenia will worsen.   Quit Alcohol  Transfuse for hemoglobin goal >7  Continue folic acid       INTERIM HISTORY:  Patient is feeling better and walking around in room during visit. He is visibly shaking during our conversation today. Reports no more episodes of epistaxis after ENT cauterized. Lightheadedness is better.     MEDICATIONS:  Reviewed       PHYSICAL EXAM:  Vital signs:  Temp: 97.6  F (36.4  C) Temp src: Oral BP: 107/69 Pulse: 93   Resp: 18 SpO2: 100 % O2 Device: None (Room air)     Weight: 108.9 kg (240 lb)  Estimated body mass index is 32.55 kg/m  as calculated from the following:    Height as of 4/9/24: 1.829 m (6').    Weight as of this encounter: 108.9 kg (240 lb).      GENERAL/CONSTITUTIONAL: No acute distress.      LABS:  CBC RESULTS:   Recent Labs   Lab Test 07/12/24  0539   WBC 3.6*   RBC 1.99*   HGB 6.8*   HCT 19.8*      MCH 34.2*   MCHC 34.3   RDW 21.8*   PLT 55*        Comprehensive Metabolic Panel:  Lab Results   Component Value Date     (L) 07/12/2024     POTASSIUM 4.9 07/12/2024    CHLORIDE 99 07/12/2024    CO2 14 (L) 07/12/2024    ANIONGAP 12 07/12/2024     (H) 07/12/2024    BUN 40.6 (H) 07/12/2024    CR 1.90 (H) 07/12/2024    GFRESTIMATED 46 (L) 07/12/2024    MIGUELINA 9.5 07/12/2024            Imaging  Results for orders placed or performed during the hospital encounter of 07/09/24   CT Abdomen Pelvis w/o Contrast    Impression    IMPRESSION:   1.  No retroperitoneal hematoma or evidence for intra-abdominal  hemorrhage.  2.  Small right pleural effusion. New multifocal patchy and nodular  opacities in the lung bases, likely pneumonia.  3.  Cirrhosis and evidence of portal hypertension.  4.  Distended gallbladder.    ROXANA CAMARA MD         SYSTEM ID:  [id number]            Thank you for involving us in this patients care.    Shahrzad SANDERS, CNP  Hematology/Oncology  [facility]   Pager #[phone]

## 2024-07-12 NOTE — PLAN OF CARE
VSS on RA. A/Ox4. Denies pain. LS dim, denies SOB. Voiding adequately. BM this shift, +BS and passing flatus. Completed PRBC transfusion this AM, no transfusion reaction noted at this time. Per provider no hgb recheck needed prior to discharge.    Went over AVS w/ pt and he verbalized understanding of discharge instructions. Questioned answered. Reviewed and verified discharge meds. Personal belongings and discharge meds sent w/ pt at discharge. Pt arranged transportation home.      Irineo Negron RN  3:23 PM        Goal Outcome Evaluation:      Plan of Care Reviewed With: patient    Overall Patient Progress: improving    Outcome Evaluation: Discharged.

## 2024-07-12 NOTE — PROGRESS NOTES
Care Management Discharge Note    Discharge Date: 07/12/2024       Discharge Disposition:  Home    Discharge Services:  None    Discharge DME:      Discharge Transportation: public transportation, family or friend will provide    Private pay costs discussed: Not applicable    Does the patient's insurance plan have a 3 day qualifying hospital stay waiver?  No    PAS Confirmation Code:    Patient/family educated on Medicare website which has current facility and service quality ratings:      Education Provided on the Discharge Plan:    Persons Notified of Discharge Plans:   Patient/Family in Agreement with the Plan:      Handoff Referral Completed: No    Additional Information:  Patient discharging today.    He has been scheduled an appointment for hospital follow up with his primary care provider, Dr. Blake Canchola, for Monday July 15 at 10:20 am at the [facility].    He will follow with [facility] liver clinic.  They will reach out to him to schedule.    Kirti Figueroa RN  Inpatient Float Care Coordinator  [facility]      Kirti Figueroa RN

## 2024-07-12 NOTE — PROGRESS NOTES
[facility]  [facility]  Gastroenterology Progress note    Interval History:      Without complaints.  No further epistaxis since ENT treated yesterday.  Receiving another unit of PRBC.      Vital Signs:      BP 97/71 (BP Location: Right arm)   Pulse 88   Temp 97.5  F (36.4  C) (Oral)   Resp 16   Wt 108.9 kg (240 lb)   SpO2 100%   BMI 32.55 kg/m    Temp (24hrs), Av.5  F (36.4  C), Min:97.2  F (36.2  C), Max:97.6  F (36.4  C)    Patient Vitals for the past 72 hrs:   Weight   24 2309 108.9 kg (240 lb)       Intake/Output Summary (Last 24 hours) at 2024 0738  Last data filed at 2024 0200  Gross per 24 hour   Intake 360 ml   Output 550 ml   Net -190 ml         Constitutional: NAD, comfortable  Cardiovascular: RRR, normal S1, S2   Respiratory: CTAB  Abdomen: soft, non-tender, nondistended    Additional Comments:  ROS, FH, SH: See initial GI consult for details.    Laboratory Data:  Recent Labs   Lab Test 24  0539 24  0002 24  0954 07/10/24  1311 07/10/24  0636 24  2344 24  0641 24  0850 24  0550 24  0430   WBC 3.6*  --  4.8  --  4.9  --    < > 6.8   < > 4.1   HGB 6.8* 7.5* 6.8*   < > 4.6*  --    < > 8.5*   < > 6.6*     --  98  --  103*  --    < > 100   < > 101*   PLT 55*  --  61*  --  63*  --    < > 103*   < > 87*   INR  --   --   --   --   --  2.26*  --  2.48*  --  2.74*    < > = values in this interval not displayed.     Recent Labs   Lab Test 24  0539 24  0002 24  1821 24  1248 24  0954 07/10/24  2225 07/10/24  1004 07/10/24  0636   * 125* 124*   < > 123*   < >  --  122*   POTASSIUM 4.9  --   --   --  5.3  --  5.5* 5.4*   CHLORIDE 99  --   --   --  98  --   --  97*   CO2 14*  --   --   --  14*  --   --  17*   BUN 40.6*  --   --   --  44.9*  --   --  50.8*   CR 1.90*  --   --   --  2.48*  --   --  3.11*   ANIONGAP 12  --   --   --  11  --   --  8   MIGUELINA 9.5  --   --   --   9.1  --   --  8.6    < > = values in this interval not displayed.     Recent Labs   Lab Test 07/11/24  0954 07/10/24  0636 07/10/24  0423 07/09/24  2342 04/30/24  0832 04/29/24  0837 04/28/24  0806 04/19/24  1802 04/19/24  0458 04/18/24  1230 11/17/23  0536 11/16/23  1514 09/25/23  1537 09/25/23  1424 09/08/23  1224 09/08/23  1147   ALBUMIN  --  3.1*  --  3.3* 2.8* 3.0* 2.8*   < > 2.8*  --    < > 3.1*   < > 2.4*   < > 2.3*   BILITOTAL 10.7* 8.2*  --  8.6* 10.3* 10.9* 9.5*   < > 11.3*  --    < > 8.4*   < > 21.7*   < > 10.4*   DBIL 3.70*  --   --   --   --  3.50* 3.00*  --   --   --    < >  --   --   --   --  7.66*   ALT  --  22  --  23 52 58 56   < > 46  --    < > 49   < > 69   < > 82*   AST  --  50*  --  52* 63* 73* 71*   < > 96*  --    < > 222*   < >  --    < > 331*   ALKPHOS  --  98  --  106 140 145 131   < > 118  --    < > 330*   < > 182*   < > 200*   PROTEIN  --   --  20*  --   --   --   --   --   --  30*  --  100*   < >  --    < >  --    LIPASE  --   --   --   --   --   --   --   --  56  --   --   --   --  237*  --  272*    < > = values in this interval not displayed.         Assessment:  38 yo male with alcoholic liver disease with underlying cirrhosis.  MELD-Na 36.  Discriminant function also high, but not a candidate for steroids due to severe anemia.  Patient with acute on chronic anemia.  Hemoglobin 4.4 on admission, now 6.8 after multiple transfusions.  No evidence of overt GI bleeding but persistent epistaxis despite ENT intervention.  Suspect anemia is multifactorial with possible contributing factors of epistaxis, slow pace GI blood loss from portal hypertensive gastropathy in the setting of severe coagulopathy, and bone marrow suppression from heavy alcohol use.  History of ascites and SBP.  Maintained on furosemide 80 mg and spironolactone 50 mg.  Patient on prophylactic Bactrim prior to admission for history of SBP.  Currently on hold due to electrolyte imbalance.  History of encephalopathy.  On  lactulose and rifaximin.  No evidence of confusion or asterixis today.  Ongoing active alcohol abuse.  Plan:  -  No plan for repeat endoscopic evaluation in the absence of overt GI bleeding.  -  Monitor H/H; transfuse prn.  -  Correct coagulopathy.  -  Appreciate hematology consult for management of severe coagulopathy and evaluation for hemolysis.  -  Continue pantoprazole 40 mg daily.  -  Advance diet as tolerated.  -  Management of diuretics per renal.  -  Complete abstinence from alcohol.  -  Patient questions getting a paracentesis prior to discharge.  No significant distention or ascites appreciated on exam.  Only trace ascites seen on CT 7/10.  Can consider if signs of increasing ascites.  -  Follow up in hepatology clinic after discharge.  [facility] will call to arrange.    -  Dr. Juan is covering the weekend.    Total Time Spent: 19 minutes        GENET Hamlin  [facility]  Office:  [phone] call if needed after 5PM  Cell:  [phone], not available after 5PM at this number

## 2024-07-17 NOTE — ED TRIAGE NOTES
Pt presents with left foot wound which started to bleed while working. Pt leg wrapped by staff at [facility], bleeding now controlled.      Triage Assessment (Adult)       Row Name 07/17/24 5516          Triage Assessment    Airway WDL WDL        Respiratory WDL    Respiratory WDL WDL        Peripheral/Neurovascular WDL    Peripheral Neurovascular WDL WDL        Cognitive/Neuro/Behavioral WDL    Cognitive/Neuro/Behavioral WDL WDL

## 2024-07-17 NOTE — ED TRIAGE NOTES
See previous RN triage note      Triage Assessment (Adult)       Row Name 07/17/24 1635 07/17/24 1720       Triage Assessment    Airway WDL WDL WDL       Respiratory WDL    Respiratory WDL WDL WDL       Skin Circulation/Temperature WDL    Skin Circulation/Temperature WDL X  Jaundice --       Cardiac WDL    Cardiac WDL WDL --       Peripheral/Neurovascular WDL    Peripheral Neurovascular WDL WDL WDL       Cognitive/Neuro/Behavioral WDL    Cognitive/Neuro/Behavioral WDL WDL WDL

## 2024-07-17 NOTE — ED PROVIDER NOTES
Emergency Department Note      History of Present Illness     Chief Complaint   Laceration      HPI   Crispin Ham is a 39 year old male with a history of chronic heart failure, hypertension, and hyponatremia who presents to the ED today for evaluation of a laceration. The patient reports he was sitting at the computer at his job today when a coworker pointed out that he had blood coming from his left shoe. He then found out he had a small laceration between his great toe and second toe. He was able to get the bleeding to stop for an hour or so, but it started up again which is why he came in to the ED. He doesn't believe that he stepped on anything sharp. He has significant swelling in his legs, with the left one being more swollen than the right. Today was his first day back to work in a little while. He reports that he has had a few beers in the last couple of months but otherwise hasn't been drinking. He doesn't weigh himself regularly but reports he was weighed to be 260 lbs two days ago, which is higher than usual. He was also measured to have a hemoglobin of 7.5 two days ago. He takes spirolactone on a regular basis. He denies any calf pain.     Independent Historian   None    Review of External Notes   Clinic notes    Past Medical History   Medical History and Problem List   Heart failure with preserved ejection fraction  Alcoholic cirrhosis of liver with ascites  Alcoholic hepatitis with ascites   Esophageal varices   Hepatic encephalopathy  Hypertension   Hyponatremia  Multiple duodenal ulcers  SBP  Severe alcohol use disorder  Alcoholism  CHF  Depressive disorder  Dyslipidemia  TIKA  Liver disease  Hepatic encephalopathy  Hyperkalemia  Hyponatremia  Septic shock  Anemia    Medications   Ciprofloxacin  Folic acid   Lasix   Magnesium oxide  Midodrine  Naltrexone  Protonix   Rifaximin  Spironolactone   Thiamine  Xifaxan  Bactrim  Methocarbamol    Surgical History   Wrist surgery    Physical Exam    Patient Vitals for the past 24 hrs:   BP Temp Pulse Resp SpO2 Height Weight   07/17/24 1751 105/82 -- 91 16 -- -- --   07/17/24 1636 106/43 97.4  F (36.3  C) 92 20 100 % 1.829 m (6') 117.9 kg (260 lb)     Physical Exam  GENERAL: well developed, pleasant  HEAD: atraumatic  EYES: pupils reactive, extraocular muscles intact, conjunctivae normal  ENT:  mucus membranes moist  NECK:  trachea midline, normal range of motion  RESPIRATORY: no tachypnea, breath sounds clear to auscultation   CVS: normal S1/S2, no murmurs, intact distal pulses  ABDOMEN: soft, nontender, nondistention  MUSCULOSKELETAL: no deformities  SKIN: warm and dry, no acute rashes or ulceration. Blood soaked gauze to the bottom of the left foot, area oozing between the great toe and the second toe. No pulsatile bleeding. Significant edema to both lower extremities, left more than right.   NEURO: GCS 15, cranial nerves intact, alert and oriented x3  PSYCH:  Mood/affect normal     Diagnostics   Lab Results   Labs Ordered and Resulted from Time of ED Arrival to Time of ED Departure   BASIC METABOLIC PANEL - Abnormal       Result Value    Sodium 129 (*)     Potassium 4.8      Chloride 103      Carbon Dioxide (CO2) 19 (*)     Anion Gap 7      Urea Nitrogen 30.5 (*)     Creatinine 1.22 (*)     GFR Estimate 78      Calcium 10.0      Glucose 100 (*)    INR - Abnormal    INR 2.17 (*)    HEPATIC FUNCTION PANEL - Abnormal    Protein Total 6.6      Albumin 3.7      Bilirubin Total 10.3 (*)     Alkaline Phosphatase 99      AST 57 (*)     ALT 23      Bilirubin Direct 3.34 (*)    CBC WITH PLATELETS AND DIFFERENTIAL - Abnormal    WBC Count 2.7 (*)     RBC Count 2.10 (*)     Hemoglobin 7.4 (*)     Hematocrit 21.2 (*)      (*)     MCH 35.2 (*)     MCHC 34.9      RDW 20.1 (*)     Platelet Count 53 (*)     % Neutrophils 60      % Lymphocytes 16      % Monocytes 17      % Eosinophils 4      % Basophils 2      % Immature Granulocytes 0      NRBCs per 100 WBC 0       Absolute Neutrophils 1.6      Absolute Lymphocytes 0.4 (*)     Absolute Monocytes 0.5      Absolute Eosinophils 0.1      Absolute Basophils 0.1      Absolute Immature Granulocytes 0.0      Absolute NRBCs 0.0     RBC AND PLATELET MORPHOLOGY - Abnormal    RBC Morphology Confirmed RBC Indices      Platelet Assessment        Value: Automated Count Confirmed. Platelet morphology is normal.    Acanthocytes Moderate (*)     RBC Fragments Slight (*)        Imaging   No orders to display       Independent Interpretation   None    ED Course    Medications Administered   Medications - No data to display    Procedures   Procedures     Discussion of Management   None    ED Course   ED Course as of 07/17/24 2022 Wed Jul 17, 2024   1726 I obtained history and examined the patient as noted above.    1942 I rechecked and updated the patient.        Optional/Additional Documentation  None    Medical Decision Making / Diagnosis   CMS Diagnoses: None    MIPS   None    MDM   Crispin Ham is a 39 year old male with history of cirrhosis and edema.  He was told by co-workers about his foot bleeding.  He has fairly new shoes and no history of stepping on an object/nail.  By report it was bleeding under significant force.  My exam shows it is now just oozing at base of big toe and second toe.  Surgicel and dressing was applied.  I rechecked his foot and bleeding is controlled.  Re-dressed.  Discussed pressure if it starts to re-bleed or returning to ED.  Labs are stable.    Disposition   The patient was discharged.     Diagnosis     ICD-10-CM    1. Bleeding from varicose vein  I83.899            Discharge Medications   New Prescriptions    No medications on file         Scribe Disclosure:  I, Neva Cross, am serving as a scribe at 5:50 PM on 7/17/2024 to document services personally performed by Leo Parham MD based on my observations and the provider's statements to me.        Leo Parham MD  07/17/24 2022

## 2024-07-17 NOTE — ED NOTES
Patient bled through dressing while in triage. Dressing removed and replaced. Patient was then brought back to ED8 for assessment and intervention by MD.

## 2024-08-06 PROBLEM — R17 ELEVATED BILIRUBIN: Status: ACTIVE | Noted: 2024-01-01

## 2024-08-06 PROBLEM — N17.9 AKI (ACUTE KIDNEY INJURY) (H): Status: ACTIVE | Noted: 2024-01-01

## 2024-08-06 NOTE — ED NOTES
DATE/TIME OF CALL RECEIVED FROM LAB:  08/06/24 at 10:49 AM   LAB TEST:  hemoglobin  LAB VALUE:  4.8  PROVIDER NOTIFIED?: Yes  PROVIDER NAME: [place]  DATE/TIME LAB VALUE REPORTED TO PROVIDER: 1012  MECHANISM OF PROVIDER NOTIFICATION: Face-To-Face  PROVIDER RESPONSE: acknowledge

## 2024-08-06 NOTE — ED NOTES
DATE/TIME OF CALL RECEIVED FROM LAB:  08/06/24 at 11:00 AM   LAB TEST:  potassium  LAB VALUE:  6.2  PROVIDER NOTIFIED?: Yes  PROVIDER NAME: [place]  DATE/TIME LAB VALUE REPORTED TO PROVIDER: 1056  MECHANISM OF PROVIDER NOTIFICATION: Face-To-Face  PROVIDER RESPONSE: Acknowledge

## 2024-08-06 NOTE — H&P
[facility]  History and Physical - Hospitalist Service       Date of Admission:  8/6/2024  PRIMARY CARE PROVIDER:    Blake Canchola    Assessment & Plan   Crispin Ham is a 39 year old male admitted on 8/6/2024 due to Acute on chronic anemia thought to be secondary to blood loss in the setting of recurrent epistaxis, GATITO, Hyperkalemia, Hyponatremia.      Past medical history significant for End stage liver disease, Alcoholic liver cirrhosis with ascites, Alcoholic hepatitis, Portal hypertension with esophageal varices, Alcohol dependence, Anemia, HTN, HLP, Chronic HFpEF, Chronic lower extremity edema, History of SBP, History of duodenal ulcers.    Patient presented to the ED due to abnormal labs.  Patient reported that he had lab studies performed the day prior to presentation and was called and informed his HGB was 5 and that he needed to go to the ED.  Lab studies were completed for ongoing management of his lever failure.  Patient reported increased fatigue, shortness of breath, frequent nose bleeds and darker yellow color in his eyes.      Work-up in the ED included blood type and screen.  CMP revealed a sodium of 125, potassium of 6.2, chloride of 94, CO2 of 21, BUN of 37.6, creatinine of 2.89 with a GFR of 28, alkaline phosphatase of 184, total bilirubin of 12.3 and glucose of 113 otherwise within normal limits.  CBC with differential revealed a hemoglobin of 4.8, hematocrit of 14.4, platelet count of 87, RBC count of 1.24, MCV of 116, MCH of 38.7, RDW of 22.1 otherwise within normal limits.  INR is elevated at 2.26.  Ammonia level is elevated at 96.    2 view chest x-ray was noted blunting posterior costophrenic sulcus (perhaps on the right, suggesting small pleural effusion) as well as a somewhat reticulointerstitial patterns in both lungs which may relate to pulmonary edema (atypical infectious process or inflammatory process may relate to a similar appearance).  EKG showed sinus  rhythm.    Patient received blood transfusion with 3u pRBC while in the ED.      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  Nasal balloon catheter placed in the ED.    - IMC status.    - Repeat HGB (4 hours after transfusion completed) and then every 6 hours.    - Conditional transfusion orders placed for HGB LESS THAN 7.    - ENT consult requested.    - IV Unasyn.    - Clear liquid diet.      GATITO  Hepatorenal syndrome  *Patient had restarted PTA Lasix and spironolactone in the last 1-2 weeks.    - Hold PTA lasix 40 mg BID mg/d and spironolactone 50 mg/d.    - Nephrology consult requested.    - BMP ordered 08/07/24.    Elevated ammonia level  History of hepatic encephalopathy   - Resumed on PTA lactulose 20 g BID.      Hyponatremia (Improving)  *Suspect multifactorial with hypovolemia due to blood loss, severe anemia, and hypotension.    - Monitor.     Hyperkalemia (Resolving)  *Potassium at 6.2 and when repeated had improved to 5.    - Monitor.      End stage liver disease  Alcoholic liver cirrhosis with ascites  Portal hypertension with esophageal varices  Alcohol dependence  History of Alcoholic hepatitis  - [facility] consult requested.    - Resumed on PTA rifaximin 550 mg BID.    - Resumed on PTA Protonix 40 mg BID.    - Resumed on PTA naltrexone 50 mg/d.    - Resumed on PTA Folic acid 1 mg/d, magnesium 400 mg/d, multivitamin and thiamine.    Orthostatic hypotension   - Resumed on PTA midodrine 5 mg TID.      HLP  *Noted on chart review.  Not currently on any therapy.      Chronic HFpEF  Chronic lower extremity edema  - Holding PTA diuretic therapy as above.    - Monitor I/O's and daily weights.    - Clear liquid diet.      Obesity   Body mass index is 35.26 kg/m .  Increase in all-cause morbidity and mortality.   - Follow up with PCP regarding ongoing management.       History of SBP  - Hold PTA Ciprofloxacin  (prophylaxis for SBP) while receiving IV Unasyn.      History of duodenal ulcers  *Patient denied melanotic stool or hematemesis but has been having daily epistaxis.      Clinically Significant Risk Factors Present on Admission        # Hyperkalemia: Highest K = 6.2 mmol/L in last 2 days, will monitor as appropriate  # Hyponatremia: Lowest Na = 125 mmol/L in last 2 days, will monitor as appropriate       # Coagulation Defect: INR = 2.26 (Ref range: 0.85 - 1.15) and/or PTT = N/A, will monitor for bleeding  # Thrombocytopenia: Lowest platelets = 87 in last 2 days, will monitor for bleeding  # Acute Kidney Injury, unspecified: based on a >150% or 0.3 mg/dL increase in last creatinine compared to past 90 day average, will monitor renal function     # Anemia: based on hgb <11  # Obesity: Estimated body mass index is 35.26 kg/m  as calculated from the following:    Height as of this encounter: 1.829 m (6').    Weight as of this encounter: 117.9 kg (260 lb).       # Financial/Environmental Concerns:           Diet: Clear liquid diet  DVT Prophylaxis: Pneumatic Compression Devices  Bosch Catheter: Not present  Lines: None     Cardiac Monitoring: ORDERED  Code Status: DNR/DNI; confirmed with the patient.         Disposition Plan   Inpatient status.  Anticipate greater than 2 evening hospitalization while undergoing continued work-up/management of Acute on chronic anemia thought to be secondary to blood loss in the setting of recurrent epistaxis, GATITO, Hyperkalemia, Hyponatremia.      Medically Ready for Discharge: Anticipated in 2-4 Days    The patient's care was discussed with the Bedside Nurse, Patient, and Dr. Ruiz .  Reviewed ED notes, Discharge summary (7/12/2024), Nephrology note(7/29/2024).      The patient has been discussed with Dr. Elmore, who agrees with the assessment and plan at this time.    Jaciel Bearden PA-C  [facility]  Securely message with the Vocera Web Console (learn  more here)    ______________________________________________________________________    Chief Complaint   Low HGB    History is obtained from Dr. Ruiz, the patient and EMR.      History of Present Illness   Crispin Ham is a 39 year old male admitted on 8/6/2024 due to Acute on chronic anemia thought to be secondary to blood loss in the setting of recurrent epistaxis, GATITO, Hyperkalemia, Hyponatremia.      Past medical history significant for End stage liver disease, Alcoholic liver cirrhosis with ascites, Alcoholic hepatitis, Portal hypertension with esophageal varices, Alcohol dependence, Anemia, HTN, HLP, Chronic HFpEF, Chronic lower extremity edema, History of SBP, History of duodenal ulcers.    Patient presented to the ED due to abnormal labs.  Patient reported that he had lab studies performed the day prior to presentation and was called and informed his HGB was 5 and that he needed to go to the ED.  Lab studies were completed for ongoing management of his lever failure.  Patient reported increased fatigue, shortness of breath, frequent nose bleeds and darker yellow color in his eyes.      Work-up in the ED included blood type and screen.  CMP revealed a sodium of 125, potassium of 6.2, chloride of 94, CO2 of 21, BUN of 37.6, creatinine of 2.89 with a GFR of 28, alkaline phosphatase of 184, total bilirubin of 12.3 and glucose of 113 otherwise within normal limits.  CBC with differential revealed a hemoglobin of 4.8, hematocrit of 14.4, platelet count of 87, RBC count of 1.24, MCV of 116, MCH of 38.7, RDW of 22.1 otherwise within normal limits.  INR is elevated at 2.26.  Ammonia level is elevated at 96.    2 view chest x-ray was noted blunting posterior costophrenic sulcus (perhaps on the right, suggesting small pleural effusion) as well as a somewhat reticulointerstitial patterns in both lungs which may relate to pulmonary edema (atypical infectious process or inflammatory process may relate to a similar  appearance).  EKG showed sinus rhythm.    Patient received blood transfusion with 3u pRBC while in the ED.      Patient was seen in the ED where he is resting somewhat comfortably on the gurney upon arrival.  He had noted blood present on his gown and appeared to have active epistaxis.  Dr. Ruiz entered the room shortly after my arrival and after short discussion it was agreed that patient would proceed with getting nasal packing to the left nare.  We then briefly reviewed events that led to patient's presentation to the ED.    Upon questioning, patient indicated that he has felt cold and feels as though he has had some shaking chills.  He has had worsening fatigue and weakness recently.  When asked if he has had any rashes or skin changes he stated he has got some ongoing spots on his right shoulder which was visible and looks like he has got some petechia there.  Patient indicated that he recently injured himself where he fell and struck his chest which is resulted in ongoing chest pain however patient indicated that it is greatly improved.  At night he experiences chest palpitations.  He has chronic swelling of his legs that seems to be present for at least 1 month or more.  He also indicated that he is got some swelling of his abdomen and groin area.  Due to the swelling in his groin it does make it difficult to urinate at times.  Patient denied having abdominal pain or nausea and vomiting but stated that he gags when he is taking his medications.    Patient indicated that he has been experiencing almost daily nosebleeds.  He indicated that he was discharged in July with saline spray and bacitracin but he has not been utilizing this as he is unclear when to utilize it.  He has chronic numbness and tingling of his toes.  He reported that he is a low talker and feels his throat/voice is hoarse.      Patient stated that he follows with ROMANA VENTURA (kidney specialist through [facility]) and his PCP.  Patient was  specifically asked if he is resumed his diuretic therapy and he stated that he restarted this a couple weeks ago.    Patient currently resides in a house with his parents in [place], [place].  He denies any history of current use of tobacco use.  He indicated that he continues to consume alcohol and at first did not quantify how much.  When specifically asked he indicated that he consumes 3-4 alcoholic beverages per week.  He did then indicate that he had previously gone to .  He denies recreational drug use.  At times he utilizes a cane.  He does not utilize a CPAP machine or supplemental oxygen.    Discussed and reviewed CODE STATUS and patient elected to be DNR/DNI.      Past Medical History    I have reviewed this patient's medical history and updated it with pertinent information if needed.   Past Medical History:   Diagnosis Date    (HFpEF) heart failure with preserved ejection fraction (H)     Alcoholic cirrhosis of liver with ascites (H)     Alcoholic hepatitis with ascites (H28)     Esophageal varices (H)     Hepatic encephalopathy (H)     Hypertension     Hyponatremia     Multiple duodenal ulcers     SBP (spontaneous bacterial peritonitis) (H)     Severe alcohol use disorder (H)    End stage liver disease, Alcoholic liver cirrhosis with ascites, Alcoholic hepatitis, Portal hypertension with esophageal varices, Alcohol dependence, Anemia, HTN, HLP, Chronic HFpEF, Chronic lower extremity edema, History of SBP, History of duodenal ulcers.    Prior to Admission Medications   Prior to Admission Medications   Prescriptions Last Dose Informant Patient Reported? Taking?   Vitamin D, Cholecalciferol, 10 MCG (400 UNIT) TABS  Self Yes No   Sig: Take 1 tablet by mouth daily   bacitracin 500 UNIT/GM OINT   No No   Sig: Apply topically 2 times daily   ciprofloxacin (CIPRO) 500 MG tablet   No No   Sig: Take 1 tablet (500 mg) by mouth every 24 hours   folic acid (FOLVITE) 1 MG tablet  Self No No   Sig: Take 1  tablet (1 mg) by mouth daily   furosemide (LASIX) 40 MG tablet   Yes No   Sig: Take 1 tablet (40 mg) by mouth 2 times daily   lactulose (CHRONULAC) 10 GM/15ML solution   No No   Si mLs (20 g) by Oral or Feeding Tube route 2 times daily   magnesium oxide 400 MG tablet   Yes No   Sig: Take 400 mg by mouth daily   midodrine (PROAMATINE) 5 MG tablet   Yes No   Sig: Take 5 mg by mouth 3 times daily (with meals)   multivitamin, therapeutic (THERA-VIT) TABS tablet  Self Yes No   Sig: Take 1 tablet by mouth daily   naltrexone (DEPADE/REVIA) 50 MG tablet   Yes No   Sig: Take 50 mg by mouth daily   oxymetazoline (AFRIN) 0.05 % nasal spray   No No   Sig: Spray 0.1 mLs (1 spray) in nostril 3 times daily as needed for other (bleeding, APPLY SPRAY IF BLEEDING STARTS AND PUT PRESSURE)   pantoprazole (PROTONIX) 40 MG EC tablet   No No   Sig: Take 1 tablet (40 mg) by mouth 2 times daily   rifaximin (XIFAXAN) 550 MG TABS tablet   No No   Si tablet (550 mg) by Oral or Feeding Tube route 2 times daily   sodium chloride (OCEAN) 0.65 % nasal spray   No No   Sig: Spray 2 sprays into both nostrils 3 times daily   spironolactone (ALDACTONE) 50 MG tablet   Yes No   Sig: Take 1 tablet (50 mg) by mouth daily   thiamine (B-1) 100 MG tablet  Self No No   Sig: Take 1 tablet (100 mg) by mouth daily      Facility-Administered Medications: None     Allergies   Allergies   Allergen Reactions    Acetaminophen Swelling    Aspirin Angioedema and Swelling    Aspirin-Acetaminophen-Caffeine Swelling     puffy eyes and dry throat a few years ago. Tolerates ibuprofen and acetaminophen, but avoids aspirin    Caffeine Swelling    Lisinopril Cough and Other (See Comments)    Nsaids Angioedema     Patient reports having a reaction of puffy eyes and dry throat a few years ago, but he has taken Advil in  and did not react.       Physical Exam   Vital Signs: Temp: 98.1  F (36.7  C)   BP: 96/48 Pulse: 96   Resp: 16 SpO2: 100 % O2 Device: None (Room air)     Weight: 260 lbs 0 oz    Constitutional: Awake, alert, cooperative, no apparent distress.    ENT: Left nare epistaxis otherwise normocephalic, without obvious abnormality, atraumatic, oral pharynx with moist mucus membranes.  Eyes extra occular movements intact. Scleral icterus present.  Neck: Supple, symmetrical, trachea midline, no adenopathy.  Pulmonary: No increased work of breathing, fair air exchange, clear to auscultation bilaterally, no crackles or wheezing.  Cardiovascular: Regular rate and rhythm, normal S1 and S2, no S3 or S4, and no murmur noted.  GI: Normal bowel sounds, soft, non-distended, non-tender.  Obese.  Skin/Integumen: Jaundiced and pale.  Neuro: CN II-XII grossly intact.    Psych:  Alert and oriented x 3. Flat affect.  Extremities: Bilateral 3+ lower extremity edema noted, and calves are non-tender to palpation bilaterally.     Medical Decision Making       Please see A&P for additional details of medical decision making.  GREATER THAN 75 MINUTES SPENT BY ME on the date of service doing chart review, history, exam, documentation & further activities per the note.       Data   Data reviewed today: I reviewed all medications, new labs and imaging results over the last 24 hours. I personally reviewed no images or EKG's today.      I have personally reviewed the following data over the past 24 hrs:    5.7  \   5.8 (LL)   / 87 (L)     127 (L) 94 (L) 37.6 (H) /  113 (H)   5.0 21 (L) 2.89 (H) \     ALT: 34 AST: N/A AP: 184 (H) TBILI: 12.3 (H)   ALB: 3.7 TOT PROTEIN: 7.0 LIPASE: N/A     INR:  2.26 (H) PTT:  N/A   D-dimer:  N/A Fibrinogen:  N/A       Imaging results reviewed over the past 24 hrs:   Recent Results (from the past 24 hour(s))   XR Chest 2 Views    Narrative    CHEST TWO VIEWS  8/6/2024 10:03 AM     HISTORY: 39-year-old patient with chest pain.       Impression    IMPRESSION: Since April 18, 2024, heart size remains normal  considering AP technique and shallow inspiration. There is  blunting  posterior costophrenic sulcus, perhaps on the right, suggesting small  pleural effusion. There are somewhat reticulointerstitial patterns in  both lungs which may relate to pulmonary edema, though an atypical  infectious process or inflammatory process may relate to a similar  appearance. No pneumothorax.    KRISTI SCHAFER MD         SYSTEM ID:  [id number]

## 2024-08-06 NOTE — ED PROVIDER NOTES
Emergency Department Note      History of Present Illness     Chief Complaint   Abnormal Labs (Hgb 5)    HPI   Crispin Ham is a 39 year old male with history of heart failure, CHF, hypertension, alcoholism, alcoholic cirrhosis of liver with ascites, alcoholic hepatitis, and liver disease who presents for low hemoglobin. Patient has routine labs done for management of his liver failure and was found to have a hemoglobin of 5 yesterday. He reports feeling a little tired and short of breath, but he has history of bilateral leg edema that sometimes causes shortness of breath. He notes that he has been having daily nose bleeds for at least 10-15min per day recently and his eyes have become more dark yellow. Patient says that he fell and landed on his chest 2.5 weeks ago after stepping over a parking lot barrier because he is unable to lift his legs very much and he has residual chest pain that has been improving. He reports an x-ray was done and was normal and there have been no injuries since. He also reports left toe injury a few weeks ago. No abdominal pain, bloody vomit, or blood in his stool. He notes occasional alcohol consumption of a bottle of cider a couple times per week. Patient is not anticoagulated.     Independent Historian   None    Review of External Notes   Reviewed discharge summary from July 9th where patient was seen for anemia.    Past Medical History     Medical History and Problem List   Heart failure with preserved ejection fraction  Alcoholic cirrhosis of liver with ascites  Alcoholic hepatitis with ascites   Esophageal varices   Hepatic encephalopathy  Hypertension   Hyponatremia  Multiple duodenal ulcers  SBP  Severe alcohol use disorder  Alcoholism  CHF  Depressive disorder  Dyslipidemia  TIKA  Liver disease  Hepatic encephalopathy  Hyperkalemia  Hyponatremia  Septic shock  Anemia     Medications   Ciprofloxacin  Folic acid   Lasix   Magnesium oxide  Midodrine  Naltrexone  Protonix    Rifaximin  Spironolactone   Thiamine  Xifaxan  Bactrim  Methocarbamol     Surgical History   Wrist surgery    Physical Exam     Patient Vitals for the past 24 hrs:   BP Temp Pulse Resp SpO2 Height Weight   08/06/24 1330 -- 97.9  F (36.6  C) 96 19 -- -- --   08/06/24 1315 115/56 -- 95 18 100 % -- --   08/06/24 1300 106/66 -- 96 10 97 % -- --   08/06/24 1245 105/56 98.1  F (36.7  C) 98 12 99 % -- --   08/06/24 1244 105/56 -- 98 18 98 % -- --   08/06/24 1229 98/58 -- 96 17 100 % -- --   08/06/24 1214 98/51 -- 97 22 100 % -- --   08/06/24 1159 101/43 -- 97 -- 98 % -- --   08/06/24 1152 -- 98.1  F (36.7  C) 96 16 100 % -- --   08/06/24 1145 95/47 -- 96 20 100 % -- --   08/06/24 1144 95/47 -- 96 14 100 % -- --   08/06/24 1136 96/48 98.1  F (36.7  C) 96 16 -- -- --   08/06/24 1130 -- -- 96 13 100 % -- --   08/06/24 1115 98/55 98.6  F (37  C) 98 14 100 % -- --   08/06/24 1100 97/58 -- 93 19 100 % -- --   08/06/24 1049 (!) 87/45 97.8  F (36.6  C) 98 13 98 % -- --   08/06/24 1034 93/63 -- 95 19 100 % -- --   08/06/24 1033 93/63 98.3  F (36.8  C) 98 16 -- -- --   08/06/24 1019 (!) 85/40 -- 98 18 97 % -- --   08/06/24 1013 94/42 98.3  F (36.8  C) 97 -- -- -- --   08/06/24 1004 94/42 -- 96 -- -- -- --   08/06/24 0949 (!) 85/36 -- 101 23 100 % -- --   08/06/24 0946 -- -- 101 15 100 % -- --   08/06/24 0945 (!) 83/44 -- 98 12 -- -- --   08/06/24 0931 (!) 90/34 98.6  F (37  C) 101 20 100 % -- --   08/06/24 0930 -- -- -- -- -- 1.829 m (6') 117.9 kg (260 lb)     Physical Exam  GENERAL: Patient appears chronically unwell.  Disheveled.  Slight confusion.  Limited historian.  HEAD: Atraumatic.  NECK: No rigidity.  Scleral icterus.  Nose: No active nasal bleeding.  CV: Tachycardic and regular, no murmurs, rubs or gallops  PULM: CTAB with good aeration; no retractions, rales, rhonchi, or wheezing  ABD: Soft, nontender, nondistended, no guarding.  No bruising.  DERM: Pale and jaundiced.  Skin warm and dry  EXTREMITY: Moving all  extremities.  3+ edema in bilateral lower extremities.       Diagnostics     Lab Results   Labs Ordered and Resulted from Time of ED Arrival to Time of ED Departure   COMPREHENSIVE METABOLIC PANEL - Abnormal       Result Value    Sodium 125 (*)     Potassium 6.2 (*)     Carbon Dioxide (CO2) 21 (*)     Anion Gap 10      Urea Nitrogen 37.6 (*)     Creatinine 2.89 (*)     GFR Estimate 28 (*)     Calcium 9.7      Chloride 94 (*)     Glucose 113 (*)     Alkaline Phosphatase 184 (*)     AST        ALT 34      Protein Total 7.0      Albumin 3.7      Bilirubin Total 12.3 (*)    CBC WITH PLATELETS AND DIFFERENTIAL - Abnormal    WBC Count 5.7      RBC Count 1.24 (*)     Hemoglobin 4.8 (*)     Hematocrit 14.4 (*)      (*)     MCH 38.7 (*)     MCHC 33.3      RDW 22.1 (*)     Platelet Count 87 (*)     % Neutrophils 65      % Lymphocytes 15      % Monocytes 16      % Eosinophils 3      % Basophils 1      % Immature Granulocytes 1      NRBCs per 100 WBC 0      Absolute Neutrophils 3.7      Absolute Lymphocytes 0.9      Absolute Monocytes 0.9      Absolute Eosinophils 0.1      Absolute Basophils 0.0      Absolute Immature Granulocytes 0.0      Absolute NRBCs 0.0     INR - Abnormal    INR 2.26 (*)    AMMONIA - Abnormal    Ammonia 96 (*)    SODIUM - Abnormal    Sodium 127 (*)    POTASSIUM - Normal    Potassium 5.0     HEMOGLOBIN   TYPE AND SCREEN, ADULT    ABO/RH(D) A POS      Antibody Screen Negative      SPECIMEN EXPIRATION DATE 20240809235900     PREPARE RED BLOOD CELLS (UNIT)    ISSUE DATE AND TIME [id number]      Blood Component Type Red Blood Cells      Product Code L3158J28      Unit Status Transfused      Unit Number [id number]      UNIT ABO/RH O+      CODING SYSTEM [id number]      UNIT TYPE ISBT 5100     PREPARE RED BLOOD CELLS (UNIT)    ISSUE DATE AND TIME [id number]      Blood Component Type Red Blood Cells      Product Code P3419B55      Unit Status Returned      Unit Number [id number]      UNIT ABO/RH  O+      CODING SYSTEM [id number]      UNIT TYPE ISBT 5100     PREPARE RED BLOOD CELLS (UNIT)    Blood Component Type Red Blood Cells      Product Code J8684N56      Unit Status Transfused      Unit Number [id number]      CROSSMATCH Compatible      CODING SYSTEM [id number]      ISSUE DATE AND TIME [id number]      UNIT ABO/RH A+      UNIT TYPE ISBT 6200     PREPARE RED BLOOD CELLS (UNIT)    Blood Component Type Red Blood Cells      Product Code P6605O46      Unit Status Ready for issue      Unit Number [id number]      CROSSMATCH Compatible      CODING SYSTEM [id number]     PREPARE RED BLOOD CELLS (UNIT)   TRANSFUSE RED BLOOD CELLS (UNIT)   TRANSFUSE RED BLOOD CELLS (UNIT)   ABO/RH TYPE AND SCREEN     Imaging   XR Chest 2 Views   Final Result   IMPRESSION: Since April 18, 2024, heart size remains normal   considering AP technique and shallow inspiration. There is blunting   posterior costophrenic sulcus, perhaps on the right, suggesting small   pleural effusion. There are somewhat reticulointerstitial patterns in   both lungs which may relate to pulmonary edema, though an atypical   infectious process or inflammatory process may relate to a similar   appearance. No pneumothorax.      KRISTI SCHAFER MD            SYSTEM ID:  [id number]        EKG   ECG results from 08/06/24   EKG 12-lead, tracing only     Value    Systolic Blood Pressure     Diastolic Blood Pressure     Ventricular Rate 95    Atrial Rate 95    LA Interval 182    QRS Duration 98        QTc 437    P Axis 53    R AXIS 25    T Axis 44    Interpretation ECG      Sinus rhythm  Nonspecific T wave abnormality  Abnormal ECG     Independent Interpretation   CXR: increased pulmonary vascular congestion.    ED Course      Medications Administered   Medications   lactulose (CHRONULAC) solution 20 g (20 g Oral $Given 8/6/24 7401)   ampicillin-sulbactam (UNASYN) 3 g vial to attach to  mL bag (has no administration in time range)   tranexamic acid  (CYKLOKAPRON) TOPICAL (injection used topically) ( Topical $Given 8/6/24 1300)     Procedures     Epistaxis Care     Procedure: Epistaxis Care    Indication: Epistaxis    Consent: Verbal    Medication: Patient was topically medicated with Tranexamic acid    Procedure detail: Rhino Rocket (sponge) was gently inserted into the left nare.   Patient was closely monitored and did not have evidence of recurrent bleeding.     Patient Status: The patient tolerated the procedure well: Yes. There were no complications.       Discussion of Management   Hospitalist team member, Georgi Bearden PA-C who is admitting under Dr. Elmore.    ED Course   ED Course as of 08/06/24 1349   Tue Aug 06, 2024   0938 I evaluated the patient, obtained history, and performed a physical exam as detailed above.    1133 I rechecked on the patient and updated on plan of care.    1205 I consulted with hospitalist team member Georgi Bearden PA-C who is admitting under Dr. Elmore.    1224 I was updated that patient has a bloody nose.    1250 I packed the patient's left nare, see procedure note above.    1300 I consulted with Georgi Bearden PA-C.      Additional Documentation  None    Medical Decision Making / Diagnosis     CMS Diagnoses: None    MIPS   None    MDM   Crispin Ham is a 39 year old male with history of liver disease, presenting with outpatient labs showing anemia.  Differential diagnosis-considered upper, lower GI bleed, traumatic related, secondary to decreased production, and others.  Patient has been having daily extensive nosebleeds, which I think could be contributing to his anemia.  Patient denies any GI bleeding.  Hypotensive on arrival, therefore ordered 2 units of emergency release blood.  Given blood products and patient felt improved and blood pressure improved appropriately.  No recent traumatic injury.  Do not think he requires emergent advanced imaging.  I ordered a chest x-ray as he hit his chest do not weeks ago and it is not  showing hemothorax or pneumothorax.  Initial labs showing hemoglobin 4.8.  Baseline thrombocytopenia.  Labs showing hyponatremia which is acute on chronic issue for him.  Also with renal failure.  Potassium was rechecked and is actually 5 after initial drawl was 6.2.  There are no EKG changes.  Ammonia is elevated so given lactulose to stave off encephalopathy.  He does appear to have some cognitive issues.  Patient then developed epistaxis of the left nare, therefore I placed a TXA soaked Rhino Rocket in the left nare with hemostasis.  Patient is not febrile.  No leukocytosis.  Denies abdominal pain.  Do not suspect SBP.  Do not see evidence of acute infection.  Repeat hemoglobin improved but still below 7 therefore written for a third unit of packed red blood cells.  Discussed with hospitalist team and patient to be admitted to [facility].    Critical Care  Critical Care is defined as an illness or injury that acutely impairs one or more vital organ systems such that there is a high probability of imminent or life-threatening deterioration in the patient's condition and that the failure to initiate these interventions on an urgent basis would likely result in sudden, clinically significant or life-threatening deterioration in the patient's condition.    The critical condition was:  epistaxis, anemia    Critical interventions performed or strongly considered: Transfusion of blood products    Critical care time was 30 minutes exclusive of time spent on separately billable procedures.    For purposes of time:  Procedures included in CC time: interpretation of NICOM, CXR, SpO2, VBG/ABG, interpretation of physiologic data, OG placement, temporary transcutaneous/transvenous pacing, ventilator management  Common separately billable procedures (not included in CC time): CPR, wound repair, endotracheal intubation, central line placement, intraosseous placement, tube thoracostomy, temporary transvenous pacemaker, EKG, electrical  cardioversion      Disposition   The patient was admitted to the hospital.     Diagnosis     ICD-10-CM    1. Anemia, unspecified type  D64.9       2. Hyponatremia  E87.1       3. GATITO (acute kidney injury) (H24)  N17.9       4. Elevated bilirubin  R17       5. Epistaxis  R04.0          Scribe Disclosure:  Mai VIGIL, am serving as a scribe at 9:31 AM on 8/6/2024 to document services personally performed by Arash Ruiz MD based on my observations and the provider's statements to me.        Arash Ruiz MD  08/06/24 1913

## 2024-08-06 NOTE — CONSULTS
[facility]    Nephrology Consultation     Date of Admission:  8/6/2024    Assessment & Plan     Crispin Ham is a 39 year old male who was admitted on 8/6/2024.     Assessment:  1) acute kidney injury  Has some underlying diagnosis of hepatorenal syndrome.  Followed by the [facility] [place] [facility], last seen by nurse practitioner on 7/29/2024.  GATITO related to hypotension, severe anemia.  Hope with transfusion and improve blood pressures he will have recovery at least back to his baseline.    Underlying CKD: Reported underlying hepatorenal syndrome.  If he does have this, would be more of a type II HRS.  Before yesterday, last creatinine was at 1.34.  Estimated GFR at this value is 70 although likely grossly overestimates renal function in setting of liver disease.  No prior Cystatin C I can find on review of review of her outside records.      2) hyperkalemia  Secondary to GATITO, GI bleed and underlying PTA spironolactone.  Has been instruction past on the potassium diet, unclear how well he has complied with his home.  Additionally, possibly taking a double strength Bactrim twice daily which could also be a contributor to hyperkalemia.    3) hyponatremia  Chronic, related to hypervolemia and underlying end-stage liver disease.  Chronically on a fluid restriction at home, notes in chart reflect he often does not follow this.      Other:  End-stage liver disease, alcoholic cirrhosis.  Hypervolemia has been treated with Lasix 40 mg daily and spironolactone 50 mg daily.  He supposed prednisone restriction at home.  Anemia, status post transfusions today      Plan/Recs:  1) agree with holding of spironolactone, furosemide  2) with diet order, suggest low sodium low potassium  3) starting Lokelma 10 g 3 times daily to potassium 4.5 or lower may be having ongoing hyperkalemia with low GFR and GI bleed.  4) fluid restriction 1.2 L/day due to ongoing free water total body  excess  5) expect with improved hemodynamics, gfr, associated abnormal electrolytes will improve    [facility] - Nephrology  [phone]  --------------------------------------------------------------------------------------------  Reason for Consult     I was asked to see the patient for acute kidney injury, hyperkalemia, hyponatremia    History is obtained from the patient and chart review.      History of Present Illness     Crispin Ham is a 39 year old male who presented to the emergency room today with significant anemia with hemoglobin of 5.0.  Known significant underlying end-stage liver disease from alcoholic cirrhosis.  Patient with GATITO and hyponatremia with admission at this hospital earlier this month.  Hemoglobin on recheck today at 4.8.  Receiving 3 units packed red cell transfusions.  Patient reports he has had significant weight gain over the 25 pounds.  Main complaint is related to edema in his scrotum.  Slightly improved with resumption of diuretics fairly recently.  He does watch a low-sodium diet but does not monitor potassium intake.    Past Medical History   I have reviewed this patient's medical history and updated it with pertinent information if needed.   Past Medical History:   Diagnosis Date    (HFpEF) heart failure with preserved ejection fraction (H)     Alcoholic cirrhosis of liver with ascites (H)     Alcoholic hepatitis with ascites (H28)     Esophageal varices (H)     Hepatic encephalopathy (H)     Hypertension     Hyponatremia     Multiple duodenal ulcers     SBP (spontaneous bacterial peritonitis) (H)     Severe alcohol use disorder (H)        Past Surgical History   I have reviewed this patient's surgical history and updated it with pertinent information if needed.  Past Surgical History:   Procedure Laterality Date    COLONOSCOPY      EGD    ESOPHAGOSCOPY, GASTROSCOPY, DUODENOSCOPY (EGD), COMBINED N/A 03/16/2024    Procedure:  ESOPHAGOGASTRODUODENOSCOPY;  Surgeon: Chato Juan MD;  Location:  OR    ESOPHAGOSCOPY, GASTROSCOPY, DUODENOSCOPY (EGD), COMBINED N/A 2024    Procedure: Esophagoscopy, gastroscopy, duodenoscopy (EGD), combined;  Surgeon: Hiren Vogt DO;  Location:  GI    ORTHOPEDIC SURGERY      wrist  surgery    WRIST SURGERY Left        Prior to Admission Medications   Prior to Admission Medications   Prescriptions Last Dose Informant Patient Reported? Taking?   Vitamin D, Cholecalciferol, 10 MCG (400 UNIT) TABS Not Taking Self Yes No   Sig: Take 1 tablet by mouth daily   Patient not taking: Reported on 2024   bacitracin 500 UNIT/GM OINT 2024 at am  No Yes   Sig: Apply topically 2 times daily   folic acid (FOLVITE) 1 MG tablet 2024 Self No Yes   Sig: Take 1 tablet (1 mg) by mouth daily   furosemide (LASIX) 40 MG tablet 2024 at am  Yes Yes   Sig: Take 1 tablet (40 mg) by mouth 2 times daily   lactulose (CHRONULAC) 10 GM/15ML solution 2024 at am  No Yes   Si mLs (20 g) by Oral or Feeding Tube route 2 times daily   magnesium oxide 400 MG tablet 2024  Yes Yes   Sig: Take 400 mg by mouth daily   midodrine (PROAMATINE) 5 MG tablet 2024 at x1  Yes Yes   Sig: Take 5 mg by mouth 3 times daily (with meals)   multivitamin, therapeutic (THERA-VIT) TABS tablet 2024 Self Yes Yes   Sig: Take 1 tablet by mouth daily   naltrexone (DEPADE/REVIA) 50 MG tablet 2024  Yes Yes   Sig: Take 50 mg by mouth daily   oxymetazoline (AFRIN) 0.05 % nasal spray   No Yes   Sig: Spray 0.1 mLs (1 spray) in nostril 3 times daily as needed for other (bleeding, APPLY SPRAY IF BLEEDING STARTS AND PUT PRESSURE)   pantoprazole (PROTONIX) 40 MG EC tablet 2024 at x1  No Yes   Sig: Take 1 tablet (40 mg) by mouth 2 times daily   rifaximin (XIFAXAN) 550 MG TABS tablet 2024 at x1  No Yes   Si tablet (550 mg) by Oral or Feeding Tube route 2 times daily   sodium chloride (OCEAN) 0.65 % nasal spray  8/6/2024  No Yes   Sig: Spray 2 sprays into both nostrils 3 times daily   spironolactone (ALDACTONE) 50 MG tablet 8/6/2024  Yes Yes   Sig: Take 1 tablet (50 mg) by mouth daily   thiamine (B-1) 100 MG tablet Past Week Self No Yes   Sig: Take 1 tablet (100 mg) by mouth daily      Facility-Administered Medications: None     Allergies   Allergies   Allergen Reactions    Acetaminophen Swelling    Aspirin Angioedema and Swelling    Aspirin-Acetaminophen-Caffeine Swelling     puffy eyes and dry throat a few years ago. Tolerates ibuprofen and acetaminophen, but avoids aspirin    Caffeine Swelling    Lisinopril Cough and Other (See Comments)    Nsaids Angioedema     Patient reports having a reaction of puffy eyes and dry throat a few years ago, but he has taken Advil in 2023 and did not react.       Social History   I have reviewed this patient's social history and updated it with pertinent information if needed. Crispin Ham  reports that he has never smoked. He does not have any smokeless tobacco history on file. He reports current alcohol use. He reports that he does not currently use drugs.    Family History   I have reviewed this patient's family history and updated it with pertinent information if needed.   No family history on file.    Review of Systems   The 10 point Review of Systems is negative other than noted in the HPI.     Physical Exam   Temp: 97.9  F (36.6  C)   BP: 116/57 Pulse: 92   Resp: 15 SpO2: 99 % O2 Device: None (Room air)    Vital Signs with Ranges  Temp:  [97.8  F (36.6  C)-98.6  F (37  C)] 97.9  F (36.6  C)  Pulse:  [] 92  Resp:  [10-23] 15  BP: ()/(34-66) 116/57  SpO2:  [97 %-100 %] 99 %  260 lbs 0 oz    GENERAL: Somewhat ill in appearance  HEENT: Scleral jaundice icteric sclera noted  CV: RRR, 1/6 systolic murmur, has 2-3+ bilateral dependent edema noted   RESP: Clear bilaterally with good efforts  GI: Abdomen soft, distended  MUSCULOSKELETAL: extremities nl - no gross  "deformities noted  SKIN: no suspicious lesions or rashes, dry to touch  NEURO: Gross nonfocal  PSYCH: mood good, affect appropriate    Data   BMP  Recent Labs   Lab 08/06/24  1137 08/06/24  0942   * 125*   POTASSIUM 5.0 6.2*   CHLORIDE  --  94*   MIGUELINA  --  9.7   CO2  --  21*   BUN  --  37.6*   CR  --  2.89*   GLC  --  113*     [email or web address](phos:4)  CBC)  Recent Labs   Lab 08/06/24  1346 08/06/24  0942   WBC  --  5.7   HGB 5.8* 4.8*   HCT  --  14.4*   MCV  --  116*   PLT  --  87*     Recent Labs   Lab 08/06/24  1027 08/06/24  0942   ALT  --  34   ALKPHOS  --  184*   BILITOTAL  --  12.3*   JAM 96*  --      Recent Labs   Lab 08/06/24  0942   INR 2.26*     No results found for: \"D2VIT\", \"D3VIT\", \"DTOT\"  Recent Labs   Lab 08/06/24  1346 08/06/24  0942   HGB 5.8* 4.8*   HCT  --  14.4*   MCV  --  116*     No results for input(s): \"PTHI\" in the last 168 hours.    "

## 2024-08-06 NOTE — CONSULTS
"GASTROENTEROLOGY CONSULTATION    Crispin Ham  [address]  [facility] [address]  39 year old male    Admission Date/Time: 8/6/2024  Primary Care Provider: Blake Canchola    We were asked to see the patient in consultation by Jaciel DE LEÓN for evaluation of acute on chronic anemia in setting of liver cirrhosis.       HPI: Crispin Ham is a 39 year old male with PMH including decompensated alcoholic cirrhosis with history of ascites, SBP, encephalopathy, chronic anemia, hx of duodenal ulcers, HTN, HLP, Chronic HFpEF, chronic lower extremity edema, who presents to the ER after he was found to have abnormal labs/acute on chronic anemia on routine imaging.    He reports that since his last admission, he has been feeling tired. He reports daily nose bleeds and admits to picking his nose sometimes when he is \"half asleep\" and that it can take while to stop bleeding. Denies abdominal pain, nausea, vomiting, hematemesis, melena, or hematochezia. Reports ongoing lower leg edema and new scrotal edema over the last 4 months. There was an incident when his toe was bleeding at work on 7/17. He had a clinic visit on 7/29 and mentioned a fall in the parking lot where he landed on his chest. He had a chest XR which was normal and also a left foot XR, which showed a fracture. He reports drinking a couple of ciders intermittently, but not daily.     He has been seen for anemia due to epistaxis previously, last in July 2024. Hgb 4.4. 5 units of blood given. CT imaging 7/2024 notable for cirrhotic appearing liver but no evidence of retroperitoneal hematoma or other evidence of intra-abdominal bleeding. Procedure was deferred due to previous ENT with same indication in April and known epistaxis. ENT was consulted, he had packing, Bacitracin ointment and nasal saline ordered along with Afrin PRN. Haptoglobin was <10. He was discharged with instruction to hold diuretics.  He saw Kristi STEWART at [facility] in follow up " on 7/23/24  and reported he resumed diuretics a few days prior. Denied alcohol use for 2 weeks. He noted ongoing epistaxis and had an outpt follow up with ENT in November.      Labs on admit show Hgb 4.8 (7.7 7/17), Platelet 87k. Creat 2.89 (1.22 on 7/17). Na 125. K 6.2. Albumin 3.7. Total bili 12.3, alk phos 184. INR 2.26. He received 3 units of blood in the ED. Nasal balloon catheter was placed in ED.      Home meds: Cipro for SBP prophylaxis, lactulose, Xifaxan, furosemide, and spironolactone. Naltrexone for alcohol use disorder.     In April, was hospitalized with acute hepatitis, acute on chronic anemia, encephalopathy, retroperitoneal hematoma, severe anasarca,and shock - he was intubated 4/18 for for airway protection due to severe hepatic encephalopathy. EGD on 4/18 showed moderate portal hypertensive gastropathy, but no varices or ulcers, or acute bleed, suspect possibly epistaxis as cause of worsening encephalopathy and anemia. He was treated with lactulose, rifaximin and ceftriaxone (empiric SBP coverage), as well as methylprednisolone for alcoholic hepatitis - he was a nonresponder.     XR Left Toe 7/29/24: acute, mildly comminuted and impacted proximal phalangeal base fracture.     Lives with his parents. Mother manages his medications.     ROS: A comprehensive ten point review of systems was negative aside from those in mentioned in the HPI.      MEDICATIONS:   Current Facility-Administered Medications   Medication Dose Route Frequency Provider Last Rate Last Admin    ampicillin-sulbactam (UNASYN) 3 g vial to attach to  mL bag  3 g Intravenous Q6H Jaciel Bearden PA-C   3 g at 08/06/24 1415    lactulose (CHRONULAC) solution 20 g  20 g Oral Daily Arash Ruiz MD   20 g at 08/06/24 1338     Current Outpatient Medications   Medication Sig Dispense Refill    bacitracin 500 UNIT/GM OINT Apply topically 2 times daily 144 g 0    folic acid (FOLVITE) 1 MG tablet Take 1 tablet (1 mg) by mouth  daily 30 tablet 0    furosemide (LASIX) 40 MG tablet Take 1 tablet (40 mg) by mouth 2 times daily      lactulose (CHRONULAC) 10 GM/15ML solution 30 mLs (20 g) by Oral or Feeding Tube route 2 times daily 1800 mL 0    magnesium oxide 400 MG tablet Take 400 mg by mouth daily      midodrine (PROAMATINE) 5 MG tablet Take 5 mg by mouth 3 times daily (with meals)      multivitamin, therapeutic (THERA-VIT) TABS tablet Take 1 tablet by mouth daily      naltrexone (DEPADE/REVIA) 50 MG tablet Take 50 mg by mouth daily      oxymetazoline (AFRIN) 0.05 % nasal spray Spray 0.1 mLs (1 spray) in nostril 3 times daily as needed for other (bleeding, APPLY SPRAY IF BLEEDING STARTS AND PUT PRESSURE) 15 mL 0    pantoprazole (PROTONIX) 40 MG EC tablet Take 1 tablet (40 mg) by mouth 2 times daily 60 tablet 0    rifaximin (XIFAXAN) 550 MG TABS tablet 1 tablet (550 mg) by Oral or Feeding Tube route 2 times daily 120 tablet 0    sodium chloride (OCEAN) 0.65 % nasal spray Spray 2 sprays into both nostrils 3 times daily 60 mL 0    spironolactone (ALDACTONE) 50 MG tablet Take 1 tablet (50 mg) by mouth daily      thiamine (B-1) 100 MG tablet Take 1 tablet (100 mg) by mouth daily 30 tablet 0    Vitamin D, Cholecalciferol, 10 MCG (400 UNIT) TABS Take 1 tablet by mouth daily (Patient not taking: Reported on 8/6/2024)         ALLERGIES:   Allergies   Allergen Reactions    Acetaminophen Swelling    Aspirin Angioedema and Swelling    Aspirin-Acetaminophen-Caffeine Swelling     puffy eyes and dry throat a few years ago. Tolerates ibuprofen and acetaminophen, but avoids aspirin    Caffeine Swelling    Lisinopril Cough and Other (See Comments)    Nsaids Angioedema     Patient reports having a reaction of puffy eyes and dry throat a few years ago, but he has taken Advil in 2023 and did not react.       Past Medical History:   Diagnosis Date    (HFpEF) heart failure with preserved ejection fraction (H)     Alcoholic cirrhosis of liver with ascites (H)      Alcoholic hepatitis with ascites (H28)     Esophageal varices (H)     Hepatic encephalopathy (H)     Hypertension     Hyponatremia     Multiple duodenal ulcers     SBP (spontaneous bacterial peritonitis) (H)     Severe alcohol use disorder (H)        Past Surgical History:   Procedure Laterality Date    COLONOSCOPY      EGD    ESOPHAGOSCOPY, GASTROSCOPY, DUODENOSCOPY (EGD), COMBINED N/A 03/16/2024    Procedure: ESOPHAGOGASTRODUODENOSCOPY;  Surgeon: Chato Juan MD;  Location:  OR    ESOPHAGOSCOPY, GASTROSCOPY, DUODENOSCOPY (EGD), COMBINED N/A 04/18/2024    Procedure: Esophagoscopy, gastroscopy, duodenoscopy (EGD), combined;  Surgeon: Hiren Vogt DO;  Location:  GI    ORTHOPEDIC SURGERY      wrist  surgery    WRIST SURGERY Left 2023       SOCIAL HISTORY:  Social History     Tobacco Use    Smoking status: Never   Substance Use Topics    Alcohol use: Yes     Comment: last drinbk 2 weeks ago    Drug use: Not Currently       FAMILY HISTORY:  No family history on file.    PHYSICAL EXAM:   /57   Pulse 92   Temp 97.9  F (36.6  C)   Resp 15   Ht 1.829 m (6')   Wt 117.9 kg (260 lb)   SpO2 99%   BMI 35.26 kg/m     Constitutional: alert, chronically ill appearing   Cardiovascular: regular rate and rhythm, ++bilateral leg edema   Respiratory: clear to auscultation bilaterally  ENT: dry/cracked lips. Packing in left nare   Abdomen: soft, non-tender, non-distended, normally active bowel sounds. No rebound tenderness or guarding  Neuro: Mild asterixis   Skin: +juandice, left big toe bruised      LABORATORY WORK  Recent Labs   Lab Test 08/06/24  1346 08/06/24  0942 07/17/24  1811 07/12/24  0539 07/10/24  0636 07/09/24  2344   WBC  --  5.7 2.7* 3.6*   < >  --    HGB 5.8* 4.8* 7.4* 6.8*   < >  --    MCV  --  116* 101* 100   < >  --    PLT  --  87* 53* 55*   < >  --    INR  --  2.26* 2.17*  --   --  2.26*    < > = values in this interval not displayed.     Recent Labs   Lab Test 08/06/24  1137  08/06/24  0942 07/17/24  1811 07/12/24  0539   * 125* 129* 125*   POTASSIUM 5.0 6.2* 4.8 4.9   CHLORIDE  --  94* 103 99   CO2  --  21* 19* 14*   BUN  --  37.6* 30.5* 40.6*   CR  --  2.89* 1.22* 1.90*   ANIONGAP  --  10 7 12   MIGUELINA  --  9.7 10.0 9.5     Recent Labs   Lab Test 08/06/24  0942 07/17/24  1811 07/11/24  0954 07/10/24  0636 07/10/24  0423 07/09/24  2342 04/30/24  0832 04/29/24  0837 04/19/24  1802 04/19/24  0458 04/18/24  1230 11/17/23  0536 11/16/23  1514 09/25/23  1537 09/25/23  1424 09/08/23  1224 09/08/23  1147   ALBUMIN 3.7 3.7  --  3.1*  --  3.3*   < > 3.0*   < > 2.8*  --    < > 3.1*   < > 2.4*   < > 2.3*   BILITOTAL 12.3* 10.3* 10.7* 8.2*  --  8.6*   < > 10.9*   < > 11.3*  --    < > 8.4*   < > 21.7*   < > 10.4*   DBIL  --  3.34* 3.70*  --   --   --   --  3.50*   < >  --   --    < >  --   --   --   --  7.66*   ALT 34 23  --  22  --  23   < > 58   < > 46  --    < > 49   < > 69   < > 82*   AST  --  57*  --  50*  --  52*   < > 73*   < > 96*  --    < > 222*   < >  --    < > 331*   ALKPHOS 184* 99  --  98  --  106   < > 145   < > 118  --    < > 330*   < > 182*   < > 200*   PROTEIN  --   --   --   --  20*  --   --   --   --   --  30*  --  100*   < >  --    < >  --    LIPASE  --   --   --   --   --   --   --   --   --  56  --   --   --   --  237*  --  272*    < > = values in this interval not displayed.       IMAGING   CT ABDOMEN PELVIS W/O CONTRAST 7/10/2024 10:47 AM   IMPRESSION:   1.  No retroperitoneal hematoma or evidence for intra-abdominal  hemorrhage.  2.  Small right pleural effusion. New multifocal patchy and nodular  opacities in the lung bases, likely pneumonia.  3.  Cirrhosis and evidence of portal hypertension.  4.  Distended gallbladder.      ENDOSCOPIC WORKUP  EGD 4/18/24  Findings:       The esophagus was normal without evidence of varices.        No blood in the stomach. No gastric varices. Moderate portal        hypertensive gastropathy was found in the cardia and in the gastric         fundus.        The examined duodenum was normal.                                                                                    Impression:               - No evidence of acute GI bleed                             - Certainly possible that frequent epistaxis is                             contributing to encephalopathy and anemia   CONSULTATION ASSESSMENT AND PLAN  40 yo male with decompensated alcoholic liver disease with underlying cirrhosis with hx of ascites, SBP (on Cipro for prophylaxis), encephalopathy, alcoholic hepatitis, frequent epistaxis, who presents with acute on chronic anemia (Hgb 7-->4.8), GATITO, and electrolyte abnormalities including hyperkalemia and hyponatremia.     He had similar presentation and drop in hemoglobin to 4.4 last month in setting of daily epistaxis since discharge. Does pick his nose sometimes. No evidence of overt GI bleeding. Suspect anemia is multifactorial and could also include GI blood loss from portal hypertensive gastropathy in the setting of severe coagulopathy, bone marrow suppression from alcohol use, hemolysis, etc.     For ascites and lower leg edema he is normally on furosemide and spironolactone.     Mild asterixis noted on exam.   MDF 78  MELD-Na 37 (90 day survival by MELD is 21%)    Plan  -PPI IV   -Agree with ENT consultation  -Monitor hgb and transfuse to keep >7  -Nephrology consulted for GATITO and elevated K+ - diuresis per nephrology   -Check haptoglobin and LDH  -INR likely fixed due to liver disease but will order Vitamin K x3 days to see if that helps as it could be partially nutritional   -No current plan for EGD given no overt GI bleeding and known daily epistaxis    -SBP Prophylaxis   -Wrap lower extremities   -2gm Na diet  -Lactulose/Xifaxan   -Hold Diuretics   -Monitor MELD labs  -Complete abstinence from alcohol. Indicates desire to go to in person AA meeting instead of Zoom (which is he currently doing).  -Pt is not a liver transplant candidate  "due to ongoing alcohol use     I will discuss the patient plan with Dr. Galvan. Thank you for asking us to participate in the care of this patient.    Total time: 60 minutes     Loyda Hampton CNP  [facility]  Cell: [phone]  Office: [phone]      Staff addendum: 39 yom with ETOH hepatitis/cirrhosis, with ongoing ETOH use admitted with severe acute on chronic anemia. Reports picking nose frequently which results in nose bleeds. Has a cocktail \"occasionally\". Goes to AA. Suspect epistaxis is reason for acute anemia so will hold off on EGD. Pt's self harming behavior with nose picking resulting in life threatening bleeding is concerning.  Agree with above assessment and plan    -Consider steroids if no improvement in liver tests in next 1-2 days.   -Check PEth level  -Monitor for withdrawal  -Consider chem dep consult vs psychiatry consult (given self harming behavior)    Total time 15 min.     Karen Galvan MD  [facility]  Phone [phone] until 5 pm  Office [phone] for after hours on call provider    "

## 2024-08-06 NOTE — ED NOTES
[facility]  ED Nurse Handoff Report    ED Chief complaint: Abnormal Labs (Hgb 5)      ED Diagnosis:   Final diagnoses:   Anemia, unspecified type   Hyponatremia   GATITO (acute kidney injury) (H24)   Hyperkalemia   Elevated bilirubin       Code Status: To be determined by admitting provider.    Allergies:   Allergies   Allergen Reactions    Acetaminophen Swelling    Aspirin Angioedema and Swelling    Aspirin-Acetaminophen-Caffeine Swelling     puffy eyes and dry throat a few years ago. Tolerates ibuprofen and acetaminophen, but avoids aspirin    Caffeine Swelling    Lisinopril Cough and Other (See Comments)    Nsaids Angioedema     Patient reports having a reaction of puffy eyes and dry throat a few years ago, but he has taken Advil in 2023 and did not react.       Patient Story:  Crispin Ham is a 39 year old male with history of heart failure, CHF, hypertension, alcoholic cirrhosis of liver with ascites, alcoholic hepatitis, and liver disease who presents for low hemoglobin. Patient has routine labs done for management of his liver failure and was found to have a hemoglobin of 5 yesterday. He reports feeling a little tired and short of breath, but he has history of bilateral leg edema that sometimes causes shortness of breath. He notes that he has been having daily nose bleeds recently and his eyes have become more dark yellow. Patient says that he fell and landed on his chest a couple of weeks ago after stepping over a parking lot barrier because he is unable to lift his legs very much and he has residual chest pain that has been improving. An x-ray was done and was normal and there have been no injuries since. No abdominal pain, bloody vomit, or blood in his stool. He notes occasional alcohol consumption of a bottle of cider a couple times per week. Patient is not anticoagulated. 2 units of blood here so far.     Focused Assessment:    Patient A&Ox4. Patient lethargic. Patient states leg pain  and chest pain from a fall 2.5 weeks ago. HR WDL in the 90s. Pressure nitially low in the 80s/40s, now better. Breathing rate, rhythm and SPO2 WDL. No cough or SOB. Extensive bilateral lower extremity edema.     Labs Ordered and Resulted from Time of ED Arrival to Time of ED Departure   COMPREHENSIVE METABOLIC PANEL - Abnormal       Result Value    Sodium 125 (*)     Potassium 6.2 (*)     Carbon Dioxide (CO2) 21 (*)     Anion Gap 10      Urea Nitrogen 37.6 (*)     Creatinine 2.89 (*)     GFR Estimate 28 (*)     Calcium 9.7      Chloride 94 (*)     Glucose 113 (*)     Alkaline Phosphatase 184 (*)     AST        ALT 34      Protein Total 7.0      Albumin 3.7      Bilirubin Total 12.3 (*)    CBC WITH PLATELETS AND DIFFERENTIAL - Abnormal    WBC Count 5.7      RBC Count 1.24 (*)     Hemoglobin 4.8 (*)     Hematocrit 14.4 (*)      (*)     MCH 38.7 (*)     MCHC 33.3      RDW 22.1 (*)     Platelet Count 87 (*)     % Neutrophils 65      % Lymphocytes 15      % Monocytes 16      % Eosinophils 3      % Basophils 1      % Immature Granulocytes 1      NRBCs per 100 WBC 0      Absolute Neutrophils 3.7      Absolute Lymphocytes 0.9      Absolute Monocytes 0.9      Absolute Eosinophils 0.1      Absolute Basophils 0.0      Absolute Immature Granulocytes 0.0      Absolute NRBCs 0.0     INR - Abnormal    INR 2.26 (*)    AMMONIA - Abnormal    Ammonia 96 (*)    POTASSIUM   TYPE AND SCREEN, ADULT    ABO/RH(D) A POS      Antibody Screen Negative      SPECIMEN EXPIRATION DATE 20240809235900     PREPARE RED BLOOD CELLS (UNIT)    ISSUE DATE AND TIME [id number]      Blood Component Type Red Blood Cells      Product Code D0986J74      Unit Status Transfused      Unit Number [id number]      UNIT ABO/RH O+      CODING SYSTEM [id number]      UNIT TYPE ISBT 5100     PREPARE RED BLOOD CELLS (UNIT)    ISSUE DATE AND TIME [id number]      Blood Component Type Red Blood Cells      Product Code T8757A45      Unit Status Returned       Unit Number [id number]      UNIT ABO/RH O+      CODING SYSTEM [id number]      UNIT TYPE ISBT 5100     PREPARE RED BLOOD CELLS (UNIT)    Blood Component Type Red Blood Cells      Product Code J1136C11      Unit Status Transfused      Unit Number [id number]      CROSSMATCH Compatible      CODING SYSTEM [id number]      ISSUE DATE AND TIME [id number]      UNIT ABO/RH A+      UNIT TYPE ISBT 6200     PREPARE RED BLOOD CELLS (UNIT)    Blood Component Type Red Blood Cells      Product Code S1995K05      Unit Status Ready for issue      Unit Number [id number]      CROSSMATCH Compatible      CODING SYSTEM ZYPW349     PREPARE RED BLOOD CELLS (UNIT)   TRANSFUSE RED BLOOD CELLS (UNIT)   TRANSFUSE RED BLOOD CELLS (UNIT)   ABO/RH TYPE AND SCREEN       XR Chest 2 Views   Final Result   IMPRESSION: Since April 18, 2024, heart size remains normal   considering AP technique and shallow inspiration. There is blunting   posterior costophrenic sulcus, perhaps on the right, suggesting small   pleural effusion. There are somewhat reticulointerstitial patterns in   both lungs which may relate to pulmonary edema, though an atypical   infectious process or inflammatory process may relate to a similar   appearance. No pneumothorax.      KRISTI SCHAFER MD            SYSTEM ID:  [id number]            Treatments and/or interventions provided:    Medications - No data to display    Patient's response to treatments and/or interventions:  Patient pressure better after blood.     To be done/followed up on inpatient unit:   See any in-patient orders    Does this patient have any cognitive concerns?:  N/A    Activity level - Baseline/Home:    Unknown    Activity Level - Current:    Total Care    Patient's Preferred language: English     Needed?: No    Isolation: None  Infection: Not Applicable  Patient tested for COVID 19 prior to admission: NO    Bariatric?: No    Vital Signs:   Vitals:    08/06/24 1100 08/06/24 1115 08/06/24 1130  08/06/24 1136   BP: 97/58 98/55  96/48   Pulse: 93 98 96 96   Resp: 19 14 13 16   Temp:  98.6  F (37  C)  98.1  F (36.7  C)   SpO2: 100% 100% 100%    Weight:       Height:           Cardiac Rhythm:     Was the PSS-3 completed:   Yes  What interventions are required if any?                 Family Comments: N/A    OBS brochure/video discussed/provided to patient/family: No              Name of person given brochure if not patient: N/A              Relationship to patient: N/A    For the majority of the shift this patient's behavior was Green.  Behavioral interventions performed were N/A.    ED NURSE PHONE NUMBER: *[phone]

## 2024-08-06 NOTE — ED TRIAGE NOTES
Pt states he had labs drawn yesterday and was told his hemoglobin is 5 and needs to come to ED. Pt states he has liver damage for about 1.5 years

## 2024-08-06 NOTE — PLAN OF CARE
Goal Outcome Evaluation:    Orientation: Aox4, calm    Pain: BLE and hip discomfort with movement    Vitals: VSS, on RA    Tele: SR    IV Access/drains: 2 PIV SL    Diet: Full liquid    Mobility: Ax1 with cane    GI/: Cont. B&B    Wound/Skin: Blanchable redness to sacrum, 3+ BLE edema, redness to neck. L. Nares packed due to bleeding.    Discharge Plan: TBD      See Flow sheets for assessment

## 2024-08-06 NOTE — PHARMACY-ADMISSION MEDICATION HISTORY
Pharmacist Admission Medication History    Admission medication history is complete. The information provided in this note is only as accurate as the sources available at the time of the update.    Information Source(s): Patient via in-person      Medication History Completed By: Hannah Gay RPH 8/6/2024 1:18 PM    PTA Med List   Medication Sig Note Last Dose    bacitracin 500 UNIT/GM OINT Apply topically 2 times daily  8/6/2024 at am    folic acid (FOLVITE) 1 MG tablet Take 1 tablet (1 mg) by mouth daily  8/6/2024    furosemide (LASIX) 40 MG tablet Take 1 tablet (40 mg) by mouth 2 times daily  8/6/2024 at am    lactulose (CHRONULAC) 10 GM/15ML solution 30 mLs (20 g) by Oral or Feeding Tube route 2 times daily  8/6/2024 at am    magnesium oxide 400 MG tablet Take 400 mg by mouth daily  8/6/2024    midodrine (PROAMATINE) 5 MG tablet Take 5 mg by mouth 3 times daily (with meals)  8/6/2024 at x1    multivitamin, therapeutic (THERA-VIT) TABS tablet Take 1 tablet by mouth daily  8/6/2024    naltrexone (DEPADE/REVIA) 50 MG tablet Take 50 mg by mouth daily  8/6/2024    oxymetazoline (AFRIN) 0.05 % nasal spray Spray 0.1 mLs (1 spray) in nostril 3 times daily as needed for other (bleeding, APPLY SPRAY IF BLEEDING STARTS AND PUT PRESSURE)      pantoprazole (PROTONIX) 40 MG EC tablet Take 1 tablet (40 mg) by mouth 2 times daily  8/6/2024 at x1    rifaximin (XIFAXAN) 550 MG TABS tablet 1 tablet (550 mg) by Oral or Feeding Tube route 2 times daily  8/6/2024 at x1    sodium chloride (OCEAN) 0.65 % nasal spray Spray 2 sprays into both nostrils 3 times daily  8/6/2024    spironolactone (ALDACTONE) 50 MG tablet Take 1 tablet (50 mg) by mouth daily  8/6/2024    thiamine (B-1) 100 MG tablet Take 1 tablet (100 mg) by mouth daily 8/6/2024: Needs refill Past Week   Hannah Gay, JoseD

## 2024-08-07 NOTE — PROGRESS NOTES
GASTROENTEROLOGY PROGRESS NOTE     CC: Anemia     SUBJECTIVE:  He is on full liquid diet. Denies any new symptoms. Denies black or bloody stools      OBJECTIVE:  General Appearance:  Chronically ill appearing, jaundiced male   BP 96/47 (BP Location: Left arm)   Pulse 101   Temp 98.2  F (36.8  C) (Oral)   Resp 16   Ht 1.829 m (6')   Wt 119.1 kg (262 lb 9.6 oz)   SpO2 95%   BMI 35.61 kg/m    Temp (24hrs), Av.9  F (36.6  C), Min:97.2  F (36.2  C), Max:98.6  F (37  C)    Patient Vitals for the past 72 hrs:   Weight   24 0418 119.1 kg (262 lb 9.6 oz)   24 0930 117.9 kg (260 lb)       Intake/Output Summary (Last 24 hours) at 2024 0943  Last data filed at 2024 0821  Gross per 24 hour   Intake 2050 ml   Output 750 ml   Net 1300 ml        PHYSICAL EXAM  General: alert   SKIN:, jaundice noted  EYES:scleral icterus noted  RESPIRATORY: Non labored breathing  GASTROINTESTINAL: Abdomen soft no tenderness on palpation.   NEURO:Grossly WNL. Mild asterixis noted  Extremities: 2 + edema in lower extremities  Additional Comments:  ROS, FH, SH: See initial GI consult for details.          Recent Labs   Lab Test 24  0453 24  0022 24  1346 24  0942 24  1811   WBC 4.8  --   --  5.7 2.7*   HGB 7.1* 6.7* 5.8* 4.8* 7.4*   *  --   --  116* 101*   PLT 70*  --   --  87* 53*   INR 2.37*  --   --  2.26* 2.17*     Recent Labs   Lab Test 24  0453 24  1547 24  1137 24  0942 24  1811   POTASSIUM 4.7 5.2 5.0 6.2* 4.8   CHLORIDE 95*  --   --  94* 103   CO2 21*  --   --  21* 19*   BUN 39.6*  --   --  37.6* 30.5*   ANIONGAP 12  --   --  10 7     Recent Labs   Lab Test 24  0453 24  0942 24  1811 24  0954 07/10/24  0636 07/10/24  0423 24  1802 24  0458 24  1230 23  0536 23  1514 23  1537 23  1424 23  1224 23  1147   ALBUMIN 3.5 3.7 3.7  --  3.1*  --    < > 2.8*  --    < > 3.1*   < >  2.4*   < > 2.3*   BILITOTAL 14.1* 12.3* 10.3*   < > 8.2*  --    < > 11.3*  --    < > 8.4*   < > 21.7*   < > 10.4*   ALT 32 34 23  --  22  --    < > 46  --    < > 49   < > 69   < > 82*   AST 86*  --  57*  --  50*  --    < > 96*  --    < > 222*   < >  --    < > 331*   PROTEIN  --   --   --   --   --  20*  --   --  30*  --  100*   < >  --    < >  --    LIPASE  --   --   --   --   --   --   --  56  --   --   --   --  237*  --  272*    < > = values in this interval not displayed.      MELD 3.0: 38 at 8/7/2024  5:55 AM  MELD-Na: 36 at 8/7/2024  5:55 AM  Calculated from:  Serum Creatinine: 2.60 mg/dL at 8/7/2024  4:53 AM  Serum Sodium: 129 mmol/L at 8/7/2024  5:55 AM  Total Bilirubin: 14.1 mg/dL at 8/7/2024  4:53 AM  Serum Albumin: 3.5 g/dL at 8/7/2024  4:53 AM  INR(ratio): 2.37 at 8/7/2024  4:53 AM  Age at listing (hypothetical): 39 years  Sex: Male at 8/7/2024  5:55 AM    Imaging and procedures:    CTAP 7/10  IMPRESSION:   1.  No retroperitoneal hematoma or evidence for intra-abdominal  hemorrhage.  2.  Small right pleural effusion. New multifocal patchy and nodular  opacities in the lung bases, likely pneumonia.  3.  Cirrhosis and evidence of portal hypertension.  4.  Distended gallbladder.    X-Ray chest 8/6  MPRESSION: Since April 18, 2024, heart size remains normal  considering AP technique and shallow inspiration. There is blunting  posterior costophrenic sulcus, perhaps on the right, suggesting small  pleural effusion. There are somewhat reticulointerstitial patterns in  both lungs which may relate to pulmonary edema, though an atypical  infectious process or inflammatory process may relate to a similar  appearance. No pneumothorax.      I have reviewed the patient's new clinical lab results    Problem list pertaining to GI:  Decompensated liver disease  Alcoholic hepatitis  Anemia  Epistaxis   Coagulopathy  Thrombocytopenia    Assessment:  39 year old male with decompensated alcoholic cirrhosis with history of  ascites, SBP, encephalopathy, chronic anemia, hx of duodenal ulcers, HTN, HLP, Chronic HFpEF, chronic lower extremity edema, who presents to the ER after he was found to have abnormal labs/acute on chronic anemia routine labs.   X-ray showed blunting posterior costophrenic sulcus, perhaps on the rightsmall pleural effusion, and reticulointerstitial patterns in both lungs.     ENT consulted, he has rhino balloon in place. Hgb at 7.1. Formed stool and brown soft stools documented.      LDH and ammonia elevated.     Plan:  -PPI IV   -Monitor hgb and transfuse to keep >7  -Nephrology consulted for GATITO and elevated K+ - diuresis per nephrology   -INR likely fixed due to liver disease but will order Vitamin K x3 days to see if that helps as it could be partially nutritional   -No current plan for EGD given no overt GI bleeding and known daily epistaxis    -SBP Prophylaxis - Cipro on hold now. He is on Zosyn. Resume daily antibiotics on discharge   -Wrap lower extremities   -2gm Na diet  -Lactulose/Xifaxan - Increasing lactulose to three times only one formed stool documented   -Diuretics on hold due to worsening kidney function  -Monitor MELD labs  -Complete abstinence from alcohol. Indicates desire to go to in person AA meeting instead of Zoom (which is he currently doing).  -Pt is not a liver transplant candidate due to ongoing alcohol use  - High MELD poor prognosis  - Continue multivitamin, folic acid and thiamine   - Monitor for withdrawal symptoms  - PeTH pending      I will discuss with Dr. Galvan    Time spent: 20 minutes, greater than 50% of the visit was spent in counseling/coordination of care.     Anna Rogers CNP  [place] [employer] [facility] ([facility])  Mobile # [phone]         Staff addendum: MELD 38 today, no sign of obvious infection. Will start prednisone 40 mg daily. Check AFP for HCC screening.     Karen Galvan MD  [facility]  Phone [phone] until 5 pm  Office  [phone] for after hours on call provider

## 2024-08-07 NOTE — CONSULTS
[facility] Consultation by ENT Specialty Care    Crispin Ham MRN# [id number]   Age: 39 year old YOB: 1985     Date of Admission:  8/6/2024  Date of Consult:    08/07/24    Reason for consult: Epistaxis       Requesting physician: Jane Elmore MD                           Chief Complaint:   Abnormal Labs (Hgb 5)              HPI:      Crispin Ham is a 39 year old male admitted on 8/6/2024 due to Acute on chronic anemia thought to be secondary to blood loss in the setting of recurrent epistaxis, GATITO, Hyperkalemia, Hyponatremia.       Past medical history significant for End stage liver disease, Alcoholic liver cirrhosis with ascites, Alcoholic hepatitis, Portal hypertension with esophageal varices, Alcohol dependence, Anemia, HTN, HLP, Chronic HFpEF, Chronic lower extremity edema, History of SBP, History of duodenal ulcers.     Patient presented to the ED due to abnormal labs.  Patient reported that he had lab studies performed the day prior to presentation and was called and informed his HGB was 5 and that he needed to go to the ED.  Lab studies were completed for ongoing management of his lever failure.  Patient reported increased fatigue, shortness of breath, frequent nose bleeds and darker yellow color in his eyes.       Work-up in the ED included blood type and screen.  CMP revealed a sodium of 125, potassium of 6.2, chloride of 94, CO2 of 21, BUN of 37.6, creatinine of 2.89 with a GFR of 28, alkaline phosphatase of 184, total bilirubin of 12.3 and glucose of 113 otherwise within normal limits.  CBC with differential revealed a hemoglobin of 4.8, hematocrit of 14.4, platelet count of 87, RBC count of 1.24, MCV of 116, MCH of 38.7, RDW of 22.1 otherwise within normal limits.  INR is elevated at 2.26.  Ammonia level is elevated at 96.     2 view chest x-ray was noted blunting posterior costophrenic sulcus (perhaps on the right, suggesting small pleural effusion) as well as a  "somewhat reticulointerstitial patterns in both lungs which may relate to pulmonary edema (atypical infectious process or inflammatory process may relate to a similar appearance).  EKG showed sinus rhythm.     Patient received blood transfusion with 3u pRBC while in the ED.       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  Nasal balloon catheter placed in the ED.    - IMC status.    - Repeat HGB (4 hours after transfusion completed) and then every 6 hours.    - Conditional transfusion orders placed for HGB LESS THAN 7.    - ENT consult requested.    - IV Unasyn.    - Clear liquid diet.      Patient underwent packing of the left naris and bleeding has been controlled.  There was some minimal bleeding from the right and a nasal clamp was positioned and has since been removed with no further bleeding.  He has had epistaxis in the past though this is not a frequent recurrent event for him.  He currently voices some discomfort with the left nasal packing.  He is on Unasyn and hemoglobin appears stable overnight following transfusions.    Previous tests and diagnostic procedures: see \"Tests and Procedures\" and \"PFSH\".               Past Medical History:     Past Medical History:   Diagnosis Date    (HFpEF) heart failure with preserved ejection fraction (H)     Alcoholic cirrhosis of liver with ascites (H)     Alcoholic hepatitis with ascites (H28)     Esophageal varices (H)     Hepatic encephalopathy (H)     Hypertension     Hyponatremia     Multiple duodenal ulcers     SBP (spontaneous bacterial peritonitis) (H)     Severe alcohol use disorder (H)                Past Surgical History:     Past Surgical History:   Procedure Laterality Date    COLONOSCOPY      EGD    ESOPHAGOSCOPY, GASTROSCOPY, DUODENOSCOPY (EGD), COMBINED N/A 03/16/2024    Procedure: ESOPHAGOGASTRODUODENOSCOPY;  Surgeon: Chato Juan MD;  Location: [facility]" OR    ESOPHAGOSCOPY, GASTROSCOPY, DUODENOSCOPY (EGD), COMBINED N/A 04/18/2024    Procedure: Esophagoscopy, gastroscopy, duodenoscopy (EGD), combined;  Surgeon: Hiren Vogt DO;  Location:  GI    ORTHOPEDIC SURGERY      wrist  surgery    WRIST SURGERY Left 2023               Social History:     Social History     Socioeconomic History    Marital status: Single     Spouse name: Not on file    Number of children: Not on file    Years of education: Not on file    Highest education level: Not on file   Occupational History    Not on file   Tobacco Use    Smoking status: Never    Smokeless tobacco: Not on file   Substance and Sexual Activity    Alcohol use: Yes     Comment: last drinbk 2 weeks ago    Drug use: Not Currently    Sexual activity: Not on file   Other Topics Concern    Not on file   Social History Narrative    Not on file     Social Determinants of Health     Financial Resource Strain: Low Risk  (9/25/2023)    [facility] [employer] [facility]    Financial Resource Strain     Difficulty of Paying Living Expenses: 3     Difficulty of Paying Living Expenses: Not on file   Food Insecurity: No Food Insecurity (9/25/2023)    [facility] [employer] [facility]    Food Insecurity     Worried About Running Out of Food in the Last Year: 1   Transportation Needs: No Transportation Needs (9/25/2023)    [facility]    Transportation Needs     Lack of Transportation (Medical): 1   Physical Activity: Not on file   Stress: Not on file   Social Connections: Socially Isolated (9/25/2023)    [facility]    Social Connections     Frequency of Communication with Friends and Family: 4   Interpersonal  Safety: Not on file   Housing Stability: Low Risk  (9/25/2023)    [facility], [facility]    Housing Stability     Unable to Pay for Housing in the Last Year: 1               Family History:   No family history on file.            Immunizations:     Immunization History   Administered Date(s) Administered    COVID-19 12+ (2023-24) (MODERNA) 10/18/2023    COVID-19 Bivalent 12+ (Pfizer) 10/05/2022    COVID-19 MONOVALENT 12+ (Pfizer) 04/16/2021, 05/07/2021, 11/24/2021    Influenza Vaccine >6 months,quad, PF 10/18/2023               Allergies:   Acetaminophen, Aspirin, Aspirin-acetaminophen-caffeine, Caffeine, Lisinopril, and Nsaids          Medications:     Current Facility-Administered Medications:     ampicillin-sulbactam (UNASYN) 3 g vial to attach to  mL bag, 3 g, Intravenous, Q6H, Jaciel Bearden PA-C, Last Rate: 200 mL/hr at 08/07/24 0246, 3 g at 08/07/24 0814    [Held by provider] bacitracin ointment, , Topical, BID, Jaciel Bearden PA-C    bisacodyl (DULCOLAX) suppository 10 mg, 10 mg, Rectal, Daily PRN, Jaciel Bearden PA-C    calcium carbonate (TUMS) chewable tablet 1,000 mg, 1,000 mg, Oral, 4x Daily PRN, Jaciel Bearden PA-C    cholecalciferol (VITAMIN D3) tablet 10 mcg, 10 mcg, Oral, Daily, Jaciel Bearden PA-C, 10 mcg at 08/07/24 0815    [Held by provider] ciprofloxacin (CIPRO) tablet 500 mg, 500 mg, Oral, Q24H, Jaciel Bearden PA-C    folic acid (FOLVITE) tablet 1 mg, 1 mg, Oral, Daily, Jaciel Bearden PA-C, 1 mg at 08/07/24 0815    [Held by provider] furosemide (LASIX) tablet 40 mg, 40 mg, Oral, BID, Jaciel Bearden PA-C    lactulose (CHRONULAC) solution 20 g, 20 g, Oral or Feeding Tube, BID, Jaciel Bearden PA-C, 20 g at 08/07/24 0814    lidocaine (LMX4) cream, , Topical, Q1H PRN, Jaciel Bearden PA-C    lidocaine  (LMX4) cream, , Topical, Q1H PRN, Jaciel Bearden PA-C    lidocaine 1 % 0.1-1 mL, 0.1-1 mL, Other, Q1H PRN, Jaciel Bearden PA-C    lidocaine 1 % 0.1-1 mL, 0.1-1 mL, Other, Q1H PRN, Jaciel Bearden PA-C    magnesium oxide (MAG-OX) tablet 400 mg, 400 mg, Oral, Daily, Jaciel Bearden PA-C, 400 mg at 08/07/24 0815    midodrine (PROAMATINE) tablet 5 mg, 5 mg, Oral, TID w/meals, Jaciel Bearden PA-C, 5 mg at 08/07/24 0815    multivitamin, therapeutic (THERA-VIT) tablet 1 tablet, 1 tablet, Oral, Daily, Jaciel Bearden PA-C, 1 tablet at 08/07/24 0814    naltrexone (DEPADE/REVIA) tablet 50 mg, 50 mg, Oral, Daily, Jaciel Bearden PA-KATIA, 50 mg at 08/07/24 0815    ondansetron (ZOFRAN ODT) ODT tab 4 mg, 4 mg, Oral, Q6H PRN **OR** ondansetron (ZOFRAN) injection 4 mg, 4 mg, Intravenous, Q6H PRN, Jaciel Bearden PA-C    oxymetazoline (AFRIN) 0.05 % spray 1 spray, 1 spray, Nasal, TID PRN, Jaciel Bearden PA-C    pantoprazole (PROTONIX) EC tablet 40 mg, 40 mg, Oral, BID AC, Jaciel Bearden PA-C, 40 mg at 08/07/24 0815    phytonadione (AQUAMEPHYTON) 10 MG/ML 10 mg in sodium chloride 0.9 % 50 mL intermittent infusion, 10 mg, Intravenous, Daily, Loyda Hampton, APRN CNP, Last Rate: 100 mL/hr at 08/07/24 0815, 10 mg at 08/07/24 0815    polyethylene glycol (MIRALAX) Packet 17 g, 17 g, Oral, BID PRN, Jaciel Bearden PA-C    rifaximin (XIFAXAN) tablet 550 mg, 550 mg, Oral or Feeding Tube, BID, Jaciel Bearden PA-C, 550 mg at 08/07/24 0816    senna-docusate (SENOKOT-S/PERICOLACE) 8.6-50 MG per tablet 1 tablet, 1 tablet, Oral, BID PRN **OR** senna-docusate (SENOKOT-S/PERICOLACE) 8.6-50 MG per tablet 2 tablet, 2 tablet, Oral, BID PRN, Jaciel Bearden PA-C    sodium chloride (OCEAN) 0.65 % nasal spray 2 spray, 2 spray, Both Nostrils, TID, Jaciel Bearden PA-C, 2 spray at 08/07/24 0822    sodium  chloride (PF) 0.9% PF flush 3 mL, 3 mL, Intracatheter, Q8H, Jaciel Beardenin, PA-C, 3 mL at 08/07/24 0821    sodium chloride (PF) 0.9% PF flush 3 mL, 3 mL, Intracatheter, q1 min prn, Jaciel Bearden [place], PA-C    sodium chloride (PF) 0.9% PF flush 3 mL, 3 mL, Intracatheter, Q8H, Jaciel Bearden [place], PA-C, 3 mL at 08/07/24 0817    sodium chloride (PF) 0.9% PF flush 3 mL, 3 mL, Intracatheter, q1 min prn, Suleiman, Jaciel Guyupin, PA-C, 3 mL at 08/06/24 2158    sodium zirconium cyclosilicate (LOKELMA) packet 10 g, 10 g, Oral, TID, Eidman, Shawn, DO, 10 g at 08/06/24 2158    [Held by provider] spironolactone (ALDACTONE) tablet 50 mg, 50 mg, Oral, Daily, Jaciel Bearden, PA-C    thiamine (B-1) tablet 100 mg, 100 mg, Oral, Daily, Jaciel Bearden Maup[place], PA-C, 100 mg at 08/07/24 0815            Physical exam:   CONSTITUTION:    BP 96/47 (BP Location: Left arm)   Pulse 101   Temp 98.2  F (36.8  C) (Oral)   Resp 16   Ht 1.829 m (6')   Wt 119.1 kg (262 lb 9.6 oz)   SpO2 95%   BMI 35.61 kg/m       General appearance:Well developed, well nourished and groomed. No apparent acute or chronic distress.    Ability to communicate: normal.       HEAD, FACE, SALIVARY GLANDS AND TMJ:    Inspection of Head and Face: Normal contour and symmetry. No masses, lesions, or significant scars observed.    Palpation/Percussion of the Face: No tenderness, deformity or instability.    Palpation of Parotid and Submaxillary glands: Normal.    Facial Mobility: Normal.       EYES: Ocular Motility: gaze appears conjugate in all positions; no evident nystagmus.       EAR, NOSE, MOUTH AND THROAT:    Pinnas and External Nose: Normal.     Hearing: Conversational speech rarely or never misunderstands words.    Nasal Interior: Rapid Rhino balloon left naris.  No bleeding.  Crusted blood in the right naris with no active bleeding.  Lips, Teeth and Gums: Normal.    Oral Cavity and Oropharynx: Normal.  Posterior oropharynx with  no clot or active bleeding.     NECK AND THYROID:    Neck: normal symmetry; trachea midline; normal laryngeal crepitation; no adenopathy; no neck masses; no skin lesions; no scars.       LYMPH NODES: Neck Nodes: normal, no adenopathy.       NEUROLOGIC:    Level of orientation: normal to time, place, person and situation.    Cranial nerves: Cranial nerves II-XII grossly intact and symmetrical.       PSYCHIATRIC:    Level of consciousness: awake and alert.    Judgment and insight: normal.    Mood and affect: normal and appropriate to the situation.             Data:     Lab Results   Component Value Date    WBC 4.8 08/07/2024    WBC 5.7 08/06/2024    WBC 2.7 (L) 07/17/2024    HGB 7.1 (L) 08/07/2024    HGB 6.7 (LL) 08/07/2024    HGB 5.8 (LL) 08/06/2024    HCT 19.9 (L) 08/07/2024    HCT 14.4 (L) 08/06/2024    HCT 21.2 (L) 07/17/2024    PLT 70 (L) 08/07/2024    PLT 87 (L) 08/06/2024    PLT 53 (L) 07/17/2024     (L) 08/07/2024     (L) 08/07/2024     (L) 08/07/2024    POTASSIUM 4.7 08/07/2024    POTASSIUM 5.2 08/06/2024    POTASSIUM 5.0 08/06/2024    CHLORIDE 95 (L) 08/07/2024    CHLORIDE 94 (L) 08/06/2024    CHLORIDE 103 07/17/2024    CO2 21 (L) 08/07/2024    CO2 21 (L) 08/06/2024    CO2 19 (L) 07/17/2024    BUN 39.6 (H) 08/07/2024    BUN 37.6 (H) 08/06/2024    BUN 30.5 (H) 07/17/2024    CR 2.60 (H) 08/07/2024    CR 2.89 (H) 08/06/2024    CR 1.22 (H) 07/17/2024     (H) 08/07/2024     (H) 08/07/2024     (H) 08/06/2024    NTBNPI 622 (H) 04/29/2024    NTBNPI 93 11/17/2023    AST 86 (H) 08/07/2024    AST  08/06/2024      Comment:      Unsatisfactory specimen - hemolyzed      AST 57 (H) 07/17/2024    ALT 32 08/07/2024    ALT 34 08/06/2024    ALT 23 07/17/2024    ALKPHOS 166 (H) 08/07/2024    ALKPHOS 184 (H) 08/06/2024    ALKPHOS 99 07/17/2024    BILITOTAL 14.1 (H) 08/07/2024    BILITOTAL 12.3 (H) 08/06/2024    BILITOTAL 10.3 (H) 07/17/2024    JAM 96 (H) 08/06/2024    JAM 62 (H)  07/09/2024    JAM 71 (H) 04/26/2024    INR 2.37 (H) 08/07/2024    INR 2.26 (H) 08/06/2024    INR 2.17 (H) 07/17/2024           Assessment and Plan:   Pranay is a very pleasant 39-year-old with acute on chronic anemia following severe left-sided epistaxis due to end-stage liver disease and alcoholic cirrhosis.  He has been stabilized after a rapid Rhino balloon pack was positioned.  There is no further bleeding.  He has received PRBC transfusion and hemoglobin appears stable.  We discussed the rationale to leave the packing in place for 1 week.  Will plan to see patient in 1 week for packing removal if he remains in the hospital otherwise follow-up with ENT specialty care provider on Monday, 8/12/2024.  Please call with questions or concerns.    Attestation:  I have reviewed today's vital signs, notes, medications, medication allergies, and PFSH/ROSlabs and imaging.    Anup Monsivais MD

## 2024-08-07 NOTE — PLAN OF CARE
Goal Outcome Evaluation:    Orientation: A&Ox4  Vitals/Pain: VSS on require 2L NC at 0200, c/o mild L knee pain, relieved with repositioning   Tele: ST  Lines/Drains: X1 L/R PIV, old drainage on the R and small blood drainage on L  LS: Diminished  GI/: BS active, x1 BM this shift. Pt having adequate urine output throughout shift.   Labs: Abnormal/Trends, Electrolyte Replacement- Hgb 6.7 at midnigt, received 1u of PRBCs pending morning results.   Lab were unable to draw 1800 hgb, called x4, they finally came to draw at midnight.     Ambulation/Assist: A1 with a cane  Skin/Wounds: Jaundiced, blanchable redness to the sacrm.   Plan: Trending Hgb, GI consulted and ENT, IV Abx, Nephrology following, 1.2L FR.    Blake Mariano RN

## 2024-08-07 NOTE — PROGRESS NOTES
Renal Medicine Progress Note            Assessment/Plan:     Crispin Ham is a 39 year old male who was admitted on 8/6/2024.      Assessment:  1) acute kidney injury  Has some underlying diagnosis of hepatorenal syndrome.  Followed by the [facility], last seen by nurse practitioner on 7/29/2024.  GATITO related to hypotension, severe anemia.  Hope with transfusion and improve blood pressures he will have recovery at least back to his baseline.     Underlying CKD: Reported underlying hepatorenal syndrome.  If he does have this, would be more of a type II HRS.  Before yesterday, last creatinine was at 1.34.  Estimated GFR at this value is 70 although likely grossly overestimates renal function in setting of liver disease.  No prior Cystatin C I can find on review of review of outside records.     Slight improvement today, hope for continued improving trend.  If discharged soon will need a close follow-up BMP.     2) hyperkalemia  Secondary to GATITO, GI bleed and underlying PTA spironolactone.  Has been instruction past on the potassium diet, unclear how well he has complied with his home.  Additionally, possibly taking a double strength Bactrim twice daily which could also be a contributor to hyperkalemia.  Improved with improving GFR and Lokelma.     3) hyponatremia  Chronic, related to hypervolemia and underlying end-stage liver disease.  Chronically on a fluid restriction at home, notes in chart reflect he often does not follow this.        Other:  End-stage liver disease, alcoholic cirrhosis.  Hypervolemia has been treated with Lasix 40 mg daily and spironolactone 50 mg daily.  He supposed prednisone restriction at home.  Anemia, status post transfusions yesterday, recent hemoglobin 6.7        Plan/Recs:  1) continue holding of spironolactone, furosemide.  If renal function continues improvement, will resume the furosemide first.  2) continue fluid restriction 1.2 L/day  3) can  continue Lokelma, if potassium next same or lower we will discontinue    Shawn Jessica DO  [facility] consultants  Office: [phone]  Cell: [phone]        Interval History:     Discussed with patient .  Slight improvement in his GFR.  Potassium has improved to 4.7.  Denies any new acute concerns or complaints.         Medications and Allergies:     Current Facility-Administered Medications   Medication Dose Route Frequency Provider Last Rate Last Admin    ampicillin-sulbactam (UNASYN) 3 g vial to attach to  mL bag  3 g Intravenous Q6H Jaciel Bearden PA-C 200 mL/hr at 08/07/24 0246 3 g at 08/07/24 0814    [Held by provider] bacitracin ointment   Topical BID Jaciel Bearden PA-C        cholecalciferol (VITAMIN D3) tablet 10 mcg  10 mcg Oral Daily Jaciel Bearden PA-C   10 mcg at 08/07/24 0815    [Held by provider] ciprofloxacin (CIPRO) tablet 500 mg  500 mg Oral Q24H Jaciel Bearden PA-C        folic acid (FOLVITE) tablet 1 mg  1 mg Oral Daily Jaciel Bearden PA-C   1 mg at 08/07/24 0815    [Held by provider] furosemide (LASIX) tablet 40 mg  40 mg Oral BID Jaciel Bearden PA-C        lactulose (CHRONULAC) solution 20 g  20 g Oral or Feeding Tube TID Anna Rogers, APRN CNP        magnesium oxide (MAG-OX) tablet 400 mg  400 mg Oral Daily Jaciel Bearden PA-C   400 mg at 08/07/24 0815    midodrine (PROAMATINE) tablet 5 mg  5 mg Oral TID w/meals Jaciel Bearden PA-C   5 mg at 08/07/24 0815    multivitamin, therapeutic (THERA-VIT) tablet 1 tablet  1 tablet Oral Daily Jaciel Bearden PA-C   1 tablet at 08/07/24 0814    naltrexone (DEPADE/REVIA) tablet 50 mg  50 mg Oral Daily Jaciel Bearden PA-C   50 mg at 08/07/24 0815    pantoprazole (PROTONIX) EC tablet 40 mg  40 mg Oral BID AC Jaciel Bearden PA-C   40 mg at 08/07/24 0815    phytonadione (AQUAMEPHYTON) 10 MG/ML 10 mg in sodium chloride 0.9 % 50  mL intermittent infusion  10 mg Intravenous Daily Loyda Hampton APRN  mL/hr at 08/07/24 0815 10 mg at 08/07/24 0815    predniSONE (DELTASONE) tablet 40 mg  40 mg Oral Daily Karen Galvan MD   40 mg at 08/07/24 1053    rifaximin (XIFAXAN) tablet 550 mg  550 mg Oral or Feeding Tube BID Jaciel Bearden PA-C   550 mg at 08/07/24 0816    sodium chloride (OCEAN) 0.65 % nasal spray 2 spray  2 spray Both Nostrils TID Jaciel Bearden PA-C   2 spray at 08/07/24 0822    sodium chloride (PF) 0.9% PF flush 3 mL  3 mL Intracatheter Q8H Jaciel Bearden PA-C   3 mL at 08/07/24 0817    sodium zirconium cyclosilicate (LOKELMA) packet 10 g  10 g Oral TID Shawn Jessica DO   10 g at 08/07/24 1050    [Held by provider] spironolactone (ALDACTONE) tablet 50 mg  50 mg Oral Daily Jaciel Bearden PA-C        thiamine (B-1) tablet 100 mg  100 mg Oral Daily Jaciel Bearden PA-C   100 mg at 08/07/24 0815        Allergies   Allergen Reactions    Acetaminophen Swelling    Aspirin Angioedema and Swelling    Aspirin-Acetaminophen-Caffeine Swelling     puffy eyes and dry throat a few years ago. Tolerates ibuprofen and acetaminophen, but avoids aspirin    Caffeine Swelling    Lisinopril Cough and Other (See Comments)    Nsaids Angioedema     Patient reports having a reaction of puffy eyes and dry throat a few years ago, but he has taken Advil in 2023 and did not react.            Physical Exam:   Vitals were reviewed  BP 96/49 (BP Location: Left arm)   Pulse 98   Temp 99.8  F (37.7  C) (Oral)   Resp 18   Ht 1.829 m (6')   Wt 119.1 kg (262 lb 9.6 oz)   SpO2 92%   BMI 35.61 kg/m      Wt Readings from Last 3 Encounters:   08/07/24 119.1 kg (262 lb 9.6 oz)   07/17/24 117.9 kg (260 lb)   07/09/24 108.9 kg (240 lb)       Intake/Output Summary (Last 24 hours) at 8/7/2024 1246  Last data filed at 8/7/2024 1034  Gross per 24 hour   Intake 1750 ml   Output 950 ml   Net 800 ml        GENERAL: Somewhat ill in appearance  HEENT: Scleral jaundice icteric sclera noted  CV: RRR, 1/6 systolic murmur, has 2-3+ bilateral dependent edema noted   RESP: Clear bilaterally with good efforts  GI: Abdomen soft, distended  MUSCULOSKELETAL: extremities nl - no gross deformities noted  SKIN: no suspicious lesions or rashes, dry to touch  NEURO: Gross nonfocal  PSYCH: mood good, affect appropriate           Data:     BMP  Recent Labs   Lab 08/07/24  1043 08/07/24  0829 08/07/24  0555 08/07/24  0453 08/07/24  0439 08/07/24  0022 08/06/24  1551 08/06/24  1547 08/06/24  1137 08/06/24  0942   *  --  129* 128* 127*   < >  --  126* 127* 125*   POTASSIUM  --   --   --  4.7  --   --   --  5.2 5.0 6.2*   CHLORIDE  --   --   --  95*  --   --   --   --   --  94*   MIGUELINA  --   --   --  9.5  --   --   --   --   --  9.7   CO2  --   --   --  21*  --   --   --   --   --  21*   BUN  --   --   --  39.6*  --   --   --   --   --  37.6*   CR  --   --   --  2.60*  --   --   --   --   --  2.89*   GLC  --  105*  --  110*  --   --  124*  --   --  113*    < > = values in this interval not displayed.     CBC  Recent Labs   Lab 08/07/24  1138 08/07/24  0453 08/07/24  0022 08/06/24  1346 08/06/24  0942   WBC  --  4.8  --   --  5.7   HGB 6.7* 7.1* 6.7* 5.8* 4.8*   HCT  --  19.9*  --   --  14.4*   MCV  --  101*  --   --  116*   PLT  --  70*  --   --  87*     Lab Results   Component Value Date    AST 86 (H) 08/07/2024    ALT 32 08/07/2024    ALKPHOS 166 (H) 08/07/2024    BILITOTAL 14.1 (H) 08/07/2024    JAM 96 (H) 08/06/2024     Lab Results   Component Value Date    INR 2.37 (H) 08/07/2024       Attestation:  I have reviewed today's vital signs, notes, medications, labs and imaging.    DO [facility] - Nephrology  Office: [phone]  Cell: [phone]

## 2024-08-07 NOTE — PLAN OF CARE
Orientation: A&Ox3, d/t time, illogical words spoken at times. Neuro check otherwise intact.   Vitals/Pain: VSS ex soft BP & tachycardic, on RA. Denies pain.   Tele: ST.  Diet: Tolerates 2 gm Na diet, 1200 ml FR.   Lungs: Clear/dim LS.   Lines/Drains: PIV x2 SL.   Skin/Wounds: Jaundice. Rhino rocket in L nare, minimal bleeding noted. BLE edema, compression stockings on.   GI/: AUO, orange in color. +BM's.   Labs: Q6H sodium: 129, 127. Q6H hgb: 7.1, 6.7. Gave 1 PRBC. Awaiting lab to draw next sodium & hgb levels.   Ambulation/Assist: Up SBA+gb to BR.   Plan: Monitor labs.

## 2024-08-07 NOTE — PROGRESS NOTES
[facility]    Medicine Progress Note - Hospitalist Service    Date of Admission:  8/6/2024    Assessment & Plan   Crispin Ham is a 39 year old male admitted on 8/6/2024 due to Acute on chronic anemia thought to be secondary to blood loss in the setting of recurrent epistaxis, GATITO, Hyperkalemia, Hyponatremia.       Past medical history significant for End stage liver disease, Alcoholic liver cirrhosis with ascites, Alcoholic hepatitis, Portal hypertension with esophageal varices, Alcohol dependence, Anemia, HTN, HLP, Chronic HFpEF, Chronic lower extremity edema, History of SBP, History of duodenal ulcers.     Patient presented to the ED due to abnormal labs.  Patient reported that he had lab studies performed the day prior to presentation and was called and informed his HGB was 5 and that he needed to go to the ED.  Patient reported increased fatigue, shortness of breath, frequent nose bleeds and darker yellow color in his eyes.       Acute on chronic anemia secondary to blood loss  Recurrent epistaxis  Coagulopathy secondary to liver cirrhosis  Received 3u pRBC in the ED.  Nasal balloon catheter placed in the ED as he was also actively bleeding.  -Received a dose of vitamin K  -Received another unit of PRBC early morning 8/7/2024  - IMC status.    -Hemoglobin improved up to 7.1 but repeat hemoglobin is 6.7  - Conditional transfusion orders  for HGB LESS THAN 7.    -Appreciate ENT input, plan to leave the packing in place for 1 week with plan for inpatient versus outpatient(if discharged) follow-up with ENT on 8/12/2024  -Continue IV Unasyn for prophylaxis.    - Clear liquid diet.       GATTIO  Hepatorenal syndrome  Hyponatremia  Hyperkalemia  *Patient had restarted PTA Lasix and spironolactone in the last 1-2 weeks.  -Recent baseline creatinine around 1.2  Presented with a creatinine of 2.89 and a potassium of 6.2 and sodium of 125  - Hold PTA lasix 40 mg BID mg/d and spironolactone 50  mg/d.    - Nephrology following, appreciate input  -Started on Lokelma, potassium has improved this morning to 4.7, with plan for discontinuing if potassium stabilizes/improves  Sodium has also improved and the most recent 1 is 127  -Creatinine this morning is 2.6  -Continue fluid restriction to 1.2 L/day     Elevated ammonia level  History of hepatic encephalopathy   - Resumed on PTA lactulose 20 g BID.      End stage liver disease  Alcoholic liver cirrhosis with ascites  Portal hypertension with esophageal varices  Alcohol dependence  History of Alcoholic hepatitis  History of SBP  - [facility] following appreciate input  -Continue PTA rifaximin 550 mg BID,  Protonix 40 mg BID, PTA naltrexone 50 mg/d, PTA Folic acid 1 mg/d, magnesium 400 mg/d, multivitamin and thiamine.  -Continue to monitor hemoglobin and transfuse for more than 7, PPI IV  PTA on Cipro for SBP prophylaxis, which is currently on hold while getting IV Unasyn  -Patient strongly encouraged to quit drinking.  He currently follows with outpatient [facility] through Zoom, planning to be in person.  Refused chemical dependency counselor evaluation  -Not a transplant candidate secondary to ongoing alcohol use  -Monitor for withdrawal symptoms, currently scoring low on CIWA  -Appreciate GI, patient started on prednisone 40 mg daily due to KOFI D of 38  -AFP for HCC screening as per GI     Orthostatic hypotension   - Resumed on PTA midodrine 5 mg TID.       HLP  *Noted on chart review.  Not currently on any therapy.       Chronic HFpEF  Chronic lower extremity edema  - Holding PTA diuretic therapy as above.    - Monitor I/O's and daily weights.    - Clear liquid diet.       Obesity   Body mass index is 35.26 kg/m .  Increase in all-cause morbidity and mortality.   - Follow up with PCP regarding ongoing management.          History of duodenal ulcers  *Patient denied melanotic stool or hematemesis, anemia likely explained secondary to epistaxis as  above  Continue IV PPI  -[facility] following, appreciate input       Diet: Combination Diet Full Liquid  Fluid restriction 1200 ML FLUID    DVT Prophylaxis: Pneumatic Compression Devices  Bosch Catheter: Not present  Lines: None     Cardiac Monitoring: ACTIVE order. Indication: Electrolyte Imbalance (24 hours)- Magnesium <1.3 mg/ml; Potassium < =2.8 or > 5.5 mg/ml  Code Status: No CPR- Do NOT Intubate      Clinically Significant Risk Factors Present on Admission        # Hyperkalemia: Highest K = 6.2 mmol/L in last 2 days, will monitor as appropriate  # Hyponatremia: Lowest Na = 125 mmol/L in last 2 days, will monitor as appropriate       # Coagulation Defect: INR = 2.37 (Ref range: 0.85 - 1.15) and/or PTT = N/A, will monitor for bleeding  # Thrombocytopenia: Lowest platelets = 70 in last 2 days, will monitor for bleeding      # Anemia: based on hgb <11       # Obesity: Estimated body mass index is 35.61 kg/m  as calculated from the following:    Height as of this encounter: 1.829 m (6').    Weight as of this encounter: 119.1 kg (262 lb 9.6 oz).       # Financial/Environmental Concerns:           Disposition Plan     Medically Ready for Discharge: Anticipated in 2-4 Days             Jane Elmore MD  Hospitalist Service  [facility]  Securely message with [employer] (more info)  Text page via [employer] Paging/Directory   ______________________________________________________________________    Interval History   Patient with no new complaints.  Received another unit PRBC overnight.  Feels about the same.  Since nasal packing done his bleeding has stopped.  I did offer him evaluation by chemical dependency counselor which he refused.    Physical Exam   Vital Signs: Temp: 98.2  F (36.8  C) Temp src: Oral BP: 95/52 Pulse: 101   Resp: 16 SpO2: 100 % O2 Device: Nasal cannula Oxygen Delivery: 2 LPM  Weight: 262 lbs 9.6 oz    Exam:  Constitutional: Awake, alert and no distress. Appears  comfortable  Head: Normocephalic. No masses, lesions, tenderness or abnormalities  ENMT: ENT exam normal, no neck nodes or sinus tenderness  Cardiovascular: RRR.  no murmurs, no rubs or JVD  Respiratory:normal WOB,b/l equal air entry, no wheezes or crackles   Gastrointestinal: Abdomen soft, non-tender. BS normal. No masses, organomegaly  : Deferred  Extremities : 3+ edema , no clubbing or cyanosis      Medical Decision Making       53 MINUTES SPENT BY ME on the date of service doing chart review, history, exam, documentation & further activities per the note.      Data     I have personally reviewed the following data over the past 24 hrs:    4.8  \   6.7 (LL)   / 70 (L)     127 (L) 95 (L) 39.6 (H) /  105 (H)   4.7 21 (L) 2.60 (H) \     ALT: 32 AST: 86 (H) AP: 166 (H) TBILI: 14.1 (H)   ALB: 3.5 TOT PROTEIN: 6.3 (L) LIPASE: N/A     INR:  2.37 (H) PTT:  N/A   D-dimer:  N/A Fibrinogen:  N/A     Ferritin:  N/A % Retic:  N/A LDH:  305 (H)       Imaging results reviewed over the past 24 hrs:   No results found for this or any previous visit (from the past 24 hour(s)).  Recent Labs   Lab 08/07/24  1138 08/07/24  1043 08/07/24  0829 08/07/24  0555 08/07/24  0453 08/07/24  0439 08/07/24  0022 08/06/24  1551 08/06/24  1547 08/06/24  1346 08/06/24  1137 08/06/24  0942   WBC  --   --   --   --  4.8  --   --   --   --   --   --  5.7   HGB 6.7*  --   --   --  7.1*  --  6.7*  --   --    < >  --  4.8*   MCV  --   --   --   --  101*  --   --   --   --   --   --  116*   PLT  --   --   --   --  70*  --   --   --   --   --   --  87*   INR  --   --   --   --  2.37*  --   --   --   --   --   --  2.26*   NA  --  127*  --  129* 128*   < > 127*  --  126*  --  127* 125*   POTASSIUM  --   --   --   --  4.7  --   --   --  5.2  --  5.0 6.2*   CHLORIDE  --   --   --   --  95*  --   --   --   --   --   --  94*   CO2  --   --   --   --  21*  --   --   --   --   --   --  21*   BUN  --   --   --   --  39.6*  --   --   --   --   --   --  37.6*   CR   --   --   --   --  2.60*  --   --   --   --   --   --  2.89*   ANIONGAP  --   --   --   --  12  --   --   --   --   --   --  10   MIGUELINA  --   --   --   --  9.5  --   --   --   --   --   --  9.7   GLC  --   --  105*  --  110*  --   --  124*  --   --   --  113*   ALBUMIN  --   --   --   --  3.5  --   --   --   --   --   --  3.7   PROTTOTAL  --   --   --   --  6.3*  --   --   --   --   --   --  7.0   BILITOTAL  --   --   --   --  14.1*  --   --   --   --   --   --  12.3*   ALKPHOS  --   --   --   --  166*  --   --   --   --   --   --  184*   ALT  --   --   --   --  32  --   --   --   --   --   --  34   AST  --   --   --   --  86*  --   --   --   --   --   --   --     < > = values in this interval not displayed.

## 2024-08-08 PROBLEM — F10.90 ALCOHOL USE DISORDER: Chronic | Status: ACTIVE | Noted: 2024-01-01

## 2024-08-08 PROBLEM — F41.9 ANXIETY: Chronic | Status: ACTIVE | Noted: 2024-01-01

## 2024-08-08 NOTE — PROGRESS NOTES
Orientations: A&Ox3-4  Vitals/Pain: VSS on RA, BP's soft. Denies pain, some discomfort in bilat LE's.  Tele: SR  Lines/Drains: PIV's removed. L nasal trumpet in place.  Skin/Wounds: Jaundice. Bilat LE edema. Lasix restarted at discharge.  GI/: Loose BMx1. Low NA diet, 1200 fluid restriction. Voiding adequate in BR.  Labs: Stable with , Hgb 7.6.  Ambulation/Assist: IND.    Discharged @1430 with all belongings and medications. Given discharge instructions, needs to follow up with ENT on Monday. Answered all questions and concerns.

## 2024-08-08 NOTE — PLAN OF CARE
Goal Outcome Evaluation:    Orientation: A&Ox3, disoriented to time  Vitals/Pain: VSS on RA , mild pain to L knee, relieved with repositioning.  Tele: ST w/Abnormal T wave.  Lines/Drains: x2 PIV R/L  LS: Diminished  GI/: BS active, on scheduled Lactulose, large loose BM this shift. Pt having adequate urine output throughout shift.   Labs: Abnormal/Trends, Electrolyte Replacement- Trending hgb and Na+  Ambulation/Assist: SBA  Skin/Wounds: Jaundiced, minimal bleeding from R nare, Rhino balloon pack on L nare.   Plan: Hgb Q6, 7.6 this morning, monitor for nose bleed.      Blake Mariano RN

## 2024-08-08 NOTE — CONSULTS
"PSYCHIATRY   CONSULT     DATE OF SERVICE   8/8/2024       CHIEF COMPLAINT   \"I am cutting down.\"       CONSULT REQUEST BY   Dr. Jane Elmore MD re:  ongoing alcohol abuse and commitment       HISTORY OF PRESENT ILLNESS   This is a 39 year old male with history of severe alcohol use disorder.  Does not have prior history of psychiatric hospitalization, suicide attempt, nor MI CD treatment.  Currently, does not have outpatient mental health cares.  Now, patient seen on psychiatry consult request regarding alcohol use and indication for commitment.    On 8/6, patient had admitted from ED to [facility] for acute on chronic anemia felt to be related to both the hospital setting of recurrent epistaxis.  Further medical historyis significant for end-stage liver disease, alcoholic liver cirrhosis with ascites, alcoholic hepatitis, AAI, hyperkalemia and hyponatremia.  Patient does have recent  medical discharge 1 month ago of similar presentation.  Presenting now to ED due to abnormal labs.  Lab studies performed prior to presentation and informed of hemoglobin was 5 and instructed to present to ED. further lab studies showing liver failure.  Symptoms of self-report including: Fatigue, SOB, recurrent nosebleeds.    ED medical workup included 3 units of PRBC with plan of conditional  transfusion orders to continue after [facility] admit for hemoglobin  7..  Ammonia elevated at 96 and PTA lactulose increased from twice daily to 3 times daily.  CMP revealed sodium 125.  Total bilirubin of 12.3.  Patient reported a follow up to 1/2 weeks prior to admission.  Chest x-ray did not show pneumothorax.  EKG showed sinus rhythm.  Patient was admitted to [facility] for ongoing medical monitoring and consults by nephrology, [facility], and ENT.      Upon patient reviewed, patient interviewed performed in [facility] directly.  Cooperative on approach and gave consent to evaluation.  Made aware of the information reviewed prior to the visit.  Information provided " "consistent with chart review and collateral report.    Patient provides insight of events leading to admission.  Acknowledges efforts to decrease alcohol use or effort to calm \"cut down.\"  Describes alcohol use prior to admission as one, \"cider,\" every 3 to 4 days and/or on, \"cocktail,\" weekly.  Denies history of MICD treatment, withdrawal seizures/detox or legal issues.  Denies regular substances of abuse to include opiates or benzodiazepines.    Indicates awareness of recommendation for CD treatment.  Declines an evaluation and reports plan to proceed with established outpatient cares and convert from Zoom to direct.  Declines referrals.    Admits to past use of naltrexone or MAT despite historical efficacy and tolerability, to include extended periods of sobriety.  Discussed opportunity to confer to Vyvanse or CORY, but declines secondary to efforts of mother who assists with administration/monitoring.  Does consent to augmenting and maintain with mirtazapine.    On psychiatric review of symptoms, denies symptoms.  Mood is, \"okay.\"  Denies depression, jonnie.   does endorse initial insomnia, felt to be complicated by poor sleep hygiene.  Exacerbating low energy.  Denies anhedonia.  Decreased appetite with unknown weight change.  Denies issues of concentration.  Denies negative thoughts to self or situation.  Anxiety is situational.  Denies suicidal or homicidal ideation.  Denies gun access.  Future oriented.  Denies psychosis.    Denies stressors outside of known or documented medical issues.  Provides insight of needing to continue efforts of cessation from alcohol use due to exacerbating pre-existing medical conditions.  Supportive therapy and CBT techniques provided.    Patient requesting disposition.  Declined care coordination with mother.  Consents to treatment plan as discussed in detail.    Review of external notes and/or information:  I personally reviewed notes from the patient's admit from ED to [facility] on " 8/6 and only documented mental health contact by psychiatry consult in June 2004. This provided me with information regarding patient's recent clinical course.     I personally reviewed the patient's chart, including available medication list and available past medical history, past surgical history, family history, and social history.        CHEMICAL DEPENDENCY HISTORY   History   Drug Use Denies     Social History    Substance and Sexual Activity      Alcohol use: Yes        Comment: last drink 4-5 days ago    History   Smoking Status    Never   Smokeless Tobacco    Not on file        PAST PSYCHIATRIC HISTORY   Psychiatrist: None  Therapist: Past  Case Management: No  Hospitalizations: Denies  History of Commitment: No prior commitment or 72-hour hold   Past Medications: Zoloft, Naltrexone  TMS/ECT/Ketamine:  No  Suicide Attempts/Gun Access: No       PAST MEDICAL HISTORY   Past Medical History:   Diagnosis Date    (HFpEF) heart failure with preserved ejection fraction (H)     Alcoholic cirrhosis of liver with ascites (H)     Alcoholic hepatitis with ascites (H28)     Esophageal varices (H)     Hepatic encephalopathy (H)     Hypertension     Hyponatremia     Multiple duodenal ulcers     SBP (spontaneous bacterial peritonitis) (H)     Severe alcohol use disorder (H)      Past Surgical History:   Procedure Laterality Date    COLONOSCOPY      EGD    ESOPHAGOSCOPY, GASTROSCOPY, DUODENOSCOPY (EGD), COMBINED N/A 03/16/2024    Procedure: ESOPHAGOGASTRODUODENOSCOPY;  Surgeon: Chato Juan MD;  Location:  OR    ESOPHAGOSCOPY, GASTROSCOPY, DUODENOSCOPY (EGD), COMBINED N/A 04/18/2024    Procedure: Esophagoscopy, gastroscopy, duodenoscopy (EGD), combined;  Surgeon: Hiren Vogt DO;  Location:  GI    ORTHOPEDIC SURGERY      wrist  surgery    WRIST SURGERY Left 2023     Primary Care Provider: Blake Canchola  Medications:   Current Facility-Administered Medications   Medication Dose Route Frequency Provider Last Rate  Last Admin    ampicillin-sulbactam (UNASYN) 3 g vial to attach to  mL bag  3 g Intravenous Q6H Jaciel Bearden PA-C 200 mL/hr at 08/07/24 0246 3 g at 08/08/24 0813    [Held by provider] bacitracin ointment   Topical BID Jaciel Bearden PA-C        cholecalciferol (VITAMIN D3) tablet 10 mcg  10 mcg Oral Daily Jaciel Bearden PA-C   10 mcg at 08/08/24 0820    [Held by provider] ciprofloxacin (CIPRO) tablet 500 mg  500 mg Oral Q24H Jaciel Bearden PA-C        folic acid (FOLVITE) tablet 1 mg  1 mg Oral Daily Jaciel Bearden PA-C   1 mg at 08/08/24 0814    [Held by provider] furosemide (LASIX) tablet 40 mg  40 mg Oral BID Jaciel Bearden PA-C        lactulose (CHRONULAC) solution 20 g  20 g Oral or Feeding Tube TID Anna Rogers APRN CNP   20 g at 08/08/24 0858    magnesium oxide (MAG-OX) tablet 400 mg  400 mg Oral Daily Jaciel Bearden PA-C   400 mg at 08/08/24 0813    midodrine (PROAMATINE) tablet 5 mg  5 mg Oral TID w/meals Jaciel Bearden PA-C   5 mg at 08/08/24 1052    multivitamin, therapeutic (THERA-VIT) tablet 1 tablet  1 tablet Oral Daily Jaciel Bearden PA-C   1 tablet at 08/08/24 0814    naltrexone (DEPADE/REVIA) tablet 50 mg  50 mg Oral Daily Jaciel Bearden PA-C   50 mg at 08/08/24 0820    pantoprazole (PROTONIX) EC tablet 40 mg  40 mg Oral BID AC Jaciel Bearden PA-C   40 mg at 08/08/24 0632    predniSONE (DELTASONE) tablet 40 mg  40 mg Oral Daily Karen Galvan MD   40 mg at 08/08/24 0820    rifaximin (XIFAXAN) tablet 550 mg  550 mg Oral or Feeding Tube BID Jaciel Bearden PA-C   550 mg at 08/08/24 0814    sodium chloride (OCEAN) 0.65 % nasal spray 2 spray  2 spray Both Nostrils TID Jaciel Bearden PA-C   2 spray at 08/08/24 0815    sodium chloride (PF) 0.9% PF flush 3 mL  3 mL Intracatheter Q8H Jaciel Bearden PA-C   3 mL at 08/08/24 0630     sodium zirconium cyclosilicate (LOKELMA) packet 10 g  10 g Oral TID Shawn Jessica DO   10 g at 08/08/24 1046    [Held by provider] spironolactone (ALDACTONE) tablet 50 mg  50 mg Oral Daily Jaciel Bearden PA-C        thiamine (B-1) tablet 100 mg  100 mg Oral Daily Jaciel Bearden PA-C   100 mg at 08/08/24 0814     Medications as needed:   Current Facility-Administered Medications   Medication Dose Route Frequency Provider Last Rate Last Admin    bisacodyl (DULCOLAX) suppository 10 mg  10 mg Rectal Daily PRN Jaciel Bearden PA-C        calcium carbonate (TUMS) chewable tablet 1,000 mg  1,000 mg Oral 4x Daily PRN Jaciel Bearden PA-C        lidocaine (LMX4) cream   Topical Q1H PRN Jaciel Bearden PA-C        lidocaine 1 % 0.1-1 mL  0.1-1 mL Other Q1H PRN Jaciel Bearden PA-C        ondansetron (ZOFRAN ODT) ODT tab 4 mg  4 mg Oral Q6H PRN Jaciel Bearden PA-C        Or    ondansetron (ZOFRAN) injection 4 mg  4 mg Intravenous Q6H PRN Jaciel Bearden PA-C        oxymetazoline (AFRIN) 0.05 % spray 1 spray  1 spray Nasal TID PRN Jaciel Bearden PA-C        polyethylene glycol (MIRALAX) Packet 17 g  17 g Oral BID PRN Jaciel Bearden PA-C        senna-docusate (SENOKOT-S/PERICOLACE) 8.6-50 MG per tablet 1 tablet  1 tablet Oral BID PRN Jaciel Bearden PA-C        Or    senna-docusate (SENOKOT-S/PERICOLACE) 8.6-50 MG per tablet 2 tablet  2 tablet Oral BID PRN Jaciel Bearden PA-C        sodium chloride (PF) 0.9% PF flush 3 mL  3 mL Intracatheter q1 min prn Jaciel Bearden PA-C   3 mL at 08/08/24 0813     ALLERGIES: Aspirin, Aspirin-acetaminophen-caffeine, Lisinopril, and Nsaids    Reviewed in detail and please see ED Intake for full details and history.       MEDICATIONS   Medications Prior to Admission   Medication Sig Dispense Refill Last Dose    bacitracin 500 UNIT/GM OINT Apply topically 2  times daily 144 g 0 8/6/2024 at am    folic acid (FOLVITE) 1 MG tablet Take 1 tablet (1 mg) by mouth daily 30 tablet 0 8/6/2024    furosemide (LASIX) 40 MG tablet Take 1 tablet (40 mg) by mouth 2 times daily   8/6/2024 at am    lactulose (CHRONULAC) 10 GM/15ML solution 30 mLs (20 g) by Oral or Feeding Tube route 2 times daily 1800 mL 0 8/6/2024 at am    magnesium oxide 400 MG tablet Take 400 mg by mouth daily   8/6/2024    midodrine (PROAMATINE) 5 MG tablet Take 5 mg by mouth 3 times daily (with meals)   8/6/2024 at x1    multivitamin, therapeutic (THERA-VIT) TABS tablet Take 1 tablet by mouth daily   8/6/2024    naltrexone (DEPADE/REVIA) 50 MG tablet Take 50 mg by mouth daily   8/6/2024    oxymetazoline (AFRIN) 0.05 % nasal spray Spray 0.1 mLs (1 spray) in nostril 3 times daily as needed for other (bleeding, APPLY SPRAY IF BLEEDING STARTS AND PUT PRESSURE) 15 mL 0     pantoprazole (PROTONIX) 40 MG EC tablet Take 1 tablet (40 mg) by mouth 2 times daily 60 tablet 0 8/6/2024 at x1    rifaximin (XIFAXAN) 550 MG TABS tablet 1 tablet (550 mg) by Oral or Feeding Tube route 2 times daily 120 tablet 0 8/6/2024 at x1    sodium chloride (OCEAN) 0.65 % nasal spray Spray 2 sprays into both nostrils 3 times daily 60 mL 0 8/6/2024    spironolactone (ALDACTONE) 50 MG tablet Take 1 tablet (50 mg) by mouth daily   8/6/2024    thiamine (B-1) 100 MG tablet Take 1 tablet (100 mg) by mouth daily 30 tablet 0 Past Week    Vitamin D, Cholecalciferol, 10 MCG (400 UNIT) TABS Take 1 tablet by mouth daily (Patient not taking: Reported on 8/6/2024)   Not Taking      Medication adherence issues: MS Med Adherence Y/N: Yes, Forgetting to take.   Monitored by mother  Medication side effects: MEDICATION SIDE EFFECTS: no side effects reported  Benefit: Yes / No: Yes, Naltrexone and decrease alcohol use       ROS   A comprehensive ten point review of systems was negative aside from those in mentioned in the HPI.         FAMILY HISTORY   No family  history on file.     Psychiatric: None  Chemical: Brother, cousin: Alcohol  Suicide: None       SOCIAL HISTORY   Social History     Socioeconomic History    Marital status: Single     Spouse name: Not on file    Number of children: Not on file    Years of education: Not on file    Highest education level: Not on file   Occupational History    Not on file   Tobacco Use    Smoking status: Never    Smokeless tobacco: Not on file   Substance and Sexual Activity    Alcohol use: Yes     Comment: last drinbk 2 weeks ago    Drug use: Not Currently    Sexual activity: Not on file   Other Topics Concern    Not on file   Social History Narrative    Not on file     Social Determinants of Health     Financial Resource Strain: Low Risk  (9/25/2023)    Received from [facility], [facility]    Financial Resource Strain     Difficulty of Paying Living Expenses: 3     Difficulty of Paying Living Expenses: Not on file   Food Insecurity: No Food Insecurity (9/25/2023)    Received from [facility] [facility], [facility]    Food Insecurity     Worried About Running Out of Food in the Last Year: 1   Transportation Needs: No Transportation Needs (9/25/2023)    Received from [facility] [employer] [facility] [employer] [facility], [facility]    Transportation Needs     Lack of Transportation (Medical): 1   Physical Activity: Not on file   Stress: Not on file   Social Connections: Socially Isolated (9/25/2023)    Received from [facility] [employer] [facility] [employer] [facility], [facility]    Social Connections     Frequency of Communication with Friends and Family: 4   Interpersonal Safety: Not on file   Housing Stability: Low Risk  (9/25/2023)    Received from [facility] [employer] [facility], [facility]    Housing Stability  "    Unable to Pay for Housing in the Last Year: 1     Born and Raised: [place]; 1 brother; college education-English and philosophy  Occupation: [employer]  Marital Status: Single  Children: 0  Legal:  None  Living Situation: Living arrangements - the patient lives with their family (parents).  ASSETS/STRENGTHS:  Supportive family, Work       MENTAL STATUS EXAM   Appearance:  Cooperative, Pleasant  Mood:  {Mood: \"Okay\"  Affect: appropriate  was congruent to speech  Suicidal Ideation: PRESENT / ABSENT: absent   Homicidal Ideation: PRESENT / ABSENT: absent   Thought process: unremarkable and no JULIENNE.  Thought content: denies suicidal ideation and violent ideation.   Fund of Knowledge: Average  Attention/Concentration: Fair  Language ability:  Intact  Memory:  Intact  Insight:  fair.  Judgement: fair  Orientation: Yes, x4  Psychomotor Behavior: slowed    Muscle Strength and Tone: MuscleStrength: Normal  Gait and Station:  In bed       PHYSICAL EXAM   Vitals: /54 (BP Location: Left arm)   Pulse 99   Temp 98.1  F (36.7  C) (Oral)   Resp 16   Ht 1.829 m (6')   Wt 118.5 kg (261 lb 4.8 oz)   SpO2 98%   BMI 35.44 kg/m      Physical exam as per Hospitalist, Jaciel Bearden PA-C. Dated 8/6/2024:    Constitutional: Awake, alert, cooperative, no apparent distress.    ENT: Left nare epistaxis otherwise normocephalic, without obvious abnormality, atraumatic, oral pharynx with moist mucus membranes.  Eyes extra occular movements intact. Scleral icterus present.  Neck: Supple, symmetrical, trachea midline, no adenopathy.  Pulmonary: No increased work of breathing, fair air exchange, clear to auscultation bilaterally, no crackles or wheezing.  Cardiovascular: Regular rate and rhythm, normal S1 and S2, no S3 or S4, and no murmur noted.  GI: Normal bowel sounds, soft, non-distended, non-tender.  Obese.  Skin/Integumen: Jaundiced and pale.  Neuro: CN II-XII grossly intact.    Psych:  Alert and oriented x 3. " Flat affect.  Extremities: Bilateral 3+ lower extremity edema noted, and calves are non-tender to palpation bilaterally.        LABS   personally reviewed.   Most Recent 3 CBC's:  Recent Labs   Lab Test 08/08/24 0555 08/07/24 2346 08/07/24 1937 08/07/24  1138 08/07/24  0453 08/06/24  1346 08/06/24  0942   WBC 5.8  --   --   --  4.8  --  5.7   HGB 7.6* 7.6* 7.8*   < > 7.1*   < > 4.8*     --   --   --  101*  --  116*   PLT 71*  --   --   --  70*  --  87*    < > = values in this interval not displayed.      Most Recent 3 BMP's:  Recent Labs   Lab Test 08/08/24 0555 08/07/24 2346 08/07/24 1937 08/07/24  1043 08/07/24  0829 08/07/24  0555 08/07/24  0453 08/06/24  1551 08/06/24  1547 08/06/24  1137 08/06/24  0942   *  131* 131* 129*   < >  --    < > 128*   < > 126*   < > 125*   POTASSIUM 4.7  --   --   --   --   --  4.7  --  5.2   < > 6.2*   CHLORIDE 98  --   --   --   --   --  95*  --   --   --  94*   CO2 22  --   --   --   --   --  21*  --   --   --  21*   BUN 40.0*  --   --   --   --   --  39.6*  --   --   --  37.6*   CR 2.13*  --   --   --   --   --  2.60*  --   --   --  2.89*   ANIONGAP 11  --   --   --   --   --  12  --   --   --  10   MIGUELINA 9.6  --   --   --   --   --  9.5  --   --   --  9.7   *  --   --   --  105*  --  110*   < >  --   --  113*    < > = values in this interval not displayed.     Most Recent 2 LFT's:  Recent Labs   Lab Test 08/07/24  0453 08/06/24  0942 07/17/24  1811   AST 86*  --  57*   ALT 32 34 23   ALKPHOS 166* 184* 99   BILITOTAL 14.1* 12.3* 10.3*     Most Recent INR's and Anticoagulation Dosing History:  Anticoagulation Dose History  More data exists         Latest Ref Rng & Units 4/22/2024 4/23/2024 4/24/2024 7/9/2024 7/17/2024 8/6/2024 8/7/2024   Recent Dosing and Labs   INR 0.85 - 1.15 2.57  2.74  2.48  2.26  2.17  2.26  2.37       Details                 Most Recent 3 Troponin's:No lab results found.  Most Recent Cholesterol Panel:No lab results found.  Most  "Recent 6 Bacteria Isolates From Any Culture (See EPIC Reports for Culture Details):No lab results found.  Most Recent TSH, T4 and A1c Labs:  Recent Labs   Lab Test 03/22/24  0926 09/08/23  1147   TSH  --  8.43*   T4  --  1.39   A1C 5.1  --       Lab Results   Component Value Date    WBC 5.8 08/08/2024    HGB 7.6 08/08/2024    HCT 21.2 08/08/2024     08/08/2024    PLT 71 08/08/2024     No results found for: \"CHOL\", \"TRIG\", \"HDL\", \"LDLDIRECT\"  No results found for: \"PHENYTOIN\", \"PHENOBARB\", \"VALPROATE\", \"CBMZ\"    No result for Alcohol       ASSESSMENT   Patient seen on psychiatry consult for alcohol use and indication for commitment.  Does not meet criteria to placed on a 72-hour hold and slash or initiate MI/CD or CD commitment.  Reporting intention and providing insight to establish/maintain sobriety, given awareness of alcohol use impact medically.  Consents to continue PTA naltrexone for MAT, efforts of sobriety, and to augment with medication trial of mirtazapine.  Behavioral strategies discussed in detail, will establish with outpatient alcohol group by self referral, and continue with established outpatient medical providers.      Patient may discharge once medically cleared by primary treatment team.       DIAGNOSIS   Principal Problem:    Anxiety    Active Problem List:  Patient Active Problem List   Diagnosis    Septic shock (H)    Acute renal failure, unspecified acute renal failure type (H24)    Acute liver failure without hepatic coma    Anemia, unspecified type    Liver failure (H)    Liver disease    SBP (spontaneous bacterial peritonitis) (H)    Hyponatremia    Acute kidney injury (H24)    Hepatic encephalopathy (H)    Anemia in other chronic diseases classified elsewhere    Hyperkalemia    GATITO (acute kidney injury) (H24)    Elevated bilirubin    Alcohol use disorder    Anxiety          RECOMMENDATIONS   1. Education given regarding diagnostic and treatment options with risks, benefits and " alternatives and adequate verbalization of understanding.  2. Consult as requested.  Legal status:  Voluntary.  Does not meet criteria to placed on a 72-hour hold and/or initiate commitment for MI/CD or CD commitment.  Risk Assessment: The patient is requesting discharge and determined to not be an imminent danger to self of others.  Denies SI and SIB.  Does have notable risk factors to include chronic alcohol use and anxiety.  However, risk is mitigated by commitment to family, work, and history of seeking help when needed.  Based on available evidence including factors cited, he does not appear to be at imminent risk for self-harm, does not meet criteria to place on a 72-hour hold/PHYLLIS, and remains appropriate to resume/establish outpatient level of care.  Additional steps taken to minimize risk include: medication optimization with monitoring/supervision as reported by mother, sobriety from alcohol, and provision of crisis resources.  Voluntary referral for outpatient mental health services decline, but accepted recommendations and PCP follow-up.    Precautions in place: CIWA.  Discontinue precautions at disposition.  3. Medications: 8/6/2024: PTA Psychiatric medications reviewed.  Naltrexone 50 mg daily: 8/8: Continue restart naltrexone for MAT and impulse control.  Discussed, declines conversion until RAY.  4. Medications:  Hospital  CIWA: 8/8: Discontinue monitoring.  No alcohol level obtained at admission and self report of alcohol 4-5 days prior to admission.  Denies withdrawal symptoms.  Remeron 7.5 mg qhs: 8/8: Start trial mirtazapine or anxiety and symptom management of insomnia and low appetite.  PCP to monitor.  5. Structure and Supervision  Disposition:  Home-Does not meet criteria for 72-hour hold, commitment.  Barriers to Discharge:  Medical clearance  Referrals:  Self to outpatient [facility] services.  Declined assessment of referrals while inpatient.  Care Coordination: Declined care coordination with  mother.  Treatment plan discussed with assigned nurse.  7. Further treatment programming by Primary Team.  Psychiatry to Sign Off.    Total encounter time:  A total of  66  minutes spent related to chart review, history and exam, documentation   and further activities as noted above      Risk Assessment: IP MHAC RISK ASSESSMENT: Patient able to contract for safety    This note was created with help of Dragon dictation system. Grammatical / typing errors are not intentional.    Vipul Hyde MD  Psychiatry

## 2024-08-08 NOTE — DISCHARGE SUMMARY
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    Weight as of this encounter: 118.5 kg (261 lb 4.8 oz).       Follow-ups Needed After Discharge   Follow-up Appointments     Follow-up and recommended labs and tests       Follow up with primary care provider, Blake Canchola, within 7 days for   hospital follow- up.  The following labs/tests are recommended: CBC/CMP.  Follow-up with ENT on 8/12/2024 to get your nasal packing removed.  Follow-up with nephrology as previously scheduled  Follow-up with [facility] next available recommended  Follow-up with alcohol Anonymous as recommended          Unresulted Labs Ordered in the Past 30 Days of this Admission       Date and Time Order Name Status Description    8/8/2024 12:01 AM Phosphatidylethanol (PEth), Whole Blood In process         These results will be followed up by GI    Discharge Disposition   Discharged to home  Condition at discharge: Stable    Hospital Course   Crispin Ham is a 39 year old male with history significant for End stage liver disease, Alcoholic  liver cirrhosis with ascites, Alcoholic hepatitis, Portal hypertension with esophageal varices, Alcohol dependence, Anemia, HTN, HLP, Chronic HFpEF, Chronic lower extremity edema, History of SBP, History of duodenal ulcers. admitted on 8/6/2024 due to labs abnormality including acute on chronic anemia thought to be secondary to blood loss in the setting of recurrent epistaxis, GATITO, Hyperkalemia, Hyponatremia.      Acute on chronic anemia secondary to blood loss  Recurrent epistaxis  Coagulopathy secondary to liver cirrhosis  Hemoglobin at clinic was 5, recheck in the ED was 4.8, received 3u pRBC in the ED.  Nasal balloon catheter placed in the ED as he was also actively bleeding.  -Received a dose of vitamin K  -Received another unit of PRBC early morning 8/7/2024 and another unit in the afternoon    -Hemoglobin remained stable around 7.6 after fifth transfusion  -ENT evaluated the patient while in house.  Recommended to leave the packing in place for 1 week with plan for outpatient follow-up with ENT on 8/12/2024  -Treated with IV Unasyn for prophylaxis while in the hospital and discharged on Augmentin for prophylaxis until nasal pack in place.       GATITO  Hepatorenal syndrome  Hyponatremia  Hyperkalemia  *Patient had restarted PTA Lasix and spironolactone 1-2 weeks prior to admission.  Follows with nephrology outpatient..   -Recent baseline creatinine around 1.2.  Some component of underlying diagnosis of hepatorenal syndrome  Presented with a creatinine of 2.89 and a potassium of 6.2 and sodium of 125  -PTA diuretics were held and nephrology followed while in-house  -Started on Lokelma, potassium has improved and normalized  -Sodium has also improved and the most recent is 131  -Creatinine continue to improve and on the day of discharge creatinine was 2.13.  Nephrology recommended reinitiation of PTA Lasix but holding PTA spironolactone at the time of discharge and outpatient follow-up with his primary  nephrologist  -Continue fluid restriction to 1.2 L/day     Elevated ammonia level  History of hepatic encephalopathy   - Resumed on PTA lactulose 20 g BID.      End stage liver disease  Alcoholic liver cirrhosis with ascites  Portal hypertension with esophageal varices  Alcohol dependence  History of Alcoholic hepatitis  History of SBP  - [facility] following appreciate input  -Continue PTA rifaximin 550 mg BID,  Protonix 40 mg BID, PTA naltrexone 50 mg/d, PTA Folic acid 1 mg/d, magnesium 400 mg/d, multivitamin and thiamine.  -Continue to monitor hemoglobin and transfuse for more than 7, PPI IV  PTA on Cipro for SBP prophylaxis, which is currently on hold while getting IV Unasyn  -Patient strongly encouraged to quit drinking.  He currently follows with outpatient [facility] through Zoom, planning to be in person.  Refused chemical dependency counselor evaluation.  Psych evaluated the patient and  did not recommend commitment  -Not a transplant candidate secondary to ongoing alcohol use  -Scored very low on CIWA and mentation always remained stable  -Appreciate GI, patient started on prednisone 40 mg daily due to KOFI D of 38, plan is to continue for 7 days to reevaluate  Outpatient follow-up with GI  -AFP normal  -Patient started on Remeron as recommended by psychiatry     Orthostatic hypotension   -Continue PTA midodrine 5 mg TID.       HLP  *Noted on chart review.  Not currently on any therapy.       Chronic HFpEF  Chronic lower extremity edema  -PTA Lasix initiated at the time of discharge  -Referral for lymphedema therapy also made at the time of discharge     Obesity   Body mass index is 35.26 kg/m .  Increase in all-cause morbidity and mortality.   - Follow up with PCP regarding ongoing management.          History of duodenal ulcers  *Patient denied melanotic stool or hematemesis, anemia likely explained secondary to epistaxis as above  Continue PTA PPI  -[facility] followed while  in-house      Consultations This Hospital Stay   GASTROENTEROLOGY IP CONSULT  NEPHROLOGY IP CONSULT  ENT IP CONSULT  CARE MANAGEMENT / SOCIAL WORK IP CONSULT  PSYCHIATRY IP CONSULT  LYMPHEDEMA THERAPY IP CONSULT    Code Status   No CPR- Do NOT Intubate    Time Spent on this Encounter   I, Jane Elmore MD, personally saw the patient today and spent greater than 30 minutes discharging this patient.       Jane Elmore MD  [facility]  [address] [place] [address]  Phone: [phone]  ______________________________________________________________________    Physical Exam   Vital Signs: Temp: 98.5  F (36.9  C) Temp src: Oral BP: 101/64 Pulse: 102   Resp: 16 SpO2: 100 % O2 Device: None (Room air)    Weight: 261 lbs 4.8 oz  Exam:  Constitutional: Awake, alert and no distress. Appears comfortable  Head: Normocephalic. No masses, lesions, tenderness or abnormalities  ENMT: Jaundiced  Cardiovascular: RRR.  No murmurs, no rubs or JVD  Respiratory: Normal WOB,b/l equal air entry, no wheezes or crackles   Gastrointestinal: Abdomen soft, non-tender. BS normal. No masses, organomegaly  : Deferred  Extremities : 3+ jaundiced edema , no clubbing or cyanosis    Neurologic: Cranial nerves II-XII grossly intact , power symmetrical, Reflexes normal and symmetric. Sensation grossly WNL.         Primary Care Physician   Blake Canchola    Discharge Orders      Lymphedema Therapy  Referral      Reason for your hospital stay    Severe anemia     Activity    Your activity upon discharge: activity as tolerated     Discharge Instructions    Do not drink alcohol     Follow-up and recommended labs and tests     Follow up with primary care provider, Blake Canchola, within 7 days for hospital follow- up.  The following labs/tests are recommended: CBC/CMP.  Follow-up with ENT on 8/12/2024 to get your nasal packing removed.  Follow-up with nephrology as previously scheduled  Follow-up with [facility]  next available recommended  Follow-up with alcohol [facility] as recommended     Diet    Follow this diet upon discharge: Orders Placed This Encounter      Fluid restriction 1200 ML FLUID      2 Gram Sodium Diet       Significant Results and Procedures   Results for orders placed or performed during the hospital encounter of 08/06/24   XR Chest 2 Views    Narrative    CHEST TWO VIEWS  8/6/2024 10:03 AM     HISTORY: 39-year-old patient with chest pain.       Impression    IMPRESSION: Since April 18, 2024, heart size remains normal  considering AP technique and shallow inspiration. There is blunting  posterior costophrenic sulcus, perhaps on the right, suggesting small  pleural effusion. There are somewhat reticulointerstitial patterns in  both lungs which may relate to pulmonary edema, though an atypical  infectious process or inflammatory process may relate to a similar  appearance. No pneumothorax.    KRISTI SCHAFER MD         SYSTEM ID:  [id number]       Discharge Medications   Current Discharge Medication List        START taking these medications    Details   amoxicillin-clavulanate (AUGMENTIN) 250-125 MG tablet Take 1 tablet by mouth 2 times daily  Qty: 10 tablet, Refills: 0    Associated Diagnoses: Epistaxis      mirtazapine (REMERON) 7.5 MG tablet Take 1 tablet (7.5 mg) by mouth at bedtime  Qty: 30 tablet, Refills: 0    Comments: Future refills by PCP Dr. Blake Canchola with phone number [phone].  Associated Diagnoses: Anxiety      predniSONE (DELTASONE) 20 MG tablet Take 2 tablets (40 mg) by mouth daily  Qty: 10 tablet, Refills: 0    Associated Diagnoses: Alcohol use disorder           CONTINUE these medications which have CHANGED    Details   ciprofloxacin (CIPRO) 500 MG tablet Take 1 tablet (500 mg) by mouth every 24 hours Start taking after you are done with amoxicillin    Associated Diagnoses: Alcoholic cirrhosis of liver without ascites (H)           CONTINUE these medications which have NOT CHANGED     Details   folic acid (FOLVITE) 1 MG tablet Take 1 tablet (1 mg) by mouth daily  Qty: 30 tablet, Refills: 0    Associated Diagnoses: Alcohol abuse      furosemide (LASIX) 40 MG tablet Take 1 tablet (40 mg) by mouth 2 times daily    Comments: HOLD      lactulose (CHRONULAC) 10 GM/15ML solution 30 mLs (20 g) by Oral or Feeding Tube route 2 times daily  Qty: 1800 mL, Refills: 0    Associated Diagnoses: Hepatic encephalopathy (H); Acute liver failure without hepatic coma      magnesium oxide 400 MG tablet Take 400 mg by mouth daily      midodrine (PROAMATINE) 5 MG tablet Take 5 mg by mouth 3 times daily (with meals)      multivitamin, therapeutic (THERA-VIT) TABS tablet Take 1 tablet by mouth daily      naltrexone (DEPADE/REVIA) 50 MG tablet Take 50 mg by mouth daily      oxymetazoline (AFRIN) 0.05 % nasal spray Spray 0.1 mLs (1 spray) in nostril 3 times daily as needed for other (bleeding, APPLY SPRAY IF BLEEDING STARTS AND PUT PRESSURE)  Qty: 15 mL, Refills: 0    Associated Diagnoses: Anemia in other chronic diseases classified elsewhere      pantoprazole (PROTONIX) 40 MG EC tablet Take 1 tablet (40 mg) by mouth 2 times daily  Qty: 60 tablet, Refills: 0    Associated Diagnoses: Hepatic encephalopathy (H); Acute liver failure without hepatic coma      rifaximin (XIFAXAN) 550 MG TABS tablet 1 tablet (550 mg) by Oral or Feeding Tube route 2 times daily  Qty: 120 tablet, Refills: 0    Associated Diagnoses: Hepatic encephalopathy (H); Acute liver failure without hepatic coma      sodium chloride (OCEAN) 0.65 % nasal spray Spray 2 sprays into both nostrils 3 times daily  Qty: 60 mL, Refills: 0    Associated Diagnoses: Epistaxis      thiamine (B-1) 100 MG tablet Take 1 tablet (100 mg) by mouth daily  Qty: 30 tablet, Refills: 0    Associated Diagnoses: Alcohol abuse      Vitamin D, Cholecalciferol, 10 MCG (400 UNIT) TABS Take 1 tablet by mouth daily           STOP taking these medications       bacitracin 500 UNIT/GM OINT  Comments:   Reason for Stopping:         spironolactone (ALDACTONE) 50 MG tablet Comments:   Reason for Stopping:             Allergies   Allergies   Allergen Reactions    Aspirin Angioedema and Swelling    Aspirin-Acetaminophen-Caffeine Swelling     puffy eyes and dry throat a few years ago. Tolerates ibuprofen and acetaminophen, but avoids aspirin    Lisinopril Cough and Other (See Comments)    Nsaids Angioedema     Patient reports having a reaction of puffy eyes and dry throat a few years ago, but he has taken Advil in 2023 and did not react.

## 2024-08-08 NOTE — PROGRESS NOTES
Renal Medicine Progress Note            Assessment/Plan:     Crispin Ham is a 39 year old male who was admitted on 8/6/2024.      Assessment:  1) acute kidney injury  Has some underlying diagnosis of hepatorenal syndrome.  Followed by the [facility] [place] [facility], last seen by nurse practitioner on 7/29/2024.  GATITO related to hypotension, severe anemia.  Hope with transfusion and improve blood pressures he will have recovery at least back to his baseline.     Underlying CKD: Reported underlying hepatorenal syndrome.  If he does have this, would be more of a type II HRS.  Before yesterday, last creatinine was at 1.34.  Estimated GFR at this value is 70 although likely grossly overestimates renal function in setting of liver disease.  No prior Cystatin C I can find on review of review of outside records.     Continued improvement of GFR, hopefully will continue recovering towards his baseline.     2) hyperkalemia  Secondary to GATITO, GI bleed and underlying PTA spironolactone.  Has been instruction past on the potassium diet, unclear how well he has complied with his home.  Additionally, possibly taking a double strength Bactrim twice daily which could also be a contributor to hyperkalemia.  Improved with improving GFR and Lokelma.     3) hyponatremia  Chronic, related to hypervolemia and underlying end-stage liver disease.  Chronically on a fluid restriction at home, notes in chart reflect he often does not follow this.        Other:  End-stage liver disease, alcoholic cirrhosis.  Hypervolemia has been treated with Lasix 40 mg daily and spironolactone 50 mg daily.  He is supposed to be on sodium restriction at home.  Anemia, status post transfusions yesterday, recent hemoglobin 6.7        Plan/Recs:  1) Lokelma discontinued  2) continue fluid restriction 1.2 L/day  3) resuming Lasix, 40 mg twice daily  4) if discharged, would continue to hold spironolactone for now.  Recommend follow-up BMP next  week.  Recommend follow-up with his nephrology clinic, [facility] ([facility] [place]).    Shawn Jessica DO  [facility]  Office: [phone]  Cell: [phone]        Interval History:     Patient asking to go home today.  Hemoglobin stable.  Remains edematous and discussed today with patient resuming his furosemide.  Stressed importance of dietary restrictions of sodium, potassium as well as follow-up with his [facility] provider.          Medications and Allergies:     Current Facility-Administered Medications   Medication Dose Route Frequency Provider Last Rate Last Admin    ampicillin-sulbactam (UNASYN) 3 g vial to attach to  mL bag  3 g Intravenous Q6H Jane Elmore  mL/hr at 08/07/24 0246 3 g at 08/08/24 0813    [Held by provider] bacitracin ointment   Topical BID Jaciel Bearden PA-C        cholecalciferol (VITAMIN D3) tablet 10 mcg  10 mcg Oral Daily aJciel Bearden PA-C   10 mcg at 08/08/24 0820    [Held by provider] ciprofloxacin (CIPRO) tablet 500 mg  500 mg Oral Q24H Jaciel Bearden PA-C        folic acid (FOLVITE) tablet 1 mg  1 mg Oral Daily Jaciel Bearden PA-C   1 mg at 08/08/24 0814    [Held by provider] furosemide (LASIX) tablet 40 mg  40 mg Oral BID Jaciel Bearden PA-C        lactulose (CHRONULAC) solution 20 g  20 g Oral or Feeding Tube TID Anna Rogers APRN CNP   20 g at 08/08/24 0858    magnesium oxide (MAG-OX) tablet 400 mg  400 mg Oral Daily Jaciel Bearden PA-C   400 mg at 08/08/24 0813    midodrine (PROAMATINE) tablet 5 mg  5 mg Oral TID w/meals Jaciel Bearden PA-C   5 mg at 08/08/24 1052    mirtazapine (REMERON) tablet TABS 7.5 mg  7.5 mg Oral At Bedtime Vipul Hyde MD        multivitamin, therapeutic (THERA-VIT) tablet 1 tablet  1 tablet Oral Daily Jaciel Bearden PA-C   1 tablet at 08/08/24 0814    naltrexone (DEPADE/REVIA) tablet 50 mg  50 mg Oral Daily Suleiman  Jaciel Henderson PA-C   50 mg at 08/08/24 0820    pantoprazole (PROTONIX) EC tablet 40 mg  40 mg Oral BID AC Jaciel Bearden PA-C   40 mg at 08/08/24 0632    predniSONE (DELTASONE) tablet 40 mg  40 mg Oral Daily Karen Galvan MD   40 mg at 08/08/24 0820    rifaximin (XIFAXAN) tablet 550 mg  550 mg Oral or Feeding Tube BID Jaciel Bearden PA-C   550 mg at 08/08/24 0814    sodium chloride (OCEAN) 0.65 % nasal spray 2 spray  2 spray Both Nostrils TID Jaciel Bearden PA-C   2 spray at 08/08/24 0815    sodium chloride (PF) 0.9% PF flush 3 mL  3 mL Intracatheter Q8H Jaciel Bearden PA-C   3 mL at 08/08/24 0630    sodium zirconium cyclosilicate (LOKELMA) packet 10 g  10 g Oral TID Shawn Jessica DO   10 g at 08/08/24 1046    [Held by provider] spironolactone (ALDACTONE) tablet 50 mg  50 mg Oral Daily Jaciel Bearden PA-C        thiamine (B-1) tablet 100 mg  100 mg Oral Daily Jaciel Bearden PA-C   100 mg at 08/08/24 0814        Allergies   Allergen Reactions    Aspirin Angioedema and Swelling    Aspirin-Acetaminophen-Caffeine Swelling     puffy eyes and dry throat a few years ago. Tolerates ibuprofen and acetaminophen, but avoids aspirin    Lisinopril Cough and Other (See Comments)    Nsaids Angioedema     Patient reports having a reaction of puffy eyes and dry throat a few years ago, but he has taken Advil in 2023 and did not react.            Physical Exam:   Vitals were reviewed  /54 (BP Location: Left arm)   Pulse 99   Temp 98.5  F (36.9  C) (Oral)   Resp 16   Ht 1.829 m (6')   Wt 118.5 kg (261 lb 4.8 oz)   SpO2 98%   BMI 35.44 kg/m      Wt Readings from Last 3 Encounters:   08/08/24 118.5 kg (261 lb 4.8 oz)   07/17/24 117.9 kg (260 lb)   07/09/24 108.9 kg (240 lb)       Intake/Output Summary (Last 24 hours) at 8/8/2024 1140  Last data filed at 8/8/2024 0907  Gross per 24 hour   Intake 1720 ml   Output --   Net 1720 ml       GENERAL:  Somewhat ill in appearance  HEENT: Scleral jaundice icteric sclera noted  CV: RRR, 1/6 systolic murmur, has 2-3+ bilateral dependent edema noted   RESP: Clear bilaterally with good efforts  GI: Abdomen soft, distended  MUSCULOSKELETAL: extremities nl - no gross deformities noted  SKIN: no suspicious lesions or rashes, dry to touch  NEURO: Gross nonfocal  PSYCH: mood good, affect appropriate           Data:     BMP  Recent Labs   Lab 08/08/24  0555 08/07/24  2346 08/07/24  1937 08/07/24  1043 08/07/24  0829 08/07/24  0555 08/07/24  0453 08/07/24  0022 08/06/24  1551 08/06/24  1547 08/06/24  1137 08/06/24  0942   *  131* 131* 129* 127*  --    < > 128*   < >  --  126* 127* 125*   POTASSIUM 4.7  --   --   --   --   --  4.7  --   --  5.2 5.0 6.2*   CHLORIDE 98  --   --   --   --   --  95*  --   --   --   --  94*   MIGUELINA 9.6  --   --   --   --   --  9.5  --   --   --   --  9.7   CO2 22  --   --   --   --   --  21*  --   --   --   --  21*   BUN 40.0*  --   --   --   --   --  39.6*  --   --   --   --  37.6*   CR 2.13*  --   --   --   --   --  2.60*  --   --   --   --  2.89*   *  --   --   --  105*  --  110*  --  124*  --   --  113*    < > = values in this interval not displayed.     CBC  Recent Labs   Lab 08/08/24  0555 08/07/24  2346 08/07/24 1937 08/07/24  1138 08/07/24  0453 08/06/24  1346 08/06/24  0942   WBC 5.8  --   --   --  4.8  --  5.7   HGB 7.6* 7.6* 7.8* 6.7* 7.1*   < > 4.8*   HCT 21.2*  --   --   --  19.9*  --  14.4*     --   --   --  101*  --  116*   PLT 71*  --   --   --  70*  --  87*    < > = values in this interval not displayed.     Lab Results   Component Value Date    AST 85 (H) 08/08/2024    ALT 28 08/08/2024    ALKPHOS 165 (H) 08/08/2024    BILITOTAL 13.1 (H) 08/08/2024    JAM 96 (H) 08/06/2024     Lab Results   Component Value Date    INR 2.37 (H) 08/07/2024       Attestation:  I have reviewed today's vital signs, notes, medications, labs and imaging.    DO Awais Romero  [facility]  Office: [phone]  Cell: [phone]

## 2024-08-08 NOTE — CONSULTS
NUTRITION EDUCATION      REASON FOR ASSESSMENT:  2g Na diet  1200 mL FR    NUTRITION HISTORY:  Information obtained from patient. He was sound asleep at start of visit and difficult to waken, once awake was quite drowsy - poor-fair retention suspected.   - Pranay lives with his parents and doesn't do a lot of cooking himself. His parents do more of the shopping/meal prep.     CURRENT DIET:  2g Na, FR 1200 mL - per RN pt to stay on these restrictions once discharged    NUTRITION DIAGNOSIS:  Food- and nutrition-related knowledge deficit R/t sodium and fluid restrictions for cirrhosis as evidenced by patient requiring education prior to discharge.     INTERVENTIONS:    Nutrition Prescription:  2g Na diet  1200 mL FR (5 cups / 40 oz)    Implementation:      *  Nutrition Education (Content):   A)  Provided handout     Low sodium nutrition therapy    Fluid restriction nutrition therapy     Tips for low sodium    Label reading    Low sodium foods and drinks    Low sodium recipes    B)  Discussed     What counts toward fluid restriction    Low sodium foods / recipes       *  Nutrition Education (Application):   A)  Discussed current eating habits and recommended alternative food choices      *  Anticipate fair compliance      *  Diet Education - refer to Education Flowsheet    Goals:      *  Patient will verbalize understanding of diet       *  All of the above goals met during the education session    Follow Up/Monitoring:      *  Provided RD contact information for future questions      *  Recommended Out-Patient Nutrition Referral, if further diet instructions are needed

## 2024-08-08 NOTE — CONSULTS
Care Management Initial Consult    General Information  Assessment completed with: Patient,    Type of CM/SW Visit: Offer D/C Planning    Primary Care Provider verified and updated as needed: Yes   Readmission within the last 30 days: other (see comments)   Return Category: Exacerbation of disease  Reason for Consult: discharge planning  Advance Care Planning: Advance Care Planning Reviewed: no concerns identified     General Information Comments: High readmission risk due to comorbidities    Communication Assessment  Patient's communication style: spoken language (English or Bilingual)    Hearing Difficulty or Deaf: no   Wear Glasses or Blind: yes    Cognitive  Cognitive/Neuro/Behavioral: WDL  Level of Consciousness: alert  Arousal Level: opens eyes spontaneously  Orientation: disoriented to, time  Mood/Behavior: calm, cooperative  Best Language: 0 - No aphasia  Speech: garbled, logical    Living Environment:   People in home: parent(s)     Current living Arrangements: house      Able to return to prior arrangements: yes       Family/Social Support:  Care provided by: self, parent(s)  Provides care for: no one  Marital Status: Single  Parent(s)          Description of Support System: Supportive    Support Assessment: Lacks adequate emotional support, Limited social contact and support, Minimal outside structure and leisure time activities, Difficulty establishing and maintaining relationships    Current Resources:   Patient receiving home care services: No     Community Resources: None  Equipment currently used at home: cane, straight, walker, standard, shower chair, grab bar, tub/shower  Supplies currently used at home: None    Employment/Financial:  Employment Status: other (see comments) (currently on medical leave)     Employment/ Comments: Patient is on a [employer]  Financial Concerns: none           Does the patient's insurance plan have a 3 day qualifying hospital stay waiver?  No    Lifestyle &  Psychosocial Needs:  Social Determinants of Health     Food Insecurity: No Food Insecurity (9/25/2023)    Received from [facility], [facility]    Food Insecurity     Worried About Running Out of Food in the Last Year: 1   Depression: Not at risk (1/8/2024)    Received from [facility], [facility]    PHQ-2     PHQ-2 TOTAL SCORE: 2   Housing Stability: Low Risk  (9/25/2023)    Received from [facility], [facility]    Housing Stability     Unable to Pay for Housing in the Last Year: 1   Tobacco Use: Low Risk  (7/31/2024)    Received from [facility]    Patient History     Smoking Tobacco Use: Never     Smokeless Tobacco Use: Never     Passive Exposure: Not on file   Financial Resource Strain: Low Risk  (9/25/2023)    Received from [facility], [facility]    Financial Resource Strain     Difficulty of Paying Living Expenses: 3     Difficulty of Paying Living Expenses: Not on file   Alcohol Use: Not on file   Transportation Needs: No Transportation Needs (9/25/2023)    Received from [facility], [facility]    Transportation Needs     Lack of Transportation (Medical): 1   Physical Activity: Not on file   Interpersonal Safety: Not on file   Stress: Not on file   Social Connections: Socially Isolated (9/25/2023)    Received from [facility], [facility]    Social Connections     Frequency of Communication with Friends and Family: 4   Health Literacy: Not on file       Functional Status:  Prior to admission patient needed assistance:   Dependent ADLs:: Ambulation-walker, Independent  Dependent IADLs:: Transportation (uses  the bus system mostly; difficult to get in/out of a car)       Mental Health Status:  Mental Health Status: No Current Concerns       Chemical Dependency Status:  Chemical Dependency Status: Current Concern  Chemical Dependency Management: Previous treatment, AA          Values/Beliefs:  Spiritual, Cultural Beliefs, Anabaptism Practices, Values that affect care: no               Additional Information:  Care Coordinator met with patient and this CC is familiar with patient from past hospitalizations.  Patient keeps saying he is going to quit consuming alcohol, but unfortunately is still consuming.  Patient notes that he has cut down to consuming alcohol a few days per week.  Again, offered CD help, patient declined.  He feels he is able to quit drinking on his own with support from a friend from his Confucianism.  He was doing Zoom AA, but feels this was not helping, so now is thinking of attending in person.  In the past, patient noted he really did not have a craving for alcohol.  He noted he was able to sustain from much alcohol use from Oct 2023 to 2024.  One of his older brothers  in January due to complications of alcohol.    Patient lives with his parents.  He has not driven since his car broke down and uses public transportation or sometimes his parents will drive him.  Patient repots compliance with lactulose, but then noted he will try to manage this better as far as how many stools he has per day.  Discussed diet and patient does have questions and most likely is taking in more than the 1.2L fluid restriction.  This was discussed with his nurse.    Patient's care team: PCP is thru the [facility] system, Liver clinic is [facility], Nephrology is [facility].  Patient was seeing a therapist, but he became frustrated with the therapists quitting after a short time and starting with someone new was difficult for him.  Will schedule PCP and ENT follow up.  Patient is planning to check with his parents  concerning transportation home.  Care Management will continue to follow for safe discharge home.    Haley Durán RN  Inpatient Care Management  [phone]

## 2024-08-08 NOTE — PROGRESS NOTES
GASTROENTEROLOGY PROGRESS NOTE        SUBJECTIVE:  Admitted to picking nose again. Otherwise no new complaints, wants to go home today    OBJECTIVE:  General Appearance:  jaundiced, awake alert NAD  BP 91/56 (BP Location: Left arm)   Pulse 98   Temp 98.1  F (36.7  C) (Oral)   Resp 18   Ht 1.829 m (6')   Wt 118.5 kg (261 lb 4.8 oz)   SpO2 100%   BMI 35.44 kg/m    Temp (24hrs), Av.2  F (36.8  C), Min:97.8  F (36.6  C), Max:99.8  F (37.7  C)    Patient Vitals for the past 72 hrs:   Weight   24 0413 118.5 kg (261 lb 4.8 oz)   24 0418 119.1 kg (262 lb 9.6 oz)   24 0930 117.9 kg (260 lb)       Intake/Output Summary (Last 24 hours) at 2024 0917  Last data filed at 2024 0907  Gross per 24 hour   Intake 1720 ml   Output 200 ml   Net 1520 ml       PHYSICAL EXAM    Abd: S NT ND +bs        Additional Comments:  ROS, FH, SH: See initial GI consult for details.    I have reviewed the patient's new clinical lab results:    Recent Labs   Lab Test 24  0555 24  2346 24  1937 24  1138 24  0453 24  1346 24  0942 24  1811   WBC 5.8  --   --   --  4.8  --  5.7 2.7*   HGB 7.6* 7.6* 7.8*   < > 7.1*   < > 4.8* 7.4*     --   --   --  101*  --  116* 101*   PLT 71*  --   --   --  70*  --  87* 53*   INR  --   --   --   --  2.37*  --  2.26* 2.17*    < > = values in this interval not displayed.     Recent Labs   Lab Test 24  0555 24  0453 24  1547 24  1137 24  0942   POTASSIUM 4.7 4.7 5.2   < > 6.2*   CHLORIDE 98 95*  --   --  94*   CO2 22 21*  --   --  21*   BUN 40.0* 39.6*  --   --  37.6*   ANIONGAP 11 12  --   --  10    < > = values in this interval not displayed.     Recent Labs   Lab Test 24  0453 24  0942 24  1811 24  0954 07/10/24  0636 07/10/24  0423 24  1802 24  0458 24  1230 23  0536 23  1514 23  1537 23  1424 23  1224 23  1147   ALBUMIN  3.5 3.7 3.7  --  3.1*  --    < > 2.8*  --    < > 3.1*   < > 2.4*   < > 2.3*   BILITOTAL 14.1* 12.3* 10.3*   < > 8.2*  --    < > 11.3*  --    < > 8.4*   < > 21.7*   < > 10.4*   ALT 32 34 23  --  22  --    < > 46  --    < > 49   < > 69   < > 82*   AST 86*  --  57*  --  50*  --    < > 96*  --    < > 222*   < >  --    < > 331*   PROTEIN  --   --   --   --   --  20*  --   --  30*  --  100*   < >  --    < >  --    LIPASE  --   --   --   --   --   --   --  56  --   --   --   --  237*  --  272*    < > = values in this interval not displayed.         Active Problems:    Anemia, unspecified type    -hgb stable    -again enouraged pt to discontinue nose picking  2. HRS: management per renal, creat improving  3. ETOH liver disease: continue prednisone, didn't respond in past so may not again. Stop prednisone if no improvement in  7 days  4. Aggressive nutritional support  5. Resume SBP prophylaxis when off IV antibiotics  6. Continue lactulose and rifaxamin, stooling adequately. Appears clear  7. Continue PPI        Karen Galvan MD  [facility]  Pager: [phone]  Office: [phone]

## 2024-08-08 NOTE — PROGRESS NOTES
Care Management Discharge Note    Discharge Date: 08/08/2024       Discharge Disposition: Home    Discharge Services: None    Discharge DME: None    Discharge Transportation: public transportation, family or friend will provide    Private pay costs discussed: Not applicable    Does the patient's insurance plan have a 3 day qualifying hospital stay waiver?  No    PAS Confirmation Code:  NA  Patient/family educated on Medicare website which has current facility and service quality ratings:  NA    Education Provided on the Discharge Plan: Yes  Persons Notified of Discharge Plans: Patient, Nsg  Patient/Family in Agreement with the Plan: yes    Handoff Referral Completed: Yes, Discharge Summary was faxed to Dr Canchola, [facility]  Additional Information:  Patient is discharging home today  He declined offers to help him with CD.  The following appointments have been scheduled for patient and appear on patient's AVS as:  1.Dr Canchola is on vacation next week, so your appointment is scheduled with Dr Matt Dee on Wednesday 8/14/24 at 1:00 PM, [facility] ([phone])  2:ENT: [facility] on Monday 8/12/24 at 11:00 AM with Dr Avel Quezada, located at [address] [place] [address] ([phone])  This was the closest location that had availability on 8/12      Haley Durán RN  Inpatient Care Management  [phone]

## 2024-08-12 NOTE — ED TRIAGE NOTES
Worsening leg edema and scrotal edema last 2 days. Been hospitalized recently for low blood count and electrolyte imbalance. Received 3 units of blood.      Triage Assessment (Adult)       Row Name 08/12/24 1222          Triage Assessment    Airway WDL WDL        Respiratory WDL    Respiratory WDL WDL        Skin Circulation/Temperature WDL    Skin Circulation/Temperature WDL X        Cardiac WDL    Cardiac WDL WDL        Peripheral/Neurovascular WDL    Peripheral Neurovascular WDL WDL        Cognitive/Neuro/Behavioral WDL    Cognitive/Neuro/Behavioral WDL WDL

## 2024-08-12 NOTE — ED PROVIDER NOTES
Emergency Department Note      History of Present Illness     Chief Complaint   Leg Swelling    HPI   Crispin Ham is a 39 year old male with a history of alcoholic cirrhosis and hepatitis with ascites, hypertension, liver disease, and dyslipidemia, who presents to the ED for leg swelling. The patient was recently discharged from the hospital on 8/8/24 for his alcohol use disorder. On Friday, the patient noticed increased leg and scrotal swelling. The scrotal swelling doesn't limit the volume of his urination, but he states it is difficult to direct the stream. It also makes walking uncomfortable.     Independent Historian   None    Review of External Notes   I reviewed the discharge summary from 8/8/24.  I reviewed patient's discharge summary in detail.  He was evaluated by nephrology for hepatorenal syndrome, and had diuretics including Lasix and spironolactone held while he was hospitalized.  As his creatinine started to recover, nephrology approved restarting Lasix, but continue to hold spironolactone.  He was instructed to have nephrology follow-up, but I cannot find an appointment was made.    Past Medical History     Medical History and Problem List   Heart failure with preserved ejection fraction  Alcoholic cirrhosis of liver with ascites  Alcoholic hepatitis with ascites   Esophageal varices   Hepatic encephalopathy  Hypertension   Hyponatremia  Multiple duodenal ulcers  SBP  Severe alcohol use disorder  Alcoholism  CHF  Depressive disorder  Dyslipidemia  TIKA  Liver disease  Hepatic encephalopathy  Hyperkalemia  Hyponatremia  Septic shock  Anemia    Medications   Ciprofloxacin  Folic acid   Lasix   Magnesium oxide  Midodrine  Naltrexone  Protonix   Rifaximin  Spironolactone   Thiamine  Xifaxan  Bactrim  Methocarbamol    Surgical History   Wrist surgery  EGD   Colonoscopy     Physical Exam     Patient Vitals for the past 24 hrs:   BP Temp Temp src Pulse Resp SpO2 Height Weight   08/12/24 1858 97/57 --  -- 95 12 -- -- --   08/12/24 1839 97/59 -- -- -- -- -- -- --   08/12/24 1800 95/58 -- -- 90 -- -- -- --   08/12/24 1700 (!) 121/99 -- -- 96 12 100 % -- --   08/12/24 1221 111/63 98  F (36.7  C) Temporal 98 18 100 % 1.829 m (6') 117.9 kg (260 lb)     Physical Exam  General: alert,generally weak.  HENT: mucous membranes moist, scleral icterus, no nasal packing seen.  CV: regular rate, regular rhythm  Resp: normal effort, clear throughout, no crackles or wheezing  GI: abdomen soft and nontender, no guarding  MSK: no bony tenderness  Skin: appropriately warm and dry, jaundiced.  Extremities: extensive and severe leg edema.  Edema of the scrotum, penis invaginated into scrotum.  Neuro: fall asleep easily but awaken easily, clear speech, oriented.  Ambulatory without assistance.  Psych: normal mood and affect      Diagnostics     Lab Results   Labs Ordered and Resulted from Time of ED Arrival to Time of ED Departure   COMPREHENSIVE METABOLIC PANEL - Abnormal       Result Value    Sodium 128 (*)     Potassium 4.7      Carbon Dioxide (CO2) 25      Anion Gap 8      Urea Nitrogen 33.0 (*)     Creatinine 1.17      GFR Estimate 82      Calcium 10.0      Chloride 95 (*)     Glucose 117 (*)     Alkaline Phosphatase 205 (*)      (*)     ALT 53      Protein Total 7.1      Albumin 3.9      Bilirubin Total 11.2 (*)    INR - Abnormal    INR 2.33 (*)    ROUTINE UA WITH MICROSCOPIC REFLEX TO CULTURE - Abnormal    Color Urine Yellow      Appearance Urine Clear      Glucose Urine Negative      Bilirubin Urine Negative      Ketones Urine Negative      Specific Gravity Urine 1.017      Blood Urine Negative      pH Urine 5.5      Protein Albumin Urine 10 (*)     Urobilinogen Urine Normal      Nitrite Urine Negative      Leukocyte Esterase Urine Small (*)     Bacteria Urine Few (*)     RBC Urine 1      WBC Urine 3     CBC WITH PLATELETS AND DIFFERENTIAL - Abnormal    WBC Count 5.7      RBC Count 2.21 (*)     Hemoglobin 8.1 (*)      Hematocrit 23.4 (*)      (*)     MCH 36.7 (*)     MCHC 34.6      RDW 23.9 (*)     Platelet Count 77 (*)     % Neutrophils 64      % Lymphocytes 16      % Monocytes 18      % Eosinophils 2      % Basophils 0      % Immature Granulocytes 1      NRBCs per 100 WBC 0      Absolute Neutrophils 3.7      Absolute Lymphocytes 0.9      Absolute Monocytes 1.0      Absolute Eosinophils 0.1      Absolute Basophils 0.0      Absolute Immature Granulocytes 0.1      Absolute NRBCs 0.0     RBC AND PLATELET MORPHOLOGY - Abnormal    RBC Morphology Confirmed RBC Indices      Platelet Assessment        Value: Automated Count Confirmed. Platelet morphology is normal.    Acanthocytes Slight (*)     Kj Cells Moderate (*)        Imaging   No orders to display     Independent Interpretation   None    ED Course      Medications Administered   Medications - No data to display    Procedures   Procedures     Discussion of Management   None    ED Course   ED Course as of 08/12/24 1921   Mon Aug 12, 2024   1344 I obtained the history and examined the patient as above.        Additional Documentation  None    Medical Decision Making / Diagnosis     CMS Diagnoses: None    MIPS       None    MDM   Crispin Ham is a 39 year old male with history of end-stage liver disease, alcoholic cirrhosis, coagulopathy secondary to liver disease, portal hypertension, who presents today with increased edema including of the scrotum.  On exam, the patient has stigmata of end-stage liver disease.  He is ambulatory slowly, despite significant edema.  Bladder scan was obtained, which shows that he is adequately able to empty his bladder.  No signs of UTI at this time on urinalysis.  He does have impressive scrotal edema, likely secondary to third spacing from end-stage liver disease.  In review of discharge summary, it appears that the patient was restarted on Lasix which he confirms he has been taking, but not spironolactone.  That was restarted today.  I  have also placed a referral for nephrology, as it does not appear that a follow-up appointment was made for the patient though this was part of recommendations in the discharge summary.  I also reviewed with the patient that he is to be on a moderate fluid restriction, no more than 1200 mL of water daily.  He does not think he has been following that at home.  Patient has numerous lab normality abnormalities consistent with known history of end-stage liver disease.  Overall, hemoglobin appears stable, platelets slightly improved.  INR is stable.  Sodium has drifted down slightly, consistent with clinical presentation of hypervolemia.  However, he has had significant improvement in his creatinine.  I recommend close follow-up with outpatient specialists, as directed by discharging team.  Of note, the patient does not have nasal packing in place anymore.  This was scheduled to be removed today by ENT.  I recommended having a low threshold to return to the ED, given that his organ dysfunction makes him very tenuous.    Addendum: I called the patient for follow-up.  Patient confirmed he took out his nasal packing several days ago during the night.  Bleeding did not recur.  Patient has follow-up with PCP tomorrow, will have labs rechecked then.  He is doing okay, still has some swelling, but did get the spironolactone refilled.      Disposition   The patient was discharged.     Diagnosis     ICD-10-CM    1. Hypervolemia, unspecified hypervolemia type  E87.70       2. End stage liver disease (H)  K72.10            Discharge Medications   New Prescriptions    SPIRONOLACTONE (ALDACTONE) 50 MG TABLET    Take 1 tablet (50 mg) by mouth daily for 30 days         Scribe Disclosure:  I, Gem Roberts, am serving as a scribe at 2:40 PM on 8/12/2024 to document services personally performed by Victoria Clark MD based on my observations and the provider's statements to me.        Victoria Clark MD  08/13/24 5451        Victoria Clark MD  08/13/24 1333       Victoria Clark MD  08/13/24 1346

## 2024-08-13 NOTE — DISCHARGE INSTRUCTIONS
Your scrotal swelling is related to extra fluid in your system.  The doctors he discharged from the hospital recommend that you do not drink more than 1200 mL a day.  They also recommended a low-sodium diet to help decrease her swelling.  Because your kidney function seems to have improved, we will restart the medication spironolactone which is a type of diuretic.  I recommend following up with your nephrologist in the next 1 to 2 weeks, and your primary care doctor in the next 3 to 5 days to have your kidney function and symptoms rechecked.  Return to the ED if you notice increased swelling, difficulty urinating, shortness of breath, or for other concerns.      Routine, Follow up with primary care provider, Blake Canchola, within 7 days for hospital follow- up. The following labs/tests are recommended: CBC/CMP. Follow-up with ENT on 8/12/2024 to get your nasal packing removed. Follow-up with nephrology as previously scheduled Follow-up with [facility] next available recommended Follow-up with [facility] as recommended The following appointments have been scheduled for you: 1.Dr Canchola is on vacation next week, so your appointment is scheduled with Dr Matt Dee on Wednesday 8/14/24 at 1:00 PM, [facility] ([phone]) 2:ENT: [facility] on Monday 8/12/24 at 11:00 AM with Dr Avel Quezada, located at [address] [place] [address] ([phone]) This was the closest location that had availability on 8/12

## 2024-09-11 PROBLEM — E87.70 HYPERVOLEMIA, UNSPECIFIED HYPERVOLEMIA TYPE: Status: ACTIVE | Noted: 2024-01-01

## 2024-09-11 PROBLEM — R79.89 INCREASED AMMONIA LEVEL: Status: ACTIVE | Noted: 2024-01-01

## 2024-09-11 NOTE — ED PROVIDER NOTES
Emergency Department Note      History of Present Illness     Chief Complaint   Abnormal Labs      HPI   Crispin Ham is a 39 year old male presenting with his parents after he was found to be anemic at his GI outpatient visit.  He endorses dyspnea on exertion.  He also endorses bilateral lower extremity edema.  No fever, chills, chest pain, abdominal pain, shortness of breath at rest.  No black or bloody stool.  No vomiting.  No recent falls or injuries.        Independent Historian   None    Review of External Notes   8/12/24: ED note reviewed    Past Medical History     Medical History and Problem List   Past Medical History:   Diagnosis Date     (HFpEF) heart failure with preserved ejection fraction (H)      Alcoholic cirrhosis of liver with ascites (H)      Alcoholic hepatitis with ascites (H28)      Esophageal varices (H)      Hepatic encephalopathy (H)      Hypertension      Hyponatremia      Multiple duodenal ulcers      SBP (spontaneous bacterial peritonitis) (H)      Severe alcohol use disorder (H)        Medications   amoxicillin-clavulanate (AUGMENTIN) 250-125 MG tablet  ciprofloxacin (CIPRO) 500 MG tablet  folic acid (FOLVITE) 1 MG tablet  furosemide (LASIX) 40 MG tablet  lactulose (CHRONULAC) 10 GM/15ML solution  magnesium oxide 400 MG tablet  midodrine (PROAMATINE) 5 MG tablet  mirtazapine (REMERON) 7.5 MG tablet  multivitamin, therapeutic (THERA-VIT) TABS tablet  naltrexone (DEPADE/REVIA) 50 MG tablet  oxymetazoline (AFRIN) 0.05 % nasal spray  pantoprazole (PROTONIX) 40 MG EC tablet  predniSONE (DELTASONE) 20 MG tablet  rifaximin (XIFAXAN) 550 MG TABS tablet  sodium chloride (OCEAN) 0.65 % nasal spray  spironolactone (ALDACTONE) 50 MG tablet  thiamine (B-1) 100 MG tablet  Vitamin D, Cholecalciferol, 10 MCG (400 UNIT) TABS        Surgical History   Past Surgical History:   Procedure Laterality Date     COLONOSCOPY      EGD     ESOPHAGOSCOPY, GASTROSCOPY, DUODENOSCOPY (EGD), COMBINED N/A  03/16/2024    Procedure: ESOPHAGOGASTRODUODENOSCOPY;  Surgeon: Chato Juan MD;  Location:  OR     ESOPHAGOSCOPY, GASTROSCOPY, DUODENOSCOPY (EGD), COMBINED N/A 04/18/2024    Procedure: Esophagoscopy, gastroscopy, duodenoscopy (EGD), combined;  Surgeon: Hiren Vogt DO;  Location:  GI     ORTHOPEDIC SURGERY      wrist  surgery     WRIST SURGERY Left 2023       Physical Exam     Patient Vitals for the past 24 hrs:   BP Temp Pulse Resp SpO2   09/11/24 1353 (!) 98/29 97.6  F (36.4  C) 93 16 100 %     Physical Exam  General: No acute distress  Head: No obvious trauma to head.  Ears, Nose, Throat:  External ears normal.  Nose normal.  No pharyngeal erythema, swelling or exudate.  Midline uvula. Moist mucus membranes.  Eyes:  Conjunctivae clear.   Neck: Normal range of motion.  Neck supple.   CV: Regular rate and rhythm.  No murmurs.      Respiratory: Effort normal and breath sounds normal.  No wheezing or crackles.   Gastrointestinal: Soft.  No distension. There is no tenderness.  There is no rigidity, no rebound and no guarding.   Musculoskeletal: Normal range of motion.  Non tender extremities to palpations.  3+ pitting edema in the bilateral lower extremities up to the knees.  Neuro: Alert. Moving all extremities appropriately.  Normal speech.    Skin: Skin is warm and dry.  Jaundice  Psych: Normal mood and affect. Behavior is normal.       Diagnostics     Lab Results   Labs Ordered and Resulted from Time of ED Arrival to Time of ED Departure   CBC WITH PLATELETS - Abnormal       Result Value    WBC Count 3.9 (*)     RBC Count 1.40 (*)     Hemoglobin 5.8 (*)     Hematocrit 17.0 (*)      (*)     MCH 41.4 (*)     MCHC 34.1      RDW 14.1      Platelet Count 105 (*)    COMPREHENSIVE METABOLIC PANEL - Abnormal    Sodium 135      Potassium 4.1      Carbon Dioxide (CO2) 24      Anion Gap 13      Urea Nitrogen 22.9 (*)     Creatinine 1.71 (*)     GFR Estimate 52 (*)     Calcium 10.7 (*)     Chloride 98       Glucose 129 (*)     Alkaline Phosphatase 127      AST 50 (*)     ALT 19      Protein Total 6.6      Albumin 3.5      Bilirubin Total 9.8 (*)    TROPONIN T, HIGH SENSITIVITY - Abnormal    Troponin T, High Sensitivity 65 (*)    NT PROBNP INPATIENT - Abnormal    N terminal Pro BNP Inpatient 1,305 (*)    AMMONIA - Abnormal    Ammonia 110 (*)    LIPASE - Normal    Lipase 32     TYPE AND SCREEN, ADULT    ABO/RH(D) A POS      Antibody Screen Negative      SPECIMEN EXPIRATION DATE [id number]     PREPARE RED BLOOD CELLS (UNIT)    Blood Component Type Red Blood Cells      Product Code F0046N75      Unit Status Ready for issue      Unit Number [id number]      CROSSMATCH Compatible      CODING SYSTEM [id number]     PREPARE RED BLOOD CELLS (UNIT)    Blood Component Type Red Blood Cells      Product Code Y2603H06      Unit Status Ready for issue      Unit Number [id number]      CROSSMATCH Compatible      CODING SYSTEM [id number]     PREPARE RED BLOOD CELLS (UNIT)   ABO/RH TYPE AND SCREEN       Imaging   No orders to display         Independent Interpretation   None    ED Course      Medications Administered   Medications   lactulose (CHRONULAC) solution 20 g (has no administration in time range)   furosemide (LASIX) injection 60 mg (60 mg Intravenous $Given 9/11/24 1630)       Procedures   Procedures     Discussion of Management   Admitting Hospitalist, Katie Calvin NP    ED Course        Additional Documentation  None    Medical Decision Making / Diagnosis     CMS Diagnoses: None    MIPS       None    MDM   Crispin Ham is a 39 year old male with a past medical history of alcoholic liver cirrhosis, presenting with anemia.  Hemoglobin here in the emergency department is 5.8.  He has required blood transfusions in the past.  Consent is obtained and 2 units of packed red blood cells are ordered.  He has significant lower extremity edema and 60 mg of IV Lasix are given.  Ammonia is elevated to 110.  Lactulose was given.   I discussed the patient with the hospitalist who agreed admit the patient to their service.  He remains in stable condition.    Disposition   The patient was admitted to the hospital.     Diagnosis     ICD-10-CM    1. Acute liver failure without hepatic coma  K72.00       2. Increased ammonia level  R79.89       3. Hypervolemia, unspecified hypervolemia type  E87.70       4. Anemia, unspecified type  D64.9            Discharge Medications   New Prescriptions    No medications on file         MD Jennie Liu Stephen, MD  09/11/24 0152

## 2024-09-11 NOTE — PHARMACY-ADMISSION MEDICATION HISTORY
Pharmacist Admission Medication History    Admission medication history is complete. The information provided in this note is only as accurate as the sources available at the time of the update.    Information Source(s): Patient and CareEverywhere/SureScripts via in-person    Pertinent Information: None    Changes made to PTA medication list:  Added: None  Deleted: augmentin, prednisone, vitD  Changed: lactulose twice daily--> three times daily (patient stopped taking for a while but recently resumed again    Allergies reviewed with patient and updates made in EHR:  assessed prior to this interview    Medication History Completed By: Mary Kate Garner [facility] 9/11/2024 5:48 PM    PTA Med List   Medication Sig Last Dose    ciprofloxacin (CIPRO) 500 MG tablet Take 1 tablet (500 mg) by mouth every 24 hours Start taking after you are done with amoxicillin 9/11/2024 at am    folic acid (FOLVITE) 1 MG tablet Take 1 tablet (1 mg) by mouth daily 9/11/2024 at am    furosemide (LASIX) 40 MG tablet Take 1 tablet (40 mg) by mouth 2 times daily 9/11/2024 at am    lactulose (CHRONULAC) 10 GM/15ML solution 30 mLs (20 g) by Oral or Feeding Tube route 2 times daily (Patient taking differently: Take 20 g by mouth or Feeding Tube 3 times daily.) 9/10/2024    magnesium oxide 400 MG tablet Take 400 mg by mouth daily 9/11/2024 at am    midodrine (PROAMATINE) 5 MG tablet Take 5 mg by mouth 3 times daily (with meals) 9/11/2024 at am X1    mirtazapine (REMERON) 7.5 MG tablet Take 1 tablet (7.5 mg) by mouth at bedtime Past Month at sometimes    multivitamin, therapeutic (THERA-VIT) TABS tablet Take 1 tablet by mouth daily 9/11/2024 at am    naltrexone (DEPADE/REVIA) 50 MG tablet Take 50 mg by mouth daily 9/11/2024 at am    oxymetazoline (AFRIN) 0.05 % nasal spray Spray 0.1 mLs (1 spray) in nostril 3 times daily as needed for other (bleeding, APPLY SPRAY IF BLEEDING STARTS AND PUT PRESSURE) PRN    pantoprazole (PROTONIX) 40 MG EC tablet Take 1 tablet  (40 mg) by mouth 2 times daily 9/11/2024 at am    rifaximin (XIFAXAN) 550 MG TABS tablet 1 tablet (550 mg) by Oral or Feeding Tube route 2 times daily 9/11/2024 at am    sodium chloride (OCEAN) 0.65 % nasal spray Spray 2 sprays into both nostrils 3 times daily (Patient taking differently: Spray 2 sprays into both nostrils 3 times daily as needed for congestion.) PRN    spironolactone (ALDACTONE) 50 MG tablet Take 1 tablet (50 mg) by mouth daily for 30 days 9/11/2024 at am    thiamine (B-1) 100 MG tablet Take 1 tablet (100 mg) by mouth daily 9/11/2024 at am

## 2024-09-11 NOTE — H&P
[facility]    History and Physical - Hospitalist Service       Date of Admission:  9/11/2024    Assessment & Plan      Crispin Ham is a 39 year old male with significant past medical history of alcohol abuse, end stage liver disease, alcoholic liver cirrhosis, alcoholic hepatitis, portal hypertension with esophageal varices, anemia, chronic lower extremity edema, chronic HFpEF, history of duodenal ulcers, and history of SBP presented to the emergency department as advised by [facility] due to concern of anemia from labs that were drawn at routine appointment and 9/10/2024.  Workup in the emergency department was notable for hemoglobin of 5.8 with concern of decompensated HFpEF.  Patient initiated on blood transfusion with dose of lactulose given and request for hospitalist to admit for further treatment evaluation.    Acute on chronic anemia of chronic disease vs acute blood loss   Coagulopathy secondary to liver cirrhosis  -sent by [facility] for low hgb, unable to view records for original lab value on 09/10/2024  -hgb 5.8 on admission day  -transfused 2 units PRBC  -trend hgb q6 hr  -transfuse for hgb <7 conditional orders   -mild hypotension  -platelets 105, INR 2.8  -denies any bloody stools, hematemesis, and epistaxis   -recent admission from 8/6-8/8 due to anemia secondary to epistaxis   -recheck cbc in am     Acute on chronic decompensated HFpEF  Elevated troponin, suspect demand ischemia  -LE pitting edema up to abdomen >than basline edema   -BNP 1305 with trop 65   -received 60mg lasix in ER, continue on HF protocol with 40mg lasix bid and continue home aldactone   -compression hose   -stat echo  -trend troponin   -EKG NSR  -denies chest pain or shortness of breath    Recurring acute kidney injury secondary to HFrEF   hepatorenal syndrome  ?ATN  -really hard to determine what patient's actual baseline creatinine is to be   -last 1.17 on 8/12/2024, 1.71 upon admission  -perhaps secondary  to HFrEF or hepatorenal   -has had hypotension, on midodrine, severe anemia, ATN?   -receiving 2UPRBCs  -recheck chem in am     Mild Hypercalcemia  - receiving prbc  -EKG pending  -recheck chem in am     Elevated ammonia level  Hepatic encephalopathy  -Patient not having any stools at home per his report  -Ammonia level 110  -Patient's parents at bedside reports open flow of mentation changes at home  -Patient given dose of lactulose in the emergency department will continue his 20 mg 3 times daily dosing and rifaximin  -Recheck ammonia in the morning    Alcohol abuse with continued use concern with EtOH withdrawal  -Patient with end-stage liver disease and alcohol liver cirrhosis due to alcohol abuse  -Was able to have conversation with patient and he does endorse continued alcohol use stating drinking likely within the last week  -Had a long discussion regarding necessity to be completely sober for the very minimum 6 months before he is even considered for a liver transplant list  -Patient is DNR/DNI per his request  -I did place palliative consult after discussion with patient given the likelihood of potential continued drinking and severe illness at this time.    End-stage liver disease  Alcoholic liver cirrhosis with ascites  Portal hypertension with esophageal varices  History of alcohol hepatitis  History SBP  -Follows with [place] [facility] sent by [facility] for anemia  -Bilirubin 9.8 AST 50  -Abdomen is soft without signs of ascites at this time  -continue cipro for SBP prophylaxis   -[facility] consulted    Orthostatic hypotension  -Continue midodrine    Hyperlipidemia  -not on statin    History of duodenal ulcers  History of esophageal varices  -appreciate [facility] following     Depression  -continue Remeron         Diet:  cardiac  DVT Prophylaxis: high bleeding risk, coagulapathy   Bosch Catheter: Not present  Lines: None     Cardiac Monitoring: None  Code Status:  DNR/DNI    Clinically Significant Risk  Factors Present on Admission           # Hypercalcemia: Highest Ca = 10.7 mg/dL in last 2 days, will monitor as appropriate      # Thrombocytopenia: Lowest platelets = 105 in last 2 days, will monitor for bleeding      # Anemia: based on hgb <11           # Financial/Environmental Concerns:                 Disposition Plan     Medically Ready for Discharge: Anticipated in 2-4 Days  [facility] consulted, echo pending, palliative consult pending   Anticipate discharge home with his parents when able        The patient's care was discussed with the Attending Physician, Dr. Cochran .    Katie Calvin, SERENA APRN [facility]  Hospitalist Service  [facility]  Securely message with [employer] (more info)  Text page via [facility] Paging/Directory     ______________________________________________________________________    Chief Complaint   anemia    History is obtained from the patient, electronic health record, parents,and emergency department physician    History of Present Illness   Crispin Ham is a 39 year old male with significant past medical history of alcohol abuse, end stage liver disease, alcoholic liver cirrhosis, alcoholic hepatitis, portal hypertension with esophageal varices, anemia, chronic lower extremity edema, chronic HFpEF, history of duodenal ulcers, and history of SBP presented to the emergency department as advised by [facility] due to concern of anemia from labs that were drawn at routine appointment and 9/10/2024.  Workup in the emergency department was notable for hemoglobin of 5.8, elevated creatinine at 1.71, ammonia elevated at 110, bilirubin 9.8, significant bilateral lower extremity edema up to abdomen with BNP of 1305, and troponin 65.  Patient initiated on blood transfusion with dose of lactulose given and request for hospitalist to admit for further treatment evaluation.    To note, patient has had multiple hospitalizations for complications related to alcohol liver disease.  Patient has  had 5 admissions at least 2 upwards over 10 days as well as several ER visits this past year.  Patient does continue to consume alcohol despite of several conversations that this will cause him to not be able to be a liver transplant and is certainly in severe end-stage liver failure.  With this, we did discuss in depth CODE STATUS and what he anticipates for ongoing cares.  Palliative consult placed    Patient is seen with his parents at bedside patient is alert, oriented, very jaundiced, tremorous with piloerection noted.  He denies any recent fever or chills.  He endorses being very rundown over the past couple weeks.  He denies any chest pain or shortness of breath.  He has poor oral intake.  We discussed lactulose and patient reports that he has not been having regular bowel movements.  Patient resides with his parents at home.      Past Medical History    Past Medical History:   Diagnosis Date    (HFpEF) heart failure with preserved ejection fraction (H)     Alcoholic cirrhosis of liver with ascites (H)     Alcoholic hepatitis with ascites (H28)     Esophageal varices (H)     Hepatic encephalopathy (H)     Hypertension     Hyponatremia     Multiple duodenal ulcers     SBP (spontaneous bacterial peritonitis) (H)     Severe alcohol use disorder (H)        Past Surgical History   Past Surgical History:   Procedure Laterality Date    COLONOSCOPY      EGD    ESOPHAGOSCOPY, GASTROSCOPY, DUODENOSCOPY (EGD), COMBINED N/A 03/16/2024    Procedure: ESOPHAGOGASTRODUODENOSCOPY;  Surgeon: Chato Juan MD;  Location:  OR    ESOPHAGOSCOPY, GASTROSCOPY, DUODENOSCOPY (EGD), COMBINED N/A 04/18/2024    Procedure: Esophagoscopy, gastroscopy, duodenoscopy (EGD), combined;  Surgeon: Hiren Vogt DO;  Location:  GI    ORTHOPEDIC SURGERY      wrist  surgery    WRIST SURGERY Left 2023       Prior to Admission Medications   Prior to Admission Medications   Prescriptions Last Dose Informant Patient Reported? Taking?    Vitamin D, Cholecalciferol, 10 MCG (400 UNIT) TABS  Self Yes No   Sig: Take 1 tablet by mouth daily   Patient not taking: Reported on 2024   amoxicillin-clavulanate (AUGMENTIN) 250-125 MG tablet   No No   Sig: Take 1 tablet by mouth 2 times daily   ciprofloxacin (CIPRO) 500 MG tablet   Yes No   Sig: Take 1 tablet (500 mg) by mouth every 24 hours Start taking after you are done with amoxicillin   folic acid (FOLVITE) 1 MG tablet  Self No No   Sig: Take 1 tablet (1 mg) by mouth daily   furosemide (LASIX) 40 MG tablet   Yes No   Sig: Take 1 tablet (40 mg) by mouth 2 times daily   lactulose (CHRONULAC) 10 GM/15ML solution   No No   Si mLs (20 g) by Oral or Feeding Tube route 2 times daily   magnesium oxide 400 MG tablet   Yes No   Sig: Take 400 mg by mouth daily   midodrine (PROAMATINE) 5 MG tablet   Yes No   Sig: Take 5 mg by mouth 3 times daily (with meals)   mirtazapine (REMERON) 7.5 MG tablet   No No   Sig: Take 1 tablet (7.5 mg) by mouth at bedtime   multivitamin, therapeutic (THERA-VIT) TABS tablet  Self Yes No   Sig: Take 1 tablet by mouth daily   naltrexone (DEPADE/REVIA) 50 MG tablet   Yes No   Sig: Take 50 mg by mouth daily   oxymetazoline (AFRIN) 0.05 % nasal spray   No No   Sig: Spray 0.1 mLs (1 spray) in nostril 3 times daily as needed for other (bleeding, APPLY SPRAY IF BLEEDING STARTS AND PUT PRESSURE)   pantoprazole (PROTONIX) 40 MG EC tablet   No No   Sig: Take 1 tablet (40 mg) by mouth 2 times daily   predniSONE (DELTASONE) 20 MG tablet   No No   Sig: Take 2 tablets (40 mg) by mouth daily   rifaximin (XIFAXAN) 550 MG TABS tablet   No No   Si tablet (550 mg) by Oral or Feeding Tube route 2 times daily   sodium chloride (OCEAN) 0.65 % nasal spray   No No   Sig: Spray 2 sprays into both nostrils 3 times daily   spironolactone (ALDACTONE) 50 MG tablet   No No   Sig: Take 1 tablet (50 mg) by mouth daily for 30 days   thiamine (B-1) 100 MG tablet  Self No No   Sig: Take 1 tablet (100 mg) by  mouth daily      Facility-Administered Medications: None        Social History   I have reviewed this patient's social history and updated it with pertinent information if needed.  Social History     Tobacco Use    Smoking status: Never   Substance Use Topics    Alcohol use: Yes     Comment: last drinbk 2 weeks ago    Drug use: Not Currently        Physical Exam   Vital Signs: Temp: 97.6  F (36.4  C)   BP: (!) 98/29 Pulse: 93   Resp: 16 SpO2: 100 %      Weight: 0 lbs 0 oz    Physical Exam  Constitutional:       Appearance: He is ill-appearing.   HENT:      Mouth/Throat:      Mouth: Mucous membranes are dry.   Eyes:      General: Scleral icterus present.   Cardiovascular:      Rate and Rhythm: Regular rhythm.   Abdominal:      General: Abdomen is flat.      Palpations: Abdomen is soft.   Musculoskeletal:      Right lower leg: Edema present.      Left lower leg: Edema present.   Skin:     General: Skin is dry.      Coloration: Skin is jaundiced.   Neurological:      Mental Status: He is alert.          Medical Decision Making       77 MINUTES SPENT BY ME on the date of service doing chart review, history, exam, documentation & further activities per the note.      Data     I have personally reviewed the following data over the past 24 hrs:    3.9 (L)  \   5.8 (LL)   / 105 (L)     135; 135 98; 98 22.9 (H); 22.9 (H) /  129 (H); 129 (H)   4.1; 4.1 24; 24 1.71 (H); 1.71 (H) \     ALT: 19 AST: 50 (H) AP: 127 TBILI: 9.8 (H)   ALB: 3.5 TOT PROTEIN: 6.6 LIPASE: 32     Trop: 65 (H) BNP: 1,305 (H)     INR:  2.28 (H) PTT:  N/A   D-dimer:  N/A Fibrinogen:  N/A       Imaging results reviewed over the past 24 hrs:   No results found for this or any previous visit (from the past 24 hour(s)).

## 2024-09-11 NOTE — ED TRIAGE NOTES
Pt reports blood draw at the clinic [facility] on Monday that was low so sent here for further eval     Pt has long history of low hemoglobin along with etoh cirrhosis     Denies actue CP or SOB     Chronic SOB upon exertion

## 2024-09-12 NOTE — CONSULTS
[facility] GASTROENTEROLOGY CONSULTATION     Crispin Ham  [address]  [facility] [place] [id number]  39 year old male    Admission Date/Time: 9/11/2024  Primary Care Provider: Blake Canchola    We were asked to see the patient in consultation by Dr. Bailey for evaluation of EtOH cirrhosis, anemia.        HPI:  Crispin Ham is a 39 year old male who was admitted to [facility] 9/11/24 with anemia and confusion as well as decompensated CHF.    Patient has a known history of decompensated EtOH cirrhosis with history of ascites, SBP, encephalopathy, anemia, CHF.  He continues to drink alcohol.    He has history of epistaxis as well, in July he had enough bleeding from that to require a blood transfusion.   He notes some intermittent ongoing epistaxis.    Last EGD April 2024 with no varices but he did have portal hypertensive gastropathy.    ROS: A comprehensive ten point review of systems was negative aside from those in mentioned in the HPI.      MEDICATIONS:   Reviewed.    ALLERGIES:   Allergies   Allergen Reactions    Aspirin Angioedema and Swelling    Aspirin-Acetaminophen-Caffeine Swelling     puffy eyes and dry throat a few years ago. Tolerates ibuprofen and acetaminophen, but avoids aspirin    Lisinopril Cough and Other (See Comments)    Nsaids Angioedema     Patient reports having a reaction of puffy eyes and dry throat a few years ago, but he has taken Advil in 2023 and did not react.       Past Medical History:   Diagnosis Date    (HFpEF) heart failure with preserved ejection fraction (H)     Alcoholic cirrhosis of liver with ascites (H)     Alcoholic hepatitis with ascites (H28)     Esophageal varices (H)     Hepatic encephalopathy (H)     Hypertension     Hyponatremia     Multiple duodenal ulcers     SBP (spontaneous bacterial peritonitis) (H)     Severe alcohol use disorder (H)        Past Surgical History:   Procedure Laterality Date    COLONOSCOPY      EGD    ESOPHAGOSCOPY, GASTROSCOPY,  DUODENOSCOPY (EGD), COMBINED N/A 03/16/2024    Procedure: ESOPHAGOGASTRODUODENOSCOPY;  Surgeon: Chato Juan MD;  Location:  OR    ESOPHAGOSCOPY, GASTROSCOPY, DUODENOSCOPY (EGD), COMBINED N/A 04/18/2024    Procedure: Esophagoscopy, gastroscopy, duodenoscopy (EGD), combined;  Surgeon: Hiren Vogt DO;  Location:  GI    ORTHOPEDIC SURGERY      wrist  surgery    WRIST SURGERY Left 2023         SOCIAL HISTORY:  Social History     Tobacco Use    Smoking status: Never   Substance Use Topics    Alcohol use: Yes     Comment: last drinbk 2 weeks ago    Drug use: Not Currently       FAMILY HISTORY:  Non contributory    PHYSICAL EXAM:   BP (!) 85/46   Pulse 87   Temp 97.7  F (36.5  C) (Oral)   Resp 12   Wt 93.7 kg (206 lb 9.1 oz)   SpO2 99%   BMI 28.02 kg/m      Constitutional: chronically ill appearing  Cardiovascular: CAMILO  Respiratory: CTAB  Psychiatric: Awake but not fully oriented  Head: Normocephalic. Atraumatic.    Neck:  normal size, trachea midline  Eyes:  icteric  ENT: hearing adequate  Abdomen: mildly distended, nontender to palpation  NEURO: grossly negative  SKIN: jaundice            ADDITIONAL COMMENTS:   I reviewed the patient's new clinical lab test results.   Recent Labs   Lab Test 09/12/24  0506 09/12/24  0031 09/11/24  1403 08/12/24  1725 08/12/24  1351   WBC 3.0*  --  3.9*  --  5.7   HGB 7.1* 6.8* 5.8*  --  8.1*   *  --  121*  --  106*   *  --  105*  --  77*   INR 2.25*  --  2.28* 2.33*  --      Recent Labs   Lab Test 09/12/24  0506 09/11/24  1403 08/12/24  1351    135  135 128*   POTASSIUM 4.5 4.1  4.1 4.7   CHLORIDE 102 98  98 95*   CO2 23 24  24 25   BUN 21.3* 22.9*  22.9* 33.0*   CR 1.49* 1.71*  1.71* 1.17   ANIONGAP 11 13  13 8   MIGUELINA 10.4 10.7*  10.7* 10.0   * 129*  129* 117*     Recent Labs   Lab Test 09/12/24  0506 09/11/24  1403 08/12/24  1735 08/12/24  1351 08/08/24  0555 07/10/24  0636 07/10/24  0423 04/19/24  1802 04/19/24  0458  04/18/24  1230 09/25/23  1537 09/25/23  1424   ALBUMIN 3.2* 3.5  --  3.9 3.4*   < >  --    < > 2.8*  --    < > 2.4*   BILITOTAL 9.2* 9.8*  --  11.2* 13.1*   < >  --    < > 11.3*  --    < > 21.7*   ALT 18 19  --  53 28   < >  --    < > 46  --    < > 69   AST  --  50*  --  114* 85*   < >  --    < > 96*  --    < >  --    ALKPHOS 109 127  --  205* 165*   < >  --    < > 118  --    < > 182*   PROTEIN  --   --  10*  --   --   --  20*  --   --  30*   < >  --    LIPASE  --  32  --   --   --   --   --   --  56  --   --  237*    < > = values in this interval not displayed.             CONSULTATION ASSESSMENT AND PLAN:    Active Problems:    EtOh Liver cirrhosis    Assessment: 38 yo male with decompensated liver disease and ongoing alcohol abuse. History of ascites, SBP (on cipro prophylaxis), frequent epistaxis, Presents with encephalopathy and anemia.  Unclear if he was taking his lactulose and xifaxan prior to admission.       With ongoing EtOH use despite multiple admissions with cirrhosis complications. Prognosis is poor.    Admission MELD 27, Maddreys 75.    Plan:  - continue midodrine  - Monitor MELD labs, consider prednisolone if bili not improving tomorrow  - agree with palliative care consult, with ongoing alcohol use this is only going to worsen      Anemia, unspecified type    Assessment: History of bleeding in the past from epistaxis. May have some gastric blood loss as well from portal hypertension noted on 4/24 EGD.  Some suppression from EtOH also possible.    Plan:   - monitor, no endoscopy planned      Increased ammonia level    Assessment: Question of non compliance of with xifaxan and lactulose.    Plan:   - continue lactulose and xifaxan        Dotty Pratt MD  [facility]  Office:  [phone]  40 minutes of total time was spent providing patient care, including patient evaluation, reviewing documentation/test results, and .

## 2024-09-12 NOTE — PROVIDER NOTIFICATION
MD Notification    Notified Person: MD    Notified Person Name: Dr Ashutosh WEBER    Notification Date/Time: 9/12/24 0025    Notification Interaction: Vocera    Purpose of Notification: soft BP 80/50s MAP 60s, asymptomatic     Orders Received: vit k, transfusion, compression stocking    Comments:

## 2024-09-12 NOTE — PROVIDER NOTIFICATION
Brief update:    Asymptomatic with lower blood pressures.    Decompensated liver disease.    Added vitamin K 5 mg oral x 1 for INR of 2.8.  With alcohol abuse, could have some degree of nutritional deficiency on top of coagulopathy from liver disease.    Hemoglobin of 6.8.  Has conditional transfusion order in place.  Okay to transfusion additional unit    Continue to monitor blood pressures    Compression stockings ordered for bilateral lower extremities with third spacing.  Continue to elevate lower extremities as able.    Virgil Boykin MD  12:50 AM

## 2024-09-12 NOTE — PROGRESS NOTES
Patient requested blood to be transfused despite changing his status to comfort care.   Blood transfusion has been initiated.   He is AO X 4; VSS; on RA; in no acute distress.

## 2024-09-12 NOTE — PROVIDER NOTIFICATION
09/12/24 0631   Critical Test Results/Notification   Critical Lab Result (Lab Name and Value) ammonia   What Time Did The Lab Notify You?   (not notified)   Provider Notified yes   Date of Provider Notification 09/12/24   Time of Provider Notification 0631   Mechanism of Provider notification other (see comment)  (vocera messaging)   What Provider Did You Notify? Dr oBykin   Response orders obtained  (give lactulose as scheduled)

## 2024-09-12 NOTE — PLAN OF CARE
Neuro: lethargic, oriented x5 CIWA score below 5  Tele/cardiac: SR, soft BP  Respiration: RA, SNOWDEN  Activity: SBA with cane  Pain: denies  Drips: PIV SL  Drains/tubes: none  Skin: BLE edema  GI/: continent,had x1 loose BM  Aggression color: green  Isolation: none  Plan: trend hemoglobin

## 2024-09-12 NOTE — PROGRESS NOTES
[facility]  Hospitalist Progress Note        Kameron Bailey MD   09/12/2024        Interval History:        - Has a sick looking appearance, somnolent but arousable and conversant    - BP has been low in 80s; given vitamin K for coagulopathy of INR 2.8--- no significant changes-- > 2.25  - s/p 2 units PRBC; transfuse PRN for Hb<7; BP better to low 90s--100s; hemoglobin 5.8---> 7.1; will monitor hemoglobin Q8 hours and transfuse PRN for Hb <7  - compression stockings ordered  - will decrease Lasix to 20 mg IV BID with hold parameters (starting 9/13); will also put hold parameters for Spironolactone  - renal function slightly better Cr 1.71--->1.49; monitor BMP  - ammonia 110-- 133; reports being noncompliant with lactulose and Rifaximin at home  - evaluated by [facility].I., note poor prognosis with ongoing alcohol use despite multiple hospitalizations with cirrhosis complications  - GI rec to continue midodrine, MELD labs and to consider Prednisolone if bilirubin not improving 9/13; GI plans for no endoscopy at this time  - palliative care consultation pending  - care coordinator following for disposition    Addendum:  -patient evaluated by palliative care and has now opted for comfort focused measures only  -will discontinue nonessential medications and vitals  -will press comfort care orders   - for hospice disposition           Assessment and Plan:        Crispin Ham is a 39 year old male with PMH of significant alcohol abuse, end stage liver disease, alcoholic liver cirrhosis, alcoholic hepatitis, portal hypertension with esophageal varices, anemia, chronic lower extremity edema, chronic HFpEF, h/o duodenal ulcers, and history of SBP who was sent to ED from GI clinic with anemia (Hb 5.7)    Decompensated liver disease, end-stage liver disease  alcohol abuse with alcoholic cirrhosis  coagulopathy secondary to above  acute on chronic anemia  Pancytopenia related to alcohol  use  alcoholic hepatitis  portal hypertension with h/o esophageal varices  orthostatic hypotension  hepatic encephalopathy  - on presentation Cr 1.71, calcium 10.7, magnesium 1.4, AST 50, bilirubin 9.8, ammonia 110, Hb 5.8; INR 2.28, WBC 3.9, platelet 105    - BP has been low in 80s; given vitamin K for coagulopathy of INR 2.8--- no significant changes-- > 2.25  - s/p 2 units PRBC; transfuse PRN for Hb<7; BP better to low 90s--100s; hemoglobin 5.8---> 7.1; will monitor hemoglobin Q8 hours and transfuse PRN for Hb <7  - compression stockings ordered  - will decrease Lasix to 20 mg IV BID with hold parameters (starting 9/13); will also put hold parameters for Spironolactone  - ammonia 110-- 133; somnolent on exam; reports being noncompliant with lactulose and Rifaximin at home  - evaluated by [facility], note poor prognosis with ongoing alcohol use despite multiple hospitalizations with cirrhosis complications  - GI rec to continue midodrine, MELD labs and to consider Prednisolone if bilirubin not improving 9/13; GI plans for no endoscopy at this time  -continue ciprofloxacin for SBP prophylaxis  -admits to active drinking; counseled on alcohol cessation  - palliative care consultation pending  - care coordinator following for disposition     Acute on chronic decompensated HFpEF  fluid overload likely also due to cirrhosis  Elevated troponin, suspect demand ischemia  -significantly fluid overloaded on admission  - BNP 1305 with trop 65--- 59, no significant trend  -received 60 mg IV Lasix in ED and then was started on Lasix 40 mg IV BID  -diuretics adjusted as above for low BP  -ECHO ordered and pending     acute renal failure on CKD stage 3  Likely hepatorenal syndrome  mild hypercalcemia  - unclear baseline creatinine; last 1.17 on 8/12/2024, 1.71 upon admission  - renal function slightly better Cr 1.71--->1.49; monitor BMP with diuresis  - will consider nephrology evaluation if renal function worsening  - continue with  Midodrine; might need to consider albumin infusion if renal function worsening     History of duodenal ulcers  History of esophageal varices  -appreciate [facility] following   -will continue with pantoprazole BID  -G.I. plans for no EGD at this time     Depression  -continue Remeron     DVT Prophylaxis: high bleeding risk, coagulapathy; PCDs     Code Status:  DNR/DNI    Diet:   Combination Diet 2 gm NA Diet; No Caffeine Diet (and additional linked orders)  Fluid restriction 2000 ML FLUID (and additional linked orders)  Fluid restriction 2000 ML FLUID      Disposition:   Medically Ready for Discharge: Anticipated in 2-4 Days pending clinical stability  -palliative care consulted for goals of care discussion    High medical complexity  total time spent today 52 minutes    Care plan discussed with patient, nursing and palliative care       Clinically Significant Risk Factors Present on Admission           # Hypercalcemia: Highest Ca = 10.7 mg/dL in last 2 days, will monitor as appropriate  # Hypomagnesemia: Lowest Mg = 1.4 mg/dL in last 2 days, will replace as needed   # Hypoalbuminemia: Lowest albumin = 3.2 g/dL at 9/12/2024  5:06 AM, will monitor as appropriate  # Coagulation Defect: INR = 2.25 (Ref range: 0.85 - 1.15) and/or PTT = N/A, will monitor for bleeding    # Thrombocytopenia: Lowest platelets = 102 in last 2 days, will monitor for bleeding          # Anemia: based on hgb <11                 # Financial/Environmental Concerns:                                    Physical Exam:      Blood pressure 102/53, pulse 87, temperature 97.5  F (36.4  C), temperature source Oral, resp. rate 12, weight 93.7 kg (206 lb 9.1 oz), SpO2 100%.  Vitals:    09/11/24 2200 09/12/24 0423   Weight: 93 kg (205 lb 0.4 oz) 93.7 kg (206 lb 9.1 oz)     Vital Signs with Ranges  Temp:  [97.5  F (36.4  C)-98  F (36.7  C)] 97.5  F (36.4  C)  Pulse:  [] 87  Resp:  [12-20] 12  BP: ()/(29-80) 102/53  SpO2:  [91 %-100 %] 100 %  I/O's Last  24 hours  I/O last 3 completed shifts:  In: 1000 [P.O.:400]  Out: 600 [Urine:600]    Constitutional: somnolent but arousable and conversant; sick looking appearance; deeply icteric; in no apparent distress       Oral cavity: Moist mucosa   Cardiovascular: Normal s1 s2, regular rate and rhythm, no murmur   Lungs: B/l clear to auscultation, no wheezes or crepitations   Abdomen: Soft, nt, nd, no guarding, rigidity or rebound; BS +   LE : B/l pitting edema ++   Musculoskeletal/Neuro Power 5/5 in all extremities; No focal neurological deficits noted   Psychiatry: normal mood and affect                Medications:        Current Facility-Administered Medications   Medication Dose Route Frequency Provider Last Rate Last Admin    ciprofloxacin (CIPRO) tablet 500 mg  500 mg Oral Q24H Katie Calvin APRN CNP        folic acid (FOLVITE) tablet 1 mg  1 mg Oral Daily Katie Calvin APRN CNP        furosemide (LASIX) injection 40 mg  40 mg Intravenous bid 08 & 14 Katie Calvin APRN CNP        lactulose (CHRONULAC) solution 20 g  20 g Oral or Feeding Tube TID Katie Calvin APRN CNP   20 g at 09/11/24 2101    midodrine (PROAMATINE) tablet 5 mg  5 mg Oral TID w/meals Katie Calvin APRN CNP        mirtazapine (REMERON) tablet TABS 7.5 mg  7.5 mg Oral At Bedtime Katie Calvin APRN CNP   7.5 mg at 09/11/24 2102    multivitamin w/minerals (THERA-VIT-M) tablet 1 tablet  1 tablet Oral Daily Katie Calvin APRN CNP        pantoprazole (PROTONIX) EC tablet 40 mg  40 mg Oral BID Katie Calvin APRN CNP   40 mg at 09/11/24 2102    rifaximin (XIFAXAN) tablet 550 mg  550 mg Oral or Feeding Tube BID Katie Calvin APRN CNP   550 mg at 09/11/24 2102    sodium chloride (PF) 0.9% PF flush 3 mL  3 mL Intracatheter Q8H Katie Calvin APRN CNP   3 mL at 09/12/24 0137    sodium chloride (PF) 0.9% PF flush 3 mL  3 mL Intracatheter Q8H Katie Calvin APRN CNP        spironolactone (ALDACTONE) tablet 50 mg   50 mg Oral Daily Katie Calvin APRN CNP        thiamine (B-1) tablet 100 mg  100 mg Oral Daily Katie Calvin APRN CNP         PRN Meds:   Current Facility-Administered Medications   Medication Dose Route Frequency Provider Last Rate Last Admin    calcium carbonate (TUMS) chewable tablet 1,000 mg  1,000 mg Oral 4x Daily PRN Katie Calvin APRN CNP        Continuing ACE inhibitor/ARB/ARNI from home medication list OR ACE inhibitor/ARB/ARNI order already placed during this visit   Does not apply DOES NOT GO TO Katie Grier APRN CNP        flumazenil (ROMAZICON) injection 0.2 mg  0.2 mg Intravenous q1 min prn Katie Calvin APRN CNP        lidocaine (LMX4) cream   Topical Q1H PRN Katie Calvin APRN CNP        lidocaine (LMX4) cream   Topical Q1H PRN Katie Calvin APRN CNP        lidocaine 1 % 0.1-1 mL  0.1-1 mL Other Q1H PRN Katie Calvin APRN CNP        lidocaine 1 % 0.1-1 mL  0.1-1 mL Other Q1H PRN Katie Calvin APRN CNP        LORazepam (ATIVAN) tablet 1-2 mg  1-2 mg Oral Q30 Min PRN Katie Calvin APRN CNP        Or    LORazepam (ATIVAN) injection 1-2 mg  1-2 mg Intravenous Q30 Min PRN Katie Calvin APRN CNP        oxymetazoline (AFRIN) 0.05 % spray 1 spray  1 spray Nasal TID PRN Katie Calvin APRN CNP        Patient is already receiving mechanical prophylaxis   Does not apply Continuous PRN Nikunj, MRAILYN Sharp CNP        prochlorperazine (COMPAZINE) injection 10 mg  10 mg Intravenous Q6H PRN Katie Calvin APRN CNP        Or    prochlorperazine (COMPAZINE) tablet 10 mg  10 mg Oral Q6H PRN Katie Calvin APRN CNP        Or    prochlorperazine (COMPAZINE) suppository 25 mg  25 mg Rectal Q12H PRN Katie Calvin APRN CNP        Reason beta blocker not prescribed   Other DOES NOT GO TO Katie Grier APRN CNP        senna-docusate (SENOKOT-S/PERICOLACE) 8.6-50 MG per tablet 1 tablet  1 tablet Oral BID Katie Rivas APRN CNP     "    Or    senna-docusate (SENOKOT-S/PERICOLACE) 8.6-50 MG per tablet 2 tablet  2 tablet Oral BID PRN Katie Calvin APRN CNP        sodium chloride (PF) 0.9% PF flush 3 mL  3 mL Intracatheter q1 min Katie Laguerre APRN CNP        sodium chloride (PF) 0.9% PF flush 3 mL  3 mL Intracatheter q1 min prn Katie Calvin APRN CNP                Data:      All new lab and imaging data was reviewed.   Recent Labs   Lab Test 09/12/24  0506 09/12/24  0031 09/11/24  1403 08/12/24  1725 08/12/24  1351   WBC 3.0*  --  3.9*  --  5.7   HGB 7.1* 6.8* 5.8*  --  8.1*   *  --  121*  --  106*   *  --  105*  --  77*   INR 2.25*  --  2.28* 2.33*  --       Recent Labs   Lab Test 09/12/24  0506 09/11/24  1403 08/12/24  1351    135  135 128*   POTASSIUM 4.5 4.1  4.1 4.7   CHLORIDE 102 98  98 95*   CO2 23 24  24 25   BUN 21.3* 22.9*  22.9* 33.0*   CR 1.49* 1.71*  1.71* 1.17   ANIONGAP 11 13  13 8   MIGUELINA 10.4 10.7*  10.7* 10.0   * 129*  129* 117*     No lab results found.    Invalid input(s): \"TROP\", \"TROPONINIES\"      "

## 2024-09-12 NOTE — CONSULTS
Palliative Care Consultation Note  [facility]      Patient: Crispin Ham  Date of Admission:  2024    Requesting Clinician / Team: Katie Calvin NP/hospitalist  Reason for consult: Goals of care     Recommendations & Counseling     GOALS OF CARE:  Comfort focused; transition to comfort measures, per patient request. Reviewed plan of care with motherJuli, via phone who is in agreement   Social work consult for hospice planning.  Patient would like to start hospice at home, with a likely transition to a hospice/nursing facility as his functional status changes and his dying process becomes more imminent    ADVANCE CARE PLANNING:  No health care directive on file. Per system policy, Surrogate Decision-makers for Patients With Diminished Decision-making Capacity offers guidance on possible decision-makers. Mother Juli, father Quan have been identified as surrogate decision makers.   There is no POLST form on file, recommend to complete with enrollment in hospice  Code status: No CPR- Do NOT Intubate    MEDICAL MANAGEMENT:  As above, transition to comfort measures  Continue CIWA protocol and as needed Ativan for alcohol withdrawal signs/symptoms for comfort  Continue Lasix, Aldactone, rifaximin for comfort  Continue lactulose 20 g p.o. 3 times daily to maintain mentation  Continue Remeron at bedtime  Roxicodone 5 to 10 mg p.o. every 2 hours as needed pain, shortness of breath or Dilaudid 0.5 to 1 mg IV every hour as needed pain, shortness of breath  Zyprexa 2.5 to 5 mg ODT every 6 hours as needed agitation    PSYCHOSOCIAL/SPIRITUAL SUPPORT:  Family -supportive mother Juli, father Quan.  Has 2 brothers, 1 lives with his family in [place], another brother, Ender,  2024 from alcoholism    Palliative Care will continue to follow. Thank you for the consult and allowing us to aid in the care of Crispin Ham.    These recommendations have been discussed with   Lynn, unit care coordinator Yuriy, unit  Hansa, bedside RN Oralia.    Chart documentation was completed, in part, with Thingies voice-recognition software. Even though reviewed, some grammatical, spelling, and word errors may remain.     MARILYN Jackson CNP  Securely message with Vocera (more info)  Text page via [facility] Paging/Directory     Palliative Summary/HPI     Crispin Ham is a 39 year old male with a past medical history significant for alcohol use disorder, alcoholic liver cirrhosis, end stage liver disease, alcoholic hepatitis, esophageal varices, portal hypertension with esophageal varices, anemia of chronic disease, chronic lower extremity edema, chronic HFpEF, CKD stage III, h/o duodenal ulcers, history of SBP, and depression who was sent to ED from GI clinic with anemia (Hb 5.7).  He has coagulopathy related to advanced liver disease.  Noted to have GATITO on CKD, likely hepatorenal syndrome.    Today, the patient was seen for:  Goals of care    Palliative Care Summary:   Met with Pranay, with APRN student, Nicole Iasac.     I introduced our role as an extra layer of support and how we help patients and families dealing with serious, potentially life-limiting illnesses. I explained the composition of the palliative care team.  Palliative care helps patients and families navigate their care while focusing on the whole person; providing emotional, social and spiritual support  Palliative care often assists with symptom management, information sharing about what to expect from the illness, available treatment options and what effect those options may have on the disease course, and provide effective communication and caring support.    Prognosis, Goals, & Planning:    Functional Status just prior to this current hospitalization:  Alcoholic liver disease, recurrent hospitalizations, 5 hospitalizations in the last 6 months    Prognosis, Goals, and/or Advance Care Planning:  Advance  Care Planning Discussion 9/12/2024. IEly APRN CNP met with Patient today at the hospital to discuss Advance Care Planning. Crispin Ham does have decisional capacity and was present for this discussion. Those present were informed of the voluntary nature of this discussion and wished to proceed. This discussion began at 1218 and ended at 1238 for a total of 20 minutes.    Pranay understands that his liver is not functioning, and is the reason why he is so sick.  Up until this point, his goal has always been to get stronger.  He recognizes that that is getting harder and harder.  We acknowledged together that a liver transplant is likely not in his cards, related to ongoing alcohol use.  Without a liver transplant, I am worried that this is the strongest that Pranay will ever feel, and that he will continue to deteriorate.  We acknowledged that recurrent hospitalizations/setbacks will persist until the end of his life.  He understands that he will eventually die from this process.  Education provided regarding hospice philosophy, prognostic, and eligibility criteria. Discussed what services are provided and those that are not. Discussed common misconceptions. We explored the various disposition options where they can receive hospice care (home, residential hospice homes, LTC with hospice) including subsequent financial and familial implications. Discussed typical anticipated timing of discharge, acknowledging that the care management team will assist in specifics.   Education provided on transition to comfort-focused goals of care would be including discontinuation of IV fluids, cardiac monitoring, labs, and other interventions that do not directly promote comfort.  Anticipatory guidance was given regarding feeding, hunger, fluids at end of life. We discussed utilization of medications. Discussed that this process is very purposeful in terms of ensuring patient is as comfortable as possible and that  family wishes are honored.  Based on this discussion, Pranay would like to proceed with comfort cares and hospice planning  Spoke with mother Juli via phone, with permission from Pranay.  Reviewed my conversation with Pranay and his request for a comfort plan of care, hospice.  Juli appropriately saddened, but in agreement and supportive.    Code Status was addressed today:   No already DNR/DNI    Patient's decision making preferences: with input from medical clinicians and loved ones        Patient has decision-making capacity today for complex decisions:Intact          Coping, Meaning, & Spirituality:   Mood, coping, and/or meaning in the context of serious illness were addressed today: Yes.  Patient's brother, Ender,  2024 from alcoholism.  Pranay is pretty [id number] about this.  Mother is stretching up memories from this tragedy.    Social:   Living situation:lives with parents, mother Juli, father Quan  Important relationships/caregivers: Parents  Areas of fulfillment/kaya: Playing video games, reading, listening to audiobooks    Medications:  I have reviewed this patient's medication profile and medications from this hospitalization. Notable medications:  Ciprofloxacin 500 mg p.o. daily  Folic acid  Lasix 20 mg IV twice daily  Lactulose 20 g p.o. 3 times daily  Midodrine 5 mg p.o. 3 times daily  Remeron 7.5 mg p.o. nightly  Naltrexone 50 mg p.o. daily  PPI  Rifaximin 550 mg p.o. twice daily  Spironolactone 50 mg p.o. daily  Thiamine  Roxicodone 5 to 10 mg p.o. every 2 hours as needed pain, shortness of breath  Dilaudid 0.5 to 1 mg IV every hour as needed pain, shortness of breath  Zyprexa 2.5 to 5 mg ODT every 6 hours as needed agitation  Ativan 1 to 2 mg p.o./IV every 30 minutes as needed CIWA protocol    ROS:  Comprehensive ROS is reviewed and is negative except as here & per HPI: N/A    Physical Exam   Vital Signs with Ranges  Temp:  [96.8  F (36  C)-98  F (36.7  C)] 96.8  F (36  C)  Pulse:   [] 98  Resp:  [12-20] 18  BP: ()/(29-80) 101/64  SpO2:  [91 %-100 %] 100 %  206 lbs 9.14 oz  CONSTITUTIONAL: Chronically ill cachectic man seen resting in bed in NAD, A&Ox3, periods of lethargy/closing eyes, but awakens spontaneously and remains engaged in conversation. Calm and cooperative.  HEENT: NCAT, scleral icterus  RESPIRATORY: NL respiratory effort on RA  SKIN: Jaundice    Data reviewed:  Recent imaging independently reviewed, my comments on pertinents:   8/6 CXR with pulmonary edema    Recent lab data independently reviewed, my comments on pertinents:   Na 136  K 4.5  Creat 1.49  Magnesium 1.8  Phosphorus 3.5  Albumin 3.2  Alk phos 109  ALT 18  Ammonia 133  Bilirubin 9.2  WBC 3  Hgb 6.8  Plt 102    Medical Decision Making   Please see A&P for additional details of medical decision making.  MANAGEMENT DISCUSSED with the following over the past 24 hours: Dr. Bailey, bedside CARA Barton, unit care coordinator Yuriy, unit  Hansa   NOTE(S)/MEDICAL RECORDS REVIEWED over the past 24 hours: H&P, hospitalist notes, GI note, nursing notes  Tests personally interpreted in the past 24 hours:  - CHEST XRAY showing pulmonary edema  Tests REVIEWED in the past 24 hours:  - See lab/imaging results included in the data section of the note  - BMP  - CMP  - CBC  - LFTs  - Magnesium  - Phosphorus  SUPPLEMENTAL HISTORY, in addition to the patient's history, over the past 24 hours obtained from:   - Dr. Bailey, bedside CARA Barton, unit care coordinator Yuriy, unit  Hansa, mother Juli  Medical complexity over the past 24 hours:  - Decision to DE-ESCALATE CARE based on prognosis

## 2024-09-12 NOTE — PROGRESS NOTES
Care Management Follow Up    Length of Stay (days): 1    Expected Discharge Date: 09/16/2024     Concerns to be Addressed: discharge planning     Patient plan of care discussed at interdisciplinary rounds: Yes    Anticipated Discharge Disposition: Hospice     Anticipated Discharge Services: None  Anticipated Discharge DME:      Patient/family educated on Medicare website which has current facility and service quality ratings: yes  Education Provided on the Discharge Plan: Yes  Patient/Family in Agreement with the Plan: yes    Referrals Placed by CM/SW: Hospice  Private pay costs discussed: Not applicable    Discussed  Partnership in Safe Discharge Planning  document with patient/family: No     Handoff Completed: No, handoff not indicated or clinically appropriate    Additional Information:  SW reviewed chart and spoke with RN CC and palliative care. Pt and family have decided on hospice care. SW briefly met with pt. He asked that his mother, Juli, be called to discuss discharge planning. Pt did express his desire to discharge home with hospice care. SW called Juli. She is in agreement with pt coming home. SW offered choice of hospice agency. Referral sent to Michael Rm liaison, can meet with pt and family Friday at 1100. Juli lorie pt is currently on short term disability through his job at [employer] that ends 10/16/24. He is planning on applying for long term disability thorough his employer. She believes his insurance to be active.     Next Steps: Confirm hospice plan and discharge date. SYLVESTER following for discharge planning.     JACK Munoz, [facility] HospitalSW  [phone] Desk phone  [phone] Cell/text (Preferred)  [facility]

## 2024-09-12 NOTE — PLAN OF CARE
Goal Outcome Evaluation:      Plan of Care Reviewed With: parent          Outcome Evaluation: Hospice

## 2024-09-12 NOTE — PLAN OF CARE
Goal Outcome Evaluation:       Active Problems:    Acute liver failure without hepatic coma    Anemia, unspecified type    Increased ammonia level    Hypervolemia, unspecified hypervolemia type

## 2024-09-12 NOTE — CONSULTS
Care Management Initial Consult  Additional information  Spoke with patients mother Juli [phone]  with patient's consent as patient states his head feels cloudy and will not be able to give accurate information.   Mother state patient continues to live with them in their basement. Patient is withdrawn and does not communicate much. Noted patient was being increasingly lethargic and noted having nose bleeds.   Juli romero patient was diligent in medical follow ups after his last discharge on 8/8 .  Mother feels patient has continued to consume alcohol however he has decreased the amount. Patient has not sought any CD treatment in the past as he states he was able to quit on his own. Alyssa has joined AA on [employer] but does not feel it is very helpful.  Mother reports patient is anxious to work but has not been able to do it due to his Alcohol issues. Patient is on short term disability from work at present and may end on 10/31. Patient will be applying for Long Term disability.  Mother requested a meeting with SW to determine disposition. They would like clarification from medical team regarding prognosis and if patient would benefit from TCU that would do CD therapy and Physical therapy .  Mother stated they would be in the hospital on Friday 9/13  at 11 am.  Discussed with her that our palliative team will be following patient and CC will pass this information to them.   General Information  Assessment completed with: Patient, Parents, Mother Juli  Type of CM/SW Visit: Initial Assessment    Primary Care Provider verified and updated as needed: Yes   Readmission within the last 30 days: previous discharge plan unsuccessful   Return Category: Progression of disease  Reason for Consult: discharge planning  Advance Care Planning: Advance Care Planning Reviewed: no concerns identified          Communication Assessment  Patient's communication style: spoken language (English or Bilingual)              Cognitive  Cognitive/Neuro/Behavioral: WDL                      Living Environment:   People in home: parent(s)     Current living Arrangements: house      Able to return to prior arrangements: yes       Family/Social Support:  Care provided by: self, parent(s)  Provides care for: no one  Marital Status: Single  Support system: Parent(s)          Description of Support System: Supportive, Involved         Current Resources:   Patient receiving home care services: No        Community Resources: None  Equipment currently used at home:    Supplies currently used at home:      Employment/Financial:  Employment Status: unemployed        Financial Concerns: unemployed           Does the patient's insurance plan have a 3 day qualifying hospital stay waiver?  No    Lifestyle & Psychosocial Needs:  Social Determinants of Health     Food Insecurity: No Food Insecurity (9/25/2023)    [facility]    Food Insecurity     Worried About Running Out of Food in the Last Year: 1   Depression: Not at risk (1/8/2024)    [facility]    PHQ-2     PHQ-2 TOTAL SCORE: 2   Housing Stability: Low Risk  (9/25/2023)    [facility]    Housing Stability     Unable to Pay for Housing in the Last Year: 1   Tobacco Use: Low Risk  (8/14/2024)    Received from [facility]    Patient History     Smoking Tobacco Use: Never     Smokeless Tobacco Use: Never     Passive Exposure: Not on file   Financial Resource Strain: Low Risk  (9/25/2023)    [facility]    Financial Resource Strain     Difficulty of Paying Living Expenses: 3     Difficulty of Paying  Living Expenses: Not on file   Alcohol Use: Not on file   Transportation Needs: No Transportation Needs (9/25/2023)    Received from [facility], [facility]    Transportation Needs     Lack of Transportation (Medical): 1   Physical Activity: Not on file   Interpersonal Safety: Not on file   Stress: Not on file   Social Connections: Socially Isolated (9/25/2023)    Received from [facility], [facility]    Social Connections     Frequency of Communication with Friends and Family: 4   Health Literacy: Not on file       Functional Status:  Prior to admission patient needed assistance:   Dependent ADLs:: Ambulation-walker, Independent  Dependent IADLs:: Laundry, Shopping, Transportation, Meal Preparation  Assesssment of Functional Status: Not at baseline with mobility, Not at  functional baseline    Mental Health Status:  Mental Health Status: Past Concern  Mental Health Management: Psychiatrist    Chemical Dependency Status:  Chemical Dependency Status: Current Concern  Chemical Dependency Management: AA          Values/Beliefs:  Spiritual, Cultural Beliefs, Amish Practices, Values that affect care: no               Discussed  Partnership in Safe Discharge Planning  document with patient/family: No      Next Steps: Await progress ,await palliative recommendation.   CC will cont to follow for discharge needs.     Yuriy Alfaro RN [phone]

## 2024-09-12 NOTE — PROGRESS NOTES
Occupational Therapy: Orders received. Chart reviewed and discussed with care team.? Occupational Therapy not indicated due to pt being moved to comfort cares.? Defer discharge recommendations to treatment team.? Will complete orders.

## 2024-09-12 NOTE — PLAN OF CARE
Orientation: A&Ox4. Able to make needs known.     Vitals/Tele: SR, VSS, SpO2>93% on RA. Neuros intact R pronator drift.     IV Access/drains: L PIV    Diet: 2g NA 2L f/r    Mobility: SBA     GI/: continent     Wound/Skin: Jaundiced, scattered bruising noted. Severe BLE edema, ace wrap during day    Consults: Lymph edema     Discharge Plan: TBD      See Flow sheets for assessment

## 2024-09-13 NOTE — PROGRESS NOTES
[facility]  Hospitalist Progress Note        Kameron Bailey MD   09/13/2024        Interval History:        - After palliative care discussion, care transitioned to comfort focused cares on 9/12/24  -  /care coordinator to follow for hospice disposition; patient wishes to discharge home with hospice care  - Pranya is more alert and awake this morning  - d/w patient and his mother Efra Pires reaffirms his wishes for comfort cares/hospice; does not want to return back to hospital but wants to continue with his medications especially diuretics to keep his swelling down; accordingly will resume PTA Aldactone 50 mg daily, also resume Lasix 40 mg daily (PTA on BID) for symptom management without monitoring vitals or labs    Addendum:  - patient/family met with hospice care team and is planned for discharge to home with [facility] on 9/14/24  - discharge orders completed for discharge on 9/14/24           Assessment and Plan:        Crispin Ham is a 39 year old male with PMH of significant alcohol abuse, end stage liver disease, alcoholic liver cirrhosis, alcoholic hepatitis, portal hypertension with esophageal varices, anemia, chronic lower extremity edema, chronic HFpEF, h/o duodenal ulcers, and history of SBP who was sent to ED from GI clinic with anemia (Hb 5.7)    Patient has now opted for comfort cares/hospice.    Comfort cares/hospice  - After palliative care discussion, care transitioned to comfort focused cares on 9/12/24  -  /care coordinator to follow for hospice disposition; patient wishes to discharge home with hospice care  - on 9/13: d/w patient and his mother Efra Pires reaffirms his wishes for comfort cares/hospice; does not want to return back to hospital but wants to continue with his medications especially diuretics to keep his swelling down; accordingly will resume PTA Aldactone 50 mg daily, also resume Lasix 40 mg daily (PTA on BID) for  symptom management without monitoring vitals or labs    Decompensated liver disease, end-stage liver disease  alcohol abuse with alcoholic cirrhosis  coagulopathy secondary to above  acute on chronic anemia  Pancytopenia related to alcohol use  alcoholic hepatitis  portal hypertension with h/o esophageal varices  orthostatic hypotension  hepatic encephalopathy  - on presentation Cr 1.71, calcium 10.7, magnesium 1.4, AST 50, bilirubin 9.8, ammonia 110, Hb 5.8; INR 2.28, WBC 3.9, platelet 105  - BP has been low in 80s; given vitamin K for coagulopathy of INR 2.8--- no significant changes-- > 2.25  - s/p 2 units PRBC; BP better to low 90s--100s; hemoglobin 5.8---> 7.1---6.8; no further vitals or labs monitoring with comfort cares    - compression stockings ordered  - resumed Lasix and Aldactone as above (9/3)  - ammonia 110-- 133; somnolent on presentation; reported being noncompliant with lactulose and Rifaximin at home; mentation improved; continue lactulose and Rifaximin along with Ciprofloxacin for SBP prophylaxis  - evaluated by GUSTAVO (signed off with comfort cares), note poor prognosis with ongoing alcohol use despite multiple hospitalizations with cirrhosis complications  - to continue midodrine; GI was initially planning for MELD labs and to consider Prednisolone if bilirubin not improving; now comfort cares; GI planned for no endoscopy at this time  - admits to active drinking; counseled on alcohol cessation     Acute on chronic decompensated HFpEF  fluid overload likely also due to cirrhosis  Elevated troponin, suspect demand ischemia  - significantly fluid overloaded on admission  - BNP 1305 with trop 65--- 59, no significant trend  - received 60 mg IV Lasix in ED and then was started on Lasix 40 mg IV BID; diuresis as above  -ECHO 9/12 noted new apical hypokinesis, decreased EF 40-45%; no further cardiac workup given comfort cares  -will order for ACE wraps     acute renal failure on CKD stage 3  Likely  hepatorenal syndrome  mild hypercalcemia  - unclear baseline creatinine; last 1.17 on 8/12/2024, 1.71 upon admission  - renal function better Cr 1.71--->1.49; no further BMP monitoring; diuresis for supportive comfort cares  - continue with Midodrine     History of duodenal ulcers  History of esophageal varices  - will continue with pantoprazole BID  -G.I. plans for no EGD at this time     Depression  - continue Remeron     DVT Prophylaxis: not indicated; comfort cares    Code Status:  DNR/DNI    Diet:   Regular Diet Adult      Disposition:   Medically Ready for Discharge: Anticipated in 2-4 Days pending Hospice Care    Care plan discussed with patient and palliative care  Also updated his mother Juli over the phone    Total time spent today 52 minutes including extensive discussion over goals of care with patient and family       Clinically Significant Risk Factors Present on Admission           # Hypercalcemia: Highest Ca = 10.7 mg/dL in last 2 days, will monitor as appropriate  # Hypomagnesemia: Lowest Mg = 1.4 mg/dL in last 2 days, will replace as needed   # Hypoalbuminemia: Lowest albumin = 3.2 g/dL at 9/12/2024  5:06 AM, will monitor as appropriate    # Coagulation Defect: INR = 2.25 (Ref range: 0.85 - 1.15) and/or PTT = N/A, will monitor for bleeding    # Thrombocytopenia: Lowest platelets = 102 in last 2 days, will monitor for bleeding                           # Financial/Environmental Concerns: unemployed                                  Physical Exam:      Blood pressure 101/64, pulse 98, temperature 96.8  F (36  C), temperature source Axillary, resp. rate 18, weight 93.7 kg (206 lb 9.1 oz), SpO2 100%.  Vitals:    09/11/24 2200 09/12/24 0423   Weight: 93 kg (205 lb 0.4 oz) 93.7 kg (206 lb 9.1 oz)     Vital Signs with Ranges  Temp:  [96.8  F (36  C)-97.8  F (36.6  C)] 96.8  F (36  C)  Pulse:  [] 98  Resp:  [18] 18  BP: ()/(46-64) 101/64  SpO2:  [99 %-100 %] 100 %  I/O's Last 24 hours  I/O  last 3 completed shifts:  In: -   Out: 675 [Urine:675]    Constitutional: Alert and awake ; conversant; deeply icteric; in no apparent distress       Oral cavity: Moist mucosa   Cardiovascular: Normal s1 s2, regular rate and rhythm, no murmur   Lungs: B/l clear to auscultation, no wheezes or crepitations   Abdomen: Soft, nt, nd, no guarding, rigidity or rebound; BS +   LE : B/l pitting edema ++   Musculoskeletal/Neuro Power 5/5 in all extremities; No focal neurological deficits noted   Psychiatry: normal mood and affect                Medications:        Current Facility-Administered Medications   Medication Dose Route Frequency Provider Last Rate Last Admin    ciprofloxacin (CIPRO) tablet 500 mg  500 mg Oral Q24H AwKatie kidd APRN CNP   500 mg at 09/13/24 0635    lactulose (CHRONULAC) solution 20 g  20 g Oral or Feeding Tube TID Katie Calvin APRN CNP   20 g at 09/12/24 2205    midodrine (PROAMATINE) tablet 5 mg  5 mg Oral TID w/meals AwKatie kidd APRN CNP   5 mg at 09/12/24 1740    mirtazapine (REMERON) tablet TABS 7.5 mg  7.5 mg Oral At Bedtime Katie Calvin APRN CNP   7.5 mg at 09/12/24 2205    naltrexone (DEPADE/REVIA) tablet 50 mg  50 mg Oral Daily Kameron Bailey MD   50 mg at 09/12/24 1301    pantoprazole (PROTONIX) EC tablet 40 mg  40 mg Oral BID Katie Calvin APRN CNP   40 mg at 09/12/24 2053    rifaximin (XIFAXAN) tablet 550 mg  550 mg Oral or Feeding Tube BID Katie Calvin APRN CNP   550 mg at 09/12/24 2053    sodium chloride (PF) 0.9% PF flush 3 mL  3 mL Intracatheter Q8H Katie Calvin APRN CNP   3 mL at 09/12/24 1031     PRN Meds:   Current Facility-Administered Medications   Medication Dose Route Frequency Provider Last Rate Last Admin    [START ON 9/15/2024] bisacodyl (DULCOLAX) suppository 10 mg  10 mg Rectal Once PRN Ely Gama APRN CNP        calcium carbonate (TUMS) chewable tablet 1,000 mg  1,000 mg Oral 4x Daily PRN Katie Calvin, MARILYN CNP         carboxymethylcellulose PF (REFRESH PLUS) 0.5 % ophthalmic solution 1-2 drop  1-2 drop Both Eyes Q1H PRN Lanette, Ely R.MARILYN CNP        Continuing ACE inhibitor/ARB/ARNI from home medication list OR ACE inhibitor/ARB/ARNI order already placed during this visit   Does not apply DOES NOT GO TO Katie Grier APRN CNP        HYDROmorphone (PF) (DILAUDID) injection 0.5-1 mg  0.5-1 mg Intravenous Q1H PRN Lanette, Ely R.MARILYN CNP        lidocaine (LMX4) cream   Topical Q1H PRN Katie Calvin APRN CNP        lidocaine 1 % 0.1-1 mL  0.1-1 mL Other Q1H PRN Katie Calvin APRN CNP        LORazepam (ATIVAN) tablet 1-2 mg  1-2 mg Oral Q30 Min PRN Katie Calvin APRN CNP        Or    LORazepam (ATIVAN) injection 1-2 mg  1-2 mg Intravenous Q30 Min PRN Katie Calvin APRN CNP        mineral oil-hydrophilic petrolatum (AQUAPHOR)   Topical Q1H PRN Lanette, Ely R.MARILYN CNP        naloxone (NARCAN) injection 0.1 mg  0.1 mg Intravenous Q2 Min PRN Lanette, Ely R.MARILYN CNP        naloxone (NARCAN) injection 0.2 mg  0.2 mg Intravenous Q2 Min PRN Lanette, Ely R.MARILYN CNP        OLANZapine zydis (zyPREXA) ODT half-tab 2.5-5 mg  2.5-5 mg Oral Q6H PRN Lanette, Ely R.MARILYN CNP        oxyCODONE (ROXICODONE INTENSOL) 20 mg/mL (HIGH CONC) solution 5-10 mg  5-10 mg Oral Q2H PRN Lanette, Ely R.MARILYN CNP        oxymetazoline (AFRIN) 0.05 % spray 1 spray  1 spray Nasal TID PRN Katie Calvin APRN CNP        Patient is already receiving mechanical prophylaxis   Does not apply Continuous PRN Katie Calvin APRN CNP        prochlorperazine (COMPAZINE) injection 10 mg  10 mg Intravenous Q6H PRN Katie Calvin APRN CNP        Or    prochlorperazine (COMPAZINE) tablet 10 mg  10 mg Oral Q6H PRN Katie Calvin APRN CNP        Or    prochlorperazine (COMPAZINE) suppository 25 mg  25 mg Rectal Q12H PRN Katie Calvin APRN CNP        Reason beta blocker not prescribed   Other DOES NOT GO TO MAR  "Katie Calvin APRN CNP        senna-docusate (SENOKOT-S/PERICOLACE) 8.6-50 MG per tablet 1 tablet  1 tablet Oral BID PRN Katie Calvin APRN CNP        Or    senna-docusate (SENOKOT-S/PERICOLACE) 8.6-50 MG per tablet 2 tablet  2 tablet Oral BID PRN Katie Calvin APRN CNP        sennosides (SENOKOT) tablet 1 tablet  1 tablet Oral BID PRN Ely Gama APRN CNP        sodium chloride (PF) 0.9% PF flush 3 mL  3 mL Intracatheter q1 min prn Katie Calvin APRN CNP                Data:      All new lab and imaging data was reviewed.   Recent Labs   Lab Test 09/12/24  1140 09/12/24  0506 09/12/24  0031 09/11/24  1403 08/12/24  1725 08/12/24  1351   WBC  --  3.0*  --  3.9*  --  5.7   HGB 6.8* 7.1* 6.8* 5.8*  --  8.1*   MCV  --  110*  --  121*  --  106*   PLT  --  102*  --  105*  --  77*   INR  --  2.25*  --  2.28* 2.33*  --       Recent Labs   Lab Test 09/12/24  0506 09/11/24  1403 08/12/24  1351    135  135 128*   POTASSIUM 4.5 4.1  4.1 4.7   CHLORIDE 102 98  98 95*   CO2 23 24  24 25   BUN 21.3* 22.9*  22.9* 33.0*   CR 1.49* 1.71*  1.71* 1.17   ANIONGAP 11 13  13 8   MIGUELINA 10.4 10.7*  10.7* 10.0   * 129*  129* 117*     No lab results found.    Invalid input(s): \"TROP\", \"TROPONINIES\"      "

## 2024-09-13 NOTE — PROGRESS NOTES
[facility] Brief Note    Chart reviewed, transition to comfort cares noted.  GI will sign off.    Dotty Pratt MD  [facility]  [phone]

## 2024-09-13 NOTE — PROGRESS NOTES
Care Management Follow Up    Length of Stay (days): 2    Expected Discharge Date: 09/14/2024     Concerns to be Addressed: discharge planning     Patient plan of care discussed at interdisciplinary rounds: Yes    Anticipated Discharge Disposition: Hospice              Anticipated Discharge Services: None  Anticipated Discharge DME:      Patient/family educated on Medicare website which has current facility and service quality ratings: yes  Education Provided on the Discharge Plan: Yes  Patient/Family in Agreement with the Plan: yes    Referrals Placed by CM/SW: Hospice  Private pay costs discussed: Not applicable    Discussed  Partnership in Safe Discharge Planning  document with patient/family: No     Handoff Completed: No, handoff not indicated or clinically appropriate    Additional Information:   SYLVESTER met with patient/parents and informed patient is planning to discharge home with hospice and would like to work with [facility]. SYLVESTER confirmed address on facesheet is where patient will be discharging to. SYLVESTER informed family will transport at 1200 tomorrow. SYLVESTER spoke with Jag at [facility] and informed they will have hospital bed delivered today and will plan intake tomorrow around [phone]. SYLVESTER discussed discharge plans with family and patient and they are in agreement with discharge home with [facility] hospice tomorrow 09/14 via family at 1200 with intake with [facility] at [phone]. Patient will need a 3 day supply of comfort meds filled here and sent with. SYLVESTER updated hospitalist on discharge tomorrow.     Next Steps: Discharge 09/14    THERON Olivier    [facility]

## 2024-09-13 NOTE — PROGRESS NOTES
[id number]  Orientation: A&Ox4, Lethargic at times.    Aggression Stop Light: Green  Activity: SBA w/ Cane  Diet/BS Checks: Regular  IV Access/Drains: PIV SL   Pain Management: Denies  Abnormal VS/Results: Deferred comfort cares  Bowel/Bladder: Continent B/B   Skin/Wounds: Scattered bruising, juandice, Lymphedema in BLE  Consults:   D/C Disposition: Pending, will discharge on hospice.    Other Info: Arrived on the floor at 9/12 @ 2045, Came from [facility] on Comfort cares

## 2024-09-13 NOTE — PLAN OF CARE
Comfort cares continued. Full assessment and vital signs deferred. Alert and oriented x4. Denies pain. Up SBA. Sat up in chair for meals. Appetite fair. Patient and family met with [facility] this morning. Plan for discharge home with hospice tomorrow afternoon. Meds to be filled here and sent with patient.

## 2024-09-14 NOTE — PROGRESS NOTES
Discharge Note    Patient discharged to home via private vehicle  accompanied by mother and father.  IV: Discontinued  Prescriptions filled and given to patient/family.   Belongings reviewed and sent with patient.   Home medications returned to patient: NA  Equipment sent with: N/A.   patient verbalizes understanding of discharge instructions. AVS given to patient.

## 2024-09-14 NOTE — PROGRESS NOTES
Care Management Discharge Note    Discharge Date: 09/14/2024       Discharge Disposition: Hospice    Discharge Services: None    Discharge DME:      Discharge Transportation: family or friend will provide    Private pay costs discussed: Not applicable    Does the patient's insurance plan have a 3 day qualifying hospital stay waiver?  No    PAS Confirmation Code:    Patient/family educated on Medicare website which has current facility and service quality ratings: yes    Education Provided on the Discharge Plan: Yes  Persons Notified of Discharge Plans: Pt  Patient/Family in Agreement with the Plan: yes    Handoff Referral Completed: No, handoff not indicated or clinically appropriate    Additional Information:  Pt is 39 year old male planning to discharge home today with enrollment in [facility] hospice. SW met with the pt and he noted agreement with the plan. He confirmed his family is planning to come transport him home today. All orders faxed to [facility] for admission, DME delivered yesterday and they confirmed plan for admission between 13:00-15:00 at the pt's home.     Pt noted no other questions or concerns at this time.    JACK Mon, [facility]  Phone [phone]

## 2024-09-14 NOTE — DISCHARGE SUMMARY
[facility]    Discharge Summary  Hospitalist    Date of Admission:  9/11/2024  Date of Discharge:   9/14/2024  Provider:  Lu Rodriguez, DO    Discharge Diagnoses   Acute blood loss anemia  Acute on chronic decompensated HFpEF  Elevated troponin, suspected d/t demand ischemia  End-stage liver disease  Hepatorenal syndrome  Hepatic encephalopathy  Alcoholic liver cirrhosis  Portal HTN with h/o esophageal varices  Orthostatic hypotension  Transition to comfort cares    Other medical issues:  Past Medical History:   Diagnosis Date    (HFpEF) heart failure with preserved ejection fraction (H)     Alcoholic cirrhosis of liver with ascites (H)     Alcoholic hepatitis with ascites (H28)     Esophageal varices (H)     Hepatic encephalopathy (H)     Hypertension     Hyponatremia     Multiple duodenal ulcers     SBP (spontaneous bacterial peritonitis) (H)     Severe alcohol use disorder (H)        History of Present Illness   Crispin Ham is an 39 year old male who presented to the ED from [facility] clinic d/t anemia.  Please see the admission history and physical for full details.    Hospital Course   Crispin Ham was admitted on 9/11/2024.  The following problems were addressed during his hospitalization:     Acute blood loss anemia         End stage liver disease          Hepatorenal syndrome          Hepatic encephalopathy         Noted to have hgb of 5.8 and elevated INR of 2.8.  He was transfused PRBCs and given vitamin K to lower his INR.  He had no obvious clinical s/s of active GI bleeding.  GI consulted and recommended supportive treatment and no urgent endoscopy.     He was given IV lasix and continued on po spironolactone.  He was restarted on home lactulose and rifaximin with improved mentation.  Creat was improving with treatments provided.      He had a consultation with palliative care medicine.  He voiced desire to transition to comfort measures and enroll in hospice.   Mother, Juli, was part of those conversations and was in agreement.  He was discharged home with his parents with plans to enroll in home hospice.          Significant Results and Procedures   none    Pending Results   Unresulted Labs Ordered in the Past 30 Days of this Admission       Date and Time Order Name Status Description    9/12/2024  6:17 AM CONDITIONAL Prepare red blood cells (unit) Preliminary             Code Status   Comfort Care       Primary Care Physician   Blake Canchola    Physical Exam                      Vitals:    09/11/24 2200 09/12/24 0423   Weight: 93 kg (205 lb 0.4 oz) 93.7 kg (206 lb 9.1 oz)     Vital Signs with Ranges     I/O last 3 completed shifts:  In: 440 [P.O.:440]  Out: 350 [Urine:350]    GEN:  Alert, awake, up in chair.  Interactive, slightly jaundiced  HEENT:  Normocephalic/atraumatic, PERRL, no scleral icterus, no nasal discharge, mouth moist,   CV:  somewhat distant bur regular rate and rhythma.  LUNGS:  Clear to auscultation ant/lat bilaterally.  No rales/rhonchi/wheezing auscultated bilaterally.  No costal retractions bilaterally.  Symmetric chest rise on inhalation noted.  ABD:  Active bowel sounds, soft, non-tender to light palpation throughout, mod distended.  No rebound/guarding/rigidity.     EXT:  2+ edema to legs bilaterally.  No rashes  NEURO:  No tremors at rest, speech is clear and appropriate    Discharge Disposition   Admited to hospice care.   Discharged to home with his parents.    Consultations This Hospital Stay   OCCUPATIONAL THERAPY ADULT IP CONSULT  PALLIATIVE CARE ADULT IP CONSULT  GASTROENTEROLOGY IP CONSULT  CARE MANAGEMENT / SOCIAL WORK IP CONSULT  LYMPHEDEMA THERAPY IP CONSULT  CARE MANAGEMENT / SOCIAL WORK IP CONSULT    Time Spent on this Encounter   Lu VIGIL DO, personally saw the patient today and spent less than or equal to 30 minutes discharging this patient.    Discharge Orders      Reason for your hospital stay    You were  admitted for evaluation of anemia, liver failure. You have opted to continue comfort focused cares only and to enroll in Hospice.     Follow-up and recommended labs and tests     Follow up with Hospice Care Team.     Activity    Your activity upon discharge: activity as tolerated     Diet    Follow this diet upon discharge:       Regular Diet Adult     Discharge Medications   Current Discharge Medication List        START taking these medications    Details   HYDROmorphone, STANDARD CONC, (DILAUDID) 1 MG/ML oral solution Take 1-2 mLs (1-2 mg) by mouth or place under tongue every 2 hours as needed for pain or shortness of breath.  Qty: 50 mL, Refills: 0    Associated Diagnoses: Hospice care patient      LORazepam (ATIVAN) 0.5 MG tablet Take 0.5-1 tablets (0.25-0.5 mg) by mouth or place under tongue every 4 hours as needed for anxiety (restlessness).  Qty: 30 tablet, Refills: 0    Associated Diagnoses: Hospice care patient           CONTINUE these medications which have CHANGED    Details   furosemide (LASIX) 40 MG tablet Take 1 tablet (40 mg) by mouth daily.    Associated Diagnoses: Liver disease      lactulose (CHRONULAC) 10 GM/15ML solution Take 30 mLs (20 g) by mouth or Feeding Tube 3 times daily.    Associated Diagnoses: Acute liver failure without hepatic coma; Hepatic encephalopathy (H)      spironolactone (ALDACTONE) 50 MG tablet Take 1 tablet (50 mg) by mouth daily.  Qty: 30 tablet, Refills: 0    Associated Diagnoses: Liver disease           CONTINUE these medications which have NOT CHANGED    Details   ciprofloxacin (CIPRO) 500 MG tablet Take 1 tablet (500 mg) by mouth every 24 hours Start taking after you are done with amoxicillin    Associated Diagnoses: Alcoholic cirrhosis of liver without ascites (H)      folic acid (FOLVITE) 1 MG tablet Take 1 tablet (1 mg) by mouth daily  Qty: 30 tablet, Refills: 0    Associated Diagnoses: Alcohol abuse      magnesium oxide 400 MG tablet Take 400 mg by mouth daily       midodrine (PROAMATINE) 5 MG tablet Take 5 mg by mouth 3 times daily (with meals)      mirtazapine (REMERON) 7.5 MG tablet Take 1 tablet (7.5 mg) by mouth at bedtime  Qty: 30 tablet, Refills: 0    Comments: Future refills by PCP Dr. Blake Canchola with phone number [phone].  Associated Diagnoses: Anxiety      multivitamin, therapeutic (THERA-VIT) TABS tablet Take 1 tablet by mouth daily      naltrexone (DEPADE/REVIA) 50 MG tablet Take 50 mg by mouth daily      oxymetazoline (AFRIN) 0.05 % nasal spray Spray 0.1 mLs (1 spray) in nostril 3 times daily as needed for other (bleeding, APPLY SPRAY IF BLEEDING STARTS AND PUT PRESSURE)  Qty: 15 mL, Refills: 0    Associated Diagnoses: Anemia in other chronic diseases classified elsewhere      pantoprazole (PROTONIX) 40 MG EC tablet Take 1 tablet (40 mg) by mouth 2 times daily  Qty: 60 tablet, Refills: 0    Associated Diagnoses: Hepatic encephalopathy (H); Acute liver failure without hepatic coma      rifaximin (XIFAXAN) 550 MG TABS tablet 1 tablet (550 mg) by Oral or Feeding Tube route 2 times daily  Qty: 120 tablet, Refills: 0    Associated Diagnoses: Hepatic encephalopathy (H); Acute liver failure without hepatic coma      sodium chloride (OCEAN) 0.65 % nasal spray Spray 2 sprays into both nostrils 3 times daily  Qty: 60 mL, Refills: 0    Associated Diagnoses: Epistaxis      thiamine (B-1) 100 MG tablet Take 1 tablet (100 mg) by mouth daily  Qty: 30 tablet, Refills: 0    Associated Diagnoses: Alcohol abuse           Allergies   Allergies   Allergen Reactions    Aspirin Angioedema and Swelling    Aspirin-Acetaminophen-Caffeine Swelling     puffy eyes and dry throat a few years ago. Tolerates ibuprofen and acetaminophen, but avoids aspirin    Lisinopril Cough and Other (See Comments)    Nsaids Angioedema     Patient reports having a reaction of puffy eyes and dry throat a few years ago, but he has taken Advil in 2023 and did not react.     Data   Recent Labs   Lab  "09/12/24  1140 09/12/24  0506 09/12/24  0031 09/11/24  1403   WBC  --  3.0*  --  3.9*   HGB 6.8* 7.1* 6.8* 5.8*   HCT  --  20.3*  --  17.0*   MCV  --  110*  --  121*   PLT  --  102*  --  105*     Recent Labs   Lab 09/12/24  0506 09/11/24  1403    135  135   POTASSIUM 4.5 4.1  4.1   CHLORIDE 102 98  98   CO2 23 24 24   ANIONGAP 11 13 13   * 129*  129*   BUN 21.3* 22.9*  22.9*   CR 1.49* 1.71*  1.71*   GFRESTIMATED 62 52*  52*   MIGUELINA 10.4 10.7*  10.7*     Recent Labs   Lab 09/12/24  1108 09/12/24  0506 09/11/24  1403   NA  --  136 135  135   POTASSIUM  --  4.5 4.1  4.1   CHLORIDE  --  102 98  98   CO2  --  23 24 24   ANIONGAP  --  11 13 13   GLC  --  105* 129*  129*   BUN  --  21.3* 22.9*  22.9*   CR  --  1.49* 1.71*  1.71*   GFRESTIMATED  --  62 52*  52*   MIGUELINA  --  10.4 10.7*  10.7*   MAG 1.8 2.0 1.4*   PHOS  --   --  3.5   PROTTOTAL  --  6.2* 6.6   ALBUMIN  --  3.2* 3.5   BILITOTAL  --  9.2* 9.8*   ALKPHOS  --  109 127   AST  --   --  50*   ALT  --  18 19     Recent Labs   Lab 09/12/24  0547 09/12/24  0506 09/11/24  1628 09/11/24  1403   AST  --   --   --  50*   ALT  --  18  --  19   ALKPHOS  --  109  --  127   BILITOTAL  --  9.2*  --  9.8*   *  --  110*  --      Recent Labs   Lab 09/12/24  0506 09/11/24  1403   INR 2.25* 2.28*     Recent Labs   Lab 09/12/24  0506 09/11/24  1403   BUN 21.3* 22.9*  22.9*   CR 1.49* 1.71*  1.71*     No results for input(s): \"COLOR\", \"APPEARANCE\", \"URINEGLC\", \"URINEBILI\", \"URINEKETONE\", \"SG\", \"UBLD\", \"URINEPH\", \"PROTEIN\", \"UROBILINOGEN\", \"NITRITE\", \"LEUKEST\", \"RBCU\", \"WBCU\" in the last 168 hours.  Results for orders placed or performed during the hospital encounter of 09/11/24   Echocardiogram Complete     Value    LVEF  45-50%    [facility]    [id number]  [id number]  [id number]^KASSIDY^KRISS^HERIBERTO     [facility]  Echocardiography Laboratory  [address] [place] [id number]     Name: JIMMY COLLADO  MRN: " [id number]  : 1985  Study Date: 2024 08:00 AM  Age: 39 yrs  Gender: Male  Patient Location: [facility]  Reason For Study: Heart Failure  Ordering Physician: KRISS YI  Performed By: Leann Ellis     BSA: 2.2 m2  Height: 72 in  Weight: 206 lb  HR: 95  BP: 101/58 mmHg  ______________________________________________________________________________  Procedure  Complete Portable Echo Adult. Optison (NDC #[id number]) given intravenously.  Contrast Optison.  ______________________________________________________________________________  Interpretation Summary     Apical hypokinesis.  The left ventricle is mildly dilated. LVedd 6.3 cm.  Left ventricular systolic function is mildly reduced. The visual ejection  fraction is 45-50%.  The left atrium is severely dilated.  IVC diameter >2.1 cm collapsing <50% with sniff suggests a high RA pressure  estimated at 15 mmHg or greater.  Right ventricular systolic pressure could not be approximated due to  inadequate tricuspid regurgitation.     Compared directly with the images of the previous study, the apical  hypokinesis is new and LV function has noticably decreased.  ______________________________________________________________________________  Left Ventricle  The left ventricle is mildly dilated. There is normal left ventricular wall  thickness. Left ventricular systolic function is mildly reduced. The visual  ejection fraction is 45-50%. Apical hypokinesis.     Right Ventricle  The right ventricle is normal in structure, function and size.     Atria  The left atrium is severely dilated. Right atrial size is normal. There is no  atrial shunt seen.     Mitral Valve  The mitral valve is normal in structure and function. There is trace mitral  regurgitation.     Tricuspid Valve  The tricuspid valve is normal in structure and function. There is trace  tricuspid regurgitation. IVC diameter >2.1 cm collapsing <50% with sniff  suggests a high RA pressure estimated  at 15 mmHg or greater. Right ventricular  systolic pressure could not be approximated due to inadequate tricuspid  regurgitation.     Aortic Valve  The aortic valve is normal in structure and function. No aortic regurgitation  is present. No aortic stenosis is present.     Pulmonic Valve  The pulmonic valve is not well seen, but is grossly normal. There is trace  pulmonic valvular regurgitation.     Vessels  Normal size aorta.     Pericardium  There is no pericardial effusion. Moderate left pleural effusion.     Rhythm  Sinus rhythm was noted.  ______________________________________________________________________________  MMode/2D Measurements & Calculations     IVSd: 1.0 cm  LVIDd: 6.3 cm  LVIDs: 4.7 cm  LVPWd: 1.1 cm  FS: 25.4 %  LV mass(C)d: 285.7 grams  LV mass(C)dI: 132.4 grams/m2  Ao root diam: 3.8 cm  asc Aorta Diam: 2.7 cm  LVOT diam: 2.5 cm  LVOT area: 4.9 cm2  Ao root diam index Ht(cm/m): 2.1  Ao root diam index BSA (cm/m2): 1.8  Asc Ao diam index BSA (cm/m2): 1.3  Asc Ao diam index Ht(cm/m): 1.5  EF Biplane: 41.7 %  LA Volume (BP): 133.0 ml     LA Volume Index (BP): 61.6 ml/m2  RWT: 0.35  TAPSE: 4.0 cm     Doppler Measurements & Calculations  MV E max redd: 164.0 cm/sec  MV dec slope: 933.0 cm/sec2  MV dec time: 0.18 sec  Ao V2 max: 172.0 cm/sec  Ao max P.0 mmHg  Ao V2 mean: 123.0 cm/sec  Ao mean P.0 mmHg  Ao V2 VTI: 39.0 cm  LUCY(I,D): 4.4 cm2  LUCY(V,D): 4.5 cm2  LV V1 max P.9 mmHg  LV V1 max: 157.0 cm/sec  LV V1 VTI: 35.0 cm  SV(LVOT): 171.8 ml  SI(LVOT): 79.6 ml/m2  PA acc time: 0.14 sec  AV Redd Ratio (DI): 0.91  LUCY Index (cm2/m2): 2.0     E/E' av.3  Lateral E/e': 14.1  Medial E/e': 14.4  RV S Redd: 17.7 cm/sec     ______________________________________________________________________________  Report approved by: Teri Lyn 2024 10:10 AM

## 2024-09-14 NOTE — PLAN OF CARE
Orientation: A&Ox4  Aggression Stop Light: Green  Activity: SBA w/ Cane.   Diet/BS Checks: Regular  IV Access/Drains: PIV SL   Pain Management: Denies  Abnormal VS/Results: Deferred d/t comfort cares  Bowel/Bladder: Continent B/B  Skin/Wounds: Scattered bruising, juandice, Lymphedema in BLE  Consults:   D/C Disposition:discharge home today on hospice family will transport at 12.   -meds in discharge meds in bin

## 2024-09-14 NOTE — PLAN OF CARE
Goal Outcome Evaluation:  Orientation: A&Ox4, Flat expression. Cooperative with cares.  Aggression Stop Light: Green  Activity: SBA w/ Cane.   Diet/BS Checks: Regular. Adequate oral intake. Tolerates well.   IV Access/Drains: PIV SL   Pain Management: Denies  Abnormal VS/Results: Deferred comfort cares  Bowel/Bladder: Continent B/B. 1 BM this shift.   Skin/Wounds: Scattered bruising, juandice, Lymphedema in BLE  Consults:   D/C Disposition: Pending, will discharge on hospice.    - discharge meds in cabinet

## 2024-09-16 NOTE — PROGRESS NOTES
Connected Care Resource Center: [facility] Care Resource [place]    Background: Transitional Care Management program identified per system criteria and reviewed by Connected Care Resource Center team for possible outreach.    Assessment: Upon chart review, CCRC Team member will not proceed with patient outreach related to this episode of Transitional Care Management program due to reason below:    Patient has been discharged with Hospice Care    Plan: Transitional Care Management episode addressed appropriately per reason noted above.      RACHEL Pillai  [facility] [place]cted Care Resource Team does NOT follow patient ongoing. Referrals are identified based on internal discharge reports and the outreach is to ensure patient has an understanding of their discharge instructions.

## (undated) DEVICE — CLIP HEMOSTASIS ASSURANCE W16 MM BX00711884

## (undated) DEVICE — SOL WATER IRRIG 1000ML BOTTLE 2F7114

## (undated) DEVICE — DECANTER BAG 2002S

## (undated) RX ORDER — LIDOCAINE HYDROCHLORIDE 10 MG/ML
INJECTION, SOLUTION EPIDURAL; INFILTRATION; INTRACAUDAL; PERINEURAL
Status: DISPENSED
Start: 2024-01-01

## (undated) RX ORDER — BUPIVACAINE HYDROCHLORIDE 5 MG/ML
INJECTION, SOLUTION EPIDURAL; INTRACAUDAL
Status: DISPENSED
Start: 2024-01-01

## (undated) RX ORDER — FENTANYL CITRATE 50 UG/ML
INJECTION, SOLUTION INTRAMUSCULAR; INTRAVENOUS
Status: DISPENSED
Start: 2023-09-27

## (undated) RX ORDER — TRIAMCINOLONE ACETONIDE 40 MG/ML
INJECTION, SUSPENSION INTRA-ARTICULAR; INTRAMUSCULAR
Status: DISPENSED
Start: 2024-01-01